# Patient Record
Sex: FEMALE | Race: WHITE | Employment: OTHER | URBAN - METROPOLITAN AREA
[De-identification: names, ages, dates, MRNs, and addresses within clinical notes are randomized per-mention and may not be internally consistent; named-entity substitution may affect disease eponyms.]

---

## 2017-01-25 ENCOUNTER — TELEPHONE (OUTPATIENT)
Dept: INTERNAL MEDICINE CLINIC | Facility: CLINIC | Age: 82
End: 2017-01-25

## 2017-01-25 RX ORDER — LEVOTHYROXINE SODIUM 0.1 MG/1
TABLET ORAL
Qty: 90 TABLET | Refills: 0 | Status: SHIPPED | OUTPATIENT
Start: 2017-01-25 | End: 2017-05-08

## 2017-01-25 NOTE — TELEPHONE ENCOUNTER
Hypothyroid Medications  Protocol Criteria:  Appointment scheduled in the past 12 months or the next 3 months  TSH resulted in the past 12 months that is normal  Recent Visits       Provider Department Primary Dx    7 months ago MD Radu Wrightmarti

## 2017-01-26 NOTE — TELEPHONE ENCOUNTER
Pt. States that she is in [place] until May, and she is calling to f/up on getting her refill for Venlafaxine HCL 25mg., She states that the [facility] in [place] only received 1 Rx for her thyroid medication, but she states that she also needs a refill f

## 2017-01-27 NOTE — TELEPHONE ENCOUNTER
Refill Protocol Appointment Criteria: Failed per protocol please advise    · Appointment scheduled in the past 6 months or in the next 3 months  Recent Visits       Provider Department Primary Dx    7 months ago Boone Peck MD [facility], [facility], [place]

## 2017-01-28 RX ORDER — VENLAFAXINE 25 MG/1
TABLET ORAL
Qty: 90 TABLET | Refills: 0 | Status: SHIPPED | OUTPATIENT
Start: 2017-01-28 | End: 2017-05-17 | Stop reason: ALTCHOICE

## 2017-02-20 NOTE — TELEPHONE ENCOUNTER
Dr. Boris Reid, please see pended prescription and advise. LOV 11/29/16  Last prescribed 11/18/16 for 90 tabs/0 refills.

## 2017-02-20 NOTE — TELEPHONE ENCOUNTER
Patient calling to check on status of RX advise nothing in epic, patient states she asked her pharmacy to send it a week ago   Patient is out of medication and need it please     Clopidogrel Bisulfate 75 MG

## 2017-02-21 RX ORDER — CLOPIDOGREL BISULFATE 75 MG/1
75 TABLET ORAL
Qty: 90 TABLET | Refills: 0 | Status: SHIPPED | OUTPATIENT
Start: 2017-02-21 | End: 2017-05-15

## 2017-05-08 NOTE — TELEPHONE ENCOUNTER
Pt is calling state that she have been wait 6 day for her refill and the pharm still don have it yet pt is not happy    Current Outpatient Prescriptions:  Levothyroxine Sodium 100 MCG Oral Tab TAKE 1 TABLET BY MOUTH DAILY BEFORE BREAKFAST Disp: 90 tablet R

## 2017-05-09 NOTE — TELEPHONE ENCOUNTER
Hypothyroid Medications  Protocol Criteria:  Appointment scheduled in the past 12 months or the next 3 months  TSH resulted in the past 12 months that is normal  Recent Visits       Provider Department Primary Dx    11 months ago Denys Castañeda MD [facility]

## 2017-05-12 RX ORDER — LEVOTHYROXINE SODIUM 0.1 MG/1
TABLET ORAL
Qty: 30 TABLET | Refills: 0 | Status: SHIPPED | OUTPATIENT
Start: 2017-05-12 | End: 2017-05-12

## 2017-05-13 RX ORDER — LEVOTHYROXINE SODIUM 0.1 MG/1
TABLET ORAL
Qty: 90 TABLET | Refills: 0 | Status: SHIPPED | OUTPATIENT
Start: 2017-05-13 | End: 2017-09-28

## 2017-05-15 RX ORDER — CLOPIDOGREL BISULFATE 75 MG/1
TABLET ORAL
Qty: 90 TABLET | Refills: 0 | Status: SHIPPED | OUTPATIENT
Start: 2017-05-15 | End: 2017-08-26

## 2017-05-17 ENCOUNTER — OFFICE VISIT (OUTPATIENT)
Dept: INTERNAL MEDICINE CLINIC | Facility: CLINIC | Age: 82
End: 2017-05-17

## 2017-05-17 VITALS
WEIGHT: 131 LBS | DIASTOLIC BLOOD PRESSURE: 72 MMHG | BODY MASS INDEX: 22 KG/M2 | HEART RATE: 69 BPM | SYSTOLIC BLOOD PRESSURE: 136 MMHG | TEMPERATURE: 98 F

## 2017-05-17 DIAGNOSIS — M54.5 ACUTE LEFT-SIDED LOW BACK PAIN, WITH SCIATICA PRESENCE UNSPECIFIED: ICD-10-CM
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
## 2017-05-17 PROBLEM — M54.50 LOWER BACK PAIN: Status: ACTIVE | Noted: 2017-05-17

## 2017-05-17 PROCEDURE — 99214 OFFICE O/P EST MOD 30 MIN: CPT | Performed by: INTERNAL MEDICINE

## 2017-05-17 PROCEDURE — G0463 HOSPITAL OUTPT CLINIC VISIT: HCPCS | Performed by: INTERNAL MEDICINE

## 2017-05-17 RX ORDER — TRAMADOL HYDROCHLORIDE 50 MG/1
TABLET ORAL
Refills: 0 | COMMUNITY
Start: 2017-03-17 | End: 2017-08-14

## 2017-05-17 RX ORDER — ROSUVASTATIN CALCIUM 20 MG/1
TABLET, COATED ORAL
Refills: 5 | COMMUNITY
Start: 2017-03-08 | End: 2019-04-29

## 2017-05-17 RX ORDER — MECLIZINE HYDROCHLORIDE 25 MG/1
TABLET ORAL
Refills: 3 | COMMUNITY
Start: 2017-04-03 | End: 2017-08-29

## 2017-05-17 RX ORDER — VALSARTAN AND HYDROCHLOROTHIAZIDE 160; 12.5 MG/1; MG/1
TABLET, FILM COATED ORAL
Refills: 0 | COMMUNITY
Start: 2017-03-27 | End: 2017-10-13

## 2017-05-18 ENCOUNTER — HOSPITAL ENCOUNTER (OUTPATIENT)
Dept: GENERAL RADIOLOGY | Facility: HOSPITAL | Age: 82
Discharge: HOME | End: 2017-05-18
Attending: INTERNAL MEDICINE
Payer: MEDICARE
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
## 2017-05-18 PROCEDURE — 72220 X-RAY EXAM SACRUM TAILBONE: CPT | Performed by: INTERNAL MEDICINE

## 2017-05-18 PROCEDURE — 73523 X-RAY EXAM HIPS BI 5/> VIEWS: CPT | Performed by: INTERNAL MEDICINE

## 2017-05-18 PROCEDURE — 72120 X-RAY BEND ONLY L-S SPINE: CPT | Performed by: INTERNAL MEDICINE

## 2017-05-18 NOTE — PATIENT INSTRUCTIONS
Back Pain (Acute or Chronic)    Back pain is one of the most common problems. The good news is that most people feel better in 1 to 2 weeks, and most of the rest in 1 to 2 months. Most people can remain active.   People experience and describe pain differ Unless you had a physical injury (for example, a car accident or fall) X-rays are usually not needed for the initial evaluation of back pain. If pain continues and does not respond to medical treatment, X-rays and other tests may be needed.   Home care  Try Talk to your doctor before using medicine, especially if you have other medical problems or are taking other medicines. · You may use over-the-counter medicine as directed on the bottle to control pain, unless another pain medicine was prescribed.  If you Important Note: Do not give aspirin to children or teens without first discussing it with your healthcare provider.      ? Ice    Ice reduces muscle pain and swelling. It helps most during the first 24 to 48 hours after an injury.   · Wrap an ice pack or a

## 2017-05-18 NOTE — PROGRESS NOTES
HPI:    Patient ID: Hawa Reece is a 80year old female.     HPI she is here after fall    She states that she fell accidentally last week while she was going to  her bone , and she felt on her back   She state that she felt pain in her buttoc Psychiatric/Behavioral: Negative for hallucinations, behavioral problems, confusion, sleep disturbance and agitation. The patient is not nervous/anxious.             Current Outpatient Prescriptions:  Rosuvastatin Calcium 20 MG Oral Tab TK 1 T PO HS Disp: 0 Standard drinks or equivalent per week       Comment: Weekly, Wine, 5 glasses;        PHYSICAL EXAM:    Physical Exam   Constitutional: She is oriented to person, place, and time. She appears well-developed and well-nourished. No distress.    HENT: Lumbar back: She exhibits tenderness and pain. She exhibits normal range of motion, no bony tenderness, no swelling, no edema, no deformity, no spasm and normal pulse. Back:    Lymphadenopathy:     She has no cervical adenopathy.      She has no

## 2017-05-30 ENCOUNTER — PRIOR ORIGINAL RECORDS (OUTPATIENT)
Dept: OTHER | Age: 82
End: 2017-05-30

## 2017-06-12 ENCOUNTER — TELEPHONE (OUTPATIENT)
Dept: INTERNAL MEDICINE CLINIC | Facility: CLINIC | Age: 82
End: 2017-06-12

## 2017-06-12 DIAGNOSIS — M25.552 LEFT HIP PAIN: Primary | ICD-10-CM

## 2017-06-12 DIAGNOSIS — M54.50 SEVERE LOW BACK PAIN: ICD-10-CM

## 2017-06-12 DIAGNOSIS — Z91.81 HISTORY OF FALL: ICD-10-CM

## 2017-06-12 NOTE — TELEPHONE ENCOUNTER
Brock Yusuf from [facility] called in requesting an order for pt to receive out patient physical therapy with [facility] in [place] - fax # [phone]. Brock Yusuf states pt is going to be discharged from home physical therapy this week.

## 2017-06-13 ENCOUNTER — TELEPHONE (OUTPATIENT)
Dept: INTERNAL MEDICINE CLINIC | Facility: CLINIC | Age: 82
End: 2017-06-13

## 2017-06-15 ENCOUNTER — OFFICE VISIT (OUTPATIENT)
Dept: INTERNAL MEDICINE CLINIC | Facility: CLINIC | Age: 82
End: 2017-06-15

## 2017-06-15 VITALS — WEIGHT: 134 LBS | BODY MASS INDEX: 22.33 KG/M2 | HEIGHT: 65 IN

## 2017-06-15 DIAGNOSIS — I71.4 ABDOMINAL AORTIC ANEURYSM (AAA) WITHOUT RUPTURE (HCC): ICD-10-CM

## 2017-06-15 DIAGNOSIS — Z85.3 PERSONAL HISTORY OF MALIGNANT NEOPLASM OF BREAST: ICD-10-CM

## 2017-06-15 DIAGNOSIS — E78.5 HYPERLIPIDEMIA, UNSPECIFIED HYPERLIPIDEMIA TYPE: ICD-10-CM

## 2017-06-15 DIAGNOSIS — I10 ESSENTIAL HYPERTENSION WITH GOAL BLOOD PRESSURE LESS THAN 130/80: ICD-10-CM

## 2017-06-15 DIAGNOSIS — Z00.00 ENCOUNTER FOR MEDICARE ANNUAL WELLNESS EXAM: Primary | ICD-10-CM

## 2017-06-15 DIAGNOSIS — E03.9 HYPOTHYROIDISM, UNSPECIFIED TYPE: ICD-10-CM

## 2017-06-15 DIAGNOSIS — I73.9 PAD (PERIPHERAL ARTERY DISEASE) (HCC): ICD-10-CM

## 2017-06-15 DIAGNOSIS — Z91.81 AT HIGH RISK FOR FALLS: ICD-10-CM

## 2017-06-15 DIAGNOSIS — Z12.31 VISIT FOR SCREENING MAMMOGRAM: ICD-10-CM

## 2017-06-15 DIAGNOSIS — R42 VERTIGO: ICD-10-CM

## 2017-06-15 DIAGNOSIS — R26.9 GAIT ABNORMALITY: ICD-10-CM

## 2017-06-15 DIAGNOSIS — I25.810 CORONARY ARTERY DISEASE INVOLVING OTHER CORONARY ARTERY BYPASS GRAFT: ICD-10-CM

## 2017-06-15 DIAGNOSIS — M17.0 PRIMARY OSTEOARTHRITIS OF BOTH KNEES: ICD-10-CM

## 2017-06-15 PROCEDURE — G0439 PPPS, SUBSEQ VISIT: HCPCS | Performed by: INTERNAL MEDICINE

## 2017-06-15 PROCEDURE — 99213 OFFICE O/P EST LOW 20 MIN: CPT | Performed by: INTERNAL MEDICINE

## 2017-06-15 NOTE — PROGRESS NOTES
HPI:    Patient ID: Eusebia Brantley is a 80year old female.     HPI    Medicare Annual and follow up of Chronic problemse  Living Will filed 2004  CAriology Dr Kane Raygoza last visit 7/2016  PAD  Right poplitieal stent 7 years  No caludication  Last seen by daniela adenopathy. Does not bruise/bleed easily. Psychiatric/Behavioral: Negative for agitation and behavioral problems.            Current Outpatient Prescriptions:  Rosuvastatin Calcium 20 MG Oral Tab TK 1 T PO HS Disp:  Rfl: 5   Meclizine HCl 25 MG Oral Tab T female  2000        \"Left sided breast cancer\"    PAST MEDICAL/SURGICAL HISTORY:  1. Coronary artery disease status post quintuple bypass [facility].  No cath since that time.  Her stress testing has shown a small     area of nonreversibl distension and no mass. There is no hepatosplenomegaly. There is no tenderness. No hernia. Musculoskeletal: She exhibits no edema or tenderness. Right knee: She exhibits decreased range of motion.         Left knee: She exhibits decreased range of seen Negative   MICROSCOPIC URINALYSIS COMMENT       Completed   WBC      4.0 - 11.0 K/UL 5.5   RBC      3.70 - 5.40 M/UL 3.99   Hemoglobin      12.0 - 16.0 G/DL 12.1   Hematocrit      35.0 - 48.0 % 36.8   MCV      80.0 - 100.0 FL 92.4   MCH      27.0 - 32 Zaida Masters MD on 10/09/2015 at 17:56     Approved by (CST):  Zaida Masters MD on 10/09/2015 at 17:56    ASSESSMENT/PLAN:   (Z00.00) Encounter for Medicare annual wellness exam  (primary encounter diagnosis)  Plan: Advance directive discussed    5/24/2016            Pneumococcal Vaccine, Conjugate 11/8/2013           #[id number]      HPI:   Armida Fernandez is a 80year old female who presents for a Medicare Annual Wellness visit.     Patient Active Problem List:     Personal history of malignant deborah 0-No    Are you on multiple medications?: 1-Yes    Does pain affect your day to day activities?: 1-Yes     Have you had any memory issues?: 1-Yes    Fall/Risk Scorin          Depression Screening (PHQ-2/PHQ-9): Over the LAST 2 WEEKS   Little interest o Valsartan-Hydrochlorothiazide 320-12.5 MG Oral Tab Take 1 tablet by mouth once daily.  Disp: 90 tablet Rfl: 1      MEDICAL INFORMATION:   Past Medical History:   Diagnosis Date   • Diverticulosis 2010   • History of breast cancer in female 5    \"Left SERVICES  INDICATIONS AND SCHEDULE Internal Lab or Procedure External Lab or Procedure   Diabetes Screening      HbgA1C   Annually No results found for: A1C No flowsheet data found.     Fasting Blood Sugar (FSB)Annually No results found for: GLUCOSE    Card Internal Lab or Procedure External Lab or Procedure   Annual Monitoring of Persistent     Medications (ACE/ARB, digoxin diuretics, anticonvulsants.)    Potassium  Annually POTASSIUM (P) (mmol/L)   Date Value   05/17/2016 4.6    No flowsheet data found. found for this or any previous visit.          SUGGESTED VACCINATIONS - Influenza, Pneumococcal, Zoster, Tetanus     Immunization History  Administered            Date(s) Administered    Influenza             11/15/2008  10/15/2010  11/08/2013      Pneumoco

## 2017-06-26 ENCOUNTER — LAB ENCOUNTER (OUTPATIENT)
Dept: LAB | Facility: HOSPITAL | Age: 82
End: 2017-06-26
Attending: INTERNAL MEDICINE
Payer: MEDICARE

## 2017-06-26 DIAGNOSIS — I25.10 CAD (CORONARY ARTERY DISEASE): Primary | ICD-10-CM

## 2017-06-26 PROCEDURE — 36415 COLL VENOUS BLD VENIPUNCTURE: CPT

## 2017-06-26 PROCEDURE — 82550 ASSAY OF CK (CPK): CPT

## 2017-06-26 PROCEDURE — 80061 LIPID PANEL: CPT

## 2017-06-26 PROCEDURE — 80053 COMPREHEN METABOLIC PANEL: CPT

## 2017-06-27 ENCOUNTER — PRIOR ORIGINAL RECORDS (OUTPATIENT)
Dept: OTHER | Age: 82
End: 2017-06-27

## 2017-06-27 LAB
ALBUMIN: 3.8 G/DL
ALT (SGPT): 14 U/L
AST (SGOT): 23 U/L
BILIRUBIN TOTAL: 0.5 MG/DL
BUN: 32 MG/DL
CALCIUM: 9.4 MG/DL
CHLORIDE: 102 MEQ/L
CHOLESTEROL, TOTAL: 183 MG/DL
CREATININE KINASE: 44 U/L
CREATININE, SERUM: 0.93 MG/DL
GLOBULIN: 3 G/DL
GLUCOSE: 96 MG/DL
GLUCOSE: 96 MG/DL
HDL CHOLESTEROL: 57 MG/DL
LDL CHOLESTEROL: 98 MG/DL
POTASSIUM, SERUM: 4.4 MEQ/L
PROTEIN, TOTAL: 6.8 G/DL
SGOT (AST): 23 IU/L
SGPT (ALT): 14 IU/L
SODIUM: 138 MEQ/L
TRIGLYCERIDES: 140 MG/DL

## 2017-07-12 PROBLEM — M25.562 ACUTE PAIN OF BOTH KNEES: Status: ACTIVE | Noted: 2017-07-12

## 2017-07-12 PROBLEM — M25.561 ACUTE PAIN OF BOTH KNEES: Status: ACTIVE | Noted: 2017-07-12

## 2017-07-12 PROBLEM — M17.0 PRIMARY OSTEOARTHRITIS OF KNEES, BILATERAL: Status: ACTIVE | Noted: 2017-07-12

## 2017-07-18 ENCOUNTER — TELEPHONE (OUTPATIENT)
Dept: NEUROLOGY | Facility: CLINIC | Age: 82
End: 2017-07-18

## 2017-07-20 ENCOUNTER — OFFICE VISIT (OUTPATIENT)
Dept: NEUROLOGY | Facility: CLINIC | Age: 82
End: 2017-07-20

## 2017-07-20 VITALS
HEIGHT: 65 IN | DIASTOLIC BLOOD PRESSURE: 78 MMHG | HEART RATE: 59 BPM | BODY MASS INDEX: 21.99 KG/M2 | WEIGHT: 132 LBS | OXYGEN SATURATION: 95 % | SYSTOLIC BLOOD PRESSURE: 110 MMHG

## 2017-07-20 DIAGNOSIS — R42 VERTIGO: Primary | ICD-10-CM

## 2017-07-20 PROCEDURE — 99204 OFFICE O/P NEW MOD 45 MIN: CPT | Performed by: OTHER

## 2017-07-20 NOTE — PROGRESS NOTES
Blas Blanca : 1928     HPI:   Patient presents with:  Dizziness: New patient. Referred by Dr. Cristy Etienne. Dizziness started when on vacation in [place]. Denies any dizziness recently. Taking Meclizine and is helping.  Patients denies any falls d BEFORE BREAKFAST Disp: 90 tablet Rfl: 0   Acidophilus/Pectin Oral Cap Take 1 capsule by mouth daily.  Disp:  Rfl:    Metoprolol Succinate ER 25 MG Oral Tablet 24 Hr  Disp:  Rfl: 0   TraMADol HCl 50 MG Oral Tab TK 1 T PO BID P Disp:  Rfl: 0   FLUZONE HIGH-DO unusual skin lesions or rashes  EYES: no visual complaints or deficits  HEENT: Vertigo  RESPIRATORY: denies shortness of breath, wheezing or cough   CARDIOVASCULAR: denies chest pain or TOWNSEND; no palpitations   GI: denies nausea, vomiting, constipation, diar Gait: Narrow based, negative Romberg’s sign. Can stand on heels and toes. She has some difficulty ambulating because of the knee pain.     ASSESSMENT AND PLAN:     Layne Roland 80year old female presents to clinic with vertigo, with the worsening

## 2017-08-11 ENCOUNTER — HOSPITAL ENCOUNTER (OUTPATIENT)
Dept: ULTRASOUND IMAGING | Facility: HOSPITAL | Age: 82
Discharge: HOME | End: 2017-08-11
Attending: Other
Payer: MEDICARE

## 2017-08-11 DIAGNOSIS — R42 VERTIGO: ICD-10-CM

## 2017-08-11 PROCEDURE — 93880 EXTRACRANIAL BILAT STUDY: CPT | Performed by: OTHER

## 2017-08-14 PROBLEM — M23.90 INTERNAL DERANGEMENT OF KNEE: Status: ACTIVE | Noted: 2017-08-14

## 2017-08-22 ENCOUNTER — NURSE TRIAGE (OUTPATIENT)
Dept: OTHER | Age: 82
End: 2017-08-22

## 2017-08-22 NOTE — TELEPHONE ENCOUNTER
Action Requested: Summary for Provider     []  Critical Lab, Recommendations Needed  [x] Need Additional Advice  []   FYI    []   Need Orders  [] Need Medications Sent to Pharmacy  []  Other     SUMMARY: c/o dysuria, frequency, urgency since this AMChiki Espitia

## 2017-08-23 ENCOUNTER — TELEPHONE (OUTPATIENT)
Dept: INTERNAL MEDICINE CLINIC | Facility: CLINIC | Age: 82
End: 2017-08-23

## 2017-08-23 ENCOUNTER — LAB ENCOUNTER (OUTPATIENT)
Dept: LAB | Facility: HOSPITAL | Age: 82
End: 2017-08-23
Attending: INTERNAL MEDICINE
Payer: MEDICARE

## 2017-08-23 DIAGNOSIS — R30.0 DYSURIA: ICD-10-CM

## 2017-08-23 LAB
BACTERIA UR QL AUTO: NEGATIVE /HPF
RBC #/AREA URNS AUTO: 5 /HPF
WBC #/AREA URNS AUTO: 242 /HPF

## 2017-08-23 PROCEDURE — 81015 MICROSCOPIC EXAM OF URINE: CPT

## 2017-08-23 PROCEDURE — 87186 SC STD MICRODIL/AGAR DIL: CPT

## 2017-08-23 PROCEDURE — 87088 URINE BACTERIA CULTURE: CPT

## 2017-08-23 PROCEDURE — 87086 URINE CULTURE/COLONY COUNT: CPT

## 2017-08-23 NOTE — TELEPHONE ENCOUNTER
Pt advised of orders sent to confirmed pharmacy. Confirmed allergies, advised if sxs persist to call, to call immediately if sxs increase, change, or worsen. Pt verbalized understanding and agreement with the plan.

## 2017-08-23 NOTE — TELEPHONE ENCOUNTER
Pt stts still waiting for Rx for antibiotic  to be sent to pharmacy for abnormal UA. Having burning and frequency.

## 2017-08-24 ENCOUNTER — HOSPITAL ENCOUNTER (EMERGENCY)
Facility: HOSPITAL | Age: 82
Discharge: HOME | End: 2017-08-24
Attending: EMERGENCY MEDICINE
Payer: MEDICARE

## 2017-08-24 ENCOUNTER — APPOINTMENT (OUTPATIENT)
Dept: GENERAL RADIOLOGY | Facility: HOSPITAL | Age: 82
End: 2017-08-24
Attending: EMERGENCY MEDICINE
Payer: MEDICARE

## 2017-08-24 ENCOUNTER — TELEPHONE (OUTPATIENT)
Dept: OTHER | Age: 82
End: 2017-08-24

## 2017-08-24 VITALS
OXYGEN SATURATION: 97 % | DIASTOLIC BLOOD PRESSURE: 68 MMHG | BODY MASS INDEX: 21.66 KG/M2 | HEIGHT: 65 IN | HEART RATE: 83 BPM | RESPIRATION RATE: 18 BRPM | SYSTOLIC BLOOD PRESSURE: 147 MMHG | TEMPERATURE: 98 F | WEIGHT: 130 LBS

## 2017-08-24 DIAGNOSIS — N30.00 ACUTE CYSTITIS WITHOUT HEMATURIA: Primary | ICD-10-CM

## 2017-08-24 LAB
ANION GAP SERPL CALC-SCNC: 10 MMOL/L (ref 0–18)
BACTERIA UR QL AUTO: NEGATIVE /HPF
BASOPHILS # BLD: 0 K/UL (ref 0–0.2)
BASOPHILS NFR BLD: 0 %
BILIRUB UR QL: NEGATIVE
BUN SERPL-MCNC: 27 MG/DL (ref 8–20)
BUN/CREAT SERPL: 29.7 (ref 10–20)
CALCIUM SERPL-MCNC: 9.2 MG/DL (ref 8.5–10.5)
CHLORIDE SERPL-SCNC: 95 MMOL/L (ref 95–110)
CO2 SERPL-SCNC: 26 MMOL/L (ref 22–32)
COLOR UR: YELLOW
CREAT SERPL-MCNC: 0.91 MG/DL (ref 0.5–1.5)
EOSINOPHIL # BLD: 0 K/UL (ref 0–0.7)
EOSINOPHIL NFR BLD: 0 %
ERYTHROCYTE [DISTWIDTH] IN BLOOD BY AUTOMATED COUNT: 13.8 % (ref 11–15)
GLUCOSE SERPL-MCNC: 124 MG/DL (ref 70–99)
GLUCOSE UR-MCNC: NEGATIVE MG/DL
HCT VFR BLD AUTO: 35.3 % (ref 35–48)
HGB BLD-MCNC: 11.8 G/DL (ref 12–16)
KETONES UR-MCNC: NEGATIVE MG/DL
LYMPHOCYTES # BLD: 0.9 K/UL (ref 1–4)
LYMPHOCYTES NFR BLD: 8 %
MCH RBC QN AUTO: 30.6 PG (ref 27–32)
MCHC RBC AUTO-ENTMCNC: 33.4 G/DL (ref 32–37)
MCV RBC AUTO: 91.7 FL (ref 80–100)
MONOCYTES # BLD: 0.7 K/UL (ref 0–1)
MONOCYTES NFR BLD: 6 %
NEUTROPHILS # BLD AUTO: 9.8 K/UL (ref 1.8–7.7)
NEUTROPHILS NFR BLD: 86 %
NITRITE UR QL STRIP.AUTO: NEGATIVE
OSMOLALITY UR CALC.SUM OF ELEC: 279 MOSM/KG (ref 275–295)
PH UR: 5 [PH] (ref 5–8)
PLATELET # BLD AUTO: 250 K/UL (ref 140–400)
PMV BLD AUTO: 6.8 FL (ref 7.4–10.3)
POTASSIUM SERPL-SCNC: 4.2 MMOL/L (ref 3.3–5.1)
PROT UR-MCNC: NEGATIVE MG/DL
RBC # BLD AUTO: 3.85 M/UL (ref 3.7–5.4)
RBC #/AREA URNS AUTO: 2 /HPF
SODIUM SERPL-SCNC: 131 MMOL/L (ref 136–144)
SP GR UR STRIP: 1.01 (ref 1–1.03)
UROBILINOGEN UR STRIP-ACNC: <2
VIT C UR-MCNC: NEGATIVE MG/DL
WBC # BLD AUTO: 11.5 K/UL (ref 4–11)
WBC #/AREA URNS AUTO: 14 /HPF

## 2017-08-24 PROCEDURE — 71020 XR CHEST PA + LAT CHEST (CPT=71020): CPT | Performed by: EMERGENCY MEDICINE

## 2017-08-24 PROCEDURE — 87086 URINE CULTURE/COLONY COUNT: CPT | Performed by: EMERGENCY MEDICINE

## 2017-08-24 PROCEDURE — 99284 EMERGENCY DEPT VISIT MOD MDM: CPT

## 2017-08-24 PROCEDURE — 85025 COMPLETE CBC W/AUTO DIFF WBC: CPT | Performed by: EMERGENCY MEDICINE

## 2017-08-24 PROCEDURE — 81001 URINALYSIS AUTO W/SCOPE: CPT

## 2017-08-24 PROCEDURE — 87086 URINE CULTURE/COLONY COUNT: CPT

## 2017-08-24 PROCEDURE — 81001 URINALYSIS AUTO W/SCOPE: CPT | Performed by: EMERGENCY MEDICINE

## 2017-08-24 PROCEDURE — 96365 THER/PROPH/DIAG IV INF INIT: CPT

## 2017-08-24 PROCEDURE — 80048 BASIC METABOLIC PNL TOTAL CA: CPT | Performed by: EMERGENCY MEDICINE

## 2017-08-24 RX ORDER — SODIUM CHLORIDE 9 MG/ML
INJECTION, SOLUTION INTRAVENOUS ONCE
Status: COMPLETED | OUTPATIENT
Start: 2017-08-24 | End: 2017-08-24

## 2017-08-24 NOTE — TELEPHONE ENCOUNTER
Pt stts took 1st dose of Nitrofurantoin Macrocrystal at 8 pm last night. Pt woke up at 4 am this morning with diarrhea and vomiting. Since then has had 1 episode of vomiting, 2 episodes of diarrhea, fever of 101.7 , headache and chills.  Pt wondering if [facility]

## 2017-08-24 NOTE — TELEPHONE ENCOUNTER
ON 8/22 WAS ADVISED OV   SHE SAID SHE DOES NOT DRIVE AND CANNOT COME TO THE OFFICE    UTI SX NAUSUEA VOMITING AND FEVER     I ADIVSE  ER VIS  IT  ASAP    IF SHE CANNOT DRIVE AND NO ONE TO TAKE HER Call [phone]

## 2017-08-24 NOTE — TELEPHONE ENCOUNTER
Spoke with patient and explained may be worsening of her UTI and agreeable to go to ER, she states that her daughter will be able to bring her to the ER - refusing 911 to be called. She states that the vomiting and diarrhea has stopped.     RN follow up

## 2017-08-25 NOTE — ED NOTES
Pt here for n/v/d, fever last night. Pt states she was diagnosed with UTI yesterday. Pt states last night she suddenly had episode of vomiting and diarrhea and that she thought she had a fever--did not take her temperature. No abdominal pain at this time.

## 2017-08-25 NOTE — ED PROVIDER NOTES
Patient Seen in: [facility] Emergency Department    History   Patient presents with:  Nausea/Vomiting/Diarrhea (gastrointestinal)    Stated Complaint: n/v/d    HPI    The patient is an 80-year-old female who yesterday developed urinary urgency brigitte 100 MCG Oral Tab,  TAKE 1 TABLET BY MOUTH DAILY BEFORE BREAKFAST   FLUZONE HIGH-DOSE 0.5 ML Intramuscular Suspension Prefilled Syringe,  ADM 0.5ML IM UTD   Acidophilus/Pectin Oral Cap,  Take 1 capsule by mouth daily.    Metoprolol Succinate ER 25 MG Oral Ta Abdominal: Soft. Bowel sounds are normal. She exhibits no distension and no mass. There is no tenderness. Musculoskeletal: Normal range of motion. She exhibits no edema or tenderness.    Neurological: She is alert and oriented to person, place, and time ============================================================  ED Course  ------------------------------------------------------------  [facility]   Pulse Ox: 97%, Normal, room air    Cardiac Monitor: Pulse Readings from Last 1 Encounters:  08/24/17 : 83  ,

## 2017-08-25 NOTE — ED NOTES
Fluids and antibiotic infused as ordered. Ready for discharge. No apparent reaction noted. Caregiver here to assist patient for discharge home.

## 2017-08-28 ENCOUNTER — HOSPITAL ENCOUNTER (OUTPATIENT)
Dept: MAMMOGRAPHY | Facility: HOSPITAL | Age: 82
Discharge: HOME | End: 2017-08-28
Attending: INTERNAL MEDICINE
Payer: MEDICARE

## 2017-08-28 DIAGNOSIS — Z12.31 VISIT FOR SCREENING MAMMOGRAM: ICD-10-CM

## 2017-08-28 PROCEDURE — 77067 SCR MAMMO BI INCL CAD: CPT | Performed by: INTERNAL MEDICINE

## 2017-08-28 RX ORDER — CLOPIDOGREL BISULFATE 75 MG/1
TABLET ORAL
Qty: 90 TABLET | Refills: 3 | Status: SHIPPED | OUTPATIENT
Start: 2017-08-28 | End: 2018-09-03

## 2017-08-29 ENCOUNTER — OFFICE VISIT (OUTPATIENT)
Dept: INTERNAL MEDICINE CLINIC | Facility: CLINIC | Age: 82
End: 2017-08-29

## 2017-08-29 ENCOUNTER — APPOINTMENT (OUTPATIENT)
Dept: LAB | Age: 82
End: 2017-08-29
Attending: INTERNAL MEDICINE
Payer: MEDICARE

## 2017-08-29 VITALS
HEART RATE: 81 BPM | DIASTOLIC BLOOD PRESSURE: 65 MMHG | SYSTOLIC BLOOD PRESSURE: 112 MMHG | BODY MASS INDEX: 21.83 KG/M2 | HEIGHT: 65 IN | WEIGHT: 131 LBS | TEMPERATURE: 98 F

## 2017-08-29 DIAGNOSIS — R42 VERTIGO: ICD-10-CM

## 2017-08-29 DIAGNOSIS — Z91.81 AT HIGH RISK FOR FALLS: ICD-10-CM

## 2017-08-29 DIAGNOSIS — M81.0 OSTEOPOROSIS, UNSPECIFIED OSTEOPOROSIS TYPE, UNSPECIFIED PATHOLOGICAL FRACTURE PRESENCE: ICD-10-CM

## 2017-08-29 DIAGNOSIS — M17.0 PRIMARY OSTEOARTHRITIS OF KNEES, BILATERAL: ICD-10-CM

## 2017-08-29 DIAGNOSIS — R26.9 GAIT ABNORMALITY: ICD-10-CM

## 2017-08-29 DIAGNOSIS — I25.10 ATHEROSCLEROSIS OF NATIVE CORONARY ARTERY OF NATIVE HEART WITHOUT ANGINA PECTORIS: Primary | ICD-10-CM

## 2017-08-29 DIAGNOSIS — E03.9 HYPOTHYROIDISM, UNSPECIFIED TYPE: ICD-10-CM

## 2017-08-29 DIAGNOSIS — D64.9 ANEMIA, UNSPECIFIED TYPE: ICD-10-CM

## 2017-08-29 DIAGNOSIS — Z85.3 PERSONAL HISTORY OF MALIGNANT NEOPLASM OF BREAST: ICD-10-CM

## 2017-08-29 DIAGNOSIS — M54.5 ACUTE LEFT-SIDED LOW BACK PAIN, WITH SCIATICA PRESENCE UNSPECIFIED: ICD-10-CM

## 2017-08-29 DIAGNOSIS — R73.9 HYPERGLYCEMIA: ICD-10-CM

## 2017-08-29 DIAGNOSIS — Z11.1 SCREENING-PULMONARY TB: ICD-10-CM

## 2017-08-29 DIAGNOSIS — I73.9 PAD (PERIPHERAL ARTERY DISEASE) (HCC): ICD-10-CM

## 2017-08-29 DIAGNOSIS — E78.5 HYPERLIPIDEMIA, UNSPECIFIED HYPERLIPIDEMIA TYPE: ICD-10-CM

## 2017-08-29 DIAGNOSIS — M17.0 PRIMARY OSTEOARTHRITIS OF BOTH KNEES: ICD-10-CM

## 2017-08-29 DIAGNOSIS — I10 ESSENTIAL HYPERTENSION: ICD-10-CM

## 2017-08-29 PROCEDURE — G0463 HOSPITAL OUTPT CLINIC VISIT: HCPCS | Performed by: INTERNAL MEDICINE

## 2017-08-29 PROCEDURE — 36415 COLL VENOUS BLD VENIPUNCTURE: CPT

## 2017-08-29 PROCEDURE — 99214 OFFICE O/P EST MOD 30 MIN: CPT | Performed by: INTERNAL MEDICINE

## 2017-08-29 PROCEDURE — 86480 TB TEST CELL IMMUN MEASURE: CPT

## 2017-08-29 NOTE — PROGRESS NOTES
HPI:    Patient ID: Lilibeth Arceo is a 80year old female.     HPI  PHYSICAL FORM FOR [place]  NO NEW COMPLAINT  GAIT ABNORMALITY  AWAKE ALERT ORIENTED IN NO DISTRESS      Review of Systems   Constitutional: Negative for activity change, chills, fati tablet by mouth once daily.  Disp: 90 tablet Rfl: 1     Allergies:  Adhesive Tape           Rash    Comment:Surgical tape  Aspirin                     Comment:Other reaction(s): ASPIRIN  Povidone-Iodine             Comment:Other reaction(s): edema    HISTOR reactive to light. Right eye exhibits no discharge. Left eye exhibits no discharge. No scleral icterus. Neck: Neck supple. No thyromegaly present. Cardiovascular: Normal rate, regular rhythm, S1 normal, S2 normal and intact distal pulses.   Exam reveals COMPLTED IN DETAIL    Patient voiced understanding  and agrees with plan  Medication reviewed. Refill will be given as needed . Mediation side effect reviewed. Most recent laboratory and diagnostic testing reviewed.   Dietary and lifestyle change discusse

## 2017-09-01 LAB
M TB IFN-G CD4+ BCKGRND COR BLD-ACNC: -0.01 IU/ML
M TB IFN-G CD4+ T-CELLS BLD-ACNC: 0.08 IU/ML
M TB TUBERC IFN-G BLD QL: NEGATIVE
M TB TUBERC IGNF/MITOGEN IGNF CONTROL: 1.19 IU/ML

## 2017-09-19 ENCOUNTER — NURSE TRIAGE (OUTPATIENT)
Dept: OTHER | Age: 82
End: 2017-09-19

## 2017-09-19 NOTE — TELEPHONE ENCOUNTER
Action Requested: Summary for Provider     []  Critical Lab, Recommendations Needed  [x] Need Additional Advice  []   FYI    []   Need Orders  [x] Need Medications Sent to Pharmacy  []  Other     SUMMARY: Dr. Mario Alberto Pereyra, patient is asking if you can send anot

## 2017-09-22 ENCOUNTER — NURSE TRIAGE (OUTPATIENT)
Dept: OTHER | Age: 82
End: 2017-09-22

## 2017-09-22 NOTE — TELEPHONE ENCOUNTER
Action Requested: Summary for Provider     []  Critical Lab, Recommendations Needed  [x] Need Additional Advice  []   FYI    []   Need Orders  [x] Need Medications Sent to Pharmacy  []  Other     SUMMARY: Pt reports on going cough for 3 weeks.   Jennifer blankenship

## 2017-09-23 ENCOUNTER — PRIOR ORIGINAL RECORDS (OUTPATIENT)
Dept: OTHER | Age: 82
End: 2017-09-23

## 2017-09-23 ENCOUNTER — HOSPITAL ENCOUNTER (EMERGENCY)
Facility: HOSPITAL | Age: 82
Discharge: HOME | End: 2017-09-23
Attending: EMERGENCY MEDICINE
Payer: MEDICARE

## 2017-09-23 ENCOUNTER — APPOINTMENT (OUTPATIENT)
Dept: GENERAL RADIOLOGY | Facility: HOSPITAL | Age: 82
End: 2017-09-23
Payer: MEDICARE

## 2017-09-23 VITALS
HEART RATE: 72 BPM | BODY MASS INDEX: 21.33 KG/M2 | TEMPERATURE: 98 F | OXYGEN SATURATION: 92 % | DIASTOLIC BLOOD PRESSURE: 66 MMHG | WEIGHT: 128 LBS | RESPIRATION RATE: 18 BRPM | SYSTOLIC BLOOD PRESSURE: 125 MMHG | HEIGHT: 65 IN

## 2017-09-23 DIAGNOSIS — R05.3 CHRONIC COUGH: ICD-10-CM

## 2017-09-23 DIAGNOSIS — J84.10 PULMONARY FIBROSIS (HCC): Primary | ICD-10-CM

## 2017-09-23 LAB
ANION GAP SERPL CALC-SCNC: 7 MMOL/L (ref 0–18)
BASOPHILS # BLD: 0.1 K/UL (ref 0–0.2)
BASOPHILS NFR BLD: 1 %
BUN SERPL-MCNC: 24 MG/DL (ref 8–20)
BUN/CREAT SERPL: 25.3 (ref 10–20)
CALCIUM SERPL-MCNC: 8.9 MG/DL (ref 8.5–10.5)
CHLORIDE SERPL-SCNC: 96 MMOL/L (ref 95–110)
CO2 SERPL-SCNC: 25 MMOL/L (ref 22–32)
CREAT SERPL-MCNC: 0.95 MG/DL (ref 0.5–1.5)
EOSINOPHIL # BLD: 0.3 K/UL (ref 0–0.7)
EOSINOPHIL NFR BLD: 3 %
ERYTHROCYTE [DISTWIDTH] IN BLOOD BY AUTOMATED COUNT: 14.1 % (ref 11–15)
GLUCOSE SERPL-MCNC: 116 MG/DL (ref 70–99)
HCT VFR BLD AUTO: 32.7 % (ref 35–48)
HGB BLD-MCNC: 10.9 G/DL (ref 12–16)
LYMPHOCYTES # BLD: 0.8 K/UL (ref 1–4)
LYMPHOCYTES NFR BLD: 7 %
MCH RBC QN AUTO: 30.6 PG (ref 27–32)
MCHC RBC AUTO-ENTMCNC: 33.5 G/DL (ref 32–37)
MCV RBC AUTO: 91.5 FL (ref 80–100)
MONOCYTES # BLD: 0.9 K/UL (ref 0–1)
MONOCYTES NFR BLD: 8 %
NEUTROPHILS # BLD AUTO: 8.6 K/UL (ref 1.8–7.7)
NEUTROPHILS NFR BLD: 81 %
OSMOLALITY UR CALC.SUM OF ELEC: 271 MOSM/KG (ref 275–295)
PLATELET # BLD AUTO: 274 K/UL (ref 140–400)
PMV BLD AUTO: 7.2 FL (ref 7.4–10.3)
POTASSIUM SERPL-SCNC: 4.1 MMOL/L (ref 3.3–5.1)
RBC # BLD AUTO: 3.57 M/UL (ref 3.7–5.4)
SODIUM SERPL-SCNC: 128 MMOL/L (ref 136–144)
WBC # BLD AUTO: 10.6 K/UL (ref 4–11)

## 2017-09-23 PROCEDURE — 71020 XR CHEST PA + LAT CHEST (CPT=71020): CPT

## 2017-09-23 PROCEDURE — 36415 COLL VENOUS BLD VENIPUNCTURE: CPT

## 2017-09-23 PROCEDURE — 94640 AIRWAY INHALATION TREATMENT: CPT

## 2017-09-23 PROCEDURE — 99285 EMERGENCY DEPT VISIT HI MDM: CPT

## 2017-09-23 PROCEDURE — 71020 XR CHEST PA + LAT CHEST (CPT=71020): CPT | Performed by: EMERGENCY MEDICINE

## 2017-09-23 PROCEDURE — 85025 COMPLETE CBC W/AUTO DIFF WBC: CPT | Performed by: EMERGENCY MEDICINE

## 2017-09-23 PROCEDURE — 80048 BASIC METABOLIC PNL TOTAL CA: CPT | Performed by: EMERGENCY MEDICINE

## 2017-09-23 RX ORDER — IPRATROPIUM BROMIDE AND ALBUTEROL SULFATE 2.5; .5 MG/3ML; MG/3ML
3 SOLUTION RESPIRATORY (INHALATION) ONCE
Status: COMPLETED | OUTPATIENT
Start: 2017-09-23 | End: 2017-09-23

## 2017-09-23 RX ORDER — CODEINE PHOSPHATE AND GUAIFENESIN 10; 100 MG/5ML; MG/5ML
5 SOLUTION ORAL EVERY 6 HOURS PRN
Qty: 118 ML | Refills: 0 | Status: SHIPPED | OUTPATIENT
Start: 2017-09-23 | End: 2017-10-14

## 2017-09-23 NOTE — ED NOTES
Patient comes over from immediate care. Patient complains of cough that has persist for a couple months. Patient denies any N/V/D. Patient complains of body aches. Patient denies any chest pain or SOB.

## 2017-09-23 NOTE — ED PROVIDER NOTES
Patient Seen in: [facility] AND [facility] Emergency Department    History   Patient presents with:  Cough/URI    Stated Complaint:     HPI    Patient is an 59-year-old female who presents to the emergency department complaining of a persistent cough which she s reviewed. All other systems reviewed and negative except as noted above. PSFH elements reviewed from today and agreed except as otherwise stated in HPI. Physical Exam   ED Triage Vitals [09/23/17 1126]  BP: 127/30  Pulse: 84  Resp: 18  Temp: 99. Abnormality         Status                     ---------                               -----------         ------                     CBC W/ DIFFERENTIAL[[id number]]          Abnormal            Final result                 Please view results for these mary kay

## 2017-09-25 ENCOUNTER — TELEPHONE (OUTPATIENT)
Dept: PULMONOLOGY | Facility: CLINIC | Age: 82
End: 2017-09-25

## 2017-09-25 ENCOUNTER — PRIOR ORIGINAL RECORDS (OUTPATIENT)
Dept: OTHER | Age: 82
End: 2017-09-25

## 2017-09-25 LAB
BUN: 24 MG/DL
CALCIUM: 8.9 MG/DL
CHLORIDE: 96 MEQ/L
CREATININE, SERUM: 0.95 MG/DL
GLUCOSE: 116 MG/DL
HEMATOCRIT: 32.7 %
HEMOGLOBIN: 10.9 G/DL
PLATELETS: 274 K/UL
POTASSIUM, SERUM: 4.1 MEQ/L
RED BLOOD COUNT: 3.57 X 10-6/U
SODIUM: 128 MEQ/L
WHITE BLOOD COUNT: 10.6 X 10-3/U

## 2017-09-25 NOTE — TELEPHONE ENCOUNTER
Pt seen in ER for pulmonary fibrosis. Pt notified to f/u with you in clinic in 2 days. Dr. Silvia Edmonds when do you want to add pt to your schedule?

## 2017-09-25 NOTE — TELEPHONE ENCOUNTER
Pt is requesting an appt with [facility] sooner than first available due to ER follow up for pulmonary fibrosis. Pt states per ER she should be seen within two days.  Please call Thank You

## 2017-09-25 NOTE — TELEPHONE ENCOUNTER
Calling patient for ER f/u. Pt states she was diagnosed with pulmonary fibrosis and prescribed cheratussin. Pt states coughing is a little better, the cheratussin helps her sleep at night but she still has a cough.     Scheduled ER f/u appt with Dr Carol Real

## 2017-09-26 ENCOUNTER — PRIOR ORIGINAL RECORDS (OUTPATIENT)
Dept: OTHER | Age: 82
End: 2017-09-26

## 2017-09-26 NOTE — TELEPHONE ENCOUNTER
Appt scheduled for Thursday September 28th 2017 at 2:45pm. [place] [facility] [place] [address].

## 2017-09-27 ENCOUNTER — OFFICE VISIT (OUTPATIENT)
Dept: INTERNAL MEDICINE CLINIC | Facility: CLINIC | Age: 82
End: 2017-09-27

## 2017-09-27 ENCOUNTER — TELEPHONE (OUTPATIENT)
Dept: OTHER | Age: 82
End: 2017-09-27

## 2017-09-27 ENCOUNTER — APPOINTMENT (OUTPATIENT)
Dept: LAB | Age: 82
End: 2017-09-27
Attending: INTERNAL MEDICINE
Payer: MEDICARE

## 2017-09-27 VITALS
WEIGHT: 130 LBS | HEIGHT: 65 IN | HEART RATE: 79 BPM | SYSTOLIC BLOOD PRESSURE: 107 MMHG | DIASTOLIC BLOOD PRESSURE: 63 MMHG | TEMPERATURE: 99 F | BODY MASS INDEX: 21.66 KG/M2

## 2017-09-27 DIAGNOSIS — M17.0 PRIMARY OSTEOARTHRITIS OF KNEES, BILATERAL: ICD-10-CM

## 2017-09-27 DIAGNOSIS — R26.9 GAIT ABNORMALITY: ICD-10-CM

## 2017-09-27 DIAGNOSIS — I73.9 PAD (PERIPHERAL ARTERY DISEASE) (HCC): ICD-10-CM

## 2017-09-27 DIAGNOSIS — R39.9 UTI SYMPTOMS: Primary | ICD-10-CM

## 2017-09-27 DIAGNOSIS — R39.9 UTI SYMPTOMS: ICD-10-CM

## 2017-09-27 DIAGNOSIS — Z91.81 AT HIGH RISK FOR FALLS: ICD-10-CM

## 2017-09-27 DIAGNOSIS — E87.1 HYPONATREMIA: ICD-10-CM

## 2017-09-27 DIAGNOSIS — I10 ESSENTIAL HYPERTENSION: ICD-10-CM

## 2017-09-27 DIAGNOSIS — D64.9 ANEMIA, UNSPECIFIED TYPE: ICD-10-CM

## 2017-09-27 DIAGNOSIS — J84.10 PULMONARY FIBROSIS (HCC): ICD-10-CM

## 2017-09-27 PROCEDURE — G0463 HOSPITAL OUTPT CLINIC VISIT: HCPCS | Performed by: INTERNAL MEDICINE

## 2017-09-27 PROCEDURE — 80053 COMPREHEN METABOLIC PANEL: CPT

## 2017-09-27 PROCEDURE — 81001 URINALYSIS AUTO W/SCOPE: CPT

## 2017-09-27 PROCEDURE — 85027 COMPLETE CBC AUTOMATED: CPT

## 2017-09-27 PROCEDURE — 99214 OFFICE O/P EST MOD 30 MIN: CPT | Performed by: INTERNAL MEDICINE

## 2017-09-27 PROCEDURE — 36415 COLL VENOUS BLD VENIPUNCTURE: CPT

## 2017-09-28 ENCOUNTER — OFFICE VISIT (OUTPATIENT)
Dept: PULMONOLOGY | Facility: CLINIC | Age: 82
End: 2017-09-28

## 2017-09-28 VITALS
WEIGHT: 130.38 LBS | DIASTOLIC BLOOD PRESSURE: 64 MMHG | TEMPERATURE: 97 F | HEIGHT: 63 IN | BODY MASS INDEX: 23.1 KG/M2 | SYSTOLIC BLOOD PRESSURE: 126 MMHG | OXYGEN SATURATION: 98 % | HEART RATE: 80 BPM

## 2017-09-28 DIAGNOSIS — R06.00 DYSPNEA ON EXERTION: Primary | ICD-10-CM

## 2017-09-28 PROCEDURE — G0463 HOSPITAL OUTPT CLINIC VISIT: HCPCS | Performed by: INTERNAL MEDICINE

## 2017-09-28 PROCEDURE — 99204 OFFICE O/P NEW MOD 45 MIN: CPT | Performed by: INTERNAL MEDICINE

## 2017-09-28 RX ORDER — PREDNISONE 20 MG/1
TABLET ORAL
Qty: 10 TABLET | Refills: 0 | Status: SHIPPED | OUTPATIENT
Start: 2017-09-28 | End: 2017-10-14 | Stop reason: ALTCHOICE

## 2017-09-28 NOTE — PROGRESS NOTES
HPI:    Patient ID: Ricardo Bergman is a 80year old female.     HPI    Accompanied by her daughter   Pt is 80  She lives alone  Was in the ER for cough Dx with pulmonary fibrosis  Was seen by her cardiologist Valsartan/HCTZ  Dose reduced by DR Brittaney Turner problem. Negative for joint swelling. Skin: Negative for rash. Neurological: Positive for weakness. Negative for syncope, light-headedness and headaches. Hematological: Negative for adenopathy. Does not bruise/bleed easily.    Psychiatric/Behavioral: fever/heart disease age 12      Past Surgical History:  06-: ANGIOPLASTY (CORONARY)  2016: BOWEL RESECTION  1989: CABG      Comment: 5 bypass  No date: CHOLECYSTECTOMY  No date: COLON SURGERY  2010: COLONOSCOPY  No date: HYSTERECTOMY  No date: Renny Sirbrandon tenderness. No hernia. Musculoskeletal: She exhibits no edema or tenderness. Lymphadenopathy:     She has no cervical adenopathy. Neurological: She is alert. Gait unsteady   Skin: No rash noted. She is not diaphoretic. No erythema.    Nursing note a 35.0 - 48.0 % 33.2 (L)   MCV      80.0 - 100.0 fL 91.5   MCH      27.0 - 32.0 pg 29.8   MCHC      32.0 - 37.0 g/dl 32.5   RDW      11.0 - 15.0 % 14.2   Platelet Count      006 - 400 K/   MEAN PLATELET VOLUME      7.4 - 10.3 fL 7.3 (L)          ASSESS

## 2017-09-28 NOTE — PROGRESS NOTES
Dear  Will Wright  :           As you know, Radha Andrade is an 80-year-old female who I am now evaluating for abnormal chest x-ray with cough.     HISTORY OF PRESENT ILLNESS: The patient was in her usual state of health until a month ago when she developed a urinary crackles to auscultation and percussion. Cardiac regular rate and rhythm no murmur. Abdomen nontender, without hepatosplenomegaly and no mass appreciable. Extremities without clubbing cyanosis nor edema.  Neurologic grossly intact with symmetric tone and st

## 2017-09-29 RX ORDER — LEVOTHYROXINE SODIUM 0.1 MG/1
TABLET ORAL
Qty: 90 TABLET | Refills: 3 | Status: SHIPPED | OUTPATIENT
Start: 2017-09-29 | End: 2018-10-10

## 2017-10-06 ENCOUNTER — TELEPHONE (OUTPATIENT)
Dept: PULMONOLOGY | Facility: CLINIC | Age: 82
End: 2017-10-06

## 2017-10-06 DIAGNOSIS — R06.02 SHORTNESS OF BREATH: Primary | ICD-10-CM

## 2017-10-06 NOTE — TELEPHONE ENCOUNTER
Patient was seen a week ago and was given Prednisone course for 1 week. She completed the round of steroid yesterday. She is feeling better but her blood pressure did rise a little but otherwise better. Patient would like to know her next step if she should get a CXR or CT. Please advise.

## 2017-10-11 ENCOUNTER — TELEPHONE (OUTPATIENT)
Dept: PULMONOLOGY | Facility: CLINIC | Age: 82
End: 2017-10-11

## 2017-10-11 ENCOUNTER — HOSPITAL ENCOUNTER (INPATIENT)
Facility: HOSPITAL | Age: 82
LOS: 2 days | Discharge: HOME HEALTH | DRG: 683 | End: 2017-10-13
Attending: EMERGENCY MEDICINE | Admitting: HOSPITALIST
Payer: MEDICARE

## 2017-10-11 ENCOUNTER — APPOINTMENT (OUTPATIENT)
Dept: GENERAL RADIOLOGY | Facility: HOSPITAL | Age: 82
DRG: 683 | End: 2017-10-11
Attending: EMERGENCY MEDICINE
Payer: MEDICARE

## 2017-10-11 DIAGNOSIS — N30.00 ACUTE CYSTITIS WITHOUT HEMATURIA: Primary | ICD-10-CM

## 2017-10-11 DIAGNOSIS — A41.9 SEPSIS DUE TO URINARY TRACT INFECTION (HCC): ICD-10-CM

## 2017-10-11 DIAGNOSIS — N39.0 SEPSIS DUE TO URINARY TRACT INFECTION (HCC): ICD-10-CM

## 2017-10-11 PROBLEM — N17.9 ACUTE RENAL FAILURE (ARF) (HCC): Status: ACTIVE | Noted: 2017-10-11

## 2017-10-11 PROCEDURE — 99223 1ST HOSP IP/OBS HIGH 75: CPT | Performed by: HOSPITALIST

## 2017-10-11 PROCEDURE — 71010 XR CHEST AP PORTABLE  (CPT=71010): CPT | Performed by: EMERGENCY MEDICINE

## 2017-10-11 RX ORDER — HEPARIN SODIUM 5000 [USP'U]/ML
5000 INJECTION, SOLUTION INTRAVENOUS; SUBCUTANEOUS EVERY 12 HOURS SCHEDULED
Status: DISCONTINUED | OUTPATIENT
Start: 2017-10-11 | End: 2017-10-13

## 2017-10-11 RX ORDER — CLOPIDOGREL BISULFATE 75 MG/1
75 TABLET ORAL
Status: DISCONTINUED | OUTPATIENT
Start: 2017-10-12 | End: 2017-10-13

## 2017-10-11 RX ORDER — METOPROLOL SUCCINATE 25 MG/1
25 TABLET, EXTENDED RELEASE ORAL
Status: DISCONTINUED | OUTPATIENT
Start: 2017-10-12 | End: 2017-10-13

## 2017-10-11 RX ORDER — ONDANSETRON 2 MG/ML
4 INJECTION INTRAMUSCULAR; INTRAVENOUS EVERY 6 HOURS PRN
Status: DISCONTINUED | OUTPATIENT
Start: 2017-10-11 | End: 2017-10-13

## 2017-10-11 RX ORDER — SODIUM CHLORIDE 9 MG/ML
INJECTION, SOLUTION INTRAVENOUS CONTINUOUS
Status: DISCONTINUED | OUTPATIENT
Start: 2017-10-11 | End: 2017-10-13

## 2017-10-11 RX ORDER — SODIUM CHLORIDE 0.9 % (FLUSH) 0.9 %
3 SYRINGE (ML) INJECTION AS NEEDED
Status: DISCONTINUED | OUTPATIENT
Start: 2017-10-11 | End: 2017-10-13

## 2017-10-11 RX ORDER — ROSUVASTATIN CALCIUM 20 MG/1
20 TABLET, COATED ORAL NIGHTLY
Status: DISCONTINUED | OUTPATIENT
Start: 2017-10-11 | End: 2017-10-13

## 2017-10-11 RX ORDER — LACTOBACILLUS ACIDOPH-L.BULGARICUS 1 MILLION CELL CHEWABLE TABLET 1MM CELL
1 TABLET,CHEWABLE ORAL 3 TIMES DAILY
Status: DISCONTINUED | OUTPATIENT
Start: 2017-10-11 | End: 2017-10-13

## 2017-10-11 RX ORDER — ACETAMINOPHEN 325 MG/1
650 TABLET ORAL EVERY 6 HOURS PRN
Status: DISCONTINUED | OUTPATIENT
Start: 2017-10-11 | End: 2017-10-13

## 2017-10-11 NOTE — TELEPHONE ENCOUNTER
Pt states that she was feeling better after taking prednisone but on Sunday night she started vomiting and woke up with the chills and it has happened each night since then. Early this morning pt woke up and sheet was drenched with sweat.   Pt vomited agai

## 2017-10-11 NOTE — ED INITIAL ASSESSMENT (HPI)
Patient reports feeling chills this morning and \" violentally\" puking with diarrhea this morning. Also with \"hacking cough. \", just finished course of prednisone.

## 2017-10-11 NOTE — TELEPHONE ENCOUNTER
Pt c/o being nauseated all the time, vomiting @ night/morning, & chills @ night except last night, but this morning x 2.5 hrs. She stts bed wet from sweating, she was so hot this morn, has fatigue, feels dazed, & onset of symptoms Sun night.  Per pt she has

## 2017-10-11 NOTE — ED PROVIDER NOTES
Patient Seen in: [facility] AND [facility] Emergency Department    History   Patient presents with:  Fever (infectious)    Stated Complaint: Flu symptoms    HPI    80year old female with pmh diverticulosis/diverticulitis s/p partial bowel resection, htn, CAD s/ Comment: Weekly, Wine, 5 glasses; Review of Systems    Positive for stated complaint: Flu symptoms  Other systems are as noted in HPI. Constitutional and vital signs reviewed. All other systems reviewed and negative except as noted above.     PS Blood Urine Moderate (*)     Leukocyte Esterase Urine Large (*)     WBC Urine 528 (*)     WBC Clump Few (*)     RBC URINE 12 (*)     Bacteria Urine Many (*)     All other components within normal limits   BASIC METABOLIC PANEL (8) - Abnormal; Notable for t 10/11/2017  CONCLUSION: No acute cardiopulmonary abnormality.          Radiology exams  Viewed and reviewed by myself and findings discussed with patient including need for follow up      [facility]      Sepsis Reassessment Note    /68

## 2017-10-11 NOTE — H&P
[facility]    PATIENT'S NAME: Sarina Ariza   ATTENDING PHYSICIAN: Kun Yeh MD   PATIENT ACCOUNT#:   [id number]    LOCATION:  [place] [address]  MEDICAL RECORD #:   [id number]       YOB: 1928  ADMISSION DATE:       10/1 independent in her basic activities of daily living. REVIEW OF SYSTEMS:  The patient describes several days of decreased appetite, generalized body aches, fevers, chills, dysuria, and urinary frequency. She denies any sick contacts. No cough.   Other

## 2017-10-11 NOTE — TELEPHONE ENCOUNTER
Notified MD of below. Telephone rx w/ read back rcvd from Dr. Jose Harris to inform pt that sweating may be a side effect of prednisone, but pt to follow PCP's orders to go to the ER since she is having additional symptoms.  Informed pt of Dr. Silva Avendano

## 2017-10-12 PROCEDURE — 99232 SBSQ HOSP IP/OBS MODERATE 35: CPT | Performed by: HOSPITALIST

## 2017-10-12 RX ORDER — GUAIFENESIN 100 MG/5ML
100 SOLUTION ORAL EVERY 4 HOURS PRN
Status: DISCONTINUED | OUTPATIENT
Start: 2017-10-12 | End: 2017-10-13

## 2017-10-12 RX ORDER — POTASSIUM CHLORIDE 20 MEQ/1
40 TABLET, EXTENDED RELEASE ORAL EVERY 4 HOURS
Status: COMPLETED | OUTPATIENT
Start: 2017-10-12 | End: 2017-10-12

## 2017-10-12 RX ORDER — LOPERAMIDE HYDROCHLORIDE 2 MG/1
2 CAPSULE ORAL 4 TIMES DAILY PRN
Status: DISCONTINUED | OUTPATIENT
Start: 2017-10-12 | End: 2017-10-13

## 2017-10-12 RX ORDER — LEVOTHYROXINE SODIUM 0.1 MG/1
100 TABLET ORAL
Status: DISCONTINUED | OUTPATIENT
Start: 2017-10-12 | End: 2017-10-13

## 2017-10-12 NOTE — PHYSICAL THERAPY NOTE
PHYSICAL THERAPY EVALUATION - INPATIENT     Room Number: 573/573-A  Evaluation Date: 10/12/2017  Type of Evaluation: Initial  Physician Order: PT Eval and Treat    Presenting Problem: UTI, Acute renal failure  Reason for Therapy: Mobility Dysfunction a Problems:    Acute renal failure (ARF) (HCC)    Sepsis due to urinary tract infection [facility])      Past Medical History  Past Medical History:   Diagnosis Date   • Arthritis    • Balance problem    • Bladder problem    • Cancer [facility])    • Cataract    • Disor currently have. ..  -   Turning over in bed (including adjusting bedclothes, sheets and blankets)?: A Little   -   Sitting down on and standing up from a chair with arms (e.g., wheelchair, bedside commode, etc.): A Little   -   Moving from lying on back to

## 2017-10-12 NOTE — PROGRESS NOTES
Therapeutic interchange Acidophilus/Pectin (PROBIOTIC) CAPS 1 capsule daily with lactobacillus acidophiuls and bulgar (LACTINEX) chewable tab 1 tablet 3 times daily. Yasir SEGOVIA Ph.  [id number]  10/11/2017

## 2017-10-12 NOTE — PLAN OF CARE
Patient Centered Care    • Patient preferences are identified and integrated in the patient's plan of care Progressing        Patient/Family Goals    • Patient/Family Long Term Goal Progressing    • Patient/Family Short Term Goal Progressing        RISK FO

## 2017-10-12 NOTE — PROGRESS NOTES
[facility]    Progress Note    Layne Roland Patient Status:  Inpatient    1928 MRN [id number]   Location [facility] 5SW/SE Attending Sin Ruelas MD   Hosp Day # 1 PCP Love Garcia MD       Subjective:   Leatha Cortez Succinate ER  25 mg Oral Daily Beta Blocker   • Rosuvastatin Calcium  20 mg Oral Nightly       Current PRN Inpatient Meds:      guaiFENesin, Normal Saline Flush, acetaminophen, ondansetron HCl    Results:     Lab Results  Component Value Date   WBC 10.5 10 at 17:50 by Arleen Sanchez MD    Assessment and Plan:     UTI with gram negative bacteremia   -responding to rocephin, cont for now  -IVF  -repeat BC today  -awaiting culture sens.      Coronary artery disease, status post coronary artery bypass graft surgery

## 2017-10-12 NOTE — PLAN OF CARE
Problem: Patient/Family Goals  Goal: Patient/Family Long Term Goal  Patient's Long Term Goal: To discharge home independent living     Interventions:  - IVF, IV abx, monitoring labs, vitals.    - See additional Care Plan goals for specific interventions

## 2017-10-12 NOTE — PLAN OF CARE
Problem: Patient/Family Goals  Goal: Patient/Family Long Term Goal  Patient's Long Term Goal: To discharge home independent living     Interventions:  - IVF, IV abx, monitoring labs, vitals.    - See additional Care Plan goals for specific interventions   O

## 2017-10-13 ENCOUNTER — TELEPHONE (OUTPATIENT)
Dept: INTERNAL MEDICINE CLINIC | Facility: CLINIC | Age: 82
End: 2017-10-13

## 2017-10-13 VITALS
HEIGHT: 63 IN | DIASTOLIC BLOOD PRESSURE: 68 MMHG | OXYGEN SATURATION: 98 % | HEART RATE: 81 BPM | BODY MASS INDEX: 22.15 KG/M2 | WEIGHT: 125 LBS | RESPIRATION RATE: 18 BRPM | SYSTOLIC BLOOD PRESSURE: 144 MMHG | TEMPERATURE: 98 F

## 2017-10-13 PROCEDURE — 99239 HOSP IP/OBS DSCHRG MGMT >30: CPT | Performed by: HOSPITALIST

## 2017-10-13 RX ORDER — VALSARTAN 160 MG/1
160 TABLET ORAL DAILY
Qty: 30 TABLET | Refills: 0 | Status: SHIPPED | OUTPATIENT
Start: 2017-10-13 | End: 2018-06-29

## 2017-10-13 RX ORDER — CIPROFLOXACIN 500 MG/1
500 TABLET, FILM COATED ORAL 2 TIMES DAILY
Qty: 14 TABLET | Refills: 0 | Status: SHIPPED | OUTPATIENT
Start: 2017-10-14 | End: 2017-10-21

## 2017-10-13 RX ORDER — LOPERAMIDE HYDROCHLORIDE 2 MG/1
2 CAPSULE ORAL 4 TIMES DAILY PRN
Refills: 0 | Status: SHIPPED | COMMUNITY
Start: 2017-10-13 | End: 2017-10-20

## 2017-10-13 NOTE — CM/SW NOTE
MISAEL received for home health services. Referral made to [facility]/Brianne [id number]. Sabino 78 orders obtained. Pt will be dc'd back home today and intends to move into [facility] in a few weeks.     CONRAD thompson [id number]

## 2017-10-13 NOTE — HOME CARE LIAISON
MET WITH PATIENT AND HER DAUGHTER CATHIE,   (C; [phone]),  [place]. PATIENT IN AGREEMENT WITH SERVICES BEING PROVIDED BY [facility].  PROVIDED RESIDENTIAL BROCHURE WITH CONTACT INFORMATION, ALONG WITH LIAISON'S BUSINESS

## 2017-10-13 NOTE — PROGRESS NOTES
[facility]    Progress Note    Jannet Lancaster Patient Status:  Inpatient    1928 MRN [id number]   Location [facility] 5SW/SE Attending Margi Oneill MD   Hosp Day # 2 PCP Florian Gunter MD       Subjective:   Tony Diallo Succinate ER  25 mg Oral Daily Beta Blocker   • Rosuvastatin Calcium  20 mg Oral Nightly       Current PRN Inpatient Meds:      guaiFENesin, Loperamide HCl, Normal Saline Flush, acetaminophen, ondansetron HCl    Results:       Lab Results  Component Value cardiopulmonary abnormality. Ekg 12-lead    Result Date: 10/11/2017  ECG Report  Interpretation  -------------------------- Sinus Rhythm - occasional ectopic ventricular beat -T-wave inversions anteriorly, consider anterior ischemia.  ABNORMAL When co

## 2017-10-13 NOTE — DISCHARGE SUMMARY
[facility]    Discharge Summary    Leander Andrea Patient Status:  Inpatient    1928 MRN [id number]   Location [facility] 5SW/SE Attending Kirk Dupree MD   Hosp Day # 2 PCP Don Mcpherson MD     Date of Admissio ferritin, iron studies - can follow up outpt  -checked b12 - ok     Stable medical issues  Hyperlipidemia, hypertension, hypothyroidism, peripheral arterial disease, status post right popliteal artery stent, depression, generalized osteoarthritis.  Pneumoni guaiFENesin-codeine 100-10 MG/5ML Soln  Commonly known as:  CHERATUSSIN AC      Take 5 mL by mouth every 6 (six) hours as needed for cough.    Quantity:  118 mL  Refills:  0     Levothyroxine Sodium 100 MCG Tabs  Commonly known as:  SYNTHROID      TAKE 1

## 2017-10-14 ENCOUNTER — PATIENT OUTREACH (OUTPATIENT)
Dept: INTERNAL MEDICINE CLINIC | Facility: CLINIC | Age: 82
End: 2017-10-14

## 2017-10-14 ENCOUNTER — TELEPHONE (OUTPATIENT)
Dept: INTERNAL MEDICINE UNIT | Facility: HOSPITAL | Age: 82
End: 2017-10-14

## 2017-10-14 NOTE — PROGRESS NOTES
Initial Post Discharge Follow Up   Discharge Date: 10/13/17  Contact Date: 10/14/2017    Consent Verification:  Assessment Completed With: Patient  HIPAA Verified? Yes    1.  Tell me why you were in the hospital?  \"I was directed by 2 of my doctors after received your home health care services / DME ordered upon discharge? Residential RN and PT , not scheduled yet / No     11. Are you able to do normal activities as you did prior to your hospitalization? Yes     12. In need of referral for:  N/A    13.  Do y N/A    [x]  Discharge Summary, medications and orders reviewed.

## 2017-10-15 PROBLEM — M23.91 INTERNAL DERANGEMENT OF KNEE, RIGHT: Status: ACTIVE | Noted: 2017-08-14

## 2017-10-15 PROBLEM — S83.241A ACUTE MEDIAL MENISCUS TEAR OF RIGHT KNEE: Status: ACTIVE | Noted: 2017-10-15

## 2017-10-16 NOTE — TELEPHONE ENCOUNTER
Librado from [facility] calling for a verbal order for skilled nursing and PT. Please advise.

## 2017-10-20 ENCOUNTER — APPOINTMENT (OUTPATIENT)
Dept: LAB | Age: 82
End: 2017-10-20
Attending: INTERNAL MEDICINE
Payer: MEDICARE

## 2017-10-20 ENCOUNTER — PRIOR ORIGINAL RECORDS (OUTPATIENT)
Dept: OTHER | Age: 82
End: 2017-10-20

## 2017-10-20 ENCOUNTER — OFFICE VISIT (OUTPATIENT)
Dept: INTERNAL MEDICINE CLINIC | Facility: CLINIC | Age: 82
End: 2017-10-20

## 2017-10-20 VITALS
WEIGHT: 128 LBS | DIASTOLIC BLOOD PRESSURE: 62 MMHG | HEART RATE: 73 BPM | BODY MASS INDEX: 22.68 KG/M2 | HEIGHT: 63 IN | SYSTOLIC BLOOD PRESSURE: 97 MMHG | TEMPERATURE: 99 F

## 2017-10-20 DIAGNOSIS — N39.0 URINARY TRACT INFECTION WITHOUT HEMATURIA, SITE UNSPECIFIED: ICD-10-CM

## 2017-10-20 DIAGNOSIS — N39.0 URINARY TRACT INFECTION WITHOUT HEMATURIA, SITE UNSPECIFIED: Primary | ICD-10-CM

## 2017-10-20 DIAGNOSIS — N28.9 KIDNEY DISEASE: ICD-10-CM

## 2017-10-20 DIAGNOSIS — R19.7 DIARRHEA, UNSPECIFIED TYPE: ICD-10-CM

## 2017-10-20 DIAGNOSIS — D64.9 ANEMIA, UNSPECIFIED TYPE: ICD-10-CM

## 2017-10-20 DIAGNOSIS — I25.10 ATHEROSCLEROSIS OF NATIVE CORONARY ARTERY OF NATIVE HEART WITHOUT ANGINA PECTORIS: ICD-10-CM

## 2017-10-20 DIAGNOSIS — I10 ESSENTIAL HYPERTENSION: ICD-10-CM

## 2017-10-20 PROCEDURE — 85027 COMPLETE CBC AUTOMATED: CPT

## 2017-10-20 PROCEDURE — 80053 COMPREHEN METABOLIC PANEL: CPT

## 2017-10-20 PROCEDURE — 81015 MICROSCOPIC EXAM OF URINE: CPT

## 2017-10-20 PROCEDURE — 36415 COLL VENOUS BLD VENIPUNCTURE: CPT

## 2017-10-20 PROCEDURE — 99495 TRANSJ CARE MGMT MOD F2F 14D: CPT | Performed by: INTERNAL MEDICINE

## 2017-10-25 ENCOUNTER — PRIOR ORIGINAL RECORDS (OUTPATIENT)
Dept: OTHER | Age: 82
End: 2017-10-25

## 2017-10-26 ENCOUNTER — PRIOR ORIGINAL RECORDS (OUTPATIENT)
Dept: OTHER | Age: 82
End: 2017-10-26

## 2017-10-28 NOTE — PROGRESS NOTES
HPI:    Asiya Canas is a 80year old female here today for hospital follow up.    She was discharged from Inpatient hospital, [facility] AND [facility]  to Home   Admission Date: 10/11/17   Discharge Date: 10/13/17  Hospital Discharge Diagnosis: sepsis  TCM issues  Hyperlipidemia, hypertension, hypothyroidism, peripheral arterial disease, status post right popliteal artery stent, depression, generalized osteoarthritis.  Pneumonitis 2/2 Macrobid.        Greater than 35 minutes spent, >50% spent counseling re: t CHERATUSSIN AC    Take 5 mL by mouth every 6 (six) hours as needed for cough.  Quantity:  118 mL  Refills:  0   Levothyroxine Sodium 100 MCG Tabs  Commonly known as:  SYNTHROID    TAKE 1 TABLET BY MOUTH DAILY BEFORE BREAKFAST Quantity:  90 tablet  Refills: Visit:  valsartan 160 MG Oral Tab Take 1 tablet (160 mg total) by mouth daily.    LEVOTHYROXINE SODIUM 100 MCG Oral Tab TAKE 1 TABLET BY MOUTH DAILY BEFORE BREAKFAST   CLOPIDOGREL BISULFATE 75 MG Oral Tab TAKE 1 TABLET BY MOUTH ONCE DAILY   Rosuvastatin Marky shortness of breath and wheezing. Cardiovascular: Negative for chest pain, palpitations and leg swelling. Gastrointestinal: Negative for nausea, vomiting, abdominal pain, constipation and abdominal distention.    Genitourinary: Negative for dysuria, fr tenderness. Gait abnormality  weakness   Neurological: She is alert. Gait normal.   Skin: Skin is warm and dry. No lesion and no rash noted. She is not diaphoretic.    Psychiatric: Her behavior is normal. Thought content normal.     ASSESSMENT/ PLAN:   (N Complexity of Data to Be Reviewed: moderate  · Risk of Significant Complications, Morbidity, and/or Mortality: moderate    Overall Risk:   moderate    Patient seen within 14 days from date of discharge.      Merary Wynne MD, 10/27/2017

## 2017-11-30 ENCOUNTER — OFFICE VISIT (OUTPATIENT)
Dept: PULMONOLOGY | Facility: CLINIC | Age: 82
End: 2017-11-30

## 2017-11-30 VITALS
RESPIRATION RATE: 19 BRPM | HEIGHT: 63 IN | WEIGHT: 126.38 LBS | DIASTOLIC BLOOD PRESSURE: 68 MMHG | SYSTOLIC BLOOD PRESSURE: 117 MMHG | HEART RATE: 73 BPM | OXYGEN SATURATION: 100 % | BODY MASS INDEX: 22.39 KG/M2

## 2017-11-30 DIAGNOSIS — R06.02 SHORTNESS OF BREATH: Primary | ICD-10-CM

## 2017-11-30 PROCEDURE — G0463 HOSPITAL OUTPT CLINIC VISIT: HCPCS | Performed by: INTERNAL MEDICINE

## 2017-11-30 PROCEDURE — 99213 OFFICE O/P EST LOW 20 MIN: CPT | Performed by: INTERNAL MEDICINE

## 2017-11-30 RX ORDER — ALBUTEROL SULFATE 90 UG/1
2 AEROSOL, METERED RESPIRATORY (INHALATION) EVERY 6 HOURS PRN
Qty: 1 INHALER | Refills: 5 | Status: SHIPPED | OUTPATIENT
Start: 2017-11-30 | End: 2018-05-14

## 2017-11-30 RX ORDER — VALSARTAN AND HYDROCHLOROTHIAZIDE 160; 12.5 MG/1; MG/1
TABLET, FILM COATED ORAL
Refills: 11 | COMMUNITY
Start: 2017-11-16 | End: 2018-05-14 | Stop reason: ALTCHOICE

## 2017-11-30 NOTE — PROGRESS NOTES
The patient is an 70-year-old female who I know well from prior evaluation who comes in now for follow-up. She notes that her breathing is improved.   She had stopped the nitrofurantoin and I had hypothesized that she had nitrofurantoin associated pneumoni

## 2018-03-08 ENCOUNTER — APPOINTMENT (OUTPATIENT)
Dept: GENERAL RADIOLOGY | Facility: HOSPITAL | Age: 83
End: 2018-03-08
Attending: EMERGENCY MEDICINE
Payer: MEDICARE

## 2018-03-08 ENCOUNTER — APPOINTMENT (OUTPATIENT)
Dept: CT IMAGING | Facility: HOSPITAL | Age: 83
End: 2018-03-08
Attending: EMERGENCY MEDICINE
Payer: MEDICARE

## 2018-03-08 ENCOUNTER — HOSPITAL ENCOUNTER (EMERGENCY)
Facility: HOSPITAL | Age: 83
Discharge: HOME | End: 2018-03-08
Attending: EMERGENCY MEDICINE
Payer: MEDICARE

## 2018-03-08 VITALS
HEIGHT: 63 IN | TEMPERATURE: 98 F | OXYGEN SATURATION: 93 % | RESPIRATION RATE: 18 BRPM | BODY MASS INDEX: 22.15 KG/M2 | WEIGHT: 125 LBS | SYSTOLIC BLOOD PRESSURE: 193 MMHG | DIASTOLIC BLOOD PRESSURE: 80 MMHG | HEART RATE: 61 BPM

## 2018-03-08 DIAGNOSIS — M25.461 KNEE EFFUSION, RIGHT: ICD-10-CM

## 2018-03-08 DIAGNOSIS — S86.911A KNEE STRAIN, RIGHT, INITIAL ENCOUNTER: Primary | ICD-10-CM

## 2018-03-08 PROCEDURE — 73560 X-RAY EXAM OF KNEE 1 OR 2: CPT | Performed by: EMERGENCY MEDICINE

## 2018-03-08 PROCEDURE — 99284 EMERGENCY DEPT VISIT MOD MDM: CPT

## 2018-03-08 PROCEDURE — 73700 CT LOWER EXTREMITY W/O DYE: CPT | Performed by: EMERGENCY MEDICINE

## 2018-03-08 RX ORDER — HYDROCODONE BITARTRATE AND ACETAMINOPHEN 5; 325 MG/1; MG/1
2 TABLET ORAL ONCE
Status: COMPLETED | OUTPATIENT
Start: 2018-03-08 | End: 2018-03-08

## 2018-03-08 RX ORDER — TRAMADOL HYDROCHLORIDE 50 MG/1
TABLET ORAL EVERY 6 HOURS PRN
Qty: 20 TABLET | Refills: 0 | Status: SHIPPED | OUTPATIENT
Start: 2018-03-08 | End: 2018-05-14

## 2018-03-08 NOTE — ED NOTES
ED tech at bedside for knee immobilizer application to right knee and ACE wrap. Patient tolerated well.

## 2018-03-08 NOTE — ED PROVIDER NOTES
Patient Seen in: [facility] AND [facility] Emergency Department    History   Patient presents with:  Musculoskeletal Problem    Stated Complaint: right leg pain    HPI    81 yo female with right knee pain and swelling.  She has a h/o torn meniscus in this knee wh (36.8 °C) (Oral)   Resp 18   Ht 160 cm (5' 3\")   Wt 56.7 kg   SpO2 93%   BMI 22.14 kg/m²         Physical Exam   Constitutional: She is oriented to person, place, and time. She appears well-developed and well-nourished.  She appears distressed (appears unc Medication List as of 3/8/2018  1:39 AM    START taking these medications    TraMADol HCl 50 MG Oral Tab  Take 1-2 tablets ( mg total) by mouth every 6 (six) hours as needed for Pain., Print Script, Disp-20 tablet, R-0

## 2018-03-18 PROBLEM — S83.231A COMPLEX TEAR OF MEDIAL MENISCUS OF RIGHT KNEE AS CURRENT INJURY: Status: ACTIVE | Noted: 2017-10-15

## 2018-03-18 PROBLEM — G89.29 CHRONIC PAIN OF RIGHT KNEE: Status: ACTIVE | Noted: 2017-07-12

## 2018-03-18 PROBLEM — M25.561 CHRONIC PAIN OF RIGHT KNEE: Status: ACTIVE | Noted: 2017-07-12

## 2018-06-06 LAB
ALBUMIN: 3.2 G/DL
ALKALINE PHOSPHATATE(ALK PHOS): 69 IU/L
BILIRUBIN TOTAL: 0.4 MG/DL
BUN: 27 MG/DL
CALCIUM: 9.1 MG/DL
CHLORIDE: 104 MEQ/L
CREATININE, SERUM: 1.16 MG/DL
GLOBULIN: 3.7 G/DL
GLUCOSE: 130 MG/DL
HEMATOCRIT: 31.8 %
HEMOGLOBIN: 10.6 G/DL
PLATELETS: 411 K/UL
POTASSIUM, SERUM: 4.3 MEQ/L
PROTEIN, TOTAL: 6.9 G/DL
RED BLOOD COUNT: 3.51 X 10-6/U
SGOT (AST): 24 IU/L
SGPT (ALT): 16 IU/L
SODIUM: 137 MEQ/L
WHITE BLOOD COUNT: 6.8 X 10-3/U

## 2018-06-08 ENCOUNTER — MYAURORA ACCOUNT LINK (OUTPATIENT)
Dept: OTHER | Age: 83
End: 2018-06-08

## 2018-06-08 ENCOUNTER — PRIOR ORIGINAL RECORDS (OUTPATIENT)
Dept: OTHER | Age: 83
End: 2018-06-08

## 2018-06-14 ENCOUNTER — OFFICE VISIT (OUTPATIENT)
Dept: INTERNAL MEDICINE CLINIC | Facility: CLINIC | Age: 83
End: 2018-06-14

## 2018-06-14 VITALS
RESPIRATION RATE: 17 BRPM | BODY MASS INDEX: 23.04 KG/M2 | DIASTOLIC BLOOD PRESSURE: 77 MMHG | HEART RATE: 59 BPM | WEIGHT: 130 LBS | HEIGHT: 63 IN | SYSTOLIC BLOOD PRESSURE: 135 MMHG | TEMPERATURE: 98 F

## 2018-06-14 DIAGNOSIS — I25.10 ATHEROSCLEROSIS OF NATIVE CORONARY ARTERY OF NATIVE HEART WITHOUT ANGINA PECTORIS: ICD-10-CM

## 2018-06-14 DIAGNOSIS — I10 ESSENTIAL HYPERTENSION: ICD-10-CM

## 2018-06-14 DIAGNOSIS — I70.0 ATHEROSCLEROSIS OF AORTA (HCC): ICD-10-CM

## 2018-06-14 DIAGNOSIS — I73.9 PAD (PERIPHERAL ARTERY DISEASE) (HCC): ICD-10-CM

## 2018-06-14 DIAGNOSIS — Z91.81 AT HIGH RISK FOR FALLS: ICD-10-CM

## 2018-06-14 DIAGNOSIS — Z00.00 MEDICARE ANNUAL WELLNESS VISIT, SUBSEQUENT: Primary | ICD-10-CM

## 2018-06-14 DIAGNOSIS — I71.4 ABDOMINAL AORTIC ANEURYSM (AAA) WITHOUT RUPTURE (HCC): ICD-10-CM

## 2018-06-14 DIAGNOSIS — N39.41 URGE INCONTINENCE OF URINE: ICD-10-CM

## 2018-06-14 DIAGNOSIS — E03.9 HYPOTHYROIDISM, UNSPECIFIED TYPE: ICD-10-CM

## 2018-06-14 DIAGNOSIS — Z85.3 PERSONAL HISTORY OF MALIGNANT NEOPLASM OF BREAST: ICD-10-CM

## 2018-06-14 DIAGNOSIS — H92.02 LEFT EAR PAIN: ICD-10-CM

## 2018-06-14 DIAGNOSIS — E78.5 HYPERLIPIDEMIA, UNSPECIFIED HYPERLIPIDEMIA TYPE: ICD-10-CM

## 2018-06-14 DIAGNOSIS — M17.0 PRIMARY OSTEOARTHRITIS OF KNEES, BILATERAL: ICD-10-CM

## 2018-06-14 PROBLEM — D64.9 ANEMIA: Status: ACTIVE | Noted: 2018-06-14

## 2018-06-14 PROBLEM — N17.9 ACUTE RENAL FAILURE (ARF) (HCC): Status: RESOLVED | Noted: 2017-10-11 | Resolved: 2018-06-14

## 2018-06-14 PROBLEM — M54.50 LOWER BACK PAIN: Status: RESOLVED | Noted: 2017-05-17 | Resolved: 2018-06-14

## 2018-06-14 PROBLEM — R06.02 SHORTNESS OF BREATH: Status: RESOLVED | Noted: 2017-11-30 | Resolved: 2018-06-14

## 2018-06-14 PROBLEM — S83.231A COMPLEX TEAR OF MEDIAL MENISCUS OF RIGHT KNEE AS CURRENT INJURY: Status: RESOLVED | Noted: 2017-10-15 | Resolved: 2018-06-14

## 2018-06-14 PROBLEM — A41.9 SEPSIS DUE TO URINARY TRACT INFECTION (HCC): Status: RESOLVED | Noted: 2017-10-11 | Resolved: 2018-06-14

## 2018-06-14 PROBLEM — N30.00 ACUTE CYSTITIS WITHOUT HEMATURIA: Status: RESOLVED | Noted: 2017-10-11 | Resolved: 2018-06-14

## 2018-06-14 PROBLEM — M23.91 INTERNAL DERANGEMENT OF KNEE, RIGHT: Status: RESOLVED | Noted: 2017-08-14 | Resolved: 2018-06-14

## 2018-06-14 PROBLEM — N18.30 STAGE 3 CHRONIC KIDNEY DISEASE (HCC): Status: ACTIVE | Noted: 2018-06-14

## 2018-06-14 PROBLEM — N39.0 SEPSIS DUE TO URINARY TRACT INFECTION (HCC): Status: RESOLVED | Noted: 2017-10-11 | Resolved: 2018-06-14

## 2018-06-14 PROBLEM — A41.9 SEPSIS DUE TO URINARY TRACT INFECTION  (HCC): Status: RESOLVED | Noted: 2017-10-11 | Resolved: 2018-06-14

## 2018-06-14 PROBLEM — R42 VERTIGO: Status: RESOLVED | Noted: 2017-07-20 | Resolved: 2018-06-14

## 2018-06-14 PROBLEM — N39.0 SEPSIS DUE TO URINARY TRACT INFECTION: Status: RESOLVED | Noted: 2017-10-11 | Resolved: 2018-06-14

## 2018-06-14 PROBLEM — N39.0 SEPSIS DUE TO URINARY TRACT INFECTION  (HCC): Status: RESOLVED | Noted: 2017-10-11 | Resolved: 2018-06-14

## 2018-06-14 PROBLEM — A41.9 SEPSIS DUE TO URINARY TRACT INFECTION: Status: RESOLVED | Noted: 2017-10-11 | Resolved: 2018-06-14

## 2018-06-14 PROCEDURE — G0439 PPPS, SUBSEQ VISIT: HCPCS | Performed by: INTERNAL MEDICINE

## 2018-06-14 NOTE — PATIENT INSTRUCTIONS
Pavithra Cedillo's SCREENING SCHEDULE   Tests on this list are recommended by your physician but may not be covered, or covered at this frequency, by your insurer. Please check with your insurance carrier before scheduling to verify coverage.    PREVENT Anyone with a family history    Colorectal Cancer Screening  Covered up to Age 76     Colonoscopy Screen   Covered every 10 years- more often if abnormal There are no preventive care reminders to display for this patient.  Update Adyoulike if appli year    Pneumococcal 13 (Prevnar)  Covered Once after 65   Orders placed or performed in visit on 05/24/16  -PNEUMOCOCCAL VACC, 13 SERA IM    Please get once after your 65th birthday    Pneumococcal 23 (Pneumovax)  Covered Once after 65 No orders found for frequency, by your insurer. Please check with your insurance carrier before scheduling to verify coverage.    PREVENTATIVE SERVICES  INDICATIONS AND SCHEDULE Internal Lab or Procedure External Lab or Procedure   Diabetes Screening      HbgA1C    At Least  A abnormal There are no preventive care reminders to display for this patient. Update Health Maintenance if applicable    Flex Sigmoidoscopy Screen  Covered every 5 years No results found for this or any previous visit. No flowsheet data found.      Fecal Occ Please get once after your 65th birthday    Pneumococcal 23 (Pneumovax)  Covered Once after 65 No orders found for this or any previous visit.  Please get once after your 65th birthday    Hepatitis B for Moderate/High Risk       No orders found for this or

## 2018-06-14 NOTE — PROGRESS NOTES
HPI:   Desire Owens is a 80year old female who presents for a Medicare Subsequent Annual Wellness visit (Pt already had Initial Annual Wellness).       Annual Physical due on 06/15/2018      /77 (BP Location: Right arm, Patient Position: Sittin 1.00      Years: 20.00        Types: Cigarettes  Smokeless tobacco: Never Used                           Ms. Rober Barrera has an allergy or contraindication to taking aspirin (and it is documented in the medical record).     CAGE Alcohol screening   Pavithra ROBERTSON (160 mg total) by mouth daily.    LEVOTHYROXINE SODIUM 100 MCG Oral Tab TAKE 1 TABLET BY MOUTH DAILY BEFORE BREAKFAST   CLOPIDOGREL BISULFATE 75 MG Oral Tab TAKE 1 TABLET BY MOUTH ONCE DAILY   Rosuvastatin Calcium 20 MG Oral Tab TK 1 T PO HS   Metoprolol Garcia pain, palpitations and leg swelling. Gastrointestinal: Negative for nausea, vomiting, abdominal pain, constipation and abdominal distention. Genitourinary: Negative for dysuria, frequency, hematuria and difficulty urinating.    Musculoskeletal: Positive responses:  No I avoid social activities because I cannot hear well and fear I will reply improperly:  No   Family members and friends have told me they think I may have hearing loss:   No               Visual Acuity                           Physical Exam annual wellness visit, subsequent  (primary encounter diagnosis)  Plan: Advance directive discussed   Copy of document advised  To be subitted for chart copy  Independent with most ADL's    (I10) Essential hypertension  Plan: CBC, PLATELET; NO DIFFERENTIAL appetite been poor?: No  How does the patient maintain a good energy level?: Daily Walks  How would you describe your daily physical activity?: Light  How would you describe your current health state?: Fair  How do you maintain positive mental well-being?: Mammogram  Annually to 76, then as discussed There are no preventive care reminders to display for this patient.  Update Health Maintenance if applicable     Immunizations (Update Immunization Activity if applicable)     Influenza  Covered Annually 10/13/20

## 2018-06-14 NOTE — PROGRESS NOTES
HPI:    Patient ID: Nereyda George is a 80year old female. HPI    Review of Systems   Musculoskeletal: Positive for arthralgias.            Current Outpatient Prescriptions:  acetaminophen 500 MG Oral Tab Take 500 mg by mouth every 6 (six) hours as Onset   • Heart Disease Father    • Stroke Mother      CVA   • Stroke Sister      CVA   • Diabetes Brother    • Breast Cancer Self 67   • Breast Cancer Maternal Aunt      post menopause   • Breast Cancer Maternal Cousin Female      post menopause   • Tomas on screening of functional status. Driving: Cannot do without help   She has Meal Preparation difficulties based on screening of functional status.    Preparing your meals: Need some help  She has difficulties Managing Money/Bills based on screening of fu HDL 57 06/26/2017   LDL 98 06/26/2017   TRIG 140 06/26/2017          Last Chemistry Labs:     Lab Results  Component Value Date   AST 24 10/20/2017   ALT 16 10/20/2017   CA 9.1 10/20/2017   ALB 3.2 (L) 10/20/2017   TSH 1.77 05/17/2016   CREATSERUM 1.16 1 history includes Breast Cancer in her maternal aunt, maternal cousin female, and maternal cousin female; Breast Cancer (age of onset: 67) in her self; Diabetes in her brother; Heart Disease in her father; Stroke in her mother and sister.    SOCIAL HISTORY: misunderstand what they say:  No   I misunderstand what others are saying and make inappropriate responses:  No I avoid social activities because I cannot hear well and fear I will reply improperly:  No   Family members and friends have told me they think lifestyle, and exercise. \"}    No Follow-up on file.      Don Mcpherson MD, 6/14/2018     General Health     In the past six months, have you lost more than 10 pounds without trying?: 2 - No  Has your appetite been poor?: No  How does the patient maintain reminders to display for this patient. Update Health Maintenance if applicable    Chlamydia  Annually if high risk No results found for: CHLAMYDIA No flowsheet data found.     Screening Mammogram      Mammogram  Annually to 76, then as discussed There are n DIGOXIN, DIG, VALP No flowsheet data found.                Template: [facility] MEDICARE ANNUAL ASSESSMENT FEMALE Terra Reasons [[id number]]

## 2018-06-27 ENCOUNTER — LAB ENCOUNTER (OUTPATIENT)
Dept: LAB | Facility: HOSPITAL | Age: 83
End: 2018-06-27
Attending: INTERNAL MEDICINE
Payer: MEDICARE

## 2018-06-27 ENCOUNTER — PRIOR ORIGINAL RECORDS (OUTPATIENT)
Dept: OTHER | Age: 83
End: 2018-06-27

## 2018-06-27 DIAGNOSIS — I10 ESSENTIAL HYPERTENSION: ICD-10-CM

## 2018-06-27 DIAGNOSIS — E78.00 PURE HYPERCHOLESTEROLEMIA: ICD-10-CM

## 2018-06-27 DIAGNOSIS — E78.5 HYPERLIPIDEMIA, UNSPECIFIED HYPERLIPIDEMIA TYPE: ICD-10-CM

## 2018-06-27 DIAGNOSIS — I25.119 ATHEROSCLEROSIS OF NATIVE CORONARY ARTERY OF NATIVE HEART WITH ANGINA PECTORIS (HCC): ICD-10-CM

## 2018-06-27 PROCEDURE — 80061 LIPID PANEL: CPT

## 2018-06-27 PROCEDURE — 85027 COMPLETE CBC AUTOMATED: CPT

## 2018-06-27 PROCEDURE — 82550 ASSAY OF CK (CPK): CPT

## 2018-06-27 PROCEDURE — 84443 ASSAY THYROID STIM HORMONE: CPT

## 2018-06-27 PROCEDURE — 84439 ASSAY OF FREE THYROXINE: CPT

## 2018-06-27 PROCEDURE — 80053 COMPREHEN METABOLIC PANEL: CPT

## 2018-06-27 PROCEDURE — 36415 COLL VENOUS BLD VENIPUNCTURE: CPT

## 2018-06-28 LAB
ALBUMIN: 3.9 G/DL
ALKALINE PHOSPHATATE(ALK PHOS): 78 IU/L
ALT (SGPT): 22 U/L
AST (SGOT): 24 U/L
BILIRUBIN TOTAL: 0.6 MG/DL
BUN: 22 MG/DL
CALCIUM: 9.2 MG/DL
CHLORIDE: 98 MEQ/L
CHOLESTEROL, TOTAL: 171 MG/DL
CREATININE KINASE: 58 U/L
CREATININE, SERUM: 0.93 MG/DL
GLOBULIN: 3 G/DL
GLUCOSE: 103 MG/DL
GLUCOSE: 103 MG/DL
HDL CHOLESTEROL: 63 MG/DL
LDL CHOLESTEROL: 87 MG/DL
POTASSIUM, SERUM: 4.4 MEQ/L
PROTEIN, TOTAL: 6.9 G/DL
SGOT (AST): 24 IU/L
SGPT (ALT): 22 IU/L
SODIUM: 134 MEQ/L
TRIGLYCERIDES: 106 MG/DL

## 2018-06-29 RX ORDER — VALSARTAN 160 MG/1
160 TABLET ORAL DAILY
Qty: 30 TABLET | Refills: 2 | Status: ON HOLD | OUTPATIENT
Start: 2018-06-29 | End: 2018-12-03

## 2018-06-29 NOTE — TELEPHONE ENCOUNTER
Per pt she needs refill on her valsartan and her pharmacy keep on telling her that they sent to the office already but nothing was received. Per pt she is out of med.       Current Outpatient Prescriptions:              valsartan 160 MG Oral Tab Take 1 tabl

## 2018-06-30 NOTE — TELEPHONE ENCOUNTER
Hypertensive Medications  Protocol Criteria:  · Appointment scheduled in the past 6 months or in the next 3 months  · BMP or CMP in the past 12 months  · Creatinine result < 2  Recent Outpatient Visits            2 weeks ago Medicare annual wellness visit,

## 2018-07-05 ENCOUNTER — APPOINTMENT (OUTPATIENT)
Dept: LAB | Facility: HOSPITAL | Age: 83
End: 2018-07-05
Attending: INTERNAL MEDICINE
Payer: MEDICARE

## 2018-07-05 DIAGNOSIS — I10 ESSENTIAL HYPERTENSION: ICD-10-CM

## 2018-07-05 DIAGNOSIS — E78.00 PURE HYPERCHOLESTEROLEMIA: ICD-10-CM

## 2018-07-05 DIAGNOSIS — E78.5 HYPERLIPIDEMIA, UNSPECIFIED HYPERLIPIDEMIA TYPE: ICD-10-CM

## 2018-07-05 DIAGNOSIS — I25.119 ATHEROSCLEROSIS OF NATIVE CORONARY ARTERY OF NATIVE HEART WITH ANGINA PECTORIS (HCC): ICD-10-CM

## 2018-07-05 LAB
BACTERIA UR QL AUTO: NEGATIVE /HPF
RBC #/AREA URNS AUTO: 1 /HPF
WBC #/AREA URNS AUTO: 1 /HPF

## 2018-07-05 PROCEDURE — 81015 MICROSCOPIC EXAM OF URINE: CPT

## 2018-07-09 ENCOUNTER — PRIOR ORIGINAL RECORDS (OUTPATIENT)
Dept: OTHER | Age: 83
End: 2018-07-09

## 2018-07-30 ENCOUNTER — PRIOR ORIGINAL RECORDS (OUTPATIENT)
Dept: OTHER | Age: 83
End: 2018-07-30

## 2018-08-08 ENCOUNTER — TELEPHONE (OUTPATIENT)
Dept: INTERNAL MEDICINE CLINIC | Facility: CLINIC | Age: 83
End: 2018-08-08

## 2018-08-10 ENCOUNTER — NURSE TRIAGE (OUTPATIENT)
Dept: OTHER | Age: 83
End: 2018-08-10

## 2018-08-10 DIAGNOSIS — R35.0 URINARY FREQUENCY: Primary | ICD-10-CM

## 2018-08-10 NOTE — TELEPHONE ENCOUNTER
[address] sent to pharmacy  BACTRIM DS  She has so many allergies   She really should have a urinalysis and culture prior ot taking the med make sure she needs it   Side effect of bactrim RASH  GI Upset    Order Ua Culture   Or go to urgent care  FIRST choice  S

## 2018-08-10 NOTE — TELEPHONE ENCOUNTER
Action Requested: Summary for Provider     []  Critical Lab, Recommendations Needed  [] Need Additional Advice  []   FYI    []   Need Orders  [x] Need Medications Sent to Pharmacy  []  Other     SUMMARY: Pt is requesting meds to pharmacy, it is difficult f

## 2018-08-11 ENCOUNTER — APPOINTMENT (OUTPATIENT)
Dept: LAB | Facility: HOSPITAL | Age: 83
End: 2018-08-11
Attending: INTERNAL MEDICINE
Payer: MEDICARE

## 2018-08-11 DIAGNOSIS — R35.0 URINARY FREQUENCY: ICD-10-CM

## 2018-08-11 LAB
BACTERIA UR QL AUTO: NEGATIVE /HPF
BILIRUB UR QL: NEGATIVE
CLARITY UR: CLEAR
COLOR UR: YELLOW
GLUCOSE UR-MCNC: NEGATIVE MG/DL
HGB UR QL STRIP.AUTO: NEGATIVE
KETONES UR-MCNC: NEGATIVE MG/DL
NITRITE UR QL STRIP.AUTO: NEGATIVE
PH UR: 7 [PH] (ref 5–8)
PROT UR-MCNC: NEGATIVE MG/DL
RBC #/AREA URNS AUTO: <1 /HPF
SP GR UR STRIP: 1.01 (ref 1–1.03)
UROBILINOGEN UR STRIP-ACNC: <2
VIT C UR-MCNC: NEGATIVE MG/DL
WBC #/AREA URNS AUTO: 5 /HPF

## 2018-08-11 PROCEDURE — 87086 URINE CULTURE/COLONY COUNT: CPT

## 2018-08-11 PROCEDURE — 81001 URINALYSIS AUTO W/SCOPE: CPT

## 2018-08-11 NOTE — TELEPHONE ENCOUNTER
Received call from Lab patient is there to do her UA/culture. Lab orders have been signed per Dr Chrissie Darden notes below to complete prior to antibiotic. Patient is going to complete labs now.

## 2018-08-11 NOTE — TELEPHONE ENCOUNTER
Daughter calling stating patient completed UA/culture and they are on their way to her pharmacy to  the antibiotic and daughter stating that her prescription better be ready. Advised daughter again regarding Dr. Dong Metcalf note below.  Daughter states

## 2018-08-11 NOTE — TELEPHONE ENCOUNTER
Advised Daughter Estefania of Dr. Griffin Outhouse note. Estefania verbalized understanding and will try and get a hold of patient and see if can get the urinalysis and culture done today. Lab order pended.

## 2018-08-14 NOTE — TELEPHONE ENCOUNTER
Study Result     PROCEDURE:  [facility] SCREENING BILAT (CPT=77067)       Yes same as last year

## 2018-09-03 NOTE — TELEPHONE ENCOUNTER
No Protocol on this med. Pending Prescriptions Disp Refills    CLOPIDOGREL BISULFATE 75 MG Oral Tab [Pharmacy Med Name: CLOPIDOGREL 75MG TABLETS] 90 tablet 0     Sig: TAKE 1 TABLET BY MOUTH ONCE DAILY         LOV 6-14-18  LR 8-28-17 # 90 + 3    Unable to refill per protocol. pls advise, thanks.      Future Appointments  Date Time Provider Jerrell Argueta   9/13/2018 3:20 PM [facility] RM4 [facility] EM [facility]

## 2018-09-04 RX ORDER — CLOPIDOGREL BISULFATE 75 MG/1
TABLET ORAL
Qty: 90 TABLET | Refills: 3 | Status: SHIPPED | OUTPATIENT
Start: 2018-09-04 | End: 2019-04-04

## 2018-10-01 ENCOUNTER — HOSPITAL ENCOUNTER (OUTPATIENT)
Dept: MAMMOGRAPHY | Facility: HOSPITAL | Age: 83
Discharge: HOME | End: 2018-10-01
Attending: INTERNAL MEDICINE
Payer: MEDICARE

## 2018-10-01 DIAGNOSIS — Z12.31 VISIT FOR SCREENING MAMMOGRAM: ICD-10-CM

## 2018-10-01 PROCEDURE — 77067 SCR MAMMO BI INCL CAD: CPT | Performed by: INTERNAL MEDICINE

## 2018-10-08 ENCOUNTER — APPOINTMENT (OUTPATIENT)
Dept: GENERAL RADIOLOGY | Facility: HOSPITAL | Age: 83
End: 2018-10-08
Attending: EMERGENCY MEDICINE
Payer: MEDICARE

## 2018-10-08 ENCOUNTER — HOSPITAL ENCOUNTER (EMERGENCY)
Facility: HOSPITAL | Age: 83
Discharge: HOME | End: 2018-10-08
Attending: EMERGENCY MEDICINE
Payer: MEDICARE

## 2018-10-08 VITALS
RESPIRATION RATE: 15 BRPM | DIASTOLIC BLOOD PRESSURE: 82 MMHG | HEART RATE: 75 BPM | TEMPERATURE: 98 F | BODY MASS INDEX: 23.04 KG/M2 | SYSTOLIC BLOOD PRESSURE: 170 MMHG | HEIGHT: 63 IN | OXYGEN SATURATION: 97 % | WEIGHT: 130 LBS

## 2018-10-08 DIAGNOSIS — M25.461 EFFUSION OF RIGHT KNEE: Primary | ICD-10-CM

## 2018-10-08 PROCEDURE — 73560 X-RAY EXAM OF KNEE 1 OR 2: CPT | Performed by: EMERGENCY MEDICINE

## 2018-10-08 PROCEDURE — 99284 EMERGENCY DEPT VISIT MOD MDM: CPT

## 2018-10-08 RX ORDER — TRAMADOL HYDROCHLORIDE 50 MG/1
50 TABLET ORAL EVERY 4 HOURS PRN
Qty: 10 TABLET | Refills: 0 | Status: SHIPPED | OUTPATIENT
Start: 2018-10-08 | End: 2018-10-15

## 2018-10-08 RX ORDER — ONDANSETRON 4 MG/1
4 TABLET, ORALLY DISINTEGRATING ORAL ONCE
Status: COMPLETED | OUTPATIENT
Start: 2018-10-08 | End: 2018-10-08

## 2018-10-08 RX ORDER — ONDANSETRON 4 MG/1
TABLET, ORALLY DISINTEGRATING ORAL
Status: COMPLETED
Start: 2018-10-08 | End: 2018-10-08

## 2018-10-08 RX ORDER — ACETAMINOPHEN 500 MG
1000 TABLET ORAL ONCE
Status: COMPLETED | OUTPATIENT
Start: 2018-10-08 | End: 2018-10-08

## 2018-10-08 RX ORDER — TRAMADOL HYDROCHLORIDE 50 MG/1
50 TABLET ORAL ONCE
Status: COMPLETED | OUTPATIENT
Start: 2018-10-08 | End: 2018-10-08

## 2018-10-08 NOTE — ED NOTES
Care assumed from EMS presents with acute onset nontraumatic R knee pain/edema States similar episode in the past and dx with effusion requiring aspiration + edema/pain that exacerbates with movement + distal pulses

## 2018-10-08 NOTE — ED PROVIDER NOTES
Patient Seen in: [facility] Emergency Department    History   Patient presents with:  Lower Extremity Injury (musculoskeletal)    Stated Complaint: r knee pain    HPI    27-year-old female with history of hypertension, hyperlipidemia, previous tor Negative for congestion, ear pain and sore throat. Eyes: Negative for pain, discharge and redness. Respiratory: Negative for cough, shortness of breath and wheezing. Cardiovascular: Negative for chest pain.    Gastrointestinal: Negative for abdomina diffusely tender to palpation, overlying ecchymosis    R hip and ankle are nontender to palpation   Neurological: She is alert and oriented to person, place, and time.    5/5 strength in b/l UEs and LEs, normal sensation in all extremities, normal finger to thinks it's too uncomfortable, agreeable with ace wrap    The patient was informed of their elevated blood pressure reading in the Emergency Department. They were informed of the dangers of undiagnosed and untreated hypertension.   Education regarding life follow up care with a primary care provider within the next three months to obtain basic health screening including reassessment of your blood pressure.     Medications Prescribed:  Current Discharge Medication List    START taking these medications    TraM

## 2018-10-08 NOTE — ED NOTES
[facility] presents for transport home as pt poor mobility secondary to pain Pt refuses requesting medicar secondary to \"cost\" refusing transport verbalizes frustration regarding ED care specifically \" the doctor never even introduced herself\" \"you jus

## 2018-10-08 NOTE — ED INITIAL ASSESSMENT (HPI)
Atraumatic right knee pain since midnight. Patient states she has a meniscus tear that Dr's are unwilling to operate on.  +CMS

## 2018-10-09 PROBLEM — M11.20 CHONDROCALCINOSIS: Status: ACTIVE | Noted: 2018-10-09

## 2018-10-10 ENCOUNTER — HOSPITAL ENCOUNTER (OUTPATIENT)
Dept: MAMMOGRAPHY | Facility: HOSPITAL | Age: 83
Discharge: HOME | End: 2018-10-10
Attending: INTERNAL MEDICINE
Payer: MEDICARE

## 2018-10-10 DIAGNOSIS — R92.8 ABNORMAL MAMMOGRAM: ICD-10-CM

## 2018-10-10 PROCEDURE — 77066 DX MAMMO INCL CAD BI: CPT | Performed by: INTERNAL MEDICINE

## 2018-10-10 PROCEDURE — 77062 BREAST TOMOSYNTHESIS BI: CPT | Performed by: INTERNAL MEDICINE

## 2018-10-11 RX ORDER — LEVOTHYROXINE SODIUM 0.1 MG/1
TABLET ORAL
Qty: 90 TABLET | Refills: 0 | Status: SHIPPED | OUTPATIENT
Start: 2018-10-11 | End: 2019-04-01

## 2018-10-11 NOTE — TELEPHONE ENCOUNTER
Hypothyroid Medications  Protocol Criteria:  Appointment scheduled in the past 12 months or the next 3 months  TSH resulted in the past 12 months that is normal  Recent Outpatient Visits            2 days ago Primary osteoarthritis of knees, bilateral    W

## 2018-11-12 PROBLEM — M17.11 PRIMARY OSTEOARTHRITIS OF RIGHT KNEE: Status: ACTIVE | Noted: 2018-11-12

## 2018-12-03 ENCOUNTER — APPOINTMENT (OUTPATIENT)
Dept: CT IMAGING | Facility: HOSPITAL | Age: 83
DRG: 481 | End: 2018-12-03
Attending: EMERGENCY MEDICINE
Payer: MEDICARE

## 2018-12-03 ENCOUNTER — HOSPITAL ENCOUNTER (INPATIENT)
Facility: HOSPITAL | Age: 83
LOS: 9 days | Discharge: NURSING, REHAB OR LONG-TERM CARE | DRG: 481 | End: 2018-12-12
Attending: EMERGENCY MEDICINE | Admitting: HOSPITALIST
Payer: MEDICARE

## 2018-12-03 ENCOUNTER — APPOINTMENT (OUTPATIENT)
Dept: GENERAL RADIOLOGY | Facility: HOSPITAL | Age: 83
DRG: 481 | End: 2018-12-03
Attending: EMERGENCY MEDICINE
Payer: MEDICARE

## 2018-12-03 DIAGNOSIS — S72.8X2A OTHER FRACTURE OF LEFT FEMUR, INITIAL ENCOUNTER FOR CLOSED FRACTURE (HCC): Primary | ICD-10-CM

## 2018-12-03 DIAGNOSIS — S02.2XXB OPEN FRACTURE OF NASAL BONE, INITIAL ENCOUNTER: ICD-10-CM

## 2018-12-03 DIAGNOSIS — S01.21XA LACERATION OF NOSE, INITIAL ENCOUNTER: ICD-10-CM

## 2018-12-03 PROCEDURE — 73552 X-RAY EXAM OF FEMUR 2/>: CPT | Performed by: EMERGENCY MEDICINE

## 2018-12-03 PROCEDURE — 72125 CT NECK SPINE W/O DYE: CPT | Performed by: EMERGENCY MEDICINE

## 2018-12-03 PROCEDURE — 73560 X-RAY EXAM OF KNEE 1 OR 2: CPT | Performed by: EMERGENCY MEDICINE

## 2018-12-03 PROCEDURE — 99223 1ST HOSP IP/OBS HIGH 75: CPT | Performed by: HOSPITALIST

## 2018-12-03 PROCEDURE — 70450 CT HEAD/BRAIN W/O DYE: CPT | Performed by: EMERGENCY MEDICINE

## 2018-12-03 PROCEDURE — 70486 CT MAXILLOFACIAL W/O DYE: CPT | Performed by: EMERGENCY MEDICINE

## 2018-12-03 RX ORDER — ACETAMINOPHEN 325 MG/1
650 TABLET ORAL EVERY 6 HOURS PRN
Status: DISCONTINUED | OUTPATIENT
Start: 2018-12-03 | End: 2018-12-12

## 2018-12-03 RX ORDER — SODIUM CHLORIDE 0.9 % (FLUSH) 0.9 %
3 SYRINGE (ML) INJECTION AS NEEDED
Status: DISCONTINUED | OUTPATIENT
Start: 2018-12-03 | End: 2018-12-12

## 2018-12-03 RX ORDER — MORPHINE SULFATE 2 MG/ML
2 INJECTION, SOLUTION INTRAMUSCULAR; INTRAVENOUS EVERY 2 HOUR PRN
Status: DISCONTINUED | OUTPATIENT
Start: 2018-12-03 | End: 2018-12-12

## 2018-12-03 RX ORDER — DEXTROSE MONOHYDRATE 25 G/50ML
50 INJECTION, SOLUTION INTRAVENOUS AS NEEDED
Status: DISCONTINUED | OUTPATIENT
Start: 2018-12-03 | End: 2018-12-12

## 2018-12-03 RX ORDER — ONDANSETRON 2 MG/ML
4 INJECTION INTRAMUSCULAR; INTRAVENOUS ONCE
Status: COMPLETED | OUTPATIENT
Start: 2018-12-03 | End: 2018-12-03

## 2018-12-03 RX ORDER — MORPHINE SULFATE 4 MG/ML
2 INJECTION, SOLUTION INTRAMUSCULAR; INTRAVENOUS ONCE
Status: COMPLETED | OUTPATIENT
Start: 2018-12-03 | End: 2018-12-03

## 2018-12-03 RX ORDER — SODIUM PHOSPHATE, DIBASIC AND SODIUM PHOSPHATE, MONOBASIC 7; 19 G/133ML; G/133ML
1 ENEMA RECTAL ONCE AS NEEDED
Status: DISCONTINUED | OUTPATIENT
Start: 2018-12-03 | End: 2018-12-08

## 2018-12-03 RX ORDER — HYDROCODONE BITARTRATE AND ACETAMINOPHEN 5; 325 MG/1; MG/1
1-2 TABLET ORAL EVERY 6 HOURS PRN
Status: DISCONTINUED | OUTPATIENT
Start: 2018-12-03 | End: 2018-12-05

## 2018-12-03 RX ORDER — POLYETHYLENE GLYCOL 3350 17 G/17G
17 POWDER, FOR SOLUTION ORAL DAILY PRN
Status: DISCONTINUED | OUTPATIENT
Start: 2018-12-03 | End: 2018-12-08

## 2018-12-03 RX ORDER — MORPHINE SULFATE 2 MG/ML
1 INJECTION, SOLUTION INTRAMUSCULAR; INTRAVENOUS EVERY 2 HOUR PRN
Status: DISCONTINUED | OUTPATIENT
Start: 2018-12-03 | End: 2018-12-12

## 2018-12-03 RX ORDER — MORPHINE SULFATE 4 MG/ML
4 INJECTION, SOLUTION INTRAMUSCULAR; INTRAVENOUS EVERY 2 HOUR PRN
Status: DISCONTINUED | OUTPATIENT
Start: 2018-12-03 | End: 2018-12-12

## 2018-12-03 RX ORDER — BISACODYL 10 MG
10 SUPPOSITORY, RECTAL RECTAL
Status: DISCONTINUED | OUTPATIENT
Start: 2018-12-03 | End: 2018-12-12

## 2018-12-03 RX ORDER — MORPHINE SULFATE 4 MG/ML
2 INJECTION, SOLUTION INTRAMUSCULAR; INTRAVENOUS EVERY 30 MIN PRN
Status: DISCONTINUED | OUTPATIENT
Start: 2018-12-03 | End: 2018-12-12

## 2018-12-03 RX ORDER — ONDANSETRON 2 MG/ML
4 INJECTION INTRAMUSCULAR; INTRAVENOUS EVERY 6 HOURS PRN
Status: DISCONTINUED | OUTPATIENT
Start: 2018-12-03 | End: 2018-12-12

## 2018-12-03 RX ORDER — MORPHINE SULFATE 4 MG/ML
4 INJECTION, SOLUTION INTRAMUSCULAR; INTRAVENOUS ONCE
Status: COMPLETED | OUTPATIENT
Start: 2018-12-03 | End: 2018-12-03

## 2018-12-03 RX ORDER — DOCUSATE SODIUM 100 MG/1
100 CAPSULE, LIQUID FILLED ORAL 2 TIMES DAILY
Status: DISCONTINUED | OUTPATIENT
Start: 2018-12-03 | End: 2018-12-05

## 2018-12-03 NOTE — ED INITIAL ASSESSMENT (HPI)
Patient from [facility], she was decorating her apartment when she turned and fell. Lac noted to bridge of her nose. C/o pain to left leg.

## 2018-12-04 ENCOUNTER — APPOINTMENT (OUTPATIENT)
Dept: NUCLEAR MEDICINE | Facility: HOSPITAL | Age: 83
DRG: 481 | End: 2018-12-04
Attending: HOSPITALIST
Payer: MEDICARE

## 2018-12-04 ENCOUNTER — APPOINTMENT (OUTPATIENT)
Dept: CT IMAGING | Facility: HOSPITAL | Age: 83
DRG: 481 | End: 2018-12-04
Attending: ORTHOPAEDIC SURGERY
Payer: MEDICARE

## 2018-12-04 ENCOUNTER — APPOINTMENT (OUTPATIENT)
Dept: CV DIAGNOSTICS | Facility: HOSPITAL | Age: 83
DRG: 481 | End: 2018-12-04
Attending: HOSPITALIST
Payer: MEDICARE

## 2018-12-04 ENCOUNTER — APPOINTMENT (OUTPATIENT)
Dept: GENERAL RADIOLOGY | Facility: HOSPITAL | Age: 83
DRG: 481 | End: 2018-12-04
Attending: ORTHOPAEDIC SURGERY
Payer: MEDICARE

## 2018-12-04 PROCEDURE — 73706 CT ANGIO LWR EXTR W/O&W/DYE: CPT | Performed by: ORTHOPAEDIC SURGERY

## 2018-12-04 PROCEDURE — 99233 SBSQ HOSP IP/OBS HIGH 50: CPT | Performed by: HOSPITALIST

## 2018-12-04 PROCEDURE — 0HQ1XZZ REPAIR FACE SKIN, EXTERNAL APPROACH: ICD-10-PCS | Performed by: EMERGENCY MEDICINE

## 2018-12-04 PROCEDURE — 99221 1ST HOSP IP/OBS SF/LOW 40: CPT | Performed by: OTOLARYNGOLOGY

## 2018-12-04 PROCEDURE — 78452 HT MUSCLE IMAGE SPECT MULT: CPT | Performed by: HOSPITALIST

## 2018-12-04 PROCEDURE — 73700 CT LOWER EXTREMITY W/O DYE: CPT | Performed by: ORTHOPAEDIC SURGERY

## 2018-12-04 PROCEDURE — 71045 X-RAY EXAM CHEST 1 VIEW: CPT | Performed by: ORTHOPAEDIC SURGERY

## 2018-12-04 PROCEDURE — 93017 CV STRESS TEST TRACING ONLY: CPT | Performed by: HOSPITALIST

## 2018-12-04 RX ORDER — LEVOTHYROXINE SODIUM 0.1 MG/1
100 TABLET ORAL
Status: DISCONTINUED | OUTPATIENT
Start: 2018-12-04 | End: 2018-12-12

## 2018-12-04 RX ORDER — 0.9 % SODIUM CHLORIDE 0.9 %
VIAL (ML) INJECTION
Status: COMPLETED
Start: 2018-12-04 | End: 2018-12-04

## 2018-12-04 RX ORDER — SODIUM CHLORIDE 9 MG/ML
INJECTION, SOLUTION INTRAVENOUS
Status: COMPLETED
Start: 2018-12-04 | End: 2018-12-04

## 2018-12-04 RX ORDER — CLINDAMYCIN PHOSPHATE 600 MG/50ML
600 INJECTION INTRAVENOUS EVERY 8 HOURS
Status: DISCONTINUED | OUTPATIENT
Start: 2018-12-04 | End: 2018-12-06

## 2018-12-04 NOTE — CONSULTS
[facility]    PATIENT'S NAME: Soledad Kebede   ATTENDING PHYSICIAN: Marie Villalba MD   CONSULTING PHYSICIAN: Petra Chavis.  Philippe Rodriguez MD   PATIENT ACCOUNT#:   [id number]    LOCATION:  [address] [place] [address] #:   [id number]       DATE OF JIM adenopathy. SKIN:  Warm and dry. LUNGS:  She shows no evidence of labored respirations. NEUROLOGIC:  Cranial nerve exam is normal.    As mentioned, I reviewed her facial CT scan myself, and the above findings were noted.     ASSESSMENT AND PLAN:  This

## 2018-12-04 NOTE — CONSULTS
[facility]    Cardiology Consultation    Alpine Cap Patient Status:  Inpatient    1928 MRN [id number]   Location [facility] 4W/SW/SE Attending Bipin Espinal MD   [address] Day # 1 PCP Leonid Vela MD     12/3/2018  R Cataract    • Disorder of thyroid    • Essential hypertension    • High blood pressure    • High cholesterol    • History of blood transfusion    • History of breast cancer in female 2000    \"Left sided breast cancer\"   • History of heart bypass surgery Intravenous, Q8H  •  Sodium Chloride 0.9 % solution, , ,   •  morphINE sulfate (PF) 4 MG/ML injection 2 mg, 2 mg, Intravenous, Q30 Min PRN  •  Normal Saline Flush 0.9 % injection 3 mL, 3 mL, Intravenous, PRN  •  acetaminophen (TYLENOL) tab 650 mg, 650 mg, (36.4 °C), Max:97.6 °F (36.4 °C)       Intake/Output Summary (Last 24 hours) at 12/4/2018 1116  Last data filed at 12/4/2018 0616  Gross per 24 hour   Intake 650 ml   Output 350 ml   Net 300 ml     Wt Readings from Last 3 Encounters:  12/03/18 : 128 lb (58 Atherosclerosis. Dictated by (CST): Rossi Marr MD on 12/03/2018 at 15:39     Approved by (CST):  Rossi Marr MD on 12/03/2018 at 15:40          Ct Brain Or Head (72693)    Result Date: 12/3/2018  CONCLUSION:   No acute intracranial abnormal MD      Impression:  Patient Active Problem List:     Personal history of malignant neoplasm of breast     Coronary atherosclerosis of native coronary artery     Essential hypertension     Hypothyroidism     Hyperlipidemia     Primary osteoarthritis of kne

## 2018-12-04 NOTE — H&P
[facility]    PATIENT'S NAME: Dolly Winslow   ATTENDING PHYSICIAN: Sandro Crawford MD   PATIENT ACCOUNT#:   [id number]    LOCATION:  [place] [address]  MEDICAL RECORD #:   [id number]       YOB: 1928  ADMISSION DATE:       12/ Please see medication reconciliation list.    ALLERGIES:  Aspirin, penicillin, iodine. FAMILY HISTORY:  Father has coronary artery disease. SOCIAL HISTORY:  No tobacco, alcohol, or drug use. Lives by herself.   Usually independent for basic activitie stress test in the morning for preoperative clearance. Hold the Plavix. Orthopedic surgery consult, Dr. Roselyn Liang was notified. Monitor her Accu-Checks. Further recommendations to follow.      Dictated By Rolan Barrett MD  d: 12/03/2018 16:39:23  t: 58

## 2018-12-04 NOTE — CONSULTS
[facility]    PATIENT'S NAME: Meryle Berthold   ATTENDING PHYSICIAN: Katina Bee MD   CONSULTING PHYSICIAN: Leigh Pereira MD   PATIENT ACCOUNT#:   [id number]    LOCATION:  [address] [place] [address] #:   [id number]       DATE OF before admission.      PAST MEDICAL HISTORY:  History of osteoarthritis, contralateral knee; significant peripheral vascular disease status post right popliteal stent; coronary artery disease status post coronary artery bypass graft surgery; bare medical st RECOMMENDATION:    1. We discussed all the potential benefits and risks of open reduction, internal fixation of her distal femur fracture.   The discussion included, but was not limited to the significant risk of posttraumatic arthritic changes, resid all of which were answered to the best of my ability. Therefore, I do feel that she has a good understanding of the above discussion. Thank you for the consultation.     Dictated By Theresa Clifford MD  d: 12/04/2018 08:15:58  t: 12/04/2018 09:46:41

## 2018-12-04 NOTE — HISTORICAL OFFICE NOTE
Virgilioteto Erlinasterhoward  : 1928  ACCOUNT:  [id number]  [phone]  PCP: Dr. Austin Moses     TODAY'S DATE: 2018  DICTATED BY:  [Dr. Stanford: [Followup of .  CAD, of native vessels.]    HPI:    [On 2018, Christine Pineda allergies, environmental allergies and seasonal allergies.       PAST HISTORY: hypothyroidism, cholecsytectomy and hysterectomy    PAST CV HISTORY: 5 vessel CABG 1989, BMS 06/2012, CAD, dyslipidemia, hypertension, Mitral Valve Prolapse, PTCA/Stent and Rhuem control. []    ASSESSMENT:  1. . CAD, of native vessels  2. Mitral valve prolapse  3. History of bare metal stent 06/2012  4. History of CABG  5. Hypercholesteremia, pure  6. Hypertension, benign  7. Hypothyroidism, on replacement  8.  Angiogram 9/12

## 2018-12-04 NOTE — CM/SW NOTE
CHRIS met with the patient at bedside. The patient lives alone at [facility]. The patient responds being independent prior to admission with all ADL's and  Ambulation. Patient denies use of any DME.  The patient has family in the area, she has adult chil

## 2018-12-04 NOTE — PROGRESS NOTES
[facility]    Progress Note    Anuelnicola Vinicius Patient Status:  Inpatient    1928 MRN [id number]   Location [facility] 4W/SW/SE Attending Nicolás Torrez MD   Hosp Day # 1 PCP Babar Laura MD       SUBJECTIVE:    Fee (1 Or 2 Views), Left (cpt=73560)    Result Date: 12/3/2018  CONCLUSION:  1. Comminuted slightly angulated and displaced fracture of the distal femur. 2. Demineralization. 3. Surgical clips in the soft tissues. 4. Atherosclerosis.      Dictated by (CST): Was clindamycin in D5W (CLEOCIN) premix 600mg/50ml 600 mg Intravenous Q8H   Sodium Chloride 0.9 % solution      morphINE sulfate (PF) 4 MG/ML injection 2 mg 2 mg Intravenous Q30 Min PRN   Normal Saline Flush 0.9 % injection 3 mL 3 mL Intravenous PRN   acetam Other fracture of left femur, initial encounter for closed fracture ([facility] [address]     Laceration of nose, initial encounter      Plan:     Left femur fracture  -Ortho consulted (Dr Linda Ellison to see)  -cards consulted for preop clearance  -stress test today to yumiko

## 2018-12-05 PROCEDURE — 99233 SBSQ HOSP IP/OBS HIGH 50: CPT | Performed by: HOSPITALIST

## 2018-12-05 RX ORDER — ENOXAPARIN SODIUM 100 MG/ML
40 INJECTION SUBCUTANEOUS DAILY
Status: COMPLETED | OUTPATIENT
Start: 2018-12-05 | End: 2018-12-06

## 2018-12-05 RX ORDER — ROSUVASTATIN CALCIUM 20 MG/1
20 TABLET, COATED ORAL NIGHTLY
Status: DISCONTINUED | OUTPATIENT
Start: 2018-12-05 | End: 2018-12-12

## 2018-12-05 RX ORDER — HYDROCODONE BITARTRATE AND ACETAMINOPHEN 10; 325 MG/1; MG/1
1 TABLET ORAL EVERY 4 HOURS PRN
Status: DISCONTINUED | OUTPATIENT
Start: 2018-12-05 | End: 2018-12-12

## 2018-12-05 RX ORDER — SENNA AND DOCUSATE SODIUM 50; 8.6 MG/1; MG/1
2 TABLET, FILM COATED ORAL 2 TIMES DAILY
Status: DISCONTINUED | OUTPATIENT
Start: 2018-12-05 | End: 2018-12-12

## 2018-12-05 NOTE — PROGRESS NOTES
[facility]    Progress Note    Herb Graff Patient Status:  Inpatient    1928 MRN [id number]   Location [facility] 4W/SW/SE Attending Abdirahman Gates MD   Hosp Day # 2 PCP Seda Watson MD       SUBJECTIVE:    Ino Wilson Left (cpt=73560)    Result Date: 12/3/2018  CONCLUSION:  1. Comminuted slightly angulated and displaced fracture of the distal femur. 2. Demineralization. 3. Surgical clips in the soft tissues. 4. Atherosclerosis. 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 Nonemergent interventional radiology or vascular surgery consultation suggested.  Yearly ada PRN   Normal Saline Flush 0.9 % injection 3 mL 3 mL Intravenous PRN   acetaminophen (TYLENOL) tab 650 mg 650 mg Oral Q6H PRN   morphINE sulfate (PF) 2 MG/ML injection 1 mg 1 mg Intravenous Q2H PRN   Or      morphINE sulfate (PF) 2 MG/ML injection 2 mg 2 mg defect    Hx of CAD  -on plavix - held for surgery  -sees Dr Ania Kumar   -[facility] consulted    Nasal fracture  -seen by Dr Nir Taylor  -no surgical intervention needed at this time  -can cont Clinda for now and stop 12/6    DM type 2  -accuchecks, ISS    HTN  -stable

## 2018-12-05 NOTE — PROGRESS NOTES
[facility]    Progress Note    Donna Etienne Patient Status:  Inpatient    1928 MRN [id number]   Location [facility] 4W/SW/SE Attending Silvia Mcqueen MD   Hosp Day # 2 PCP Coretta Parra MD     Date of Admission:  15 MG/5ML injection      [COMPLETED] Sodium Chloride 0.9 % solution      [COMPLETED] iopamidol (ISOVUE-M) 76 % injection 100 mL 100 mL Intravenous ONCE PRN   [COMPLETED] morphINE sulfate (PF) 4 MG/ML injection 4 mg 4 mg Intravenous Once   [COMPLETED] ondanset 1989   • Hx of skin cancer, basal cell 2009   • Internal derangement of knee, right 8/14/2017   • Osteoarthritis    • Rheumatic fever/heart disease age 12   • Visual impairment       Surgical History:  Past Surgical History:   Procedure Laterality Date   • Marty Rodriguez MD on 12/04/2018 at 13:41     Approved by (CST): Marty Rodriguez MD on 12/04/2018 at 13:44          Cta Lower Extremity Bilat (cpt=73706)    Result Date: 12/4/2018  CONCLUSION:   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 4.      Anemia  5. Adult onset diabetes mellitus type 2.  6.   Hypertension. 7.   Hypoglycemia. RECOMMENDATION:    1.    The patient's CT scan was reviewed and we discussed all the potential benefits a has a history of a popliteal stent on the contralateral right lower extremity. 4.   While the Plavix has been placed on hold, we have placed her on Lovenox for DVT prophylaxis.   5.      Continue to closely monitor her hemoglobin and hematocrit with Plavi

## 2018-12-05 NOTE — PLAN OF CARE
PAIN - ADULT    • Verbalizes/displays adequate comfort level or patient's stated pain goal Not Progressing          Impaired Functional Mobility    • Achieve highest/safest level of mobility/gait Progressing        Patient Centered Care    • Patient prefer

## 2018-12-05 NOTE — PROGRESS NOTES
Enoxaparin dosing for prophylaxis-- AM dose today and tomorrow and then D/c for possible surgery. Based on age, wt., and crcl, will order Lovenox 40 mg sc q24 hours x 2 doses. Will monitor creatinine clearance closely for possible dose adjustment.    Thank

## 2018-12-05 NOTE — CM/SW NOTE
CTL rounds: Met with Pavithra at bedside = pt is BPCI eligible under . [employer] program including 90 day phone call follow up reviewed with patient who verbalizes understanding. [employer] Brochure provided.  Patient encouraged to share informatio

## 2018-12-06 PROCEDURE — 99233 SBSQ HOSP IP/OBS HIGH 50: CPT | Performed by: HOSPITALIST

## 2018-12-06 RX ORDER — SODIUM CHLORIDE 9 MG/ML
INJECTION, SOLUTION INTRAVENOUS ONCE
Status: COMPLETED | OUTPATIENT
Start: 2018-12-07 | End: 2018-12-07

## 2018-12-06 NOTE — PLAN OF CARE
Impaired Functional Mobility    • Achieve highest/safest level of mobility/gait Adequate for Discharge        PAIN - ADULT    • Verbalizes/displays adequate comfort level or patient's stated pain goal Adequate for Discharge        Patient Centered Care

## 2018-12-06 NOTE — PROGRESS NOTES
[facility]    [facility] Note    Eusebia Brantley Patient Status:  Inpatient    1928 MRN [id number]   Location [facility] 4W/SW/SE Attending Irma Huertas MD   Hosp Day # 3 PCP Olivia العلي MD     Date of Admission: solution      [COMPLETED] regadenoson (LEXISCAN) 0.4 MG/5ML injection      [COMPLETED] Sodium Chloride 0.9 % solution      [COMPLETED] iopamidol (ISOVUE-M) 76 % injection 100 mL 100 mL Intravenous ONCE PRN   [COMPLETED] morphINE sulfate (PF) 4 MG/ML inject sided breast cancer\"   • History of heart bypass surgery 1989   • Hx of skin cancer, basal cell 2009   • Internal derangement of knee, right 8/14/2017   • Osteoarthritis    • Rheumatic fever/heart disease age 12   • Visual impairment       Surgical Histor Severe osteopenia. 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      Dictated by (CST): Irma Fleming MD on 12/04/2018 at 13:41     Approved by (CST): Irma Fleming MD on 12/04/2018 at 13:44          Cta Lower Extremity Bilat (cpt=73706)    Resul Hypertension. 7.        Hypoglycemia.       RECOMMENDATION:    1. The patient's CT scan was reviewed and we discussed all the potential benefits and risks of open reduction, internal fixation of her distal femur fracture.   The discussion included, popliteal stent on the contralateral right lower extremity. We are still waiting their consultation/recommendations. 4.        While the Plavix has been placed on hold, we have placed her on Lovenox for DVT prophylaxis.   5.      Continue to closely monit

## 2018-12-06 NOTE — PROGRESS NOTES
[facility]    Progress Note    Palmira Farley Patient Status:  Inpatient    1928 MRN [id number]   Location [facility] 4W/SW/SE Attending Grady José MD   Hosp Day # 3 PCP Edy Abdullahi MD       SUBJECTIVE:  Pt Surgical clips in the soft tissues. 4. Atherosclerosis. Dictated by (CST): Nori Quezada MD on 12/03/2018 at 15:39     Approved by (CST):  Nori Quezada MD on 12/03/2018 at 15:40          Ct Brain Or Head ([id number])    Result Date: 12/3/2018  CONCL 10/12/15. Nonemergent interventional radiology or vascular surgery consultation suggested. Yearly surveillance could be performed if intervention is deferred.   Extensive atherosclerotic calcification seen within the aortobiiliac system and throughout the b mg Intravenous Q2H PRN   Or      morphINE sulfate (PF) 2 MG/ML injection 2 mg 2 mg Intravenous Q2H PRN   Or      morphINE sulfate (PF) 4 MG/ML injection 4 mg 4 mg Intravenous Q2H PRN   ondansetron HCl (ZOFRAN) injection 4 mg 4 mg Intravenous Q6H PRN   PEG pending    Greater than 35 minutes spent, >50% spent counseling re: treatment plan and workup      Naresh Lino, 12/06/18

## 2018-12-06 NOTE — CONSULTS
[facility]  Vascular Interventional Radiology Consultation    Lilibeth Arceo Patient Status:  Inpatient    1928 MRN [id number]   Location [facility] 4W/SW/SE Attending Vita Quinn., MD   Hosp Day # 3 PCP Irene Arce MD history. she has never used smokeless tobacco. She reports that she drinks alcohol. She reports that she does not use drugs.     Allergies:    Adhesive Tape           RASH    Comment:Surgical tape  Aspirin                     Comment:Other reaction(s): ASPI Oral, Q15 Min PRN  •  glucose (DEX4) oral liquid 15 g, 15 g, Oral, Q15 Min PRN  •  Insulin Aspart Pen (NOVOLOG) 100 UNIT/ML flexpen 1-7 Units, 1-7 Units, Subcutaneous, TID CC and HS  •  metoprolol Tartrate (LOPRESSOR) tab 12.5 mg, 12.5 mg, Oral, BID    Rev tissues. 4. Atherosclerosis. Dictated by (CST): Una Husain MD on 12/03/2018 at 15:39     Approved by (CST):  Una Husain MD on 12/03/2018 at 15:40          Ct Brain Or Head ([id number])    Result Date: 12/3/2018  CONCLUSION:   No acute intracran interventional radiology or vascular surgery consultation suggested. Yearly surveillance could be performed if intervention is deferred.   Extensive atherosclerotic calcification seen within the aortobiiliac system and throughout the bilateral lower extremi osteoarthritis of right knee     Other fracture of left femur, initial encounter for closed fracture (HCC)     Laceration of nose, initial encounter    79 yo with recent fall, comminuted, impacted fracture of the distal femur with anterior displacement.   I

## 2018-12-06 NOTE — PROGRESS NOTES
[facility]  Progress Note    Armidabessie Fernandez Patient Status:  Inpatient    1928 MRN [id number]   Location [facility] 4W/SW/SE Attending Marta Hackett MD   Hosp Day # 2 PCP Jessica Valladares MD     Assessment:    1.   Trauma with fr 12/05/2018     12/05/2018     No results found for: PT, INR  Lab Results   Component Value Date     12/05/2018    K 4.0 12/05/2018    CL 93 12/05/2018    CO2 28 12/05/2018    BUN 20 12/05/2018    CREATSERUM 0.96 12/05/2018     12/05/201

## 2018-12-06 NOTE — PROGRESS NOTES
[facility]  Progress Note    Donna Etienne Patient Status:  Inpatient    1928 MRN [id number]   Location [facility] 4W/SW/SE Attending Josh Weston MD   Hosp Day # 3 PCP Coretta Parra MD     Assessment:    1.   Trauma with Warm and dry.      Labs:  Lab Results   Component Value Date    WBC 6.9 12/06/2018    HGB 8.7 12/06/2018    HCT 26.0 12/06/2018     12/06/2018     No results found for: PT, INR        No results found for: TROP, CKMB     Medications:    • Senna-Docus

## 2018-12-06 NOTE — PLAN OF CARE
Helen Lawler is on complete bedrest until she has sx which is tentively scheduled for Friday with Dr Cristi Zuñiga. She was taking plavix so sx had to be postponed. Gave norco for pain.

## 2018-12-07 PROCEDURE — 99233 SBSQ HOSP IP/OBS HIGH 50: CPT | Performed by: HOSPITALIST

## 2018-12-07 PROCEDURE — 30233N1 TRANSFUSION OF NONAUTOLOGOUS RED BLOOD CELLS INTO PERIPHERAL VEIN, PERCUTANEOUS APPROACH: ICD-10-PCS | Performed by: HOSPITALIST

## 2018-12-07 PROCEDURE — 99223 1ST HOSP IP/OBS HIGH 75: CPT | Performed by: INTERNAL MEDICINE

## 2018-12-07 RX ORDER — SODIUM CHLORIDE 1000 MG
1 TABLET, SOLUBLE MISCELLANEOUS DAILY
Status: DISCONTINUED | OUTPATIENT
Start: 2018-12-07 | End: 2018-12-09

## 2018-12-07 RX ORDER — DIPHENHYDRAMINE HCL 25 MG
25 CAPSULE ORAL EVERY 6 HOURS PRN
Status: DISCONTINUED | OUTPATIENT
Start: 2018-12-07 | End: 2018-12-12

## 2018-12-07 RX ORDER — ENOXAPARIN SODIUM 100 MG/ML
40 INJECTION SUBCUTANEOUS ONCE
Status: COMPLETED | OUTPATIENT
Start: 2018-12-07 | End: 2018-12-07

## 2018-12-07 RX ORDER — SODIUM CHLORIDE 0.9 % (FLUSH) 0.9 %
10 SYRINGE (ML) INJECTION AS NEEDED
Status: DISCONTINUED | OUTPATIENT
Start: 2018-12-07 | End: 2018-12-08

## 2018-12-07 RX ORDER — IBUPROFEN 200 MG
CAPSULE ORAL
Status: DISCONTINUED | OUTPATIENT
Start: 2018-12-07 | End: 2018-12-12

## 2018-12-07 RX ORDER — SODIUM CHLORIDE 9 MG/ML
INJECTION, SOLUTION INTRAVENOUS ONCE
Status: COMPLETED | OUTPATIENT
Start: 2018-12-07 | End: 2018-12-08

## 2018-12-07 RX ORDER — SODIUM CHLORIDE 1000 MG
1 TABLET, SOLUBLE MISCELLANEOUS ONCE
Status: COMPLETED | OUTPATIENT
Start: 2018-12-07 | End: 2018-12-07

## 2018-12-07 NOTE — PLAN OF CARE
Patient: Desire Owens  MRN: [id number]  : 1928  Allergies:   Adhesive Tape           RASH    Comment:Surgical tape  Aspirin                     Comment:Other reaction(s): ASPIRIN  Macrobid [Nitrofura*      Penicillins             HIVES  Povid

## 2018-12-07 NOTE — PROGRESS NOTES
Ms. Lana Dueñas was seen again earlier todayresting comfortably in bed in no apparent distress. Her physical examination is unchanged.   All the potential benefits and risk of open reduction internal fixation of the left distal femoral supracondylar fracture h

## 2018-12-07 NOTE — PROGRESS NOTES
[address] [facility] Dosing Service  Antibiotic Dosing    Jeannine Michele is a 80year old female for whom pharmacy is dosing Vancomycin for surgical prophylaxis .       Allergies: is allergic to adhesive tape; aspirin; macrobid [nitrofurantoin]; penicillins; po

## 2018-12-07 NOTE — PROGRESS NOTES
[facility]    Progress Note    Donna Etienne Patient Status:  Inpatient    1928 MRN [id number]   Location [facility] 4W/SW/SE Attending Dany Flynn MD   Hosp Day # 4 PCP Coretta Parra MD       Subjective:    Gregorio Do HYDROcodone-acetaminophen, morphINE sulfate, Normal Saline Flush, acetaminophen, morphINE sulfate **OR** morphINE sulfate **OR** morphINE sulfate, ondansetron HCl, PEG 3350, magnesium hydroxide, bisacodyl, FLEET ENEMA, dextrose, Glucose-Vitamin C, glucose will need to delay surgery until improves     Hx of CAD  -on plavix - held for surgery  P2Y12 level acceptable for surgery  -sees Dr Linsey [facility] on consult    PAD  -will need IR follow up post op      Nasal fracture  -seen by Dr Henna Man  -no surgical int

## 2018-12-07 NOTE — PROGRESS NOTES
[facility]OSP - [facility]    Cardiology Progress Note    Dez Montenegro Patient Status:  Inpatient    1928 MRN [id number]   Location [facility] 4W/SW/SE Attending Charmayne Cranker, MD   Hosp Day # 4 PCP Jose Peralta MD     Primary C sign off for the weekend and see patient on Monday post-op.     Results:     Lab Results   Component Value Date    WBC 6.0 12/07/2018    HGB 8.4 (L) 12/07/2018    HCT 24.6 (L) 12/07/2018     12/07/2018    CREATSERUM 0.76 12/07/2018    BUN 12 12/07/20

## 2018-12-07 NOTE — CONSULTS
[facility]    Report of Consultation    Date of Admission:  12/3/2018  Date of Consult:  12/7/2018     Reason for Consultation:     Hyponatremia     History of Present Illness:     Patient is a 80 yrs old female with pmh of HTN, CAD s/p C Maternal Aunt         post menopause   • Breast Cancer Maternal Cousin Female         post menopause   • Breast Cancer Maternal Cousin Female         post menopause       Social History  Patient Guardian Status:  Not on file    Other Topics            Conc GM/118ML enema 133 mL 1 enema Rectal Once PRN   dextrose 50 % injection 50 mL 50 mL Intravenous PRN   Glucose-Vitamin C (DEX-4) 4-6 GM-MG chewable tab 4 tablet 4 tablet Oral Q15 Min PRN   glucose (DEX4) oral liquid 15 g 15 g Oral Q15 Min PRN   metoprolol T Readings from Last 5 Encounters:  12/03/18 : 128 lb (58.1 kg)  10/08/18 : 130 lb (59 kg)  06/14/18 : 130 lb (59 kg)  03/08/18 : 125 lb (56.7 kg)  11/30/17 : 126 lb 6.4 oz (57.3 kg)      General appearance: alert, appears stated age and cooperative  Head: N with Nursing, Daughter at bedside and Dr. Agustin Watson     Thank you for allowing me to participate in the care of your patient.     Aidan Amato MD  12/7/2018

## 2018-12-08 ENCOUNTER — ANESTHESIA EVENT (OUTPATIENT)
Dept: SURGERY | Facility: HOSPITAL | Age: 83
DRG: 481 | End: 2018-12-08
Payer: MEDICARE

## 2018-12-08 ENCOUNTER — ANESTHESIA (OUTPATIENT)
Dept: SURGERY | Facility: HOSPITAL | Age: 83
DRG: 481 | End: 2018-12-08
Payer: MEDICARE

## 2018-12-08 ENCOUNTER — APPOINTMENT (OUTPATIENT)
Dept: GENERAL RADIOLOGY | Facility: HOSPITAL | Age: 83
DRG: 481 | End: 2018-12-08
Attending: ORTHOPAEDIC SURGERY
Payer: MEDICARE

## 2018-12-08 PROCEDURE — 0QSC04Z REPOSITION LEFT LOWER FEMUR WITH INTERNAL FIXATION DEVICE, OPEN APPROACH: ICD-10-PCS | Performed by: ORTHOPAEDIC SURGERY

## 2018-12-08 PROCEDURE — 99233 SBSQ HOSP IP/OBS HIGH 50: CPT | Performed by: INTERNAL MEDICINE

## 2018-12-08 PROCEDURE — 36430 TRANSFUSION BLD/BLD COMPNT: CPT | Performed by: ANESTHESIOLOGY

## 2018-12-08 PROCEDURE — 76001 XR C-ARM FLUORO >1 HOUR  (CPT=76001): CPT | Performed by: ORTHOPAEDIC SURGERY

## 2018-12-08 PROCEDURE — 30233H1 TRANSFUSION OF NONAUTOLOGOUS WHOLE BLOOD INTO PERIPHERAL VEIN, PERCUTANEOUS APPROACH: ICD-10-PCS | Performed by: HOSPITALIST

## 2018-12-08 PROCEDURE — 99233 SBSQ HOSP IP/OBS HIGH 50: CPT | Performed by: HOSPITALIST

## 2018-12-08 DEVICE — IMPLANTABLE DEVICE: Type: IMPLANTABLE DEVICE | Site: HIP | Status: FUNCTIONAL

## 2018-12-08 DEVICE — SCREW PERI LCK 5X80 ST: Type: IMPLANTABLE DEVICE | Site: HIP | Status: FUNCTIONAL

## 2018-12-08 DEVICE — SCREW BN 5MM 75MM LCP SS CNDYL: Type: IMPLANTABLE DEVICE | Site: HIP | Status: FUNCTIONAL

## 2018-12-08 RX ORDER — SODIUM CHLORIDE, SODIUM LACTATE, POTASSIUM CHLORIDE, CALCIUM CHLORIDE 600; 310; 30; 20 MG/100ML; MG/100ML; MG/100ML; MG/100ML
INJECTION, SOLUTION INTRAVENOUS CONTINUOUS PRN
Status: DISCONTINUED | OUTPATIENT
Start: 2018-12-08 | End: 2018-12-08 | Stop reason: SURG

## 2018-12-08 RX ORDER — POTASSIUM CHLORIDE 20 MEQ/1
40 TABLET, EXTENDED RELEASE ORAL ONCE
Status: COMPLETED | OUTPATIENT
Start: 2018-12-08 | End: 2018-12-08

## 2018-12-08 RX ORDER — SODIUM PHOSPHATE, DIBASIC AND SODIUM PHOSPHATE, MONOBASIC 7; 19 G/133ML; G/133ML
1 ENEMA RECTAL ONCE AS NEEDED
Status: DISCONTINUED | OUTPATIENT
Start: 2018-12-08 | End: 2018-12-12

## 2018-12-08 RX ORDER — POLYETHYLENE GLYCOL 3350 17 G/17G
17 POWDER, FOR SOLUTION ORAL DAILY PRN
Status: DISCONTINUED | OUTPATIENT
Start: 2018-12-08 | End: 2018-12-12

## 2018-12-08 RX ORDER — BISACODYL 10 MG
10 SUPPOSITORY, RECTAL RECTAL
Status: DISCONTINUED | OUTPATIENT
Start: 2018-12-08 | End: 2018-12-12

## 2018-12-08 RX ORDER — DOCUSATE SODIUM 100 MG/1
100 CAPSULE, LIQUID FILLED ORAL 2 TIMES DAILY
Status: DISCONTINUED | OUTPATIENT
Start: 2018-12-08 | End: 2018-12-12

## 2018-12-08 RX ORDER — HYDROCODONE BITARTRATE AND ACETAMINOPHEN 5; 325 MG/1; MG/1
2 TABLET ORAL AS NEEDED
Status: DISCONTINUED | OUTPATIENT
Start: 2018-12-08 | End: 2018-12-08 | Stop reason: HOSPADM

## 2018-12-08 RX ORDER — HYDRALAZINE HYDROCHLORIDE 20 MG/ML
5 INJECTION INTRAMUSCULAR; INTRAVENOUS EVERY 6 HOURS PRN
Status: DISCONTINUED | OUTPATIENT
Start: 2018-12-08 | End: 2018-12-12

## 2018-12-08 RX ORDER — SODIUM CHLORIDE 0.9 % (FLUSH) 0.9 %
10 SYRINGE (ML) INJECTION AS NEEDED
Status: DISCONTINUED | OUTPATIENT
Start: 2018-12-08 | End: 2018-12-12

## 2018-12-08 RX ORDER — SODIUM CHLORIDE 9 MG/ML
INJECTION, SOLUTION INTRAVENOUS
Status: COMPLETED
Start: 2018-12-08 | End: 2018-12-08

## 2018-12-08 RX ORDER — DEXTROSE MONOHYDRATE 25 G/50ML
50 INJECTION, SOLUTION INTRAVENOUS
Status: DISCONTINUED | OUTPATIENT
Start: 2018-12-08 | End: 2018-12-08 | Stop reason: HOSPADM

## 2018-12-08 RX ORDER — MORPHINE SULFATE 4 MG/ML
2 INJECTION, SOLUTION INTRAMUSCULAR; INTRAVENOUS EVERY 10 MIN PRN
Status: DISCONTINUED | OUTPATIENT
Start: 2018-12-08 | End: 2018-12-08 | Stop reason: HOSPADM

## 2018-12-08 RX ORDER — METOPROLOL TARTRATE 5 MG/5ML
2.5 INJECTION INTRAVENOUS ONCE
Status: DISCONTINUED | OUTPATIENT
Start: 2018-12-08 | End: 2018-12-08 | Stop reason: HOSPADM

## 2018-12-08 RX ORDER — GLYCOPYRROLATE 0.2 MG/ML
INJECTION INTRAMUSCULAR; INTRAVENOUS AS NEEDED
Status: DISCONTINUED | OUTPATIENT
Start: 2018-12-08 | End: 2018-12-08 | Stop reason: SURG

## 2018-12-08 RX ORDER — MORPHINE SULFATE 10 MG/ML
6 INJECTION, SOLUTION INTRAMUSCULAR; INTRAVENOUS EVERY 10 MIN PRN
Status: DISCONTINUED | OUTPATIENT
Start: 2018-12-08 | End: 2018-12-08 | Stop reason: HOSPADM

## 2018-12-08 RX ORDER — ROCURONIUM BROMIDE 10 MG/ML
INJECTION, SOLUTION INTRAVENOUS AS NEEDED
Status: DISCONTINUED | OUTPATIENT
Start: 2018-12-08 | End: 2018-12-08 | Stop reason: SURG

## 2018-12-08 RX ORDER — EPHEDRINE SULFATE 50 MG/ML
INJECTION, SOLUTION INTRAVENOUS AS NEEDED
Status: DISCONTINUED | OUTPATIENT
Start: 2018-12-08 | End: 2018-12-08 | Stop reason: SURG

## 2018-12-08 RX ORDER — HYDROCODONE BITARTRATE AND ACETAMINOPHEN 5; 325 MG/1; MG/1
1 TABLET ORAL AS NEEDED
Status: DISCONTINUED | OUTPATIENT
Start: 2018-12-08 | End: 2018-12-08 | Stop reason: HOSPADM

## 2018-12-08 RX ORDER — ONDANSETRON 2 MG/ML
4 INJECTION INTRAMUSCULAR; INTRAVENOUS ONCE AS NEEDED
Status: DISCONTINUED | OUTPATIENT
Start: 2018-12-08 | End: 2018-12-08 | Stop reason: HOSPADM

## 2018-12-08 RX ORDER — MORPHINE SULFATE 4 MG/ML
4 INJECTION, SOLUTION INTRAMUSCULAR; INTRAVENOUS EVERY 10 MIN PRN
Status: DISCONTINUED | OUTPATIENT
Start: 2018-12-08 | End: 2018-12-08 | Stop reason: HOSPADM

## 2018-12-08 RX ORDER — NALOXONE HYDROCHLORIDE 0.4 MG/ML
80 INJECTION, SOLUTION INTRAMUSCULAR; INTRAVENOUS; SUBCUTANEOUS AS NEEDED
Status: DISCONTINUED | OUTPATIENT
Start: 2018-12-08 | End: 2018-12-08 | Stop reason: HOSPADM

## 2018-12-08 RX ORDER — HALOPERIDOL 5 MG/ML
0.25 INJECTION INTRAMUSCULAR ONCE AS NEEDED
Status: DISCONTINUED | OUTPATIENT
Start: 2018-12-08 | End: 2018-12-08 | Stop reason: HOSPADM

## 2018-12-08 RX ORDER — NEOSTIGMINE METHYLSULFATE 0.5 MG/ML
INJECTION INTRAVENOUS AS NEEDED
Status: DISCONTINUED | OUTPATIENT
Start: 2018-12-08 | End: 2018-12-08 | Stop reason: SURG

## 2018-12-08 RX ORDER — SODIUM CHLORIDE, SODIUM LACTATE, POTASSIUM CHLORIDE, CALCIUM CHLORIDE 600; 310; 30; 20 MG/100ML; MG/100ML; MG/100ML; MG/100ML
INJECTION, SOLUTION INTRAVENOUS CONTINUOUS
Status: DISCONTINUED | OUTPATIENT
Start: 2018-12-08 | End: 2018-12-08 | Stop reason: HOSPADM

## 2018-12-08 RX ORDER — 0.9 % SODIUM CHLORIDE 0.9 %
VIAL (ML) INJECTION
Status: DISPENSED
Start: 2018-12-08 | End: 2018-12-08

## 2018-12-08 RX ADMIN — GLYCOPYRROLATE 0.8 MG: 0.2 INJECTION INTRAMUSCULAR; INTRAVENOUS at 13:28:00

## 2018-12-08 RX ADMIN — EPHEDRINE SULFATE 10 MG: 50 INJECTION, SOLUTION INTRAVENOUS at 11:40:00

## 2018-12-08 RX ADMIN — EPHEDRINE SULFATE 10 MG: 50 INJECTION, SOLUTION INTRAVENOUS at 12:52:00

## 2018-12-08 RX ADMIN — NEOSTIGMINE METHYLSULFATE 5 MG: 0.5 INJECTION INTRAVENOUS at 13:28:00

## 2018-12-08 RX ADMIN — ONDANSETRON 4 MG: 2 INJECTION INTRAMUSCULAR; INTRAVENOUS at 13:08:00

## 2018-12-08 RX ADMIN — ROCURONIUM BROMIDE 30 MG: 10 INJECTION, SOLUTION INTRAVENOUS at 11:35:00

## 2018-12-08 RX ADMIN — SODIUM CHLORIDE, SODIUM LACTATE, POTASSIUM CHLORIDE, CALCIUM CHLORIDE: 600; 310; 30; 20 INJECTION, SOLUTION INTRAVENOUS at 11:15:00

## 2018-12-08 NOTE — ANESTHESIA PREPROCEDURE EVALUATION
Anesthesia PreOp Note    HPI:     Dez Montenegro is a 80year old female who presents for preoperative consultation requested by: Tatum Sanchez MD    Date of Surgery: 12/3/2018 - 12/8/2018    Procedure(s):   FEMUR OPEN REDUCTION INTERNAL FIXATION/ EX F \"Left sided breast cancer\"   • History of heart bypass surgery 1989   • Hx of skin cancer, basal cell 2009   • Internal derangement of knee, right 8/14/2017   • Osteoarthritis    • Rheumatic fever/heart disease age 12   • Visual impairment        Past Garcia 0.9 % solution        Vancomycin HCl (VANCOCIN) 1 g in sodium chloride 0.9 % 250 mL IVPB add-vantage 1 g Intravenous On Call to [address] MD 1 g at 12/08/18 1003   [MAR Hold] triple antibiotic (NEOSPORIN) 3.5-400-5000 ointment  Topical Q shift Zure [MAR Hold] PEG 3350 (MIRALAX) powder packet 17 g 17 g Oral Daily PRN Rosalinda Michel MD    [MAR Hold] magnesium hydroxide (MILK OF MAGNESIA) 400 MG/5ML suspension 30 mL 30 mL Oral Daily PRN Rosalinda Michel MD 30 mL at 12/07/18 2227   [facility] Hold] patricio Food insecurity - worry: Not on file      Food insecurity - inability: Not on file      Transportation needs - medical: Not on file      Transportation needs - non-medical: Not on file    Occupational History      Not on file    Tobacco Use      Smoking st 98%.    12/07/18 2027 12/08/18  0309 12/08/18  0629 12/08/18  1006   BP: 133/64 150/66 (!) 166/73 141/56   BP Location: Right arm Right arm Right arm Right arm   Pulse: 77 76     Resp: 18 18  16   Temp: 98.2 °F (36.8 °C) 97.7 °F (36.5 °C)  98.2 °F (36.8 °

## 2018-12-08 NOTE — PROGRESS NOTES
ORTHO SURG:  AM LABS: Na+ = 131, H / H = 10.4 / 31.0. BASED ON LABS PATIENT IS OK FOR OR TODAY. NEEDS TO BE SEEN BY HOSPITALIST BEFORE SHE LEAVES 4W-B.

## 2018-12-08 NOTE — PROGRESS NOTES
[facility]    Progress Note    Ricardo Bergman Patient Status:  Inpatient    1928 MRN [id number]   Location [facility] 4W/SW/SE Attending Leonidas Felix MD   Hosp Day # 5 PCP Andrea Valdez MD       Subjective:    Easton Villaseñor metoprolol Tartrate  12.5 mg Oral BID       Current PRN Inpatient Meds:      hydrALAzine HCl, Normal Saline Flush, diphenhydrAMINE, HYDROcodone-acetaminophen, morphINE sulfate, Normal Saline Flush, acetaminophen, morphINE sulfate **OR** morphINE sulfate ** found for: PT, INR    Culture:  No results found for this visit on 12/03/18.     Imaging/EKG:           Assessment and Plan:   Left femur fracture  -Ortho consulted  -cards consulted for preop clearance--> low/moderate risk for surgery  -stress test today t

## 2018-12-08 NOTE — BRIEF OP NOTE
[facility] POST ANESTHESIA CARE UNIT  Brief Op Note     Payton Iglesias Location: OR   [facility] [id number] MRN [id number]   Admission Date 12/3/2018 Operation Date 12/8/2018   Attending Physician Ayana Dupree MD Operating Physician Sangeeta Justice,

## 2018-12-08 NOTE — ANESTHESIA POSTPROCEDURE EVALUATION
Patient: Yamile Murry    Procedure Summary     Date:  12/08/18 Room / Location:  [address] [place] [address] MAIN OR 05 / [address] [place] [address] MAIN OR    Anesthesia Start:  0126 Anesthesia Stop:      Procedure:  FEMUR OPEN REDUCTION INTERNAL FIXATION/ EX FIX (Left Hip) Diagnosis:  (left sup

## 2018-12-08 NOTE — PROGRESS NOTES
ORTHO SURG (COVERING ):   MET PATIENT AND HER DTR  THIS PM.  I REVIEWED LEFT FEMUR RADIOGRAPHS WITH PATIENT'S DTR AND DISCUSSED THE REQUIRED SURGERY WHICH CAN ONLY BE DONE BY ORIF USING A 15\" - 18\" INCISION WITH APPLICATION OF A SIDE PLATE AND MULTIPLE S

## 2018-12-08 NOTE — ANESTHESIA PROCEDURE NOTES
Peripheral IV  Date/Time: 12/8/2018 12:05 PM  Inserted by: Laurence [place] MD    Placement  Needle size: 16 G  Laterality: left  Location: external jugular  Local anesthetic: none  Site prep: alcohol  Technique: anatomical landmarks  Attempts: 1

## 2018-12-08 NOTE — PROGRESS NOTES
[facility]    Progress Note      Subjective:     Feels better.  Surgery this am. Daughter at bedside       Review of Systems:     Constitutional: negative for fatigue, fevers and weight loss  Eyes: negative for irritation, redness and visu Sodium Chloride 0.9 % solution      glucose (DEX4) oral liquid 15 g 15 g Oral Q15 Min PRN   Or      Glucose-Vitamin C (DEX-4) 4-6 GM-MG chewable tab 4 tablet 4 tablet Oral Q15 Min PRN   Or      dextrose 50 % injection 50 mL 50 mL Intravenous Q15 Min PRN tablet 2 tablet Oral BID   [MAR Hold] Rosuvastatin Calcium (CRESTOR) tab 20 mg 20 mg Oral Nightly   [MAR Hold] Levothyroxine Sodium (SYNTHROID) tab 100 mcg 100 mcg Oral Before breakfast   [MAR Hold] morphINE sulfate (PF) 4 MG/ML injection 2 mg 2 mg Ganga Fernández --   8.3*   ALB   --   2.7*   --    --    --    NA  126*  124*  126*  128*  131*   K  4.0  3.7   --    --   3.8   CL  93*  93*   --    --   97   CO2  28  25   --    --   26   ALKPHO   --   55   --    --    --    AST   --   18   --    --    --    ALT   --

## 2018-12-08 NOTE — ANESTHESIA PROCEDURE NOTES
ANESTHESIA INTUBATION  Date/Time: 12/8/2018 11:23 AM  Urgency: elective    Airway not difficult    General Information and Staff    Patient location during procedure: OR  Anesthesiologist: Estrella Bain MD  Performed: anesthesiologist     Indications

## 2018-12-08 NOTE — ANESTHESIA PROCEDURE NOTES
Arterial Line  Performed by: Chaparrita Simon MD  Authorized by: Chaparrita Simon MD     Procedure Start:  12/8/2018 12:20 PM  Procedure End:  12/8/2018 12:20 PM  Site Identification: surface landmarks    Patient Location:  OR  Indication: continuous

## 2018-12-08 NOTE — OPERATIVE REPORT
[facility]    PATIENT'S NAME: Slim Hogue   ATTENDING PHYSICIAN: Tra Mendoza MD   OPERATING PHYSICIAN: Jennifer Hernandez.  MD Sonia   PATIENT ACCOUNT#:   [de-identified]    LOCATION:  [address] [place] [address] #:   [id number]       DATE OF B traction boot of the fracture table. Using a C-arm image intensifier, the fracture site was evaluated in AP projection.   Light to moderate longitudinal traction was applied and alignment of the fracture was further improved, though there was still some bone hook over the anterior aspect of the distal region of the shaft fragment and with manipulation of the fracture. In this way, an anatomic reduction was achieved and demonstrated with C-arm imaging.   Further mobilization of the fascia from the linea as More proximally, with the plate appropriately aligned to the shaft fragment, a second drill hole and self-tapping cortical screw fixation was carried out with the same sequencing as with the first screw.   This secured plate to the shaft fragment and then, was reversed of anesthesia, extubated, and transferred then into bed and to the PACU where she arrived in stable condition. Dictated By Tom Scott MD  d: 12/08/2018 15:18:13  t: 12/08/2018 16:43:29  [facility] [id number]  XXU/

## 2018-12-09 PROCEDURE — 99233 SBSQ HOSP IP/OBS HIGH 50: CPT | Performed by: INTERNAL MEDICINE

## 2018-12-09 PROCEDURE — 99233 SBSQ HOSP IP/OBS HIGH 50: CPT | Performed by: HOSPITALIST

## 2018-12-09 RX ORDER — ENOXAPARIN SODIUM 100 MG/ML
40 INJECTION SUBCUTANEOUS DAILY
Status: DISCONTINUED | OUTPATIENT
Start: 2018-12-09 | End: 2018-12-12

## 2018-12-09 NOTE — PLAN OF CARE
HEMATOLOGIC - ADULT    • Maintains hematologic stability Progressing    • Free from bleeding injury Progressing        Scds in place bilaterally    METABOLIC/FLUID AND ELECTROLYTES - ADULT    • Hemodynamic stability and optimal renal function maintained Pr

## 2018-12-09 NOTE — PROGRESS NOTES
[facility]    Progress Note      Subjective:     Pain well control.  No sob    Open reduction internal fixation of closed displaced supracondylar fracture, left femur on 12/8    Review of Systems:     Constitutional: negative for fatigue, Sodium (LOVENOX) 40 MG/0.4ML injection 40 mg 40 mg Subcutaneous Daily   hydrALAzine HCl (APRESOLINE) injection 5 mg 5 mg Intravenous Q6H PRN   Normal Saline Flush 0.9 % injection 10 mL 10 mL Intravenous PRN   docusate sodium (COLACE) cap 100 mg 100 mg Oral 0423   RBC  3.45*  3.53*  3.36*   HGB  10.4*  10.9*  10.3*   HCT  31.0*  31.6*  30.6*   MCV  89.9  89.3  91.1   WBC  5.0  7.5  6.0   PLT  202  198  213     Recent Labs   Lab  12/07/18   0431   12/07/18   1758  12/08/18   0444  12/09/18   0423   GLU  117*

## 2018-12-09 NOTE — PHYSICAL THERAPY NOTE
PHYSICAL THERAPY EVALUATION - INPATIENT     Room Number: 420/420-A  Evaluation Date: 12/9/2018  Type of Evaluation: Initial   Physician Order: See Comment for Specific Order(AROM/PROM to B ankles, hoes, hindfeet)    Presenting Problem: s/p fall with ORIF mobility; Patient education;Gait training;Family education;Strengthening;Range of motion;Transfer training;Balance training(as cleared per MD)  Rehab Potential : Good  Frequency (Obs): Daily       PHYSICAL THERAPY MEDICAL/SOCIAL HISTORY     History related Bed/chair alarm(knee immbolizer; out of bed with lift)  Fall Risk: High fall risk    WEIGHT BEARING RESTRICTION  Weight Bearing Restriction: L lower extremity           L Lower Extremity: Non-Weight Bearing    PAIN ASSESSMENT  Rating: 3  Location: [facility] Gait Assistance: Not tested           Stoop/Curb Assistance: Not tested       Bed Mobility: not assessed, assist needed per pt    Transfers: mechanical lift to chair per MD    Exercise/Education Provided:  Lower therapeutic exercise:   Ankle pumps  ankle

## 2018-12-09 NOTE — PROGRESS NOTES
[facility]    Progress Note    Emily Yeung Patient Status:  Inpatient    1928 MRN [id number]   Location [facility] 4W/SW/SE Attending Sarah Adrian MD   Hosp Day # 6 PCP Malu Kwon MD       Subjective:    Arely Nguyen hydrALAzine HCl, Normal Saline Flush, PEG 3350, magnesium hydroxide, bisacodyl, FLEET ENEMA, diphenhydrAMINE, HYDROcodone-acetaminophen, morphINE sulfate, Normal Saline Flush, acetaminophen, morphINE sulfate **OR** morphINE sulfate **OR** morphINE sulfat found for: PT, INR    Culture:  No results found for this visit on 12/03/18. Imaging/EKG:   Xr C-arm Fluoro >1 Hour  (cpt=76001)    Result Date: 12/8/2018  CONCLUSION:  1.  Fluoroscopic guidance utilized during ORIF procedure of distal left femur fractur

## 2018-12-09 NOTE — PROGRESS NOTES
ORTHO SURG: PO#1  AFEB. AM LABS: WBC = 6,000, H/H = 10.3 / 30.6, PLATELETS = 416,640. PAIN IS THAT OF \"SCIATICA\" .   PE: ALERT AT BEDREST, NAD, EARLY 1CM POSTERIOR LEFT HEEL DECUBITUS, EARLY EQUINUS CONTRACTURE OF LEFT ANKLE, LIGHT SANGUINOUS DRAINAGE OVE

## 2018-12-10 PROCEDURE — 99233 SBSQ HOSP IP/OBS HIGH 50: CPT | Performed by: INTERNAL MEDICINE

## 2018-12-10 PROCEDURE — 99233 SBSQ HOSP IP/OBS HIGH 50: CPT | Performed by: HOSPITALIST

## 2018-12-10 PROCEDURE — 99231 SBSQ HOSP IP/OBS SF/LOW 25: CPT | Performed by: OTOLARYNGOLOGY

## 2018-12-10 RX ORDER — SODIUM CHLORIDE 1000 MG
1 TABLET, SOLUBLE MISCELLANEOUS ONCE
Status: COMPLETED | OUTPATIENT
Start: 2018-12-10 | End: 2018-12-10

## 2018-12-10 NOTE — PROGRESS NOTES
[facility]    Progress Note    Sahil Dense Patient Status:  Inpatient    1928 MRN [id number]   Location [facility] 4W/SW/SE Attending Natali Gross MD   Hosp Day # 7 PCP Geovanna Kohli MD       Subjective:    Kaitlin Pereyra magnesium hydroxide, bisacodyl, FLEET ENEMA, diphenhydrAMINE, HYDROcodone-acetaminophen, morphINE sulfate, Normal Saline Flush, acetaminophen, morphINE sulfate **OR** morphINE sulfate **OR** morphINE sulfate, ondansetron HCl, bisacodyl, dextrose, Glucose-V 12/03/18.     Imaging/EKG:           Assessment and Plan:   Left femur fracture  -Ortho consulted  -cards consulted for preop clearance--> low/moderate risk for surgery  -stress test done to further risk stratify--> fixed apical defect  -S/P left open reduc

## 2018-12-10 NOTE — PROGRESS NOTES
ORTHO SURG: PO#2  Tmax = 99. AM LABS: WBC = 6,600, H/H = 9.3 / 27.1, PLATELETS = 873,773. PAIN IS CONTROLLED. MOBILIZATION TO CHAIR HAS BEEN WELL TOLERATED.   PE: ALERT, NAD, LEFT THIGH WOUND WITH FOCAL AREAS OF ECCHYMOTIC DISCOLORATION AND SLIGHT [employer]

## 2018-12-10 NOTE — OCCUPATIONAL THERAPY NOTE
OCCUPATIONAL THERAPY EVALUATION - INPATIENT      Room Number: 420/420-A  Evaluation Date: 12/10/2018  Type of Evaluation: Initial  Presenting Problem: orif lle    Physician Order: IP Consult to Occupational Therapy  Reason for Therapy: ADL/IADL Dysfunctio Bladder problem    • Cancer [facility])    • Cataract    • Disorder of thyroid    • Essential hypertension    • High blood pressure    • High cholesterol    • History of blood transfusion    • History of breast cancer in female 2000    \"Left sided breast cancer need…  -   Putting on and taking off regular lower body clothing?: Total  -   Bathing (including washing, rinsing, drying)?: A Lot  -   Toileting, which includes using toilet, bedpan or urinal? : Total  -   Putting on and taking off regular upper body clot

## 2018-12-10 NOTE — CM/SW NOTE
Updated information has been sent to [facility] today 12/10.     Jaycee Gomes, [facility] ext [phone]

## 2018-12-10 NOTE — PHYSICAL THERAPY NOTE
PHYSICAL THERAPY TREATMENT NOTE - INPATIENT     Room Number: 420/420-A       Presenting Problem: s/p fall with ORIF of L supracondylar fx of femur    Problem List  Principal Problem:    Other fracture of left femur, initial encounter for closed fracture (H Sitting: Not tested  Dynamic Sitting: Not tested           Static Standing: Not tested  Dynamic Standing: Not tested    AM-PAC '6-Clicks' INPATIENT SHORT FORM - BASIC MOBILITY  How much difficulty does the patient currently have. ..  -   Turning over in bed In progress   Goal #6     Goal #6  Current Status

## 2018-12-10 NOTE — PROGRESS NOTES
[facility]    Progress Note    Hawa Reece Patient Status:  Inpatient    1928 MRN [id number]   Location [facility] 4W/SW/SE Attending Mellissa Hayward MD   Hosp Day # 7 PCP Malissa Cervantes MD        Subjective: (L) 12/10/2018    ALB 2.7 (L) 12/07/2018    ALKPHO 55 12/07/2018    BILT 0.7 12/07/2018    TP 5.4 (L) 12/07/2018    AST 18 12/07/2018    ALT 11 (L) 12/07/2018    T4F 1.08 06/27/2018    TSH 3.04 12/07/2018    MG 1.9 05/17/2016    CK 58 06/27/2018    B12 709

## 2018-12-10 NOTE — WOUND PROGRESS NOTE
WOUND CARE NOTE      PLAN   Recommendations:  Dr. Daniel Sahni currently on consult  Dietary consult for recommendations for nutrition to optimize wound healing  Heels elevated using pillows, heel wedge or heel boots to offload heels    Wound(s)  Location: left

## 2018-12-10 NOTE — PROGRESS NOTES
Feels OK. Nose is dry and crusty at times. Breathing is overall good through her nose.      Exam:  Nose pyramid is slightly imperfect but not grossly crooked   Sutures in place, clean and intact    At this point I do not believe any intervention is needed f

## 2018-12-10 NOTE — PROGRESS NOTES
[facility]    Progress Note      Subjective:     Pain well control.  No sob  Urinary retention this am - s/p 450 cc urine with straight cath     Open reduction internal fixation of closed displaced supracondylar fracture, left femur on 12/ calm      Medications:    Current Facility-Administered Medications:  sodium chloride tab 1 g 1 g Oral Once   Enoxaparin Sodium (LOVENOX) 40 MG/0.4ML injection 40 mg 40 mg Subcutaneous Daily   hydrALAzine HCl (APRESOLINE) injection 5 mg 5 mg Intravenous Q6 Results:     Recent Labs   Lab  12/08/18   1408  12/09/18   0423  12/10/18   0426   RBC  3.53*  3.36*  2.97*   HGB  10.9*  10.3*  9.3*   HCT  31.6*  30.6*  27.1*   MCV  89.3  91.1  91.2   WBC  7.5  6.0  6.6   PLT  198  213  224     Recent Labs

## 2018-12-11 ENCOUNTER — APPOINTMENT (OUTPATIENT)
Dept: INTERVENTIONAL RADIOLOGY/VASCULAR | Facility: HOSPITAL | Age: 83
DRG: 481 | End: 2018-12-11
Attending: CLINICAL NURSE SPECIALIST
Payer: MEDICARE

## 2018-12-11 PROCEDURE — 99233 SBSQ HOSP IP/OBS HIGH 50: CPT | Performed by: INTERNAL MEDICINE

## 2018-12-11 PROCEDURE — 99233 SBSQ HOSP IP/OBS HIGH 50: CPT | Performed by: HOSPITALIST

## 2018-12-11 PROCEDURE — B41G1ZZ FLUOROSCOPY OF LEFT LOWER EXTREMITY ARTERIES USING LOW OSMOLAR CONTRAST: ICD-10-PCS | Performed by: RADIOLOGY

## 2018-12-11 RX ORDER — SODIUM CHLORIDE 9 MG/ML
INJECTION, SOLUTION INTRAVENOUS CONTINUOUS
Status: DISCONTINUED | OUTPATIENT
Start: 2018-12-11 | End: 2018-12-12

## 2018-12-11 RX ORDER — MIDAZOLAM HYDROCHLORIDE 1 MG/ML
INJECTION INTRAMUSCULAR; INTRAVENOUS
Status: COMPLETED
Start: 2018-12-11 | End: 2018-12-11

## 2018-12-11 RX ORDER — HEPARIN SODIUM 1000 [USP'U]/ML
INJECTION, SOLUTION INTRAVENOUS; SUBCUTANEOUS
Status: COMPLETED
Start: 2018-12-11 | End: 2018-12-11

## 2018-12-11 RX ORDER — CLOPIDOGREL BISULFATE 75 MG/1
75 TABLET ORAL DAILY
Status: DISCONTINUED | OUTPATIENT
Start: 2018-12-12 | End: 2018-12-12

## 2018-12-11 RX ORDER — METOPROLOL SUCCINATE 25 MG/1
12.5 TABLET, EXTENDED RELEASE ORAL
Status: DISCONTINUED | OUTPATIENT
Start: 2018-12-11 | End: 2018-12-12

## 2018-12-11 RX ORDER — WARFARIN SODIUM 5 MG/1
5 TABLET ORAL NIGHTLY
Status: DISCONTINUED | OUTPATIENT
Start: 2018-12-11 | End: 2018-12-12

## 2018-12-11 RX ORDER — ONDANSETRON 2 MG/ML
INJECTION INTRAMUSCULAR; INTRAVENOUS
Status: COMPLETED
Start: 2018-12-11 | End: 2018-12-11

## 2018-12-11 RX ORDER — SODIUM CHLORIDE 9 MG/ML
INJECTION, SOLUTION INTRAVENOUS
Status: COMPLETED
Start: 2018-12-11 | End: 2018-12-11

## 2018-12-11 RX ORDER — LIDOCAINE HYDROCHLORIDE 20 MG/ML
INJECTION, SOLUTION EPIDURAL; INFILTRATION; INTRACAUDAL; PERINEURAL
Status: COMPLETED
Start: 2018-12-11 | End: 2018-12-11

## 2018-12-11 RX ORDER — SODIUM CHLORIDE 0.9 % (FLUSH) 0.9 %
10 SYRINGE (ML) INJECTION AS NEEDED
Status: DISCONTINUED | OUTPATIENT
Start: 2018-12-11 | End: 2018-12-12

## 2018-12-11 RX ORDER — CALCIUM CARBONATE 200(500)MG
500 TABLET,CHEWABLE ORAL 3 TIMES DAILY PRN
Status: DISCONTINUED | OUTPATIENT
Start: 2018-12-11 | End: 2018-12-12

## 2018-12-11 NOTE — PHYSICAL THERAPY NOTE
PHYSICAL THERAPY TREATMENT NOTE - INPATIENT     Room Number: 420/420-A       Presenting Problem: s/p fall with ORIF of L supracondylar fx of femur    Problem List  Principal Problem:    Other fracture of left femur, initial encounter for closed fracture (H rate  Location: LLE  Management Techniques:  Activity promotion    BALANCE                                                                                                                     Static Sitting: Not tested  Dynamic Sitting: Not tested Goal #4     Goal #4   Current Status     Goal #5 Patient to demonstrate independence with home activity/exercise instructions provided to patient in preparation for discharge.    Goal #5   Current Status In progress   Goal #6     Goal #6  Current Status

## 2018-12-11 NOTE — PROGRESS NOTES
ORTHO SURG: PO#3  AFEB. AM LABS: INR = 1.1, WBC = 7,100, H/H = 9.0 / 26.1, PLATELETS = 659,124. NO COMPLAINTS. SCHEDULED FOR IR PROCEDURE TODAY. PE: ALERT AT BEDREST, NAD, LEFT THIGH DRESSING IS DRY AND INTACT. ASSESS: SATISFACTORY PO#3.   PLAN: DRESSING

## 2018-12-11 NOTE — PROGRESS NOTES
Bedside handoff performed with [facility] RN. Right groin clean dry and intact.

## 2018-12-11 NOTE — BRIEF PROCEDURE NOTE
[facility]  Procedure Note    Anselmodario Mea Patient Status:  Inpatient    1928 MRN [id number]   Location [facility] 4W/SW/SE Attending Luci José MD   Hosp Day # 8 PCP Korey Jenkins MD     Procedure: Left l

## 2018-12-11 NOTE — OCCUPATIONAL THERAPY NOTE
OCCUPATIONAL THERAPY TREATMENT NOTE - INPATIENT    Room Number: 420/420-A         Presenting Problem: orif lle     Problem List  Principal Problem:    Other fracture of left femur, initial encounter for closed fracture [facility])  Active Problems:    Laceration off regular upper body clothing?: A Little  -   Taking care of personal grooming such as brushing teeth?: A Little  -   Eating meals?: None    AM-PAC Score:  Score: 16  Approx Degree of Impairment: 53.32%  Standardized Score (AM-PAC Scale): 35.96  CMS Aimee

## 2018-12-11 NOTE — TRANSITION NOTE
Admit  12/3  CHIEF COMPLAINT:  Fall, left distal femur fracture, nasal fracture.     HISTORY OF PRESENT ILLNESS:  Patient is a 20-year-old  female who was doing Kat shopping today when she lost her balance, falling, hitting her head against shift   • Senna-Docusate Sodium  2 tablet Oral BID   • Rosuvastatin Calcium  20 mg Oral Nightly   • Levothyroxine Sodium  100 mcg Oral Before breakfast     Lab Results   Component Value Date     WBC 7.1 12/11/2018     HGB 9.0 (L) 12/11/2018     HCT 26.1 (L

## 2018-12-11 NOTE — PLAN OF CARE
DISCHARGE PLANNING    • Discharge to home or other facility with appropriate resources Progressing        MUSCULOSKELETAL - ADULT    • Maintain proper alignment of affected body part Progressing        SAFETY ADULT - FALL    • Free from fall injury Progres

## 2018-12-11 NOTE — PROGRESS NOTES
[facility]    Progress Note    Della Steward Patient Status:  Inpatient    1928 MRN [id number]   Location [facility] 4W/SW/SE Attending Laurence Lopez MD   Hosp Day # 8 PCP Merary Wynne MD       Subjective:    Sofia Goldstein PEG 3350, magnesium hydroxide, bisacodyl, FLEET ENEMA, diphenhydrAMINE, HYDROcodone-acetaminophen, morphINE sulfate, Normal Saline Flush, acetaminophen, morphINE sulfate **OR** morphINE sulfate **OR** morphINE sulfate, ondansetron HCl, bisacodyl, dextrose, Culture:  No results found for this visit on 12/03/18.     Imaging/EKG:           Assessment and Plan:   Left femur fracture  -Ortho consulted  -cards consulted for preop clearance--> low/moderate risk for surgery  -stress test done to further risk st

## 2018-12-11 NOTE — PROGRESS NOTES
[facility]    Cardiology Progress Note    Christine Sharpe Patient Status:  Inpatient    1928 MRN [id number]   Location [facility] 4W/SW/SE Attending Eli Osorio MD   Hosp Day # 8 PCP Juan Bridges MD     Primary C questions. Plavix to be resumed when okay with all other services.       Results:     Lab Results   Component Value Date    WBC 7.1 12/11/2018    HGB 9.0 (L) 12/11/2018    HCT 26.1 (L) 12/11/2018     12/11/2018    CREATSERUM 0.68 12/11/2018    BUN 1

## 2018-12-11 NOTE — PROGRESS NOTES
[facility]    Progress Note      Subjective:     Pain well control.  No sob    Open reduction internal fixation of closed displaced supracondylar fracture, left femur on 12/8    Review of Systems:     Constitutional: negative for fatigue, Antacid (TUMS) chewable tab 500 mg 500 mg Oral TID PRN   Metoprolol Succinate ER (Toprol XL) 24 hr tab 12.5 mg 12.5 mg Oral 2x Daily(Beta Blocker)   Enoxaparin Sodium (LOVENOX) 40 MG/0.4ML injection 40 mg 40 mg Subcutaneous Daily   hydrALAzine HCl (APRESOL Lab  12/09/18   0423  12/10/18   0426  12/11/18   0447   RBC  3.36*  2.97*  2.89*   HGB  10.3*  9.3*  9.0*   HCT  30.6*  27.1*  26.1*   MCV  91.1  91.2  90.3   WBC  6.0  6.6  7.1   PLT  213  224  254     Recent Labs   Lab  12/07/18   0431   12/09/18   04 at bedside     Jewel Styles MD  12/11/2018

## 2018-12-12 VITALS
WEIGHT: 139.88 LBS | BODY MASS INDEX: 24.79 KG/M2 | SYSTOLIC BLOOD PRESSURE: 132 MMHG | OXYGEN SATURATION: 94 % | HEIGHT: 63 IN | TEMPERATURE: 99 F | DIASTOLIC BLOOD PRESSURE: 45 MMHG | RESPIRATION RATE: 18 BRPM | HEART RATE: 81 BPM

## 2018-12-12 PROCEDURE — 99232 SBSQ HOSP IP/OBS MODERATE 35: CPT | Performed by: INTERNAL MEDICINE

## 2018-12-12 PROCEDURE — 99239 HOSP IP/OBS DSCHRG MGMT >30: CPT | Performed by: HOSPITALIST

## 2018-12-12 RX ORDER — HYDROCODONE BITARTRATE AND ACETAMINOPHEN 10; 325 MG/1; MG/1
1 TABLET ORAL EVERY 4 HOURS PRN
Qty: 20 TABLET | Refills: 0 | Status: SHIPPED | OUTPATIENT
Start: 2018-12-12 | End: 2019-01-25

## 2018-12-12 RX ORDER — POLYETHYLENE GLYCOL 3350 17 G/17G
17 POWDER, FOR SOLUTION ORAL DAILY PRN
Refills: 0 | Status: SHIPPED | COMMUNITY
Start: 2018-12-12 | End: 2019-01-23

## 2018-12-12 RX ORDER — ENOXAPARIN SODIUM 100 MG/ML
INJECTION SUBCUTANEOUS
Refills: 0 | Status: SHIPPED | COMMUNITY
Start: 2018-12-12 | End: 2019-01-23

## 2018-12-12 RX ORDER — PSEUDOEPHEDRINE HCL 30 MG
100 TABLET ORAL 2 TIMES DAILY
Refills: 0 | Status: SHIPPED | COMMUNITY
Start: 2018-12-12 | End: 2019-01-23

## 2018-12-12 RX ORDER — IBUPROFEN 200 MG
CAPSULE ORAL
Refills: 0 | Status: SHIPPED | COMMUNITY
Start: 2018-12-12 | End: 2019-01-23

## 2018-12-12 RX ORDER — WARFARIN SODIUM 5 MG/1
TABLET ORAL
Refills: 0 | Status: SHIPPED | COMMUNITY
Start: 2018-12-12 | End: 2019-01-18

## 2018-12-12 RX ORDER — SULFAMETHOXAZOLE AND TRIMETHOPRIM 800; 160 MG/1; MG/1
TABLET ORAL
Refills: 0 | Status: SHIPPED | COMMUNITY
Start: 2018-12-12 | End: 2019-01-23

## 2018-12-12 RX ORDER — SULFAMETHOXAZOLE AND TRIMETHOPRIM 800; 160 MG/1; MG/1
1 TABLET ORAL EVERY 12 HOURS SCHEDULED
Status: DISCONTINUED | OUTPATIENT
Start: 2018-12-12 | End: 2018-12-12

## 2018-12-12 NOTE — PROGRESS NOTES
ORTHO SURG: PO#4  Tmax = 98.7. AM LABS: INR = 1.2, WBC = 6,800, H/H = 8.5 / 25.1, PLATELETS = 379,589.  PE: ALERT AT BEDREST, NAD, POSTERIOR LEFT HEEL ULCER IS CLOSED AND OF SAME SIZE, LATERAL LEFT THIGH WOUND IS CLEAN, DRY AND WITHOUT ERYTHEMA OR INDURATIO

## 2018-12-12 NOTE — OCCUPATIONAL THERAPY NOTE
OCCUPATIONAL THERAPY TREATMENT NOTE - INPATIENT    Room Number: 420/420-A         Presenting Problem: orif lle     Problem List  Principal Problem:    Other fracture of left femur, initial encounter for closed fracture [facility])  Active Problems:    Laceration Lot  -   Putting on and taking off regular upper body clothing?: A Little  -   Taking care of personal grooming such as brushing teeth?: A Little  -   Eating meals?: None    AM-PAC Score:  Score: 16  Approx Degree of Impairment: 53.32%  Standardized Score

## 2018-12-12 NOTE — PHYSICAL THERAPY NOTE
PHYSICAL THERAPY TREATMENT NOTE - INPATIENT     Room Number: 420/420-A       Presenting Problem: s/p fall with ORIF of L supracondylar fx of femur    Problem List  Principal Problem:    Other fracture of left femur, initial encounter for closed fracture (H Treatment Plan: Bed mobility; Body mechanics; Patient education;Gait training;Transfer training;Balance training;Strengthening    SUBJECTIVE  \"Oh, this hurts so much\"    OBJECTIVE  Precautions: Bed/chair alarm(knee immbolizer; out of bed with lift)    Shay Montano level: minimum assistance      Goal #1   Current Status Mod A x 2    Goal #2 Patient is able to demonstrate transfers Sit to/from Stand at assistance level: minimum assistance with walker - rolling as cleared by MD.      Goal #2  Current Status Max A x 2

## 2018-12-12 NOTE — DISCHARGE SUMMARY
[facility]    Discharge Summary    Bhavin Colvin Patient Status:  Inpatient    1928 MRN [id number]   Location [facility] 4W/SW/SE Attending Ronni Machado MD   Hosp Day # 9 PCP Babar Laura MD     Date of Admiss of CAD  -on plavix - held for surgery  P2Y12 level acceptable for surgery  -sees Dr Adriano Aaron consult  -resume plavix      PAD  -will need IR follow up post op   -s/p normal angiogram - no intervetion     Nasal fracture  -seen by Dr Kobe Gallo  -no ada displaced bilateral nasal bone fractures. Some of the bony fragments breach the skin surface. There is significant soft tissue swelling and scattered soft tissue gas. No acute orbital fracture.   Dictated by (CST): Meron Rowley MD on 12/03/2018 at 16: tibial artery and occluded mid and distal right posterior tibial artery. Asymmetric enlargement and hyperdensity within the left side within the anterior compartment musculature suggestive of edema given the recent trauma.  There is a 4.6 cm intramuscular >60  >60  >60   GFRNAA  >60   < >  >60  >60  >60   CA  8.0*   < >  7.8*  8.0*  8.1*   ALB  2.7*   --    --    --    --    NA  124*   < >  127*  130*  131*   K  3.7   < >  4.1  4.1  4.4   CL  93*   < >  97  98  99   CO2  25   < >  25  26  26   ALKPHO  55 tablet  Refills:  3     Levothyroxine Sodium 100 MCG Tabs  Commonly known as:  SYNTHROID      TAKE 1 TABLET BY MOUTH DAILY BEFORE BREAKFAST   Quantity:  90 tablet  Refills:  0     Metoprolol Succinate ER 25 MG Tb24  Commonly known as:   Toprol XL      12.5 as needed.    Refills:  0     Sulfamethoxazole-TMP -160 MG Tabs per tablet  Commonly known as:  BACTRIM DS      Take BID for 3 days   Refills:  0     triple antibiotic 3.5-400-5000 Oint      Apply to nose daily   Refills:  0     Warfarin Sodium 5 MG T

## 2018-12-12 NOTE — CM/SW NOTE
Patient has been accepted to [facility] is scheduled to transfer today, 12.12.18.  Ambulance transfer has been scheduled for 1 pm.    Report [address], [facility] W.[address]

## 2018-12-12 NOTE — PLAN OF CARE
DISCHARGE PLANNING    • Discharge to home or other facility with appropriate resources Adequate for Discharge        HEMATOLOGIC - ADULT    • Maintains hematologic stability Adequate for Discharge    • Free from bleeding injury Adequate for Discharge Patient will be transferred to [facility] this afternoon via ambulance stretcher.     1336 Report given to ARLETTE Onofre in [facility] in [place].

## 2018-12-13 PROCEDURE — 99305 1ST NF CARE MODERATE MDM 35: CPT | Performed by: INTERNAL MEDICINE

## 2018-12-14 ENCOUNTER — SNF VISIT (OUTPATIENT)
Dept: INTERNAL MEDICINE CLINIC | Facility: SKILLED NURSING FACILITY | Age: 83
End: 2018-12-14

## 2018-12-14 ENCOUNTER — SNF/IP PROF CHARGE ONLY (OUTPATIENT)
Dept: INTERNAL MEDICINE CLINIC | Facility: CLINIC | Age: 83
End: 2018-12-14

## 2018-12-14 VITALS
SYSTOLIC BLOOD PRESSURE: 147 MMHG | RESPIRATION RATE: 20 BRPM | TEMPERATURE: 98 F | WEIGHT: 140 LBS | HEART RATE: 69 BPM | BODY MASS INDEX: 25 KG/M2 | OXYGEN SATURATION: 97 % | DIASTOLIC BLOOD PRESSURE: 74 MMHG

## 2018-12-14 DIAGNOSIS — I73.9 PAD (PERIPHERAL ARTERY DISEASE) (HCC): ICD-10-CM

## 2018-12-14 DIAGNOSIS — N18.30 STAGE 3 CHRONIC KIDNEY DISEASE (HCC): ICD-10-CM

## 2018-12-14 DIAGNOSIS — S72.8X2A OTHER FRACTURE OF LEFT FEMUR, INITIAL ENCOUNTER FOR CLOSED FRACTURE (HCC): ICD-10-CM

## 2018-12-14 DIAGNOSIS — S01.21XA LACERATION OF NOSE, INITIAL ENCOUNTER: ICD-10-CM

## 2018-12-14 DIAGNOSIS — D64.9 ANEMIA, UNSPECIFIED TYPE: ICD-10-CM

## 2018-12-14 DIAGNOSIS — S83.241D ACUTE MEDIAL MENISCUS TEAR OF RIGHT KNEE, SUBSEQUENT ENCOUNTER: ICD-10-CM

## 2018-12-14 DIAGNOSIS — I10 ESSENTIAL HYPERTENSION: ICD-10-CM

## 2018-12-14 DIAGNOSIS — E87.1 HYPONATREMIA: ICD-10-CM

## 2018-12-14 PROCEDURE — 99310 SBSQ NF CARE HIGH MDM 45: CPT | Performed by: NURSE PRACTITIONER

## 2018-12-14 NOTE — PROGRESS NOTES
Dona Going  : 1928  Age 80year old  female patient is admitted to [facility]   18 for rehab and strengthening     Chief complaint: Left femur fracture S/P left open reduction internal fixation femur fracture 18, nasal fractur coumadin tonite , pt is still on plavix. No other new issues or concerns, patient was examined in her wheelchair.         Past Medical History:   Diagnosis Date   • Arthritis    • Balance problem    • Bladder problem    • Cancer [facility])    • Cataract    • Aditiertanvi Moscoso HIVES  Povidone-Iodine             Comment:Other reaction(s): edema  Ciprofloxacin           Coughing    Comment:Pt states that Cipro caused her lung problems that             necessitated seeing Dr. Reno Bowels:  Kirstin Hernandez Lacks EYES:no visual complaints or deficits  HENT: denies nasal congestion, sinus pain or sore throat;  RESPIRATORY: denies shortness of breath, wheezing or cough   CARDIOVASCULAR:denies chest pain, no palpitations   GI: denies nausea, vomiting, constipation, fracture   -Ortho FOLLOWING, Dr. Amparo Brown, will need f/u , discussed with RN  -stress test done to further risk stratify--> fixed apical defect, done before surgery   -S/P left open reduction internal fixation femur fracture 12/8  -received 1 unit PRBC 12/7 a and greater than 50% of the time was spent counseling the patient and/or coordinating care.     SHELL Pedersen  12/14/18   1:44 PM

## 2018-12-17 ENCOUNTER — SNF VISIT (OUTPATIENT)
Dept: INTERNAL MEDICINE CLINIC | Facility: SKILLED NURSING FACILITY | Age: 83
End: 2018-12-17

## 2018-12-17 VITALS
DIASTOLIC BLOOD PRESSURE: 74 MMHG | WEIGHT: 140 LBS | TEMPERATURE: 97 F | BODY MASS INDEX: 25 KG/M2 | RESPIRATION RATE: 20 BRPM | SYSTOLIC BLOOD PRESSURE: 116 MMHG | OXYGEN SATURATION: 97 % | HEART RATE: 72 BPM

## 2018-12-17 DIAGNOSIS — S83.241D ACUTE MEDIAL MENISCUS TEAR OF RIGHT KNEE, SUBSEQUENT ENCOUNTER: ICD-10-CM

## 2018-12-17 DIAGNOSIS — I10 ESSENTIAL HYPERTENSION: ICD-10-CM

## 2018-12-17 DIAGNOSIS — D64.9 ANEMIA, UNSPECIFIED TYPE: ICD-10-CM

## 2018-12-17 DIAGNOSIS — I73.9 PAD (PERIPHERAL ARTERY DISEASE) (HCC): ICD-10-CM

## 2018-12-17 DIAGNOSIS — I25.10 ATHEROSCLEROSIS OF NATIVE CORONARY ARTERY OF NATIVE HEART WITHOUT ANGINA PECTORIS: ICD-10-CM

## 2018-12-17 PROCEDURE — 99308 SBSQ NF CARE LOW MDM 20: CPT | Performed by: NURSE PRACTITIONER

## 2018-12-17 NOTE — PROGRESS NOTES
Lilibeth Marielos  : 1928  Age 80year old  female patient is admitted to [place] [address]   18 for rehab and strengthening     Admitted to [facility] on: 12/3/18 TO 18    Chief complaint: Left femur fracture S/P left open reductio her wheelchair.     Reviewed labs for today 12/17/18: cbc - wbc 8.04, rbc 2.93, hgb 8.9, hct 27.4, plts 460. BMP: creat 0.82, BUN 14, Glucose 99, Ca 8.5, na 133, k 4.3, cHL 98, CO 2 24, eGFR >60. Monitored weekly .      ALLERGIES:    Adhesive Tape between 2-3 for 2 consecutive days.  Disp:  Rfl: 0       VITALS:  /74   Pulse 72   Temp 97.4 °F (36.3 °C) (Tympanic)   Resp 20   Wt 140 lb (63.5 kg)   SpO2 97%   BMI 24.80 kg/m²       REVIEW OF SYSTEMS:  GENERAL HEALTH:feels well otherwise  SKIN: nori lymphedema  MUSCULOSKELETAL: no acute synovitis upper or lower extremity  EXTREMITIES/VASCULAR:no cyanosis, clubbing or edema  NEUROLOGIC: follows commands  PSYCHIATRIC: alert and oriented x 3; affect appropriate     SEE PLAN BELOW  1.  Left femur fracture hs  -also Gabapentin added for nerve pain which may help her sleep also      This is a 15 minute visit and greater than 50% of the time was spent counseling the patient and/or coordinating care.     Stephane Do, SHELL  12/17/18   12:25 PM

## 2018-12-18 PROCEDURE — 99308 SBSQ NF CARE LOW MDM 20: CPT | Performed by: INTERNAL MEDICINE

## 2018-12-19 ENCOUNTER — SNF VISIT (OUTPATIENT)
Dept: INTERNAL MEDICINE CLINIC | Facility: SKILLED NURSING FACILITY | Age: 83
End: 2018-12-19

## 2018-12-19 VITALS
DIASTOLIC BLOOD PRESSURE: 68 MMHG | RESPIRATION RATE: 20 BRPM | TEMPERATURE: 97 F | HEART RATE: 65 BPM | WEIGHT: 140 LBS | SYSTOLIC BLOOD PRESSURE: 134 MMHG | BODY MASS INDEX: 25 KG/M2 | OXYGEN SATURATION: 97 %

## 2018-12-19 DIAGNOSIS — I73.9 PAD (PERIPHERAL ARTERY DISEASE) (HCC): ICD-10-CM

## 2018-12-19 DIAGNOSIS — S72.8X2A OTHER FRACTURE OF LEFT FEMUR, INITIAL ENCOUNTER FOR CLOSED FRACTURE (HCC): ICD-10-CM

## 2018-12-19 DIAGNOSIS — I10 ESSENTIAL HYPERTENSION: ICD-10-CM

## 2018-12-19 DIAGNOSIS — I25.10 ATHEROSCLEROSIS OF NATIVE CORONARY ARTERY OF NATIVE HEART WITHOUT ANGINA PECTORIS: ICD-10-CM

## 2018-12-19 PROCEDURE — 99308 SBSQ NF CARE LOW MDM 20: CPT | Performed by: NURSE PRACTITIONER

## 2018-12-19 NOTE — PROGRESS NOTES
Jamie Nai  : 1928  Age 80year old  female patient is admitted to [place] [address]   18 for rehab and strengthening      Admitted to [facility] on:12/3/18 TO 18    Chief complaint:  Left femur fracture S/P left open reducti which she reports makes her feel better.   Reviewed labs for today 12/19/18: cbc - wbc 8.39, rbc 287, hgb 8.9, hct 26.9, plts 450. BMP: creat 0.88, BUN 15, Glucose 100, Ca 8.6, na 133, k 4.1, cHL 99, CO 2 24, eGFR >60. Monitored weekly .      ALLERGIES: MG Oral Tablet 24 Hr 12.5 mg 2 (two) times daily.    Disp:  Rfl: 0       VITALS:  /68   Pulse 65   Temp 97.4 °F (36.3 °C) (Tympanic)   Resp 20   Wt 140 lb (63.5 kg)   SpO2 97%   BMI 24.80 kg/m²       REVIEW OF SYSTEMS:    GENERAL HEALTH:feels well oth suprapubic distension  LYMPHATIC:no lymphedema  MUSCULOSKELETAL: no acute synovitis upper or lower extremity  EXTREMITIES/VASCULAR:no cyanosis, clubbing or edema  NEUROLOGIC: follows commands  PSYCHIATRIC: alert and oriented x 3; affect appropriate     SEE nerve pain which may help her sleep also          This is a 15 minute visit and greater than 50% of the time was spent counseling the patient and/or coordinating care.     Cele Engel, SHELL  12/19/18   2:48 PM

## 2018-12-20 PROCEDURE — 99308 SBSQ NF CARE LOW MDM 20: CPT | Performed by: INTERNAL MEDICINE

## 2018-12-21 ENCOUNTER — SNF/IP PROF CHARGE ONLY (OUTPATIENT)
Dept: INTERNAL MEDICINE CLINIC | Facility: CLINIC | Age: 83
End: 2018-12-21

## 2018-12-21 ENCOUNTER — TELEPHONE (OUTPATIENT)
Dept: INTERNAL MEDICINE CLINIC | Facility: CLINIC | Age: 83
End: 2018-12-21

## 2018-12-21 DIAGNOSIS — N18.30 STAGE 3 CHRONIC KIDNEY DISEASE (HCC): ICD-10-CM

## 2018-12-21 DIAGNOSIS — E03.9 HYPOTHYROIDISM, UNSPECIFIED TYPE: ICD-10-CM

## 2018-12-21 DIAGNOSIS — S72.8X2A OTHER FRACTURE OF LEFT FEMUR, INITIAL ENCOUNTER FOR CLOSED FRACTURE (HCC): ICD-10-CM

## 2018-12-21 DIAGNOSIS — I10 ESSENTIAL HYPERTENSION: ICD-10-CM

## 2018-12-21 DIAGNOSIS — I73.9 PAD (PERIPHERAL ARTERY DISEASE) (HCC): ICD-10-CM

## 2018-12-26 ENCOUNTER — SNF VISIT (OUTPATIENT)
Dept: INTERNAL MEDICINE CLINIC | Facility: SKILLED NURSING FACILITY | Age: 83
End: 2018-12-26

## 2018-12-26 VITALS
WEIGHT: 128 LBS | OXYGEN SATURATION: 98 % | RESPIRATION RATE: 20 BRPM | DIASTOLIC BLOOD PRESSURE: 67 MMHG | TEMPERATURE: 97 F | SYSTOLIC BLOOD PRESSURE: 120 MMHG | HEART RATE: 80 BPM | BODY MASS INDEX: 23 KG/M2

## 2018-12-26 DIAGNOSIS — L03.116 CELLULITIS OF LEFT LOWER EXTREMITY: ICD-10-CM

## 2018-12-26 DIAGNOSIS — S83.241D ACUTE MEDIAL MENISCUS TEAR OF RIGHT KNEE, SUBSEQUENT ENCOUNTER: ICD-10-CM

## 2018-12-26 DIAGNOSIS — I25.10 ATHEROSCLEROSIS OF NATIVE CORONARY ARTERY OF NATIVE HEART WITHOUT ANGINA PECTORIS: ICD-10-CM

## 2018-12-26 DIAGNOSIS — I10 ESSENTIAL HYPERTENSION: ICD-10-CM

## 2018-12-26 DIAGNOSIS — I73.9 PAD (PERIPHERAL ARTERY DISEASE) (HCC): ICD-10-CM

## 2018-12-26 PROCEDURE — 99309 SBSQ NF CARE MODERATE MDM 30: CPT | Performed by: NURSE PRACTITIONER

## 2018-12-26 NOTE — PROGRESS NOTES
Herb Trentcolin  : 1928  Age 80year old  female patient is admitted to [place] [address]   18 for rehab and strengthening      Admitted to [facility] on: 12/3/18 TO 18    Chief complaint: left leg cellulitis  S/P left open reduct sleeping, taking  melatonin 3 mg po q hs and  Gabapentin for nerve pain at hs which also helps her sleep.  No other new issues or concerns. Marily Modi need to monitor .        ALLERGIES:    Adhesive Tape           RASH    Comment:Surgical tape  Aspirin 24 Hr 12.5 mg 2 (two) times daily.    Disp:  Rfl: 0       VITALS:  /67   Pulse 80   Temp 97.4 °F (36.3 °C) (Tympanic)   Resp 20   Wt 128 lb (58.1 kg)   SpO2 98%   BMI 22.67 kg/m²       REVIEW OF SYSTEMS:   GENERAL HEALTH:feels well otherwise  SKIN: ne tenderness.   :no suprapubic distension  LYMPHATIC:no lymphedema  MUSCULOSKELETAL: no acute synovitis upper or lower extremity  EXTREMITIES/VASCULAR:no cyanosis, clubbing or edema  NEUROLOGIC: follows commands  PSYCHIATRIC: alert and oriented x 3; affect disorder  -cont levothyroxine 100mg po day     8. Difficulty sleeping  -cont melatonin 3 mg po q hs  -cont Gabapentin 100mg po q hs for nerve pain which may help her sleep also         This is a 25 minute visit and greater than 50% of the time was spent co

## 2018-12-27 ENCOUNTER — SNF VISIT (OUTPATIENT)
Dept: INTERNAL MEDICINE CLINIC | Facility: SKILLED NURSING FACILITY | Age: 83
End: 2018-12-27

## 2018-12-27 VITALS
TEMPERATURE: 97 F | DIASTOLIC BLOOD PRESSURE: 72 MMHG | HEART RATE: 70 BPM | BODY MASS INDEX: 23 KG/M2 | WEIGHT: 128 LBS | SYSTOLIC BLOOD PRESSURE: 126 MMHG | RESPIRATION RATE: 20 BRPM | OXYGEN SATURATION: 97 %

## 2018-12-27 DIAGNOSIS — L03.116 CELLULITIS OF LEFT LOWER EXTREMITY: ICD-10-CM

## 2018-12-27 DIAGNOSIS — I73.9 PAD (PERIPHERAL ARTERY DISEASE) (HCC): ICD-10-CM

## 2018-12-27 DIAGNOSIS — S72.8X2A OTHER FRACTURE OF LEFT FEMUR, INITIAL ENCOUNTER FOR CLOSED FRACTURE (HCC): ICD-10-CM

## 2018-12-27 DIAGNOSIS — I25.10 ATHEROSCLEROSIS OF NATIVE CORONARY ARTERY OF NATIVE HEART WITHOUT ANGINA PECTORIS: ICD-10-CM

## 2018-12-27 DIAGNOSIS — N18.30 STAGE 3 CHRONIC KIDNEY DISEASE (HCC): ICD-10-CM

## 2018-12-27 DIAGNOSIS — D64.9 ANEMIA, UNSPECIFIED TYPE: ICD-10-CM

## 2018-12-27 DIAGNOSIS — S83.241D ACUTE MEDIAL MENISCUS TEAR OF RIGHT KNEE, SUBSEQUENT ENCOUNTER: ICD-10-CM

## 2018-12-27 PROCEDURE — 99309 SBSQ NF CARE MODERATE MDM 30: CPT | Performed by: NURSE PRACTITIONER

## 2018-12-27 RX ORDER — FUROSEMIDE 40 MG/1
40 TABLET ORAL DAILY
COMMUNITY
Start: 2018-12-27 | End: 2018-12-29

## 2018-12-27 RX ORDER — POTASSIUM CHLORIDE 750 MG/1
10 TABLET, EXTENDED RELEASE ORAL DAILY
COMMUNITY
Start: 2018-12-27 | End: 2018-12-29

## 2018-12-27 NOTE — PROGRESS NOTES
Anuj Johnson  : 1928  Age 80year old  female patient is admitted to [place] [address]   18 for rehab and strengthening     Admitted to [facility] on:12/3/18 TO 18    Chief complaint: left leg cellulitis  S/P left open reductio  Dressing  in place , no s/s of infection to this site , dressing intact.  Saw ortho  in f/u 12/17/18.  Patient  Cont on coumadin. Pt is still on plavix.  No longer  having problems sleeping, taking  melatonin 3 mg po q hs and  Gabapentin for nerve pain at 90 tablet Rfl: 3   acetaminophen 500 MG Oral Tab Take 500 mg by mouth every 6 (six) hours as needed for Pain.  Disp:  Rfl:    Rosuvastatin Calcium 20 MG Oral Tab TK 1 T PO HS Disp:  Rfl: 5   Metoprolol Succinate ER 25 MG Oral Tablet 24 Hr 12.5 mg 2 (two) ti normocephalic; normal nose, no nasal drainage, mucous membranes pink, moist, pharynx no exudate, no visible cerumen.   NECK: supple; FROM; no JVD, no TMG, no carotid bruits  BREAST: DEFERRED  RESPIRATORY:clear to percussion and auscultation  CARDIOVASCULAR: surgical intervention needed at this time  -completed clindamycin  -neosporin dressing to nasal bridge q day  5. HTN  -well controlled  -vs q shift   -cont metoprolol succinate ER 12.5mg po bid   6. Suspected UTI  -completed   Bactrim DS po bid x 3 days   -

## 2018-12-28 ENCOUNTER — SNF/IP PROF CHARGE ONLY (OUTPATIENT)
Dept: INTERNAL MEDICINE CLINIC | Facility: CLINIC | Age: 83
End: 2018-12-28

## 2018-12-28 DIAGNOSIS — I10 ESSENTIAL HYPERTENSION: ICD-10-CM

## 2018-12-28 DIAGNOSIS — S72.8X2A OTHER FRACTURE OF LEFT FEMUR, INITIAL ENCOUNTER FOR CLOSED FRACTURE (HCC): ICD-10-CM

## 2018-12-28 DIAGNOSIS — I70.0 ATHEROSCLEROSIS OF AORTA (HCC): ICD-10-CM

## 2018-12-28 PROCEDURE — 99308 SBSQ NF CARE LOW MDM 20: CPT | Performed by: INTERNAL MEDICINE

## 2018-12-31 PROCEDURE — 99308 SBSQ NF CARE LOW MDM 20: CPT | Performed by: INTERNAL MEDICINE

## 2019-01-02 ENCOUNTER — SNF VISIT (OUTPATIENT)
Dept: INTERNAL MEDICINE CLINIC | Facility: SKILLED NURSING FACILITY | Age: 84
End: 2019-01-02

## 2019-01-02 VITALS
SYSTOLIC BLOOD PRESSURE: 118 MMHG | RESPIRATION RATE: 20 BRPM | TEMPERATURE: 97 F | WEIGHT: 130 LBS | HEART RATE: 76 BPM | BODY MASS INDEX: 23 KG/M2 | OXYGEN SATURATION: 97 % | DIASTOLIC BLOOD PRESSURE: 70 MMHG

## 2019-01-02 DIAGNOSIS — I25.10 ATHEROSCLEROSIS OF NATIVE CORONARY ARTERY OF NATIVE HEART WITHOUT ANGINA PECTORIS: ICD-10-CM

## 2019-01-02 DIAGNOSIS — M23.91 INTERNAL DERANGEMENT OF KNEE, RIGHT: ICD-10-CM

## 2019-01-02 DIAGNOSIS — N18.30 STAGE 3 CHRONIC KIDNEY DISEASE (HCC): ICD-10-CM

## 2019-01-02 DIAGNOSIS — I73.9 PAD (PERIPHERAL ARTERY DISEASE) (HCC): ICD-10-CM

## 2019-01-02 DIAGNOSIS — I10 ESSENTIAL HYPERTENSION: ICD-10-CM

## 2019-01-02 PROCEDURE — 99309 SBSQ NF CARE MODERATE MDM 30: CPT | Performed by: NURSE PRACTITIONER

## 2019-01-02 NOTE — PROGRESS NOTES
Palmira Martine  : 1928  Age 80year old  female patient is admitted to [facility] 18 for rehab and strengthening        Chief complaint: left leg cellulitis  S/P left open reduction internal fixation femur fracture 18, nasal fract 12/17/18, to see again early in this new yr.  Michael Lozada on coumadin. Pt is still on plavix.  No longer  having problems sleeping, taking  melatonin 3 mg po q hs and  Gabapentin for nerve pain at hs which also helps her sleep.  No other new issues or c tobacco: Never Used    Alcohol use:  Yes      Alcohol/week: 0.0 oz      Comment: Weekly, Wine, 5 glasses;     Drug use: No      ALLERGIES:    Adhesive Tape           RASH    Comment:Surgical tape  Aspirin                     Comment:Other reaction(s): ASPIR 118/70   Pulse 76   Temp 97.4 °F (36.3 °C) (Tympanic)   Resp 20   Wt 130 lb (59 kg)   SpO2 97%   BMI 23.03 kg/m²      REVIEW OF SYSTEMS:  GENERAL HEALTH:feels well otherwise  SKIN: new cellulitis left lower extremity with swelling   WOUNDS: as above, skin suprapubic distension  LYMPHATIC:no lymphedema  MUSCULOSKELETAL: no acute synovitis upper or lower extremity  EXTREMITIES/VASCULAR:no cyanosis, clubbing or edema  NEUROLOGIC: follows commands  PSYCHIATRIC: alert and oriented x 3; affect appropriate     SEE may help her sleep also       This is a 25 minute visit and greater than 50% of the time was spent counseling the patient and/or coordinating care.     Jessica Daley, SHELL  01/02/19   12:35 PM

## 2019-01-03 PROCEDURE — 99308 SBSQ NF CARE LOW MDM 20: CPT | Performed by: INTERNAL MEDICINE

## 2019-01-04 ENCOUNTER — SNF/IP PROF CHARGE ONLY (OUTPATIENT)
Dept: INTERNAL MEDICINE CLINIC | Facility: CLINIC | Age: 84
End: 2019-01-04

## 2019-01-04 DIAGNOSIS — S72.8X2A OTHER FRACTURE OF LEFT FEMUR, INITIAL ENCOUNTER FOR CLOSED FRACTURE (HCC): ICD-10-CM

## 2019-01-04 DIAGNOSIS — I10 ESSENTIAL HYPERTENSION: ICD-10-CM

## 2019-01-04 DIAGNOSIS — L03.116 LEFT LEG CELLULITIS: ICD-10-CM

## 2019-01-04 DIAGNOSIS — E87.1 HYPONATREMIA: ICD-10-CM

## 2019-01-04 DIAGNOSIS — I73.9 PAD (PERIPHERAL ARTERY DISEASE) (HCC): ICD-10-CM

## 2019-01-04 DIAGNOSIS — L03.116 CELLULITIS OF LEFT LEG: ICD-10-CM

## 2019-01-04 DIAGNOSIS — E03.8 OTHER SPECIFIED HYPOTHYROIDISM: ICD-10-CM

## 2019-01-07 ENCOUNTER — SNF VISIT (OUTPATIENT)
Dept: INTERNAL MEDICINE CLINIC | Facility: SKILLED NURSING FACILITY | Age: 84
End: 2019-01-07

## 2019-01-07 VITALS
BODY MASS INDEX: 23 KG/M2 | RESPIRATION RATE: 20 BRPM | HEART RATE: 76 BPM | WEIGHT: 129.19 LBS | OXYGEN SATURATION: 97 % | SYSTOLIC BLOOD PRESSURE: 124 MMHG | TEMPERATURE: 98 F | DIASTOLIC BLOOD PRESSURE: 67 MMHG

## 2019-01-07 DIAGNOSIS — S72.8X2A OTHER FRACTURE OF LEFT FEMUR, INITIAL ENCOUNTER FOR CLOSED FRACTURE (HCC): ICD-10-CM

## 2019-01-07 DIAGNOSIS — I10 ESSENTIAL HYPERTENSION: ICD-10-CM

## 2019-01-07 DIAGNOSIS — N18.30 STAGE 3 CHRONIC KIDNEY DISEASE (HCC): ICD-10-CM

## 2019-01-07 DIAGNOSIS — Z02.9 DISCHARGE PLANNING ISSUES: ICD-10-CM

## 2019-01-07 DIAGNOSIS — D64.9 ANEMIA, UNSPECIFIED TYPE: ICD-10-CM

## 2019-01-07 DIAGNOSIS — L03.119 CELLULITIS OF LOWER EXTREMITY, UNSPECIFIED LATERALITY: ICD-10-CM

## 2019-01-07 PROCEDURE — 99309 SBSQ NF CARE MODERATE MDM 30: CPT | Performed by: NURSE PRACTITIONER

## 2019-01-07 NOTE — PROGRESS NOTES
Anuj Johnson  : 1928  Age 80year old  female patient is admitted to [facility]   18 for rehab and strengthening      Chief complaint:  left leg cellulitis  S/P left open reduction internal fixation femur fracture 18, nasal frac dressing intact.  Saw ortho  in f/u 12/17/18, to see again as above.   Patient  Cont on coumadin. Pt is still on plavix.  No longer  having problems sleeping, taking  melatonin 3 mg po q hs and  Gabapentin for nerve pain at hs which also helps her sleep.   tablet Rfl: 0   CLOPIDOGREL BISULFATE 75 MG Oral Tab TAKE 1 TABLET BY MOUTH ONCE DAILY Disp: 90 tablet Rfl: 3   acetaminophen 500 MG Oral Tab Take 500 mg by mouth every 6 (six) hours as needed for Pain.  Disp:  Rfl:    Rosuvastatin Calcium 20 MG Oral Tab TK eyes  HENT: normocephalic; normal nose, no nasal drainage, mucous membranes pink, moist, pharynx no exudate, no visible cerumen.   NECK: supple; FROM; no JVD, no TMG, no carotid bruits  BREAST: DEFERRED  RESPIRATORY:clear to percussion and auscultation  CAR fracture  -seen by Dr Miller Brood  -no surgical intervention needed at this time  -completed clindamycin  -neosporin dressing to nasal bridge q day  5. HTN  -well controlled  -vs q shift   -cont metoprolol succinate ER 12.5mg po bid   6. Suspected UTI  -c

## 2019-01-08 PROCEDURE — 99308 SBSQ NF CARE LOW MDM 20: CPT | Performed by: INTERNAL MEDICINE

## 2019-01-09 ENCOUNTER — SNF VISIT (OUTPATIENT)
Dept: INTERNAL MEDICINE CLINIC | Facility: SKILLED NURSING FACILITY | Age: 84
End: 2019-01-09

## 2019-01-09 VITALS
OXYGEN SATURATION: 97 % | WEIGHT: 129.19 LBS | HEART RATE: 76 BPM | BODY MASS INDEX: 23 KG/M2 | DIASTOLIC BLOOD PRESSURE: 65 MMHG | TEMPERATURE: 98 F | RESPIRATION RATE: 20 BRPM | SYSTOLIC BLOOD PRESSURE: 142 MMHG

## 2019-01-09 DIAGNOSIS — I10 ESSENTIAL HYPERTENSION: ICD-10-CM

## 2019-01-09 DIAGNOSIS — D64.9 ANEMIA, UNSPECIFIED TYPE: ICD-10-CM

## 2019-01-09 DIAGNOSIS — S72.8X2A OTHER FRACTURE OF LEFT FEMUR, INITIAL ENCOUNTER FOR CLOSED FRACTURE (HCC): ICD-10-CM

## 2019-01-09 DIAGNOSIS — N18.30 STAGE 3 CHRONIC KIDNEY DISEASE (HCC): ICD-10-CM

## 2019-01-09 PROCEDURE — 99310 SBSQ NF CARE HIGH MDM 45: CPT | Performed by: NURSE PRACTITIONER

## 2019-01-09 NOTE — PROGRESS NOTES
Blas Blanca, 2/24/1928, 80year old, female is being discharged from [place] [address] 1/9/19       DISCHARGE SUMMARY    Date of Admission [address] [place] [address] : 12/3/18 to 12/12/18    Date of Discharge [facility] :12/12/18 to 1/9/19                                Ad Dr. Sana Nino 1/17/19. Will be followed by [facility]/PT/OT at [facility]. Was on On  Norco 10mg/325mg po scheduled at 8 am and 2 pm but will be just prn q 6 if needed for home .  Will  cont tylenol  Prn, no more than 3 gms of tylenol daily with both Norco or anxiety  HEMATOLOGY:denies hx anemia  ENDOCRINE: denies excessive thirst or urination; denies unexpected wt gain or wt loss  ALLERGY/IMM. : denies food or seasonal allergies        PHYSICAL EXAM:  GENERAL HEALTH: well developed, well nourished, in no appare area, no swelling, bleeding noted, dressing     -wound RN consulted, to monitor  - cellulitis left lower leg, completed Bactrim DS po bid x 7 days and lasix 40mg , past doppler neg for dvt, improving, skin just dry, arterial US neg as well  -will cont on l readmission but with close f/u should be good    This is a 35 minute visit and greater than 50% of the time was spent counseling the patient and/or coordinating care.     Juanita Carcamo, APRN  1/9/2019  10:55 AM

## 2019-01-10 ENCOUNTER — SNF/IP PROF CHARGE ONLY (OUTPATIENT)
Dept: INTERNAL MEDICINE CLINIC | Facility: CLINIC | Age: 84
End: 2019-01-10

## 2019-01-10 DIAGNOSIS — I10 ESSENTIAL HYPERTENSION: ICD-10-CM

## 2019-01-10 DIAGNOSIS — I73.9 PAD (PERIPHERAL ARTERY DISEASE) (HCC): ICD-10-CM

## 2019-01-10 DIAGNOSIS — N18.30 STAGE 3 CHRONIC KIDNEY DISEASE (HCC): ICD-10-CM

## 2019-01-10 DIAGNOSIS — E03.9 HYPOTHYROIDISM, UNSPECIFIED TYPE: ICD-10-CM

## 2019-01-11 ENCOUNTER — TELEPHONE (OUTPATIENT)
Dept: INTERNAL MEDICINE CLINIC | Facility: CLINIC | Age: 84
End: 2019-01-11

## 2019-01-11 NOTE — TELEPHONE ENCOUNTER
[facility] nurse Loretha Bamberger informed in detail of pt's medications and conversation with Dr. Tyler King insructions.

## 2019-01-11 NOTE — TELEPHONE ENCOUNTER
Per conversation with Dr. Forbes Councilman, pt is to continue with Plavix and coumadin until she see's Dr. Emily Arnold and he can decide, as for the gabapentin, pt was given this for pain, she can continue, or she can stop if there is no pain.

## 2019-01-11 NOTE — TELEPHONE ENCOUNTER
cont coumadin 3mg po daily, she needs to have [facility] to check PT/IR asap and will dose accordingly, the coumadien will be until Dr Nano Ferrer, the ortho dc'c it    -will cnt lasix 20mg x 5 more days   She needs to come and see me next week sometime     I am out now

## 2019-01-11 NOTE — TELEPHONE ENCOUNTER
Luana Regan RN from [facility]# [phone], pt was discharged from [facility] and is being seen by their [facility] dept. RN went over pt's medications with me and there are three medications pt is not sure if she should take.  They are Lasix 20 mg qd--Monica

## 2019-01-15 ENCOUNTER — TELEPHONE (OUTPATIENT)
Dept: INTERNAL MEDICINE CLINIC | Facility: CLINIC | Age: 84
End: 2019-01-15

## 2019-01-15 NOTE — TELEPHONE ENCOUNTER
Daughter stts pt has a Home health nurse. Asking if Dr. Carol Real can approval an INR order?   Please call [employer]

## 2019-01-16 NOTE — TELEPHONE ENCOUNTER
Called Pt spoke to Pt's daughter Jean-Pierre Posada informed Dr Javid Middleton approved order for INR . Per Jean-Pierre Posada she cannot bring in Pt to lab to get INR drawn .  What Pt is really requesting is a standing order to get INR testing every other day and this is per Pt's request

## 2019-01-17 ENCOUNTER — HOSPITAL ENCOUNTER (OUTPATIENT)
Dept: GENERAL RADIOLOGY | Age: 84
Discharge: HOME | End: 2019-01-17
Attending: ORTHOPAEDIC SURGERY
Payer: MEDICARE

## 2019-01-17 ENCOUNTER — TELEPHONE (OUTPATIENT)
Dept: INTERNAL MEDICINE CLINIC | Facility: CLINIC | Age: 84
End: 2019-01-17

## 2019-01-17 DIAGNOSIS — S72.92XA FEMUR FRACTURE, LEFT (HCC): ICD-10-CM

## 2019-01-17 DIAGNOSIS — S72.90XD CLOSED FRACTURE OF FEMUR WITH ROUTINE HEALING, UNSPECIFIED FRACTURE MORPHOLOGY, UNSPECIFIED LATERALITY, UNSPECIFIED PORTION OF FEMUR, SUBSEQUENT ENCOUNTER: Primary | ICD-10-CM

## 2019-01-17 PROCEDURE — 73552 X-RAY EXAM OF FEMUR 2/>: CPT | Performed by: ORTHOPAEDIC SURGERY

## 2019-01-17 NOTE — TELEPHONE ENCOUNTER
I am not entirely sure how long pt should be on coumadin  Any recommendaiton for Dr Carol Haywood  I am giving the referral and I have not personally seen her or have face to face encoutner op  I put end date at the end of the month for coumadin  I advised follow

## 2019-01-17 NOTE — TELEPHONE ENCOUNTER
DISCHARGE DX:Left femur fracture  -Ortho consulted  -cards consulted for preop clearance--> low/moderate risk for surgery  -stress test done to further risk stratify--> fixed apical defect  -S/P left open reduction internal fixation femur fracture 12/8  -r

## 2019-01-17 NOTE — TELEPHONE ENCOUNTER
OK I NEED MORE INFORMATION  PLEASE CLARIFY    DIAGNOSIS     WHY IS PATIENT ON COUMADIN  AND I AM NOT SURE HOW LONG SHE SHOULD BE ON IT  DEONDRE IS ANXIOUS BEC PT IS ON COUMADIN   WANT  INR EVERY OTHER DAY FOR MONITORING  TRIAGE RN CAN YOU PLEASE ASSIST IN

## 2019-01-17 NOTE — TELEPHONE ENCOUNTER
Pt on coumadin for clot prevention due to broken leg and kelly and screws were placed  12/9/18. Needs to be on coumadin 5 mg and has INR done QOD. Had a doppler completed.   Going to specialist today to see how much longer she needs to stay on coumadin  Pt

## 2019-01-17 NOTE — TELEPHONE ENCOUNTER
Received call from Dr Claudean Chamberlain that this patient was in his office for f/u to ortho surgery , Patient needs to be enrolled into coumadin clinic for coumadin management. Per Dr. Claudean Chamberlain to contact primary for this.    Will send enrollment order to PCP, high pr

## 2019-01-17 NOTE — TELEPHONE ENCOUNTER
Per Dr Luiz Dixon- INR 2-3, 30 days of coumadin. LM on [employer] cell of us now following pt, inr goal is 2-3, asking her to call with tomorrow;s INR,  Number given. Request for her to assist pt in making f/u appt with Dr. Sara Jennings.

## 2019-01-18 ENCOUNTER — TELEPHONE (OUTPATIENT)
Dept: INTERNAL MEDICINE CLINIC | Facility: CLINIC | Age: 84
End: 2019-01-18

## 2019-01-18 RX ORDER — WARFARIN SODIUM 1 MG/1
TABLET ORAL
Qty: 30 TABLET | Refills: 0 | Status: SHIPPED | OUTPATIENT
Start: 2019-01-18 | End: 2019-01-19

## 2019-01-18 RX ORDER — WARFARIN SODIUM 1 MG/1
TABLET ORAL
Qty: 30 TABLET | Refills: 0 | Status: SHIPPED | OUTPATIENT
Start: 2019-01-18 | End: 2019-01-18

## 2019-01-18 NOTE — TELEPHONE ENCOUNTER
[facility] [phone] is returning Lucy Reddy Coumadin RN call. Per [employer] pt needs a refill on the coumadin medication.

## 2019-01-18 NOTE — TELEPHONE ENCOUNTER
Per Laurent/RN, pt PT 34.7 INR 2.9 Coumadin 5mg Monday Tuesday;  Wednesday and Thursday  Recheck INR 01/15/2019 1.0.

## 2019-01-18 NOTE — TELEPHONE ENCOUNTER
Many encounters open and active on this pt with regards to coumadin.   Will close this as to avoid further confusion

## 2019-01-19 RX ORDER — WARFARIN SODIUM 1 MG/1
TABLET ORAL
Qty: 30 TABLET | Refills: 0 | Status: SHIPPED | OUTPATIENT
Start: 2019-01-19 | End: 2019-02-27

## 2019-01-21 ENCOUNTER — TELEPHONE (OUTPATIENT)
Dept: INTERNAL MEDICINE CLINIC | Facility: CLINIC | Age: 84
End: 2019-01-21

## 2019-01-21 ENCOUNTER — ANTI-COAG VISIT (OUTPATIENT)
Dept: INTERNAL MEDICINE CLINIC | Facility: CLINIC | Age: 84
End: 2019-01-21

## 2019-01-21 DIAGNOSIS — S72.90XD CLOSED FRACTURE OF FEMUR WITH ROUTINE HEALING, UNSPECIFIED FRACTURE MORPHOLOGY, UNSPECIFIED LATERALITY, UNSPECIFIED PORTION OF FEMUR, SUBSEQUENT ENCOUNTER: ICD-10-CM

## 2019-01-21 LAB — INR: 2.7 (ref 2–3)

## 2019-01-21 PROCEDURE — 93793 ANTICOAG MGMT PT WARFARIN: CPT | Performed by: INTERNAL MEDICINE

## 2019-01-21 NOTE — TELEPHONE ENCOUNTER
Per Laurent/RN, pt PT 32.4 INR 2.7 Coumadin 5mg Friday, supposed to take 2.5mg Saturday and Sunday but she forgot so she took 5mg Saturday then skip Sunday. Flu-like symptoms

## 2019-01-23 RX ORDER — FUROSEMIDE 20 MG/1
TABLET ORAL
Qty: 90 TABLET | Refills: 0 | Status: SHIPPED | OUTPATIENT
Start: 2019-01-23 | End: 2019-04-02 | Stop reason: ALTCHOICE

## 2019-01-24 ENCOUNTER — ANTI-COAG VISIT (OUTPATIENT)
Dept: INTERNAL MEDICINE CLINIC | Facility: CLINIC | Age: 84
End: 2019-01-24

## 2019-01-24 ENCOUNTER — TELEPHONE (OUTPATIENT)
Dept: INTERNAL MEDICINE CLINIC | Facility: CLINIC | Age: 84
End: 2019-01-24

## 2019-01-24 DIAGNOSIS — S72.90XD CLOSED FRACTURE OF FEMUR WITH ROUTINE HEALING, UNSPECIFIED FRACTURE MORPHOLOGY, UNSPECIFIED LATERALITY, UNSPECIFIED PORTION OF FEMUR, SUBSEQUENT ENCOUNTER: ICD-10-CM

## 2019-01-24 DIAGNOSIS — R30.0 BURNING WITH URINATION: Primary | ICD-10-CM

## 2019-01-24 LAB — INR: 4.1 (ref 2–3)

## 2019-01-24 PROCEDURE — 93793 ANTICOAG MGMT PT WARFARIN: CPT | Performed by: INTERNAL MEDICINE

## 2019-01-24 NOTE — TELEPHONE ENCOUNTER
Call transferred from [address] [place] [address], [facility] [address] [facility] nurse requesting verbal order for a U/A. Lauretn ziegler saw patient today, and patient complaining of burning at the start of urination. Per patient this started yesterday, no other symptoms.  Is however also having

## 2019-01-24 NOTE — TELEPHONE ENCOUNTER
Fortune from [employer]. She stated she will do on Monday. I stated with burning with urination it needs to be done soon. The Home Health nurse will call the daughter and drop off a cup. Follow up with results.

## 2019-01-24 NOTE — TELEPHONE ENCOUNTER
Pt would like a refill on her hydrocodone medication. Per pt Dr. Jamaica Lopez has never prescribed this medication to her before. This was given to her at the [address] [place] [address]. Please, call pt when the script is ready for .        Kathryn Lowe

## 2019-01-25 ENCOUNTER — OFFICE VISIT (OUTPATIENT)
Dept: INTERNAL MEDICINE CLINIC | Facility: CLINIC | Age: 84
End: 2019-01-25
Payer: MEDICARE

## 2019-01-25 ENCOUNTER — TELEPHONE (OUTPATIENT)
Dept: OTHER | Age: 84
End: 2019-01-25

## 2019-01-25 ENCOUNTER — LAB ENCOUNTER (OUTPATIENT)
Dept: LAB | Age: 84
End: 2019-01-25
Attending: INTERNAL MEDICINE
Payer: MEDICARE

## 2019-01-25 VITALS
BODY MASS INDEX: 23 KG/M2 | SYSTOLIC BLOOD PRESSURE: 76 MMHG | HEIGHT: 63 IN | HEART RATE: 71 BPM | TEMPERATURE: 98 F | DIASTOLIC BLOOD PRESSURE: 47 MMHG

## 2019-01-25 DIAGNOSIS — I95.9 HYPOTENSION, UNSPECIFIED HYPOTENSION TYPE: ICD-10-CM

## 2019-01-25 DIAGNOSIS — R31.9 URINARY TRACT INFECTION WITH HEMATURIA, SITE UNSPECIFIED: ICD-10-CM

## 2019-01-25 DIAGNOSIS — Z87.81 S/P ORIF (OPEN REDUCTION INTERNAL FIXATION) FRACTURE: Primary | ICD-10-CM

## 2019-01-25 DIAGNOSIS — N39.0 URINARY TRACT INFECTION WITH HEMATURIA, SITE UNSPECIFIED: ICD-10-CM

## 2019-01-25 DIAGNOSIS — R30.0 BURNING WITH URINATION: ICD-10-CM

## 2019-01-25 DIAGNOSIS — I25.10 CORONARY ARTERY DISEASE INVOLVING NATIVE CORONARY ARTERY OF NATIVE HEART WITHOUT ANGINA PECTORIS: ICD-10-CM

## 2019-01-25 DIAGNOSIS — Z98.890 S/P ORIF (OPEN REDUCTION INTERNAL FIXATION) FRACTURE: Primary | ICD-10-CM

## 2019-01-25 LAB
ALBUMIN SERPL BCP-MCNC: 3.8 G/DL (ref 3.5–4.8)
ALBUMIN/GLOB SERPL: 1.4 {RATIO} (ref 1–2)
ALP SERPL-CCNC: 78 U/L (ref 32–100)
ALT SERPL-CCNC: 17 U/L (ref 14–54)
ANION GAP SERPL CALC-SCNC: 13 MMOL/L (ref 0–18)
AST SERPL-CCNC: 29 U/L (ref 15–41)
BACTERIA UR QL AUTO: NEGATIVE /HPF
BILIRUB SERPL-MCNC: 0.4 MG/DL (ref 0.3–1.2)
BUN SERPL-MCNC: 25 MG/DL (ref 8–20)
BUN/CREAT SERPL: 25.8 (ref 10–20)
CALCIUM SERPL-MCNC: 9.7 MG/DL (ref 8.5–10.5)
CHLORIDE SERPL-SCNC: 94 MMOL/L (ref 95–110)
CO2 SERPL-SCNC: 28 MMOL/L (ref 22–32)
CREAT SERPL-MCNC: 0.97 MG/DL (ref 0.5–1.5)
ERYTHROCYTE [DISTWIDTH] IN BLOOD BY AUTOMATED COUNT: 15.7 % (ref 11–15)
GLOBULIN PLAS-MCNC: 2.7 G/DL (ref 2.5–3.7)
GLUCOSE SERPL-MCNC: 106 MG/DL (ref 70–99)
HCT VFR BLD AUTO: 37.2 % (ref 35–48)
HGB BLD-MCNC: 12.1 G/DL (ref 12–16)
INR BLD: 2.7 (ref 0.9–1.2)
MCH RBC QN AUTO: 29.9 PG (ref 27–32)
MCHC RBC AUTO-ENTMCNC: 32.4 G/DL (ref 32–37)
MCV RBC AUTO: 92.3 FL (ref 80–100)
OSMOLALITY UR CALC.SUM OF ELEC: 285 MOSM/KG (ref 275–295)
PATIENT FASTING: NO
PLATELET # BLD AUTO: 328 K/UL (ref 140–400)
PMV BLD AUTO: 7.1 FL (ref 7.4–10.3)
POTASSIUM SERPL-SCNC: 4.4 MMOL/L (ref 3.3–5.1)
PROT SERPL-MCNC: 6.5 G/DL (ref 5.9–8.4)
PROTHROMBIN TIME: 28.2 SECONDS (ref 11.8–14.5)
RBC # BLD AUTO: 4.03 M/UL (ref 3.7–5.4)
RBC #/AREA URNS AUTO: 7 /HPF
SODIUM SERPL-SCNC: 135 MMOL/L (ref 136–144)
WBC # BLD AUTO: 6.1 K/UL (ref 4–11)
WBC #/AREA URNS AUTO: 1107 /HPF

## 2019-01-25 PROCEDURE — 81015 MICROSCOPIC EXAM OF URINE: CPT

## 2019-01-25 PROCEDURE — 99214 OFFICE O/P EST MOD 30 MIN: CPT | Performed by: INTERNAL MEDICINE

## 2019-01-25 PROCEDURE — 80053 COMPREHEN METABOLIC PANEL: CPT

## 2019-01-25 PROCEDURE — 85610 PROTHROMBIN TIME: CPT

## 2019-01-25 PROCEDURE — 87086 URINE CULTURE/COLONY COUNT: CPT

## 2019-01-25 PROCEDURE — 36415 COLL VENOUS BLD VENIPUNCTURE: CPT

## 2019-01-25 PROCEDURE — G0463 HOSPITAL OUTPT CLINIC VISIT: HCPCS | Performed by: INTERNAL MEDICINE

## 2019-01-25 PROCEDURE — 87077 CULTURE AEROBIC IDENTIFY: CPT

## 2019-01-25 PROCEDURE — 1111F DSCHRG MED/CURRENT MED MERGE: CPT | Performed by: INTERNAL MEDICINE

## 2019-01-25 PROCEDURE — 87186 SC STD MICRODIL/AGAR DIL: CPT

## 2019-01-25 PROCEDURE — 85027 COMPLETE CBC AUTOMATED: CPT

## 2019-01-25 RX ORDER — SULFAMETHOXAZOLE AND TRIMETHOPRIM 800; 160 MG/1; MG/1
1 TABLET ORAL 2 TIMES DAILY
Qty: 20 TABLET | Refills: 0 | Status: SHIPPED | OUTPATIENT
Start: 2019-01-25 | End: 2019-02-04

## 2019-01-25 RX ORDER — HYDROCODONE BITARTRATE AND ACETAMINOPHEN 10; 325 MG/1; MG/1
1 TABLET ORAL EVERY 4 HOURS PRN
Qty: 20 TABLET | Refills: 0 | Status: SHIPPED | OUTPATIENT
Start: 2019-01-25 | End: 2019-04-02 | Stop reason: ALTCHOICE

## 2019-01-25 RX ORDER — HYDROCODONE BITARTRATE AND ACETAMINOPHEN 5; 325 MG/1; MG/1
1 TABLET ORAL EVERY 6 HOURS PRN
Qty: 60 TABLET | Refills: 0 | Status: SHIPPED | OUTPATIENT
Start: 2019-01-25 | End: 2019-04-02 | Stop reason: ALTCHOICE

## 2019-01-25 NOTE — PROGRESS NOTES
HPI:    Patient ID: Chaparrita Puente is a 80year old female.     HPI     Followu up  PT back to [facility] living with home health visitng  Left Hip ORIF accidental fall  Pt on coumadin   She is still non weight bearing  Awake alert conversive BP HPI Per Admitting Physician: Patient is a 80-year-old  female who was doing [place] shopping today when she lost her balance, falling, hitting her head against a coffee table, landing on her left knee.  She immediately developed pain and bleedin No results found for this visit on 12/03/18.        IMAGING STUDIES: SOME MAY NEED FOLLOW UP WITH PCP   Xr Femur Min 2 Views Left (cpt=73552)     Result Date: 12/3/2018  CONCLUSION:  1.  Supracondylar fracture of the distal femur with questionable intercond CONCLUSION:   Comminuted, impacted fracture of the distal femur with anterior displacement. Severe osteopenia. 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 Nonemergent interventional radiology or vascular surgery consultation suggested.  Yearly surveillance c   T4F 1.08 06/27/2018     TSH 3.04 12/07/2018     MG 1.9 05/17/2016     CK 58 06/27/2018     B12 709 10/13/2017               Recent Labs   Lab  12/10/18   0426  12/11/18   0447  12/12/18   0425   RBC  2.97*  2.89*  2.79*   HGB  9.3*  9.0*  8.5*   HCT  27. Sulfamethoxazole-TMP -160 MG Tabs per tablet  Commonly known as:  BACTRIM DS       Take BID for 3 days    Refills:  0      triple antibiotic 3.5-400-5000 Oint       Apply to nose daily    Refills:  0      Warfarin Sodium 5 MG Tabs  Commonly known as:   Love Vazquez MD Consulting Physician  Nazia Brown MD Consulting Physician  CARDIOLOGY     Jordan Walton MD Consulting Physician  SURGERY, ORTHOPEDIC                      Discharge Medications              START taking these medic Commonly known as:  CRESTOR       TK 1 T PO HS    Refills:  5                   STOP taking these medications         prednisoLONE acetate 1 % Susp  Commonly known as:  PRED FORTE          TraMADol HCl 50 MG Tabs  Commonly known as:  ULTRAM          triamc LEVOTHYROXINE SODIUM 100 MCG Oral Tab TAKE 1 TABLET BY MOUTH DAILY BEFORE BREAKFAST Disp: 90 tablet Rfl: 0   Rosuvastatin Calcium 20 MG Oral Tab TK 1 T PO HS Disp:  Rfl: 5   Metoprolol Succinate ER 25 MG Oral Tablet 24 Hr 12.5 mg 2 (two) times daily.    Dis CVA   • Diabetes Brother    • Breast Cancer Self 67   • Breast Cancer Maternal Aunt         post menopause   • Breast Cancer Maternal Cousin Female         post menopause   • Breast Cancer Maternal Cousin Female         post menopause      Social Hist BUN/CREA RATIO      10.0 - 20.0 25.8 (H)   CALCULATED OSMOLALITY      275 - 295 mOsm/kg 285   GFR, Non-      >=60 52 (L)   GFR, -American      >=60 59 (L)   Patient Fasting?        No   WBC      4.0 - 11.0 K/UL 6.1   RBC      3.70 - 5 Imaging & Referrals:  None        XR#[id number]

## 2019-01-25 NOTE — TELEPHONE ENCOUNTER
Rx for Hydrocodone is unable to be called in or faxed . Pt has to  at the office . Called Pt spoke to Pt's daughter (on hippa) informed Rx is ready at [place] office .

## 2019-01-25 NOTE — TELEPHONE ENCOUNTER
Labs discussed with pt  BP is low  Furosemide on hold  INR 2.7 cont dose per intruction of coumadinclinic  UTI  rx bactrim  Tolerated it in the past   Discussed with pt and her daughter over the phone  Patient voiced understanding  and agrees with plan

## 2019-01-25 NOTE — TELEPHONE ENCOUNTER
Controlled medication pending for review. If approved needs to be called in or faxed by on-site staff.      Requested Prescriptions     Pending Prescriptions Disp Refills   • HYDROcodone-acetaminophen  MG Oral Tab 20 tablet 0     Sig: Take 1 tablet b

## 2019-01-25 NOTE — TELEPHONE ENCOUNTER
Spoke with Roslyn Roth from lab--reports STAT labs are ready to view--urine culture in process    Sent to CLARK and MERRICK (on call)

## 2019-01-28 ENCOUNTER — MED REC SCAN ONLY (OUTPATIENT)
Dept: INTERNAL MEDICINE CLINIC | Facility: CLINIC | Age: 84
End: 2019-01-28

## 2019-01-28 ENCOUNTER — ANTI-COAG VISIT (OUTPATIENT)
Dept: INTERNAL MEDICINE CLINIC | Facility: CLINIC | Age: 84
End: 2019-01-28

## 2019-01-28 DIAGNOSIS — S72.90XD CLOSED FRACTURE OF FEMUR WITH ROUTINE HEALING, UNSPECIFIED FRACTURE MORPHOLOGY, UNSPECIFIED LATERALITY, UNSPECIFIED PORTION OF FEMUR, SUBSEQUENT ENCOUNTER: ICD-10-CM

## 2019-01-28 PROCEDURE — 93793 ANTICOAG MGMT PT WARFARIN: CPT | Performed by: INTERNAL MEDICINE

## 2019-01-29 ENCOUNTER — TELEPHONE (OUTPATIENT)
Dept: INTERNAL MEDICINE CLINIC | Facility: CLINIC | Age: 84
End: 2019-01-29

## 2019-01-29 ENCOUNTER — ANTI-COAG VISIT (OUTPATIENT)
Dept: INTERNAL MEDICINE CLINIC | Facility: CLINIC | Age: 84
End: 2019-01-29

## 2019-01-29 DIAGNOSIS — S72.90XD CLOSED FRACTURE OF FEMUR WITH ROUTINE HEALING, UNSPECIFIED FRACTURE MORPHOLOGY, UNSPECIFIED LATERALITY, UNSPECIFIED PORTION OF FEMUR, SUBSEQUENT ENCOUNTER: ICD-10-CM

## 2019-01-29 LAB — INR: 3.3 (ref 2–3)

## 2019-02-01 ENCOUNTER — NURSE TRIAGE (OUTPATIENT)
Dept: OTHER | Age: 84
End: 2019-02-01

## 2019-02-01 NOTE — TELEPHONE ENCOUNTER
Please reply to pool: EM RN TRIAGE  Action Requested: Summary for Provider     []  Critical Lab, Recommendations Needed  [x] Need Additional Advice  []   FYI    []   Need Orders  [] Need Medications Sent to Pharmacy  []  Other     SUMMARY: Visiting nurse julia

## 2019-02-04 ENCOUNTER — ANTI-COAG VISIT (OUTPATIENT)
Dept: INTERNAL MEDICINE CLINIC | Facility: CLINIC | Age: 84
End: 2019-02-04

## 2019-02-04 ENCOUNTER — TELEPHONE (OUTPATIENT)
Dept: INTERNAL MEDICINE CLINIC | Facility: CLINIC | Age: 84
End: 2019-02-04

## 2019-02-04 DIAGNOSIS — S72.90XD CLOSED FRACTURE OF FEMUR WITH ROUTINE HEALING, UNSPECIFIED FRACTURE MORPHOLOGY, UNSPECIFIED LATERALITY, UNSPECIFIED PORTION OF FEMUR, SUBSEQUENT ENCOUNTER: ICD-10-CM

## 2019-02-04 LAB — INR: 6.5 (ref 2–3)

## 2019-02-04 PROCEDURE — 93793 ANTICOAG MGMT PT WARFARIN: CPT | Performed by: INTERNAL MEDICINE

## 2019-02-04 NOTE — TELEPHONE ENCOUNTER
Hi Dr. Brandon Good. Pt INR= 6.5. HH will call pt and have her hold warfarin X 3 days. Pt is not having any bleeding or bruising issues. I have a stop date on 1/31/19. Just checking that you want it stopped?   Did you want an INR done after the 3 days of ho

## 2019-02-05 ENCOUNTER — NURSE TRIAGE (OUTPATIENT)
Dept: OTHER | Age: 84
End: 2019-02-05

## 2019-02-05 NOTE — TELEPHONE ENCOUNTER
Message left on [facility] RN V/M to please do an INR on Thursday. Danii 30 # given if she has any questions.  catalino

## 2019-02-05 NOTE — TELEPHONE ENCOUNTER
Action Requested: Summary for Provider     []  Critical Lab, Recommendations Needed  [] Need Additional Advice  []   FYI    []   Need Orders  [] Need Medications Sent to Pharmacy  []  Other     SUMMARY: HH RN states that pt's family wants Dr. Bridgett Carrasquillo to [facility]

## 2019-02-05 NOTE — TELEPHONE ENCOUNTER
I agree may be from bactrim   Nausea can be from bactrim  hopefuly appetite will get better  Give it a couple of days otherwise   Need OV

## 2019-02-05 NOTE — TELEPHONE ENCOUNTER
Advised patient of Dr. Star Smith note. Patient verbalized understanding  Patient states she is feeling better today. Per patient, she had a bowel of cereal and a cup of coffee today and have not had any problems with nausea or vomiting.      Patient encou

## 2019-02-07 ENCOUNTER — ANTI-COAG VISIT (OUTPATIENT)
Dept: INTERNAL MEDICINE CLINIC | Facility: CLINIC | Age: 84
End: 2019-02-07

## 2019-02-07 ENCOUNTER — TELEPHONE (OUTPATIENT)
Dept: INTERNAL MEDICINE CLINIC | Facility: CLINIC | Age: 84
End: 2019-02-07

## 2019-02-07 DIAGNOSIS — S72.90XD CLOSED FRACTURE OF FEMUR WITH ROUTINE HEALING, UNSPECIFIED FRACTURE MORPHOLOGY, UNSPECIFIED LATERALITY, UNSPECIFIED PORTION OF FEMUR, SUBSEQUENT ENCOUNTER: ICD-10-CM

## 2019-02-07 LAB — INR: 1.5 (ref 2–3)

## 2019-02-11 ENCOUNTER — TELEPHONE (OUTPATIENT)
Dept: INTERNAL MEDICINE CLINIC | Facility: CLINIC | Age: 84
End: 2019-02-11

## 2019-02-11 ENCOUNTER — ANTI-COAG VISIT (OUTPATIENT)
Dept: INTERNAL MEDICINE CLINIC | Facility: CLINIC | Age: 84
End: 2019-02-11

## 2019-02-11 DIAGNOSIS — S72.90XD CLOSED FRACTURE OF FEMUR WITH ROUTINE HEALING, UNSPECIFIED FRACTURE MORPHOLOGY, UNSPECIFIED LATERALITY, UNSPECIFIED PORTION OF FEMUR, SUBSEQUENT ENCOUNTER: ICD-10-CM

## 2019-02-11 LAB — INR: 1.4 (ref 2–3)

## 2019-02-11 NOTE — TELEPHONE ENCOUNTER
[facility] to advise that pt is taking 2.5 mg of coumadin daily.   Please advise     INR = 1.4  PT = 16.7

## 2019-02-12 ENCOUNTER — HOSPITAL ENCOUNTER (OUTPATIENT)
Dept: GENERAL RADIOLOGY | Age: 84
Discharge: HOME | End: 2019-02-12
Attending: ORTHOPAEDIC SURGERY
Payer: MEDICARE

## 2019-02-12 DIAGNOSIS — S72.92XA FEMUR FRACTURE, LEFT (HCC): ICD-10-CM

## 2019-02-12 PROCEDURE — 73552 X-RAY EXAM OF FEMUR 2/>: CPT | Performed by: ORTHOPAEDIC SURGERY

## 2019-02-13 NOTE — TELEPHONE ENCOUNTER
Pt is requesting if Dr can refill a medication for her that another Dr will not fill    Please advise         Current Outpatient Medications:  Metoprolol Succinate ER 25 MG Oral Tablet 24 Hr 12.5 mg 2 (two) times daily.    Disp:  Rfl: 0     Pt stts can you

## 2019-02-14 ENCOUNTER — TELEPHONE (OUTPATIENT)
Dept: INTERNAL MEDICINE CLINIC | Facility: CLINIC | Age: 84
End: 2019-02-14

## 2019-02-14 ENCOUNTER — ANTI-COAG VISIT (OUTPATIENT)
Dept: INTERNAL MEDICINE CLINIC | Facility: CLINIC | Age: 84
End: 2019-02-14

## 2019-02-14 DIAGNOSIS — S72.90XD CLOSED FRACTURE OF FEMUR WITH ROUTINE HEALING, UNSPECIFIED FRACTURE MORPHOLOGY, UNSPECIFIED LATERALITY, UNSPECIFIED PORTION OF FEMUR, SUBSEQUENT ENCOUNTER: ICD-10-CM

## 2019-02-14 LAB — INR: 2.5 (ref 2–3)

## 2019-02-14 RX ORDER — METOPROLOL SUCCINATE 25 MG/1
12.5 TABLET, EXTENDED RELEASE ORAL 2 TIMES DAILY
Qty: 90 TABLET | Refills: 3 | Status: SHIPPED | OUTPATIENT
Start: 2019-02-14 | End: 2020-06-24

## 2019-02-21 ENCOUNTER — ANTI-COAG VISIT (OUTPATIENT)
Dept: INTERNAL MEDICINE CLINIC | Facility: CLINIC | Age: 84
End: 2019-02-21

## 2019-02-21 ENCOUNTER — TELEPHONE (OUTPATIENT)
Dept: INTERNAL MEDICINE CLINIC | Facility: CLINIC | Age: 84
End: 2019-02-21

## 2019-02-21 DIAGNOSIS — S72.90XD CLOSED FRACTURE OF FEMUR WITH ROUTINE HEALING, UNSPECIFIED FRACTURE MORPHOLOGY, UNSPECIFIED LATERALITY, UNSPECIFIED PORTION OF FEMUR, SUBSEQUENT ENCOUNTER: ICD-10-CM

## 2019-02-21 LAB — INR: 3.3 (ref 2–3)

## 2019-02-21 NOTE — TELEPHONE ENCOUNTER
Laurent was calling to report pt's INR of 3.3. Pt is on 2.5 coumadin per day. Please advise thank you.

## 2019-02-22 ENCOUNTER — MED REC SCAN ONLY (OUTPATIENT)
Dept: INTERNAL MEDICINE CLINIC | Facility: CLINIC | Age: 84
End: 2019-02-22

## 2019-02-27 RX ORDER — WARFARIN SODIUM 1 MG/1
TABLET ORAL
Qty: 30 TABLET | Refills: 0 | Status: SHIPPED | OUTPATIENT
Start: 2019-02-27 | End: 2019-03-30

## 2019-02-27 RX ORDER — WARFARIN SODIUM 1 MG/1
TABLET ORAL
Qty: 30 TABLET | Refills: 0 | Status: CANCELLED | OUTPATIENT
Start: 2019-02-27

## 2019-02-27 NOTE — TELEPHONE ENCOUNTER
Per pharmacy patient is out of     Warfarin Sodium 1 MG Oral Tab TAKE 1 TABLET BY MOUTH NIGHTLY Disp: 30 tablet Rfl: 0     And they need to get medications delivered but needs refill ASAP.      Please advise

## 2019-02-28 ENCOUNTER — TELEPHONE (OUTPATIENT)
Dept: INTERNAL MEDICINE CLINIC | Facility: CLINIC | Age: 84
End: 2019-02-28

## 2019-02-28 ENCOUNTER — ANTI-COAG VISIT (OUTPATIENT)
Dept: INTERNAL MEDICINE CLINIC | Facility: CLINIC | Age: 84
End: 2019-02-28

## 2019-02-28 VITALS
SYSTOLIC BLOOD PRESSURE: 96 MMHG | WEIGHT: 130 LBS | HEART RATE: 66 BPM | HEIGHT: 65 IN | OXYGEN SATURATION: 94 % | BODY MASS INDEX: 21.66 KG/M2 | DIASTOLIC BLOOD PRESSURE: 50 MMHG

## 2019-02-28 VITALS
HEART RATE: 64 BPM | BODY MASS INDEX: 22.86 KG/M2 | HEIGHT: 63 IN | WEIGHT: 129 LBS | DIASTOLIC BLOOD PRESSURE: 60 MMHG | SYSTOLIC BLOOD PRESSURE: 130 MMHG | OXYGEN SATURATION: 98 %

## 2019-02-28 DIAGNOSIS — S72.90XD CLOSED FRACTURE OF FEMUR WITH ROUTINE HEALING, UNSPECIFIED FRACTURE MORPHOLOGY, UNSPECIFIED LATERALITY, UNSPECIFIED PORTION OF FEMUR, SUBSEQUENT ENCOUNTER: ICD-10-CM

## 2019-02-28 LAB — INR: 3 (ref 2–3)

## 2019-02-28 NOTE — TELEPHONE ENCOUNTER
Laurent would like to report PT/INR results to the coumadin Clinic. Patient's PT is 36 and INR Is 3.

## 2019-03-01 VITALS
HEIGHT: 65 IN | WEIGHT: 130 LBS | OXYGEN SATURATION: 96 % | HEART RATE: 68 BPM | BODY MASS INDEX: 21.66 KG/M2 | SYSTOLIC BLOOD PRESSURE: 110 MMHG | DIASTOLIC BLOOD PRESSURE: 60 MMHG

## 2019-03-04 ENCOUNTER — ANTI-COAG VISIT (OUTPATIENT)
Dept: INTERNAL MEDICINE CLINIC | Facility: CLINIC | Age: 84
End: 2019-03-04

## 2019-03-04 ENCOUNTER — TELEPHONE (OUTPATIENT)
Dept: INTERNAL MEDICINE CLINIC | Facility: CLINIC | Age: 84
End: 2019-03-04

## 2019-03-04 DIAGNOSIS — S72.90XD CLOSED FRACTURE OF FEMUR WITH ROUTINE HEALING, UNSPECIFIED FRACTURE MORPHOLOGY, UNSPECIFIED LATERALITY, UNSPECIFIED PORTION OF FEMUR, SUBSEQUENT ENCOUNTER: ICD-10-CM

## 2019-03-04 NOTE — TELEPHONE ENCOUNTER
[facility] would like a nurse to give a her a call back to discuss pt dose of medication .       Please advise

## 2019-03-07 ENCOUNTER — TELEPHONE (OUTPATIENT)
Dept: INTERNAL MEDICINE CLINIC | Facility: CLINIC | Age: 84
End: 2019-03-07

## 2019-03-07 ENCOUNTER — ANTI-COAG VISIT (OUTPATIENT)
Dept: INTERNAL MEDICINE CLINIC | Facility: CLINIC | Age: 84
End: 2019-03-07

## 2019-03-07 DIAGNOSIS — S72.90XD CLOSED FRACTURE OF FEMUR WITH ROUTINE HEALING, UNSPECIFIED FRACTURE MORPHOLOGY, UNSPECIFIED LATERALITY, UNSPECIFIED PORTION OF FEMUR, SUBSEQUENT ENCOUNTER: ICD-10-CM

## 2019-03-07 LAB — INR: 1.7 (ref 2–3)

## 2019-03-07 NOTE — TELEPHONE ENCOUNTER
Laurent, would like to get an order for PT and nursing for the patient. Per [employer] this is for re-certification. Efren Vu

## 2019-03-07 NOTE — TELEPHONE ENCOUNTER
Laurent, would like to report PT/INR results to the coumadin RN. Patient's PT  Is 19.9 and INR is 1.7.

## 2019-03-14 ENCOUNTER — ANTI-COAG VISIT (OUTPATIENT)
Dept: INTERNAL MEDICINE CLINIC | Facility: CLINIC | Age: 84
End: 2019-03-14

## 2019-03-14 ENCOUNTER — TELEPHONE (OUTPATIENT)
Dept: INTERNAL MEDICINE CLINIC | Facility: CLINIC | Age: 84
End: 2019-03-14

## 2019-03-14 DIAGNOSIS — S72.90XD CLOSED FRACTURE OF FEMUR WITH ROUTINE HEALING, UNSPECIFIED FRACTURE MORPHOLOGY, UNSPECIFIED LATERALITY, UNSPECIFIED PORTION OF FEMUR, SUBSEQUENT ENCOUNTER: ICD-10-CM

## 2019-03-14 LAB — INR: 1.5 (ref 2–3)

## 2019-03-20 ENCOUNTER — HOSPITAL ENCOUNTER (OUTPATIENT)
Dept: GENERAL RADIOLOGY | Age: 84
Discharge: HOME | End: 2019-03-20
Attending: ORTHOPAEDIC SURGERY
Payer: MEDICARE

## 2019-03-20 DIAGNOSIS — Z09 FRACTURE FOLLOW-UP: ICD-10-CM

## 2019-03-20 PROCEDURE — 73552 X-RAY EXAM OF FEMUR 2/>: CPT | Performed by: ORTHOPAEDIC SURGERY

## 2019-03-21 ENCOUNTER — ANTI-COAG VISIT (OUTPATIENT)
Dept: INTERNAL MEDICINE CLINIC | Facility: CLINIC | Age: 84
End: 2019-03-21

## 2019-03-21 ENCOUNTER — TELEPHONE (OUTPATIENT)
Dept: INTERNAL MEDICINE CLINIC | Facility: CLINIC | Age: 84
End: 2019-03-21

## 2019-03-21 DIAGNOSIS — S72.90XD CLOSED FRACTURE OF FEMUR WITH ROUTINE HEALING, UNSPECIFIED FRACTURE MORPHOLOGY, UNSPECIFIED LATERALITY, UNSPECIFIED PORTION OF FEMUR, SUBSEQUENT ENCOUNTER: ICD-10-CM

## 2019-03-21 LAB — INR: 1 (ref 2–3)

## 2019-03-22 ENCOUNTER — NURSE TRIAGE (OUTPATIENT)
Dept: OTHER | Age: 84
End: 2019-03-22

## 2019-03-22 NOTE — TELEPHONE ENCOUNTER
I spoke with Kelli Pappas (daughter) and informed antibiotic is to be picked up at [facility] and brought to pt at [facility]. No pharmacy dept at [facility]. Kelli Pappas verbalized understanding and will make arrangements to have medication brought to patient.

## 2019-03-22 NOTE — TELEPHONE ENCOUNTER
Mckenzie Sebastian RN from [facility] returned called, reviewed Dr Kianna Burnham orders with nurse. Urine has been collected but lab  is tomorrow. The medication has to be picked up by family member or other person and brought to [facility].  No pharmacy dept at [facility]

## 2019-03-22 NOTE — TELEPHONE ENCOUNTER
Patient's daughter, Aleks Kim who is on HIPAA list, concerned that her mother most likely has a UTI and will have difficulty getting an order for the urine test and medication to treat her mother's symptoms.  Informed daughter that information regarding her mot

## 2019-03-22 NOTE — TELEPHONE ENCOUNTER
Reviewed Dr Jamaica Lopez orders with pts' daughter Angus who verbalized understanding and agreed with plan. Daughter stated pt is at [facility]. I called [facility] tel# [phone] and pts' nurse not available to speak.  I left triage telephone [phone]

## 2019-03-22 NOTE — TELEPHONE ENCOUNTER
Action Requested: Summary for Provider     []  Critical Lab, Recommendations Needed  [] Need Additional Advice  []   FYI    [x]   Need Orders  [] Need Medications Sent to Pharmacy  []  Other     SUMMARY:  Per protocol advised OV today , pt declined.  She

## 2019-03-26 ENCOUNTER — HOSPITAL ENCOUNTER (OUTPATIENT)
Dept: CT IMAGING | Facility: HOSPITAL | Age: 84
Discharge: HOME | End: 2019-03-26
Attending: ORTHOPAEDIC SURGERY
Payer: MEDICARE

## 2019-03-26 DIAGNOSIS — Z87.81 HISTORY OF FEMUR FRACTURE: ICD-10-CM

## 2019-03-26 PROCEDURE — 73700 CT LOWER EXTREMITY W/O DYE: CPT | Performed by: ORTHOPAEDIC SURGERY

## 2019-03-28 ENCOUNTER — TELEPHONE (OUTPATIENT)
Dept: INTERNAL MEDICINE CLINIC | Facility: CLINIC | Age: 84
End: 2019-03-28

## 2019-03-28 ENCOUNTER — ANTI-COAG VISIT (OUTPATIENT)
Dept: INTERNAL MEDICINE CLINIC | Facility: CLINIC | Age: 84
End: 2019-03-28

## 2019-03-28 DIAGNOSIS — S72.90XD CLOSED FRACTURE OF FEMUR WITH ROUTINE HEALING, UNSPECIFIED FRACTURE MORPHOLOGY, UNSPECIFIED LATERALITY, UNSPECIFIED PORTION OF FEMUR, SUBSEQUENT ENCOUNTER: ICD-10-CM

## 2019-03-28 LAB — INR: 2.5 (ref 2–3)

## 2019-03-28 NOTE — TELEPHONE ENCOUNTER
Laurent cobb  called reg pt inr     PT-29.5  INR- 2.5    4 mg Thursday and Friday  2mg res of the week

## 2019-03-30 NOTE — TELEPHONE ENCOUNTER
No Protocol on this med.      Requested Prescriptions     Pending Prescriptions Disp Refills   • WARFARIN SODIUM 1 MG Oral Tab [Pharmacy Med Name: WARFARIN SOD 1MG TABLETS (PINK)] 30 tablet 0     Sig: TAKE 1 TABLET BY MOUTH NIGHTLY       Last Office Visit w

## 2019-04-01 ENCOUNTER — TELEPHONE (OUTPATIENT)
Dept: INTERNAL MEDICINE CLINIC | Facility: CLINIC | Age: 84
End: 2019-04-01

## 2019-04-01 RX ORDER — LEVOTHYROXINE SODIUM 0.1 MG/1
TABLET ORAL
Qty: 90 TABLET | Refills: 0 | Status: CANCELLED | OUTPATIENT
Start: 2019-04-01

## 2019-04-01 RX ORDER — LEVOTHYROXINE SODIUM 0.1 MG/1
TABLET ORAL
Qty: 90 TABLET | Refills: 0 | Status: SHIPPED | OUTPATIENT
Start: 2019-04-01 | End: 2019-04-04

## 2019-04-01 RX ORDER — WARFARIN SODIUM 1 MG/1
TABLET ORAL
Qty: 360 TABLET | Refills: 0 | OUTPATIENT
Start: 2019-04-01

## 2019-04-01 RX ORDER — WARFARIN SODIUM 1 MG/1
TABLET ORAL
Qty: 30 TABLET | Refills: 0 | Status: SHIPPED | OUTPATIENT
Start: 2019-04-01 | End: 2019-04-03

## 2019-04-01 NOTE — TELEPHONE ENCOUNTER
LMTCB- left detailed message to cj Mancilla at [facility]- seen other [facility]

## 2019-04-01 NOTE — TELEPHONE ENCOUNTER
Refill passed per [facility] protocol.   Hypothyroid Medications  Protocol Criteria:  Appointment scheduled in the past 12 months or the next 3 months  TSH resulted in the past 12 months that is normal  Recent Outpatient Visits            2 months ago

## 2019-04-01 NOTE — TELEPHONE ENCOUNTER
Contacted patient's home number and caregiver picked up phone. Caregiver gave RN Leni Brannon (gibran's number). Called Leni Brannon, no answer. No option to leave voicemail.      Called caregiver back and left a message with caregiver to have daughter call office

## 2019-04-01 NOTE — TELEPHONE ENCOUNTER
Message from Dr RODAS on another ENC -  Why is patient still on coumadin  Nurses are refilling the medication

## 2019-04-01 NOTE — TELEPHONE ENCOUNTER
[facility] [phone] states pt is running out of the warfarin medication. Per [employer] the pt takes 4 milligrams Monday and Friday and 2 milligrams the rest of the week. Please, redo script to cover enough medication for the above.  Per

## 2019-04-01 NOTE — TELEPHONE ENCOUNTER
Anticoagulation Summary   As of 2019   INR goal:  2.0-3.0   INR used for dosin.9 (2019)   Next INR check:  2019   Target end date:  2019    Indications     Closed fracture of femur with routine healing   unspecified fracture morpho INJECTED

## 2019-04-01 NOTE — TELEPHONE ENCOUNTER
I have not seen patient since January  S/p ORIF hip fracture    Coumadin refill being requested  I am not sure why she is still on coumadin  Is her leg still swollen?   Is she ambulatory    I cannot get a hold of patient  Several attempt phone number  Sylvie Kussmaul

## 2019-04-01 NOTE — TELEPHONE ENCOUNTER
Pt calling states insurance denied coverage for Warfarin 1 mg. Pt states she is taking 1 or more per MD direction. She need new prescription to change from 1-4 tabs /day as directed.   Called [facility] and spoke with Yenny Stout and she explained pt last ref

## 2019-04-01 NOTE — TELEPHONE ENCOUNTER
Anticoagulation Summary   As of 2019   INR goal:  2.0-3.0   INR used for dosin.9 (2019)   Next INR check:  2019   Target end date:  2019    Indications     Closed fracture of femur with routine healing   unspecified fracture morpho

## 2019-04-02 ENCOUNTER — TELEPHONE (OUTPATIENT)
Dept: INTERNAL MEDICINE CLINIC | Facility: CLINIC | Age: 84
End: 2019-04-02

## 2019-04-02 PROBLEM — S72.452A CLOSED DISPLACED SUPRACONDYLAR FRACTURE WITHOUT INTRACONDYLAR EXTENSION OF LOWER END OF LEFT FEMUR (HCC): Status: ACTIVE | Noted: 2019-04-02

## 2019-04-02 PROBLEM — M62.838 MUSCLE SPASM OF LEFT LOWER EXTREMITY: Status: ACTIVE | Noted: 2019-04-02

## 2019-04-02 RX ORDER — WARFARIN SODIUM 1 MG/1
TABLET ORAL
Qty: 72 TABLET | Refills: 0 | Status: CANCELLED | OUTPATIENT
Start: 2019-04-02

## 2019-04-02 NOTE — TELEPHONE ENCOUNTER
Pt is still taking coumadin. Has 50% use of leg and is in wheelchair most of day. Has appt w/ Dr Heredia Barefoot today. States cobb monitors her INR as she lives there.   F/U appt scheduled 4/4/19 to see PCP

## 2019-04-02 NOTE — TELEPHONE ENCOUNTER
Fortune from [employer] with [address]: The patient still needs to be on coumadin per Dr. Anastacio Salas, her surgeon. He wants her  on coumadin until she is on full weight bearing.   She is not on full weight bearing as yet but should be hopefully soon

## 2019-04-02 NOTE — TELEPHONE ENCOUNTER
Who is Dr Fabián Yanescel?     I spoke with Dr Alexander Partida today and he is seeing the patient  He will determine pf still need coumadin

## 2019-04-03 ENCOUNTER — TELEPHONE (OUTPATIENT)
Dept: INTERNAL MEDICINE CLINIC | Facility: CLINIC | Age: 84
End: 2019-04-03

## 2019-04-03 RX ORDER — WARFARIN SODIUM 1 MG/1
1 TABLET ORAL
Qty: 30 TABLET | Refills: 0 | Status: CANCELLED | OUTPATIENT
Start: 2019-04-03

## 2019-04-03 NOTE — TELEPHONE ENCOUNTER
Advised patient of Dr. Scott Osorio note. Patient verbalized understanding and indicated that was advised to stop coumadin yesterday by Dr Linda Ellison. Will resume plavix.

## 2019-04-03 NOTE — TELEPHONE ENCOUNTER
Left vm for patient to call back, wanting to confirm she is aware of instructions to stop coumadin. Received refill from pharmacy for coumadin, will disregard. Also confirm if patient was instructed to resume her Plavix from Dr. Tisha Adams.     Per Dr. Mel Ha

## 2019-04-03 NOTE — TELEPHONE ENCOUNTER
See Acute TE 4/1/19, per Dr RODAS stop use of coumadin per Dr Chacha Reyes. Refill denied, patient should no longer be taking.

## 2019-04-03 NOTE — TELEPHONE ENCOUNTER
Advised Laurent at [facility] that warfarin was discontinued by Dr Tisha Adams yesterday and patient is to resume plavix- patient was already advised. See 4/2/19 telephone encounter.

## 2019-04-03 NOTE — TELEPHONE ENCOUNTER
Advised patient of Dr. Andreas Rubio note. Patient verbalized understanding. See 4/2/19 telephone encounter.

## 2019-04-03 NOTE — TELEPHONE ENCOUNTER
WARFARIN SODIUM 1 MG Oral Tab TAKE 1 TABLET BY MOUTH NIGHTLY Disp: 30 tablet Rfl: 0     Fax received from [facility] with message for provider. Message:  Patient requests a prescription for warfarin 1mg with updated instructions. She says she takes mo

## 2019-04-03 NOTE — TELEPHONE ENCOUNTER
STOP COUMADIN    Per Dr Willa Blanco   had previously discussed her case with Dr. Brandon Good and the patient  was to stop the Coumadin that she had been taken since her surgery and resume her preinjury Plavix.

## 2019-04-04 RX ORDER — LOSARTAN POTASSIUM AND HYDROCHLOROTHIAZIDE 12.5; 5 MG/1; MG/1
TABLET ORAL
COMMUNITY
Start: 2018-07-31 | End: 2019-04-29 | Stop reason: SDUPTHER

## 2019-04-04 RX ORDER — LEVOTHYROXINE SODIUM 0.1 MG/1
TABLET ORAL
COMMUNITY

## 2019-04-04 RX ORDER — ROSUVASTATIN CALCIUM 20 MG/1
TABLET, COATED ORAL
COMMUNITY
Start: 2018-06-25 | End: 2019-05-28 | Stop reason: SDUPTHER

## 2019-04-04 RX ORDER — CLOPIDOGREL BISULFATE 75 MG/1
TABLET ORAL
COMMUNITY
Start: 2012-07-06 | End: 2021-03-09

## 2019-04-04 RX ORDER — VALSARTAN AND HYDROCHLOROTHIAZIDE 160; 12.5 MG/1; MG/1
TABLET, FILM COATED ORAL
COMMUNITY
Start: 2015-05-22 | End: 2020-07-14

## 2019-04-04 RX ORDER — METOPROLOL SUCCINATE 25 MG/1
TABLET, EXTENDED RELEASE ORAL
COMMUNITY
Start: 2019-02-13 | End: 2020-09-01 | Stop reason: DRUGHIGH

## 2019-04-04 NOTE — TELEPHONE ENCOUNTER
Patient's [facility] pharmacist following up today, regarding refill for Coumadin, confirmed with pharmacist it has been completely discontinued at this time, will update record. No other questions at this time.

## 2019-04-05 RX ORDER — FUROSEMIDE 20 MG/1
TABLET ORAL
Qty: 30 TABLET | Refills: 0 | OUTPATIENT
Start: 2019-04-05

## 2019-04-05 RX ORDER — CLOPIDOGREL BISULFATE 75 MG/1
TABLET ORAL
Qty: 90 TABLET | Refills: 1 | Status: SHIPPED | OUTPATIENT
Start: 2019-04-05 | End: 2020-07-28

## 2019-04-05 NOTE — TELEPHONE ENCOUNTER
MMP please advise regarding pending refill request as you had sent only 30 day supply on 4/4/19 and pharmacy requesting 90 days supply.

## 2019-04-05 NOTE — TELEPHONE ENCOUNTER
Noted from chart review Furosemide was d/c on 4/2/19 with reason \" therapy completed\"  Pt states is NOT taking Furosemide and verifies as d/c.

## 2019-04-11 ENCOUNTER — MED REC SCAN ONLY (OUTPATIENT)
Dept: INTERNAL MEDICINE CLINIC | Facility: CLINIC | Age: 84
End: 2019-04-11

## 2019-04-11 NOTE — PLAN OF CARE
Problem: Patient/Family Goals  Goal: Patient/Family Long Term Goal  Patient's Long Term Goal: To discharge home independent living     Interventions:  - IVF, IV abx, monitoring labs, vitals.    - See additional Care Plan goals for specific interventions medication patch(es) used

## 2019-04-30 RX ORDER — LOSARTAN POTASSIUM AND HYDROCHLOROTHIAZIDE 12.5; 5 MG/1; MG/1
TABLET ORAL
Qty: 90 TABLET | Refills: 0 | Status: SHIPPED | OUTPATIENT
Start: 2019-04-30 | End: 2019-07-27 | Stop reason: SDUPTHER

## 2019-05-02 ENCOUNTER — OFFICE VISIT (OUTPATIENT)
Dept: INTERNAL MEDICINE CLINIC | Facility: CLINIC | Age: 84
End: 2019-05-02
Payer: MEDICARE

## 2019-05-02 ENCOUNTER — APPOINTMENT (OUTPATIENT)
Dept: LAB | Age: 84
End: 2019-05-02
Attending: INTERNAL MEDICINE
Payer: MEDICARE

## 2019-05-02 VITALS
TEMPERATURE: 98 F | HEART RATE: 79 BPM | RESPIRATION RATE: 18 BRPM | BODY MASS INDEX: 19.14 KG/M2 | HEIGHT: 63 IN | DIASTOLIC BLOOD PRESSURE: 77 MMHG | SYSTOLIC BLOOD PRESSURE: 126 MMHG | WEIGHT: 108 LBS | OXYGEN SATURATION: 97 %

## 2019-05-02 DIAGNOSIS — R42 DIZZINESS: ICD-10-CM

## 2019-05-02 DIAGNOSIS — Z98.890 S/P ORIF (OPEN REDUCTION INTERNAL FIXATION) FRACTURE: ICD-10-CM

## 2019-05-02 DIAGNOSIS — N18.30 STAGE 3 CHRONIC KIDNEY DISEASE (HCC): ICD-10-CM

## 2019-05-02 DIAGNOSIS — I25.10 CORONARY ARTERY DISEASE INVOLVING NATIVE CORONARY ARTERY OF NATIVE HEART WITHOUT ANGINA PECTORIS: ICD-10-CM

## 2019-05-02 DIAGNOSIS — D64.9 ANEMIA, UNSPECIFIED TYPE: ICD-10-CM

## 2019-05-02 DIAGNOSIS — Z87.81 S/P ORIF (OPEN REDUCTION INTERNAL FIXATION) FRACTURE: ICD-10-CM

## 2019-05-02 DIAGNOSIS — E55.9 VITAMIN D DEFICIENCY: ICD-10-CM

## 2019-05-02 DIAGNOSIS — E55.9 VITAMIN D DEFICIENCY: Primary | ICD-10-CM

## 2019-05-02 DIAGNOSIS — S72.90XD CLOSED FRACTURE OF FEMUR WITH ROUTINE HEALING, UNSPECIFIED FRACTURE MORPHOLOGY, UNSPECIFIED LATERALITY, UNSPECIFIED PORTION OF FEMUR, SUBSEQUENT ENCOUNTER: ICD-10-CM

## 2019-05-02 DIAGNOSIS — I10 ESSENTIAL HYPERTENSION: ICD-10-CM

## 2019-05-02 PROCEDURE — 82607 VITAMIN B-12: CPT

## 2019-05-02 PROCEDURE — 82746 ASSAY OF FOLIC ACID SERUM: CPT

## 2019-05-02 PROCEDURE — G0463 HOSPITAL OUTPT CLINIC VISIT: HCPCS | Performed by: INTERNAL MEDICINE

## 2019-05-02 PROCEDURE — 36415 COLL VENOUS BLD VENIPUNCTURE: CPT

## 2019-05-02 PROCEDURE — 99214 OFFICE O/P EST MOD 30 MIN: CPT | Performed by: INTERNAL MEDICINE

## 2019-05-02 PROCEDURE — 82306 VITAMIN D 25 HYDROXY: CPT

## 2019-05-02 RX ORDER — ERGOCALCIFEROL (VITAMIN D2) 1250 MCG
50000 CAPSULE ORAL WEEKLY
Qty: 12 CAPSULE | Refills: 0 | Status: SHIPPED | OUTPATIENT
Start: 2019-05-02 | End: 2019-07-31

## 2019-05-12 NOTE — PROGRESS NOTES
HPI:    Patient ID: Uri Tanner is a 80year old female.     HPI    Follow up  Delay healing    Under home health care  Rehab   Gait balance training    Dr Marixa Martínez following    Denies new complaint  Feeling better than last viit s  BP is bett total) by mouth once a week. Disp: 12 capsule Rfl: 0   Valsartan-hydroCHLOROthiazide 160-12.5 MG Oral Tab TAKE 1 TABLET DAILY. Disp:  Rfl:    Losartan Potassium-HCTZ 50-12.5 MG Oral Tab TAKE 1 TABLET DAILY.  Disp:  Rfl:    Rosuvastatin Calcium 20 MG Oral Ta Procedure Laterality Date   • ANGIOPLASTY (CORONARY)  06-   • BOWEL RESECTION  2016   • CABG  1989    5 bypass   • CHOLECYSTECTOMY     • COLON SURGERY     • COLONOSCOPY  2010   • FEMUR OPEN REDUCTION INTERNAL FIXATION/ EX FIX Left 12/8/2018    Per Sitting, Cuff Size: adult)   Pulse 79   Temp 97.8 °F (36.6 °C) (Oral)   Resp 18   Ht 5' 3\" (1.6 m)   Wt 108 lb (49 kg)   SpO2 97%   Breastfeeding?  No   BMI 19.13 kg/m²   controlled    (Z96.7,  Z87.81) S/P ORIF (open reduction internal fixation) fracture

## 2019-05-15 ENCOUNTER — TELEPHONE (OUTPATIENT)
Dept: CARDIOLOGY | Age: 84
End: 2019-05-15

## 2019-05-15 ENCOUNTER — NURSE TRIAGE (OUTPATIENT)
Dept: OTHER | Age: 84
End: 2019-05-15

## 2019-05-15 NOTE — TELEPHONE ENCOUNTER
Action Requested: Summary for Provider     []  Critical Lab, Recommendations Needed  [x] Need Additional Advice  []   FYI    []   Need Orders  [x] Need Medications Sent to Pharmacy  []  Other     SUMMARY: Patient stated that since yesterday has been having

## 2019-05-15 NOTE — TELEPHONE ENCOUNTER
Spoke with patient ( verified) and relayed EV message below--patient verbalizes understanding and agreement.     Patient will submit urine sample tomorrow and  cranberry capsules while she is out (does not drive-will get ride tomorrow to lab and s

## 2019-05-15 NOTE — TELEPHONE ENCOUNTER
Agreed with the plan patient can have a urine test done tomorrow meantime patient to increase her fluid intake  Can take some cranberry capsules over-the-counter once or twice daily for 1 to 2 weeks  After the urine is resulted -might be on antibiotic if t

## 2019-05-16 ENCOUNTER — APPOINTMENT (OUTPATIENT)
Dept: LAB | Facility: HOSPITAL | Age: 84
End: 2019-05-16
Attending: INTERNAL MEDICINE
Payer: MEDICARE

## 2019-05-16 DIAGNOSIS — R39.9 URINARY SYMPTOM OR SIGN: ICD-10-CM

## 2019-05-16 PROCEDURE — 81001 URINALYSIS AUTO W/SCOPE: CPT

## 2019-05-16 NOTE — TELEPHONE ENCOUNTER
Please review urinalysis results from today and advise. Dr Victoriano Cortez out of the office till 5/20/19.

## 2019-05-16 NOTE — TELEPHONE ENCOUNTER
Spoke to patient, she reports change in color of the urine, frequency of urination, she had UTI several months ago and trying to be proactive, I advised that urine analysis is negative does not look like it is infection, darker urine could be sign of dehyd

## 2019-05-28 ENCOUNTER — NURSE TRIAGE (OUTPATIENT)
Dept: OTHER | Age: 84
End: 2019-05-28

## 2019-05-28 RX ORDER — ROSUVASTATIN CALCIUM 20 MG/1
TABLET, COATED ORAL
Qty: 30 TABLET | Refills: 1 | Status: SHIPPED | OUTPATIENT
Start: 2019-05-28 | End: 2019-07-27 | Stop reason: SDUPTHER

## 2019-05-28 RX ORDER — ROSUVASTATIN CALCIUM 20 MG/1
TABLET, COATED ORAL
Qty: 90 TABLET | Refills: 1 | OUTPATIENT
Start: 2019-05-28

## 2019-05-28 NOTE — TELEPHONE ENCOUNTER
Headache positional associated with supine    Use proper pillow   Keep neck neutral duing sleep  monitor

## 2019-06-14 ENCOUNTER — APPOINTMENT (OUTPATIENT)
Dept: GENERAL RADIOLOGY | Facility: HOSPITAL | Age: 84
End: 2019-06-14
Attending: EMERGENCY MEDICINE
Payer: MEDICARE

## 2019-06-14 ENCOUNTER — HOSPITAL ENCOUNTER (EMERGENCY)
Facility: HOSPITAL | Age: 84
Discharge: HOME | End: 2019-06-14
Attending: EMERGENCY MEDICINE
Payer: MEDICARE

## 2019-06-14 VITALS
TEMPERATURE: 99 F | DIASTOLIC BLOOD PRESSURE: 92 MMHG | SYSTOLIC BLOOD PRESSURE: 160 MMHG | WEIGHT: 120 LBS | BODY MASS INDEX: 21.26 KG/M2 | HEIGHT: 63 IN | RESPIRATION RATE: 16 BRPM | HEART RATE: 78 BPM | OXYGEN SATURATION: 97 %

## 2019-06-14 DIAGNOSIS — S89.92XA INJURY OF LEFT KNEE, INITIAL ENCOUNTER: Primary | ICD-10-CM

## 2019-06-14 PROCEDURE — 73560 X-RAY EXAM OF KNEE 1 OR 2: CPT | Performed by: EMERGENCY MEDICINE

## 2019-06-14 PROCEDURE — 73552 X-RAY EXAM OF FEMUR 2/>: CPT | Performed by: EMERGENCY MEDICINE

## 2019-06-14 PROCEDURE — 99284 EMERGENCY DEPT VISIT MOD MDM: CPT

## 2019-06-14 RX ORDER — HYDROCODONE BITARTRATE AND ACETAMINOPHEN 5; 325 MG/1; MG/1
1 TABLET ORAL ONCE
Status: COMPLETED | OUTPATIENT
Start: 2019-06-14 | End: 2019-06-14

## 2019-06-14 NOTE — ED PROVIDER NOTES
Patient Seen in: [facility] AND [facility] Emergency Department    History   Patient presents with:  Lower Extremity Injury (musculoskeletal)    Stated Complaint: left knee injury    HPI    Patient is a 72-year-old female she is status post ORIF of a left suprac injury  Other systems are as noted in HPI. Constitutional and vital signs reviewed. All other systems reviewed and negative except as noted above. Physical Exam     ED Triage Vitals [06/14/19 1727]   BP (!) 163/107   Pulse 78   Resp 18   Temp 98. 7 Mikel Lawler MD on 6/14/2019 at 19:04     Approved by (CST): Mikel Nails MD on 6/14/2019 at 19:06          Xr Knee (1 Or 2 Views), Left (cpt=73560)    Result Date: 6/14/2019  CONCLUSION:  1. No acute findings.     Dictated by (CST): NAFISA Nails

## 2019-06-14 NOTE — ED INITIAL ASSESSMENT (HPI)
Nenita De La Cruz arrives via EMS from [place].  A staff member was pushing her in a wheelchair when she injured her left knee    No fall  Patient usually ambulates with walker

## 2019-06-15 NOTE — CM/SW NOTE
[facility] and they were made aware that the pt may be needing extra assistance when she returns home. The supervisor will notify the staff.

## 2019-06-15 NOTE — ED NOTES
[facility] contacted as per request by family, eta 45 minutes, pt and her daughter made aware, daughter sts that she will pay medicar when pt gets to her place

## 2019-06-15 NOTE — ED NOTES
Pt dcd back to nursing per medicar with daighter, discharge instruction given and voices understanding, denies any concern

## 2019-06-18 PROBLEM — M79.662 PAIN OF LEFT CALF: Status: ACTIVE | Noted: 2019-06-18

## 2019-06-18 PROBLEM — M25.562 ACUTE PAIN OF LEFT KNEE: Status: ACTIVE | Noted: 2019-06-18

## 2019-06-26 PROBLEM — I10 ESSENTIAL HYPERTENSION: Status: ACTIVE | Noted: 2019-06-26

## 2019-06-26 PROBLEM — I25.10 CAD (CORONARY ARTERY DISEASE): Status: ACTIVE | Noted: 2019-06-26

## 2019-06-26 PROBLEM — E78.5 DYSLIPIDEMIA: Status: ACTIVE | Noted: 2019-06-26

## 2019-06-26 PROBLEM — I00 RHEUMATIC FEVER: Status: ACTIVE | Noted: 2019-06-26

## 2019-06-26 PROBLEM — I34.1 MITRAL VALVE PROLAPSE: Status: ACTIVE | Noted: 2019-06-26

## 2019-06-27 ENCOUNTER — HOSPITAL ENCOUNTER (OUTPATIENT)
Dept: NUCLEAR MEDICINE | Facility: HOSPITAL | Age: 84
Discharge: HOME | End: 2019-06-27
Attending: ORTHOPAEDIC SURGERY
Payer: MEDICARE

## 2019-06-27 DIAGNOSIS — M79.662 PAIN OF LEFT CALF: ICD-10-CM

## 2019-06-27 DIAGNOSIS — M25.562 ACUTE PAIN OF LEFT KNEE: ICD-10-CM

## 2019-06-27 PROCEDURE — 78315 BONE IMAGING 3 PHASE: CPT | Performed by: ORTHOPAEDIC SURGERY

## 2019-06-28 ENCOUNTER — OFFICE VISIT (OUTPATIENT)
Dept: CARDIOLOGY | Age: 84
End: 2019-06-28

## 2019-06-28 VITALS
DIASTOLIC BLOOD PRESSURE: 60 MMHG | OXYGEN SATURATION: 99 % | SYSTOLIC BLOOD PRESSURE: 110 MMHG | WEIGHT: 125 LBS | BODY MASS INDEX: 22.15 KG/M2 | HEART RATE: 70 BPM | HEIGHT: 63 IN

## 2019-06-28 DIAGNOSIS — I35.0 NONRHEUMATIC AORTIC VALVE STENOSIS: ICD-10-CM

## 2019-06-28 DIAGNOSIS — I10 ESSENTIAL HYPERTENSION: ICD-10-CM

## 2019-06-28 DIAGNOSIS — I25.10 CORONARY ARTERY DISEASE INVOLVING NATIVE CORONARY ARTERY OF NATIVE HEART WITHOUT ANGINA PECTORIS: Primary | ICD-10-CM

## 2019-06-28 PROCEDURE — 99214 OFFICE O/P EST MOD 30 MIN: CPT | Performed by: INTERNAL MEDICINE

## 2019-06-28 RX ORDER — CHOLECALCIFEROL (VITAMIN D3) 1250 MCG
CAPSULE ORAL
COMMUNITY

## 2019-06-29 ENCOUNTER — OFFICE VISIT (OUTPATIENT)
Dept: FAMILY MEDICINE CLINIC | Facility: CLINIC | Age: 84
End: 2019-06-29
Payer: MEDICARE

## 2019-06-29 ENCOUNTER — NURSE TRIAGE (OUTPATIENT)
Dept: OTHER | Age: 84
End: 2019-06-29

## 2019-06-29 VITALS
DIASTOLIC BLOOD PRESSURE: 55 MMHG | OXYGEN SATURATION: 96 % | SYSTOLIC BLOOD PRESSURE: 136 MMHG | RESPIRATION RATE: 16 BRPM | TEMPERATURE: 98 F | HEART RATE: 72 BPM

## 2019-06-29 DIAGNOSIS — N30.01 ACUTE CYSTITIS WITH HEMATURIA: ICD-10-CM

## 2019-06-29 DIAGNOSIS — R30.0 DYSURIA: Primary | ICD-10-CM

## 2019-06-29 PROCEDURE — 87186 SC STD MICRODIL/AGAR DIL: CPT | Performed by: PHYSICIAN ASSISTANT

## 2019-06-29 PROCEDURE — 87077 CULTURE AEROBIC IDENTIFY: CPT | Performed by: PHYSICIAN ASSISTANT

## 2019-06-29 PROCEDURE — 87086 URINE CULTURE/COLONY COUNT: CPT | Performed by: PHYSICIAN ASSISTANT

## 2019-06-29 PROCEDURE — 99213 OFFICE O/P EST LOW 20 MIN: CPT | Performed by: PHYSICIAN ASSISTANT

## 2019-06-29 PROCEDURE — 81003 URINALYSIS AUTO W/O SCOPE: CPT | Performed by: PHYSICIAN ASSISTANT

## 2019-06-29 RX ORDER — SULFAMETHOXAZOLE AND TRIMETHOPRIM 800; 160 MG/1; MG/1
1 TABLET ORAL 2 TIMES DAILY
Qty: 10 TABLET | Refills: 0 | Status: SHIPPED | OUTPATIENT
Start: 2019-06-29 | End: 2019-07-04

## 2019-06-29 NOTE — PROGRESS NOTES
CHIEF COMPLAINT:     Patient presents with:  Urinary: since last night, no fevers or chills, urinary frequency and urgency       HPI:    Palmira Farley is a 80year old female who presents with symptoms of UTI.  Complaining of urinary frequency and urge • High blood pressure    • High cholesterol    • History of blood transfusion    • History of breast cancer in female 2000    \"Left sided breast cancer\"   • History of heart bypass surgery 1989   • Hx of skin cancer, basal cell 2009   • Internal derang non labored, clear to auscultation bilaterally, no wheezing, rales or rhonchi, or rales    Abdomen: soft, NT/ND, no rebound tenderness or guarding, no hepatosplenomegaly  : no suprapubic tenderness.  No bladder distention, or CVAT   Skin: unremarkable wit

## 2019-06-29 NOTE — TELEPHONE ENCOUNTER
Action Requested: Summary for Provider     []  Critical Lab, Recommendations Needed  [] Need Additional Advice  []   FYI    []   Need Orders  [] Need Medications Sent to Pharmacy  []  Other     SUMMARY: Pt stated that her s/sx started last night.  Pt has so

## 2019-07-01 ENCOUNTER — MED REC SCAN ONLY (OUTPATIENT)
Dept: INTERNAL MEDICINE CLINIC | Facility: CLINIC | Age: 84
End: 2019-07-01

## 2019-07-01 NOTE — TELEPHONE ENCOUNTER
Please advise on refill request    Hypothyroid Medications  Protocol Criteria:  Appointment scheduled in the past 12 months or the next 3 months  TSH resulted in the past 12 months that is normal  Recent Outpatient Visits            2 days ago leslie [address] [place] [address]    Office Visit    1 week ago Pain of left calf    [place] Nemo Mcintosh MD    Office Visit    3 weeks ago Closed displaced supracondylar fracture of distal end of left femur without intracondylar extension with delayed healing, subsequent encounter    [address] Luiz Quintero MD    Office Visit    2 months ago Vitamin D deficiency    [facility], Tamra Kincaid MD    Office Visit    2 months ago Encounter for aftercare for healing closed traumatic fracture of left femur    [address] Luiz Quintero MD    Office Visit        Future Appointments       Provider Department Appt Notes    In 2 weeks MD [facility] follow up left leg     In 3 weeks Jc Levin MD [facility], [facility], Julio COLLINS annual     In 1 month MD [facility] Fu left femur        Lab Results   Component Value Date    TSH 3.04 12/07/2018

## 2019-07-02 RX ORDER — LEVOTHYROXINE SODIUM 0.1 MG/1
TABLET ORAL
Qty: 90 TABLET | Refills: 3 | Status: SHIPPED | OUTPATIENT
Start: 2019-07-02 | End: 2020-08-17

## 2019-07-10 ENCOUNTER — TELEPHONE (OUTPATIENT)
Dept: INTERNAL MEDICINE CLINIC | Facility: CLINIC | Age: 84
End: 2019-07-10

## 2019-07-10 PROBLEM — S72.90XD: Status: RESOLVED | Noted: 2019-01-17 | Resolved: 2019-07-10

## 2019-07-15 PROBLEM — S72.453D: Status: ACTIVE | Noted: 2019-04-02

## 2019-07-15 PROBLEM — S83.231A COMPLEX TEAR OF MEDIAL MENISCUS OF RIGHT KNEE AS CURRENT INJURY: Status: ACTIVE | Noted: 2019-07-15

## 2019-07-21 PROBLEM — S82.102D CLOSED FRACTURE OF UPPER END OF LEFT TIBIA WITH ROUTINE HEALING: Status: ACTIVE | Noted: 2019-07-21

## 2019-07-22 PROBLEM — E78.5 DYSLIPIDEMIA: Status: ACTIVE | Noted: 2019-06-26

## 2019-07-22 PROBLEM — I00 RHEUMATIC FEVER: Status: ACTIVE | Noted: 2019-06-26

## 2019-07-22 PROBLEM — I34.1 MITRAL VALVE PROLAPSE: Status: ACTIVE | Noted: 2019-06-26

## 2019-07-22 PROBLEM — I35.0 AORTIC STENOSIS: Status: ACTIVE | Noted: 2019-06-28

## 2019-07-25 ENCOUNTER — OFFICE VISIT (OUTPATIENT)
Dept: INTERNAL MEDICINE CLINIC | Facility: CLINIC | Age: 84
End: 2019-07-25
Payer: MEDICARE

## 2019-07-25 VITALS — HEIGHT: 63 IN | TEMPERATURE: 98 F | BODY MASS INDEX: 21 KG/M2

## 2019-07-25 DIAGNOSIS — E78.5 HYPERLIPIDEMIA, UNSPECIFIED HYPERLIPIDEMIA TYPE: ICD-10-CM

## 2019-07-25 DIAGNOSIS — E03.9 HYPOTHYROIDISM, UNSPECIFIED TYPE: ICD-10-CM

## 2019-07-25 DIAGNOSIS — I10 ESSENTIAL HYPERTENSION: Primary | ICD-10-CM

## 2019-07-25 DIAGNOSIS — S72.452D CLOSED DISPLACED SUPRACONDYLAR FRACTURE OF DISTAL END OF LEFT FEMUR WITHOUT INTRACONDYLAR EXTENSION WITH ROUTINE HEALING, SUBSEQUENT ENCOUNTER: ICD-10-CM

## 2019-07-25 DIAGNOSIS — I73.9 PAD (PERIPHERAL ARTERY DISEASE) (HCC): ICD-10-CM

## 2019-07-25 DIAGNOSIS — N18.30 STAGE 3 CHRONIC KIDNEY DISEASE (HCC): ICD-10-CM

## 2019-07-25 PROCEDURE — 99214 OFFICE O/P EST MOD 30 MIN: CPT | Performed by: INTERNAL MEDICINE

## 2019-07-25 PROCEDURE — G0463 HOSPITAL OUTPT CLINIC VISIT: HCPCS | Performed by: INTERNAL MEDICINE

## 2019-07-27 RX ORDER — CLOPIDOGREL BISULFATE 75 MG/1
TABLET ORAL
Qty: 90 TABLET | Refills: 0 | OUTPATIENT
Start: 2019-07-27

## 2019-07-29 ENCOUNTER — HOSPITAL ENCOUNTER (OUTPATIENT)
Dept: CT IMAGING | Facility: HOSPITAL | Age: 84
Discharge: HOME | End: 2019-07-29
Attending: ORTHOPAEDIC SURGERY
Payer: MEDICARE

## 2019-07-29 DIAGNOSIS — S72.452D CLOSED DISPLACED SUPRACONDYLAR FRACTURE OF DISTAL END OF LEFT FEMUR WITHOUT INTRACONDYLAR EXTENSION WITH ROUTINE HEALING, SUBSEQUENT ENCOUNTER: ICD-10-CM

## 2019-07-29 DIAGNOSIS — S82.102D CLOSED FRACTURE OF PROXIMAL END OF LEFT TIBIA WITH ROUTINE HEALING, UNSPECIFIED FRACTURE MORPHOLOGY, SUBSEQUENT ENCOUNTER: ICD-10-CM

## 2019-07-29 DIAGNOSIS — M62.838 MUSCLE SPASM OF LEFT LOWER EXTREMITY: ICD-10-CM

## 2019-07-29 PROCEDURE — 76376 3D RENDER W/INTRP POSTPROCES: CPT | Performed by: ORTHOPAEDIC SURGERY

## 2019-07-29 PROCEDURE — 73700 CT LOWER EXTREMITY W/O DYE: CPT | Performed by: ORTHOPAEDIC SURGERY

## 2019-07-29 RX ORDER — ROSUVASTATIN CALCIUM 20 MG/1
TABLET, COATED ORAL
Qty: 90 TABLET | Refills: 0 | Status: SHIPPED | OUTPATIENT
Start: 2019-07-29 | End: 2019-10-25 | Stop reason: SDUPTHER

## 2019-07-29 RX ORDER — LOSARTAN POTASSIUM AND HYDROCHLOROTHIAZIDE 12.5; 5 MG/1; MG/1
TABLET ORAL
Qty: 90 TABLET | Refills: 0 | Status: SHIPPED | OUTPATIENT
Start: 2019-07-29 | End: 2020-08-13

## 2019-07-31 NOTE — PROGRESS NOTES
HPI:    Patient ID: Yamile Murry is a 80year old female.     HPI    Follow-up  HTN  Long standing history of hypertension     sympotms  :        Headache no  dizziness        no                             Blurred vision no  palpitaionsSyncope no  Ch Rfl:    Rosuvastatin Calcium 20 MG Oral Tab TAKE 1 TABLET AT BEDTIME. Disp:  Rfl:    Cyclobenzaprine HCl 10 MG Oral Tab Take 1 tablet (10 mg total) by mouth every 12 (twelve) hours.  Disp: 30 tablet Rfl: 1     Allergies:  Adhesive Tape           RASH    Co menopause      Social History: Social History    Tobacco Use      Smoking status: Former Smoker        Packs/day: 1.00        Years: 20.00        Pack years: 20        Types: Cigarettes      Smokeless tobacco: Never Used    Alcohol use:  Yes      Alcohol/we

## 2019-08-09 ENCOUNTER — NURSE TRIAGE (OUTPATIENT)
Dept: OTHER | Age: 84
End: 2019-08-09

## 2019-08-09 NOTE — TELEPHONE ENCOUNTER
Action Requested: Summary for Provider     []  Critical Lab, Recommendations Needed  [x] Need Additional Advice  []   FYI    [x]   Need Orders  [] Need Medications Sent to Pharmacy  []  Other     SUMMARY: Patient requesing advice or antibiotic order.  Recetanvi

## 2019-08-09 NOTE — TELEPHONE ENCOUNTER
Patient was called and informed Bactrim was sent to her pharmacy. Reviewed medication's name and directions with patient. Also informed patient per Dr. Deborah Flores note, to follow up if symptoms do not improve.  Patient was advised to stay hydrated and drink

## 2019-08-12 ENCOUNTER — TELEPHONE (OUTPATIENT)
Dept: OTHER | Age: 84
End: 2019-08-12

## 2019-08-12 NOTE — TELEPHONE ENCOUNTER
Verified pt name and . Appt scheduled per doctor's instructions, pt states she will call back if she is unable to keep that appt time.

## 2019-08-12 NOTE — TELEPHONE ENCOUNTER
I received a call from pt that on Saturday morning  pt notice some blood on her sheets. And then she noticed that on the back of her right leg about 2 inches below her knee there is a rough spot. That is about 5 inches long and 3 inches wide.  Pt

## 2019-08-13 ENCOUNTER — OFFICE VISIT (OUTPATIENT)
Dept: INTERNAL MEDICINE CLINIC | Facility: CLINIC | Age: 84
End: 2019-08-13
Payer: MEDICARE

## 2019-08-13 VITALS
SYSTOLIC BLOOD PRESSURE: 116 MMHG | BODY MASS INDEX: 20.45 KG/M2 | HEART RATE: 67 BPM | TEMPERATURE: 98 F | WEIGHT: 115.38 LBS | DIASTOLIC BLOOD PRESSURE: 63 MMHG | HEIGHT: 63 IN

## 2019-08-13 DIAGNOSIS — S72.452D CLOSED DISPLACED SUPRACONDYLAR FRACTURE OF DISTAL END OF LEFT FEMUR WITHOUT INTRACONDYLAR EXTENSION WITH ROUTINE HEALING, SUBSEQUENT ENCOUNTER: ICD-10-CM

## 2019-08-13 DIAGNOSIS — S80.11XA CONTUSION OF RIGHT LOWER LEG, INITIAL ENCOUNTER: Primary | ICD-10-CM

## 2019-08-13 PROCEDURE — G0463 HOSPITAL OUTPT CLINIC VISIT: HCPCS | Performed by: INTERNAL MEDICINE

## 2019-08-13 PROCEDURE — 99213 OFFICE O/P EST LOW 20 MIN: CPT | Performed by: INTERNAL MEDICINE

## 2019-08-24 NOTE — PROGRESS NOTES
HPI:    Patient ID: Dmitry Mcwilliams is a 80year old female.     HPI  Pt is here for the following reason  She called yesterday  Dark spot behind her right leg  No bleeding currently  No pain  Does not remember trauma    8/12   I received a kimberlee Gastrointestinal: Negative. Musculoskeletal: Positive for arthralgias. Current Outpatient Medications:  triamcinolone acetonide 0.1 % External Cream  Disp:  Rfl: 0   Cholecalciferol (VITAMIN D3) 17766 units Oral Cap Take by mouth.  Disp:  Rfl fever/heart disease age 12   • Visual impairment       Past Surgical History:   Procedure Laterality Date   • ANGIOPLASTY (CORONARY)  06-   • BOWEL RESECTION  2016   • CABG  1989 5 bypass   • CHOLECYSTECTOMY     • COLON SURGERY     • COLONOSCOPY Prescriptions      No prescriptions requested or ordered in this encounter       Imaging & Referrals:  None        #[id number]

## 2019-08-29 PROBLEM — T14.8XXA BLOOD BLISTER: Status: ACTIVE | Noted: 2019-08-29

## 2019-09-10 PROBLEM — T14.8XXD OTHER INJURY OF UNSPECIFIED BODY REGION, SUBSEQUENT ENCOUNTER: Status: ACTIVE | Noted: 2019-09-10

## 2019-09-13 ENCOUNTER — TELEPHONE (OUTPATIENT)
Dept: INTERNAL MEDICINE CLINIC | Facility: CLINIC | Age: 84
End: 2019-09-13

## 2019-09-13 DIAGNOSIS — Z12.31 VISIT FOR SCREENING MAMMOGRAM: Primary | ICD-10-CM

## 2019-09-16 NOTE — TELEPHONE ENCOUNTER
Please advise on what mammogram is needed.   Last one was a michelle lynette diagnostic on 10/10/18    Last office visit on 5/2/19    Please reply to pool: EM TRIAGE SUPPORT

## 2019-09-20 NOTE — TELEPHONE ENCOUNTER
Called pt and Yue (ARNAUD), no answer. No phone response letter with order mailed to home address on file. Noted pt has mammo scheduled 10/15/19.

## 2019-10-07 ENCOUNTER — NURSE TRIAGE (OUTPATIENT)
Dept: INTERNAL MEDICINE CLINIC | Facility: CLINIC | Age: 84
End: 2019-10-07

## 2019-10-07 RX ORDER — SULFAMETHOXAZOLE AND TRIMETHOPRIM 800; 160 MG/1; MG/1
1 TABLET ORAL 2 TIMES DAILY
Qty: 14 TABLET | Refills: 0 | Status: SHIPPED | OUTPATIENT
Start: 2019-10-07 | End: 2019-11-05 | Stop reason: ALTCHOICE

## 2019-10-07 NOTE — TELEPHONE ENCOUNTER
Patient states she usually gets UTI and she started to have symptoms on 10/5. She has burning sensation when she urinates and frequency of urination. Refused appt since she has UTI often would like for  To prescribe medication for her symptoms.

## 2019-10-07 NOTE — TELEPHONE ENCOUNTER
Verified pt name and . Reviewed doctor's recommendations with pt as noted below. Pt agreed with plan of care, states she has taken med in the past without problems. Prescription sent to [address] on file.

## 2019-10-07 NOTE — TELEPHONE ENCOUNTER
Action Requested: Summary for Provider     []  Critical Lab, Recommendations Needed  [] Need Additional Advice  []   FYI    []   Need Orders  [] Need Medications Sent to Pharmacy  []  Other     SUMMARY: Patient calling c/o polyuria and dysuria.  Denies hema

## 2019-10-08 ENCOUNTER — HOSPITAL ENCOUNTER (OUTPATIENT)
Dept: ULTRASOUND IMAGING | Facility: HOSPITAL | Age: 84
Discharge: HOME | End: 2019-10-08
Attending: ORTHOPAEDIC SURGERY
Payer: MEDICARE

## 2019-10-08 DIAGNOSIS — M79.89 PAIN AND SWELLING OF LOWER EXTREMITY, LEFT: ICD-10-CM

## 2019-10-08 DIAGNOSIS — M79.605 PAIN AND SWELLING OF LOWER EXTREMITY, LEFT: ICD-10-CM

## 2019-10-08 PROCEDURE — 93971 EXTREMITY STUDY: CPT | Performed by: ORTHOPAEDIC SURGERY

## 2019-10-15 ENCOUNTER — HOSPITAL ENCOUNTER (OUTPATIENT)
Dept: MAMMOGRAPHY | Facility: HOSPITAL | Age: 84
Discharge: HOME | End: 2019-10-15
Attending: INTERNAL MEDICINE
Payer: MEDICARE

## 2019-10-15 DIAGNOSIS — Z12.31 VISIT FOR SCREENING MAMMOGRAM: ICD-10-CM

## 2019-10-15 PROCEDURE — 77063 BREAST TOMOSYNTHESIS BI: CPT | Performed by: INTERNAL MEDICINE

## 2019-10-15 PROCEDURE — 77067 SCR MAMMO BI INCL CAD: CPT | Performed by: INTERNAL MEDICINE

## 2019-10-25 RX ORDER — ROSUVASTATIN CALCIUM 20 MG/1
TABLET, COATED ORAL
Qty: 90 TABLET | Refills: 2 | Status: SHIPPED | OUTPATIENT
Start: 2019-10-25 | End: 2020-06-23

## 2019-11-05 ENCOUNTER — LAB ENCOUNTER (OUTPATIENT)
Dept: LAB | Age: 84
End: 2019-11-05
Attending: INTERNAL MEDICINE
Payer: MEDICARE

## 2019-11-05 ENCOUNTER — OFFICE VISIT (OUTPATIENT)
Dept: INTERNAL MEDICINE CLINIC | Facility: CLINIC | Age: 84
End: 2019-11-05
Payer: MEDICARE

## 2019-11-05 VITALS
BODY MASS INDEX: 21.09 KG/M2 | HEIGHT: 63 IN | HEART RATE: 76 BPM | DIASTOLIC BLOOD PRESSURE: 77 MMHG | TEMPERATURE: 98 F | WEIGHT: 119 LBS | SYSTOLIC BLOOD PRESSURE: 143 MMHG

## 2019-11-05 DIAGNOSIS — I10 ESSENTIAL HYPERTENSION: Primary | ICD-10-CM

## 2019-11-05 DIAGNOSIS — E03.9 HYPOTHYROIDISM, UNSPECIFIED TYPE: ICD-10-CM

## 2019-11-05 DIAGNOSIS — E78.5 HYPERLIPIDEMIA, UNSPECIFIED HYPERLIPIDEMIA TYPE: ICD-10-CM

## 2019-11-05 DIAGNOSIS — E55.9 VITAMIN D DEFICIENCY: ICD-10-CM

## 2019-11-05 DIAGNOSIS — I87.2 VENOUS STASIS DERMATITIS OF BOTH LOWER EXTREMITIES: ICD-10-CM

## 2019-11-05 DIAGNOSIS — I73.9 PAD (PERIPHERAL ARTERY DISEASE) (HCC): ICD-10-CM

## 2019-11-05 LAB
ALBUMIN SERPL-MCNC: 3.7 G/DL
ALBUMIN/GLOB SERPL: 1 {RATIO}
ALP SERPL-CCNC: 106 U/L
ALT SERPL-CCNC: 20 UNITS/L
ANION GAP SERPL CALC-SCNC: 3 MMOL/L
AST SERPL-CCNC: 23 UNITS/L
BILIRUB SERPL-MCNC: 0.4 MG/DL
BUN SERPL-MCNC: 22 MG/DL
BUN/CREAT SERPL: 24.4
CALCIUM SERPL-MCNC: 9.2 MG/DL
CHLORIDE SERPL-SCNC: 104 MMOL/L
CO2 SERPL-SCNC: 32 MMOL/L
CREAT SERPL-MCNC: 0.9 MG/DL
GLOBULIN SER-MCNC: 3.8 G/DL
GLUCOSE SERPL-MCNC: 89 MG/DL
HCT VFR BLD CALC: 39.3 %
HGB BLD-MCNC: 12.4 G/DL
MCH RBC QN AUTO: 30.8 PG
MCHC RBC AUTO-ENTMCNC: 31.6 G/DL
MCV RBC AUTO: 97.8 FL
PLATELET # BLD: 221 K/MCL
POTASSIUM SERPL-SCNC: 4.5 MMOL/L
PROT SERPL-MCNC: 7.5 G/DL
RBC # BLD: 4.02 10*6/UL
SODIUM SERPL-SCNC: 139 MMOL/L
TSH SERPL-ACNC: 1.55 MCUNITS/ML
WBC # BLD: 5 K/MCL

## 2019-11-05 PROCEDURE — 84443 ASSAY THYROID STIM HORMONE: CPT

## 2019-11-05 PROCEDURE — 85027 COMPLETE CBC AUTOMATED: CPT

## 2019-11-05 PROCEDURE — 99214 OFFICE O/P EST MOD 30 MIN: CPT | Performed by: INTERNAL MEDICINE

## 2019-11-05 PROCEDURE — 82306 VITAMIN D 25 HYDROXY: CPT

## 2019-11-05 PROCEDURE — 36415 COLL VENOUS BLD VENIPUNCTURE: CPT

## 2019-11-05 PROCEDURE — G0463 HOSPITAL OUTPT CLINIC VISIT: HCPCS | Performed by: INTERNAL MEDICINE

## 2019-11-05 PROCEDURE — 80053 COMPREHEN METABOLIC PANEL: CPT

## 2019-11-06 NOTE — PROGRESS NOTES
HPI:    Patient ID: Della Steward is a 80year old female.     HPI    Chronic leg edema  Left more than right with dry skin  Dermatitis  And redness  midly tender chonic   Hx of PAD have not seen Dr Greta Bee  No fever no chiills      /77 (BP Location Potassium-HCTZ 50-12.5 MG Oral Tab TAKE 1 TABLET BY MOUTH DAILY     • Cranberry Juice Extract 1000 MG Oral Cap Take by mouth.      • LEVOTHYROXINE SODIUM 100 MCG Oral Tab TAKE 1 TABLET BY MOUTH EVERY DAY BEFORE BREAKFAST 90 tablet 3   • Rosuvastatin Calcium Disease Father    • Stroke Mother         CVA   • Stroke Sister         CVA   • Diabetes Brother    • Breast Cancer Self 67   • Breast Cancer Maternal Aunt         post menopause   • Breast Cancer Maternal Cousin Female         post menopause   • Breast Ca deficiency  Plan: VITAMIN D, 25-HYDROXY        supplement    (I87.2) Venous stasis dermatitis of both lower extremities  Plan: chornic  Leg elEVATION   AVOID trauma  Leg elevation  mosturing past   Leg elevation  Patient voiced understanding  and agrees wi

## 2019-11-13 RX ORDER — CEPHALEXIN 500 MG/1
500 CAPSULE ORAL 2 TIMES DAILY
Qty: 14 CAPSULE | Refills: 0 | Status: SHIPPED | OUTPATIENT
Start: 2019-11-13 | End: 2019-11-20

## 2019-11-16 ENCOUNTER — TELEPHONE (OUTPATIENT)
Dept: INTERNAL MEDICINE CLINIC | Facility: CLINIC | Age: 84
End: 2019-11-16

## 2019-11-16 NOTE — TELEPHONE ENCOUNTER
Spoke with pt informed her Dr Andreas Rubio message, she verbalized understanding will stop Keflex and  Bactrim.

## 2019-11-16 NOTE — TELEPHONE ENCOUNTER
Patient is requesting the name of the antibiotic she is allergic to. Per patient she is aware of the allergy to penicillin but states there also is a different one.

## 2019-11-16 NOTE — TELEPHONE ENCOUNTER
Spoke with pt regarding her test results and message/recommendations from Dr Boris Reid (see result). Pt also states she called this morning regarding question on antibiotic prescribed by Dr Vladimir Rooney for  Legs . Noted Cephalexin 500mg BID .  Pt reporsts she has al

## 2019-11-16 NOTE — PROGRESS NOTES
Vitamin D  Normal   CONTINUE VIT D SUPPLEMENT  MAINTENANCE D3 2000 U PER DAY OVER THE COUNTER DOSE  OTHER LABS ARE NORMAL/STABLE  CALL PT

## 2019-12-11 DIAGNOSIS — I83.893 VARICOSE VEINS OF LEG WITH SWELLING, BILATERAL: ICD-10-CM

## 2019-12-11 DIAGNOSIS — I83.819 VARICOSE VEINS WITH PAIN: Primary | ICD-10-CM

## 2019-12-27 NOTE — TELEPHONE ENCOUNTER
Patient requesting refill of clopidogrel bisulfate 75 mg for quantity of 90. Medication pended. Please advise.

## 2019-12-28 RX ORDER — CLOPIDOGREL BISULFATE 75 MG/1
75 TABLET ORAL DAILY
Qty: 90 TABLET | Refills: 0 | Status: SHIPPED | OUTPATIENT
Start: 2019-12-28 | End: 2020-03-23

## 2020-01-14 ENCOUNTER — HOSPITAL ENCOUNTER (OUTPATIENT)
Dept: ULTRASOUND IMAGING | Facility: HOSPITAL | Age: 85
Discharge: HOME | End: 2020-01-14
Attending: RADIOLOGY
Payer: MEDICARE

## 2020-01-14 DIAGNOSIS — I83.819 VARICOSE VEINS WITH PAIN: ICD-10-CM

## 2020-01-14 DIAGNOSIS — I83.893 VARICOSE VEINS OF LEG WITH SWELLING, BILATERAL: ICD-10-CM

## 2020-01-14 PROCEDURE — 93970 EXTREMITY STUDY: CPT | Performed by: RADIOLOGY

## 2020-01-23 ENCOUNTER — HOSPITAL ENCOUNTER (OUTPATIENT)
Dept: GENERAL RADIOLOGY | Facility: HOSPITAL | Age: 85
Discharge: HOME | End: 2020-01-23
Attending: NURSE PRACTITIONER | Admitting: NURSE PRACTITIONER
Payer: MEDICARE

## 2020-01-23 ENCOUNTER — NURSE TRIAGE (OUTPATIENT)
Dept: INTERNAL MEDICINE CLINIC | Facility: CLINIC | Age: 85
End: 2020-01-23

## 2020-01-23 ENCOUNTER — TELEPHONE (OUTPATIENT)
Dept: INTERNAL MEDICINE CLINIC | Facility: CLINIC | Age: 85
End: 2020-01-23

## 2020-01-23 ENCOUNTER — OFFICE VISIT (OUTPATIENT)
Dept: INTERNAL MEDICINE CLINIC | Facility: CLINIC | Age: 85
End: 2020-01-23
Payer: MEDICARE

## 2020-01-23 VITALS
HEART RATE: 75 BPM | SYSTOLIC BLOOD PRESSURE: 158 MMHG | HEIGHT: 63 IN | WEIGHT: 117 LBS | BODY MASS INDEX: 20.73 KG/M2 | DIASTOLIC BLOOD PRESSURE: 78 MMHG | OXYGEN SATURATION: 96 % | TEMPERATURE: 98 F

## 2020-01-23 DIAGNOSIS — R09.89 CHEST CONGESTION: Primary | ICD-10-CM

## 2020-01-23 DIAGNOSIS — R09.89 CHEST CONGESTION: ICD-10-CM

## 2020-01-23 PROCEDURE — G0463 HOSPITAL OUTPT CLINIC VISIT: HCPCS | Performed by: NURSE PRACTITIONER

## 2020-01-23 PROCEDURE — 71046 X-RAY EXAM CHEST 2 VIEWS: CPT | Performed by: NURSE PRACTITIONER

## 2020-01-23 PROCEDURE — 99213 OFFICE O/P EST LOW 20 MIN: CPT | Performed by: NURSE PRACTITIONER

## 2020-01-23 RX ORDER — SACCHAROMYCES BOULARDII 250 MG
250 CAPSULE ORAL 2 TIMES DAILY
Qty: 10 CAPSULE | Refills: 0 | Status: SHIPPED | OUTPATIENT
Start: 2020-01-23 | End: 2020-01-28

## 2020-01-23 RX ORDER — AZITHROMYCIN 250 MG/1
TABLET, FILM COATED ORAL
Qty: 6 TABLET | Refills: 0 | Status: SHIPPED | OUTPATIENT
Start: 2020-01-23 | End: 2020-07-20

## 2020-01-23 NOTE — ASSESSMENT & PLAN NOTE
A/P 68-year-old frail female who lives at [place]. 2 weeks ago which it appears that she had cold symptoms probable flu. She said it was bad and then she developed diarrhea and she took Imodium x1. She now has not had a bowel movement in 3 days.   She

## 2020-01-23 NOTE — TELEPHONE ENCOUNTER
Per patient, she is coughing and she is wheezing  anf would like to know what to do. Patient would like to talk to nurse.  Offered appointment but insist to talk to nurses first.

## 2020-01-23 NOTE — TELEPHONE ENCOUNTER
Action Requested: Summary for Provider     []  Critical Lab, Recommendations Needed  [] Need Additional Advice  []   FYI    []   Need Orders  [] Need Medications Sent to Pharmacy  []  Other     SUMMARY: Patient requesting advice for symptoms of cough and w

## 2020-01-23 NOTE — PROGRESS NOTES
HPI:    Patient ID: Della Steward is a 80year old female. HPI Patient here for wheezing in her chest.  Patient started with cold symptoms two weeks ago. She lives at [facility] and many residents are ill.   She has chest congestion and a coug • Rosuvastatin Calcium 20 MG Oral Tab TAKE 1 TABLET AT BEDTIME.      • CLOPIDOGREL BISULFATE 75 MG Oral Tab TAKE 1 TABLET BY MOUTH DAILY 90 tablet 1   • Metoprolol Succinate ER 25 MG Oral Tablet 24 Hr Take 0.5 tablets (12.5 mg total) by mouth 2 (two) time lb (53.1 kg)  01/07/20 : 115 lb (52.2 kg)    Body mass index is 20.73 kg/m². (2)           ASSESSMENT/PLAN:     Problem List Items Addressed This Visit        Respiratory    Chest congestion - Primary     A/P 26-year-old frail female who lives at [place].

## 2020-01-24 NOTE — TELEPHONE ENCOUNTER
Patient called with Chest X-ray -  No pneumonia. Emphysema noted and discussed with patient.     Selin Brown, ANp

## 2020-02-22 ENCOUNTER — NURSE TRIAGE (OUTPATIENT)
Dept: INTERNAL MEDICINE CLINIC | Facility: CLINIC | Age: 85
End: 2020-02-22

## 2020-02-22 NOTE — TELEPHONE ENCOUNTER
Patient calling and states she is trying to see if she can get medication for a possible UTI Dr. Tom Bay have prescribe it before       [facility] [address] [place] [address]   [phone]

## 2020-02-22 NOTE — TELEPHONE ENCOUNTER
Patient advised that provider on call wants her to be tested prior to prescribing antibiotics. Patient given the information for the walk in clinics in [place].  She will go there tomorrow, as they are closing at 4:30.

## 2020-03-03 ENCOUNTER — HOSPITAL ENCOUNTER (OUTPATIENT)
Dept: CT IMAGING | Facility: HOSPITAL | Age: 85
Discharge: HOME | End: 2020-03-03
Attending: ORTHOPAEDIC SURGERY
Payer: MEDICARE

## 2020-03-03 DIAGNOSIS — S72.452D CLOSED DISPLACED SUPRACONDYLAR FRACTURE OF DISTAL END OF LEFT FEMUR WITHOUT INTRACONDYLAR EXTENSION WITH ROUTINE HEALING, SUBSEQUENT ENCOUNTER: ICD-10-CM

## 2020-03-03 DIAGNOSIS — S82.102D CLOSED FRACTURE OF PROXIMAL END OF LEFT TIBIA WITH ROUTINE HEALING, UNSPECIFIED FRACTURE MORPHOLOGY, SUBSEQUENT ENCOUNTER: ICD-10-CM

## 2020-03-03 DIAGNOSIS — M17.0 PRIMARY OSTEOARTHRITIS OF KNEES, BILATERAL: ICD-10-CM

## 2020-03-03 PROCEDURE — 73700 CT LOWER EXTREMITY W/O DYE: CPT | Performed by: ORTHOPAEDIC SURGERY

## 2020-03-23 RX ORDER — CLOPIDOGREL BISULFATE 75 MG/1
TABLET ORAL
Qty: 90 TABLET | Refills: 0 | Status: SHIPPED | OUTPATIENT
Start: 2020-03-23 | End: 2020-08-21

## 2020-03-30 ENCOUNTER — NURSE TRIAGE (OUTPATIENT)
Dept: OTHER | Age: 85
End: 2020-03-30

## 2020-03-30 NOTE — TELEPHONE ENCOUNTER
Patient informed of provider's response/recommendations below and pt agrees. Pt voiced understanding and agrees with Rx/recommendation.

## 2020-03-30 NOTE — TELEPHONE ENCOUNTER
Action Requested: Summary for Provider     []  Critical Lab, Recommendations Needed  [x] Need Additional Advice  []   FYI    []   Need Orders  [x] Need Medications Sent to Pharmacy  []  Other     SUMMARY: Patient indicated that she has frequent UTI's.   She

## 2020-03-30 NOTE — TELEPHONE ENCOUNTER
No answer no voicemail    I sent bactrim to her pharmacy  Took bactriim  More than once in the past  No reported allergy    Triage notes reviewed  Follow up if not imrpvoing

## 2020-06-04 ENCOUNTER — VIRTUAL PHONE E/M (OUTPATIENT)
Dept: INTERNAL MEDICINE CLINIC | Facility: CLINIC | Age: 85
End: 2020-06-04
Payer: MEDICARE

## 2020-06-04 DIAGNOSIS — N30.00 ACUTE CYSTITIS WITHOUT HEMATURIA: Primary | ICD-10-CM

## 2020-06-04 PROCEDURE — 99213 OFFICE O/P EST LOW 20 MIN: CPT | Performed by: NURSE PRACTITIONER

## 2020-06-04 RX ORDER — SULFAMETHOXAZOLE AND TRIMETHOPRIM 800; 160 MG/1; MG/1
1 TABLET ORAL 2 TIMES DAILY
Qty: 20 TABLET | Refills: 0 | Status: SHIPPED | OUTPATIENT
Start: 2020-06-04 | End: 2020-07-20

## 2020-06-04 NOTE — PROGRESS NOTES
HPI:    Patient ID: Della Steward is a 80year old female. Please note that the following visit was completed using two-way, real-time interactive audio and/or video communication.   This has been done in good blade to provide continuity of care in th Hematological: Does not bruise/bleed easily. Psychiatric/Behavioral: The patient is not nervous/anxious.              Current Outpatient Medications   Medication Sig Dispense Refill   • Sulfamethoxazole-TMP -160 MG Oral Tab per tablet Take 1 table kg)    There is no height or weight on file to calculate BMI. (2)           ASSESSMENT/PLAN:     Problem List Items Addressed This Visit        Genitourinary    RESOLVED: Acute cystitis without hematuria - Primary     A/P 80-year-old female who complains of

## 2020-06-04 NOTE — ASSESSMENT & PLAN NOTE
A/P 77-year-old female who complains of urinary frequency and burning. Patient states she has frequent urinary tract infections and Dr. Boris Reid orders her Bactrim. Her allergies were reviewed. Her kidney function was reviewed.   Her chart was reviewed fo

## 2020-06-22 ENCOUNTER — ANCILLARY PROCEDURE (OUTPATIENT)
Dept: CARDIOLOGY | Age: 85
End: 2020-06-22
Attending: INTERNAL MEDICINE

## 2020-06-22 DIAGNOSIS — I35.0 NONRHEUMATIC AORTIC VALVE STENOSIS: ICD-10-CM

## 2020-06-22 PROCEDURE — 93306 TTE W/DOPPLER COMPLETE: CPT | Performed by: INTERNAL MEDICINE

## 2020-06-23 RX ORDER — ROSUVASTATIN CALCIUM 20 MG/1
TABLET, COATED ORAL
Qty: 30 TABLET | Refills: 0 | Status: SHIPPED | OUTPATIENT
Start: 2020-06-23 | End: 2020-08-25

## 2020-06-24 RX ORDER — METOPROLOL SUCCINATE 25 MG/1
TABLET, EXTENDED RELEASE ORAL
Qty: 90 TABLET | Refills: 3 | Status: SHIPPED | OUTPATIENT
Start: 2020-06-24 | End: 2021-02-19

## 2020-07-02 ENCOUNTER — TELEPHONE (OUTPATIENT)
Dept: CARDIOLOGY | Age: 85
End: 2020-07-02

## 2020-07-13 ENCOUNTER — TELEPHONE (OUTPATIENT)
Dept: CARDIOLOGY | Age: 85
End: 2020-07-13

## 2020-07-14 ENCOUNTER — OFFICE VISIT (OUTPATIENT)
Dept: CARDIOLOGY | Age: 85
End: 2020-07-14

## 2020-07-14 VITALS
OXYGEN SATURATION: 99 % | WEIGHT: 117 LBS | HEART RATE: 54 BPM | DIASTOLIC BLOOD PRESSURE: 90 MMHG | HEIGHT: 63 IN | SYSTOLIC BLOOD PRESSURE: 120 MMHG | BODY MASS INDEX: 20.73 KG/M2

## 2020-07-14 DIAGNOSIS — I25.118 CORONARY ARTERY DISEASE INVOLVING NATIVE CORONARY ARTERY OF NATIVE HEART WITH OTHER FORM OF ANGINA PECTORIS (CMD): ICD-10-CM

## 2020-07-14 DIAGNOSIS — I10 ESSENTIAL HYPERTENSION: ICD-10-CM

## 2020-07-14 DIAGNOSIS — R07.2 PRECORDIAL PAIN: Primary | ICD-10-CM

## 2020-07-14 PROCEDURE — 99214 OFFICE O/P EST MOD 30 MIN: CPT | Performed by: INTERNAL MEDICINE

## 2020-07-14 RX ORDER — ISOSORBIDE MONONITRATE 30 MG/1
30 TABLET, EXTENDED RELEASE ORAL DAILY
Qty: 90 TABLET | Refills: 3 | Status: SHIPPED | OUTPATIENT
Start: 2020-07-14 | End: 2021-03-09

## 2020-07-14 ASSESSMENT — PATIENT HEALTH QUESTIONNAIRE - PHQ9
2. FEELING DOWN, DEPRESSED OR HOPELESS: NOT AT ALL
CLINICAL INTERPRETATION OF PHQ9 SCORE: NO FURTHER SCREENING NEEDED
SUM OF ALL RESPONSES TO PHQ9 QUESTIONS 1 AND 2: 0
SUM OF ALL RESPONSES TO PHQ9 QUESTIONS 1 AND 2: 0
1. LITTLE INTEREST OR PLEASURE IN DOING THINGS: NOT AT ALL
CLINICAL INTERPRETATION OF PHQ2 SCORE: NO FURTHER SCREENING NEEDED

## 2020-07-15 ENCOUNTER — MED REC SCAN ONLY (OUTPATIENT)
Dept: INTERNAL MEDICINE CLINIC | Facility: CLINIC | Age: 85
End: 2020-07-15

## 2020-07-16 ENCOUNTER — TELEPHONE (OUTPATIENT)
Dept: SCHEDULING | Age: 85
End: 2020-07-16

## 2020-07-20 ENCOUNTER — NURSE TRIAGE (OUTPATIENT)
Dept: INTERNAL MEDICINE CLINIC | Facility: CLINIC | Age: 85
End: 2020-07-20

## 2020-07-20 NOTE — TELEPHONE ENCOUNTER
Bactrim sent to the pharmacy for pickup follow-up if not improving   Should see his improvement within the next 2 to 3 days otherwise call

## 2020-07-20 NOTE — TELEPHONE ENCOUNTER
Pt stated she was told RX triamcinolone was denied, stated I use this after showering to apply to my abdomen,buttocks and bra area, that's where I get a rash , has appt next week

## 2020-07-20 NOTE — TELEPHONE ENCOUNTER
Action Requested: Summary for Provider     []  Critical Lab, Recommendations Needed  [] Need Additional Advice  []   FYI    []   Need Orders  [] Need Medications Sent to Pharmacy  []  Other     SUMMARY:c/c UTI s/s started yesterday frequency,burning with u

## 2020-07-28 ENCOUNTER — OFFICE VISIT (OUTPATIENT)
Dept: INTERNAL MEDICINE CLINIC | Facility: CLINIC | Age: 85
End: 2020-07-28
Payer: MEDICARE

## 2020-07-28 VITALS
DIASTOLIC BLOOD PRESSURE: 60 MMHG | BODY MASS INDEX: 20.37 KG/M2 | TEMPERATURE: 97 F | HEIGHT: 63 IN | SYSTOLIC BLOOD PRESSURE: 137 MMHG | HEART RATE: 59 BPM | WEIGHT: 115 LBS

## 2020-07-28 DIAGNOSIS — E78.5 DYSLIPIDEMIA: ICD-10-CM

## 2020-07-28 DIAGNOSIS — I25.10 ATHEROSCLEROSIS OF NATIVE CORONARY ARTERY OF NATIVE HEART WITHOUT ANGINA PECTORIS: ICD-10-CM

## 2020-07-28 DIAGNOSIS — E03.9 HYPOTHYROIDISM, UNSPECIFIED TYPE: ICD-10-CM

## 2020-07-28 DIAGNOSIS — I35.0 NONRHEUMATIC AORTIC VALVE STENOSIS: ICD-10-CM

## 2020-07-28 DIAGNOSIS — N18.30 STAGE 3 CHRONIC KIDNEY DISEASE (HCC): ICD-10-CM

## 2020-07-28 DIAGNOSIS — E78.5 HYPERLIPIDEMIA, UNSPECIFIED HYPERLIPIDEMIA TYPE: ICD-10-CM

## 2020-07-28 DIAGNOSIS — Z00.00 ENCOUNTER FOR MEDICARE ANNUAL WELLNESS EXAM: Primary | ICD-10-CM

## 2020-07-28 DIAGNOSIS — I70.0 ATHEROSCLEROSIS OF AORTA (HCC): ICD-10-CM

## 2020-07-28 DIAGNOSIS — Z00.00 ENCOUNTER FOR ANNUAL HEALTH EXAMINATION: ICD-10-CM

## 2020-07-28 DIAGNOSIS — I73.9 PAD (PERIPHERAL ARTERY DISEASE) (HCC): ICD-10-CM

## 2020-07-28 DIAGNOSIS — I34.1 MITRAL VALVE PROLAPSE: ICD-10-CM

## 2020-07-28 DIAGNOSIS — I10 ESSENTIAL HYPERTENSION: ICD-10-CM

## 2020-07-28 PROCEDURE — G0439 PPPS, SUBSEQ VISIT: HCPCS | Performed by: INTERNAL MEDICINE

## 2020-07-28 RX ORDER — ISOSORBIDE MONONITRATE 30 MG/1
30 TABLET, EXTENDED RELEASE ORAL DAILY
COMMUNITY
Start: 2020-07-14 | End: 2021-04-20

## 2020-07-29 PROBLEM — M62.838 MUSCLE SPASM OF LEFT LOWER EXTREMITY: Status: RESOLVED | Noted: 2019-04-02 | Resolved: 2020-07-29

## 2020-07-29 PROBLEM — S72.453D: Status: RESOLVED | Noted: 2019-04-02 | Resolved: 2020-07-29

## 2020-07-29 PROBLEM — S83.231A COMPLEX TEAR OF MEDIAL MENISCUS OF RIGHT KNEE AS CURRENT INJURY: Status: RESOLVED | Noted: 2019-07-15 | Resolved: 2020-07-29

## 2020-07-29 PROBLEM — M79.662 PAIN OF LEFT CALF: Status: RESOLVED | Noted: 2019-06-18 | Resolved: 2020-07-29

## 2020-07-29 PROBLEM — S72.8X2A OTHER FRACTURE OF LEFT FEMUR, INITIAL ENCOUNTER FOR CLOSED FRACTURE (HCC): Status: RESOLVED | Noted: 2018-12-03 | Resolved: 2020-07-29

## 2020-07-29 PROBLEM — D64.9 ANEMIA: Status: RESOLVED | Noted: 2018-06-14 | Resolved: 2020-07-29

## 2020-07-29 PROBLEM — S82.102D CLOSED FRACTURE OF UPPER END OF LEFT TIBIA WITH ROUTINE HEALING: Status: RESOLVED | Noted: 2019-07-21 | Resolved: 2020-07-29

## 2020-07-29 PROBLEM — S01.21XA LACERATION OF NOSE, INITIAL ENCOUNTER: Status: RESOLVED | Noted: 2018-12-03 | Resolved: 2020-07-29

## 2020-07-29 PROBLEM — T14.8XXA BLOOD BLISTER: Status: RESOLVED | Noted: 2019-08-29 | Resolved: 2020-07-29

## 2020-07-29 PROBLEM — I00 RHEUMATIC FEVER: Status: RESOLVED | Noted: 2019-06-26 | Resolved: 2020-07-29

## 2020-07-29 PROBLEM — T14.8XXD OTHER INJURY OF UNSPECIFIED BODY REGION, SUBSEQUENT ENCOUNTER: Status: RESOLVED | Noted: 2019-09-10 | Resolved: 2020-07-29

## 2020-07-29 PROBLEM — M25.562 ACUTE PAIN OF LEFT KNEE: Status: RESOLVED | Noted: 2019-06-18 | Resolved: 2020-07-29

## 2020-07-29 PROBLEM — R09.89 CHEST CONGESTION: Status: RESOLVED | Noted: 2020-01-23 | Resolved: 2020-07-29

## 2020-07-29 PROBLEM — M23.91 INTERNAL DERANGEMENT OF KNEE, RIGHT: Status: RESOLVED | Noted: 2017-08-14 | Resolved: 2020-07-29

## 2020-07-29 PROBLEM — S83.241A ACUTE MEDIAL MENISCUS TEAR OF RIGHT KNEE: Status: RESOLVED | Noted: 2017-10-15 | Resolved: 2020-07-29

## 2020-07-29 NOTE — PATIENT INSTRUCTIONS
Pavithra Cedillo's SCREENING SCHEDULE   Tests on this list are recommended by your physician but may not be covered, or covered at this frequency, by your insurer. Please check with your insurance carrier before scheduling to verify coverage.    PREVENT Covered up to Age 76     Colonoscopy Screen   Covered every 10 years- more often if abnormal There are no preventive care reminders to display for this patient.  Update Health Maintenance if applicable    Flex Sigmoidoscopy Screen  Covered every 5 years No Orders placed or performed in visit on 05/24/16   • PNEUMOCOCCAL VACC, 13 SERA IM    Please get once after your 65th birthday    Pneumococcal 23 (Pneumovax)  Covered Once after 65 No orders found for this or any previous visit.  Please get once after your 65

## 2020-07-29 NOTE — PROGRESS NOTES
HPI:   Dona Arriaga is a 80year old female who presents for a Medicare Subsequent Annual Wellness visit (Pt already had Initial Annual Wellness).       Her last annual assessment has been over 1 year: Annual Physical due on 06/14/2019         Fall/Ri (Internal Medicine)  David Siddiqi MD as PCP - [facility]  Susan Dietrich MD (NEUROLOGY)    Patient Active Problem List:     Coronary atherosclerosis of native coronary artery     Essential hypertension     Hypothyroidism     Hyperlipidemia     Atheroscler problem, Bladder problem, Cancer ([facility] [address] Cataract, Disorder of thyroid, Essential hypertension, High blood pressure, High cholesterol, History of blood transfusion, History of breast cancer in female (2000), History of heart bypass surgery (1989), skin canc hematuria and difficulty urinating. Musculoskeletal: Positive for gait problem. Negative for joint swelling and joint pain. Skin: Negative for rash and wound. Neurological: Negative for dizziness, syncope, weakness and light-headedness.    Psychiatric Exam    Constitutional: She appears well-developed and well-nourished. No distress. HENT:   Head: Normocephalic and atraumatic.    Right Ear: Ear canal normal.   Left Ear: Ear canal normal.   Nose: Nose normal.   Mouth/Throat: Oropharynx is clear and mois Pulse 59   Temp 96.9 °F (36.1 °C) (Oral)   Ht 5' 3\" (1.6 m)   Wt 115 lb (52.2 kg)   BMI 20.37 kg/m²   Controlled CPM    (E03.9) Hypothyroidism, unspecified type  Plan: controlledCPM    (I73.9) PAD (peripheral artery disease) (HCC)  Plan: chronic stable 06/27/2018 87   05/17/2016 72        EKG - w/ Initial Preventative Physical Exam only, or if medically necessary Electrocardiogram date12/03/2018     Colorectal Cancer Screening      Colonoscopy Screen every 10 years There are no preventive care reminder abusers     Tetanus Toxoid  Only covered with a cut with metal- TD and TDaP Not covered by Medicare Part B) No vaccine history found This may be covered with your prescription benefits, but Medicare does not cover unless Medically needed    Zoster   Not co

## 2020-08-13 ENCOUNTER — OFFICE VISIT (OUTPATIENT)
Dept: CARDIOLOGY | Age: 85
End: 2020-08-13

## 2020-08-13 VITALS
OXYGEN SATURATION: 98 % | SYSTOLIC BLOOD PRESSURE: 160 MMHG | HEART RATE: 60 BPM | WEIGHT: 120 LBS | BODY MASS INDEX: 21.26 KG/M2 | DIASTOLIC BLOOD PRESSURE: 64 MMHG | HEIGHT: 63 IN

## 2020-08-13 DIAGNOSIS — I10 ESSENTIAL HYPERTENSION: Primary | ICD-10-CM

## 2020-08-13 PROCEDURE — 99214 OFFICE O/P EST MOD 30 MIN: CPT | Performed by: NURSE PRACTITIONER

## 2020-08-13 RX ORDER — HYDROCHLOROTHIAZIDE 12.5 MG/1
12.5 TABLET ORAL DAILY
Qty: 30 TABLET | Refills: 3 | Status: SHIPPED | OUTPATIENT
Start: 2020-08-13 | End: 2020-09-08 | Stop reason: DRUGHIGH

## 2020-08-13 ASSESSMENT — PATIENT HEALTH QUESTIONNAIRE - PHQ9
2. FEELING DOWN, DEPRESSED OR HOPELESS: NOT AT ALL
CLINICAL INTERPRETATION OF PHQ2 SCORE: NO FURTHER SCREENING NEEDED
SUM OF ALL RESPONSES TO PHQ9 QUESTIONS 1 AND 2: 0
CLINICAL INTERPRETATION OF PHQ9 SCORE: NO FURTHER SCREENING NEEDED
SUM OF ALL RESPONSES TO PHQ9 QUESTIONS 1 AND 2: 0
1. LITTLE INTEREST OR PLEASURE IN DOING THINGS: NOT AT ALL

## 2020-08-14 ENCOUNTER — MED REC SCAN ONLY (OUTPATIENT)
Dept: INTERNAL MEDICINE CLINIC | Facility: CLINIC | Age: 85
End: 2020-08-14

## 2020-08-14 ENCOUNTER — TELEPHONE (OUTPATIENT)
Dept: CARDIOLOGY | Age: 85
End: 2020-08-14

## 2020-08-17 RX ORDER — LEVOTHYROXINE SODIUM 0.1 MG/1
100 TABLET ORAL
Qty: 90 TABLET | Refills: 3 | Status: SHIPPED | OUTPATIENT
Start: 2020-08-17 | End: 2021-07-18

## 2020-08-17 NOTE — TELEPHONE ENCOUNTER
Current Outpatient Medications:      •  LEVOTHYROXINE SODIUM 100 MCG Oral Tab, TAKE 1 TABLET BY MOUTH EVERY DAY BEFORE BREAKFAST, Disp: 90 tablet, Rfl: 3  •

## 2020-08-19 ENCOUNTER — TELEPHONE (OUTPATIENT)
Dept: CARDIOLOGY | Age: 85
End: 2020-08-19

## 2020-08-20 ENCOUNTER — LAB ENCOUNTER (OUTPATIENT)
Dept: LAB | Facility: HOSPITAL | Age: 85
End: 2020-08-20
Attending: NURSE PRACTITIONER
Payer: MEDICARE

## 2020-08-20 DIAGNOSIS — I10 ESSENTIAL HYPERTENSION, MALIGNANT: Primary | ICD-10-CM

## 2020-08-20 LAB
ANION GAP SERPL CALC-SCNC: 7 MMOL/L
ANION GAP SERPL CALC-SCNC: 7 MMOL/L (ref 0–18)
BUN BLD-MCNC: 21 MG/DL (ref 7–18)
BUN SERPL-MCNC: 21 MG/DL
BUN/CREAT SERPL: 24.4
BUN/CREAT SERPL: 24.4 (ref 10–20)
CALCIUM BLD-MCNC: 8.6 MG/DL (ref 8.5–10.1)
CALCIUM SERPL-MCNC: 8.6 MG/DL
CHLORIDE SERPL-SCNC: 102 MMOL/L
CHLORIDE SERPL-SCNC: 102 MMOL/L (ref 98–112)
CO2 SERPL-SCNC: 28 MMOL/L
CO2 SERPL-SCNC: 28 MMOL/L (ref 21–32)
CREAT BLD-MCNC: 0.86 MG/DL (ref 0.55–1.02)
CREAT SERPL-MCNC: 0.86 MG/DL
GLUCOSE BLD-MCNC: 108 MG/DL (ref 70–99)
GLUCOSE SERPL-MCNC: 108 MG/DL
OSMOLALITY SERPL CALC.SUM OF ELEC: 288 MOSM/KG (ref 275–295)
PATIENT FASTING Y/N/NP: NO
POTASSIUM SERPL-SCNC: 3.9 MMOL/L
POTASSIUM SERPL-SCNC: 3.9 MMOL/L (ref 3.5–5.1)
SODIUM SERPL-SCNC: 137 MMOL/L
SODIUM SERPL-SCNC: 137 MMOL/L (ref 136–145)

## 2020-08-20 PROCEDURE — 36415 COLL VENOUS BLD VENIPUNCTURE: CPT

## 2020-08-20 PROCEDURE — 80048 BASIC METABOLIC PNL TOTAL CA: CPT

## 2020-08-21 RX ORDER — CLOPIDOGREL BISULFATE 75 MG/1
75 TABLET ORAL DAILY
Qty: 90 TABLET | Refills: 1 | Status: ON HOLD | OUTPATIENT
Start: 2020-08-21 | End: 2020-10-16

## 2020-08-21 NOTE — TELEPHONE ENCOUNTER
Patient requesting refill of Clopidogrel, reports took last dose of medication today, requesting refill as soon as possible, confirm dose/instructions with patient, pended below please advise.

## 2020-08-24 ENCOUNTER — NURSE TRIAGE (OUTPATIENT)
Dept: INTERNAL MEDICINE CLINIC | Facility: CLINIC | Age: 85
End: 2020-08-24

## 2020-08-24 RX ORDER — SULFAMETHOXAZOLE AND TRIMETHOPRIM 800; 160 MG/1; MG/1
1 TABLET ORAL 2 TIMES DAILY
Qty: 14 TABLET | Refills: 0 | Status: SHIPPED | OUTPATIENT
Start: 2020-08-24 | End: 2020-08-31

## 2020-08-24 NOTE — TELEPHONE ENCOUNTER
Action Requested: Summary for Provider     []  Critical Lab, Recommendations Needed  [] Need Additional Advice  []   FYI    []   Need Orders  [x] Need Medications Sent to Pharmacy  []  Other     SUMMARY: Patient calling with complaint of urinary frequency,

## 2020-08-25 RX ORDER — ROSUVASTATIN CALCIUM 20 MG/1
TABLET, COATED ORAL
Qty: 30 TABLET | Refills: 0 | Status: SHIPPED | OUTPATIENT
Start: 2020-08-25 | End: 2020-09-22

## 2020-08-26 ENCOUNTER — TELEPHONE (OUTPATIENT)
Dept: CARDIOLOGY | Age: 85
End: 2020-08-26

## 2020-09-01 ENCOUNTER — TELEPHONE (OUTPATIENT)
Dept: CARDIOLOGY | Age: 85
End: 2020-09-01

## 2020-09-01 RX ORDER — METOPROLOL SUCCINATE 25 MG/1
25 TABLET, EXTENDED RELEASE ORAL 2 TIMES DAILY
Qty: 180 TABLET | Refills: 2 | Status: SHIPPED | OUTPATIENT
Start: 2020-09-01

## 2020-09-08 ENCOUNTER — OFFICE VISIT (OUTPATIENT)
Dept: CARDIOLOGY | Age: 85
End: 2020-09-08

## 2020-09-08 VITALS
SYSTOLIC BLOOD PRESSURE: 150 MMHG | HEIGHT: 63 IN | OXYGEN SATURATION: 99 % | HEART RATE: 58 BPM | BODY MASS INDEX: 20.55 KG/M2 | WEIGHT: 116 LBS | DIASTOLIC BLOOD PRESSURE: 70 MMHG

## 2020-09-08 DIAGNOSIS — I25.118 CORONARY ARTERY DISEASE INVOLVING NATIVE CORONARY ARTERY OF NATIVE HEART WITH OTHER FORM OF ANGINA PECTORIS (CMD): Primary | ICD-10-CM

## 2020-09-08 DIAGNOSIS — I35.0 NONRHEUMATIC AORTIC VALVE STENOSIS: ICD-10-CM

## 2020-09-08 DIAGNOSIS — I10 ESSENTIAL HYPERTENSION: ICD-10-CM

## 2020-09-08 PROCEDURE — 99214 OFFICE O/P EST MOD 30 MIN: CPT | Performed by: INTERNAL MEDICINE

## 2020-09-08 RX ORDER — HYDROCHLOROTHIAZIDE 12.5 MG/1
25 TABLET ORAL DAILY
Qty: 30 TABLET | Refills: 11 | Status: SHIPPED | OUTPATIENT
Start: 2020-09-08 | End: 2021-03-09

## 2020-09-09 ENCOUNTER — MED REC SCAN ONLY (OUTPATIENT)
Dept: INTERNAL MEDICINE CLINIC | Facility: CLINIC | Age: 85
End: 2020-09-09

## 2020-09-15 ENCOUNTER — CLINICAL ABSTRACT (OUTPATIENT)
Dept: CARDIOLOGY | Age: 85
End: 2020-09-15

## 2020-09-15 ENCOUNTER — LAB ENCOUNTER (OUTPATIENT)
Dept: LAB | Facility: HOSPITAL | Age: 85
End: 2020-09-15
Attending: INTERNAL MEDICINE
Payer: MEDICARE

## 2020-09-15 DIAGNOSIS — I10 ESSENTIAL HYPERTENSION: Primary | ICD-10-CM

## 2020-09-15 LAB
ANION GAP SERPL CALC-SCNC: 3 MMOL/L (ref 0–18)
BUN BLD-MCNC: 20 MG/DL (ref 7–18)
BUN SERPL-MCNC: 20 MG/DL
BUN/CREAT SERPL: 24.1 (ref 10–20)
CALCIUM BLD-MCNC: 9.4 MG/DL (ref 8.5–10.1)
CALCIUM SERPL-MCNC: 9.4 MG/DL
CHLORIDE SERPL-SCNC: 95 MMOL/L
CHLORIDE SERPL-SCNC: 95 MMOL/L (ref 98–112)
CO2 SERPL-SCNC: 34 MMOL/L (ref 21–32)
CREAT BLD-MCNC: 0.83 MG/DL (ref 0.55–1.02)
CREAT SERPL-MCNC: 0.83 MG/DL
GLUCOSE BLD-MCNC: 82 MG/DL (ref 70–99)
GLUCOSE SERPL-MCNC: 82 MG/DL
HAV IGM SER QL: 2 MG/DL (ref 1.6–2.6)
MAGNESIUM SERPL-MCNC: 2 MG/DL
OSMOLALITY SERPL CALC.SUM OF ELEC: 276 MOSM/KG (ref 275–295)
PATIENT FASTING Y/N/NP: NO
POTASSIUM SERPL-SCNC: 3.4 MMOL/L
POTASSIUM SERPL-SCNC: 3.4 MMOL/L (ref 3.5–5.1)
SODIUM SERPL-SCNC: 132 MMOL/L
SODIUM SERPL-SCNC: 132 MMOL/L (ref 136–145)

## 2020-09-15 PROCEDURE — 36415 COLL VENOUS BLD VENIPUNCTURE: CPT

## 2020-09-15 PROCEDURE — 80048 BASIC METABOLIC PNL TOTAL CA: CPT

## 2020-09-15 PROCEDURE — 83735 ASSAY OF MAGNESIUM: CPT

## 2020-09-18 ENCOUNTER — TELEPHONE (OUTPATIENT)
Dept: CARDIOLOGY | Age: 85
End: 2020-09-18

## 2020-09-18 RX ORDER — POTASSIUM CHLORIDE 750 MG/1
10 TABLET, FILM COATED, EXTENDED RELEASE ORAL 2 TIMES DAILY
Qty: 30 TABLET | Refills: 2 | Status: SHIPPED | OUTPATIENT
Start: 2020-09-18 | End: 2020-09-18 | Stop reason: DRUGHIGH

## 2020-09-18 RX ORDER — POTASSIUM CHLORIDE 750 MG/1
10 TABLET, FILM COATED, EXTENDED RELEASE ORAL DAILY
Qty: 30 TABLET | Refills: 2 | Status: SHIPPED | OUTPATIENT
Start: 2020-09-18

## 2020-09-22 RX ORDER — ROSUVASTATIN CALCIUM 20 MG/1
TABLET, COATED ORAL
Qty: 30 TABLET | Refills: 0 | Status: SHIPPED | OUTPATIENT
Start: 2020-09-22 | End: 2020-10-22

## 2020-09-24 ENCOUNTER — DOCUMENTATION (OUTPATIENT)
Dept: CARDIOLOGY | Age: 85
End: 2020-09-24

## 2020-09-25 ENCOUNTER — ANCILLARY PROCEDURE (OUTPATIENT)
Dept: CARDIOLOGY | Age: 85
End: 2020-09-25
Attending: INTERNAL MEDICINE

## 2020-09-25 VITALS
DIASTOLIC BLOOD PRESSURE: 74 MMHG | BODY MASS INDEX: 20.38 KG/M2 | HEART RATE: 56 BPM | WEIGHT: 115 LBS | SYSTOLIC BLOOD PRESSURE: 150 MMHG | HEIGHT: 63 IN

## 2020-09-25 DIAGNOSIS — I25.118 CORONARY ARTERY DISEASE INVOLVING NATIVE CORONARY ARTERY OF NATIVE HEART WITH OTHER FORM OF ANGINA PECTORIS (CMD): ICD-10-CM

## 2020-09-25 LAB
HEART RATE RESERVE PREDICTED: 50.78 BPM
LV EF: 72 %
PEAK HR ACHIEVED: 63 BPM
RESTING HR ACHIEVED: 56 BPM
STRESS BASELINE BP: NORMAL MMHG
STRESS PERCENT HR: 49 %
STRESS POST PEAK BP: NORMAL MMHG
STRESS TARGET HR: 128 BPM

## 2020-09-25 PROCEDURE — 78452 HT MUSCLE IMAGE SPECT MULT: CPT | Performed by: INTERNAL MEDICINE

## 2020-09-25 PROCEDURE — 93016 CV STRESS TEST SUPVJ ONLY: CPT | Performed by: INTERNAL MEDICINE

## 2020-09-25 PROCEDURE — 93018 CV STRESS TEST I&R ONLY: CPT | Performed by: INTERNAL MEDICINE

## 2020-09-25 PROCEDURE — 93017 CV STRESS TEST TRACING ONLY: CPT | Performed by: INTERNAL MEDICINE

## 2020-09-25 PROCEDURE — A9502 TC99M TETROFOSMIN: HCPCS | Performed by: INTERNAL MEDICINE

## 2020-09-25 RX ORDER — REGADENOSON 0.08 MG/ML
0.4 INJECTION, SOLUTION INTRAVENOUS ONCE
Status: COMPLETED | OUTPATIENT
Start: 2020-09-25 | End: 2020-09-25

## 2020-09-25 RX ADMIN — REGADENOSON 0.4 MG: 0.08 INJECTION, SOLUTION INTRAVENOUS at 11:35

## 2020-09-25 ASSESSMENT — EXERCISE STRESS TEST
PEAK_RPP: 8784
STAGE_CATEGORIES: RESTING
PEAK_HR: 69
PEAK_RPP: 7812
PEAK_BP: 126/64
PEAK_RPP: 8400
PEAK_BP: 122/62
STAGE_CATEGORIES: RECOVERY 0
PEAK_BP: 124/64
PEAK_BP: 150/74
PEAK_HR: 56
COMMENTS: INJECTED AT 0:30
PEAK_RPP: 8694
STAGE_CATEGORIES: RECOVERY 1
PEAK_HR: 63
STAGE_CATEGORIES: RECOVERY 2
PEAK_HR: 72
STOPPAGE_REASON: PROTOCOL COMPLETE

## 2020-10-01 ENCOUNTER — NURSE TRIAGE (OUTPATIENT)
Dept: INTERNAL MEDICINE CLINIC | Facility: CLINIC | Age: 85
End: 2020-10-01

## 2020-10-01 NOTE — TELEPHONE ENCOUNTER
Action Requested: Summary for Provider     []  Critical Lab, Recommendations Needed  [] Need Additional Advice  []   FYI    []   Need Orders  [] Need Medications Sent to Pharmacy  []  Other     SUMMARY:   An appointment was offered and refused.   She would

## 2020-10-08 ENCOUNTER — APPOINTMENT (OUTPATIENT)
Dept: GENERAL RADIOLOGY | Facility: HOSPITAL | Age: 85
DRG: 481 | End: 2020-10-08
Attending: EMERGENCY MEDICINE
Payer: MEDICARE

## 2020-10-08 ENCOUNTER — HOSPITAL ENCOUNTER (INPATIENT)
Facility: HOSPITAL | Age: 85
LOS: 12 days | Discharge: NURSING, REHAB OR LONG-TERM CARE | DRG: 481 | End: 2020-10-20
Attending: EMERGENCY MEDICINE | Admitting: HOSPITALIST
Payer: MEDICARE

## 2020-10-08 DIAGNOSIS — S72.002A CLOSED FRACTURE OF LEFT HIP, INITIAL ENCOUNTER (HCC): Primary | ICD-10-CM

## 2020-10-08 PROCEDURE — 99223 1ST HOSP IP/OBS HIGH 75: CPT | Performed by: HOSPITALIST

## 2020-10-08 PROCEDURE — 71045 X-RAY EXAM CHEST 1 VIEW: CPT | Performed by: EMERGENCY MEDICINE

## 2020-10-08 PROCEDURE — 73502 X-RAY EXAM HIP UNI 2-3 VIEWS: CPT | Performed by: EMERGENCY MEDICINE

## 2020-10-08 RX ORDER — SODIUM CHLORIDE 9 MG/ML
INJECTION, SOLUTION INTRAVENOUS CONTINUOUS
Status: DISCONTINUED | OUTPATIENT
Start: 2020-10-09 | End: 2020-10-09

## 2020-10-08 RX ORDER — MORPHINE SULFATE 4 MG/ML
2 INJECTION, SOLUTION INTRAMUSCULAR; INTRAVENOUS ONCE
Status: COMPLETED | OUTPATIENT
Start: 2020-10-08 | End: 2020-10-08

## 2020-10-08 RX ORDER — HEPARIN SODIUM 5000 [USP'U]/ML
5000 INJECTION, SOLUTION INTRAVENOUS; SUBCUTANEOUS EVERY 12 HOURS SCHEDULED
Status: DISCONTINUED | OUTPATIENT
Start: 2020-10-09 | End: 2020-10-14

## 2020-10-08 RX ORDER — METOCLOPRAMIDE HYDROCHLORIDE 5 MG/ML
5 INJECTION INTRAMUSCULAR; INTRAVENOUS EVERY 8 HOURS PRN
Status: DISCONTINUED | OUTPATIENT
Start: 2020-10-08 | End: 2020-10-11

## 2020-10-08 RX ORDER — MORPHINE SULFATE 4 MG/ML
1 INJECTION, SOLUTION INTRAMUSCULAR; INTRAVENOUS EVERY 4 HOURS PRN
Status: DISCONTINUED | OUTPATIENT
Start: 2020-10-08 | End: 2020-10-12

## 2020-10-08 RX ORDER — SODIUM CHLORIDE 0.9 % (FLUSH) 0.9 %
3 SYRINGE (ML) INJECTION AS NEEDED
Status: DISCONTINUED | OUTPATIENT
Start: 2020-10-08 | End: 2020-10-20

## 2020-10-08 RX ORDER — MORPHINE SULFATE 4 MG/ML
2 INJECTION, SOLUTION INTRAMUSCULAR; INTRAVENOUS EVERY 4 HOURS PRN
Status: DISCONTINUED | OUTPATIENT
Start: 2020-10-08 | End: 2020-10-12

## 2020-10-08 RX ORDER — ONDANSETRON 2 MG/ML
4 INJECTION INTRAMUSCULAR; INTRAVENOUS EVERY 6 HOURS PRN
Status: DISCONTINUED | OUTPATIENT
Start: 2020-10-08 | End: 2020-10-14

## 2020-10-09 ENCOUNTER — APPOINTMENT (OUTPATIENT)
Dept: GENERAL RADIOLOGY | Facility: HOSPITAL | Age: 85
DRG: 481 | End: 2020-10-09
Attending: ORTHOPAEDIC SURGERY
Payer: MEDICARE

## 2020-10-09 PROCEDURE — 99222 1ST HOSP IP/OBS MODERATE 55: CPT | Performed by: INTERNAL MEDICINE

## 2020-10-09 PROCEDURE — 73552 X-RAY EXAM OF FEMUR 2/>: CPT | Performed by: ORTHOPAEDIC SURGERY

## 2020-10-09 PROCEDURE — 99233 SBSQ HOSP IP/OBS HIGH 50: CPT | Performed by: INTERNAL MEDICINE

## 2020-10-09 PROCEDURE — 73560 X-RAY EXAM OF KNEE 1 OR 2: CPT | Performed by: ORTHOPAEDIC SURGERY

## 2020-10-09 RX ORDER — CYCLOBENZAPRINE HCL 5 MG
5 TABLET ORAL 3 TIMES DAILY PRN
Status: DISCONTINUED | OUTPATIENT
Start: 2020-10-09 | End: 2020-10-20

## 2020-10-09 RX ORDER — SULFAMETHOXAZOLE AND TRIMETHOPRIM 800; 160 MG/1; MG/1
1 TABLET ORAL ONCE
Status: COMPLETED | OUTPATIENT
Start: 2020-10-09 | End: 2020-10-09

## 2020-10-09 RX ORDER — LEVOTHYROXINE SODIUM 0.1 MG/1
100 TABLET ORAL
Status: DISCONTINUED | OUTPATIENT
Start: 2020-10-09 | End: 2020-10-20

## 2020-10-09 RX ORDER — ROSUVASTATIN CALCIUM 20 MG/1
20 TABLET, COATED ORAL NIGHTLY
Status: DISCONTINUED | OUTPATIENT
Start: 2020-10-09 | End: 2020-10-20

## 2020-10-09 RX ORDER — METOPROLOL SUCCINATE 25 MG/1
25 TABLET, EXTENDED RELEASE ORAL DAILY
Status: DISCONTINUED | OUTPATIENT
Start: 2020-10-09 | End: 2020-10-20

## 2020-10-09 RX ORDER — HYDROCODONE BITARTRATE AND ACETAMINOPHEN 5; 325 MG/1; MG/1
1 TABLET ORAL EVERY 6 HOURS PRN
Status: DISCONTINUED | OUTPATIENT
Start: 2020-10-09 | End: 2020-10-14

## 2020-10-09 RX ORDER — HYDROCHLOROTHIAZIDE 25 MG/1
25 TABLET ORAL DAILY
Status: DISCONTINUED | OUTPATIENT
Start: 2020-10-09 | End: 2020-10-10

## 2020-10-09 RX ORDER — ISOSORBIDE MONONITRATE 30 MG/1
30 TABLET, EXTENDED RELEASE ORAL DAILY
Status: DISCONTINUED | OUTPATIENT
Start: 2020-10-09 | End: 2020-10-20

## 2020-10-09 NOTE — PROGRESS NOTES
S Pt is pleasant, alert. and communicative. Pt had recently been given morphine. O Pt is anticipating treatment for fractured hip. A Pt appreciates dialogue, active listening, compassionate presence, and prayer.     P CH will follow up after weekend

## 2020-10-09 NOTE — PLAN OF CARE
Problem: PAIN - ADULT  Goal: Verbalizes/displays adequate comfort level or patient's stated pain goal  Description: INTERVENTIONS:  - Encourage pt to monitor pain and request assistance  - Assess pain using appropriate pain scale  - Administer analgesics post-discharge preferences of patient/family/discharge partner  - Complete POLST form as appropriate  - Assess patient's ability to be responsible for managing their own health  - Refer to Case Management Department for coordinating discharge planning if t patient/family in tolerated activity level and precautions    Outcome: Progressing   Pt remain on bed rest pulido cath patent. Dr Anshu Zhang here to see pt and he said will do surgery poss Tuesday evening or Wednesday. Pt was taking plavix at home.  X ray of fe

## 2020-10-09 NOTE — CONSULTS
[facility]    Cardiology Consultation    Bhavin Colvin Patient Status:  Inpatient    1928 MRN [id number]   Location [facility] 4W/SW/SE Attending Jarvis Ruiz MD   [facility] Day # 1 PCP Babar Laura MD     10/8/2020 Personal history of malignant neoplasm of breast 5/28/2009   • Primary osteoarthritis of knees, bilateral 7/12/2017   • Primary osteoarthritis of right knee 11/12/2018   • Rheumatic fever/heart disease age 12   • Visual impairment      Past Surgical Histor Calcium (CRESTOR) tab 20 mg, 20 mg, Oral, Nightly  •  triamcinolone acetonide (KENALOG) 0.1 % cream, , Topical, BID  •  hydrochlorothiazide (HYDRODIURIL) tab 25 mg, 25 mg, Oral, Daily  •  Normal Saline Flush 0.9 % injection 3 mL, 3 mL, Intravenous, PRN  • effort. Abdomen: Soft, non-tender. BS-present. Extremities: Without clubbing, cyanosis or edema. Peripheral pulses are 2+. Neurologic: Alert and oriented, normal affect. Skin: Warm and dry.      Laboratory Data:  Lab Results   Component Value Date    W reversibility. Echocardiogram September showed preserved EF with regional wall motion abnormalities consistent with prior bypass surgery.   At this point she does not require any further cardiac work-up and would be considered acceptable to proceed for hip

## 2020-10-09 NOTE — PROGRESS NOTES
[facility]    Progress Note    Dona Going Patient Status:  Inpatient    1928 MRN [id number]   Location [facility] 4W/SW/SE Attending Jania Geiger MD   [facility] Day # 1 PCP Tl Tavarez MD       Subjective:   No hydrochlorothiazide  25 mg Oral Daily   • Heparin Sodium (Porcine)  5,000 Units Subcutaneous Q12H [address]       Current PRN Inpatient Meds:      Normal Saline Flush, ondansetron HCl, Metoclopramide HCl, morphINE sulfate (PF), morphINE sulfate (PF)    Results: Portable  (cpt=71045)    Result Date: 10/8/2020  CONCLUSION: No significant interval change. No evidence of acute cardiopulmonary disease. Bilateral increased interstitial lung markings similar to prior studies likely chronic.     Dictated by (CST): Shaun Miller

## 2020-10-09 NOTE — CM/SW NOTE
CM received MDO for Advanced directives and self referred for dc planning. CM met with pt and daughter at bedside. CM verified address and telephone number. Pt lives at [address] [place] [address] in [place] [address].   Pt is independent with ADL's and ambulation with t

## 2020-10-09 NOTE — ED NOTES
Orders for admission, patient is aware of plan and ready to go upstairs. Any questions, please call ED RN Bridget Bryant at extension [phone].    Type of COVID test sent: Rapid  COVID Suspicion level: Low    Titratable drug(s) infusing: n/a  Rate:    LOC at time of tr

## 2020-10-09 NOTE — PROGRESS NOTES
ADMISSION NOTE    80year old female with h/o CAD CABG stenting in the past presents after mechanical fall with acute comminuted mildly displaced left intra trochanteric fracture . Available medical records partially reviewed. Dictation to follow.     Luis Lu

## 2020-10-09 NOTE — ED INITIAL ASSESSMENT (HPI)
Care assumed from EMS. Pt from [facility]. Pt states that she was trying to get something from her closet and turned from her walker and lost her footing and fell. Pt denies head injury/LOC. + blood thinner use.  Visible shortening and external

## 2020-10-09 NOTE — ED PROVIDER NOTES
Patient Seen in: [facility] Emergency Department      History   Patient presents with:  Fall  Leg or Foot Injury    Stated Complaint:     HPI    Patient is a 25-year-old female who fell and landed on her left hip.   She complains of pain in her lef Weekly, Wine, 5 glasses;     Drug use: No             Review of Systems    Positive for stated complaint:   Other systems are as noted in HPI. Constitutional and vital signs reviewed. All other systems reviewed and negative except as noted above. WITH DIFFERENTIAL WITH PLATELET    Narrative: The following orders were created for panel order CBC WITH DIFFERENTIAL WITH PLATELET.   Procedure                               Abnormality         Status                     ---------

## 2020-10-09 NOTE — PLAN OF CARE
Problem: Patient Centered Care  Goal: Patient preferences are identified and integrated in the patient's plan of care  Description: Interventions:  - What would you like us to know as we care for you?  Live in the [facility], [place]  - Provide timely, comple Manage/alleviate anxiety  - Utilize distraction and/or relaxation techniques  - Monitor for opioid side effects  - Notify MD/LIP if interventions unsuccessful or patient reports new pain  - Anticipate increased pain with activity and pre-medicate as approp

## 2020-10-09 NOTE — CONSULTS
[facility]    PATIENT'S NAME: Cowilver Jenkins   ATTENDING PHYSICIAN: Esperanza Davis MD   CONSULTING PHYSICIAN: Luis Ferreira MD   PATIENT ACCOUNT#:   [de-identified]    LOCATION:  [address] [place] [address] Ave #:   [id number] that she has been experiencing spasm-like pain in her knee, in addition to the marked pain in the proximal thigh/hip region.       In the emergency room, a chest x-ray was also performed which demonstrated mild cardiomegaly with interstitial lung markings, Ciprofloxacin, she stated her reaction was coughing. Surgical tape, she stated the reaction was a rash. SOCIAL HISTORY:  Patient denies cigarette smoking at the present time, though she did at a younger age. Denies excessive alcohol use.   Denies subst given her history of chronic UTIs and peripheral vascular disease despite the use of strict sterile techniques and prophylactic antibiotics; increased risk for malunion, nonunion, and delayed union given the fact that we are now unable to utilize a long in pending cardiology consultation and DVT prophylaxis. We will place the patient on the OR schedule for early to mid next week given the 5 to 7 days of increased platelet effect traditionally with Plavix.   The patient stated that this would actually work ou

## 2020-10-09 NOTE — H&P
[facility]    PATIENT'S NAME: Ronal Junior   ATTENDING PHYSICIAN: Destiny Carvajal MD   PATIENT ACCOUNT#:   [de-identified]    LOCATION:  [address] [place] [address] RECORD #:   [id number]       YOB: 1928  ADMISSION DATE: change from previous, no acute cardiopulmonary disease. There were some interstitial lung markings that were prominent and unchanged from her previous x-ray. She had no loss of consciousness with the fall.   She was therefore admitted for further evaluati infection last dose the evening of admission, triamcinolone acetonide 0.1% ointment topically twice a day as needed, vitamin D3 at 50,000 units once a month, clopidogrel 75 mg daily. ALLERGIES:  Penicillin, aspirin, iodine.     FAMILY HISTORY:  The patie over her cervical spinous process. LUNGS:  Few crackles at the bases, but easy respiratory excursions. No wheezes or rhonchi. HEART:  Regular rate and rhythm. Normal S1, S2.  ABDOMEN:  Soft, nontender, with normoactive bowel sounds. No guarding.   No mild exertional chest pain, which was possibly related to angina. She was started on isosorbide, and the patient denied any further chest discomfort additionally. She saw Dr. Anna Espinal in followup and her diuretic was increased to 25 mg daily.   She was foun with her. All of her questions were answered. She agreed with plan of care as outlined above. She will need therapy prior to returning to her current living situation. She is aware of this and agreeable.      Dictated By Marianela Lopez MD  d: 1

## 2020-10-10 PROCEDURE — 99233 SBSQ HOSP IP/OBS HIGH 50: CPT | Performed by: INTERNAL MEDICINE

## 2020-10-10 RX ORDER — AMLODIPINE BESYLATE 5 MG/1
5 TABLET ORAL DAILY
Status: DISCONTINUED | OUTPATIENT
Start: 2020-10-10 | End: 2020-10-13

## 2020-10-10 NOTE — PROGRESS NOTES
[facility]    Progress Note    Palmira Farley Patient Status:  Inpatient    1928 MRN [id number]   Location [facility] 4W/SW/SE Attending Claudia Cruz MD   Hosp Day # 2 PCP Edy Abdullahi MD       Subjective:   No BID   • hydrochlorothiazide  25 mg Oral Daily   • Heparin Sodium (Porcine)  5,000 Units Subcutaneous Q12H [address]       Current PRN Inpatient Meds:      cyclobenzaprine, HYDROcodone-acetaminophen, Normal Saline Flush, ondansetron HCl, Metoclopramide HCl, morph small joint effusion.     Dictated by (CST): Julia Quinonez MD on 10/09/2020 at 2:28 PM     Finalized by (CST): Julia Quinonez MD on 10/09/2020 at 2:30 PM          Xr Chest Ap Portable  (cpt=71045)    Result Date: 10/8/2020  CONCLUSION: No significant inter

## 2020-10-10 NOTE — PLAN OF CARE
Patient A/Ox 3-4. Confused to place at times. Reoriented as needed. Elevated blood pressure on morning assessment, stabilized throughout the day. VSS. On room air. Saline locked, pulido catheter in place. [place] traction #5 maintained to left extremity.  Trap of care.    - See additional Care Plan goals for specific interventions  Outcome: Progressing     Problem: PAIN - ADULT  Goal: Verbalizes/displays adequate comfort level or patient's stated pain goal  Description: INTERVENTIONS:  - Encourage pt to monitor p preferences of patient/family/discharge partner  - Complete POLST form as appropriate  - Assess patient's ability to be responsible for managing their own health  - Refer to Case Management Department for coordinating discharge planning if the patient need in tolerated activity level and precautions  Outcome: Progressing

## 2020-10-10 NOTE — SLP NOTE
ADULT SWALLOWING EVALUATION    ASSESSMENT    ASSESSMENT/OVERALL IMPRESSION:      PPE REQUIRED. THIS SLP WORE GLOVES AND DROPLET MASK. HANDS SANITIZED/WASHED UPON ENTRANCE/EXIT.       This BSE was ordered d/t RN reporting Pt with \"gurgly sounds\" with thin required. Collaborated with RN regarding Pt's swallowing plan of care. BSE results/recommendations discussed with Pt; good understanding. Swallowing precautions written on white board in Pt room.             PLAN:    To f/u x2 sessions/meals for dysphagia t Regular; Thin liquids  Precautions: Aspiration    Patient/Family Goals: \"to not have hip pain\"      SWALLOWING HISTORY  Current Diet Consistency: Regular; Thin liquids      OBJECTIVE   ORAL MOTOR EXAMINATION  Dentition: Natural;Functional  Symmetry: Within

## 2020-10-10 NOTE — DIETARY NOTE
ADULT NUTRITION INITIAL ASSESSMENT    Pt is at moderate nutrition risk. Pt does not meet malnutrition criteria.       RECOMMENDATIONS TO MD:  See Nutrition Intervention     NUTRITION DIAGNOSIS/PROBLEM:  Unintentional weight loss related to physiological ca fever/heart disease age 12   • Visual impairment        ANTHROPOMETRICS:  HT: 160 cm (5' 3\")  WT: 49 kg (108 lb 1.6 oz)   BMI: Body mass index is 19.15 kg/m². BMI CLASSIFICATION: 19-24.9 kg/m2 - WNL considered underweight given elderly age.   IBW: 115 lbs Skin Integrity: intact and RN documentation reviewed.     NUTRITION PRESCRIPTION:  Diet: Cardiac  Oral Supplements: BID as above  ESTIMATED NUTRITION NEEDS: Dosing wt: 49 kg  Calories: 1470 calories/day (30 calories per kg Current wt)  Protein: 59-68 grams

## 2020-10-10 NOTE — PROGRESS NOTES
[facility]    Progress Note    Palmira Farley Patient Status:  Inpatient    1928 MRN [id number]   Location [facility] 4W/SW/SE Attending Claudia rCuz MD   [address] Day # 2 PCP Edy Abdullahi MD     Date of Admission: (FLEXERIL) tab 5 mg, 5 mg, Oral, TID PRN    •  HYDROcodone-acetaminophen (NORCO) 5-325 MG per tab 1 tablet, 1 tablet, Oral, Q6H PRN    •  [COMPLETED] morphINE sulfate (PF) 4 MG/ML injection 2 mg, 2 mg, Intravenous, Once    •  Normal Saline Flush 0.9 % inje History:  Social History    Tobacco Use      Smoking status: Former Smoker        Packs/day: 1.00        Years: 20.00        Pack years: 20        Types: Cigarettes      Smokeless tobacco: Former User    Alcohol use:  Yes      Alcohol/week: 0.0 standard dri Dawit Sprague MD on 10/09/2020 at 2:28 PM     Finalized by (CST): Pari Gama MD on 10/09/2020 at 2:30 PM          Xr Chest Ap Portable  (cpt=71045)    Result Date: 10/8/2020  CONCLUSION: No significant interval change.   No evidence of acute cardiopulmonary di difficulties involved in protecting the femur from re-fracture given her history of a long supracondylar femur plate and screws which prevent utilization of a long interlocking intramedullary nail.   We also discussed the increased risk of infection as gabriel intertrochanteric region. She has also been experiencing knee pain and we will get dedicated x-rays of the knee as well.   No acute bone changes were seen on the above radiographs.     In view of her chronic Plavix and aspirin usage, the Plavix just stoppe

## 2020-10-11 PROCEDURE — 99233 SBSQ HOSP IP/OBS HIGH 50: CPT | Performed by: INTERNAL MEDICINE

## 2020-10-11 RX ORDER — METOCLOPRAMIDE 10 MG/1
5 TABLET ORAL EVERY 8 HOURS PRN
Status: DISCONTINUED | OUTPATIENT
Start: 2020-10-11 | End: 2020-10-20

## 2020-10-11 RX ORDER — SODIUM CHLORIDE 9 MG/ML
INJECTION, SOLUTION INTRAVENOUS CONTINUOUS
Status: DISCONTINUED | OUTPATIENT
Start: 2020-10-11 | End: 2020-10-13

## 2020-10-11 RX ORDER — POTASSIUM CHLORIDE 1.5 G/1.77G
40 POWDER, FOR SOLUTION ORAL EVERY 4 HOURS
Status: COMPLETED | OUTPATIENT
Start: 2020-10-11 | End: 2020-10-11

## 2020-10-11 NOTE — PROGRESS NOTES
[facility]    Progress Note    Bhavin Colvin Patient Status:  Inpatient    1928 MRN [id number]   Location [facility] 4W/SW/SE Attending Jarvis Ruiz MD   [facility] Day # 3 PCP Babar Laura MD     Date of Admission: per tab 1 tablet, 1 tablet, Oral, Once    •  triamcinolone acetonide (KENALOG) 0.1 % cream, , Topical, BID    •  cyclobenzaprine (FLEXERIL) tab 5 mg, 5 mg, Oral, TID PRN    •  HYDROcodone-acetaminophen (NORCO) 5-325 MG per tab 1 tablet, 1 tablet, Oral, Q6H LUMPECTOMY LEFT     • RADIATION LEFT        Social History:  Social History    Tobacco Use      Smoking status: Former Smoker        Packs/day: 1.00        Years: 20.00        Pack years: 20        Types: Cigarettes      Smokeless tobacco: Former User    A 10/09/2020 at 2:28 PM     Finalized by (CST): Anthony Larkin MD on 10/09/2020 at 2:30 PM           Ekg 12-lead    Result Date: 10/9/2020  ECG Report  Interpretation  -------------------------- Sinus Rhythm -Nonspecific ST depression + Negative T-waves - AB hardware-related problems given her marked osteopenia; increased risk for leg length discrepancy and/or rotational asymmetry (given the deformity she already has secondary to her distal femur fracture and as explained influences of leg length also from the recommend continued treatment for the UTI.     Strict nursing decubitus prophylactic prevention measures will be recommended as well.  A Tim mattress has been ordered and a bed with frame and trapeze has as well to facilitate patient mobilization & shiftin

## 2020-10-11 NOTE — PLAN OF CARE
Pt is Aox4, on bedrest with [place] Traction #5 in place on left leg. Voiding per pulido catheter; Heparin, SCD on RLL for DVT prophylaxis. Plavix on hold for surgery. CARMEN mattress in use.  Right heel boot and sacrum mepilex in place for skin breakdown prevent INTERVENTIONS:  - Encourage pt to monitor pain and request assistance  - Assess pain using appropriate pain scale  - Administer analgesics based on type and severity of pain and evaluate response  - Implement non-pharmacological measures as appropriate and coordinating discharge planning if the patient needs post-hospital services based on physician/LIP order or complex needs related to functional status, cognitive ability or social support system  Outcome: Progressing     Problem: SKIN/TISSUE INTEGRITY - AD

## 2020-10-11 NOTE — PROGRESS NOTES
[facility]    Progress Note    Nereyda George Patient Status:  Inpatient    1928 MRN [id number]   Location [facility] 4W/SW/SE Attending Azeb King,  [facility]  Day # 3 PCP Sarahi Fernandes MD       Subjective:   No Nightly   • triamcinolone acetonide   Topical BID   • Heparin Sodium (Porcine)  5,000 Units Subcutaneous Q12H [facility]       Current PRN Inpatient Meds:      Metoclopramide HCl, cyclobenzaprine, HYDROcodone-acetaminophen, Normal Saline Flush, ondansetron HCl, m 10/9/2020  CONCLUSION:  1. No acute appearing fracture or dislocation. Partially visualized internal fixation hardware transfixing an old healed fracture deformity of the distal femur. Mild tricompartment degenerative narrowing of the knee joint.   [place]

## 2020-10-11 NOTE — PLAN OF CARE
Patient is on bedrest awaiting surgery on 10/14. A/Ox4, VSS. On room air. Bryant catheter in place, saline locked. Tolerating general diet. [place] traction #5 in place to LLE. Patient frequently repositioned in bed.  SCD and heel protector boot in place on RL - See additional Care Plan goals for specific interventions  Outcome: Progressing     Problem: PAIN - ADULT  Goal: Verbalizes/displays adequate comfort level or patient's stated pain goal  Description: INTERVENTIONS:  - Encourage pt to monitor pain and r preferences of patient/family/discharge partner  - Complete POLST form as appropriate  - Assess patient's ability to be responsible for managing their own health  - Refer to Case Management Department for coordinating discharge planning if the patient need in tolerated activity level and precautions  Outcome: Progressing

## 2020-10-11 NOTE — PROGRESS NOTES
[facility] Pharmacy Note: Route Optimization for  Reglan    Patient is currently on  metoclopramide  5 mg IV every 8 hours.    The patient meets the criteria to convert to the oral equivalent as established by the IV to Oral conversion protocol approved by the [employer]

## 2020-10-11 NOTE — SLP NOTE
SPEECH DAILY NOTE - INPATIENT    ASSESSMENT & PLAN   ASSESSMENT   THIS SLP WORE GLOVES, AND DROPLET MASK. HANDS SANITIZED/WASHED UPON ENTRANCE/EXIT. Pt seen to monitor tolerance of PO diet. Daughter, Bonny Dakins, at bedside.   Pt afebrile, tolerating room air Recommendations/Plan: Undetermined    Treatment Plan  Treatment Plan/Recommendations: Aspiration precautions    Interdisciplinary Communication: Discussed with RN  and PCT.             GOALS  Goal #1 The patient will tolerate solid consistency and thin  liq

## 2020-10-12 PROCEDURE — 99233 SBSQ HOSP IP/OBS HIGH 50: CPT | Performed by: INTERNAL MEDICINE

## 2020-10-12 RX ORDER — HYDROMORPHONE HYDROCHLORIDE 1 MG/ML
0.2 INJECTION, SOLUTION INTRAMUSCULAR; INTRAVENOUS; SUBCUTANEOUS EVERY 4 HOURS PRN
Status: DISCONTINUED | OUTPATIENT
Start: 2020-10-12 | End: 2020-10-14

## 2020-10-12 RX ORDER — MORPHINE SULFATE 4 MG/ML
1 INJECTION, SOLUTION INTRAMUSCULAR; INTRAVENOUS
Status: DISCONTINUED | OUTPATIENT
Start: 2020-10-12 | End: 2020-10-12

## 2020-10-12 RX ORDER — MORPHINE SULFATE 4 MG/ML
2 INJECTION, SOLUTION INTRAMUSCULAR; INTRAVENOUS
Status: DISCONTINUED | OUTPATIENT
Start: 2020-10-12 | End: 2020-10-12

## 2020-10-12 NOTE — PROGRESS NOTES
[facility]    Progress Note    Asiyaleslie Canas Patient Status:  Inpatient    1928 MRN [id number]   Location [facility] 4W/SW/SE Attending Yolie Wells,  [facility] Day # 4 PCP Julisa Layne MD       Subjective:   No triamcinolone acetonide   Topical BID   • Heparin Sodium (Porcine)  5,000 Units Subcutaneous Q12H [facility]       Current PRN Inpatient Meds:      HYDROmorphone HCl, Metoclopramide HCl, cyclobenzaprine, HYDROcodone-acetaminophen, Normal Saline Flush, ondansetron 10/09/2020 at 2:47 PM          Xr Knee (1 Or 2 Views), Left (cpt=73560)    Result Date: 10/9/2020  CONCLUSION:  1. No acute appearing fracture or dislocation.   Partially visualized internal fixation hardware transfixing an old healed fracture deformity of Greater than 35 minutes spent, >50% spent counseling re: treatment plan and workup    Eleonora Vega MD

## 2020-10-12 NOTE — PLAN OF CARE
Was able to move and reposition patient after giving Dilaudid as premedication. Readjusted [place] Traction #5 and verified placement of Mepilex dressing over sacrum. Sacrum was red, blanchable. Mepilex resecured.

## 2020-10-12 NOTE — PROGRESS NOTES
S Pt was drowsy from pain medications. Pt's daughter was present and informed [address] that another daughter will arrive soon. O Pt anticipating surgery Wednesday (10/14). A Pt displayed minimal amount of anxiety regarding upcoming procedure.   Presence an

## 2020-10-12 NOTE — PLAN OF CARE
Pt is Aox4, currently on bedrest with [place] Traction #5 applied; Voiding per pulido catheter; SCD on RLL and Heparin for DVT prophylaxis. CARMEN mattress in use.  Norco given for pain management and Morphine given as premedication for movement/adjustment in bed goals for specific interventions  10/12/2020 0233 by She Solo RN  Outcome: Progressing  10/12/2020 0226 by She Solo RN  Outcome: Progressing  Goal: Patient/Family Short Term Goal  Description: Patient's Short Term Goal: I would like my pain t of injury  - Provide assistive devices as appropriate  - Consider OT/PT consult to assist with strengthening/mobility  - Encourage toileting schedule  10/12/2020 0233 by Ward Sales RN  Outcome: Progressing  10/12/2020 0226 by ARLETTE Kenyon pain levels when moving. Attempting to premedicate patient has had no effect and patient verbalizes understanding of importance of skin integrity.       Problem: MUSCULOSKELETAL - ADULT  Goal: Return mobility to safest level of function  Description: INTERV

## 2020-10-12 NOTE — PROGRESS NOTES
[facility]    Progress Note    Nereyda George Patient Status:  Inpatient    1928 MRN [id number]   Location [facility] 4W/SW/SE Attending Azeb King MD   [facility] Day # 4 PCP Sarahi Fernandes MD     Date of Admission: (Toprol XL) 24 hr tab 25 mg, 25 mg, Oral, Daily    •  Rosuvastatin Calcium (CRESTOR) tab 20 mg, 20 mg, Oral, Nightly    •  [COMPLETED] Sulfamethoxazole-TMP DS (BACTRIM DS) 800-160 MG per tab 1 tablet, 1 tablet, Oral, Once    •  triamcinolone acetonide (DEBI LAPAROSCOPIC CHOLECYSTECTOMY     • LUMPECTOMY LEFT     • RADIATION LEFT        Social History:  Social History    Tobacco Use      Smoking status: Former Smoker        Packs/day: 1.00        Years: 20.00        Pack years: 20        Types: Cigarettes as the potential benefits and risks of various treatment options including interlocking intramedullary femoral nailing.  We discussed the concerns and technical difficulties involved in protecting the femur from re-fracture given her history of a long supr ordering additional x-rays of the femur and knee to assess the supracondylar femoral plate and screws and its relationship to the femoral diaphysis and proximally the intertrochanteric region.  She has also been experiencing knee pain and we will get dedic

## 2020-10-12 NOTE — CM/SW NOTE
CHRIS received call from RN who states pt's dtr is requesting to discuss [facility]. SW met pt and dtr in room to discuss Acute Rehab vs [facility]. Dtr would like pt to DC to Acute Rehab.  CHRIS advised that pt may not be appropriate for Acute rehab but

## 2020-10-12 NOTE — SLP NOTE
SPEECH DAILY NOTE - INPATIENT    ASSESSMENT & PLAN   ASSESSMENT   THIS SLP WORE GLOVES, AND DROPLET MASK. HANDS SANITIZED/WASHED UPON ENTRANCE/EXIT. Pt seen to monitor tolerance of PO diet.    Pt afebrile, tolerating room air and sitting 75 degrees in b aspiration during session. Pt and RN report no clinical signs of aspiration.     In Progress   Goal #2 The patient will utilize compensatory strategies as outlined by  BSSE (clinical evaluation) including Slow rate, Small bites, Small sips, Upright 90 degr

## 2020-10-13 ENCOUNTER — TELEPHONE (OUTPATIENT)
Dept: CARDIOLOGY | Age: 85
End: 2020-10-13

## 2020-10-13 PROCEDURE — 99233 SBSQ HOSP IP/OBS HIGH 50: CPT | Performed by: INTERNAL MEDICINE

## 2020-10-13 RX ORDER — POLYETHYLENE GLYCOL 3350 17 G/17G
17 POWDER, FOR SOLUTION ORAL DAILY PRN
Status: DISCONTINUED | OUTPATIENT
Start: 2020-10-13 | End: 2020-10-14

## 2020-10-13 RX ORDER — AMLODIPINE BESYLATE 10 MG/1
10 TABLET ORAL DAILY
Status: DISCONTINUED | OUTPATIENT
Start: 2020-10-14 | End: 2020-10-20

## 2020-10-13 RX ORDER — SODIUM CHLORIDE AND POTASSIUM CHLORIDE .9; .15 G/100ML; G/100ML
SOLUTION INTRAVENOUS CONTINUOUS
Status: DISCONTINUED | OUTPATIENT
Start: 2020-10-14 | End: 2020-10-15

## 2020-10-13 RX ORDER — BISACODYL 10 MG
10 SUPPOSITORY, RECTAL RECTAL
Status: DISCONTINUED | OUTPATIENT
Start: 2020-10-13 | End: 2020-10-14

## 2020-10-13 NOTE — PROGRESS NOTES
S Pt's daughter was present and attentive. Pt was in good spirits. O Pt is anticipating hip surgery tomorrow (10/14). A Prayed with and for pt, engaged in active listening and non-anxious presence. P Will follow up Thursday (10/15).        10/13/2

## 2020-10-13 NOTE — PROGRESS NOTES
S Pt is in some discomfort, anticipating arrival of other daughter this afternoon. O Pt anticipating surgery tomorrow (10/14). A Pt appreciates presence and prayer. P Will stop by later in the day when daughter has arrived for pre-op.      10/13/20

## 2020-10-13 NOTE — PROGRESS NOTES
[facility]    Progress Note    Uri Tanner Patient Status:  Inpatient    1928 MRN [id number]   Location [facility] 4W/SW/SE Attending Obed Keen, [address] [facility] Day # 5 PCP Reed Kerr MD     Date of Admission: (SYNTHROID) tab 100 mcg, 100 mcg, Oral, QAM AC    •  Metoprolol Succinate ER (Toprol XL) 24 hr tab 25 mg, 25 mg, Oral, Daily    •  Rosuvastatin Calcium (CRESTOR) tab 20 mg, 20 mg, Oral, Nightly    •  [COMPLETED] Sulfamethoxazole-TMP DS (BACTRIM DS) 800-160 Performed by Jesus Calvo MD at [facility] OR   • HYSTERECTOMY     • LAPAROSCOPIC CHOLECYSTECTOMY     • LUMPECTOMY LEFT     • RADIATION LEFT        Social History:  Social History    Tobacco Use      Smoking status: Former Smoker        Packs/day: 1.00 patient and her daughter as well as the potential benefits and risks of various treatment options including interlocking intramedullary femoral nailing.  We discussed the concerns and technical difficulties involved in protecting the femur from re-fracture surgical treatment.     In view of her chronic Plavix and aspirin usage, the Plavix just stopped within the last 24 hours, would recommend holding off on the Plavix pending cardiology consultation and DVT prophylaxis.  We will place the patient on the OR s

## 2020-10-13 NOTE — CM/SW NOTE
[facility] DAWIT Proposal:  Met with patient at bedside to explain the BPCI/Medicare program. Patient was enrolled under DRG    536 . BPCI/Medicare Letter provided.     [id number] [facility] Proposal  Add FactorsSee All FactorsDecision   Kane  Ambulato

## 2020-10-13 NOTE — PLAN OF CARE
Problem: Patient Centered Care  Goal: Patient preferences are identified and integrated in the patient's plan of care  Description: Interventions:  - What would you like us to know as we care for you?  I live at [facility].  - Provide timely, c effects  - Notify MD/LIP if interventions unsuccessful or patient reports new pain  - Anticipate increased pain with activity and pre-medicate as appropriate  Outcome: Progressing     Problem: SAFETY ADULT - FALL  Goal: Free from fall injury  Description: and/or skin breakdown  - Initiate interventions, skin care algorithm/standards of care as needed  Outcome: Progressing     Problem: MUSCULOSKELETAL - ADULT  Goal: Return mobility to safest level of function  Description: INTERVENTIONS:  - Assess patient st

## 2020-10-13 NOTE — PROGRESS NOTES
[place] [facility]    Progress Note    Leanderrush Andrea Patient Status:  Inpatient    1928 MRN [id number]   Location [facility] 4W/SW/SE Attending Roxanna Andino,  [facility]  Day # 5 PCP Don Mcpherson MD       Subjective:   No Succinate ER  25 mg Oral Daily   • Rosuvastatin Calcium  20 mg Oral Nightly   • triamcinolone acetonide   Topical BID   • Heparin Sodium (Porcine)  5,000 Units Subcutaneous Q12H [address]       Current PRN Inpatient Meds:      HYDROmorphone HCl, Metoclopramide H MD on 10/09/2020 at 2:46 PM     Finalized by (CST): Timothy Diaz MD on 10/09/2020 at 2:47 PM          Xr Knee (1 Or 2 Views), Left (cpt=73560)    Result Date: 10/9/2020  CONCLUSION:  1. No acute appearing fracture or dislocation.   Partially visualized inte growth    Hypothyroid  - Cont synthroid    Other Issues  HLD  PAD  CAD      Greater than 35 minutes spent, >50% spent counseling re: treatment plan and workup    Don Sandifer, MD

## 2020-10-14 ENCOUNTER — ANESTHESIA (OUTPATIENT)
Dept: SURGERY | Facility: HOSPITAL | Age: 85
DRG: 481 | End: 2020-10-14
Payer: MEDICARE

## 2020-10-14 ENCOUNTER — ANESTHESIA EVENT (OUTPATIENT)
Dept: SURGERY | Facility: HOSPITAL | Age: 85
DRG: 481 | End: 2020-10-14
Payer: MEDICARE

## 2020-10-14 ENCOUNTER — APPOINTMENT (OUTPATIENT)
Dept: GENERAL RADIOLOGY | Facility: HOSPITAL | Age: 85
DRG: 481 | End: 2020-10-14
Attending: ORTHOPAEDIC SURGERY
Payer: MEDICARE

## 2020-10-14 PROCEDURE — 76000 FLUOROSCOPY <1 HR PHYS/QHP: CPT | Performed by: ORTHOPAEDIC SURGERY

## 2020-10-14 PROCEDURE — 0QS706Z REPOSITION LEFT UPPER FEMUR WITH INTRAMEDULLARY INTERNAL FIXATION DEVICE, OPEN APPROACH: ICD-10-PCS | Performed by: ORTHOPAEDIC SURGERY

## 2020-10-14 PROCEDURE — 99233 SBSQ HOSP IP/OBS HIGH 50: CPT | Performed by: HOSPITALIST

## 2020-10-14 PROCEDURE — 0QP704Z REMOVAL OF INTERNAL FIXATION DEVICE FROM LEFT UPPER FEMUR, OPEN APPROACH: ICD-10-PCS | Performed by: ORTHOPAEDIC SURGERY

## 2020-10-14 RX ORDER — NALOXONE HYDROCHLORIDE 0.4 MG/ML
80 INJECTION, SOLUTION INTRAMUSCULAR; INTRAVENOUS; SUBCUTANEOUS AS NEEDED
Status: DISCONTINUED | OUTPATIENT
Start: 2020-10-14 | End: 2020-10-14 | Stop reason: HOSPADM

## 2020-10-14 RX ORDER — PHENYLEPHRINE HCL 10 MG/ML
VIAL (ML) INJECTION AS NEEDED
Status: DISCONTINUED | OUTPATIENT
Start: 2020-10-14 | End: 2020-10-14 | Stop reason: SURG

## 2020-10-14 RX ORDER — METOPROLOL TARTRATE 5 MG/5ML
2.5 INJECTION INTRAVENOUS ONCE
Status: DISCONTINUED | OUTPATIENT
Start: 2020-10-14 | End: 2020-10-14 | Stop reason: HOSPADM

## 2020-10-14 RX ORDER — POLYETHYLENE GLYCOL 3350 17 G/17G
17 POWDER, FOR SOLUTION ORAL DAILY PRN
Status: DISCONTINUED | OUTPATIENT
Start: 2020-10-14 | End: 2020-10-20

## 2020-10-14 RX ORDER — FAMOTIDINE 20 MG/1
20 TABLET ORAL DAILY
Status: DISCONTINUED | OUTPATIENT
Start: 2020-10-14 | End: 2020-10-19

## 2020-10-14 RX ORDER — GLYCOPYRROLATE 0.2 MG/ML
INJECTION, SOLUTION INTRAMUSCULAR; INTRAVENOUS AS NEEDED
Status: DISCONTINUED | OUTPATIENT
Start: 2020-10-14 | End: 2020-10-14 | Stop reason: SURG

## 2020-10-14 RX ORDER — ONDANSETRON 2 MG/ML
4 INJECTION INTRAMUSCULAR; INTRAVENOUS EVERY 6 HOURS PRN
Status: DISCONTINUED | OUTPATIENT
Start: 2020-10-14 | End: 2020-10-17

## 2020-10-14 RX ORDER — HYDROMORPHONE HYDROCHLORIDE 1 MG/ML
0.2 INJECTION, SOLUTION INTRAMUSCULAR; INTRAVENOUS; SUBCUTANEOUS EVERY 2 HOUR PRN
Status: DISCONTINUED | OUTPATIENT
Start: 2020-10-14 | End: 2020-10-20

## 2020-10-14 RX ORDER — MORPHINE SULFATE 4 MG/ML
4 INJECTION, SOLUTION INTRAMUSCULAR; INTRAVENOUS EVERY 10 MIN PRN
Status: DISCONTINUED | OUTPATIENT
Start: 2020-10-14 | End: 2020-10-14 | Stop reason: HOSPADM

## 2020-10-14 RX ORDER — ONDANSETRON 2 MG/ML
4 INJECTION INTRAMUSCULAR; INTRAVENOUS ONCE AS NEEDED
Status: DISCONTINUED | OUTPATIENT
Start: 2020-10-14 | End: 2020-10-14 | Stop reason: HOSPADM

## 2020-10-14 RX ORDER — SODIUM PHOSPHATE, DIBASIC AND SODIUM PHOSPHATE, MONOBASIC 7; 19 G/133ML; G/133ML
1 ENEMA RECTAL ONCE AS NEEDED
Status: DISCONTINUED | OUTPATIENT
Start: 2020-10-14 | End: 2020-10-20

## 2020-10-14 RX ORDER — CLINDAMYCIN PHOSPHATE 150 MG/ML
INJECTION, SOLUTION INTRAVENOUS AS NEEDED
Status: DISCONTINUED | OUTPATIENT
Start: 2020-10-14 | End: 2020-10-14 | Stop reason: SURG

## 2020-10-14 RX ORDER — HYDROCODONE BITARTRATE AND ACETAMINOPHEN 7.5; 325 MG/1; MG/1
1 TABLET ORAL EVERY 4 HOURS PRN
Status: DISCONTINUED | OUTPATIENT
Start: 2020-10-14 | End: 2020-10-20

## 2020-10-14 RX ORDER — NEOSTIGMINE METHYLSULFATE 1 MG/ML
INJECTION INTRAVENOUS AS NEEDED
Status: DISCONTINUED | OUTPATIENT
Start: 2020-10-14 | End: 2020-10-14 | Stop reason: SURG

## 2020-10-14 RX ORDER — DOCUSATE SODIUM 100 MG/1
100 CAPSULE, LIQUID FILLED ORAL 2 TIMES DAILY
Status: DISCONTINUED | OUTPATIENT
Start: 2020-10-14 | End: 2020-10-20

## 2020-10-14 RX ORDER — HYDROCODONE BITARTRATE AND ACETAMINOPHEN 5; 325 MG/1; MG/1
1 TABLET ORAL AS NEEDED
Status: DISCONTINUED | OUTPATIENT
Start: 2020-10-14 | End: 2020-10-14 | Stop reason: HOSPADM

## 2020-10-14 RX ORDER — HYDROMORPHONE HYDROCHLORIDE 1 MG/ML
0.4 INJECTION, SOLUTION INTRAMUSCULAR; INTRAVENOUS; SUBCUTANEOUS EVERY 5 MIN PRN
Status: DISCONTINUED | OUTPATIENT
Start: 2020-10-14 | End: 2020-10-14 | Stop reason: HOSPADM

## 2020-10-14 RX ORDER — SODIUM CHLORIDE, SODIUM LACTATE, POTASSIUM CHLORIDE, CALCIUM CHLORIDE 600; 310; 30; 20 MG/100ML; MG/100ML; MG/100ML; MG/100ML
INJECTION, SOLUTION INTRAVENOUS CONTINUOUS PRN
Status: DISCONTINUED | OUTPATIENT
Start: 2020-10-14 | End: 2020-10-14 | Stop reason: SURG

## 2020-10-14 RX ORDER — HYDROMORPHONE HYDROCHLORIDE 1 MG/ML
0.2 INJECTION, SOLUTION INTRAMUSCULAR; INTRAVENOUS; SUBCUTANEOUS EVERY 5 MIN PRN
Status: DISCONTINUED | OUTPATIENT
Start: 2020-10-14 | End: 2020-10-14 | Stop reason: HOSPADM

## 2020-10-14 RX ORDER — HYDROMORPHONE HYDROCHLORIDE 1 MG/ML
0.4 INJECTION, SOLUTION INTRAMUSCULAR; INTRAVENOUS; SUBCUTANEOUS EVERY 2 HOUR PRN
Status: DISCONTINUED | OUTPATIENT
Start: 2020-10-14 | End: 2020-10-20

## 2020-10-14 RX ORDER — DEXAMETHASONE SODIUM PHOSPHATE 4 MG/ML
VIAL (ML) INJECTION AS NEEDED
Status: DISCONTINUED | OUTPATIENT
Start: 2020-10-14 | End: 2020-10-14 | Stop reason: SURG

## 2020-10-14 RX ORDER — SODIUM CHLORIDE 9 MG/ML
INJECTION, SOLUTION INTRAVENOUS CONTINUOUS PRN
Status: DISCONTINUED | OUTPATIENT
Start: 2020-10-14 | End: 2020-10-14 | Stop reason: SURG

## 2020-10-14 RX ORDER — MORPHINE SULFATE 4 MG/ML
2 INJECTION, SOLUTION INTRAMUSCULAR; INTRAVENOUS EVERY 10 MIN PRN
Status: DISCONTINUED | OUTPATIENT
Start: 2020-10-14 | End: 2020-10-14 | Stop reason: HOSPADM

## 2020-10-14 RX ORDER — LIDOCAINE HYDROCHLORIDE 10 MG/ML
INJECTION, SOLUTION EPIDURAL; INFILTRATION; INTRACAUDAL; PERINEURAL AS NEEDED
Status: DISCONTINUED | OUTPATIENT
Start: 2020-10-14 | End: 2020-10-14 | Stop reason: SURG

## 2020-10-14 RX ORDER — FAMOTIDINE 10 MG/ML
20 INJECTION, SOLUTION INTRAVENOUS DAILY
Status: DISCONTINUED | OUTPATIENT
Start: 2020-10-14 | End: 2020-10-19

## 2020-10-14 RX ORDER — MORPHINE SULFATE 10 MG/ML
6 INJECTION, SOLUTION INTRAMUSCULAR; INTRAVENOUS EVERY 10 MIN PRN
Status: DISCONTINUED | OUTPATIENT
Start: 2020-10-14 | End: 2020-10-14 | Stop reason: HOSPADM

## 2020-10-14 RX ORDER — PROCHLORPERAZINE EDISYLATE 5 MG/ML
5 INJECTION INTRAMUSCULAR; INTRAVENOUS ONCE AS NEEDED
Status: DISCONTINUED | OUTPATIENT
Start: 2020-10-14 | End: 2020-10-14 | Stop reason: HOSPADM

## 2020-10-14 RX ORDER — HYDROMORPHONE HYDROCHLORIDE 1 MG/ML
0.8 INJECTION, SOLUTION INTRAMUSCULAR; INTRAVENOUS; SUBCUTANEOUS EVERY 2 HOUR PRN
Status: DISCONTINUED | OUTPATIENT
Start: 2020-10-14 | End: 2020-10-20

## 2020-10-14 RX ORDER — HYDROMORPHONE HYDROCHLORIDE 1 MG/ML
0.6 INJECTION, SOLUTION INTRAMUSCULAR; INTRAVENOUS; SUBCUTANEOUS EVERY 5 MIN PRN
Status: DISCONTINUED | OUTPATIENT
Start: 2020-10-14 | End: 2020-10-14 | Stop reason: HOSPADM

## 2020-10-14 RX ORDER — BISACODYL 10 MG
10 SUPPOSITORY, RECTAL RECTAL
Status: DISCONTINUED | OUTPATIENT
Start: 2020-10-14 | End: 2020-10-20

## 2020-10-14 RX ORDER — ROCURONIUM BROMIDE 10 MG/ML
INJECTION, SOLUTION INTRAVENOUS AS NEEDED
Status: DISCONTINUED | OUTPATIENT
Start: 2020-10-14 | End: 2020-10-14 | Stop reason: SURG

## 2020-10-14 RX ORDER — HYDROCODONE BITARTRATE AND ACETAMINOPHEN 5; 325 MG/1; MG/1
2 TABLET ORAL AS NEEDED
Status: DISCONTINUED | OUTPATIENT
Start: 2020-10-14 | End: 2020-10-14 | Stop reason: HOSPADM

## 2020-10-14 RX ORDER — SODIUM CHLORIDE, SODIUM LACTATE, POTASSIUM CHLORIDE, CALCIUM CHLORIDE 600; 310; 30; 20 MG/100ML; MG/100ML; MG/100ML; MG/100ML
INJECTION, SOLUTION INTRAVENOUS CONTINUOUS
Status: DISCONTINUED | OUTPATIENT
Start: 2020-10-14 | End: 2020-10-14 | Stop reason: HOSPADM

## 2020-10-14 RX ADMIN — ROCURONIUM BROMIDE 35 MG: 10 INJECTION, SOLUTION INTRAVENOUS at 07:39:00

## 2020-10-14 RX ADMIN — DEXAMETHASONE SODIUM PHOSPHATE 4 MG: 4 MG/ML VIAL (ML) INJECTION at 08:36:00

## 2020-10-14 RX ADMIN — CLINDAMYCIN PHOSPHATE 900 MG: 150 INJECTION, SOLUTION INTRAVENOUS at 08:44:00

## 2020-10-14 RX ADMIN — LIDOCAINE HYDROCHLORIDE 50 MG: 10 INJECTION, SOLUTION EPIDURAL; INFILTRATION; INTRACAUDAL; PERINEURAL at 07:39:00

## 2020-10-14 RX ADMIN — SODIUM CHLORIDE, SODIUM LACTATE, POTASSIUM CHLORIDE, CALCIUM CHLORIDE: 600; 310; 30; 20 INJECTION, SOLUTION INTRAVENOUS at 09:54:00

## 2020-10-14 RX ADMIN — SODIUM CHLORIDE: 9 INJECTION, SOLUTION INTRAVENOUS at 09:53:00

## 2020-10-14 RX ADMIN — SODIUM CHLORIDE, SODIUM LACTATE, POTASSIUM CHLORIDE, CALCIUM CHLORIDE: 600; 310; 30; 20 INJECTION, SOLUTION INTRAVENOUS at 07:30:00

## 2020-10-14 RX ADMIN — SODIUM CHLORIDE: 9 INJECTION, SOLUTION INTRAVENOUS at 07:54:00

## 2020-10-14 RX ADMIN — GLYCOPYRROLATE 0.2 MG: 0.2 INJECTION, SOLUTION INTRAMUSCULAR; INTRAVENOUS at 10:28:00

## 2020-10-14 RX ADMIN — SODIUM CHLORIDE: 9 INJECTION, SOLUTION INTRAVENOUS at 10:34:00

## 2020-10-14 RX ADMIN — NEOSTIGMINE METHYLSULFATE 2 MG: 1 INJECTION INTRAVENOUS at 10:28:00

## 2020-10-14 RX ADMIN — ROCURONIUM BROMIDE 15 MG: 10 INJECTION, SOLUTION INTRAVENOUS at 09:03:00

## 2020-10-14 RX ADMIN — ONDANSETRON 4 MG: 2 INJECTION INTRAMUSCULAR; INTRAVENOUS at 10:31:00

## 2020-10-14 RX ADMIN — PHENYLEPHRINE HCL 100 MCG: 10 MG/ML VIAL (ML) INJECTION at 07:58:00

## 2020-10-14 RX ADMIN — PHENYLEPHRINE HCL 100 MCG: 10 MG/ML VIAL (ML) INJECTION at 08:02:00

## 2020-10-14 RX ADMIN — SODIUM CHLORIDE, SODIUM LACTATE, POTASSIUM CHLORIDE, CALCIUM CHLORIDE: 600; 310; 30; 20 INJECTION, SOLUTION INTRAVENOUS at 10:34:00

## 2020-10-14 NOTE — PROGRESS NOTES
[facility] Pharmacy Note:  Renal Dose Adjustment    Pavithra POTTER Zaire Cavazos has been prescribed famotidine (PEPCID) 20 mg intravenously and orally every 12 hours. Estimated Creatinine Clearance: 49.6 mL/min (based on SCr of 0.56 mg/dL).     Calculated creatinine carolyn

## 2020-10-14 NOTE — ANESTHESIA PREPROCEDURE EVALUATION
Anesthesia PreOp Note    HPI:     Uri Tanner is a 80year old female who presents for preoperative consultation requested by: Ean Mars MD    Date of Surgery: 10/8/2020 - 10/14/2020    Procedure(s):  ORIF INTER/SUB [place] osteoarthritis of knees, bilateral 7/12/2017   • Primary osteoarthritis of right knee 11/12/2018   • Rheumatic fever/heart disease age 12   • Visual impairment        Past Surgical History:   Procedure Laterality Date   • ANGIOPLASTY (CORONARY)  06- MG Oral Cap, Take by mouth., Disp: , Rfl: , 10/8/2020 at Unknown time    •  Rosuvastatin Calcium 20 MG Oral Tab, TAKE 1 TABLET AT BEDTIME., Disp: , Rfl: , 10/7/2020 at Unknown time        •  0.9 % NaCl with KCl 20 mEq infusion, , Intravenous, Continuous, H (FLEXERIL) tab 5 mg, 5 mg, Oral, TID PRN, Jose Van MD, 5 mg at 10/12/20 1836    •  [MAR Hold] HYDROcodone-acetaminophen ([facility]) 5-325 MG per tab 1 tablet, 1 tablet, Oral, Q6H PRN, Jose Van MD, 1 tablet at 10/14/20 0330    •  [MAR Hold status: Former Smoker        Packs/day: 1.00        Years: 20.00        Pack years: 20        Types: Cigarettes      Smokeless tobacco: Former User    Substance and Sexual Activity      Alcohol use:  Yes        Alcohol/week: 0.0 standard drinks        Comme 10/14/2020    RDW 12.7 10/14/2020    RDW 12.7 10/14/2020    .0 10/14/2020    .0 10/14/2020     Lab Results   Component Value Date     (L) 10/12/2020    K 4.0 10/12/2020    CL 97 (L) 10/12/2020    CO2 29.0 10/12/2020    BUN 14 10/12/2020 7:16 AM

## 2020-10-14 NOTE — PROGRESS NOTES
[facility]    Progress Note    Afshan Tran Patient Status:  Inpatient    1928 MRN [id number]   Location [facility] 4W/SW/SE Attending Jerrell Beck Day # 6 PCP Bishnu Rubio MD        Subjective: growth     Hypothyroid  - Cont synthroid     HLD    PAD    CAD no cp/no sob    Rheumatic fever with systolic murmur    Xray images reviewed    45 min spent on pt of which 35 min spent coordinating care with sw/nurse and counseling pt and with her clarita

## 2020-10-14 NOTE — PLAN OF CARE
Problem: Patient Centered Care  Goal: Patient preferences are identified and integrated in the patient's plan of care  Description: Interventions:  - What would you like us to know as we care for you?  I live at [address] [place] [address]  - Provide timely, c effects  - Notify MD/LIP if interventions unsuccessful or patient reports new pain  - Anticipate increased pain with activity and pre-medicate as appropriate  Outcome: Progressing     Problem: SAFETY ADULT - FALL  Goal: Free from fall injury  Description: and/or skin breakdown  - Initiate interventions, skin care algorithm/standards of care as needed  Outcome: Progressing     Problem: MUSCULOSKELETAL - ADULT  Goal: Return mobility to safest level of function  Description: INTERVENTIONS:  - Assess patient st

## 2020-10-14 NOTE — PARENT/FAMILY INTERACTION NOTE
Spoke with pt's daughter Madisyn Dawson about change in surgery time, scheduled on 10/14 for 3:25pm then changed to 7:30 am. Daughter was unaware of this and very upset that no one had informed her earlier since she lives about two hours away.  Madisyn Dawson had also stated

## 2020-10-14 NOTE — CONSULTS
Physician Medicine & Rehabilitation Consult Note         SUBJECTIVE:    Chief Complaint: To assess rehabilitation needs following Mobility and ADL dysfunction s/p Closed fracture of left hip, initial encounter [facility]) as per request of Dr. Shena Ann.     Hist 5/28/2009   • Primary osteoarthritis of knees, bilateral 7/12/2017   • Primary osteoarthritis of right knee 11/12/2018   • Rheumatic fever/heart disease age 12   • Visual impairment        Past Surgical History:   Procedure Laterality Date   • ANGIOPLASTY post menopause   • Breast Cancer Maternal Cousin Female         post menopause   • Breast Cancer Maternal Cousin Female         post menopause        Social History    Tobacco Use      Smoking status: Former Smoker        Packs/day: 1.00        Years: 21 Sitting: Fair-  Dynamic Sitting: Poor  Static Standing: Poor  Dynamic Standing: Poor     FUNCTIONAL ADL ASSESSMENT  Grooming: SBA at chair level  Toileting: NT  Upper Body Dressing: NT  Lower Body Dressing: Max assist socks.      Review of systems: As per H 12.8 11.5*   < > 11.3* 10.9* 11.7*  11.7* 11.7*   HCT 37.9 35.0   < > 33.8* 32.6* 34.6*  34.6*  --    MCV 93.3 94.6   < > 92.3 93.4 93.8  93.8  --    MCH 31.5 31.1   < > 30.9 31.2 31.7  31.7  --    MCHC 33.8 32.9   < > 33.4 33.4 33.8  33.8  --    RDW 12.5 than available in other levels of rehab care to manage concomitant medical issues, maintain medical stability and prevent complications, assure adequate pain control, rehabilitation team coordination for an efficient program and optimal outcomeRisks if pat

## 2020-10-14 NOTE — ANESTHESIA PROCEDURE NOTES
Airway  Date/Time: 10/14/2020 7:44 AM  Urgency: elective    Airway not difficult    General Information and Staff    Patient location during procedure: OR  Anesthesiologist: Tisha King MD  Resident/CRNA: Liz Schultz CRNA  Performed: Alexis Bolaños

## 2020-10-14 NOTE — ANESTHESIA POSTPROCEDURE EVALUATION
Patient: Ricardo Bergman    Procedure Summary     Date: 10/14/20 Room / Location: [address] [place] [address] MAIN OR 12 / [address] [place] [address] MAIN OR    Anesthesia Start: 0730 Anesthesia Stop: 1104    Procedure: ORIF INTER/SUB TROCH FRACTURE (Left ) Diagnosis: (left interochanteric femur fra

## 2020-10-14 NOTE — PLAN OF CARE
Problem: Patient Centered Care  Goal: Patient preferences are identified and integrated in the patient's plan of care  Description: Interventions:  - What would you like us to know as we care for you?  I live at [facility].  - Provide timely, c effects  - Notify MD/LIP if interventions unsuccessful or patient reports new pain  - Anticipate increased pain with activity and pre-medicate as appropriate  Outcome: Progressing     Problem: SAFETY ADULT - FALL  Goal: Free from fall injury  Description: and/or skin breakdown  - Initiate interventions, skin care algorithm/standards of care as needed  Outcome: Progressing     Problem: MUSCULOSKELETAL - ADULT  Goal: Return mobility to safest level of function  Description: INTERVENTIONS:  - Assess patient st

## 2020-10-14 NOTE — CM/SW NOTE
SW spoke to pt's two daughters in the pt's room in regards to discharge placement. They are wanting more options for [facility] instead of [facility]. Expanded [facility] referrals.  Family is requesting some facilities in [place] [address],

## 2020-10-14 NOTE — BRIEF OP NOTE
[facility]   Brief Op Note    Patients Name: Dona Arriaga  Attending Physician: Jose Maria Krueger,*  Operating Physician: Ronda Colon MD  CSN: [id number]     Location:  OR  MRN: [id number]    YOB: 1928

## 2020-10-14 NOTE — OPERATIVE REPORT
Operative Report     Pre-Operative Diagnosis:Left interochanteric femur fracture with history of distal femur fracture     Post-Operative Diagnosis: Same as above.     Procedure Performed: Procedure(s):  Left interlocking intramedullary femoral nailing wit not of pelvic obliquity/scoliosis, osteoarthritis of the hip on the ipsilateral and/or contralateral side; osteoarthritis of the knees, etc.); anesthetic-related complications; neurovascular injuries, etc.  We discussed also the increased risk for cardiova and extending proximally 6-7 cm.  Dissection was carried down through subcutaneous tissue.  Hemostasis was obtained with electric Bovie cautery.  Dissection was carried down through the tensor fascia anthony.  A cannulated curved awl was placed over the tip o positioned around the center of the neck.  An approximately 5-6 cm incision was made overlying the position of the helical blade bushing up against the lateral skin.  The dissection was carried down through subcutaneous tissue.  Hemostasis was obtained wit fit was obtained.   Intraoperative C-arm demonstrated excellent position alignment and length and multiple C-arm fluoroscopic views and permanent x-rays, AP and lateral planes of the fracture site and extent of the interlocking, intramedullary nail demonstr

## 2020-10-15 PROCEDURE — 99233 SBSQ HOSP IP/OBS HIGH 50: CPT | Performed by: HOSPITALIST

## 2020-10-15 RX ORDER — TRAMADOL HYDROCHLORIDE 50 MG/1
50 TABLET ORAL EVERY 6 HOURS PRN
Status: DISCONTINUED | OUTPATIENT
Start: 2020-10-15 | End: 2020-10-20

## 2020-10-15 RX ORDER — FUROSEMIDE 10 MG/ML
20 INJECTION INTRAMUSCULAR; INTRAVENOUS 4 TIMES DAILY
Status: DISCONTINUED | OUTPATIENT
Start: 2020-10-15 | End: 2020-10-15

## 2020-10-15 RX ORDER — SODIUM CHLORIDE 9 MG/ML
INJECTION, SOLUTION INTRAVENOUS CONTINUOUS
Status: DISCONTINUED | OUTPATIENT
Start: 2020-10-15 | End: 2020-10-20

## 2020-10-15 NOTE — PLAN OF CARE
Problem: Patient Centered Care  Goal: Patient preferences are identified and integrated in the patient's plan of care  Description: Interventions:  - What would you like us to know as we care for you?  I live at [facility].  - Provide timely, c effects  - Notify MD/LIP if interventions unsuccessful or patient reports new pain  - Anticipate increased pain with activity and pre-medicate as appropriate  Outcome: Progressing     Problem: SAFETY ADULT - FALL  Goal: Free from fall injury  Description: and/or skin breakdown  - Initiate interventions, skin care algorithm/standards of care as needed  Outcome: Progressing     Problem: MUSCULOSKELETAL - ADULT  Goal: Return mobility to safest level of function  Description: INTERVENTIONS:  - Assess patient st

## 2020-10-15 NOTE — PROGRESS NOTES
S Pt's two daughters Sondra Beasley and Everton Charles) were present. Pt seated and comfortable if sitting still, some pain when movement is required. O Pt recuperating following hip surgery.     A Pt and family appreciate compassionate presence, active listening, and pray

## 2020-10-15 NOTE — PLAN OF CARE
Problem: Patient Centered Care  Goal: Patient preferences are identified and integrated in the patient's plan of care  Description: Interventions:  - What would you like us to know as we care for you?  I live at [facility].  - Provide timely, c effects  - Notify MD/LIP if interventions unsuccessful or patient reports new pain  - Anticipate increased pain with activity and pre-medicate as appropriate  Outcome: Progressing     Problem: SAFETY ADULT - FALL  Goal: Free from fall injury  Description: and/or skin breakdown  - Initiate interventions, skin care algorithm/standards of care as needed  Outcome: Progressing     Problem: MUSCULOSKELETAL - ADULT  Goal: Return mobility to safest level of function  Description: INTERVENTIONS:  - Assess patient st

## 2020-10-15 NOTE — OCCUPATIONAL THERAPY NOTE
OCCUPATIONAL THERAPY EVALUATION - INPATIENT      Room Number: 408/408-A  Evaluation Date: 10/15/2020  Type of Evaluation: Initial  Presenting Problem: L hip fx, S/P ORIF    Physician Order: IP Consult to Occupational Therapy  Reason for Therapy: ADL/IADL D impairment may benefit from Sub-acute rehab vs Acute Inpatient Rehab pending progress.      DISCHARGE RECOMMENDATIONS  OT Discharge Recommendations: Acute rehabilitation;Sub-acute rehabilitation(Pending progress)  OT Device Recommendations: TBD    PLAN  OT Independent living facility  Home Layout: One level  Lives With: Alone    Toilet and Equipment: Standard height toilet;Grab bar  Shower/Tub and Equipment: Tub-shower combo; Shower chair;Grab bar  Other Equipment: (Rolator)       Hand Dominance: Right  Drive bedpan or urinal? : A Lot  -   Putting on and taking off regular upper body clothing?: A Little  -   Taking care of personal grooming such as brushing teeth?: A Little  -   Eating meals?: None    AM-PAC Score:  Score: 16  Approx Degree of Impairment: 53.32

## 2020-10-15 NOTE — DIETARY NOTE
ADULT NUTRITION REASSESSMENT    Pt is at moderate nutrition risk. Pt does not meet malnutrition criteria.       RECOMMENDATIONS TO MD:  See Nutrition Intervention     NUTRITION DIAGNOSIS/PROBLEM:  Unintentional weight loss related to physiological causes i High cholesterol, History of blood transfusion, History of breast cancer in female (2000), History of heart bypass surgery (1989), skin cancer, basal cell (2009), Internal derangement of knee, right (8/14/2017), Osteoarthritis, Osteoporosis (11/14/2007), P wt)    MONITOR AND EVALUATE/NUTRITION GOALS:  - Food and Nutrient Intake:      Monitor: adequacy of PO intake, tolerance of PO intake, adequacy of supplement intake and tolerance of supplement intake.   - Anthropometric Measurement:      Monitor weight  - N

## 2020-10-15 NOTE — PLAN OF CARE
Problem: Patient Centered Care  Goal: Patient preferences are identified and integrated in the patient's plan of care  Description: Interventions:  - What would you like us to know as we care for you?  I live at [facility].  - Provide timely, c effects  - Notify MD/LIP if interventions unsuccessful or patient reports new pain  - Anticipate increased pain with activity and pre-medicate as appropriate  Outcome: Progressing     Problem: SAFETY ADULT - FALL  Goal: Free from fall injury  Description: and/or skin breakdown  - Initiate interventions, skin care algorithm/standards of care as needed  Outcome: Progressing     Problem: MUSCULOSKELETAL - ADULT  Goal: Return mobility to safest level of function  Description: INTERVENTIONS:  - Assess patient st

## 2020-10-15 NOTE — PHYSICAL THERAPY NOTE
PHYSICAL THERAPY HIP EVALUATION - INPATIENT     Room Number: 408/408-A  Evaluation Date: 10/15/2020  Type of Evaluation: Initial  Physician Order: PT Eval and Treat    Presenting Problem: L hip ORIF, hardware removal  Reason for Therapy: Mobility Dysfuncti d/c recommendations in the future if pain continues to be an issue with movement. Consulted with RN on patient's functional mobility, 2 person max assist to R side, pt d/c recommendation [facility] vs acute, and patient position up in recliner.          Patient's saphenous vein graft in the past.  She also has chronic kidney disease stage 2 to 3, hyperlipidemia, hypertension, and fell in December of 2018 sustaining a distal femur fracture and nasal fracture at that time.   She said that following that fall, she had revealed fixed defects with no reversible defects. The patient also recently had her diuretic increased from 12.5 mg a day to 25 mg a day. She denies any interval chest pain. No significant shortness of breath.   She feels that if she had her walker, she for moving with me and helping me get up to the chair. \"    PHYSICAL THERAPY EXAMINATION     OBJECTIVE  Precautions: Limb alert - left;Drain(s)  Fall Risk: High fall risk    WEIGHT BEARING RESTRICTION  Weight Bearing Restriction: L lower extremity pumps  Knee extension  Transfer training    Patient End of Session: Up in chair;Needs met;Call light within reach;RN aware of session/findings; All patient questions and concerns addressed    CURRENT GOALS    Goals to be met by: 10/29/20  Patient Goal Ame

## 2020-10-15 NOTE — CM/SW NOTE
CHRIS spoke with pt's dtrs and pt at length at bedside answering questions regarding Acute rehab and Sub Acute rehab. SW presented SNF list. Pt would still like [facility]. PMR pending at this time. If PMR is not recommending Acute rehab.  Pt would like

## 2020-10-15 NOTE — PROGRESS NOTES
[facility]    Progress Note    Anuj Johnson Patient Status:  Inpatient    1928 MRN [id number]   Location [facility] 4W/SW/SE Attending Jerrell Beck Day # 7 PCP Isabelle May MD        Subjective: without bacterial growth     Hypothyroid  - Cont synthroid     HLD    PAD    CAD no cp/no sob    Rheumatic fever with systolic murmur    Xray images reviewed    45 min spent on pt of which 35 min spent coordinating care with sw/nurse and counseling pt and

## 2020-10-15 NOTE — PROGRESS NOTES
[facility]    Progress Note    Anuj Johnson Patient Status:  Inpatient    1928 MRN [id number]   Location [facility] 4W/SW/SE Attending Jerrell Beck Day # 7 PCP Isabelle May MD     Date of Admissio 133 mL, 1 enema, Rectal, Once PRN    •  apixaban (ELIQUIS) tab 2.5 mg, 2.5 mg, Oral, [email or web address]    •  HYDROcodone-acetaminophen (NORCO) 7.5-325 MG per tab 1 tablet, 1 tablet, Oral, Q4H PRN    •  HYDROmorphone HCl (DILAUDID) 1 MG/ML injection 0.2 mg, 0.2 History:  Past Medical History:   Diagnosis Date   • Arthritis    • Balance problem    • Bladder problem    • Cancer [facility])    • Cataract    • Disorder of thyroid    • Essential hypertension    • High blood pressure    • High cholesterol    • History of blo intact. Minimal Hemovac drainage. Bilateral lower extremity swelling left greater than right with Devon signs negative. Her distal neurovascular status is unchanged and intact. There is good color and capillary refill noted.     Laboratory Data:  Lab Re anticoagulation--continue to monitor hemoglobin and hematocrit as well as urine output  PT/OT twice daily.  consultation with regards to discharge planning. Digital transcription software was utilized to produce this note.  The note

## 2020-10-16 ENCOUNTER — APPOINTMENT (OUTPATIENT)
Dept: GENERAL RADIOLOGY | Facility: HOSPITAL | Age: 85
DRG: 481 | End: 2020-10-16
Attending: ORTHOPAEDIC SURGERY
Payer: MEDICARE

## 2020-10-16 PROCEDURE — 99233 SBSQ HOSP IP/OBS HIGH 50: CPT | Performed by: HOSPITALIST

## 2020-10-16 PROCEDURE — 73551 X-RAY EXAM OF FEMUR 1: CPT | Performed by: ORTHOPAEDIC SURGERY

## 2020-10-16 RX ORDER — TRAMADOL HYDROCHLORIDE 50 MG/1
50 TABLET ORAL EVERY 6 HOURS PRN
Qty: 20 TABLET | Refills: 0 | Status: ON HOLD | OUTPATIENT
Start: 2020-10-16 | End: 2021-01-25

## 2020-10-16 RX ORDER — BISACODYL 10 MG
10 SUPPOSITORY, RECTAL RECTAL
Qty: 12 SUPPOSITORY | Refills: 0 | Status: SHIPPED | OUTPATIENT
Start: 2020-10-16 | End: 2021-09-23

## 2020-10-16 RX ORDER — CYCLOBENZAPRINE HCL 5 MG
5 TABLET ORAL 3 TIMES DAILY PRN
Qty: 60 TABLET | Refills: 0 | Status: SHIPPED | OUTPATIENT
Start: 2020-10-16 | End: 2021-02-19

## 2020-10-16 RX ORDER — AMLODIPINE BESYLATE 10 MG/1
10 TABLET ORAL DAILY
Qty: 30 TABLET | Refills: 0 | Status: SHIPPED | OUTPATIENT
Start: 2020-10-17 | End: 2021-02-19

## 2020-10-16 RX ORDER — POLYETHYLENE GLYCOL 3350 17 G/17G
17 POWDER, FOR SOLUTION ORAL DAILY PRN
Qty: 30 EACH | Refills: 0 | Status: SHIPPED | OUTPATIENT
Start: 2020-10-16 | End: 2021-04-20

## 2020-10-16 RX ORDER — HYDROCODONE BITARTRATE AND ACETAMINOPHEN 7.5; 325 MG/1; MG/1
1 TABLET ORAL EVERY 6 HOURS PRN
Qty: 20 TABLET | Refills: 0 | Status: ON HOLD | OUTPATIENT
Start: 2020-10-16 | End: 2021-01-25

## 2020-10-16 RX ORDER — PSEUDOEPHEDRINE HCL 30 MG
100 TABLET ORAL 2 TIMES DAILY PRN
Qty: 60 CAPSULE | Refills: 0 | Status: SHIPPED | OUTPATIENT
Start: 2020-10-16

## 2020-10-16 RX ORDER — LACTULOSE 10 G/15ML
30 SOLUTION ORAL ONCE
Status: COMPLETED | OUTPATIENT
Start: 2020-10-16 | End: 2020-10-16

## 2020-10-16 NOTE — PROGRESS NOTES
[facility]    Progress Note    Lilibeth Arceo Patient Status:  Inpatient    1928 MRN [id number]   Location [facility] 4W/SW/SE Attending Jerrell Beck Day # 8 PCP Irene Arce MD     Date of Admissio suppository 10 mg, 10 mg, Rectal, Daily PRN    •  Fleet Enema (FLEET) 7-19 GM/118ML enema 133 mL, 1 enema, Rectal, Once PRN    •  apixaban (ELIQUIS) tab 2.5 mg, 2.5 mg, Oral, [email or web address]    •  HYDROcodone-acetaminophen (NORCO) 7.5-325 MG per tab 1 tablet PRN        HISTORY:  Past Medical History:  Past Medical History:   Diagnosis Date   • Arthritis    • Balance problem    • Bladder problem    • Cancer [facility])    • Cataract    • Disorder of thyroid    • Essential hypertension    • High blood pressure    • Hi lower extremity. Her dressings are dry and intact. Dressings were changed. There is no active drainage present at this time. There is no induration, fluctuance, nor cellulitis observed.   Minimal Hemovac drainage; drain removed  Homans sign negative  He discontinued today. PT/OT twice daily. Continue to monitor hemoglobin hematocrit on Eliquis and aspirin.    consultation with regards to [facility] care facility transfer.                   Digital transcription software was Breonna Jasmine

## 2020-10-16 NOTE — PLAN OF CARE
Pt is aox3-4, can be forgetful after narcotics and right after waking up, ambulating by stand pivoting to transfer with 2 assist and walker, not able to ambulate and is TTWB to LLE. Voiding per pulido which will be d/c'd per order this AM at 0600.  Savage yen interventions  -Take medication as prescribed  - See additional Care Plan goals for specific interventions  Outcome: Progressing  Goal: Patient/Family Short Term Goal  Description: Patient's Short Term Goal: I would like my pain to be controlled to a level home or other facility with appropriate resources  Description: INTERVENTIONS:  - Identify barriers to discharge w/pt and caregiver  - Include patient/family/discharge partner in discharge planning  - Arrange for needed discharge resources and transportati reinforce with patient and family use of appropriate assistive device and precautions (e.g. spinal or hip dislocation precautions)  Outcome: Progressing     Problem: Impaired Functional Mobility  Goal: Achieve highest/safest level of mobility/gait  Descrip

## 2020-10-16 NOTE — PHYSICAL THERAPY NOTE
PHYSICAL THERAPY TREATMENT NOTE - INPATIENT     Room Number: 756/382-L       Presenting Problem: L hip ORIF, hardware removal    Problem List  Principal Problem:    Closed fracture of left hip, initial encounter [facility])  Active Problems:    Closed fracture o BASIC MOBILITY  How much difficulty does the patient currently have. ..  -   Turning over in bed (including adjusting bedclothes, sheets and blankets)?: A Lot   -   Sitting down on and standing up from a chair with arms (e.g., wheelchair, bedside commode, e #6    Goal #6  Current Status

## 2020-10-16 NOTE — PLAN OF CARE
Problem: Patient Centered Care  Goal: Patient preferences are identified and integrated in the patient's plan of care  Description: Interventions:  - What would you like us to know as we care for you?  I live at [facility].  - Provide timely, c effects  - Notify MD/LIP if interventions unsuccessful or patient reports new pain  - Anticipate increased pain with activity and pre-medicate as appropriate  Outcome: Progressing     Problem: SAFETY ADULT - FALL  Goal: Free from fall injury  Description: and/or skin breakdown  - Initiate interventions, skin care algorithm/standards of care as needed  Outcome: Progressing     Problem: MUSCULOSKELETAL - ADULT  Goal: Return mobility to safest level of function  Description: INTERVENTIONS:  - Assess patient st afternoon. Dr. Mara Camp ordered suppository, lactulose and if no success w BM, enema. At 1530, MD Gina Bravo pulled pt hemovac and changed dressing, and pt assisted back to bed for stat xray per MD order.  When assisting patient back to bed, suppository giv

## 2020-10-16 NOTE — PROGRESS NOTES
[facility]    Progress Note    Blas Blanca Patient Status:  Inpatient    1928 MRN [id number]   Location [facility] 4W/SW/SE Attending Jerrell Beck Day # 8 PCP Hannah Grigsby MD        Subjective: increased amlodipine to 10mg  - Monitor and adjust as needed     Abnormal UA  - pt asymptomatic  - Minimal wbcs  - Urine Cx without bacterial growth     Hypothyroid  - Cont synthroid     HLD    PAD    CAD no cp/no sob    Rheumatic fever with systolic murmu MD  10/14/2020

## 2020-10-17 ENCOUNTER — APPOINTMENT (OUTPATIENT)
Dept: GENERAL RADIOLOGY | Facility: HOSPITAL | Age: 85
DRG: 481 | End: 2020-10-17
Attending: HOSPITALIST
Payer: MEDICARE

## 2020-10-17 PROCEDURE — 74019 RADEX ABDOMEN 2 VIEWS: CPT | Performed by: HOSPITALIST

## 2020-10-17 PROCEDURE — 99233 SBSQ HOSP IP/OBS HIGH 50: CPT | Performed by: HOSPITALIST

## 2020-10-17 PROCEDURE — 99223 1ST HOSP IP/OBS HIGH 75: CPT | Performed by: INTERNAL MEDICINE

## 2020-10-17 RX ORDER — PANTOPRAZOLE SODIUM 40 MG/1
40 TABLET, DELAYED RELEASE ORAL
Qty: 30 TABLET | Refills: 0 | Status: SHIPPED | OUTPATIENT
Start: 2020-10-17 | End: 2021-04-20

## 2020-10-17 RX ORDER — PANTOPRAZOLE SODIUM 40 MG/1
40 TABLET, DELAYED RELEASE ORAL
Status: DISCONTINUED | OUTPATIENT
Start: 2020-10-17 | End: 2020-10-17

## 2020-10-17 RX ORDER — ONDANSETRON 2 MG/ML
4 INJECTION INTRAMUSCULAR; INTRAVENOUS EVERY 4 HOURS PRN
Status: DISCONTINUED | OUTPATIENT
Start: 2020-10-17 | End: 2020-10-20

## 2020-10-17 RX ORDER — SODIUM CHLORIDE 9 MG/ML
INJECTION, SOLUTION INTRAVENOUS ONCE
Status: COMPLETED | OUTPATIENT
Start: 2020-10-17 | End: 2020-10-17

## 2020-10-17 RX ORDER — LEVOFLOXACIN 5 MG/ML
750 INJECTION, SOLUTION INTRAVENOUS
Status: DISCONTINUED | OUTPATIENT
Start: 2020-10-17 | End: 2020-10-17

## 2020-10-17 NOTE — CM/SW NOTE
CHRIS followed up on DC planning. SW received MDO \"pt bleeding cannot DC'\"     PMR consult states pt is appropriate for Acute Rehab. [facility] accepted the patient. CHRIS had followed up with [facility] regarding bed.  Per [facility] there are

## 2020-10-17 NOTE — CONSULTS
Yudy Goodwin 89  Report of GI Consultation    Anselmo Mae Patient Status:  Inpatient    1928 MRN [id number]   Location [facility] 4W/SW/SE Attending Jerrell Beck Jesusita Day # 9 PCP Korey Jenkins MD     Date o distant history of colonoscopy examination at least 10-15 years ago. · As per admitting notes, recent history of a colonic surgery 2 or 4 years ago possibly for obstructing process not a malignancy outside healthcare system.     Wt Readings from Last 20 En infusion today  · I had an extensive discussion midday today with both daughters at bedside today regarding acute upper GI bleed in this 25-year-old woman recovering from hip surgery as above.   We discussed possible causes for this GI bleed ranging from [facility] derangement of knee, right 8/14/2017   • Osteoarthritis    • Osteoporosis 11/14/2007   • Personal history of malignant neoplasm of breast 5/28/2009   • Primary osteoarthritis of knees, bilateral 7/12/2017   • Primary osteoarthritis of right knee 11/12/2018 100 mg, Oral, BID    •  PEG 3350 (MIRALAX) powder packet 17 g, 17 g, Oral, Daily PRN    •  magnesium hydroxide (MILK OF MAGNESIA) 400 MG/5ML suspension 30 mL, 30 mL, Oral, Daily PRN    •  bisacodyl (DULCOLAX) rectal suppository 10 mg, 10 mg, Rectal, Daily Oral Tablet 24 Hr, TAKE 1/2 TABLET(12.5 MG) BY MOUTH TWICE DAILY (Patient taking differently: Take 25 mg by mouth daily.  )    •  Cholecalciferol (VITAMIN D3) 27687 units Oral Cap, Take by mouth.     •  Cranberry Juice Extract 1000 MG Oral Cap, Take by mout .0 10/17/2020    CREATSERUM 0.55 10/17/2020    BUN 15 10/17/2020     (L) 10/17/2020    K 3.4 (L) 10/17/2020    CO2 28.0 10/17/2020    CA 8.8 10/17/2020    ALB 2.3 (L) 10/16/2020    ALKPHO 64 10/16/2020    TP 5.5 (L) 10/16/2020    AST 36 10/

## 2020-10-17 NOTE — PROGRESS NOTES
[facility] Dosing Service  Antibiotic Dosing    Asiya Canas is a 80year old for whom pharmacy is dosing Levaquin for treatment of  UTI.      Allergies: is allergic to adhesive tape; aspirin; macrobid [nitrofurantoin]; penicillins; povidone-iodine;

## 2020-10-17 NOTE — PROGRESS NOTES
Resting comfortably    LLE: Dsg w/ mild sanguinous drainage  No calf ttp  NV stable    Hg 6.6    A: s/p IMN L IT hip fx, acute postop blood loss anemia    P:  1U PRBC being transfused now  LLE protected weightbearing  DVT proph

## 2020-10-17 NOTE — PROGRESS NOTES
[place] [facility]    Progress Note    Jamie Nai Patient Status:  Inpatient    1928 MRN [id number]   Location [facility] 4W/SW/SE Attending Jerrell Beck Day # 9 PCP Oswaldo Choudhury MD        Subjective: Urine Na of 20, low serum osm  - monitor  O>>>I will dc lasix for now     HTN  - Elevations today  - Cont Metoprolol and increased amlodipine to 10mg  - Monitor and adjust as needed     Abnormal UA  - pt asymptomatic  - Minimal wbcs  - Urine Cx without bac Kevan Boggs MD on 10/16/2020 at 4:10 PM                   Patient will require inpatient services that will reasonably be expected to span two midnight's based on the clinical documentation in H+P.    Based on patients current state of illness, I anticipat

## 2020-10-17 NOTE — PHYSICAL THERAPY NOTE
PHYSICAL THERAPY TREATMENT NOTE - INPATIENT     Room Number: 683/342-U       Presenting Problem: L hip ORIF, hardware removal    Problem List  Principal Problem:    Closed fracture of left hip, initial encounter [facility])  Active Problems:    Closed fracture o blankets)?: A Lot   -   Sitting down on and standing up from a chair with arms (e.g., wheelchair, bedside commode, etc.): A Lot   -   Moving from lying on back to sitting on the side of the bed?: A Lot   How much help from another person does the patient c

## 2020-10-17 NOTE — PLAN OF CARE
Pt is aox4, can be forgetful with narcotics, ambulating by stand pivoting to rolling chair, pt has generalized weakness and is TTWB on the LLE. Voiding freely to bedpan/toilet with R/C. Eliquis with scds bilaterally for DVT prophylaxis.  Dressing to left hi prescribed  - See additional Care Plan goals for specific interventions  Outcome: Progressing  Goal: Patient/Family Short Term Goal  Description: Patient's Short Term Goal: I would like my pain to be controlled to a level 3 or less on a pain scale of 0-10. appropriate resources  Description: INTERVENTIONS:  - Identify barriers to discharge w/pt and caregiver  - Include patient/family/discharge partner in discharge planning  - Arrange for needed discharge resources and transportation as appropriate  - Identif use of appropriate assistive device and precautions (e.g. spinal or hip dislocation precautions)  Outcome: Progressing     Problem: Impaired Functional Mobility  Goal: Achieve highest/safest level of mobility/gait  Description: Interventions:  - Assess pat

## 2020-10-17 NOTE — PROGRESS NOTES
[facility] Dosing Service  Antibiotic Dosing    Desire Owens is a 80year old for whom pharmacy is dosing Merrem for treatment of  UTI.     Allergies: is allergic to adhesive tape; aspirin; macrobid [nitrofurantoin]; penicillins; povidone-iodine; an

## 2020-10-17 NOTE — PLAN OF CARE
Problem: PAIN - ADULT  Goal: Verbalizes/displays adequate comfort level or patient's stated pain goal  Description: INTERVENTIONS:  - Encourage pt to monitor pain and request assistance  - Assess pain using appropriate pain scale  - Administer analgesics patient's ability to be responsible for managing their own health  - Refer to Case Management Department for coordinating discharge planning if the patient needs post-hospital services based on physician/LIP order or complex needs related to functional sta so she was npo morning notified Dr Jeanne Jarrell and her hb drop to 6.6 when rechecked . Got order to transfuse one unit and hemoglobin went up to 10. 4. she also had  obstructive series this morning shows constipation mainly with mild ileus.  Dr Neil Carvajal also con

## 2020-10-18 PROCEDURE — 99233 SBSQ HOSP IP/OBS HIGH 50: CPT | Performed by: HOSPITALIST

## 2020-10-18 PROCEDURE — 99232 SBSQ HOSP IP/OBS MODERATE 35: CPT | Performed by: INTERNAL MEDICINE

## 2020-10-18 RX ORDER — BISACODYL 10 MG
10 SUPPOSITORY, RECTAL RECTAL ONCE
Status: COMPLETED | OUTPATIENT
Start: 2020-10-18 | End: 2020-10-18

## 2020-10-18 RX ORDER — VANCOMYCIN HYDROCHLORIDE 125 MG/1
125 CAPSULE ORAL DAILY
Status: DISCONTINUED | OUTPATIENT
Start: 2020-10-18 | End: 2020-10-20

## 2020-10-18 NOTE — PHYSICAL THERAPY NOTE
PHYSICAL THERAPY TREATMENT NOTE - INPATIENT     Room Number: 326/539-C       Presenting Problem: L hip ORIF, hardware removal    Problem List  Principal Problem:    Closed fracture of left hip, initial encounter [facility])  Active Problems:    Closed fracture o non WB on left throughout transfers . Once in chair, pt with need for support at left LE encouraged pt to scoot self back in chair using B UE and right LE. Pt with some difficulty scooting and assist was provided .       At end of session , pt left up i techniques;Relaxation;Repositioning    BALANCE                                                                                                                     Static Sitting: Poor +  Dynamic Sitting: Not tested           Static Standing: Poor  Dynamic demonstrate transfers Sit to/from Stand at assistance level:  SBA      Goal #2  Current Status NT     2 A at max level SPT to squat pivot TTWB left    Goal #3 Patient is able to ambulate 200 feet with assistive device at assistance level: SBA   Goal #3   C

## 2020-10-18 NOTE — PLAN OF CARE
Pt alert and oriented - can be forgetful at times. 2L O2 nasal cannula - will wean as tolerated. Tele in place - no calls overnight. SCDs. Eliquis still on hold. Pt had small soft-loose BM overnight but still complaining of feeling constipated.  No emesis o interventions  Outcome: Progressing     Problem: PAIN - ADULT  Goal: Verbalizes/displays adequate comfort level or patient's stated pain goal  Description: INTERVENTIONS:  - Encourage pt to monitor pain and request assistance  - Assess pain using appropria Complete POLST form as appropriate  - Assess patient's ability to be responsible for managing their own health  - Refer to Case Management Department for coordinating discharge planning if the patient needs post-hospital services based on physician/LIP ord precautions  Outcome: Progressing

## 2020-10-18 NOTE — PROGRESS NOTES
[place] [facility]    Progress Note    Sahil Dense Patient Status:  Inpatient    1928 MRN [id number]   Location [facility] 4W/SW/SE Attending Jerrell Beck Day # 10 PCP Geovanna Kohli MD        Subjective diuretics  - Urine Na of 20, low serum osm  - monitor  O>>>I will dc lasix for now     HTN  - Elevations today  - Cont Metoprolol and increased amlodipine to 10mg  - Monitor and adjust as needed     Abnormal UA  - pt asymptomatic  - Minimal wbcs  - Urine C by (CST): Toni Morris MD on 10/16/2020 at 4:10 PM                   Patient will require inpatient services that will reasonably be expected to span two midnight's based on the clinical documentation in H+P.    Based on patients current state of illness, I

## 2020-10-18 NOTE — PROGRESS NOTES
[facility]  GI SERVICE PROGRESS NOTE    Yessenia Davis Patient Status:  Inpatient    1928 MRN [id number]   Location [facility] 4W/SW/SE Attending Jerrell Beckissa Day # 10 PCP MD Shania Benedict 101   CO2 28.0  28.0 28.0 28.0   ALKPHO 64  --  65   AST 36  --  22   ALT 39  --  26   BILT 0.4  --  0.5   TP 5.5*  --  5.3*       No results for input(s): MARKUS BRAND in the last 168 hours.     Recent Labs   Lab 10/16/20  0954 10/17/20  0603 10/18/20  7623 10/17/2020.     No previous EGD examination.     10/18/2020: Stable, WBC 5800, hemoglobin 9.2g –> 6.6g –> 8.7g after transfusion 1 unit RBCs     Recommendations:     · Empiric pantoprazole 40 mg IV push daily for now  · Holding recent Eliquis anticoagulatio

## 2020-10-18 NOTE — PLAN OF CARE
Problem: PAIN - ADULT  Goal: Verbalizes/displays adequate comfort level or patient's stated pain goal  Description: INTERVENTIONS:  - Encourage pt to monitor pain and request assistance  - Assess pain using appropriate pain scale  - Administer analgesics patient's ability to be responsible for managing their own health  - Refer to Case Management Department for coordinating discharge planning if the patient needs post-hospital services based on physician/LIP order or complex needs related to functional sta No nausea or emesis at this time. Hb is 8.7 today no sign of bleeding noted eliquis on hold. Physical therapy worked with her and she is up in chair. Stool for occult blood positive will notify doctor.  Pt had small inc stool this morning

## 2020-10-18 NOTE — PROGRESS NOTES
[facility]    CDI Prediction Tool Protocol (Vancomycin Initiated)    OVP (oral vancomycin prophylaxis) 125 mg PO Daily is being started in this patient based on a score of 14.       Score Breakdown:  High risk antibiotic use (5 points)  Hospital

## 2020-10-18 NOTE — PROGRESS NOTES
Resting comfortably     LLE: Dsg w/ mild sanguinous drainage  No calf ttp  NV stable     A: s/p IMN L IT hip fx, acute postop blood loss anemia s/p PRBC yesterday     P:  PT/Rehab  LLE protected weightbearing  DVT proph

## 2020-10-19 ENCOUNTER — APPOINTMENT (OUTPATIENT)
Dept: PICC SERVICES | Facility: HOSPITAL | Age: 85
DRG: 481 | End: 2020-10-19
Attending: HOSPITALIST
Payer: MEDICARE

## 2020-10-19 PROCEDURE — 99233 SBSQ HOSP IP/OBS HIGH 50: CPT | Performed by: HOSPITALIST

## 2020-10-19 RX ORDER — PHENAZOPYRIDINE HYDROCHLORIDE 100 MG/1
100 TABLET, FILM COATED ORAL
Qty: 12 TABLET | Refills: 0 | Status: ON HOLD | OUTPATIENT
Start: 2020-10-19 | End: 2021-01-25

## 2020-10-19 RX ORDER — PHENAZOPYRIDINE HYDROCHLORIDE 100 MG/1
100 TABLET, FILM COATED ORAL
Status: DISCONTINUED | OUTPATIENT
Start: 2020-10-19 | End: 2020-10-20

## 2020-10-19 RX ORDER — POTASSIUM CHLORIDE 1.5 G/1.77G
40 POWDER, FOR SOLUTION ORAL EVERY 4 HOURS
Status: COMPLETED | OUTPATIENT
Start: 2020-10-19 | End: 2020-10-19

## 2020-10-19 RX ORDER — VANCOMYCIN HYDROCHLORIDE 125 MG/1
125 CAPSULE ORAL DAILY
Qty: 10 CAPSULE | Refills: 0 | Status: ON HOLD | OUTPATIENT
Start: 2020-10-20 | End: 2021-01-25

## 2020-10-19 RX ORDER — FUROSEMIDE 10 MG/ML
20 INJECTION INTRAMUSCULAR; INTRAVENOUS ONCE
Status: COMPLETED | OUTPATIENT
Start: 2020-10-19 | End: 2020-10-19

## 2020-10-19 RX ORDER — LIDOCAINE HYDROCHLORIDE 10 MG/ML
5 INJECTION, SOLUTION INFILTRATION; PERINEURAL AS NEEDED
Status: DISCONTINUED | OUTPATIENT
Start: 2020-10-19 | End: 2020-10-20

## 2020-10-19 NOTE — PHYSICAL THERAPY NOTE
PHYSICAL THERAPY TREATMENT NOTE - INPATIENT     Room Number: 089/126-S       Presenting Problem: L hip ORIF, hardware removal    Problem List  Principal Problem:    Closed fracture of left hip, initial encounter [facility])  Active Problems:    Closed fracture o (including adjusting bedclothes, sheets and blankets)?: A Lot   -   Sitting down on and standing up from a chair with arms (e.g., wheelchair, bedside commode, etc.): A Lot   -   Moving from lying on back to sitting on the side of the bed?: A Lot   How much instructions provided in preparation for discharge.    Goal #5   Current Status  In progress    Sitting at EOB for postural control    In progress   Goal #6     Goal #6  Current Status

## 2020-10-19 NOTE — PROGRESS NOTES
[place] [facility] [place]    Progress Note    Chaparrita Puente Patient Status:  Inpatient    1928 MRN [id number]   Location [facility] 4W/SW/SE Attending Jerrell Beck Day # 11 PCP Garth Bernard MD        Subjective Improving  - Holding thiazide diuretics  - Urine Na of 20, low serum osm  - monitor  O>>>I will dc lasix for now     HTN  - Elevations today  - Cont Metoprolol and increased amlodipine to 10mg  - Monitor and adjust as needed     Abnormal UA  - pt asymptoma

## 2020-10-19 NOTE — PLAN OF CARE
Problem: Patient Centered Care  Goal: Patient preferences are identified and integrated in the patient's plan of care  Description: Interventions:  - What would you like us to know as we care for you?  I live at [facility].  - Provide timely, c effects  - Notify MD/LIP if interventions unsuccessful or patient reports new pain  - Anticipate increased pain with activity and pre-medicate as appropriate  Outcome: Progressing     Problem: SAFETY ADULT - FALL  Goal: Free from fall injury  Description: and/or skin breakdown  - Initiate interventions, skin care algorithm/standards of care as needed  Outcome: Progressing     Problem: MUSCULOSKELETAL - ADULT  Goal: Return mobility to safest level of function  Description: INTERVENTIONS:  - Assess patient st for long term antibiotics. Awaiting approval from [facility] gisselle so patient can be discharged. Pt daughters at bedside. SCDs, heel boots on, skin reassessed and is intact. call light in reach and will continue to monitor.

## 2020-10-19 NOTE — CM/SW NOTE
MD order received regarding discharge planning and the pt. Needing IV abx at discharge. Update provided to [facility] with Monico HARMON of the above. Pt. To have PICC line placed today. Waiting on bed confirmations.   Per Hilario Mins with [facility] will likely have a

## 2020-10-19 NOTE — OCCUPATIONAL THERAPY NOTE
OCCUPATIONAL THERAPY TREATMENT NOTE - INPATIENT    Room Number: 581/128-G         Presenting Problem: L hip fx, S/P ORIF     Problem List  Principal Problem:    Closed fracture of left hip, initial encounter [facility])  Active Problems:    Closed fracture of le 1    ACTIVITIES OF DAILY LIVING ASSESSMENT  AM-PAC ‘6-Clicks’ Inpatient Daily Activity Short Form  How much help from another person does the patient currently need…  -   Putting on and taking off regular lower body clothing?: A Lot  -   Bathing (including

## 2020-10-19 NOTE — PROGRESS NOTES
[place] [facility]    Progress Note    Donna Etienne Patient Status:  Inpatient    1928 MRN [id number]   Location [facility] 4W/SW/SE Attending Jerrell Beck Day # 6 PCP Coretta Parra MD     Date of Admissi PRN    •  [COMPLETED] 0.9% NaCl infusion, , Intravenous, Once    •  Pantoprazole Sodium (PROTONIX) 40 mg in Sodium Chloride 0.9 % 10 mL IV push, 40 mg, Intravenous, Daily    •  [COMPLETED] iron sucrose (VENOFER) 400 mg in sodium chloride 0.9% 250 mL IVPB, 5 mg, Oral, Q8H PRN    •  Isosorbide Mononitrate ER (IMDUR) 24 hr tab 30 mg, 30 mg, Oral, Daily    •  Levothyroxine Sodium (SYNTHROID) tab 100 mcg, 100 mcg, Oral, QAM AC    •  Metoprolol Succinate ER (Toprol XL) 24 hr tab 25 mg, 25 mg, Oral, Daily    •  Ro FRACTURE Left 10/14/2020    Performed by Miesha Wilkins MD at [facility] MAIN OR   • RADIATION LEFT        Social History:  Social History    Tobacco Use      Smoking status: Former Smoker        Packs/day: 1.00        Years: 20.00        Pack years: 21 interlocking, intramedullary femoral nailing with proximal distal femoral plate screw removal     2.       Marked osteopenia.     3.       History of left supracondylar femur fracture status post open reduction internal fixation.     4.       History of ch

## 2020-10-19 NOTE — PROGRESS NOTES
Vascular Access Note    Vascular Access Screening:   Allergies to Lidocaine: no  Allergies to Latex: no  Presence of Pacemaker/Defibrillator: No  Mastectomy with Lymph Node Dissection: Left Side  AV Fistula / AV Graft: No  Dialysis Catheter: No  [facility]

## 2020-10-19 NOTE — PLAN OF CARE
Pt alert and oriented. Can be forgetful at times. 2L O2 - weaning as tolerated. Tele in place - no calls overnight. Eliquis still on hold until no further signs of bleeding. SCDs on. Pt received milk of mag overnight and had one small, soft BM.  Voiding wit pt to monitor pain and request assistance  - Assess pain using appropriate pain scale  - Administer analgesics based on type and severity of pain and evaluate response  - Implement non-pharmacological measures as appropriate and evaluate response  - Consid the patient needs post-hospital services based on physician/LIP order or complex needs related to functional status, cognitive ability or social support system  Outcome: Progressing     Problem: SKIN/TISSUE INTEGRITY - ADULT  Goal: Skin integrity remains i

## 2020-10-19 NOTE — PROGRESS NOTES
[facility] note: Duplicate Proton Pump Inhibitor with Histamine 2 blocker. Uri Tanner is a 80year old patient who has been prescribed both famotidine (Pepcid)  And pantoprazole (Protonix).   Pepcid was discontinued per P&T approved protocol fo

## 2020-10-20 VITALS
OXYGEN SATURATION: 99 % | HEART RATE: 79 BPM | WEIGHT: 135.63 LBS | RESPIRATION RATE: 20 BRPM | DIASTOLIC BLOOD PRESSURE: 67 MMHG | SYSTOLIC BLOOD PRESSURE: 136 MMHG | HEIGHT: 63 IN | BODY MASS INDEX: 24.03 KG/M2 | TEMPERATURE: 98 F

## 2020-10-20 PROCEDURE — 99239 HOSP IP/OBS DSCHRG MGMT >30: CPT | Performed by: HOSPITALIST

## 2020-10-20 RX ORDER — CHOLECALCIFEROL (VITAMIN D3) 1250 MCG
50000 CAPSULE ORAL WEEKLY
Qty: 1 CAPSULE | Refills: 0 | Status: SHIPPED | OUTPATIENT
Start: 2020-10-20 | End: 2021-02-19

## 2020-10-20 RX ORDER — POTASSIUM CHLORIDE 29.8 MG/ML
40 INJECTION INTRAVENOUS ONCE
Status: COMPLETED | OUTPATIENT
Start: 2020-10-20 | End: 2020-10-20

## 2020-10-20 NOTE — PROGRESS NOTES
S Pt still anticipating DC. Two daughters present and attentive. O Pt recuperating from hip surgery. A Pt relies on support from family and blade.     P Will follow up if not DC.     10/20/20 1115   Clinical Encounter Type   Visited With Patient and

## 2020-10-20 NOTE — CM/SW NOTE
SW confirmed with RN that pt is medically ready for discharge today. CHRIS called and spoke with Bridger Flores from [place]  to arrange a time for discharge.  [place] has a bed and can accept the pt today at 3PM.    Pt would be going to room 3302    RN is aware of

## 2020-10-20 NOTE — PLAN OF CARE
Pt is aox3-4, can be forgetful overnight; more so with narcotics, ambulating by stand pivot to R/C or max assistance with lift. Voiding freely per mary kay, changed twice overnight; pt also had one episode of loose BM overnight requiring full linen change. Goal: I would like my pain to be controlled to a level 3 or less on a pain scale of 0-10. Interventions:   - Work with PT/OT  -Pharmacological pain interventions  -Non-pharmacological pain interventions  -Follow plan of care.    - See additional Care Kaitlynn Arrange for needed discharge resources and transportation as appropriate  - Identify discharge learning needs (meds, wound care, etc)  - Arrange for interpreters to assist at discharge as needed  - Consider post-discharge preferences of patient/family/disc highest/safest level of mobility/gait  Description: Interventions:  - Assess patient's functional ability and stability  - Promote increasing activity/tolerance for mobility and gait  - Educate and engage patient/family in tolerated activity level and prec

## 2020-10-20 NOTE — PHYSICAL THERAPY NOTE
PHYSICAL THERAPY TREATMENT NOTE - INPATIENT     Room Number: 123/927-C       Presenting Problem: L hip ORIF, hardware removal    Problem List  Principal Problem:    Closed fracture of left hip, initial encounter [facility])  Active Problems:    Closed fracture o difficulty does the patient currently have. ..  -   Turning over in bed (including adjusting bedclothes, sheets and blankets)?: A Lot   -   Sitting down on and standing up from a chair with arms (e.g., wheelchair, bedside commode, etc.): A Lot   -   Moving Goal #5 Patient independently performs home exercise program for ROM/strengthening per the instructions provided in preparation for discharge.    Goal #5   Current Status  In progress    Sitting at EOB for postural control    In progress   Goal #6     [facility]

## 2020-10-20 NOTE — PLAN OF CARE
Problem: Patient Centered Care  Goal: Patient preferences are identified and integrated in the patient's plan of care  Description: Interventions:  - What would you like us to know as we care for you?  I live at [facility].  - Provide timely, c using appropriate pain scale  - Administer analgesics based on type and severity of pain and evaluate response  - Implement non-pharmacological measures as appropriate and evaluate response  - Consider cultural and social influences on pain and pain manage Complete POLST form as appropriate  - Assess patient's ability to be responsible for managing their own health  - Refer to Case Management Department for coordinating discharge planning if the patient needs post-hospital services based on physician/LIP ord dislocation precautions)  10/20/2020 1543 by Fernando Henao RN  Outcome: Adequate for Discharge  10/20/2020 1533 by Fernando Henao RN  Outcome: Adequate for Discharge     Problem: Impaired Functional Mobility  Goal: Achieve highest/safest level of mobi

## 2020-10-20 NOTE — DISCHARGE SUMMARY
Dc [id number] and [id number]  > 30 min spent on [address] Discharge Diagnoses: post traumatic hip fx    Lace+ Score: 71  59-90 High Risk  29-58 Medium Risk  0-28   Low Risk. TCM Follow-Up Recommendation:  LACE 29-58:  Moderate Risk of readmission

## 2020-10-20 NOTE — PROGRESS NOTES
[facility]    Progress Note    Renetta Chavez Patient Status:  Inpatient    1928 MRN [id number]   Location [facility] 4W/SW/SE Attending Jerrell Beck Day # 15 PCP Cruz Mooney MD     Date of Admissi Daily    •  [COMPLETED] bisacodyl (DULCOLAX) rectal suppository 10 mg, 10 mg, Rectal, Once    •  [COMPLETED] magnesium hydroxide (MILK OF MAGNESIA) 400 MG/5ML suspension 30 mL, 30 mL, Oral, Once    •  ondansetron HCl (ZOFRAN) injection 4 mg, 4 mg, Intraven chloride 0.9% 250 mL IVPB add-vantage, 15 mg/kg, Intravenous, Once    •  amLODIPine Besylate (NORVASC) tab 10 mg, 10 mg, Oral, Daily    •  [COMPLETED] potassium chloride (KLOR-CON) powder packet 40 mEq, 40 mEq, Oral, Q4H    •  Metoclopramide HCl (REGLAN) t CHOLECYSTECTOMY     • COLON SURGERY     • COLONOSCOPY  2010   • FEMUR OPEN REDUCTION INTERNAL FIXATION/ EX FIX Left 12/8/2018    Performed by Jeff Johnston MD at [facility] OR   • HYSTERECTOMY     • LAPAROSCOPIC CHOLECYSTECTOMY     • LUMPECTOMY LEFT     • O MG 1.9 10/20/2020    PHOS 2.6 10/19/2020    CK 58 06/27/2018    B12 491 05/02/2019         IMAGING:   No results found.          ASSESSMENT AND PLAN:   1.       Left intertrochanteric comminuted displaced fracture--status post left interlocking, intramed

## 2020-10-22 RX ORDER — ROSUVASTATIN CALCIUM 20 MG/1
TABLET, COATED ORAL
Qty: 30 TABLET | Refills: 0 | Status: SHIPPED | OUTPATIENT
Start: 2020-10-22 | End: 2020-11-23

## 2020-10-23 NOTE — DISCHARGE SUMMARY
[facility]    PATIENT'S NAME: Phyllis Perez   ATTENDING PHYSICIAN: Brittany Beck MD   PATIENT ACCOUNT#:   [id number]    LOCATION:  [address] [place] [address] Ave #:   [id number]       YOB: 1928  ADMISSION DATE:       1 Cipro and she said that she was told that she had a lung problem many years ago from taking it.   I called her pulmonologist, Dr. Terrence Buenrostro, who had no recollection, and her primary care doctor, who had no recollection, and neither of them was aware that Cipro blood loss anemia, possibly from fluids or being spurious. She vomited coffee grounds, but there is no further drop in hemoglobin. 3.   Significant stool burden, actually fairly massive.   Relistor and other medications helped, and she felt considerably b Flexeril 5 mg 3 times a day as needed. 13.   Colace 100 mg p.o. b.i.d. p.r.n. constipation. 14.   Norco 7.5/325 one tablet every 6 hours as needed. Use for only severe pain as patient can get mildly confused from it.   15.   Protonix 40 mg every morning

## 2020-11-10 ENCOUNTER — SNF ADMIT/H&P (OUTPATIENT)
Dept: INTERNAL MEDICINE CLINIC | Facility: SKILLED NURSING FACILITY | Age: 85
End: 2020-11-10

## 2020-11-10 DIAGNOSIS — D64.9 ANEMIA, UNSPECIFIED TYPE: ICD-10-CM

## 2020-11-10 DIAGNOSIS — I06.0 RHEUMATIC AORTIC STENOSIS: ICD-10-CM

## 2020-11-10 DIAGNOSIS — R53.1 WEAKNESS: ICD-10-CM

## 2020-11-10 DIAGNOSIS — S72.002D CLOSED FRACTURE OF LEFT HIP WITH ROUTINE HEALING, SUBSEQUENT ENCOUNTER: ICD-10-CM

## 2020-11-10 PROCEDURE — 1111F DSCHRG MED/CURRENT MED MERGE: CPT | Performed by: NURSE PRACTITIONER

## 2020-11-10 PROCEDURE — 99310 SBSQ NF CARE HIGH MDM 45: CPT | Performed by: NURSE PRACTITIONER

## 2020-11-10 NOTE — PROGRESS NOTES
HPI: Desire Owens  Is a 81 yo female who was admitted to [address] [place] [address] 10/8/20 after a fall sustaining a left hip fracture. S/p L hip orif by Dr Guerda Donaldson. She was also found to have a pseudomonas UTI and treated with antibiotics.  She was transferred to [facility] menopause   • Breast Cancer Maternal Cousin Female         post menopause     Social History    Tobacco Use      Smoking status: Former Smoker        Packs/day: 1.00        Years: 20.00        Pack years: 20        Types: Cigarettes      Smokeless tobacco: swelling  Lidocaine patch, tramadol prn, tylenol prn     Acute GI blood loss anemia hgb 8.8 11/2 at [facility] hgb 11/11  On ppi     UTI, pseudomonas. Resolved.   Was on IV meropenem because of allergies    Hyponatremia Na 140 11/2  Started on hctz

## 2020-11-11 ENCOUNTER — EXTERNAL FACILITY (OUTPATIENT)
Dept: INTERNAL MEDICINE CLINIC | Facility: CLINIC | Age: 85
End: 2020-11-11

## 2020-11-11 DIAGNOSIS — I73.9 PAD (PERIPHERAL ARTERY DISEASE) (HCC): ICD-10-CM

## 2020-11-11 DIAGNOSIS — I10 ESSENTIAL HYPERTENSION: ICD-10-CM

## 2020-11-11 DIAGNOSIS — I25.10 ATHEROSCLEROSIS OF NATIVE CORONARY ARTERY OF NATIVE HEART WITHOUT ANGINA PECTORIS: ICD-10-CM

## 2020-11-11 DIAGNOSIS — E78.5 HYPERLIPIDEMIA, UNSPECIFIED HYPERLIPIDEMIA TYPE: ICD-10-CM

## 2020-11-11 DIAGNOSIS — S72.002D CLOSED FRACTURE OF LEFT HIP WITH ROUTINE HEALING, SUBSEQUENT ENCOUNTER: ICD-10-CM

## 2020-11-11 PROCEDURE — 99306 1ST NF CARE HIGH MDM 50: CPT | Performed by: INTERNAL MEDICINE

## 2020-11-12 NOTE — PROGRESS NOTES
pt seen 11/11/2020    seen in room, no distress     HPI: 81 yo female who was admitted to [address] [place] [address] 10/8/20 after a fall sustaining a left hip fracture. S/p L hip orif by Dr Anshu Zhang. She was also found to have a pseudomonas UTI and treated with antibiotics.  She Pack years: 20        Types: Cigarettes      Smokeless tobacco: Former User    Alcohol use:  Yes      Alcohol/week: 0.0 standard drinks      Comment: Weekly, Wine, 5 glasses;     Drug use: No      ALLERGIES:    Adhesive Tape           RASH    Comment:S murmur, CAD  Resume antiplatelet when ok with ortho   Cont isosorbide, bb, statin   F/u cardiology     PAD  Cont present management

## 2020-11-13 ENCOUNTER — EXTERNAL FACILITY (OUTPATIENT)
Dept: INTERNAL MEDICINE CLINIC | Facility: CLINIC | Age: 85
End: 2020-11-13

## 2020-11-13 DIAGNOSIS — E78.5 HYPERLIPIDEMIA, UNSPECIFIED HYPERLIPIDEMIA TYPE: ICD-10-CM

## 2020-11-13 DIAGNOSIS — R26.2 DIFFICULTY WALKING: ICD-10-CM

## 2020-11-13 DIAGNOSIS — I73.9 PAD (PERIPHERAL ARTERY DISEASE) (HCC): ICD-10-CM

## 2020-11-13 DIAGNOSIS — E03.9 HYPOTHYROIDISM, UNSPECIFIED TYPE: ICD-10-CM

## 2020-11-13 DIAGNOSIS — S72.002D CLOSED FRACTURE OF LEFT HIP WITH ROUTINE HEALING, SUBSEQUENT ENCOUNTER: ICD-10-CM

## 2020-11-13 PROCEDURE — 99309 SBSQ NF CARE MODERATE MDM 30: CPT | Performed by: INTERNAL MEDICINE

## 2020-11-14 NOTE — PROGRESS NOTES
pt seen 11/13/2020 at [facility]    seen in room, no distress noted        SUBJECTIVE/REVIEW OF SYSTEMS:   Controlled pain of LLE.    sob/cough/cp/palpitations. Denies hematuria/dysuria/frequency. Afebrile.     vitals: stable     PHYSICAL EXAM:  [id number] [facility]

## 2020-11-16 ENCOUNTER — EXTERNAL FACILITY (OUTPATIENT)
Dept: INTERNAL MEDICINE CLINIC | Facility: CLINIC | Age: 85
End: 2020-11-16

## 2020-11-16 DIAGNOSIS — S72.002D CLOSED FRACTURE OF LEFT HIP WITH ROUTINE HEALING, SUBSEQUENT ENCOUNTER: ICD-10-CM

## 2020-11-16 DIAGNOSIS — R26.2 DIFFICULTY WALKING: ICD-10-CM

## 2020-11-16 DIAGNOSIS — E78.5 HYPERLIPIDEMIA, UNSPECIFIED HYPERLIPIDEMIA TYPE: ICD-10-CM

## 2020-11-16 DIAGNOSIS — I70.0 ATHEROSCLEROSIS OF AORTA (HCC): ICD-10-CM

## 2020-11-16 DIAGNOSIS — I10 ESSENTIAL HYPERTENSION: ICD-10-CM

## 2020-11-16 PROCEDURE — 99308 SBSQ NF CARE LOW MDM 20: CPT | Performed by: INTERNAL MEDICINE

## 2020-11-17 ENCOUNTER — SNF VISIT (OUTPATIENT)
Dept: INTERNAL MEDICINE CLINIC | Facility: SKILLED NURSING FACILITY | Age: 85
End: 2020-11-17

## 2020-11-17 DIAGNOSIS — S72.002D CLOSED FRACTURE OF LEFT HIP WITH ROUTINE HEALING, SUBSEQUENT ENCOUNTER: ICD-10-CM

## 2020-11-17 DIAGNOSIS — N30.00 ACUTE CYSTITIS WITHOUT HEMATURIA: ICD-10-CM

## 2020-11-17 DIAGNOSIS — I10 ESSENTIAL HYPERTENSION: ICD-10-CM

## 2020-11-17 DIAGNOSIS — S81.801D MULTIPLE OPEN WOUNDS OF RIGHT LOWER EXTREMITY, SUBSEQUENT ENCOUNTER: ICD-10-CM

## 2020-11-17 PROCEDURE — 99309 SBSQ NF CARE MODERATE MDM 30: CPT | Performed by: NURSE PRACTITIONER

## 2020-11-17 NOTE — PROGRESS NOTES
HPI: Olivia Bergeron  Is a 81 yo female who was admitted to [address] [place] [address] 10/8/20 after a fall sustaining a left hip fracture. S/p L hip orif by Dr Gina Bravo. She was also found to have a pseudomonas UTI and treated with antibiotics.  She was transferred to [facility] Female         post menopause   • Breast Cancer Maternal Cousin Female         post menopause       CODE STATUS:  DNR    CURRENT MEDICATIONS: Reviewed on SNF EMR  VITALS: Reviewed   LABS/Imaging: Reviewed     SUBJECTIVE/REVIEW OF SYSTEMS:  New wound RLE.  C Completed  IV meropenem because of allergies. - 11/17 c/o dysuria again c/o dysuria, has multiple allergies (cipro, penicillin, macrobid) Start bactrim ds, wait for ua/cs results    Hyponatremia. Resolved.  Na 137 11/11  Started on hctz at [facility] Geovany Menesesiredo 95  Follow BMP

## 2020-11-17 NOTE — PROGRESS NOTES
pt seen 11/16/2020 at  [facility]    seen in room, no distress noted      continue with current care       SUBJECTIVE/REVIEW OF SYSTEMS:   Controlled pain of LLE.    sob/cough/cp/palpitations. Denies hematuria/dysuria/frequency. Afebrile.     vitals: stable

## 2020-11-18 ENCOUNTER — EXTERNAL FACILITY (OUTPATIENT)
Dept: INTERNAL MEDICINE CLINIC | Facility: CLINIC | Age: 85
End: 2020-11-18

## 2020-11-18 DIAGNOSIS — S72.002D CLOSED FRACTURE OF LEFT HIP WITH ROUTINE HEALING, SUBSEQUENT ENCOUNTER: ICD-10-CM

## 2020-11-18 DIAGNOSIS — I10 ESSENTIAL HYPERTENSION: ICD-10-CM

## 2020-11-18 DIAGNOSIS — E78.5 HYPERLIPIDEMIA, UNSPECIFIED HYPERLIPIDEMIA TYPE: ICD-10-CM

## 2020-11-18 DIAGNOSIS — I73.9 PAD (PERIPHERAL ARTERY DISEASE) (HCC): ICD-10-CM

## 2020-11-18 DIAGNOSIS — R30.0 DYSURIA: ICD-10-CM

## 2020-11-18 DIAGNOSIS — S81.802D WOUND OF LEFT LOWER EXTREMITY, SUBSEQUENT ENCOUNTER: ICD-10-CM

## 2020-11-18 PROCEDURE — 99309 SBSQ NF CARE MODERATE MDM 30: CPT | Performed by: INTERNAL MEDICINE

## 2020-11-18 NOTE — PROGRESS NOTES
SUBJECTIVE/REVIEW OF SYSTEMS:  New wound RLE. Controlled pain. Denies sob/cough/cp/palpiations. C/o dysuria. Denies hematuria/frequency. Afebrile. Reports normal BMs.     PHYSICAL EXAM:  GENERAL HEALTH: NAD  HEENT: atraumatic/normocephalic, mucous membr 11/11  Started on hctz at [facility] [address]  Follow BMP     HTN/controlled  Cont metoprolol, amlodipine, isosorbide hctz started at [facility] [address]    Hypothyroidism  Cont synthroid    HLD  Cont statin     Rheumatic fever with systolic murmur, CAD  Resume antiplatelet when ok with or

## 2020-11-20 PROCEDURE — 99308 SBSQ NF CARE LOW MDM 20: CPT | Performed by: INTERNAL MEDICINE

## 2020-11-21 ENCOUNTER — EXTERNAL FACILITY (OUTPATIENT)
Dept: INTERNAL MEDICINE CLINIC | Facility: CLINIC | Age: 85
End: 2020-11-21

## 2020-11-21 DIAGNOSIS — I10 ESSENTIAL HYPERTENSION: ICD-10-CM

## 2020-11-21 DIAGNOSIS — S72.002D CLOSED FRACTURE OF LEFT HIP WITH ROUTINE HEALING, SUBSEQUENT ENCOUNTER: ICD-10-CM

## 2020-11-21 DIAGNOSIS — I73.9 PAD (PERIPHERAL ARTERY DISEASE) (HCC): ICD-10-CM

## 2020-11-21 DIAGNOSIS — E03.9 HYPOTHYROIDISM, UNSPECIFIED TYPE: ICD-10-CM

## 2020-11-21 DIAGNOSIS — E78.5 HYPERLIPIDEMIA, UNSPECIFIED HYPERLIPIDEMIA TYPE: ICD-10-CM

## 2020-11-22 NOTE — PROGRESS NOTES
pt seen 11/20 /2020 at [facility]        seen in bed no distress,     , wound care on board,     , pt also with dysuria , UA showed infection    started on ab        SUBJECTIVE/REVIEW OF SYSTEMS:    New wound RLE. Controlled pain.     Denies sob/cough/cp/palpi penicillin, macrobid) Start bactrim ds, wait for ua/cs results    Hyponatremia. Resolved.  Na 137 11/11  Started on hctz at   Follow BMP     HTN/controlled  Cont metoprolol, amlodipine, isosorbide hctz started at [facility]    Hypothyroidism  Cont synthroid    [facility]

## 2020-11-23 ENCOUNTER — EXTERNAL FACILITY (OUTPATIENT)
Dept: INTERNAL MEDICINE CLINIC | Facility: CLINIC | Age: 85
End: 2020-11-23

## 2020-11-23 DIAGNOSIS — I10 ESSENTIAL HYPERTENSION: ICD-10-CM

## 2020-11-23 DIAGNOSIS — E03.9 HYPOTHYROIDISM, UNSPECIFIED TYPE: ICD-10-CM

## 2020-11-23 DIAGNOSIS — S72.002D CLOSED FRACTURE OF LEFT HIP WITH ROUTINE HEALING, SUBSEQUENT ENCOUNTER: ICD-10-CM

## 2020-11-23 DIAGNOSIS — E78.5 HYPERLIPIDEMIA, UNSPECIFIED HYPERLIPIDEMIA TYPE: ICD-10-CM

## 2020-11-23 DIAGNOSIS — I73.9 PAD (PERIPHERAL ARTERY DISEASE) (HCC): ICD-10-CM

## 2020-11-23 DIAGNOSIS — I25.10 ATHEROSCLEROSIS OF NATIVE CORONARY ARTERY OF NATIVE HEART WITHOUT ANGINA PECTORIS: ICD-10-CM

## 2020-11-23 PROCEDURE — 99308 SBSQ NF CARE LOW MDM 20: CPT | Performed by: INTERNAL MEDICINE

## 2020-11-23 RX ORDER — ROSUVASTATIN CALCIUM 20 MG/1
TABLET, COATED ORAL
Qty: 30 TABLET | Refills: 0 | Status: SHIPPED | OUTPATIENT
Start: 2020-11-23 | End: 2020-12-22

## 2020-11-23 NOTE — PROGRESS NOTES
pt seen 11/23 /2020 at [facility]        seen in bed no distress,     , wound care on board,     , pt also with dysuria , UA showed infection    started on ab , feels ok, denies any urinary symptoms        SUBJECTIVE/REVIEW OF SYSTEMS:    New wound RLE.    Contr has multiple allergies (cipro, penicillin, macrobid) Start bactrim ds, wait for ua/cs results    Hyponatremia. Resolved.  Na 137 11/11  Started on hctz at   Follow BMP     HTN/controlled  Cont metoprolol, amlodipine, isosorbide hctz started at [address]

## 2020-11-24 ENCOUNTER — SNF VISIT (OUTPATIENT)
Dept: INTERNAL MEDICINE CLINIC | Facility: SKILLED NURSING FACILITY | Age: 85
End: 2020-11-24

## 2020-11-24 DIAGNOSIS — S72.002D CLOSED FRACTURE OF LEFT HIP WITH ROUTINE HEALING, SUBSEQUENT ENCOUNTER: ICD-10-CM

## 2020-11-24 DIAGNOSIS — R53.1 WEAKNESS: ICD-10-CM

## 2020-11-24 DIAGNOSIS — R60.0 EDEMA OF LOWER EXTREMITY: ICD-10-CM

## 2020-11-24 PROBLEM — S72.142A INTERTROCHANTERIC FRACTURE OF LEFT HIP (HCC): Status: ACTIVE | Noted: 2020-10-20

## 2020-11-24 PROBLEM — R07.2 PRECORDIAL PAIN: Status: ACTIVE | Noted: 2020-07-14

## 2020-11-24 PROBLEM — E87.1 HYPONATREMIA: Status: ACTIVE | Noted: 2020-10-21

## 2020-11-24 PROBLEM — F43.23 ADJUSTMENT REACTION WITH ANXIETY AND DEPRESSION: Status: ACTIVE | Noted: 2020-11-10

## 2020-11-24 PROBLEM — R26.9 ABNORMAL GAIT: Status: ACTIVE | Noted: 2020-10-21

## 2020-11-24 PROBLEM — S72.142A CLOSED DISPLACED INTERTROCHANTERIC FRACTURE OF LEFT FEMUR (HCC): Status: ACTIVE | Noted: 2020-10-20

## 2020-11-24 PROBLEM — Z74.09 IMPAIRED MOBILITY AND ADLS: Status: ACTIVE | Noted: 2020-10-21

## 2020-11-24 PROBLEM — F09 COGNITIVE DISORDER: Status: ACTIVE | Noted: 2020-11-10

## 2020-11-24 PROBLEM — Z78.9 IMPAIRED MOBILITY AND ADLS: Status: ACTIVE | Noted: 2020-10-21

## 2020-11-24 PROCEDURE — 99308 SBSQ NF CARE LOW MDM 20: CPT | Performed by: NURSE PRACTITIONER

## 2020-11-24 NOTE — PROGRESS NOTES
HPI: Anuj Johnson  Is a 79 yo female who was admitted to [facility] 10/8/20 after a fall sustaining a left hip fracture. S/p L hip orif by Dr Elvis Khoury. She was also found to have a pseudomonas UTI and treated with antibiotics.  She was transferred to [facility] EXAM:  GENERAL HEALTH: NAD  HEENT: atraumatic/normocephalic, mucous membranes pink and moist, PERRLA, EOMI, sclera anicteric, conjunctiva normal.    RESPIRATORY: CTAB  CARDIOVASCULAR:S1S2 RRR +murmur  ABDOMEN:  normal active BS+, soft, non distended, nonte

## 2020-11-25 PROCEDURE — 99308 SBSQ NF CARE LOW MDM 20: CPT | Performed by: INTERNAL MEDICINE

## 2020-11-26 ENCOUNTER — EXTERNAL FACILITY (OUTPATIENT)
Dept: INTERNAL MEDICINE CLINIC | Facility: CLINIC | Age: 85
End: 2020-11-26

## 2020-11-26 DIAGNOSIS — I10 ESSENTIAL HYPERTENSION: ICD-10-CM

## 2020-11-26 DIAGNOSIS — S72.452D CLOSED DISPLACED SUPRACONDYLAR FRACTURE OF DISTAL END OF LEFT FEMUR WITHOUT INTRACONDYLAR EXTENSION WITH ROUTINE HEALING, SUBSEQUENT ENCOUNTER: ICD-10-CM

## 2020-11-26 DIAGNOSIS — E78.5 HYPERLIPIDEMIA, UNSPECIFIED HYPERLIPIDEMIA TYPE: ICD-10-CM

## 2020-11-26 DIAGNOSIS — F09 COGNITIVE DISORDER: ICD-10-CM

## 2020-11-26 DIAGNOSIS — I06.0 RHEUMATIC AORTIC STENOSIS: ICD-10-CM

## 2020-11-26 DIAGNOSIS — I73.9 PAD (PERIPHERAL ARTERY DISEASE) (HCC): ICD-10-CM

## 2020-11-26 DIAGNOSIS — I25.10 ATHEROSCLEROSIS OF NATIVE CORONARY ARTERY OF NATIVE HEART WITHOUT ANGINA PECTORIS: ICD-10-CM

## 2020-11-26 DIAGNOSIS — E03.9 HYPOTHYROIDISM, UNSPECIFIED TYPE: ICD-10-CM

## 2020-11-27 ENCOUNTER — SNF VISIT (OUTPATIENT)
Dept: INTERNAL MEDICINE CLINIC | Facility: SKILLED NURSING FACILITY | Age: 85
End: 2020-11-27

## 2020-11-27 DIAGNOSIS — S72.142D CLOSED DISPLACED INTERTROCHANTERIC FRACTURE OF LEFT FEMUR WITH ROUTINE HEALING, SUBSEQUENT ENCOUNTER: ICD-10-CM

## 2020-11-27 DIAGNOSIS — R53.1 WEAKNESS: ICD-10-CM

## 2020-11-27 DIAGNOSIS — I10 ESSENTIAL HYPERTENSION: ICD-10-CM

## 2020-11-27 PROCEDURE — 99308 SBSQ NF CARE LOW MDM 20: CPT | Performed by: NURSE PRACTITIONER

## 2020-11-27 NOTE — PROGRESS NOTES
HPI: Renetta Chavez  Is a 81 yo female who was admitted to [address] [place] [address] 10/8/20 after a fall sustaining a left hip fracture. S/p L hip orif by Dr Luigi Welch. She was also found to have a pseudomonas UTI and treated with antibiotics.  She was transferred to [facility] NAD  HEENT: atraumatic/normocephalic, mucous membranes pink and moist, PERRLA, EOMI, sclera anicteric, conjunctiva normal.    RESPIRATORY: CTAB  CARDIOVASCULAR:S1S2 RRR +murmur  ABDOMEN:  normal active BS+, soft, non distended, nontender   MUSCULOSKELETAL/

## 2020-11-27 NOTE — PROGRESS NOTES
pt seen 11/25/2020 at [facility]        seen in bed no distress, doing ok over all     , wound care on board,     , pt also with dysuria , UA showed infection    started on ab , feels ok, denies any urinary symptoms        SUBJECTIVE/REVIEW OF SYSTEMS:    New w again c/o dysuria, has multiple allergies (cipro, penicillin, macrobid) Start bactrim ds, wait for ua/cs results    Hyponatremia. Resolved.  Na 137 11/11  Started on hctz at   Follow BMP     HTN/controlled  Cont metoprolol, amlodipine, isosorbide hctz sta

## 2020-11-30 ENCOUNTER — EXTERNAL FACILITY (OUTPATIENT)
Dept: INTERNAL MEDICINE CLINIC | Facility: CLINIC | Age: 85
End: 2020-11-30

## 2020-11-30 DIAGNOSIS — I70.0 ATHEROSCLEROSIS OF AORTA (HCC): ICD-10-CM

## 2020-11-30 DIAGNOSIS — Z74.09 IMPAIRED MOBILITY AND ADLS: ICD-10-CM

## 2020-11-30 DIAGNOSIS — S72.002D CLOSED FRACTURE OF LEFT HIP WITH ROUTINE HEALING, SUBSEQUENT ENCOUNTER: ICD-10-CM

## 2020-11-30 DIAGNOSIS — E03.9 HYPOTHYROIDISM, UNSPECIFIED TYPE: ICD-10-CM

## 2020-11-30 DIAGNOSIS — E78.5 HYPERLIPIDEMIA, UNSPECIFIED HYPERLIPIDEMIA TYPE: ICD-10-CM

## 2020-11-30 DIAGNOSIS — I25.10 ATHEROSCLEROSIS OF NATIVE CORONARY ARTERY OF NATIVE HEART WITHOUT ANGINA PECTORIS: ICD-10-CM

## 2020-11-30 DIAGNOSIS — Z78.9 IMPAIRED MOBILITY AND ADLS: ICD-10-CM

## 2020-11-30 DIAGNOSIS — I10 ESSENTIAL HYPERTENSION: ICD-10-CM

## 2020-11-30 DIAGNOSIS — I73.9 PAD (PERIPHERAL ARTERY DISEASE) (HCC): ICD-10-CM

## 2020-11-30 DIAGNOSIS — S72.142D CLOSED DISPLACED INTERTROCHANTERIC FRACTURE OF LEFT FEMUR WITH ROUTINE HEALING, SUBSEQUENT ENCOUNTER: ICD-10-CM

## 2020-11-30 PROCEDURE — 99308 SBSQ NF CARE LOW MDM 20: CPT | Performed by: INTERNAL MEDICINE

## 2020-11-30 NOTE — PROGRESS NOTES
pt seen 11/30/2020 at [facility]    no new complaints     seen in bed no distress,     , wound care on board,     uti- on IV ab, will monitor ,     SUBJECTIVE/REVIEW OF SYSTEMS:    New wound RLE. Controlled pain. Denies sob/cough/cp/palpiations.    C/o dysu Start bactrim ds, wait for ua/cs results    Hyponatremia. Resolved.  Na 137 11/11  Started on hctz at [facility]  Follow BMP     HTN/controlled  Cont metoprolol, amlodipine, isosorbide hctz started at [facility]    Hypothyroidism  Cont synthroid    HLD  Cont statin     Rhe

## 2020-12-01 ENCOUNTER — SNF VISIT (OUTPATIENT)
Dept: INTERNAL MEDICINE CLINIC | Facility: SKILLED NURSING FACILITY | Age: 85
End: 2020-12-01

## 2020-12-01 DIAGNOSIS — R53.1 WEAKNESS: ICD-10-CM

## 2020-12-01 DIAGNOSIS — S72.142D CLOSED DISPLACED INTERTROCHANTERIC FRACTURE OF LEFT FEMUR WITH ROUTINE HEALING, SUBSEQUENT ENCOUNTER: ICD-10-CM

## 2020-12-01 DIAGNOSIS — I10 ESSENTIAL HYPERTENSION: ICD-10-CM

## 2020-12-01 PROCEDURE — 99308 SBSQ NF CARE LOW MDM 20: CPT | Performed by: NURSE PRACTITIONER

## 2020-12-01 NOTE — PROGRESS NOTES
HPI: Chaparrita Puente  Is a 79 yo female who was admitted to [facility] 10/8/20 after a fall sustaining a left hip fracture. S/p L hip orif by Dr Alpesh Sims. She was also found to have a pseudomonas UTI and treated with antibiotics.  She was transferred to [facility] atraumatic/normocephalic, mucous membranes pink and moist, PERRLA, EOMI, sclera anicteric, conjunctiva normal.    RESPIRATORY: CTAB  CARDIOVASCULAR:S1S2 RRR +murmur  ABDOMEN:  normal active BS+, soft, non distended, nontender   MUSCULOSKELETAL/EXTREMITIES:

## 2020-12-02 ENCOUNTER — EXTERNAL FACILITY (OUTPATIENT)
Dept: INTERNAL MEDICINE CLINIC | Facility: CLINIC | Age: 85
End: 2020-12-02

## 2020-12-02 DIAGNOSIS — N18.30 STAGE 3 CHRONIC KIDNEY DISEASE, UNSPECIFIED WHETHER STAGE 3A OR 3B CKD (HCC): ICD-10-CM

## 2020-12-02 DIAGNOSIS — S72.142D CLOSED DISPLACED INTERTROCHANTERIC FRACTURE OF LEFT FEMUR WITH ROUTINE HEALING, SUBSEQUENT ENCOUNTER: ICD-10-CM

## 2020-12-02 DIAGNOSIS — I73.9 PAD (PERIPHERAL ARTERY DISEASE) (HCC): ICD-10-CM

## 2020-12-02 DIAGNOSIS — R26.2 DIFFICULTY WALKING: ICD-10-CM

## 2020-12-02 DIAGNOSIS — T07.XXXA MULTIPLE WOUNDS: ICD-10-CM

## 2020-12-02 PROCEDURE — 99308 SBSQ NF CARE LOW MDM 20: CPT | Performed by: INTERNAL MEDICINE

## 2020-12-02 NOTE — PROGRESS NOTES
pt seen 112/2/2020 at [facility]    no new complaints     seen in bed no distress,     continue with current care    SUBJECTIVE/REVIEW OF SYSTEMS:    New wound RLE. Controlled pain. Denies sob/cough/cp/palpiations. C/o dysuria.  Denies hematuria/frequency results    Hyponatremia. Resolved.  Na 137 11/11  Started on hctz at [facility]  Follow BMP     HTN/controlled  Cont metoprolol, amlodipine, isosorbide hctz started at [facility]    Hypothyroidism  Cont synthroid    HLD  Cont statin     Rheumatic fever with systolic murmur

## 2020-12-04 ENCOUNTER — EXTERNAL FACILITY (OUTPATIENT)
Dept: INTERNAL MEDICINE CLINIC | Facility: CLINIC | Age: 85
End: 2020-12-04

## 2020-12-04 DIAGNOSIS — S72.142D CLOSED DISPLACED INTERTROCHANTERIC FRACTURE OF LEFT FEMUR WITH ROUTINE HEALING, SUBSEQUENT ENCOUNTER: ICD-10-CM

## 2020-12-04 DIAGNOSIS — S72.002A CLOSED FRACTURE OF LEFT HIP, INITIAL ENCOUNTER (HCC): ICD-10-CM

## 2020-12-04 DIAGNOSIS — E78.5 HYPERLIPIDEMIA, UNSPECIFIED HYPERLIPIDEMIA TYPE: ICD-10-CM

## 2020-12-04 DIAGNOSIS — I25.10 ATHEROSCLEROSIS OF NATIVE CORONARY ARTERY OF NATIVE HEART WITHOUT ANGINA PECTORIS: ICD-10-CM

## 2020-12-04 DIAGNOSIS — R26.9 ABNORMAL GAIT: ICD-10-CM

## 2020-12-04 DIAGNOSIS — I10 ESSENTIAL HYPERTENSION: ICD-10-CM

## 2020-12-04 DIAGNOSIS — I70.0 ATHEROSCLEROSIS OF AORTA (HCC): ICD-10-CM

## 2020-12-04 DIAGNOSIS — I73.9 PAD (PERIPHERAL ARTERY DISEASE) (HCC): ICD-10-CM

## 2020-12-04 DIAGNOSIS — F09 COGNITIVE DISORDER: ICD-10-CM

## 2020-12-04 PROCEDURE — 99308 SBSQ NF CARE LOW MDM 20: CPT | Performed by: INTERNAL MEDICINE

## 2020-12-09 ENCOUNTER — NURSE TRIAGE (OUTPATIENT)
Dept: INTERNAL MEDICINE CLINIC | Facility: CLINIC | Age: 85
End: 2020-12-09

## 2020-12-09 ENCOUNTER — TELEPHONE (OUTPATIENT)
Dept: INTERNAL MEDICINE CLINIC | Facility: CLINIC | Age: 85
End: 2020-12-09

## 2020-12-09 DIAGNOSIS — R30.0 DYSURIA: Primary | ICD-10-CM

## 2020-12-09 NOTE — TELEPHONE ENCOUNTER
Action Requested: Summary for Provider     []  Critical Lab, Recommendations Needed  [] Need Additional Advice  []   FYI    []   Need Orders  [] Need Medications Sent to Pharmacy  []  Other     SUMMARY: d/c'd from rehab and sent home on Thursday 12/3/2020.

## 2020-12-09 NOTE — TELEPHONE ENCOUNTER
[facility]  and states patient is back home and they admitted her home health services physical occupational therapy  And nursing       Please advise   [phone]  Please call her with confirmation

## 2020-12-10 ENCOUNTER — TELEPHONE (OUTPATIENT)
Dept: INTERNAL MEDICINE CLINIC | Facility: CLINIC | Age: 85
End: 2020-12-10

## 2020-12-10 NOTE — TELEPHONE ENCOUNTER
JAMES RN states that the the pt has a right lower leg ulcer 3 x2 cm. US was negative for DVT while in the hospital.  RN wants to know if  can give the kind of ulcer (for diagnosis code for medicare).   I advised that she could not as she has not seen the pt

## 2020-12-10 NOTE — TELEPHONE ENCOUNTER
Evy Bermudez LPN at [facility] calling regarding message from dr corona pt. Asked that lab orders be faxed to [phone]. Will generate orders and fax. Please advise on COVID testing too.

## 2020-12-10 NOTE — TELEPHONE ENCOUNTER
Pt informed of Dr Kendra Yun orders. Pt is living at [facility] in [place].  I called [phone], no answer, LMTCB on voice mailbox belonging to [facility] RN/supervisor. to return nurse call to [phone], option 3 to rel

## 2020-12-10 NOTE — TELEPHONE ENCOUNTER
Verified name and . Patient calling to follow up with request in nurse triage call as seen below. Please advise and thank you.

## 2020-12-10 NOTE — TELEPHONE ENCOUNTER
Advised pt of urine lab orders sent to [facility]. Pt is very eager to get an antibiotic as she is very uncomfortable. Can an antibiotic be sent to the pharm to be started right after the tests were done?   She states that this is the end of the third day

## 2020-12-10 NOTE — TELEPHONE ENCOUNTER
Patient had called to make sure urine orders were sent to [facility]. Per April RN, she will refax orders and communicate with [facility]. Patient informed of this.

## 2020-12-10 NOTE — TELEPHONE ENCOUNTER
Willis Franklin, would like to inform Dr. Boris Reid that she will be seeing the patient twice a week for 4 weeks for Home Health Physical Therapy.

## 2020-12-10 NOTE — TELEPHONE ENCOUNTER
I refaxed the orders and then I called [facility]. I spoke to the RN there who is able to take a verbal order. She will collect the specimen now. Can an antibiotic be ordered for the pt this evening. This has now been multiple calls from the pt.

## 2020-12-11 NOTE — TELEPHONE ENCOUNTER
Spoke to [employer] given with above approved orders . Just an FYI per [employer] and Home health aid was discontinued Pt does not need it .

## 2020-12-12 NOTE — TELEPHONE ENCOUNTER
Attempt made to contact patient; no answer, left message instructing patient to return call to the clinic. This nurse confirmed with Maria E Cooper from [address] [place] [address], [address] that the rx for the Sulfamethoxazole-TMP was picked up yesterday 12/11/20.

## 2020-12-15 ENCOUNTER — TELEPHONE (OUTPATIENT)
Dept: INTERNAL MEDICINE CLINIC | Facility: CLINIC | Age: 85
End: 2020-12-15

## 2020-12-15 NOTE — TELEPHONE ENCOUNTER
[facility] RN calling and states since starting the Bactrim pt has had nausea and intermittent diarrhea (2 times/today).      Please advise

## 2020-12-16 ENCOUNTER — MED REC SCAN ONLY (OUTPATIENT)
Dept: INTERNAL MEDICINE CLINIC | Facility: CLINIC | Age: 85
End: 2020-12-16

## 2020-12-17 NOTE — TELEPHONE ENCOUNTER
Verified name and . Patient calling to report that she still has diarrhea from the Bactrim. She was advised to continue to drink adequate fluids and was informed of the BRAT diet. Patient verbalized understanding.   She states that she will await urin

## 2020-12-21 ENCOUNTER — MED REC SCAN ONLY (OUTPATIENT)
Dept: INTERNAL MEDICINE CLINIC | Facility: CLINIC | Age: 85
End: 2020-12-21

## 2020-12-22 RX ORDER — ROSUVASTATIN CALCIUM 20 MG/1
TABLET, COATED ORAL
Qty: 30 TABLET | Refills: 0 | Status: SHIPPED | OUTPATIENT
Start: 2020-12-22 | End: 2021-06-01 | Stop reason: SDUPTHER

## 2020-12-23 ENCOUNTER — MED REC SCAN ONLY (OUTPATIENT)
Dept: INTERNAL MEDICINE CLINIC | Facility: CLINIC | Age: 85
End: 2020-12-23

## 2020-12-24 ENCOUNTER — TELEPHONE (OUTPATIENT)
Dept: INTERNAL MEDICINE CLINIC | Facility: CLINIC | Age: 85
End: 2020-12-24

## 2020-12-24 NOTE — TELEPHONE ENCOUNTER
Pt states she had urine test collected on 12/10/2020 by home health nuse and hasn't received results yet. States she stopped taking Bactrim as advised by Dr. Kendra Yun. Pt denies any urinary symptoms at this time, also no diarrhea.  Pt not back to regular die

## 2020-12-28 ENCOUNTER — MED REC SCAN ONLY (OUTPATIENT)
Dept: INTERNAL MEDICINE CLINIC | Facility: CLINIC | Age: 85
End: 2020-12-28

## 2020-12-28 NOTE — TELEPHONE ENCOUNTER
Took 6 days  Of bactrim now asymptomatic  Asking what to do next time  Advised get urinalysis and culture first  Patient voiced understanding  and agrees with plan

## 2020-12-31 ENCOUNTER — TELEPHONE (OUTPATIENT)
Dept: INTERNAL MEDICINE CLINIC | Facility: CLINIC | Age: 85
End: 2020-12-31

## 2020-12-31 NOTE — TELEPHONE ENCOUNTER
Ronan from [facility] and she states she need verbal order for therapy for twice  week for three weeks        Please advise   [phone]

## 2021-01-06 ENCOUNTER — TELEPHONE (OUTPATIENT)
Dept: INTERNAL MEDICINE CLINIC | Facility: CLINIC | Age: 86
End: 2021-01-06

## 2021-01-06 NOTE — TELEPHONE ENCOUNTER
Pt calling and asking if it's safe for her to receive the COVID vaccine with her health history and all of her allergies to abx.      Please advise

## 2021-01-07 NOTE — TELEPHONE ENCOUNTER
Yes there is no contraindication for her to get covid 19 vaccine  There is a questionaire for her to fill out and she has to sign permission  As far as her health problems  There eis not contraindication

## 2021-01-11 ENCOUNTER — MED REC SCAN ONLY (OUTPATIENT)
Dept: INTERNAL MEDICINE CLINIC | Facility: CLINIC | Age: 86
End: 2021-01-11

## 2021-01-13 ENCOUNTER — MED REC SCAN ONLY (OUTPATIENT)
Dept: INTERNAL MEDICINE CLINIC | Facility: CLINIC | Age: 86
End: 2021-01-13

## 2021-01-14 ENCOUNTER — TELEPHONE (OUTPATIENT)
Dept: INTERNAL MEDICINE CLINIC | Facility: CLINIC | Age: 86
End: 2021-01-14

## 2021-01-14 ENCOUNTER — NURSE TRIAGE (OUTPATIENT)
Dept: INTERNAL MEDICINE CLINIC | Facility: CLINIC | Age: 86
End: 2021-01-14

## 2021-01-14 DIAGNOSIS — R30.0 BURNING WITH URINATION: Primary | ICD-10-CM

## 2021-01-14 NOTE — TELEPHONE ENCOUNTER
Action Requested: Summary for Provider     []  Critical Lab, Recommendations Needed  [x] Need Additional Advice  []   FYI    [x]   Need Orders  [x] Need Medications Sent to Pharmacy  []  Other     SUMMARY: Verified name and .   Patient reports that she g

## 2021-01-14 NOTE — TELEPHONE ENCOUNTER
She is allergic to cipri,sulf, macrobid penicillin  Not much choice for treatment    Order urinalysis with reflex culture  Clean catch  Home health to collect if needed  If sx significant  go to urgent care

## 2021-01-14 NOTE — TELEPHONE ENCOUNTER
Spoke with pt,  verified  Pt informed of MD recommendation, pt stated understanding. Pt stated a HH RN will visit her today and she will let her know to call our office for orders.

## 2021-01-14 NOTE — TELEPHONE ENCOUNTER
The patient calling to state the lab order needs to go to [facility] in [facility]  Fax number . I generated the order and faxed as requested. I called [facility] in [facility] at  who stated they received the order.

## 2021-01-15 ENCOUNTER — TELEPHONE (OUTPATIENT)
Dept: INTERNAL MEDICINE CLINIC | Facility: CLINIC | Age: 86
End: 2021-01-15

## 2021-01-15 NOTE — TELEPHONE ENCOUNTER
Physical Therapist calling to let the dr know that that pt will be discharged from [facility] physical therapy home health services, next week, as pt.  Has received maximum physical therapy potential.

## 2021-01-15 NOTE — TELEPHONE ENCOUNTER
Anisa Hdez, RN calling from Home health asking for UA order     Faxed order to [phone]  As requested

## 2021-01-18 NOTE — TELEPHONE ENCOUNTER
Derrel Runner RN from [facility] returned call. states they do not have UA results yet. Thinks it will be at least 3 more days.     AIMEE

## 2021-01-21 NOTE — TELEPHONE ENCOUNTER
Spoke with the [facility] and she states the U/A was done on 1/18/21. She will have results faxed to the office for Dr Rosa White.

## 2021-01-21 NOTE — TELEPHONE ENCOUNTER
Form that needed signature and date on line 27 has been faxed, awaiting fax confirmation. No UA received yet, attempted to reach RN at home health no answer voicemail left to call back office. No results received, may try alternative fax [phone].

## 2021-01-21 NOTE — TELEPHONE ENCOUNTER
UA results received, placed on MMP desk at [facility] for review. Call returned to [facility] no answer, message left to call back may leave message in regards to reason for call.

## 2021-01-22 ENCOUNTER — HOSPITAL ENCOUNTER (OUTPATIENT)
Facility: HOSPITAL | Age: 86
Setting detail: OBSERVATION
Discharge: HOME | End: 2021-01-25
Attending: EMERGENCY MEDICINE | Admitting: INTERNAL MEDICINE
Payer: MEDICARE

## 2021-01-22 ENCOUNTER — TELEPHONE (OUTPATIENT)
Dept: INTERNAL MEDICINE CLINIC | Facility: CLINIC | Age: 86
End: 2021-01-22

## 2021-01-22 DIAGNOSIS — N30.00 ACUTE CYSTITIS WITHOUT HEMATURIA: Primary | ICD-10-CM

## 2021-01-22 LAB
ANION GAP SERPL CALC-SCNC: 6 MMOL/L (ref 0–18)
BASOPHILS # BLD AUTO: 0.03 X10(3) UL (ref 0–0.2)
BASOPHILS NFR BLD AUTO: 0.4 %
BILIRUB UR QL: NEGATIVE
BUN BLD-MCNC: 28 MG/DL (ref 7–18)
BUN/CREAT SERPL: 28.3 (ref 10–20)
CALCIUM BLD-MCNC: 9.5 MG/DL (ref 8.5–10.1)
CHLORIDE SERPL-SCNC: 102 MMOL/L (ref 98–112)
CO2 SERPL-SCNC: 30 MMOL/L (ref 21–32)
COLOR UR: YELLOW
CREAT BLD-MCNC: 0.99 MG/DL
DEPRECATED RDW RBC AUTO: 48.2 FL (ref 35.1–46.3)
EOSINOPHIL # BLD AUTO: 0.04 X10(3) UL (ref 0–0.7)
EOSINOPHIL NFR BLD AUTO: 0.6 %
ERYTHROCYTE [DISTWIDTH] IN BLOOD BY AUTOMATED COUNT: 13.4 % (ref 11–15)
GLUCOSE BLD-MCNC: 111 MG/DL (ref 70–99)
GLUCOSE UR-MCNC: NEGATIVE MG/DL
HCT VFR BLD AUTO: 40.6 %
HGB BLD-MCNC: 12.5 G/DL
IMM GRANULOCYTES # BLD AUTO: 0.01 X10(3) UL (ref 0–1)
IMM GRANULOCYTES NFR BLD: 0.1 %
KETONES UR-MCNC: NEGATIVE MG/DL
LYMPHOCYTES # BLD AUTO: 2 X10(3) UL (ref 1–4)
LYMPHOCYTES NFR BLD AUTO: 27.9 %
MCH RBC QN AUTO: 29.9 PG (ref 26–34)
MCHC RBC AUTO-ENTMCNC: 30.8 G/DL (ref 31–37)
MCV RBC AUTO: 97.1 FL
MONOCYTES # BLD AUTO: 0.83 X10(3) UL (ref 0.1–1)
MONOCYTES NFR BLD AUTO: 11.6 %
NEUTROPHILS # BLD AUTO: 4.25 X10 (3) UL (ref 1.5–7.7)
NEUTROPHILS # BLD AUTO: 4.25 X10(3) UL (ref 1.5–7.7)
NEUTROPHILS NFR BLD AUTO: 59.4 %
NITRITE UR QL STRIP.AUTO: POSITIVE
OSMOLALITY SERPL CALC.SUM OF ELEC: 292 MOSM/KG (ref 275–295)
PH UR: 6 [PH] (ref 5–8)
PLATELET # BLD AUTO: 232 10(3)UL (ref 150–450)
POTASSIUM SERPL-SCNC: 3.9 MMOL/L (ref 3.5–5.1)
PROT UR-MCNC: 30 MG/DL
RBC # BLD AUTO: 4.18 X10(6)UL
RBC #/AREA URNS AUTO: 24 /HPF
SARS-COV-2 RNA RESP QL NAA+PROBE: NOT DETECTED
SODIUM SERPL-SCNC: 138 MMOL/L (ref 136–145)
SP GR UR STRIP: 1.01 (ref 1–1.03)
UROBILINOGEN UR STRIP-ACNC: <2
WBC # BLD AUTO: 7.2 X10(3) UL (ref 4–11)
WBC #/AREA URNS AUTO: 1977 /HPF

## 2021-01-22 NOTE — TELEPHONE ENCOUNTER
Dr Sylvester Friedman, please see messages below, UA results were placed on your [facility] desk.

## 2021-01-23 ENCOUNTER — TELEPHONE (OUTPATIENT)
Dept: INTERNAL MEDICINE CLINIC | Facility: CLINIC | Age: 86
End: 2021-01-23

## 2021-01-23 LAB
ALBUMIN SERPL-MCNC: 3.4 G/DL (ref 3.4–5)
ALBUMIN/GLOB SERPL: 0.9 {RATIO} (ref 1–2)
ALP LIVER SERPL-CCNC: 113 U/L
ALT SERPL-CCNC: 16 U/L
ANION GAP SERPL CALC-SCNC: 5 MMOL/L (ref 0–18)
AST SERPL-CCNC: 16 U/L (ref 15–37)
BASOPHILS # BLD AUTO: 0.03 X10(3) UL (ref 0–0.2)
BASOPHILS NFR BLD AUTO: 0.4 %
BILIRUB SERPL-MCNC: 0.4 MG/DL (ref 0.1–2)
BUN BLD-MCNC: 23 MG/DL (ref 7–18)
BUN/CREAT SERPL: 28.8 (ref 10–20)
CALCIUM BLD-MCNC: 9.5 MG/DL (ref 8.5–10.1)
CHLORIDE SERPL-SCNC: 105 MMOL/L (ref 98–112)
CO2 SERPL-SCNC: 29 MMOL/L (ref 21–32)
CREAT BLD-MCNC: 0.8 MG/DL
DEPRECATED RDW RBC AUTO: 46.5 FL (ref 35.1–46.3)
EOSINOPHIL # BLD AUTO: 0.1 X10(3) UL (ref 0–0.7)
EOSINOPHIL NFR BLD AUTO: 1.4 %
ERYTHROCYTE [DISTWIDTH] IN BLOOD BY AUTOMATED COUNT: 13.3 % (ref 11–15)
GLOBULIN PLAS-MCNC: 3.7 G/DL (ref 2.8–4.4)
GLUCOSE BLD-MCNC: 93 MG/DL (ref 70–99)
HAV IGM SER QL: 2 MG/DL (ref 1.6–2.6)
HCT VFR BLD AUTO: 37.1 %
HGB BLD-MCNC: 11.8 G/DL
IMM GRANULOCYTES # BLD AUTO: 0.02 X10(3) UL (ref 0–1)
IMM GRANULOCYTES NFR BLD: 0.3 %
LYMPHOCYTES # BLD AUTO: 1.83 X10(3) UL (ref 1–4)
LYMPHOCYTES NFR BLD AUTO: 26.5 %
M PROTEIN MFR SERPL ELPH: 7.1 G/DL (ref 6.4–8.2)
MCH RBC QN AUTO: 30.2 PG (ref 26–34)
MCHC RBC AUTO-ENTMCNC: 31.8 G/DL (ref 31–37)
MCV RBC AUTO: 94.9 FL
MONOCYTES # BLD AUTO: 0.89 X10(3) UL (ref 0.1–1)
MONOCYTES NFR BLD AUTO: 12.9 %
NEUTROPHILS # BLD AUTO: 4.03 X10 (3) UL (ref 1.5–7.7)
NEUTROPHILS # BLD AUTO: 4.03 X10(3) UL (ref 1.5–7.7)
NEUTROPHILS NFR BLD AUTO: 58.5 %
OSMOLALITY SERPL CALC.SUM OF ELEC: 291 MOSM/KG (ref 275–295)
PHOSPHATE SERPL-MCNC: 2.9 MG/DL (ref 2.5–4.9)
PLATELET # BLD AUTO: 204 10(3)UL (ref 150–450)
POTASSIUM SERPL-SCNC: 3.5 MMOL/L (ref 3.5–5.1)
POTASSIUM SERPL-SCNC: 5.5 MMOL/L (ref 3.5–5.1)
RBC # BLD AUTO: 3.91 X10(6)UL
SODIUM SERPL-SCNC: 139 MMOL/L (ref 136–145)
TSH SERPL-ACNC: 1.98 MCUNITS/ML
TSI SER-ACNC: 1.98 MIU/ML (ref 0.36–3.74)
WBC # BLD AUTO: 6.9 X10(3) UL (ref 4–11)

## 2021-01-23 PROCEDURE — 99233 SBSQ HOSP IP/OBS HIGH 50: CPT | Performed by: HOSPITALIST

## 2021-01-23 PROCEDURE — 99220 INITIAL OBSERVATION CARE,LEVL III: CPT | Performed by: HOSPITALIST

## 2021-01-23 RX ORDER — POLYETHYLENE GLYCOL 3350 17 G/17G
17 POWDER, FOR SOLUTION ORAL DAILY PRN
Status: DISCONTINUED | OUTPATIENT
Start: 2021-01-23 | End: 2021-01-25

## 2021-01-23 RX ORDER — ISOSORBIDE MONONITRATE 30 MG/1
30 TABLET, EXTENDED RELEASE ORAL DAILY
Status: DISCONTINUED | OUTPATIENT
Start: 2021-01-23 | End: 2021-01-25

## 2021-01-23 RX ORDER — CYCLOBENZAPRINE HCL 10 MG
5 TABLET ORAL 3 TIMES DAILY PRN
Status: DISCONTINUED | OUTPATIENT
Start: 2021-01-23 | End: 2021-01-25

## 2021-01-23 RX ORDER — POTASSIUM CHLORIDE 20 MEQ/1
40 TABLET, EXTENDED RELEASE ORAL EVERY 4 HOURS
Status: COMPLETED | OUTPATIENT
Start: 2021-01-23 | End: 2021-01-23

## 2021-01-23 RX ORDER — HEPARIN SODIUM 5000 [USP'U]/ML
5000 INJECTION, SOLUTION INTRAVENOUS; SUBCUTANEOUS EVERY 12 HOURS SCHEDULED
Status: DISCONTINUED | OUTPATIENT
Start: 2021-01-23 | End: 2021-01-23

## 2021-01-23 RX ORDER — MELATONIN
1
Status: DISCONTINUED | OUTPATIENT
Start: 2021-01-23 | End: 2021-01-25

## 2021-01-23 RX ORDER — CLOPIDOGREL BISULFATE 75 MG/1
75 TABLET ORAL DAILY
Status: DISCONTINUED | OUTPATIENT
Start: 2021-01-23 | End: 2021-01-25

## 2021-01-23 RX ORDER — TRAMADOL HYDROCHLORIDE 50 MG/1
50 TABLET ORAL EVERY 6 HOURS PRN
Status: DISCONTINUED | OUTPATIENT
Start: 2021-01-23 | End: 2021-01-25

## 2021-01-23 RX ORDER — ROSUVASTATIN CALCIUM 20 MG/1
20 TABLET, COATED ORAL NIGHTLY
Status: DISCONTINUED | OUTPATIENT
Start: 2021-01-23 | End: 2021-01-25

## 2021-01-23 RX ORDER — HYDROCODONE BITARTRATE AND ACETAMINOPHEN 7.5; 325 MG/1; MG/1
1 TABLET ORAL EVERY 6 HOURS PRN
Status: DISCONTINUED | OUTPATIENT
Start: 2021-01-23 | End: 2021-01-25

## 2021-01-23 RX ORDER — LEVOTHYROXINE SODIUM 0.1 MG/1
100 TABLET ORAL
Status: DISCONTINUED | OUTPATIENT
Start: 2021-01-23 | End: 2021-01-25

## 2021-01-23 RX ORDER — SODIUM CHLORIDE 9 MG/ML
INJECTION, SOLUTION INTRAVENOUS CONTINUOUS
Status: DISCONTINUED | OUTPATIENT
Start: 2021-01-23 | End: 2021-01-23

## 2021-01-23 RX ORDER — DOCUSATE SODIUM 100 MG/1
100 CAPSULE, LIQUID FILLED ORAL 2 TIMES DAILY PRN
Status: DISCONTINUED | OUTPATIENT
Start: 2021-01-23 | End: 2021-01-25

## 2021-01-23 RX ORDER — ONDANSETRON 2 MG/ML
4 INJECTION INTRAMUSCULAR; INTRAVENOUS EVERY 6 HOURS PRN
Status: DISCONTINUED | OUTPATIENT
Start: 2021-01-23 | End: 2021-01-25

## 2021-01-23 RX ORDER — AMLODIPINE BESYLATE 10 MG/1
10 TABLET ORAL DAILY
Status: DISCONTINUED | OUTPATIENT
Start: 2021-01-23 | End: 2021-01-25

## 2021-01-23 RX ORDER — CLOPIDOGREL BISULFATE 75 MG/1
75 TABLET ORAL DAILY
Status: ON HOLD | COMMUNITY
End: 2021-06-15

## 2021-01-23 RX ORDER — ACETAMINOPHEN 500 MG
500 TABLET ORAL EVERY 8 HOURS PRN
Status: DISCONTINUED | OUTPATIENT
Start: 2021-01-23 | End: 2021-01-25

## 2021-01-23 RX ORDER — TRAZODONE HYDROCHLORIDE 50 MG/1
25 TABLET ORAL NIGHTLY
Status: DISCONTINUED | OUTPATIENT
Start: 2021-01-23 | End: 2021-01-25

## 2021-01-23 RX ORDER — PANTOPRAZOLE SODIUM 40 MG/1
40 TABLET, DELAYED RELEASE ORAL
Status: DISCONTINUED | OUTPATIENT
Start: 2021-01-23 | End: 2021-01-25

## 2021-01-23 NOTE — TELEPHONE ENCOUNTER
On-call  Received page from ER stating that patient with multidrug-resistant UTI and that culture results were sent to the office yesterday but they do not have the results   Received fax and called hospitalist dr Oneda Riedel and gave info   Urine collected 1/19

## 2021-01-23 NOTE — ED PROVIDER NOTES
Patient Seen in: [facility] AND [facility] Emergency Department      History   Patient presents with:  Urinary Symptoms    Stated Complaint: UTI    HPI/Subjective:   HPI    80-year-old female presents for evaluation for increased urinary frequency and dysuria fo MAIN OR   • RADIATION LEFT                  Social History    Tobacco Use      Smoking status: Former Smoker        Packs/day: 1.00        Years: 20.00        Pack years: 20        Types: Cigarettes      Smokeless tobacco: Former User    Alcohol use:  Yes Status: She is alert and oriented to person, place, and time. Gait: Gait is intact.    Psychiatric:         Attention and Perception: Attention normal.         Mood and Affect: Mood normal.         Speech: Speech normal.               ED Course     Lab on-call for patient's primary care doctor who will reach out tomorrow morning to try and find the sensitivities. At this time we will initiate meropenem until we have further data. She is otherwise nontoxic-appearing.       Total Critical Care Time: 28 mi

## 2021-01-23 NOTE — ED NOTES
Orders for admission, patient is aware of plan and ready to go upstairs. Any questions, please call ED RN Praveen De Jesus  at extension [phone].    Type of COVID test sent: Rapid  COVID Suspicion level: Low    Titratable drug(s) infusing:None  Rate:na    LOC at time of

## 2021-01-23 NOTE — TELEPHONE ENCOUNTER
Many from [address] [place] [address] requesting and calling for a copy of urine culture results that was done yesterday 1/22/21, states that they cannot access and see it through [address] [place] [address] Rd, fax number given [phone], UA successfully faxed today, urine culture still in process.

## 2021-01-23 NOTE — PROGRESS NOTES
ADMISSION NOTE    80year old female with recurrent UTIs  presents at the faith;ggestion of her PCP because outpatient urine culture was positive and patient has many antibiotic allergies. She was sent to ED for admission and IV antibiotics.  . Available medic

## 2021-01-23 NOTE — ED INITIAL ASSESSMENT (HPI)
Pt recently diagnosed with UTI. Pt sent to the ER by her doctor for IV antibiotics. Pt complaining of dysuria and urinary frequency.

## 2021-01-23 NOTE — H&P
[facility]    PATIENT'S NAME: Justa Martinez   ATTENDING PHYSICIAN: Brittany Beck MD   PATIENT ACCOUNT#:   [id number]    LOCATION:  [address] [place] [address] RECORD #:   [id number]       YOB: 1928  ADMISSION DATE:       01 Hypercholesterolemia. History of left-sided breast cancer. History of basal cell skin cancer. Internal derangement of the right knee. Primary osteoarthritis of both knees. Visual impairment. Angioplasty in 2012. Bowel resection in 2016.   Coronary ar daily.  Hydrochlorothiazide 12.5 mg daily.   Vancomycin 125 mg by mouth; it is unclear when the patient was taking this last.    ALLERGIES:  Multiple and, per the record, include sulfa antibiotics, adhesive tape, aspirin, Macrobid, penicillin, povidone iodi She had no dizziness upon standing, getting out of the bed. There were no focal motor or sensory deficits. PSYCHIATRIC:  Her mood and affect were pleasant and cooperative. BACK:  There was no costovertebral angle tenderness noted.       LABORATORY DATA: hyperglycemia, likely stress related. Will simply follow. 6.   Chronic kidney disease stage 3, stable. 7.   History of gastrointestinal bleeding. Continue Protonix. 8.   DVT prophylaxis. Patient is on apixaban, will continue.   9.   The patient's cur

## 2021-01-23 NOTE — PLAN OF CARE
Patient alert and oriented, cooperative. Ambulates with stand-by assist and rolling walker. Continues on IV antibiotic for UTI. Denies pain. Good appetite. Up to chair for meals today. Resting comfortably at this time.     Problem: Patient Centered Care  Go and assess patient for signs and symptoms of electrolyte imbalances  - Administer electrolyte replacement as ordered  - Monitor response to electrolyte replacements, including rhythm and repeat lab results as appropriate  - Fluid restriction as ordered  -

## 2021-01-23 NOTE — PLAN OF CARE
Problem: Patient Centered Care  Goal: Patient preferences are identified and integrated in the patient's plan of care  Description: Interventions:  - What would you like us to know as we care for you?  From [facility]  - Provide timely, complete, and accur RN  Outcome: Progressing     Problem: METABOLIC/FLUID AND ELECTROLYTES - ADULT  Goal: Electrolytes maintained within normal limits  Description: INTERVENTIONS:  - Monitor labs and rhythm and assess patient for signs and symptoms of electrolyte imbalances

## 2021-01-23 NOTE — PROGRESS NOTES
[facility] Note: Antimicrobial Weight Based Dose Adjustment for: meropenem [facility])    Pavithra Lopez is a 80year old patient who has been prescribed meropenem (MERREM) 1000 mg x 1 dose.     CrCl cannot be calculated (Patient's most recent lab result

## 2021-01-24 LAB
ANION GAP SERPL CALC-SCNC: 4 MMOL/L (ref 0–18)
BASOPHILS # BLD AUTO: 0.03 X10(3) UL (ref 0–0.2)
BASOPHILS NFR BLD AUTO: 0.7 %
BUN BLD-MCNC: 27 MG/DL (ref 7–18)
BUN/CREAT SERPL: 32.5 (ref 10–20)
CALCIUM BLD-MCNC: 9 MG/DL (ref 8.5–10.1)
CHLORIDE SERPL-SCNC: 110 MMOL/L (ref 98–112)
CO2 SERPL-SCNC: 26 MMOL/L (ref 21–32)
CREAT BLD-MCNC: 0.83 MG/DL
DEPRECATED RDW RBC AUTO: 47.5 FL (ref 35.1–46.3)
EOSINOPHIL # BLD AUTO: 0.06 X10(3) UL (ref 0–0.7)
EOSINOPHIL NFR BLD AUTO: 1.3 %
ERYTHROCYTE [DISTWIDTH] IN BLOOD BY AUTOMATED COUNT: 13.4 % (ref 11–15)
GLUCOSE BLD-MCNC: 97 MG/DL (ref 70–99)
HAV IGM SER QL: 2 MG/DL (ref 1.6–2.6)
HCT VFR BLD AUTO: 33.5 %
HGB BLD-MCNC: 10.6 G/DL
IMM GRANULOCYTES # BLD AUTO: 0.02 X10(3) UL (ref 0–1)
IMM GRANULOCYTES NFR BLD: 0.4 %
LYMPHOCYTES # BLD AUTO: 1.15 X10(3) UL (ref 1–4)
LYMPHOCYTES NFR BLD AUTO: 25.7 %
MCH RBC QN AUTO: 30.2 PG (ref 26–34)
MCHC RBC AUTO-ENTMCNC: 31.6 G/DL (ref 31–37)
MCV RBC AUTO: 95.4 FL
MONOCYTES # BLD AUTO: 0.58 X10(3) UL (ref 0.1–1)
MONOCYTES NFR BLD AUTO: 13 %
NEUTROPHILS # BLD AUTO: 2.63 X10 (3) UL (ref 1.5–7.7)
NEUTROPHILS # BLD AUTO: 2.63 X10(3) UL (ref 1.5–7.7)
NEUTROPHILS NFR BLD AUTO: 58.9 %
OSMOLALITY SERPL CALC.SUM OF ELEC: 295 MOSM/KG (ref 275–295)
PLATELET # BLD AUTO: 189 10(3)UL (ref 150–450)
POTASSIUM SERPL-SCNC: 4.1 MMOL/L (ref 3.5–5.1)
RBC # BLD AUTO: 3.51 X10(6)UL
SODIUM SERPL-SCNC: 140 MMOL/L (ref 136–145)
WBC # BLD AUTO: 4.5 X10(3) UL (ref 4–11)

## 2021-01-24 PROCEDURE — 99226 SUBSEQUENT OBSERVATION CARE: CPT | Performed by: HOSPITALIST

## 2021-01-24 RX ORDER — VANCOMYCIN HYDROCHLORIDE 125 MG/1
125 CAPSULE ORAL DAILY
Status: DISCONTINUED | OUTPATIENT
Start: 2021-01-24 | End: 2021-01-25

## 2021-01-24 NOTE — PLAN OF CARE
Problem: Patient Centered Care  Goal: Patient preferences are identified and integrated in the patient's plan of care  Description: Interventions:  - What would you like us to know as we care for you?  From [facility]  - Provide timely, complete, and accur ELECTROLYTES - ADULT  Goal: Electrolytes maintained within normal limits  Description: INTERVENTIONS:  - Monitor labs and rhythm and assess patient for signs and symptoms of electrolyte imbalances  - Administer electrolyte replacement as ordered  - Monitor

## 2021-01-24 NOTE — PLAN OF CARE
Patient alert, oriented, cooperative. Ambulates throughout room and bathroom with walker and stand-by assist. Decreased urinary discomfort today. Denies generalized pain. Good appetite. Up to chair for meals. Call light within reach.  Resting comfortably at bladder scan as needed  - Follow urinary retention protocol/standard of care  - Consider collaborating with pharmacy to review patient's medication profile  - Implement strategies to promote bladder emptying  Outcome: Progressing     Problem: METABOLIC/FLU

## 2021-01-24 NOTE — PROGRESS NOTES
[facility]    Progress Note    Anuj Johnson Patient Status:  Observation    1928 MRN [id number]   Location [facility] 5SW/SE Attending Jerrell Beck Day # 0 PCP Isabelle May MD        Subjective: evaluation in the outpatient setting. 2.       Coronary artery disease, status post coronary artery bypass grafting in 1989. Patient denies any chest discomfort or shortness of breath. 3.       Hypertension. Blood pressure was slightly elevated.   Ame TBD.      Myesha Kaur MD  1/24/2021

## 2021-01-25 VITALS
DIASTOLIC BLOOD PRESSURE: 60 MMHG | HEIGHT: 63 IN | SYSTOLIC BLOOD PRESSURE: 127 MMHG | OXYGEN SATURATION: 97 % | RESPIRATION RATE: 18 BRPM | TEMPERATURE: 97 F | BODY MASS INDEX: 19.19 KG/M2 | HEART RATE: 68 BPM | WEIGHT: 108.31 LBS

## 2021-01-25 LAB
ANION GAP SERPL CALC-SCNC: 4 MMOL/L
ANION GAP SERPL CALC-SCNC: 4 MMOL/L (ref 0–18)
BASOPHILS # BLD AUTO: 0.04 X10(3) UL (ref 0–0.2)
BASOPHILS NFR BLD AUTO: 0.8 %
BUN BLD-MCNC: 33 MG/DL (ref 7–18)
BUN SERPL-MCNC: 33 MG/DL
BUN/CREAT SERPL: 44
BUN/CREAT SERPL: 44 (ref 10–20)
CALCIUM BLD-MCNC: 9 MG/DL (ref 8.5–10.1)
CALCIUM SERPL-MCNC: 9 MG/DL
CHLORIDE SERPL-SCNC: 108 MMOL/L
CHLORIDE SERPL-SCNC: 108 MMOL/L (ref 98–112)
CO2 SERPL-SCNC: 27 MMOL/L
CO2 SERPL-SCNC: 27 MMOL/L (ref 21–32)
CREAT BLD-MCNC: 0.75 MG/DL
CREAT SERPL-MCNC: 0.75 MG/DL
DEPRECATED RDW RBC AUTO: 47.8 FL (ref 35.1–46.3)
EOSINOPHIL # BLD AUTO: 0.12 X10(3) UL (ref 0–0.7)
EOSINOPHIL NFR BLD AUTO: 2.3 %
ERYTHROCYTE [DISTWIDTH] IN BLOOD BY AUTOMATED COUNT: 13.2 % (ref 11–15)
GLUCOSE BLD-MCNC: 94 MG/DL (ref 70–99)
GLUCOSE SERPL-MCNC: 94 MG/DL
HAV IGM SER QL: 2 MG/DL (ref 1.6–2.6)
HCT VFR BLD AUTO: 34 %
HCT VFR BLD CALC: 34 %
HGB BLD-MCNC: 10.8 G/DL
HGB BLD-MCNC: 10.8 G/DL
IMM GRANULOCYTES # BLD AUTO: 0.01 X10(3) UL (ref 0–1)
IMM GRANULOCYTES NFR BLD: 0.2 %
LYMPHOCYTES # BLD AUTO: 1.39 X10(3) UL (ref 1–4)
LYMPHOCYTES NFR BLD AUTO: 26.7 %
MCH RBC QN AUTO: 30.5 PG
MCH RBC QN AUTO: 30.5 PG (ref 26–34)
MCHC RBC AUTO-ENTMCNC: 31.8 G/DL
MCHC RBC AUTO-ENTMCNC: 31.8 G/DL (ref 31–37)
MCV RBC AUTO: 96 FL
MCV RBC AUTO: 96 FL
MONOCYTES # BLD AUTO: 0.54 X10(3) UL (ref 0.1–1)
MONOCYTES NFR BLD AUTO: 10.4 %
NEUTROPHILS # BLD AUTO: 3.1 X10 (3) UL (ref 1.5–7.7)
NEUTROPHILS # BLD AUTO: 3.1 X10(3) UL (ref 1.5–7.7)
NEUTROPHILS NFR BLD AUTO: 59.6 %
OSMOLALITY SERPL CALC.SUM OF ELEC: 295 MOSM/KG (ref 275–295)
PLATELET # BLD AUTO: 183 10(3)UL (ref 150–450)
PLATELET # BLD: 183 K/MCL
POTASSIUM SERPL-SCNC: 3.9 MMOL/L
POTASSIUM SERPL-SCNC: 3.9 MMOL/L (ref 3.5–5.1)
RBC # BLD AUTO: 3.54 X10(6)UL
RBC # BLD: 3.54 10*6/UL
SODIUM SERPL-SCNC: 139 MMOL/L
SODIUM SERPL-SCNC: 139 MMOL/L (ref 136–145)
WBC # BLD AUTO: 5.2 X10(3) UL (ref 4–11)
WBC # BLD: 5.2 K/MCL

## 2021-01-25 PROCEDURE — 99217 OBSERVATION CARE DISCHARGE: CPT | Performed by: HOSPITALIST

## 2021-01-25 RX ORDER — FOSFOMYCIN TROMETHAMINE 3 G/1
3 GRANULE, FOR SOLUTION ORAL ONCE
Status: COMPLETED | OUTPATIENT
Start: 2021-01-25 | End: 2021-01-25

## 2021-01-25 RX ORDER — FOSFOMYCIN TROMETHAMINE 3 G/1
3 GRANULE, FOR SOLUTION ORAL ONCE
Status: DISCONTINUED | OUTPATIENT
Start: 2021-01-28 | End: 2021-01-25

## 2021-01-25 RX ORDER — VANCOMYCIN HYDROCHLORIDE 125 MG/1
125 CAPSULE ORAL DAILY
Qty: 10 CAPSULE | Refills: 0 | Status: SHIPPED | OUTPATIENT
Start: 2021-01-25 | End: 2021-04-20

## 2021-01-25 RX ORDER — TRAMADOL HYDROCHLORIDE 50 MG/1
50 TABLET ORAL EVERY 6 HOURS PRN
Qty: 20 TABLET | Refills: 0 | Status: SHIPPED | OUTPATIENT
Start: 2021-01-25 | End: 2021-04-20

## 2021-01-25 RX ORDER — PHENAZOPYRIDINE HYDROCHLORIDE 100 MG/1
100 TABLET, FILM COATED ORAL
Qty: 12 TABLET | Refills: 0 | Status: SHIPPED | OUTPATIENT
Start: 2021-01-25 | End: 2021-02-19

## 2021-01-25 RX ORDER — FOSFOMYCIN TROMETHAMINE 3 G/1
3 GRANULE, FOR SOLUTION ORAL ONCE
Qty: 3 G | Refills: 0 | Status: SHIPPED | OUTPATIENT
Start: 2021-01-25 | End: 2021-01-25

## 2021-01-25 NOTE — CM/SW NOTE
SW initiated self referral for discharge planning. Per chart review, patient is from [address] [place] [address] contacted [employer] to confirm level of care. Liaison confirmed that patient is from [place] [address].      SW met with patient at

## 2021-01-25 NOTE — DISCHARGE SUMMARY
Dc summary#[id number]  > 30 min spent on [address] [place] [address] Discharge Diagnoses: uti with sepsis    Lace+ Score: 69  59-90 High Risk  29-58 Medium Risk  0-28   Low Risk. TCM Follow-Up Recommendation:  LACE 29-58:  Moderate Risk of readmission after discharge f

## 2021-01-25 NOTE — PROGRESS NOTES
Discharge RN Summary: Patient has discharge order in. Patient to discharge to [facility]. IV removed by primary RN. Understands to follow up with PCP in 1 week.  Patient understands to  tramadol (signed by MD), fosfomycin, vancomyc

## 2021-01-26 ENCOUNTER — MED REC SCAN ONLY (OUTPATIENT)
Dept: INTERNAL MEDICINE CLINIC | Facility: CLINIC | Age: 86
End: 2021-01-26

## 2021-01-26 ENCOUNTER — PATIENT OUTREACH (OUTPATIENT)
Dept: CASE MANAGEMENT | Age: 86
End: 2021-01-26

## 2021-01-26 DIAGNOSIS — N30.00 ACUTE CYSTITIS WITHOUT HEMATURIA: ICD-10-CM

## 2021-01-26 DIAGNOSIS — N18.30 STAGE 3 CHRONIC KIDNEY DISEASE, UNSPECIFIED WHETHER STAGE 3A OR 3B CKD (HCC): ICD-10-CM

## 2021-01-26 DIAGNOSIS — Z02.9 ENCOUNTERS FOR UNSPECIFIED ADMINISTRATIVE PURPOSE: ICD-10-CM

## 2021-01-26 PROCEDURE — 1111F DSCHRG MED/CURRENT MED MERGE: CPT

## 2021-01-26 NOTE — DISCHARGE SUMMARY
[facility]    PATIENT'S NAME: Slim Hogue   ATTENDING PHYSICIAN: Brittany Beck MD   PATIENT ACCOUNT#:   [id number]    LOCATION:  [address] [place] [address] RECORD #:   [id number]       YOB: 1928  ADMISSION DATE:       01 clearance of 69.  7.   History of gastrointestinal bleeding. 8.   DVT prophylaxis, the patient already on apixaban. CONDITION ON DISCHARGE:  Stable. CODE STATUS:  Full Code. DIET:  Low fat, low salt. ACTIVITY:  As tolerated.     DISCHARGE MEDIC

## 2021-01-26 NOTE — PROGRESS NOTES
NCM attempted to reach patient for TCM HFU, phone range over 12 times no answer, unable to leave a message. NCM will call later.

## 2021-01-27 ENCOUNTER — TELEPHONE (OUTPATIENT)
Dept: INTERNAL MEDICINE UNIT | Facility: HOSPITAL | Age: 86
End: 2021-01-27

## 2021-01-27 RX ORDER — FOSFOMYCIN TROMETHAMINE 3 G/1
GRANULE, FOR SOLUTION ORAL
COMMUNITY
Start: 2021-01-25 | End: 2021-01-29

## 2021-01-27 NOTE — TELEPHONE ENCOUNTER
Spoke to pt and notified per Hospitalist Dr Kaylah Warner, after speaking to cardiology it has been determined that Eliquis is to be discontinued. Pt states she is aware and was notified of this change yesterday.       Per Dr Toni Acuna PCP Dr Pena Goodness

## 2021-01-27 NOTE — PROGRESS NOTES
Initial Post Discharge Follow Up   Discharge Date: 1/25/21  Contact Date: 1/26/2021    Consent Verification:  Assessment Completed With: Patient  HIPAA Verified?   Yes    Discharge Dx:     Urinary tract infection/with sepsis    Was TCC ordered: no 0   • Phenazopyridine HCl 100 MG Oral Tab Take 1 tablet (100 mg total) by mouth 3 (three) times daily with meals. 12 tablet 0   • traMADol HCl 50 MG Oral Tab Take 1 tablet (50 mg total) by mouth every 6 (six) hours as needed for Pain (moderate).  Watch drow Apply to affected area bid  As needed (Patient taking differently: Apply topically 2 (two) times daily.  Apply to affected area bid  As needed ) 1 g 0   • METOPROLOL SUCCINATE ER 25 MG Oral Tablet 24 Hr TAKE 1/2 TABLET(12.5 MG) BY MOUTH TWICE DAILY (Patient ([facility])            [facility], [facility], [address], [facility] [place] [address] [phone] [place] [id number]

## 2021-01-27 NOTE — PROGRESS NOTES
Initial Post Discharge Follow Up   Discharge Date: 1/25/21  Contact Date: 1/26/2021    Consent Verification:  Assessment Completed With: Patient  HIPAA Verified?   Yes    Discharge Dx:     Urinary tract infection with sepsis    Was TCC ordered: no

## 2021-01-29 ENCOUNTER — TELEPHONE (OUTPATIENT)
Dept: INTERNAL MEDICINE CLINIC | Facility: CLINIC | Age: 86
End: 2021-01-29

## 2021-01-29 NOTE — TELEPHONE ENCOUNTER
Caty Mancilla calling from [address] [facility] wanted a clarification on Apixaban ;; apixaban has been discontinued  discharge meds from the hospital meds     Pt is refusing to take Vancomycin , RN would like DR. Bernard to be aware  Nurse will try to conv

## 2021-02-01 NOTE — TELEPHONE ENCOUNTER
Verified name and  of patient. Laurent, home health RN, returning phone call. She was advised of Dr. Honey Zendejas clarification that Apixaban was discontinued.     She states that patient was ordered Vancomycin after hospital visit from when Dr. Eliza Gill se

## 2021-02-02 PROBLEM — S72.002D CLOSED FRACTURE OF NECK OF LEFT FEMUR WITH ROUTINE HEALING: Status: ACTIVE | Noted: 2020-10-08

## 2021-02-03 DIAGNOSIS — Z23 NEED FOR VACCINATION: ICD-10-CM

## 2021-02-04 ENCOUNTER — MED REC SCAN ONLY (OUTPATIENT)
Dept: INTERNAL MEDICINE CLINIC | Facility: CLINIC | Age: 86
End: 2021-02-04

## 2021-02-04 ENCOUNTER — OFFICE VISIT (OUTPATIENT)
Dept: INTERNAL MEDICINE CLINIC | Facility: CLINIC | Age: 86
End: 2021-02-04
Payer: MEDICARE

## 2021-02-04 VITALS
DIASTOLIC BLOOD PRESSURE: 75 MMHG | HEART RATE: 60 BPM | TEMPERATURE: 98 F | WEIGHT: 112 LBS | SYSTOLIC BLOOD PRESSURE: 148 MMHG | BODY MASS INDEX: 19.84 KG/M2 | HEIGHT: 63 IN

## 2021-02-04 DIAGNOSIS — I25.119 ATHEROSCLEROSIS OF NATIVE CORONARY ARTERY OF NATIVE HEART WITH ANGINA PECTORIS (HCC): ICD-10-CM

## 2021-02-04 DIAGNOSIS — I70.0 ATHEROSCLEROSIS OF AORTA (HCC): ICD-10-CM

## 2021-02-04 DIAGNOSIS — N18.30 STAGE 3 CHRONIC KIDNEY DISEASE, UNSPECIFIED WHETHER STAGE 3A OR 3B CKD (HCC): ICD-10-CM

## 2021-02-04 DIAGNOSIS — R26.9 ABNORMAL GAIT: ICD-10-CM

## 2021-02-04 DIAGNOSIS — N39.0 RECURRENT UTI: Primary | ICD-10-CM

## 2021-02-04 DIAGNOSIS — I10 ESSENTIAL HYPERTENSION: ICD-10-CM

## 2021-02-04 PROCEDURE — 99495 TRANSJ CARE MGMT MOD F2F 14D: CPT | Performed by: INTERNAL MEDICINE

## 2021-02-11 ENCOUNTER — TELEPHONE (OUTPATIENT)
Dept: INTERNAL MEDICINE CLINIC | Facility: CLINIC | Age: 86
End: 2021-02-11

## 2021-02-11 NOTE — TELEPHONE ENCOUNTER
Fax to [phone] home health care regarding home health skilled nurser 2 times weekly for 1 week, fax was successful confirmation placed in scanning

## 2021-02-11 NOTE — TELEPHONE ENCOUNTER
Fax to [facility] for pt discharge was successful confirmation placed in scanning

## 2021-02-12 ENCOUNTER — TELEPHONE (OUTPATIENT)
Dept: INTERNAL MEDICINE CLINIC | Facility: CLINIC | Age: 86
End: 2021-02-12

## 2021-02-12 NOTE — TELEPHONE ENCOUNTER
Treatment order for [facility] was faxed successfully confirmation placed in scanning

## 2021-02-12 NOTE — TELEPHONE ENCOUNTER
Fax to [address] [facility] care regarding patient discharge was faxed successfully confirmation placed in scanning

## 2021-02-16 ENCOUNTER — TELEPHONE (OUTPATIENT)
Dept: INTERNAL MEDICINE CLINIC | Facility: CLINIC | Age: 86
End: 2021-02-16

## 2021-02-18 ENCOUNTER — TELEPHONE (OUTPATIENT)
Dept: INTERNAL MEDICINE CLINIC | Facility: CLINIC | Age: 86
End: 2021-02-18

## 2021-02-18 NOTE — TELEPHONE ENCOUNTER
Fax to [facility] for medication management and reverification of home health serviced faxed successfully on 2/16/2021 confirmation placed on scanning

## 2021-02-19 PROBLEM — S72.002D CLOSED FRACTURE OF NECK OF LEFT FEMUR WITH ROUTINE HEALING: Status: RESOLVED | Noted: 2020-10-08 | Resolved: 2021-02-19

## 2021-02-19 PROBLEM — S72.142A CLOSED DISPLACED INTERTROCHANTERIC FRACTURE OF LEFT FEMUR (HCC): Status: RESOLVED | Noted: 2020-10-20 | Resolved: 2021-02-19

## 2021-02-19 PROBLEM — S72.002A CLOSED FRACTURE OF LEFT HIP (HCC): Status: RESOLVED | Noted: 2020-10-08 | Resolved: 2021-02-19

## 2021-02-19 PROBLEM — R07.2 PRECORDIAL PAIN: Status: RESOLVED | Noted: 2020-07-14 | Resolved: 2021-02-19

## 2021-02-19 PROBLEM — N30.00 ACUTE CYSTITIS WITHOUT HEMATURIA: Status: RESOLVED | Noted: 2021-01-22 | Resolved: 2021-02-19

## 2021-02-19 PROBLEM — S72.453D: Status: RESOLVED | Noted: 2019-04-02 | Resolved: 2021-02-19

## 2021-02-19 NOTE — PROGRESS NOTES
HPI:    Christine Sharpe is a 80year old female here today for hospital follow up.    She was discharged from Inpatient hospital, [facility] AND [facility]  to Home   Admission Date: 1/22/21   Discharge Date: 1/25/21  Hospital Discharge Diagnoses (since 1/20/202 documents  ? Reviewed need for follow-up on pending tests, need for follow-up on diagnostic tests and need for follow-up on treatments  ? specialists already aware of assumption/resumption of care  ?  Education given to patient on self-management, independe mouth daily as needed. •  Levothyroxine Sodium 100 MCG Oral Tab, Take 1 tablet (100 mcg total) by mouth every morning before breakfast.    •  Isosorbide Mononitrate ER 30 MG Oral Tablet 24 Hr, Take 30 mg by mouth daily.     •  triamcinolone acetonide 0.1 of blood transfusion, History of breast cancer in female (2000), History of heart bypass surgery (1989), skin cancer, basal cell (2009), Internal derangement of knee, right (8/14/2017), Osteoarthritis, Osteoporosis (11/14/2007), Personal history of maligna (50.8 kg).    /75 (BP Location: Right arm, Patient Position: Sitting, Cuff Size: adult)   Pulse 60   Temp 98.2 °F (36.8 °C) (Oral)   Ht 5' 3\" (1.6 m)   Wt 112 lb (50.8 kg)   BMI 19.84 kg/m²   Physical Exam    Constitutional: She appears well-develope Decision Making- Based on service period of discharge to 30 days:   · Number of Possible Diagnoses and/or Management Options: moderate  · Amount and/or Complexity of Data to Be Reviewed: moderate  · Risk of Significant Complications, Morbidity, and/or Mort

## 2021-02-22 ENCOUNTER — TELEPHONE (OUTPATIENT)
Dept: CARDIOLOGY | Age: 86
End: 2021-02-22

## 2021-02-22 RX ORDER — POTASSIUM CHLORIDE 750 MG/1
10 TABLET, FILM COATED, EXTENDED RELEASE ORAL DAILY
Qty: 90 TABLET | Refills: 0 | Status: SHIPPED | OUTPATIENT
Start: 2021-02-22 | End: 2021-05-26

## 2021-02-25 RX ORDER — POTASSIUM CHLORIDE 750 MG/1
10 TABLET, EXTENDED RELEASE ORAL DAILY
COMMUNITY
Start: 2021-01-14 | End: 2021-06-01 | Stop reason: SDUPTHER

## 2021-02-25 RX ORDER — DOCUSATE SODIUM 100 MG/1
100 CAPSULE, LIQUID FILLED ORAL PRN
COMMUNITY
Start: 2020-10-16

## 2021-02-25 RX ORDER — PHENAZOPYRIDINE HYDROCHLORIDE 100 MG/1
100 TABLET, FILM COATED ORAL
COMMUNITY
Start: 2021-01-25 | End: 2021-03-09

## 2021-02-25 RX ORDER — VANCOMYCIN HYDROCHLORIDE 125 MG/1
125 CAPSULE ORAL DAILY
COMMUNITY
Start: 2020-10-20 | End: 2021-03-09

## 2021-02-25 RX ORDER — HYDROCODONE BITARTRATE AND ACETAMINOPHEN 7.5; 325 MG/1; MG/1
1 TABLET ORAL EVERY 6 HOURS PRN
COMMUNITY
Start: 2020-10-16 | End: 2021-03-09

## 2021-02-25 RX ORDER — CHOLECALCIFEROL (VITAMIN D3) 125 MCG
5 CAPSULE ORAL PRN
COMMUNITY
Start: 2020-11-10

## 2021-02-25 RX ORDER — TRAZODONE HYDROCHLORIDE 50 MG/1
25 TABLET ORAL NIGHTLY
COMMUNITY
Start: 2020-11-10 | End: 2021-03-09

## 2021-02-25 RX ORDER — TRAMADOL HYDROCHLORIDE 100 MG/1
100 TABLET, COATED ORAL EVERY 6 HOURS PRN
COMMUNITY
Start: 2020-11-10 | End: 2021-06-01

## 2021-02-25 RX ORDER — ACETAMINOPHEN 500 MG
500 TABLET ORAL EVERY 6 HOURS
COMMUNITY

## 2021-02-25 RX ORDER — CYCLOBENZAPRINE HCL 5 MG
5 TABLET ORAL 3 TIMES DAILY
COMMUNITY
Start: 2020-10-16 | End: 2021-03-09

## 2021-02-25 RX ORDER — AMLODIPINE BESYLATE 10 MG/1
10 TABLET ORAL DAILY
COMMUNITY
Start: 2020-10-17 | End: 2021-03-09

## 2021-02-25 RX ORDER — PANTOPRAZOLE SODIUM 40 MG/1
40 TABLET, DELAYED RELEASE ORAL
COMMUNITY
Start: 2020-10-17 | End: 2021-03-09

## 2021-03-01 ENCOUNTER — MED REC SCAN ONLY (OUTPATIENT)
Dept: INTERNAL MEDICINE CLINIC | Facility: CLINIC | Age: 86
End: 2021-03-01

## 2021-03-03 ENCOUNTER — HOSPITAL ENCOUNTER (OUTPATIENT)
Dept: CT IMAGING | Facility: HOSPITAL | Age: 86
Discharge: HOME | End: 2021-03-03
Attending: UROLOGY
Payer: MEDICARE

## 2021-03-03 DIAGNOSIS — N39.0 URINARY TRACT INFECTION WITHOUT HEMATURIA, SITE UNSPECIFIED: ICD-10-CM

## 2021-03-03 DIAGNOSIS — D64.9 ANEMIA, UNSPECIFIED TYPE: ICD-10-CM

## 2021-03-03 LAB — CREAT BLD-MCNC: 0.9 MG/DL

## 2021-03-03 PROCEDURE — 74178 CT ABD&PLV WO CNTR FLWD CNTR: CPT | Performed by: UROLOGY

## 2021-03-03 PROCEDURE — 82565 ASSAY OF CREATININE: CPT

## 2021-03-09 ENCOUNTER — OFFICE VISIT (OUTPATIENT)
Dept: CARDIOLOGY | Age: 86
End: 2021-03-09

## 2021-03-09 VITALS
DIASTOLIC BLOOD PRESSURE: 60 MMHG | BODY MASS INDEX: 20.2 KG/M2 | SYSTOLIC BLOOD PRESSURE: 128 MMHG | WEIGHT: 114 LBS | HEART RATE: 63 BPM | RESPIRATION RATE: 18 BRPM | OXYGEN SATURATION: 96 % | HEIGHT: 63 IN

## 2021-03-09 DIAGNOSIS — I35.0 NONRHEUMATIC AORTIC VALVE STENOSIS: ICD-10-CM

## 2021-03-09 DIAGNOSIS — I25.118 CORONARY ARTERY DISEASE INVOLVING NATIVE CORONARY ARTERY OF NATIVE HEART WITH OTHER FORM OF ANGINA PECTORIS (CMD): Primary | ICD-10-CM

## 2021-03-09 PROCEDURE — 99214 OFFICE O/P EST MOD 30 MIN: CPT | Performed by: INTERNAL MEDICINE

## 2021-03-09 ASSESSMENT — PATIENT HEALTH QUESTIONNAIRE - PHQ9
CLINICAL INTERPRETATION OF PHQ2 SCORE: NO FURTHER SCREENING NEEDED
2. FEELING DOWN, DEPRESSED OR HOPELESS: NOT AT ALL
SUM OF ALL RESPONSES TO PHQ9 QUESTIONS 1 AND 2: 0
CLINICAL INTERPRETATION OF PHQ9 SCORE: NO FURTHER SCREENING NEEDED
SUM OF ALL RESPONSES TO PHQ9 QUESTIONS 1 AND 2: 0
1. LITTLE INTEREST OR PLEASURE IN DOING THINGS: NOT AT ALL

## 2021-03-09 NOTE — TELEPHONE ENCOUNTER
Received call from Moise Fox from [facility]. She faxed papers yesterday to begin discharge process and is asking if they were received and when they will be signed and faxed back?  Fax # is [phone]  Per Moise Fox, the [facility] will

## 2021-04-20 ENCOUNTER — OFFICE VISIT (OUTPATIENT)
Dept: INTERNAL MEDICINE CLINIC | Facility: CLINIC | Age: 86
End: 2021-04-20
Payer: MEDICARE

## 2021-04-20 ENCOUNTER — LAB ENCOUNTER (OUTPATIENT)
Dept: LAB | Age: 86
End: 2021-04-20
Attending: INTERNAL MEDICINE
Payer: MEDICARE

## 2021-04-20 VITALS
DIASTOLIC BLOOD PRESSURE: 81 MMHG | HEART RATE: 86 BPM | BODY MASS INDEX: 20.45 KG/M2 | HEIGHT: 63 IN | WEIGHT: 115.38 LBS | SYSTOLIC BLOOD PRESSURE: 143 MMHG

## 2021-04-20 DIAGNOSIS — I25.10 ATHEROSCLEROSIS OF NATIVE CORONARY ARTERY OF NATIVE HEART WITHOUT ANGINA PECTORIS: ICD-10-CM

## 2021-04-20 DIAGNOSIS — I10 ESSENTIAL HYPERTENSION: ICD-10-CM

## 2021-04-20 DIAGNOSIS — I70.0 ATHEROSCLEROSIS OF AORTA (HCC): ICD-10-CM

## 2021-04-20 DIAGNOSIS — N18.30 STAGE 3 CHRONIC KIDNEY DISEASE, UNSPECIFIED WHETHER STAGE 3A OR 3B CKD (HCC): ICD-10-CM

## 2021-04-20 DIAGNOSIS — E78.5 HYPERLIPIDEMIA, UNSPECIFIED HYPERLIPIDEMIA TYPE: ICD-10-CM

## 2021-04-20 DIAGNOSIS — E03.9 HYPOTHYROIDISM, UNSPECIFIED TYPE: Primary | ICD-10-CM

## 2021-04-20 DIAGNOSIS — I73.9 PAD (PERIPHERAL ARTERY DISEASE) (HCC): ICD-10-CM

## 2021-04-20 PROBLEM — S72.002D CLOSED FRACTURE OF NECK OF LEFT FEMUR WITH ROUTINE HEALING: Status: RESOLVED | Noted: 2020-10-08 | Resolved: 2021-04-20

## 2021-04-20 PROBLEM — E87.1 HYPONATREMIA: Status: RESOLVED | Noted: 2020-10-21 | Resolved: 2021-04-20

## 2021-04-20 PROBLEM — S72.453D: Status: RESOLVED | Noted: 2019-04-02 | Resolved: 2021-04-20

## 2021-04-20 PROBLEM — F09 COGNITIVE DISORDER: Status: RESOLVED | Noted: 2020-11-10 | Resolved: 2021-04-20

## 2021-04-20 PROBLEM — S72.142A CLOSED DISPLACED INTERTROCHANTERIC FRACTURE OF LEFT FEMUR (HCC): Status: RESOLVED | Noted: 2020-10-20 | Resolved: 2021-04-20

## 2021-04-20 LAB
ALBUMIN SERPL-MCNC: 3.9 G/DL
ALBUMIN/GLOB SERPL: 1.1 {RATIO}
ALP SERPL-CCNC: 96 U/L
ALT SERPL-CCNC: 27 UNITS/L
ANION GAP SERPL CALC-SCNC: 7 MMOL/L
AST SERPL-CCNC: 25 UNITS/L
BILIRUB SERPL-MCNC: 0.6 MG/DL
BUN SERPL-MCNC: 22 MG/DL
BUN/CREAT SERPL: 32.8
CALCIUM SERPL-MCNC: 9.6 MG/DL
CHLORIDE SERPL-SCNC: 102 MMOL/L
CO2 SERPL-SCNC: 28 MMOL/L
CREAT SERPL-MCNC: 0.67 MG/DL
GLOBULIN SER-MCNC: 3.4 G/DL
GLUCOSE SERPL-MCNC: 90 MG/DL
HCT VFR BLD CALC: 40.8 %
HGB BLD-MCNC: 12.6 G/DL
LENGTH OF FAST TIME PATIENT: NO H
MCH RBC QN AUTO: 29.6 PG
MCHC RBC AUTO-ENTMCNC: 30.9 G/DL
MCV RBC AUTO: 96 FL
PLATELET # BLD: 202 K/MCL
POTASSIUM SERPL-SCNC: 4.2 MMOL/L
PROT SERPL-MCNC: 7.3 G/DL
RBC # BLD: 4.25 10*6/UL
SODIUM SERPL-SCNC: 137 MMOL/L
TSH SERPL-ACNC: 1.08 MCUNITS/ML
WBC # BLD: 5.8 K/MCL

## 2021-04-20 PROCEDURE — 85027 COMPLETE CBC AUTOMATED: CPT

## 2021-04-20 PROCEDURE — 36415 COLL VENOUS BLD VENIPUNCTURE: CPT

## 2021-04-20 PROCEDURE — 84443 ASSAY THYROID STIM HORMONE: CPT

## 2021-04-20 PROCEDURE — 80053 COMPREHEN METABOLIC PANEL: CPT

## 2021-04-20 PROCEDURE — 99214 OFFICE O/P EST MOD 30 MIN: CPT | Performed by: INTERNAL MEDICINE

## 2021-04-20 RX ORDER — METOPROLOL SUCCINATE 25 MG/1
12.5 TABLET, EXTENDED RELEASE ORAL 2 TIMES DAILY
COMMUNITY
Start: 2021-03-20 | End: 2021-09-16

## 2021-04-20 RX ORDER — POTASSIUM CHLORIDE 750 MG/1
10 TABLET, FILM COATED, EXTENDED RELEASE ORAL DAILY
Status: ON HOLD | COMMUNITY
Start: 2021-02-22 | End: 2021-06-15

## 2021-04-20 RX ORDER — TRAMADOL HYDROCHLORIDE 100 MG/1
100 TABLET, COATED ORAL
COMMUNITY
Start: 2020-11-10 | End: 2021-05-13 | Stop reason: SINTOL

## 2021-04-20 RX ORDER — CHOLECALCIFEROL (VITAMIN D3) 125 MCG
5 CAPSULE ORAL
COMMUNITY
Start: 2020-11-10 | End: 2021-09-23

## 2021-04-21 NOTE — PROGRESS NOTES
HPI:    Patient ID: Yessenia Davis is a 80year old female.     HPI    Follow up   HTN  Long standing history of hypertension     sympotms  :        Headache no  dizziness        no                             Blurred vision no  palpitaionsSyncope no  C triamcinolone acetonide 0.1 % External Cream Apply topically 2 (two) times daily. Apply to affected area bid  As needed (Patient taking differently: Apply topically 2 (two) times daily.  Apply to affected area bid  As needed ) 1 g 0   • Cranberry Juice Extr Father    • Stroke Mother         CVA   • Stroke Sister         CVA   • Diabetes Brother    • Breast Cancer Self 67   • Breast Cancer Maternal Aunt         post menopause   • Breast Cancer Maternal Cousin Female         post menopause   • Breast Cancer Mat Globulin      2.8 - 4.4 g/dL 3.4   A/G Ratio      1.0 - 2.0 1.1   Patient Fasting?        No   WBC      4.0 - 11.0 x10(3) uL 5.8   RBC      3.80 - 5.30 x10(6)uL 4.25   Hemoglobin      12.0 - 16.0 g/dL 12.6   Hematocrit      35.0 - 48.0 % 40.8   MCV      8 Imaging & Referrals:  None        IE#[id number]

## 2021-05-12 ENCOUNTER — NURSE TRIAGE (OUTPATIENT)
Dept: INTERNAL MEDICINE CLINIC | Facility: CLINIC | Age: 86
End: 2021-05-12

## 2021-05-12 NOTE — TELEPHONE ENCOUNTER
Action Requested: Summary for Provider     []  Critical Lab, Recommendations Needed  [] Need Additional Advice  []   FYI    []   Need Orders  [] Need Medications Sent to Pharmacy  []  Other     SUMMARY: patient had Left femur fracture and had surgery for t

## 2021-05-13 ENCOUNTER — OFFICE VISIT (OUTPATIENT)
Dept: INTERNAL MEDICINE CLINIC | Facility: CLINIC | Age: 86
End: 2021-05-13
Payer: MEDICARE

## 2021-05-13 VITALS
OXYGEN SATURATION: 98 % | DIASTOLIC BLOOD PRESSURE: 83 MMHG | WEIGHT: 122 LBS | HEART RATE: 100 BPM | HEIGHT: 63 IN | SYSTOLIC BLOOD PRESSURE: 147 MMHG | BODY MASS INDEX: 21.62 KG/M2

## 2021-05-13 DIAGNOSIS — I10 ESSENTIAL HYPERTENSION: ICD-10-CM

## 2021-05-13 DIAGNOSIS — R60.0 LOCALIZED EDEMA: ICD-10-CM

## 2021-05-13 DIAGNOSIS — S81.811A LACERATION OF RIGHT LOWER EXTREMITY, INITIAL ENCOUNTER: Primary | ICD-10-CM

## 2021-05-13 DIAGNOSIS — Z23 NEED FOR VACCINATION: ICD-10-CM

## 2021-05-13 PROCEDURE — 99214 OFFICE O/P EST MOD 30 MIN: CPT | Performed by: INTERNAL MEDICINE

## 2021-05-13 PROCEDURE — 90471 IMMUNIZATION ADMIN: CPT | Performed by: INTERNAL MEDICINE

## 2021-05-13 PROCEDURE — 90715 TDAP VACCINE 7 YRS/> IM: CPT | Performed by: INTERNAL MEDICINE

## 2021-05-13 NOTE — PROGRESS NOTES
Eusebia Brantley is a 80year old female.   Patient presents with:  Swelling: Pt states swellling in both legs, pt states injuried right leg,       HPI:   Urgent visit   C/c swelling b/l   C/o states 3 yrs ago she broke her femur and last dec broke her hi Oral Tab TAKE 1 TABLET AT BEDTIME.         Past Medical History:   Diagnosis Date   • Arthritis    • Balance problem    • Bladder problem    • Cancer [facility])    • Cataract    • Disorder of thyroid    • Essential hypertension    • High blood pressure    • High rashes,no suspicious lesions  HEENT: atraumatic, normocephalic  NECK: supple  LUNGS: clear to auscultation, no wheeze  CARDIO: RRR + systolic murmur   EXTREMITIES: + b/l edema, bilateral frail skin, very thin , noted that the right leg lateral aspect with

## 2021-05-26 ENCOUNTER — TELEPHONE (OUTPATIENT)
Dept: CARDIOLOGY | Age: 86
End: 2021-05-26

## 2021-05-26 RX ORDER — POTASSIUM CHLORIDE 750 MG/1
TABLET, EXTENDED RELEASE ORAL
Qty: 90 TABLET | Refills: 1 | Status: SHIPPED | OUTPATIENT
Start: 2021-05-26 | End: 2021-06-01 | Stop reason: SDUPTHER

## 2021-05-27 ENCOUNTER — TELEPHONE (OUTPATIENT)
Dept: INTERNAL MEDICINE CLINIC | Facility: CLINIC | Age: 86
End: 2021-05-27

## 2021-05-27 NOTE — TELEPHONE ENCOUNTER
Spoke with patient--reports laceration is not healing--\"it's a tiny opening--clear water drainage--it doesn't look infected, but it just doesn't close. \"    Patient denies acute pain or fever-but does not want compression stockings--did wear them for appr

## 2021-05-28 ENCOUNTER — OFFICE VISIT (OUTPATIENT)
Dept: INTERNAL MEDICINE CLINIC | Facility: CLINIC | Age: 86
End: 2021-05-28
Payer: MEDICARE

## 2021-05-28 VITALS
DIASTOLIC BLOOD PRESSURE: 82 MMHG | WEIGHT: 119 LBS | BODY MASS INDEX: 21.09 KG/M2 | SYSTOLIC BLOOD PRESSURE: 150 MMHG | HEART RATE: 93 BPM | HEIGHT: 63 IN

## 2021-05-28 DIAGNOSIS — S81.811S LACERATION OF RIGHT LOWER EXTREMITY, SEQUELA: Primary | ICD-10-CM

## 2021-05-28 PROCEDURE — 99213 OFFICE O/P EST LOW 20 MIN: CPT | Performed by: INTERNAL MEDICINE

## 2021-05-28 RX ORDER — LEVOFLOXACIN 250 MG/1
250 TABLET ORAL DAILY
Status: ON HOLD | COMMUNITY
Start: 2021-05-25 | End: 2021-06-15

## 2021-05-28 NOTE — PROGRESS NOTES
Patient ID: Hawa Reece is a 80year old female. Patient presents with: Follow - Up       HISTORY OF PRESENT ILLNESS:   HPI  Patient presents for above. Here for follow-up of right lower extremity laceration.   He begins to rolling walker 3 weeks 24 Hr, Take 12.5 mg by mouth 2 (two) times daily. , Disp: , Rfl:   •  Potassium Chloride ER 10 MEQ Oral Tab CR, Take 10 mEq by mouth daily. , Disp: , Rfl:   •  Melatonin 5 MG Oral Tab, Take 5 mg by mouth., Disp: , Rfl:   •  Clopidogrel Bisulfate 75 MG Oral T Not on file      Years of education: Not on file      Highest education level: Not on file    Occupational History      Not on file    Tobacco Use      Smoking status: Former Smoker        Packs/day: 1.00        Years: 20.00        Pack years: 21        Ty BP: 156/84 150/82   Pulse: 93    Weight: 119 lb (54 kg)    Height: 5' 3\" (1.6 m)        Body mass index is 21.08 kg/m². Physical Exam  Constitutional:       Appearance: Normal appearance.    Skin:         Neurological:      General: No focal deficit pre

## 2021-06-01 ENCOUNTER — ANCILLARY PROCEDURE (OUTPATIENT)
Dept: CARDIOLOGY | Age: 86
End: 2021-06-01
Attending: INTERNAL MEDICINE

## 2021-06-01 ENCOUNTER — OFFICE VISIT (OUTPATIENT)
Dept: CARDIOLOGY | Age: 86
End: 2021-06-01

## 2021-06-01 VITALS
DIASTOLIC BLOOD PRESSURE: 60 MMHG | RESPIRATION RATE: 18 BRPM | SYSTOLIC BLOOD PRESSURE: 130 MMHG | OXYGEN SATURATION: 97 % | BODY MASS INDEX: 20.91 KG/M2 | HEART RATE: 57 BPM | HEIGHT: 63 IN | WEIGHT: 118 LBS

## 2021-06-01 DIAGNOSIS — I25.10 CORONARY ARTERY DISEASE INVOLVING NATIVE CORONARY ARTERY OF NATIVE HEART WITHOUT ANGINA PECTORIS: ICD-10-CM

## 2021-06-01 DIAGNOSIS — R06.09 DYSPNEA ON EXERTION: ICD-10-CM

## 2021-06-01 DIAGNOSIS — I48.19 PERSISTENT ATRIAL FIBRILLATION (CMD): ICD-10-CM

## 2021-06-01 DIAGNOSIS — I35.0 NONRHEUMATIC AORTIC VALVE STENOSIS: ICD-10-CM

## 2021-06-01 DIAGNOSIS — R06.02 SOB (SHORTNESS OF BREATH): Primary | ICD-10-CM

## 2021-06-01 PROCEDURE — 99214 OFFICE O/P EST MOD 30 MIN: CPT | Performed by: INTERNAL MEDICINE

## 2021-06-01 PROCEDURE — 93000 ELECTROCARDIOGRAM COMPLETE: CPT | Performed by: INTERNAL MEDICINE

## 2021-06-01 RX ORDER — FUROSEMIDE 20 MG/1
20 TABLET ORAL DAILY
Qty: 30 TABLET | Refills: 6 | Status: SHIPPED | OUTPATIENT
Start: 2021-06-01

## 2021-06-01 RX ORDER — ROSUVASTATIN CALCIUM 20 MG/1
20 TABLET, COATED ORAL AT BEDTIME
Qty: 90 TABLET | Refills: 3 | Status: SHIPPED | OUTPATIENT
Start: 2021-06-01 | End: 2021-06-09 | Stop reason: ALTCHOICE

## 2021-06-01 ASSESSMENT — PATIENT HEALTH QUESTIONNAIRE - PHQ9
CLINICAL INTERPRETATION OF PHQ9 SCORE: NO FURTHER SCREENING NEEDED
SUM OF ALL RESPONSES TO PHQ9 QUESTIONS 1 AND 2: 0
CLINICAL INTERPRETATION OF PHQ2 SCORE: NO FURTHER SCREENING NEEDED
1. LITTLE INTEREST OR PLEASURE IN DOING THINGS: NOT AT ALL
SUM OF ALL RESPONSES TO PHQ9 QUESTIONS 1 AND 2: 0
2. FEELING DOWN, DEPRESSED OR HOPELESS: NOT AT ALL

## 2021-06-02 ENCOUNTER — TELEPHONE (OUTPATIENT)
Dept: CARDIOLOGY | Age: 86
End: 2021-06-02

## 2021-06-02 RX ORDER — ROSUVASTATIN CALCIUM 20 MG/1
TABLET, COATED ORAL
Qty: 30 TABLET | Refills: 1 | Status: SHIPPED | OUTPATIENT
Start: 2021-06-02

## 2021-06-08 ENCOUNTER — LAB ENCOUNTER (OUTPATIENT)
Dept: LAB | Facility: HOSPITAL | Age: 86
DRG: 086 | End: 2021-06-08
Attending: UROLOGY
Payer: MEDICARE

## 2021-06-08 DIAGNOSIS — I48.19 PERSISTENT ATRIAL FIBRILLATION (HCC): Primary | ICD-10-CM

## 2021-06-08 DIAGNOSIS — R30.0 BURNING WITH URINATION: ICD-10-CM

## 2021-06-08 DIAGNOSIS — R06.2 WHEEZING: ICD-10-CM

## 2021-06-08 PROCEDURE — 85025 COMPLETE CBC W/AUTO DIFF WBC: CPT

## 2021-06-08 PROCEDURE — 87088 URINE BACTERIA CULTURE: CPT

## 2021-06-08 PROCEDURE — 80048 BASIC METABOLIC PNL TOTAL CA: CPT

## 2021-06-08 PROCEDURE — 87186 SC STD MICRODIL/AGAR DIL: CPT

## 2021-06-08 PROCEDURE — 87086 URINE CULTURE/COLONY COUNT: CPT

## 2021-06-08 PROCEDURE — 83735 ASSAY OF MAGNESIUM: CPT

## 2021-06-08 PROCEDURE — 36415 COLL VENOUS BLD VENIPUNCTURE: CPT

## 2021-06-08 PROCEDURE — 81001 URINALYSIS AUTO W/SCOPE: CPT

## 2021-06-10 ENCOUNTER — HOSPITAL ENCOUNTER (EMERGENCY)
Facility: HOSPITAL | Age: 86
Discharge: HOME | End: 2021-06-10
Attending: EMERGENCY MEDICINE
Payer: MEDICARE

## 2021-06-10 ENCOUNTER — TELEPHONE (OUTPATIENT)
Dept: INTERNAL MEDICINE CLINIC | Facility: CLINIC | Age: 86
End: 2021-06-10

## 2021-06-10 VITALS
TEMPERATURE: 98 F | DIASTOLIC BLOOD PRESSURE: 70 MMHG | WEIGHT: 119 LBS | HEART RATE: 84 BPM | BODY MASS INDEX: 21.09 KG/M2 | SYSTOLIC BLOOD PRESSURE: 144 MMHG | HEIGHT: 63 IN | RESPIRATION RATE: 18 BRPM | OXYGEN SATURATION: 99 %

## 2021-06-10 DIAGNOSIS — N30.00 ACUTE CYSTITIS WITHOUT HEMATURIA: Primary | ICD-10-CM

## 2021-06-10 PROCEDURE — 81001 URINALYSIS AUTO W/SCOPE: CPT | Performed by: EMERGENCY MEDICINE

## 2021-06-10 PROCEDURE — 99283 EMERGENCY DEPT VISIT LOW MDM: CPT

## 2021-06-10 RX ORDER — CEFDINIR 300 MG/1
300 CAPSULE ORAL ONCE
Status: COMPLETED | OUTPATIENT
Start: 2021-06-10 | End: 2021-06-10

## 2021-06-10 RX ORDER — CEFDINIR 300 MG/1
300 CAPSULE ORAL 2 TIMES DAILY
Qty: 14 CAPSULE | Refills: 0 | Status: ON HOLD | OUTPATIENT
Start: 2021-06-11 | End: 2021-06-15

## 2021-06-10 NOTE — TELEPHONE ENCOUNTER
Daniela Jhaveri RN   6/10/2021  2:37 PM CDT Back to Top      Left msg on [phone]. Stepan Bhandari, [phone]. (daughter out of town). She was informed of results and Dr. Karl Reinoso message to go to ER.  She will try to call her mother again. Windy Ras office will

## 2021-06-10 NOTE — TELEPHONE ENCOUNTER
Spoke with patient ( verified) and relayed Dr. Kitchen Home message below--patient verbalizes understanding:    \"but I'm going to call Dr. Aure Yo office first to see what he says. He's the one who ordered the antibiotics before.  Ok--Dr. Mario Alberto Pereyra does wa

## 2021-06-11 ENCOUNTER — APPOINTMENT (OUTPATIENT)
Dept: CT IMAGING | Facility: HOSPITAL | Age: 86
DRG: 086 | End: 2021-06-11
Attending: INTERNAL MEDICINE
Payer: MEDICARE

## 2021-06-11 ENCOUNTER — APPOINTMENT (OUTPATIENT)
Dept: CT IMAGING | Facility: HOSPITAL | Age: 86
DRG: 086 | End: 2021-06-11
Attending: EMERGENCY MEDICINE
Payer: MEDICARE

## 2021-06-11 ENCOUNTER — HOSPITAL ENCOUNTER (INPATIENT)
Facility: HOSPITAL | Age: 86
LOS: 4 days | Discharge: HOME HEALTH | DRG: 086 | End: 2021-06-15
Attending: EMERGENCY MEDICINE | Admitting: INTERNAL MEDICINE
Payer: MEDICARE

## 2021-06-11 ENCOUNTER — APPOINTMENT (OUTPATIENT)
Dept: GENERAL RADIOLOGY | Facility: HOSPITAL | Age: 86
DRG: 086 | End: 2021-06-11
Attending: EMERGENCY MEDICINE
Payer: MEDICARE

## 2021-06-11 DIAGNOSIS — S02.119A CLOSED FRACTURE OF LEFT SIDE OF OCCIPITAL BONE, UNSPECIFIED OCCIPITAL FRACTURE TYPE, INITIAL ENCOUNTER (HCC): ICD-10-CM

## 2021-06-11 DIAGNOSIS — S06.5X9A ACUTE SUBDURAL HEMATOMA (HCC): ICD-10-CM

## 2021-06-11 DIAGNOSIS — S06.361A INTRAPARENCHYMAL HEMATOMA OF BRAIN WITH LOSS OF CONSCIOUSNESS OF 30 MINUTES OR LESS, UNSPECIFIED LATERALITY, INITIAL ENCOUNTER (HCC): ICD-10-CM

## 2021-06-11 DIAGNOSIS — I60.9 SUBARACHNOID HEMORRHAGE (HCC): Primary | ICD-10-CM

## 2021-06-11 PROBLEM — S06.331A: Status: ACTIVE | Noted: 2021-06-11

## 2021-06-11 PROBLEM — S06.5XAA ACUTE SUBDURAL HEMATOMA (HCC): Status: ACTIVE | Noted: 2021-06-11

## 2021-06-11 PROBLEM — S06.5XAA ACUTE SUBDURAL HEMATOMA: Status: ACTIVE | Noted: 2021-06-11

## 2021-06-11 PROCEDURE — 73130 X-RAY EXAM OF HAND: CPT | Performed by: EMERGENCY MEDICINE

## 2021-06-11 PROCEDURE — 70450 CT HEAD/BRAIN W/O DYE: CPT | Performed by: EMERGENCY MEDICINE

## 2021-06-11 PROCEDURE — 99222 1ST HOSP IP/OBS MODERATE 55: CPT | Performed by: STUDENT IN AN ORGANIZED HEALTH CARE EDUCATION/TRAINING PROGRAM

## 2021-06-11 PROCEDURE — 99223 1ST HOSP IP/OBS HIGH 75: CPT | Performed by: INTERNAL MEDICINE

## 2021-06-11 PROCEDURE — 73110 X-RAY EXAM OF WRIST: CPT | Performed by: EMERGENCY MEDICINE

## 2021-06-11 RX ORDER — LEVOTHYROXINE SODIUM 0.1 MG/1
100 TABLET ORAL
Status: DISCONTINUED | OUTPATIENT
Start: 2021-06-12 | End: 2021-06-15

## 2021-06-11 RX ORDER — ACETAMINOPHEN 500 MG
1000 TABLET ORAL ONCE
Status: COMPLETED | OUTPATIENT
Start: 2021-06-11 | End: 2021-06-11

## 2021-06-11 RX ORDER — ROSUVASTATIN CALCIUM 20 MG/1
20 TABLET, COATED ORAL NIGHTLY
Status: DISCONTINUED | OUTPATIENT
Start: 2021-06-11 | End: 2021-06-15

## 2021-06-11 RX ORDER — SODIUM CHLORIDE 9 MG/ML
125 INJECTION, SOLUTION INTRAVENOUS CONTINUOUS
Status: DISCONTINUED | OUTPATIENT
Start: 2021-06-11 | End: 2021-06-12

## 2021-06-11 RX ORDER — ONDANSETRON 2 MG/ML
4 INJECTION INTRAMUSCULAR; INTRAVENOUS EVERY 6 HOURS PRN
Status: DISCONTINUED | OUTPATIENT
Start: 2021-06-11 | End: 2021-06-15

## 2021-06-11 RX ORDER — ACETAMINOPHEN 500 MG
500 TABLET ORAL EVERY 4 HOURS PRN
Status: DISCONTINUED | OUTPATIENT
Start: 2021-06-11 | End: 2021-06-15

## 2021-06-11 NOTE — ED PROVIDER NOTES
Patient Seen in: [facility] AND [facility] Emergency Department      History   No chief complaint on file.     Stated Complaint: uti    HPI/Subjective:   HPI    51-year-old female with frequent UTIs following urology as of recent reported urine culture here now w Review of Systems    Positive for stated complaint: uti  Other systems are as noted in HPI. Constitutional and vital signs reviewed. All other systems reviewed and negative except as noted above.     Physical Exam     ED Triage Vitals [06/10/21 54.  I will treat her with a third-generation cephalosporin as there is no chance of cross-reactivity with the patient listed penicillin allergy. She is stable for discharge home. She has no signs of systemic infection or sepsis or toxicity at this time.

## 2021-06-11 NOTE — CONSULTS
[address] [facility] Cardiology Consult    Reason for Consultation:  Intracerebral bleed new on Elqiuis    History of Present Illness:  Yessenia Davis is a 80year old female with atrial fibrillation CAD prior CABG.   She had developed worsening dyspn Procedure Laterality Date   • ANGIOPLASTY (CORONARY)  06-   • BOWEL RESECTION  2016   • CABG  1989 5 bypass   • CHOLECYSTECTOMY     • COLON SURGERY     • COLONOSCOPY  2010   • HYSTERECTOMY     • LAPAROSCOPIC CHOLECYSTECTOMY     • LUMPECTOMY LEF area with old blood  Cardiac:S1 S2 irregularly irregular with 2/6 systolic murmur  Lungs: clear equal  Abdomen: Soft, nontender, nondistended  Extremities: no LE edema, ecchymotic area right hand  Skin: pale warm ghassan    Labs/Imaging:  · ECG: AFib NSST eunice hemorrhage. She was just diagnosed with atrial fibrillation and started on eliquis 2.5 mg BID on 6/1. She was at her assisted living facility today when she fell but does not have any memory of the circumstances. Currently only complains of headache.  Neuro

## 2021-06-11 NOTE — TELEPHONE ENCOUNTER
Seen in [facility] ER yesterday:    Clinical Impression:  Acute cystitis without hematuria  (primary encounter diagnosis)      Disposition:  Discharge  6/10/2021  9:41 pm     Follow-up:  Alyce Mars, [address] [address] [address] [phone]

## 2021-06-11 NOTE — H&P
[facility] Patient Status:  Emergency    1928 MRN [id number]   Location [address] Attending Sun Londono MD   Hosp Day # 0 PCP Seda Watson MD     Date of HYSTERECTOMY     • LAPAROSCOPIC CHOLECYSTECTOMY     • LUMPECTOMY LEFT     • RADIATION LEFT         Family History  Family History   Problem Relation Age of Onset   • Heart Disease Father    • Stroke Mother         CVA   • Stroke Sister         CVA   • Diab Dr. May Hernandez of Systems:   Constitutional: denies fevers, chills, weakness, fatigue, recent illness  HEENT: denies headache, sore throat, vision loss  Cardio: denies chest pain, chest pressure, palpitations  Respiratory: denies dyspnea, cough, wheez brusing, and swelling over 4th-5th metacarpals post fall today. TECHNIQUE: 3 views were obtained. 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   Severe DJD is present in the 1st through 4th DIP joints and anterior right temporal lobe. There is associated tiny subdural hematoma along the anterior aspect of the right temporal lobe. There is no evidence of mass-effect. There is diffuse atrophy appropriate for patient's age.   There is appropriate prominence

## 2021-06-11 NOTE — ED QUICK NOTES
Patient brought to floor with ED Rn and Tech. Patient aware of plan of care, verbalizes understanding.    Bedside handoff done with Karen Leigh

## 2021-06-11 NOTE — ED PROVIDER NOTES
Patient Seen in: [facility] AND [facility] Emergency Department      History   Patient presents with:  Fall  Laceration/Abrasion    Stated Complaint: fall, head laceration    HPI/Subjective:   HPI  77-year-old female with recurrent UTIs, balance issues, hyperten knee 11/12/2018   • Rheumatic fever/heart disease age 12   • Visual impairment               Past Surgical History:   Procedure Laterality Date   • ANGIOPLASTY (CORONARY)  06-   • BOWEL RESECTION  2016   • CABG  1989 5 bypass   • CHOLECYSTECTOMY Rhythm: Normal rate and regular rhythm. Heart sounds: Normal heart sounds. Pulmonary:      Effort: Pulmonary effort is normal.      Breath sounds: Normal breath sounds. Abdominal:      General: There is no distension.       Palpations: Abdomen is s Platelet.   Procedure                               Abnormality         Status                     ---------                               -----------         ------                     CBC W/ DIFFERENTIAL[[id number]]          Abnormal            Final resul discussed with Dr. Yoselin Gill.     Dictated by (CST): Jc Gomez MD on 6/11/2021 at 1:54 PM     Finalized by (CST): Jc Gomez MD on 6/11/2021 at 2:12 PM                   MDM    On arrival to the ED, patient is alert to person place a unspecified laterality, initial encounter ([facility] [address]  Closed fracture of left side of occipital bone, unspecified occipital fracture type, initial encounter [facility])     Disposition:  Admit  6/11/2021  3:35 pm    Follow-up:  No follow-up provider specified.   We re

## 2021-06-11 NOTE — ED INITIAL ASSESSMENT (HPI)
Patient presents to ER via EMS for concerns of fall, with head laceration. Patient unsure of how she fell, unsure if LOC. Patient notes not remembering. Patient is AOx4 upon arrival.   Laceration to left posterior scalp.    Complaints of nausea and he

## 2021-06-11 NOTE — ED QUICK NOTES
Laceration irrigated by ED tech. MD at bedside. 6 sutures placed along with dermabond. Dermabond also placed to laceration to right hand.

## 2021-06-12 ENCOUNTER — APPOINTMENT (OUTPATIENT)
Dept: CT IMAGING | Facility: HOSPITAL | Age: 86
DRG: 086 | End: 2021-06-12
Attending: NEUROLOGICAL SURGERY
Payer: MEDICARE

## 2021-06-12 ENCOUNTER — APPOINTMENT (OUTPATIENT)
Dept: CV DIAGNOSTICS | Facility: HOSPITAL | Age: 86
DRG: 086 | End: 2021-06-12
Attending: NURSE PRACTITIONER
Payer: MEDICARE

## 2021-06-12 PROCEDURE — 99232 SBSQ HOSP IP/OBS MODERATE 35: CPT | Performed by: INTERNAL MEDICINE

## 2021-06-12 PROCEDURE — 99233 SBSQ HOSP IP/OBS HIGH 50: CPT | Performed by: HOSPITALIST

## 2021-06-12 PROCEDURE — 93306 TTE W/DOPPLER COMPLETE: CPT | Performed by: NURSE PRACTITIONER

## 2021-06-12 PROCEDURE — 70450 CT HEAD/BRAIN W/O DYE: CPT | Performed by: NEUROLOGICAL SURGERY

## 2021-06-12 RX ORDER — HYDRALAZINE HYDROCHLORIDE 20 MG/ML
10 INJECTION INTRAMUSCULAR; INTRAVENOUS EVERY 4 HOURS PRN
Status: DISCONTINUED | OUTPATIENT
Start: 2021-06-12 | End: 2021-06-15

## 2021-06-12 NOTE — PLAN OF CARE
Problem: Patient/Family Goals  Goal: Patient/Family Long Term Goal  Description: Patient's Long Term Goal: Return home to prior functioning    Interventions:  -   Problem: Patient/Family Goals  Goal: Patient/Family Short Term Goal  Description: Patient's

## 2021-06-12 NOTE — PROGRESS NOTES
[facility]    Progress Note    Yessenia Davis Patient Status:  Inpatient    1928 MRN [id number]   Location [facility] 2W/SW Attending Lenin Coronel MD   [facility] Day # 1 PCP Ham Berkowitz MD     Chief Complaint: fall 6/11/2021  CONCLUSION:  1. DJD. Dictated by (CST): Jagdeep Nails MD on 6/11/2021 at 1:32 PM     Finalized by (CST): Jagdeep Nails MD on 6/11/2021 at 1:33 PM          CT BRAIN OR HEAD ([id number])    Result Date: 6/12/2021  CONCLUSION:  1. (APRESOLINE) injection 10 mg, 10 mg, Intravenous, Q4H PRN  cefTRIAXone Sodium (ROCEPHIN) 1 g in sodium chloride 0.9% 100 mL IVPB-ADDV, 1 g, Intravenous, Q24H  acetaminophen (TYLENOL EXTRA STRENGTH) tab 500 mg, 500 mg, Oral, Q4H PRN  Levothyroxine Sodium (S treatment plan and workup      Jonathan MD Dom  6/12/2021  68:37 AM    **Certification      PHYSICIAN Certification of Need for Inpatient Hospitalization - Initial Certification    Patient will require inpatient services that will reasonably be expected

## 2021-06-12 NOTE — PROGRESS NOTES
/63   Pulse 76   Temp 98.9 °F (37.2 °C) (Temporal)   Resp 19   Wt 113 lb 5.1 oz (51.4 kg)   SpO2 92%   BMI 20.07 kg/m²     Mrs. Noris Dukes remains neurologically intact. She has amnesia about the events from the fall. .  The CT head this am showed minim

## 2021-06-12 NOTE — PLAN OF CARE
Patient Aox4. Occasionally forgetful. Neuro exams intact. Ambulated to bathroom with walker. Stand by assistance. Some headaches. Vitals stable. Plan for another f/u CT head tomorrow.  Monitoring patient closely    Problem: Patient Centered Care  Goal: Kelly increased pain with activity and pre-medicate as appropriate  Outcome: Progressing     Problem: SAFETY ADULT - FALL  Goal: Free from fall injury  Description: INTERVENTIONS:  - Assess pt frequently for physical needs  - Identify cognitive and physical defi

## 2021-06-12 NOTE — PROGRESS NOTES
[facility]     Progress Note        Bhavin Colvin Patient Status:  Inpatient    1928 MRN [id number]   Location [facility] 2W/SW Attending Teresa Boo Day # 1 PCP Babar Laura MD       Subjective:   Angelita Kaminski cyanosis, edema  Neurologic: no gross motor deficits  Skin: warm, dry + occipital laceration      Results:     Lab Results   Component Value Date    WBC 6.3 06/12/2021    HGB 9.8 06/12/2021    HCT 32.1 06/12/2021    .0 06/12/2021    CREATSERUM 0.64 ganglia. 5. Atherosclerosis in the anterior and posterior circulations.   Dictated by (CST): Mary Jane Galloway MD on 6/12/2021 at 7:58 AM     Finalized by (CST): Mary Jane Galloway MD on 6/12/2021 at 8:06 AM          CT BRAIN OR HEAD ([id number])    Result Date: 6/11/2021

## 2021-06-12 NOTE — PROGRESS NOTES
[facility]     Progress Note    Dez Montenegro Patient Status:  Inpatient    1928 MRN [id number]   Location [facility] 2W/SW Attending Vincenzo Davis Day # 1 PCP Jose Peralta MD     Assessment:    1.   Me Peripheral pulses are 2+. Skin: Warm and dry.      Labs:  Lab Results   Component Value Date    WBC 6.3 06/12/2021    HGB 9.8 06/12/2021    HCT 32.1 06/12/2021    .0 06/12/2021     Lab Results   Component Value Date    INR 1.19 06/11/2021     Lab Re

## 2021-06-13 ENCOUNTER — APPOINTMENT (OUTPATIENT)
Dept: CT IMAGING | Facility: HOSPITAL | Age: 86
DRG: 086 | End: 2021-06-13
Attending: NEUROLOGICAL SURGERY
Payer: MEDICARE

## 2021-06-13 PROCEDURE — 99232 SBSQ HOSP IP/OBS MODERATE 35: CPT | Performed by: INTERNAL MEDICINE

## 2021-06-13 PROCEDURE — 70450 CT HEAD/BRAIN W/O DYE: CPT | Performed by: NEUROLOGICAL SURGERY

## 2021-06-13 PROCEDURE — 99233 SBSQ HOSP IP/OBS HIGH 50: CPT | Performed by: HOSPITALIST

## 2021-06-13 RX ORDER — LEVETIRACETAM 250 MG/1
250 TABLET ORAL 2 TIMES DAILY
Status: DISCONTINUED | OUTPATIENT
Start: 2021-06-13 | End: 2021-06-15

## 2021-06-13 NOTE — PLAN OF CARE
Patient Aox4. Occasionally forgetful. Neuro exams intact. Participated with PT. Can use walker with Stand by assistance. Some headaches. Vitals stable. F/u CT head stable. Can transfer to floor.  Monitoring patient closely     Problem: Patient Centered Care Anticipate increased pain with activity and pre-medicate as appropriate  Outcome: Progressing     Problem: SAFETY ADULT - FALL  Goal: Free from fall injury  Description: INTERVENTIONS:  - Assess pt frequently for physical needs  - Identify cognitive and ph

## 2021-06-13 NOTE — PROGRESS NOTES
[facility]    Progress Note    Nereyda George Patient Status:  Inpatient    1928 MRN [id number]   Location [facility] 2W/SW Attending Rosie Salter MD   [facility] Day # 2 PCP Sarahi Fernandes MD     Chief Complaint: fall Mikel Nails MD on 6/11/2021 at 1:32 PM     Finalized by (CST): Mikel Nails MD on 6/11/2021 at 1:33 PM          CT BRAIN OR HEAD ([id number])    Result Date: 6/12/2021  CONCLUSION:  1.  Stable appearance of a scalp hematoma with laceratio Intravenous, Q24H  acetaminophen (TYLENOL EXTRA STRENGTH) tab 500 mg, 500 mg, Oral, Q4H PRN  Levothyroxine Sodium (SYNTHROID) tab 100 mcg, 100 mcg, Oral, QAM AC  melatonin cap/tab 5 mg, 5 mg, Oral, Nightly  metoprolol succinate (Toprol XL) partial tablet 1 spent, >50% spent counseling re: treatment plan and workup

## 2021-06-13 NOTE — PROGRESS NOTES
[facility]     Progress Note        Jeannine Michele Patient Status:  Inpatient    1928 MRN [id number]   Location [facility] 2W/SW Attending Any Melendez # 2 PCP Vinny Marques MD       Subjective:   Kristina Brooks Date    WBC 6.8 06/13/2021    HGB 10.4 06/13/2021    HCT 34.8 06/13/2021    .0 06/13/2021    CREATSERUM 0.70 06/13/2021    BUN 18 06/13/2021     06/13/2021    K 4.2 06/13/2021     06/13/2021    CO2 24.0 06/13/2021     06/13/2021 right middle cranial fossa and right sylvian fissure. 4. There are mild microvascular white matter ischemic changes, likely related to long-standing hypertension and/or diabetes. 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  5. Atherosclerosis in the anteri therapy for recurrent UTI  -Cussed with neurosurgery. Keppra 250 twice daily for 10 days.   -DVT prophylaxis: SCDs  -Okay to transfer to floor    Nel Melo, [address] [facility]

## 2021-06-13 NOTE — OCCUPATIONAL THERAPY NOTE
OCCUPATIONAL THERAPY EVALUATION - INPATIENT     Room Number: 210/210-A  Evaluation Date: 6/13/2021  Type of Evaluation: Initial  Presenting Problem: Admit 6/11 from [facility] s/p fall (unknown LOC).  Imaging revealed  hemorrhage in the right frontal and ant patient's Approx Degree of Impairment: 42.8% has been calculated based on documentation in the [facility] '6 clicks' Inpatient Daily Activity Short Form.   Research supports that patients with this level of impairment may benefit from 24 hr A initially & HH OT/P BOWEL RESECTION  2016   • CABG  1989 5 bypass   • CHOLECYSTECTOMY     • COLON SURGERY     • COLONOSCOPY  2010   • HYSTERECTOMY     • LAPAROSCOPIC CHOLECYSTECTOMY     • LUMPECTOMY LEFT     • RADIATION LEFT         HOME SITUATION  Type of Home: Independen bedpan or urinal? : A Little  -   Putting on and taking off regular upper body clothing?: A Little  -   Taking care of personal grooming such as brushing teeth?: A Little  -   Eating meals?: None    AM-PAC Score:  Score: 19  Approx Degree of Impairment: 42

## 2021-06-13 NOTE — PHYSICAL THERAPY NOTE
PHYSICAL THERAPY EVALUATION - INPATIENT     Room Number: 210/210-A  Evaluation Date: 6/13/2021  Type of Evaluation: Initial   Physician Order: PT Eval and Treat    Presenting Problem: fall with subarachnoid hemorrhage, nonoperative  Reason for Therapy:  Mir Charles discharge. DISCHARGE RECOMMENDATIONS  PT Discharge Recommendations: Home with home health PT;24 hour care/supervision    PLAN  PT Treatment Plan: Bed mobility; Body mechanics; Endurance; Patient education; Energy conservation; Family education;Gait training; HOME SITUATION  Type of Home: Independent living facility   Home Layout: One level  Lives With: Alone  Drives: No  Patient Owned Equipment: Rolling walker  Patient Regularly Uses: Glasses    Prior Level of [place]: Prior to admission, patient wa Degree of Impairment: 46.58%   Standardized Score (AM-PAC Scale): 43.63   CMS Modifier (G-Code): CK      Exercise/Education Provided:  Bed mobility  Body mechanics  Energy conservation  Functional activity tolerated  Gait training  Posture  Strengthening

## 2021-06-13 NOTE — PROGRESS NOTES
/81 (BP Location: Right arm)   Pulse 83   Temp 97.2 °F (36.2 °C) (Temporal)   Resp 14   Wt 113 lb 5.1 oz (51.4 kg)   SpO2 99%   BMI 20.07 kg/m²     Mrs. Derrick Yap is doing very well. She remains neurologically intact. The CT head is stable.  There is

## 2021-06-13 NOTE — PLAN OF CARE
Kierra Johnson is resting in bed. Bed alarm is on, in the lowest position, and the call light is within reach.      Problem: PAIN - ADULT  Goal: Verbalizes/displays adequate comfort level or patient's stated pain goal  Description: INTERVENTIONS:  - Encourage pt to appropriate  - Communicate ordered activity level and limitations with patient/family  Outcome: Progressing  Goal: Maintain proper alignment of affected body part  Description: INTERVENTIONS:  - Support and protect limb and body alignment per provider's or

## 2021-06-13 NOTE — PROGRESS NOTES
[place] [facility]     Progress Note    Chaparrita Puente Patient Status:  Inpatient    1928 MRN [id number]   Location [facility] 2W/SW Attending Tula Krabbe, MD   [facility] Day # 2 PCP Garth Bernard MD     Assessment:    1.   Ady Page wheezes, rales, rhonchi. Normal excursions and effort. Abdomen: Soft, non-tender. Extremities: Without clubbing, cyanosis or edema. Peripheral pulses are 2+. Skin: Warm and dry.      Labs:  Lab Results   Component Value Date    WBC 6.8 06/13/2021    H

## 2021-06-14 PROCEDURE — 99233 SBSQ HOSP IP/OBS HIGH 50: CPT | Performed by: INTERNAL MEDICINE

## 2021-06-14 PROCEDURE — 93227 XTRNL ECG REC<48 HR R&I: CPT | Performed by: INTERNAL MEDICINE

## 2021-06-14 PROCEDURE — 99232 SBSQ HOSP IP/OBS MODERATE 35: CPT | Performed by: INTERNAL MEDICINE

## 2021-06-14 PROCEDURE — 99233 SBSQ HOSP IP/OBS HIGH 50: CPT | Performed by: HOSPITALIST

## 2021-06-14 RX ORDER — DILTIAZEM HYDROCHLORIDE 5 MG/ML
5 INJECTION INTRAVENOUS ONCE
Status: COMPLETED | OUTPATIENT
Start: 2021-06-14 | End: 2021-06-14

## 2021-06-14 RX ORDER — DILTIAZEM HYDROCHLORIDE 5 MG/ML
INJECTION INTRAVENOUS
Status: COMPLETED
Start: 2021-06-14 | End: 2021-06-14

## 2021-06-14 NOTE — PLAN OF CARE
Alert and oriented x 4 and sleeping comfortably on her bed. Calls appropriately when getting up. Emphasized the importance of calling for assistance and verbalized understanding and demonstrating it by using the call light and waiting for assistance.  Hear response  - Consider cultural and social influences on pain and pain management  - Manage/alleviate anxiety  - Utilize distraction and/or relaxation techniques  - Monitor for opioid side effects  - Notify MD/LIP if interventions unsuccessful or patient rep arrhythmias or at baseline  Description: INTERVENTIONS:  - Continuous cardiac monitoring, monitor vital signs, obtain 12 lead EKG if indicated  - Evaluate effectiveness of antiarrhythmic and heart rate control medications as ordered  - Initiate emergency m

## 2021-06-14 NOTE — PROGRESS NOTES
Pt is hoping to be DC to rehab in next day or so, took a fall previous Friday. Having some issues with memory. Also cut her leg on bedside chair but doesn't know how she did it. Daughters have been attentive in visiting.   Provided non-anxious presence a

## 2021-06-14 NOTE — PROGRESS NOTES
Double RN skin check done prior to transfer off Unit. Skin check performed by this RN and Court CHONG. Wounds are as follows: posterior head sutures and left lower leg deep skin tear. Will remain available for any further questions or concerns.

## 2021-06-14 NOTE — PLAN OF CARE
Problem: Patient Centered Care  Goal: Patient preferences are identified and integrated in the patient's plan of care  Description: Interventions:  - What would you like us to know as we care for you?   - Provide timely, complete, and accurate informatio Free from fall injury  Description: INTERVENTIONS:  - Assess pt frequently for physical needs  - Identify cognitive and physical deficits and behaviors that affect risk of falls.   - Pelsor fall precautions as indicated by assessment.  - Educate pt/famil CARDIOVASCULAR - ADULT  Goal: Maintains optimal cardiac output and hemodynamic stability  Description: INTERVENTIONS:  - Monitor vital signs, rhythm, and trends  - Monitor for bleeding, hypotension and signs of decreased cardiac output  - Evaluate effectiv

## 2021-06-14 NOTE — PROGRESS NOTES
[facility]    Progress Note    Renetta Chavez Patient Status:  Inpatient    1928 MRN [id number]   Location [facility] 2W/SW Attending Onel Bravo MD   [facility] Day # 3 PCP Cruz Mooney MD     Chief Complaint: fall appearance of multiple small areas of hemorrhagic contusion in the right frontal and temporal lobes. 2. No evidence of subdural or subarachnoid hemorrhage at this time.     Dictated by (CST): Aline Perdue MD on 6/13/2021 at 9:03 AM     Finalized at 2:12 PM            Meds:   levETIRAcetam (KEPPRA) tab 250 mg, 250 mg, Oral, BID  hydrALAzine HCl (APRESOLINE) injection 10 mg, 10 mg, Intravenous, Q4H PRN  cefTRIAXone Sodium (ROCEPHIN) 1 g in sodium chloride 0.9% 100 mL IVPB-ADDV, 1 g, Intravenous, Q24 would have completed course by then    HTN  - monitor vitals and adjust meds prn    Afib  - seen by cards  - rate controlled  - no longer candidate for anticoagulation  - echo ordered and reviewed    Prophylaxis:   DVT with SCDs    Dispo: pending clinical

## 2021-06-14 NOTE — PROGRESS NOTES
[facility]     Progress Note    Afshan Tran Patient Status:  Inpatient    1928 MRN [id number]   Location [facility] 2W/SW Attending Pamela Jacob MD   Hosp Day # 3 PCP Bishnu Rubio MD     Assessment:    1. Levi Price excursions and effort. Abdomen: Soft, non-tender. Extremities: Without clubbing, cyanosis or edema. Peripheral pulses are 2+. Skin: Warm and dry.      Labs:     Lab Results   Component Value Date    INR 1.19 06/11/2021           No results found for: T

## 2021-06-14 NOTE — PLAN OF CARE
Pt aox3, resting in bed, bed is low and locked in place, call light is within reach. Bed alarm on, up w/ walker, instructed to call for assistance.    Problem: Patient Centered Care  Goal: Patient preferences are identified and integrated in the patient's p new pain  - Anticipate increased pain with activity and pre-medicate as appropriate  Outcome: Progressing     Problem: SAFETY ADULT - FALL  Goal: Free from fall injury  Description: INTERVENTIONS:  - Assess pt frequently for physical needs  - Identify cogn stability  - Monitor arterial and/or venous puncture sites for bleeding and/or hematoma  - Assess quality of pulses, skin color and temperature  - Assess for signs of decreased coronary artery perfusion - ex.  Angina  - Evaluate fluid balance, assess for ed

## 2021-06-14 NOTE — PHYSICAL THERAPY NOTE
PHYSICAL THERAPY TREATMENT NOTE - INPATIENT     Room Number: 210/210-A       Presenting Problem: fall with subarachnoid hemorrhage, nonoperative    Problem List  Principal Problem:    Subarachnoid hemorrhage (HCC)  Active Problems:    Acute subdural hemato Monitor  Resp: 20  BP: 115/60 (106/75 at end of session)  BP Location: Right arm  BP Method: Automatic  Patient Position: Sitting    O2 WALK  Oxygen Therapy  SPO2% on Room Air at Rest: 99    AM-PAC '6-Clicks' [address]  How much Goal #2  Current Status  Progressing: stand by assistance with RW   Goal #3 Patient is able to ambulate 200 feet with assist device: walker - rolling at assistance level: modified independent   Goal #3   Current Status  Progressin' with CGA/SBA and

## 2021-06-15 ENCOUNTER — TELEPHONE (OUTPATIENT)
Dept: INTERNAL MEDICINE CLINIC | Facility: CLINIC | Age: 86
End: 2021-06-15

## 2021-06-15 VITALS
SYSTOLIC BLOOD PRESSURE: 143 MMHG | TEMPERATURE: 98 F | OXYGEN SATURATION: 97 % | DIASTOLIC BLOOD PRESSURE: 75 MMHG | HEART RATE: 97 BPM | BODY MASS INDEX: 20 KG/M2 | RESPIRATION RATE: 18 BRPM | WEIGHT: 113.31 LBS

## 2021-06-15 PROCEDURE — 99232 SBSQ HOSP IP/OBS MODERATE 35: CPT | Performed by: NURSE PRACTITIONER

## 2021-06-15 PROCEDURE — 99232 SBSQ HOSP IP/OBS MODERATE 35: CPT | Performed by: HOSPITALIST

## 2021-06-15 PROCEDURE — 99232 SBSQ HOSP IP/OBS MODERATE 35: CPT | Performed by: INTERNAL MEDICINE

## 2021-06-15 RX ORDER — POTASSIUM CHLORIDE 20 MEQ/1
40 TABLET, EXTENDED RELEASE ORAL ONCE
Status: COMPLETED | OUTPATIENT
Start: 2021-06-15 | End: 2021-06-15

## 2021-06-15 RX ORDER — LEVETIRACETAM 250 MG/1
250 TABLET ORAL 2 TIMES DAILY
Qty: 60 TABLET | Refills: 0 | Status: SHIPPED | OUTPATIENT
Start: 2021-06-15 | End: 2021-07-15

## 2021-06-15 NOTE — HOME CARE LIAISON
Received Aidin referral from [address] [place] [address]. Patient provided with list of [facility] providers from [place], patient choice is [facility]. Financial interest disclosure provided to patient. Residential brochure provided with contact information.  All quest

## 2021-06-15 NOTE — PLAN OF CARE
Pt denied pain. Ambulated to the bathroom with 1 assist and a walker. Tolerated it well. Resting comfortably throughout the night. Fall precautions in place. Call light within reach.           Problem: Patient Centered Care  Goal: Patient preferences are id interventions unsuccessful or patient reports new pain  - Anticipate increased pain with activity and pre-medicate as appropriate  Outcome: Progressing     Problem: SAFETY ADULT - FALL  Goal: Free from fall injury  Description: INTERVENTIONS:  - Assess pt vasoactive medications to optimize hemodynamic stability  - Monitor arterial and/or venous puncture sites for bleeding and/or hematoma  - Assess quality of pulses, skin color and temperature  - Assess for signs of decreased coronary artery perfusion - ex.

## 2021-06-15 NOTE — PHYSICAL THERAPY NOTE
PHYSICAL THERAPY TREATMENT NOTE - INPATIENT     Room Number: 401/125-J       Presenting Problem: fall with subarachnoid hemorrhage, nonoperative    Problem List  Principal Problem:    Subarachnoid hemorrhage (HCC)  Active Problems:    Acute subdural hemato 4 o'clock and she is going to stay with me! \"    OBJECTIVE  Precautions: None    WEIGHT BEARING RESTRICTION  Weight Bearing Restriction: None                PAIN ASSESSMENT   Ratin  Location: headache  Management Techniques: Activity promotion; Body mec Current Status  NT   Goal #2 Patient is able to demonstrate transfers Sit to/from Stand at assistance level: modified independent with walker - rolling      Goal #2  Current Status SBA   Goal #3 Patient is able to ambulate 200 feet with assist device: wa

## 2021-06-15 NOTE — TELEPHONE ENCOUNTER
Julia Jacobo from [facility] would like to know if you will manage Home Health / sign orders . Call back number is [phone]  .

## 2021-06-15 NOTE — DISCHARGE SUMMARY
[facility]    Discharge Summary    Hawa Reece Patient Status:  Inpatient    1928 MRN [id number]   Location [facility] 3W/SW Attending Candy Cordero MD   [facility] Day [facility] PCP Malissa Cervantes MD     Date of Admission: focal deficits  MSK: Full range of motion in extremities  Skin: Warm and dry  Psych: Normal affect  Ext: no c/c/e      History of Present Illness: Per Dr Giovani Fernández    Patient is a 61-year-old female who presents from [address] [place] [address] home status post fall. Prescription details   acetaminophen 500 MG Tabs  Commonly known as: TYLENOL EXTRA STRENGTH      Take 500 mg by mouth. Refills: 0     bisacodyl 10 MG Supp  Commonly known as: DULCOLAX      Place 1 suppository (10 mg total) rectally daily as needed.    Frances Fuentes AM    Greater than 30 minutes spent on preparation and coordination of this discharge

## 2021-06-15 NOTE — PROGRESS NOTES
· [facility]      Subjective:  Up in chair still mild headache.   Hit left leg on chair yesterday, now dressed    Objective:  /75 (BP Location: Right arm)   Pulse 79   Temp 97.9 °F (36.6 °C) (Oral)   Resp 18   Wt 113 lb 5.1 oz (51.4 kg) 40-45% apical HK unchanged    Plan:   · OK for DC from Cardiology end  · No further Eliquis.   Has been off Plavix (though was on her admit med list)  · See a NP in Dr. Glynn Cm office 2 weeks    Dione Crow NP

## 2021-06-15 NOTE — PROGRESS NOTES
[facility]     Progress Note        Uri Tanner Patient Status:  Inpatient    1928 MRN [id number]   Location [facility] 2W/SW Attending Sujit Lockwood DO   Hosp Day # 4 PCP Reed Kerr MD       Subjective:   Leonard Linear .0 06/15/2021    CREATSERUM 0.54 06/15/2021    BUN 15 06/15/2021     06/15/2021    K 3.8 06/15/2021     06/15/2021    CO2 27.0 06/15/2021    GLU 83 06/15/2021    CA 8.9 06/15/2021       No results found. Assessment   1.   Status

## 2021-06-15 NOTE — CM/SW NOTE
06/15/21 1000   CM/SW Referral Data   Referral Source Social Work (self-referral)   Reason for Referral Discharge planning   Informant Patient   Patient [address]  ([address])   Darcy Gaona will  pt at time of DC and just requests 2 hrs notice to get to [address] [place] [address]. SW to leave [facility] on WILL CALL at this time in the event that this changes and pt requires transport home.     Addendum, 6/15/2021 at 12:25PM  Received notice from Rosario johnson/ [facility]

## 2021-06-15 NOTE — PLAN OF CARE
Susana Valentine is alert and oriented, up with one assist and a walker. She received potassium replacement for K of 3.8. Neuro q shift per Dr. Astrid Schwab. Plan is to discharge to [facility] with home health and home PT.  Daughter, Angus, to  patient and bring to L anxiety  - Utilize distraction and/or relaxation techniques  - Monitor for opioid side effects  - Notify MD/LIP if interventions unsuccessful or patient reports new pain  - Anticipate increased pain with activity and pre-medicate as appropriate  Outcome: P trends  - Monitor for bleeding, hypotension and signs of decreased cardiac output  - Evaluate effectiveness of vasoactive medications to optimize hemodynamic stability  - Monitor arterial and/or venous puncture sites for bleeding and/or hematoma  - Assess

## 2021-06-15 NOTE — PROGRESS NOTES
06/15/21 1501   Wound 06/14/21 Skin Tear Tibial Left;Proximal;Anterior   Date First Assessed/Time First Assessed: 06/14/21 1300   Present on Hospital Admission: No  Primary Wound Type: Skin Tear  Location: Tibial  Wound Location Orientation: Left;Proxim Former Smoker        Packs/day: 1.00        Years: 20.00        Pack years: 20        Types: Cigarettes      Smokeless tobacco: Former User    Alcohol use: Yes      Comment: Weekly, Wine, 5 glasses;     Drug use: No        PLAN   Recommendations:  Dietary

## 2021-06-15 NOTE — DISCHARGE PLANNING
Patient was provided with discharge instructions, education, and follow up information; patient's daughter Shirly Rinne was also present with patient's instructions. Prescriptions were already sent electronically to patient's pharmacy.  Patient verbalizes Luana

## 2021-06-15 NOTE — PROGRESS NOTES
Pt is anticipating possible DC today, her two daughters are out of the area and one will have to pick her up to take her home upon DC.   Pt is feeling better and stronger, had taken a fall at home last week and experienced a contusion in her brain and rear

## 2021-06-16 ENCOUNTER — TELEPHONE (OUTPATIENT)
Dept: ADMINISTRATIVE | Age: 86
End: 2021-06-16

## 2021-06-16 ENCOUNTER — TELEPHONE (OUTPATIENT)
Dept: INTERNAL MEDICINE CLINIC | Facility: CLINIC | Age: 86
End: 2021-06-16

## 2021-06-16 ENCOUNTER — PATIENT OUTREACH (OUTPATIENT)
Dept: CASE MANAGEMENT | Age: 86
End: 2021-06-16

## 2021-06-16 DIAGNOSIS — S06.5X9A ACUTE SUBDURAL HEMATOMA (HCC): ICD-10-CM

## 2021-06-16 DIAGNOSIS — I60.9 SUBARACHNOID HEMORRHAGE (HCC): ICD-10-CM

## 2021-06-16 DIAGNOSIS — N30.00 ACUTE CYSTITIS WITHOUT HEMATURIA: ICD-10-CM

## 2021-06-16 DIAGNOSIS — Z02.9 ENCOUNTERS FOR UNSPECIFIED ADMINISTRATIVE PURPOSE: ICD-10-CM

## 2021-06-16 DIAGNOSIS — S81.819A SKIN TEAR OF LOWER LEG WITHOUT COMPLICATION, INITIAL ENCOUNTER: ICD-10-CM

## 2021-06-16 DIAGNOSIS — S06.361A INTRAPARENCHYMAL HEMATOMA OF BRAIN WITH LOSS OF CONSCIOUSNESS OF 30 MINUTES OR LESS, UNSPECIFIED LATERALITY, INITIAL ENCOUNTER (HCC): ICD-10-CM

## 2021-06-16 PROCEDURE — 1111F DSCHRG MED/CURRENT MED MERGE: CPT

## 2021-06-16 NOTE — TELEPHONE ENCOUNTER
I left a message on Liz's ([facility]  ) I placed patient on the schedule at 1:20pm Friday. 6/18/21. Spoke to patient and informed patient of appt.  (she has staples on back of head placed 6/11/21)

## 2021-06-16 NOTE — TELEPHONE ENCOUNTER
Pat with [facility] called. She advised the patient was open for [address] today 6/16 for Nursing, Physical Therapy & Occupational Therapy.      Also, Patient was discharged from hospital & has Staples on the back of her head and was i

## 2021-06-16 NOTE — TELEPHONE ENCOUNTER
Mago Hinson from [facility] is requesting to schedule patient's hospital follow up with Dr. Deana Allen. Patient needs to be seen on 6/25 (Friday) because she has staples to be removed. Next opening is 6/29. Can we use your RES24 slot for 6/25? Please confirm. Thank you.        (CSS please call patient to confirm appt)

## 2021-06-16 NOTE — PROGRESS NOTES
Initial Post Discharge Follow Up   Discharge Date: 6/15/21  Contact Date: 6/16/2021    Consent Verification:  Assessment Completed With: Other: Wyatt Mendoza patient's daughter Permission received per patient?  written  HIPAA Verified?   Yes    Discharge Dx: ten deep breaths an hour while awake. NCM reviewed discharge instructions, medications, S&S of infection/blood clots/S&S of worsening head injury with Lissett Romero, she verbalized understanding of these. Lissett Romero denies any further questions or needs at this time. capsule 0   • bisacodyl 10 MG Rectal Suppos Place 1 suppository (10 mg total) rectally daily as needed.  12 suppository 0   • Levothyroxine Sodium 100 MCG Oral Tab Take 1 tablet (100 mcg total) by mouth every morning before breakfast. 90 tablet 3   • triamc appointment with pt:  Yes, NCM placed a call to MD's office and spoke with Frankie Reyna to assist in scheduling appt., SHELBIE waiting for a call back with a date and time for TCM HFU.       Have you made all of your follow up appointments? no    Is there any reason a

## 2021-06-16 NOTE — PROGRESS NOTES
SHELBIE received a voice message from [facility] in Dr. Massiel Wright office that patient was scheduled for HFU on 6/18/2021 at 1:20 pm. SHELBIE placed a call to patient to inform her of the appointment.  Patient states that someone from Dr. Massiel Wright office already called

## 2021-06-16 NOTE — PROGRESS NOTES
Patient called requesting assistance         Kirk Vargas, [address] [facility] [phone]  Office advised they will call patient due to no availability.

## 2021-06-16 NOTE — TELEPHONE ENCOUNTER
Shannon Raman a nurse care manager for [facility] would like to speak to [place]. She wants to know if she can schedule patient for 6/18 in a res 24 slots. Patient needs hospital follow up and removal of sutures from head. Please call back.      Shannon Raman [phone]

## 2021-06-18 ENCOUNTER — APPOINTMENT (OUTPATIENT)
Dept: GENERAL RADIOLOGY | Facility: HOSPITAL | Age: 86
End: 2021-06-18
Attending: EMERGENCY MEDICINE
Payer: MEDICARE

## 2021-06-18 ENCOUNTER — OFFICE VISIT (OUTPATIENT)
Dept: INTERNAL MEDICINE CLINIC | Facility: CLINIC | Age: 86
End: 2021-06-18
Payer: MEDICARE

## 2021-06-18 ENCOUNTER — HOSPITAL ENCOUNTER (EMERGENCY)
Facility: HOSPITAL | Age: 86
Discharge: HOME | End: 2021-06-18
Attending: EMERGENCY MEDICINE
Payer: MEDICARE

## 2021-06-18 VITALS
DIASTOLIC BLOOD PRESSURE: 89 MMHG | SYSTOLIC BLOOD PRESSURE: 149 MMHG | HEART RATE: 99 BPM | BODY MASS INDEX: 20.2 KG/M2 | WEIGHT: 114 LBS | HEIGHT: 63 IN

## 2021-06-18 VITALS
OXYGEN SATURATION: 96 % | HEART RATE: 80 BPM | SYSTOLIC BLOOD PRESSURE: 141 MMHG | HEIGHT: 63 IN | BODY MASS INDEX: 20.2 KG/M2 | RESPIRATION RATE: 16 BRPM | WEIGHT: 114 LBS | DIASTOLIC BLOOD PRESSURE: 88 MMHG | TEMPERATURE: 98 F

## 2021-06-18 DIAGNOSIS — S81.802A: Primary | ICD-10-CM

## 2021-06-18 DIAGNOSIS — Z74.09 IMPAIRED MOBILITY AND ADLS: ICD-10-CM

## 2021-06-18 DIAGNOSIS — S06.361A INTRAPARENCHYMAL HEMATOMA OF BRAIN WITH LOSS OF CONSCIOUSNESS OF 30 MINUTES OR LESS, UNSPECIFIED LATERALITY, INITIAL ENCOUNTER (HCC): ICD-10-CM

## 2021-06-18 DIAGNOSIS — T14.8XXA OPEN WOUND: ICD-10-CM

## 2021-06-18 DIAGNOSIS — Z48.02 ENCOUNTER FOR REMOVAL OF SUTURES: ICD-10-CM

## 2021-06-18 DIAGNOSIS — S02.119A CLOSED FRACTURE OF LEFT SIDE OF OCCIPITAL BONE, UNSPECIFIED OCCIPITAL FRACTURE TYPE, INITIAL ENCOUNTER (HCC): ICD-10-CM

## 2021-06-18 DIAGNOSIS — Z78.9 IMPAIRED MOBILITY AND ADLS: ICD-10-CM

## 2021-06-18 DIAGNOSIS — I10 ESSENTIAL HYPERTENSION: ICD-10-CM

## 2021-06-18 DIAGNOSIS — I73.9 PAD (PERIPHERAL ARTERY DISEASE) (HCC): Primary | ICD-10-CM

## 2021-06-18 DIAGNOSIS — I25.10 ATHEROSCLEROSIS OF NATIVE CORONARY ARTERY OF NATIVE HEART WITHOUT ANGINA PECTORIS: ICD-10-CM

## 2021-06-18 DIAGNOSIS — I60.9 SUBARACHNOID HEMORRHAGE (HCC): ICD-10-CM

## 2021-06-18 DIAGNOSIS — S06.5X9A ACUTE SUBDURAL HEMATOMA (HCC): ICD-10-CM

## 2021-06-18 PROCEDURE — 85025 COMPLETE CBC W/AUTO DIFF WBC: CPT | Performed by: EMERGENCY MEDICINE

## 2021-06-18 PROCEDURE — 73590 X-RAY EXAM OF LOWER LEG: CPT | Performed by: EMERGENCY MEDICINE

## 2021-06-18 PROCEDURE — 1111F DSCHRG MED/CURRENT MED MERGE: CPT | Performed by: INTERNAL MEDICINE

## 2021-06-18 PROCEDURE — 96365 THER/PROPH/DIAG IV INF INIT: CPT

## 2021-06-18 PROCEDURE — 99285 EMERGENCY DEPT VISIT HI MDM: CPT

## 2021-06-18 PROCEDURE — 99496 TRANSJ CARE MGMT HIGH F2F 7D: CPT | Performed by: INTERNAL MEDICINE

## 2021-06-18 RX ORDER — DOXYCYCLINE HYCLATE 100 MG/1
100 CAPSULE ORAL 2 TIMES DAILY
Qty: 14 CAPSULE | Refills: 0 | Status: SHIPPED | OUTPATIENT
Start: 2021-06-18 | End: 2021-06-25

## 2021-06-18 NOTE — ED QUICK NOTES
Patient presents with:  Laceration  Infection  patient aox3 to ed via private vehicle patient sent by pcp for further eval. Patient dx with cellulitis and recently dc from hospital, stating wound not healing.  Patient also here for suture removal from [facility]

## 2021-06-18 NOTE — ED QUICK NOTES
Rounding completed    No complaints at this time  Awaiting abx infusion. Plan is for patient to be discharged after abx completed.   Elimination assistance offered  No additional needs at this time  Call light within reach, will update patient with more inf

## 2021-06-18 NOTE — ED QUICK NOTES
PATIENT APPROVED FOR DISCHARGE PER ER PROVIDER. graeme GIVEN VERBAL AND WRITTEN DISCHARGE INSTRUCTIONS. GIVEN INSTRUCTIONS ON RX X 2, FOLLOW UP WITH pcp. VERBALIZES UNDERSTANDING AND SIGNED DISCHARGE INSTRUCTIONS.      PATIENT AOX3 OUT OF ED with steady ga

## 2021-06-18 NOTE — ED INITIAL ASSESSMENT (HPI)
Patient sent from PCP due to L leg laceration that occurred a few days ago, and PCP is concerned it is not healing. Denies fevers.

## 2021-06-21 ENCOUNTER — TELEPHONE (OUTPATIENT)
Dept: CARDIOLOGY | Age: 86
End: 2021-06-21

## 2021-06-21 ENCOUNTER — TELEPHONE (OUTPATIENT)
Dept: INTERNAL MEDICINE CLINIC | Facility: CLINIC | Age: 86
End: 2021-06-21

## 2021-06-21 NOTE — TELEPHONE ENCOUNTER
Jairo Stratton from [facility] called requesting if Dr santos Thompson visits to twice a week instead of once for wound care. Please advise.

## 2021-06-24 ENCOUNTER — OFFICE VISIT (OUTPATIENT)
Dept: SURGERY | Facility: CLINIC | Age: 86
End: 2021-06-24
Payer: MEDICARE

## 2021-06-24 DIAGNOSIS — S81.802A UNSPECIFIED OPEN WOUND, LEFT LOWER LEG, INITIAL ENCOUNTER: Primary | ICD-10-CM

## 2021-06-24 PROCEDURE — 99204 OFFICE O/P NEW MOD 45 MIN: CPT | Performed by: PLASTIC SURGERY

## 2021-06-24 NOTE — H&P
Payton Iglesias is a 80year old female that presents with Patient presents with:   Injury: L leg laceration      REFERRED BY:  Marcelo Bernard      Pacemaker: No  Latex Allergy: no  Coumadin: No  Plavix: No  Other anticoagulants: No  Cardiac stents: No • Primary osteoarthritis of knees, bilateral 7/12/2017   • Primary osteoarthritis of right knee 11/12/2018   • Rheumatic fever/heart disease age 12   • Visual impairment         SURGICAL  Past Surgical History:   Procedure Laterality Date   • ANGIOPLASTY Take 1 tablet (100 mcg total) by mouth every morning before breakfast. 90 tablet 3   • triamcinolone acetonide 0.1 % External Cream Apply topically 2 (two) times daily.  Apply to affected area bid  As needed (Patient taking differently: Apply topically 2 (t flap, C-shaped  No infection  Slight serous drainage    X-ray reviewed: No bone involvement    ASSESSMENT/PLAN:       WOUND(S) left leg, traumatic    Traumatic    Risk Factors which may Negatively Impact Healing    Peripheral vascular occlusive disease  Ag

## 2021-06-25 ENCOUNTER — TELEPHONE (OUTPATIENT)
Dept: CARDIOLOGY | Age: 86
End: 2021-06-25

## 2021-06-27 PROBLEM — S72.142A CLOSED DISPLACED INTERTROCHANTERIC FRACTURE OF LEFT FEMUR (HCC): Status: RESOLVED | Noted: 2020-10-20 | Resolved: 2021-06-27

## 2021-06-27 PROBLEM — F43.23 ADJUSTMENT REACTION WITH ANXIETY AND DEPRESSION: Status: RESOLVED | Noted: 2020-11-10 | Resolved: 2021-06-27

## 2021-06-27 PROBLEM — R26.9 ABNORMAL GAIT: Status: RESOLVED | Noted: 2020-10-21 | Resolved: 2021-06-27

## 2021-06-27 NOTE — PROGRESS NOTES
HPI:    Ricardo Bergman is a 80year old female here today for hospital follow up.    She was discharged from {Discharged from which location:7092} to {Discharged to which location:7093::\"Home\"}   Admission Date: 6/18/21   Discharge Date: 6/18/21  [facility] EXTRA DRY) External Cream, Apply 1 Application topically daily as needed. docusate sodium 100 MG Oral Cap, Take 100 mg by mouth 2 (two) times daily as needed for constipation.   bisacodyl 10 MG Rectal Suppos, Place 1 suppository (10 mg total) rectally breann brother; Heart Disease in her father; Stroke in her mother and sister. She  reports that she has quit smoking. Her smoking use included cigarettes. She has a 20.00 pack-year smoking history.  She has quit using smokeless tobacco. She reports current alcoh Days:4430::\"7 days\"} from date of discharge.      Asmita Chang MD, 6/26/2021

## 2021-07-09 ENCOUNTER — PATIENT OUTREACH (OUTPATIENT)
Dept: CASE MANAGEMENT | Age: 86
End: 2021-07-09

## 2021-07-09 NOTE — PROGRESS NOTES
Patient called requesting assistance                       Xray [facility]  No order is placed for Xray patient advised will reach out to DR. [address] office for new referral

## 2021-07-15 ENCOUNTER — TELEPHONE (OUTPATIENT)
Dept: SURGERY | Facility: CLINIC | Age: 86
End: 2021-07-15

## 2021-07-15 NOTE — TELEPHONE ENCOUNTER
Patient called to to update her condition. She missed a follow-up appointment on 7/8/21 because she fell and was admitted to the hospital. She is still in the hospital and will follow up when she is discharged.

## 2021-07-18 RX ORDER — LEVOTHYROXINE SODIUM 0.1 MG/1
TABLET ORAL
Qty: 90 TABLET | Refills: 3 | Status: SHIPPED | OUTPATIENT
Start: 2021-07-18

## 2021-09-02 ENCOUNTER — TELEPHONE (OUTPATIENT)
Dept: CARDIOLOGY | Age: 86
End: 2021-09-02

## 2021-09-16 RX ORDER — METOPROLOL SUCCINATE 25 MG/1
12.5 TABLET, EXTENDED RELEASE ORAL 2 TIMES DAILY
Qty: 90 TABLET | Refills: 1 | Status: SHIPPED | OUTPATIENT
Start: 2021-09-16

## 2021-09-16 NOTE — TELEPHONE ENCOUNTER
Refill passed per [facility] protocol.   Requested Prescriptions   Pending Prescriptions Disp Refills    METOPROLOL SUCCINATE 25 MG Oral Tablet 24 Hr [Pharmacy Med Name: METOPROLOL ER SUCCINATE 25MG TABS] 90 tablet 0     Sig: TAKE 1/2 TABLET BY MOUTH T

## 2021-09-17 ENCOUNTER — TELEPHONE (OUTPATIENT)
Dept: CARDIOLOGY | Age: 86
End: 2021-09-17

## 2021-09-22 ENCOUNTER — APPOINTMENT (OUTPATIENT)
Dept: URBAN - METROPOLITAN AREA CLINIC 321 | Age: 86
Setting detail: DERMATOLOGY
End: 2021-09-23

## 2021-09-22 DIAGNOSIS — L82.1 OTHER SEBORRHEIC KERATOSIS: ICD-10-CM

## 2021-09-22 DIAGNOSIS — Z85.828 PERSONAL HISTORY OF OTHER MALIGNANT NEOPLASM OF SKIN: ICD-10-CM

## 2021-09-22 DIAGNOSIS — D22 MELANOCYTIC NEVI: ICD-10-CM

## 2021-09-22 DIAGNOSIS — L57.0 ACTINIC KERATOSIS: ICD-10-CM

## 2021-09-22 DIAGNOSIS — L81.4 OTHER MELANIN HYPERPIGMENTATION: ICD-10-CM

## 2021-09-22 PROBLEM — D22.39 MELANOCYTIC NEVI OF OTHER PARTS OF FACE: Status: ACTIVE | Noted: 2021-09-22

## 2021-09-22 PROCEDURE — OTHER LIQUID NITROGEN: OTHER

## 2021-09-22 PROCEDURE — 17000 DESTRUCT PREMALG LESION: CPT

## 2021-09-22 PROCEDURE — OTHER COUNSELING: OTHER

## 2021-09-22 PROCEDURE — 17003 DESTRUCT PREMALG LES 2-14: CPT

## 2021-09-22 PROCEDURE — 99213 OFFICE O/P EST LOW 20 MIN: CPT | Mod: 25

## 2021-09-22 ASSESSMENT — LOCATION DETAILED DESCRIPTION DERM
LOCATION DETAILED: LEFT CENTRAL MALAR CHEEK
LOCATION DETAILED: RIGHT RADIAL DORSAL HAND
LOCATION DETAILED: RIGHT INFERIOR CENTRAL MALAR CHEEK
LOCATION DETAILED: LEFT RADIAL DORSAL HAND
LOCATION DETAILED: RIGHT INFERIOR MEDIAL FOREHEAD
LOCATION DETAILED: RIGHT LATERAL MALAR CHEEK
LOCATION DETAILED: RIGHT INFERIOR MEDIAL MALAR CHEEK
LOCATION DETAILED: LEFT FOREHEAD
LOCATION DETAILED: LEFT PROXIMAL DORSAL FOREARM
LOCATION DETAILED: RIGHT ANTERIOR SHOULDER
LOCATION DETAILED: NASAL SUPRATIP
LOCATION DETAILED: LEFT ULNAR DORSAL HAND

## 2021-09-22 ASSESSMENT — LOCATION SIMPLE DESCRIPTION DERM
LOCATION SIMPLE: LEFT FOREHEAD
LOCATION SIMPLE: RIGHT SHOULDER
LOCATION SIMPLE: RIGHT CHEEK
LOCATION SIMPLE: LEFT HAND
LOCATION SIMPLE: RIGHT HAND
LOCATION SIMPLE: RIGHT FOREHEAD
LOCATION SIMPLE: NOSE
LOCATION SIMPLE: LEFT CHEEK
LOCATION SIMPLE: LEFT FOREARM

## 2021-09-22 ASSESSMENT — LOCATION ZONE DERM
LOCATION ZONE: ARM
LOCATION ZONE: FACE
LOCATION ZONE: NOSE
LOCATION ZONE: HAND

## 2021-09-22 NOTE — PROCEDURE: LIQUID NITROGEN
Total Number Of Aks Treated: 4
Detail Level: Zone
Duration Of Freeze Thaw-Cycle (Seconds): 0
Render In Bullet Format When Appropriate: No
Consent: The patient's consent was obtained including but not limited to risks of crusting, scabbing, blistering, scarring, darker or lighter pigmentary change, recurrence, incomplete removal and infection.
Post-Care Instructions: I reviewed with the patient in detail post-care instructions. Patient is to wear sunprotection, and avoid picking at any of the treated lesions. Pt may apply Vaseline to crusted or scabbing areas.

## 2021-09-23 ENCOUNTER — OFFICE VISIT (OUTPATIENT)
Dept: INTERNAL MEDICINE CLINIC | Facility: CLINIC | Age: 86
End: 2021-09-23
Payer: MEDICARE

## 2021-09-23 VITALS
WEIGHT: 114 LBS | DIASTOLIC BLOOD PRESSURE: 84 MMHG | BODY MASS INDEX: 20.2 KG/M2 | HEART RATE: 80 BPM | HEIGHT: 63 IN | OXYGEN SATURATION: 96 % | SYSTOLIC BLOOD PRESSURE: 128 MMHG

## 2021-09-23 DIAGNOSIS — I10 ESSENTIAL HYPERTENSION: Primary | ICD-10-CM

## 2021-09-23 DIAGNOSIS — H61.22 IMPACTED CERUMEN OF LEFT EAR: ICD-10-CM

## 2021-09-23 DIAGNOSIS — H91.93 DECREASED HEARING OF BOTH EARS: ICD-10-CM

## 2021-09-23 PROCEDURE — 99213 OFFICE O/P EST LOW 20 MIN: CPT | Performed by: INTERNAL MEDICINE

## 2021-09-23 NOTE — PROGRESS NOTES
Subjective:   Patient ID: Randell Seen is a 80year old female. Patient presents with:  Ear Problem: C/o bilateral hearing issues. Stts the left ear is worse than the right.       Patient in office today for Left  ear dec  Hearing > right ear but le constipation. (Patient not taking: Reported on 9/23/2021) 60 capsule 0   • triamcinolone acetonide 0.1 % External Cream Apply topically 2 (two) times daily.  Apply to affected area bid  As needed (Patient not taking: Reported on 9/23/2021) 1 g 0     Allergi found.  1. Essential hypertension   Metoprolol 25 mg  1/2 tab  Every day   Low  Salt diet   Elevate feet , elastic socks   Stable cpm     2. Impacted cerumen of left ear  3.  Decreased hearing of both ears  - refer to   ENT - INTERNAL  eval for  hearing

## 2021-09-24 ENCOUNTER — APPOINTMENT (OUTPATIENT)
Dept: URBAN - METROPOLITAN AREA CLINIC 264 | Age: 86
Setting detail: DERMATOLOGY
End: 2021-09-24

## 2021-09-30 ENCOUNTER — OFFICE VISIT (OUTPATIENT)
Dept: OTOLARYNGOLOGY | Facility: CLINIC | Age: 86
End: 2021-09-30
Payer: MEDICARE

## 2021-09-30 VITALS — WEIGHT: 114 LBS | BODY MASS INDEX: 20.2 KG/M2 | HEIGHT: 63 IN | TEMPERATURE: 98 F

## 2021-09-30 DIAGNOSIS — H61.22 IMPACTED CERUMEN OF LEFT EAR: Primary | ICD-10-CM

## 2021-09-30 DIAGNOSIS — H90.42 SENSORINEURAL HEARING LOSS (SNHL) OF LEFT EAR WITH UNRESTRICTED HEARING OF RIGHT EAR: ICD-10-CM

## 2021-09-30 PROCEDURE — 99213 OFFICE O/P EST LOW 20 MIN: CPT | Performed by: OTOLARYNGOLOGY
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
## 2021-10-07 ENCOUNTER — MED REC SCAN ONLY (OUTPATIENT)
Dept: INTERNAL MEDICINE CLINIC | Facility: CLINIC | Age: 86
End: 2021-10-07

## 2021-10-07 NOTE — H&P
I saw and examined the patient and agree with the assessment and plan. I discussed the risks and benefits of the procedure and patient was agreeable to proceed with pre-watchman VIKKI.     Keren Spears MD  [address] [place] [address]

## 2021-10-18 ENCOUNTER — NURSE ONLY (OUTPATIENT)
Dept: LAB | Facility: HOSPITAL | Age: 86
End: 2021-10-18
Attending: STUDENT IN AN ORGANIZED HEALTH CARE EDUCATION/TRAINING PROGRAM
Payer: MEDICARE

## 2021-10-18 DIAGNOSIS — Z01.818 PRE-OP TESTING: ICD-10-CM

## 2021-10-18 PROCEDURE — 36415 COLL VENOUS BLD VENIPUNCTURE: CPT

## 2021-10-18 PROCEDURE — 85027 COMPLETE CBC AUTOMATED: CPT

## 2021-10-19 ENCOUNTER — HOSPITAL ENCOUNTER (OUTPATIENT)
Dept: INTERVENTIONAL RADIOLOGY/VASCULAR | Facility: HOSPITAL | Age: 86
Discharge: HOME | End: 2021-10-19
Attending: INTERNAL MEDICINE | Admitting: STUDENT IN AN ORGANIZED HEALTH CARE EDUCATION/TRAINING PROGRAM
Payer: MEDICARE

## 2021-10-19 ENCOUNTER — HOSPITAL ENCOUNTER (OUTPATIENT)
Dept: CV DIAGNOSTICS | Facility: HOSPITAL | Age: 86
Discharge: HOME | End: 2021-10-19
Attending: INTERNAL MEDICINE
Payer: MEDICARE

## 2021-10-19 VITALS
SYSTOLIC BLOOD PRESSURE: 146 MMHG | HEART RATE: 72 BPM | RESPIRATION RATE: 17 BRPM | OXYGEN SATURATION: 96 % | TEMPERATURE: 98 F | DIASTOLIC BLOOD PRESSURE: 73 MMHG | HEIGHT: 63 IN | WEIGHT: 115 LBS | BODY MASS INDEX: 20.37 KG/M2

## 2021-10-19 DIAGNOSIS — Z01.818 PRE-OP TESTING: Primary | ICD-10-CM

## 2021-10-19 DIAGNOSIS — I48.19 PERSISTENT ATRIAL FIBRILLATION (HCC): ICD-10-CM

## 2021-10-19 PROCEDURE — B246ZZ4 ULTRASONOGRAPHY OF RIGHT AND LEFT HEART, TRANSESOPHAGEAL: ICD-10-PCS | Performed by: STUDENT IN AN ORGANIZED HEALTH CARE EDUCATION/TRAINING PROGRAM

## 2021-10-19 PROCEDURE — 93320 DOPPLER ECHO COMPLETE: CPT | Performed by: INTERNAL MEDICINE

## 2021-10-19 PROCEDURE — 99152 MOD SED SAME PHYS/QHP 5/>YRS: CPT

## 2021-10-19 PROCEDURE — 93325 DOPPLER ECHO COLOR FLOW MAPG: CPT | Performed by: INTERNAL MEDICINE

## 2021-10-19 PROCEDURE — 99153 MOD SED SAME PHYS/QHP EA: CPT

## 2021-10-19 PROCEDURE — 36415 COLL VENOUS BLD VENIPUNCTURE: CPT

## 2021-10-19 PROCEDURE — 93312 ECHO TRANSESOPHAGEAL: CPT

## 2021-10-19 RX ORDER — SODIUM CHLORIDE 9 MG/ML
INJECTION, SOLUTION INTRAVENOUS
Status: COMPLETED | OUTPATIENT
Start: 2021-10-19 | End: 2021-10-19

## 2021-10-19 RX ORDER — MIDAZOLAM HYDROCHLORIDE 1 MG/ML
INJECTION INTRAMUSCULAR; INTRAVENOUS
Status: COMPLETED
Start: 2021-10-19 | End: 2021-10-19

## 2021-10-19 RX ORDER — LIDOCAINE HYDROCHLORIDE 20 MG/ML
SOLUTION OROPHARYNGEAL
Status: COMPLETED
Start: 2021-10-19 | End: 2021-10-19

## 2021-10-19 RX ORDER — MIDAZOLAM HYDROCHLORIDE 1 MG/ML
1 INJECTION INTRAMUSCULAR; INTRAVENOUS ONCE
Status: COMPLETED | OUTPATIENT
Start: 2021-10-19 | End: 2021-10-19

## 2021-10-19 RX ORDER — SODIUM CHLORIDE 0.9 % (FLUSH) 0.9 %
10 SYRINGE (ML) INJECTION AS NEEDED
Status: DISCONTINUED | OUTPATIENT
Start: 2021-10-19 | End: 2021-10-19

## 2021-10-19 RX ORDER — LIDOCAINE HYDROCHLORIDE 20 MG/ML
15 SOLUTION OROPHARYNGEAL ONCE
Status: COMPLETED | OUTPATIENT
Start: 2021-10-19 | End: 2021-10-19

## 2021-10-19 RX ADMIN — MIDAZOLAM HYDROCHLORIDE 1 MG: 1 INJECTION INTRAMUSCULAR; INTRAVENOUS at 08:34:00

## 2021-10-19 RX ADMIN — SODIUM CHLORIDE: 9 INJECTION, SOLUTION INTRAVENOUS at 08:23:00

## 2021-10-19 RX ADMIN — LIDOCAINE HYDROCHLORIDE 15 ML: 20 SOLUTION OROPHARYNGEAL at 08:33:00

## 2021-10-19 NOTE — IVS NOTE
DISCHARGE NOTE     Pt is able to sit up and ambulate without difficulty. Pt tolerated fluids. Pt denies any pain or discomfort at this time. No signs and symptoms of bleeding noted.    IV access removed  Instruction provided, patient/family verbalizes

## 2021-10-27 ENCOUNTER — TELEPHONE (OUTPATIENT)
Dept: INTERVENTIONAL RADIOLOGY/VASCULAR | Facility: HOSPITAL | Age: 86
End: 2021-10-27

## 2021-10-27 NOTE — TELEPHONE ENCOUNTER
Talked to patient about Watchman. She is very worried about risk of stroke now that she is off of her AC. Procedure/process explained. Informational packet mailed. November/December implant dates given to patient.   She will d/w her daughter and call me

## 2021-10-29 DIAGNOSIS — I48.91 ATRIAL FIBRILLATION (HCC): Primary | ICD-10-CM

## 2021-11-16 ENCOUNTER — HOSPITAL ENCOUNTER (OUTPATIENT)
Dept: GENERAL RADIOLOGY | Facility: HOSPITAL | Age: 86
Discharge: HOME | End: 2021-11-16
Attending: INTERNAL MEDICINE
Payer: MEDICARE

## 2021-11-16 ENCOUNTER — LAB ENCOUNTER (OUTPATIENT)
Dept: LAB | Facility: HOSPITAL | Age: 86
End: 2021-11-16
Attending: INTERNAL MEDICINE
Payer: MEDICARE

## 2021-11-16 DIAGNOSIS — I48.91 ATRIAL FIBRILLATION (HCC): ICD-10-CM

## 2021-11-16 PROCEDURE — 85025 COMPLETE CBC W/AUTO DIFF WBC: CPT

## 2021-11-16 PROCEDURE — 86901 BLOOD TYPING SEROLOGIC RH(D): CPT

## 2021-11-16 PROCEDURE — 93010 ELECTROCARDIOGRAM REPORT: CPT | Performed by: INTERNAL MEDICINE

## 2021-11-16 PROCEDURE — 80048 BASIC METABOLIC PNL TOTAL CA: CPT

## 2021-11-16 PROCEDURE — 86900 BLOOD TYPING SEROLOGIC ABO: CPT

## 2021-11-16 PROCEDURE — 71046 X-RAY EXAM CHEST 2 VIEWS: CPT | Performed by: INTERNAL MEDICINE

## 2021-11-16 PROCEDURE — 36415 COLL VENOUS BLD VENIPUNCTURE: CPT

## 2021-11-16 PROCEDURE — 86850 RBC ANTIBODY SCREEN: CPT

## 2021-11-16 PROCEDURE — 93005 ELECTROCARDIOGRAM TRACING: CPT

## 2021-11-17 RX ORDER — MULTIVIT WITH MINERALS/LUTEIN
500 TABLET ORAL DAILY
COMMUNITY

## 2021-11-17 RX ORDER — MELATONIN
1000 DAILY
COMMUNITY

## 2021-11-17 RX ORDER — ROSUVASTATIN CALCIUM 20 MG/1
20 TABLET, COATED ORAL NIGHTLY
COMMUNITY

## 2021-11-19 ENCOUNTER — ANESTHESIA (OUTPATIENT)
Dept: INTERVENTIONAL RADIOLOGY/VASCULAR | Facility: HOSPITAL | Age: 86
DRG: 274 | End: 2021-11-19
Payer: MEDICARE

## 2021-11-19 ENCOUNTER — HOSPITAL ENCOUNTER (INPATIENT)
Dept: CV DIAGNOSTICS | Facility: HOSPITAL | Age: 86
Discharge: HOME | DRG: 274 | End: 2021-11-19
Attending: INTERNAL MEDICINE
Payer: MEDICARE

## 2021-11-19 ENCOUNTER — HOSPITAL ENCOUNTER (INPATIENT)
Dept: INTERVENTIONAL RADIOLOGY/VASCULAR | Facility: HOSPITAL | Age: 86
LOS: 2 days | Discharge: HOME | DRG: 274 | End: 2021-11-21
Attending: INTERNAL MEDICINE | Admitting: INTERNAL MEDICINE
Payer: MEDICARE

## 2021-11-19 DIAGNOSIS — S72.452D CLOSED DISPLACED SUPRACONDYLAR FRACTURE OF DISTAL END OF LEFT FEMUR WITHOUT INTRACONDYLAR EXTENSION WITH ROUTINE HEALING, SUBSEQUENT ENCOUNTER: ICD-10-CM

## 2021-11-19 DIAGNOSIS — S72.142D CLOSED DISPLACED INTERTROCHANTERIC FRACTURE OF LEFT FEMUR WITH ROUTINE HEALING, SUBSEQUENT ENCOUNTER: ICD-10-CM

## 2021-11-19 DIAGNOSIS — S72.002D CLOSED FRACTURE OF NECK OF LEFT FEMUR WITH ROUTINE HEALING: ICD-10-CM

## 2021-11-19 DIAGNOSIS — I48.91 ATRIAL FIBRILLATION, UNSPECIFIED TYPE (HCC): ICD-10-CM

## 2021-11-19 PROCEDURE — 82375 ASSAY CARBOXYHB QUANT: CPT | Performed by: ANESTHESIOLOGY

## 2021-11-19 PROCEDURE — 93355 ECHO TRANSESOPHAGEAL (TEE): CPT | Performed by: INTERNAL MEDICINE

## 2021-11-19 PROCEDURE — 84132 ASSAY OF SERUM POTASSIUM: CPT

## 2021-11-19 PROCEDURE — 82803 BLOOD GASES ANY COMBINATION: CPT | Performed by: ANESTHESIOLOGY

## 2021-11-19 PROCEDURE — B246ZZ4 ULTRASONOGRAPHY OF RIGHT AND LEFT HEART, TRANSESOPHAGEAL: ICD-10-PCS | Performed by: INTERNAL MEDICINE

## 2021-11-19 PROCEDURE — 85018 HEMOGLOBIN: CPT | Performed by: ANESTHESIOLOGY

## 2021-11-19 PROCEDURE — 86920 COMPATIBILITY TEST SPIN: CPT

## 2021-11-19 PROCEDURE — 83050 HGB METHEMOGLOBIN QUAN: CPT | Performed by: ANESTHESIOLOGY

## 2021-11-19 PROCEDURE — 33340 PERQ CLSR TCAT L ATR APNDGE: CPT

## 2021-11-19 PROCEDURE — 84295 ASSAY OF SERUM SODIUM: CPT

## 2021-11-19 PROCEDURE — 85014 HEMATOCRIT: CPT

## 2021-11-19 PROCEDURE — S0028 INJECTION, FAMOTIDINE, 20 MG: HCPCS | Performed by: ANESTHESIOLOGY

## 2021-11-19 PROCEDURE — 85347 COAGULATION TIME ACTIVATED: CPT

## 2021-11-19 PROCEDURE — 02L73DK OCCLUSION OF LEFT ATRIAL APPENDAGE WITH INTRALUMINAL DEVICE, PERCUTANEOUS APPROACH: ICD-10-PCS | Performed by: INTERNAL MEDICINE

## 2021-11-19 PROCEDURE — 94002 VENT MGMT INPAT INIT DAY: CPT

## 2021-11-19 PROCEDURE — 94150 VITAL CAPACITY TEST: CPT

## 2021-11-19 PROCEDURE — 82330 ASSAY OF CALCIUM: CPT

## 2021-11-19 PROCEDURE — 82803 BLOOD GASES ANY COMBINATION: CPT

## 2021-11-19 RX ORDER — CLOPIDOGREL BISULFATE 75 MG/1
75 TABLET ORAL DAILY
Status: DISCONTINUED | OUTPATIENT
Start: 2021-11-20 | End: 2021-11-21

## 2021-11-19 RX ORDER — ACETAMINOPHEN AND CODEINE PHOSPHATE 300; 30 MG/1; MG/1
1 TABLET ORAL EVERY 4 HOURS PRN
Status: DISCONTINUED | OUTPATIENT
Start: 2021-11-19 | End: 2021-11-21

## 2021-11-19 RX ORDER — NITROGLYCERIN 20 MG/100ML
INJECTION INTRAVENOUS CONTINUOUS PRN
Status: DISCONTINUED | OUTPATIENT
Start: 2021-11-19 | End: 2021-11-19 | Stop reason: SURG

## 2021-11-19 RX ORDER — MIDAZOLAM HYDROCHLORIDE 1 MG/ML
INJECTION INTRAMUSCULAR; INTRAVENOUS AS NEEDED
Status: DISCONTINUED | OUTPATIENT
Start: 2021-11-19 | End: 2021-11-19 | Stop reason: SURG

## 2021-11-19 RX ORDER — HYDROCODONE BITARTRATE AND ACETAMINOPHEN 5; 325 MG/1; MG/1
2 TABLET ORAL AS NEEDED
Status: DISCONTINUED | OUTPATIENT
Start: 2021-11-19 | End: 2021-11-21

## 2021-11-19 RX ORDER — HEPARIN SODIUM 1000 [USP'U]/ML
INJECTION, SOLUTION INTRAVENOUS; SUBCUTANEOUS AS NEEDED
Status: DISCONTINUED | OUTPATIENT
Start: 2021-11-19 | End: 2021-11-19 | Stop reason: SURG

## 2021-11-19 RX ORDER — ACETAMINOPHEN 10 MG/ML
1000 INJECTION, SOLUTION INTRAVENOUS ONCE AS NEEDED
Status: ACTIVE | OUTPATIENT
Start: 2021-11-19 | End: 2021-11-19

## 2021-11-19 RX ORDER — NITROGLYCERIN 20 MG/100ML
INJECTION INTRAVENOUS CONTINUOUS
Status: DISCONTINUED | OUTPATIENT
Start: 2021-11-19 | End: 2021-11-20

## 2021-11-19 RX ORDER — DEXAMETHASONE SODIUM PHOSPHATE 10 MG/ML
INJECTION, SOLUTION INTRAMUSCULAR; INTRAVENOUS AS NEEDED
Status: DISCONTINUED | OUTPATIENT
Start: 2021-11-19 | End: 2021-11-19 | Stop reason: SURG

## 2021-11-19 RX ORDER — DEXMEDETOMIDINE HYDROCHLORIDE 4 UG/ML
INJECTION, SOLUTION INTRAVENOUS CONTINUOUS
Status: DISCONTINUED | OUTPATIENT
Start: 2021-11-19 | End: 2021-11-20

## 2021-11-19 RX ORDER — METOCLOPRAMIDE HYDROCHLORIDE 5 MG/ML
10 INJECTION INTRAMUSCULAR; INTRAVENOUS AS NEEDED
Status: ACTIVE | OUTPATIENT
Start: 2021-11-19 | End: 2021-11-20

## 2021-11-19 RX ORDER — ROCURONIUM BROMIDE 10 MG/ML
INJECTION, SOLUTION INTRAVENOUS AS NEEDED
Status: DISCONTINUED | OUTPATIENT
Start: 2021-11-19 | End: 2021-11-19 | Stop reason: SURG

## 2021-11-19 RX ORDER — DIPHENHYDRAMINE HYDROCHLORIDE 50 MG/ML
12.5 INJECTION INTRAMUSCULAR; INTRAVENOUS AS NEEDED
Status: ACTIVE | OUTPATIENT
Start: 2021-11-19 | End: 2021-11-20

## 2021-11-19 RX ORDER — NALOXONE HYDROCHLORIDE 0.4 MG/ML
80 INJECTION, SOLUTION INTRAMUSCULAR; INTRAVENOUS; SUBCUTANEOUS AS NEEDED
Status: ACTIVE | OUTPATIENT
Start: 2021-11-19 | End: 2021-11-20

## 2021-11-19 RX ORDER — FAMOTIDINE 10 MG/ML
INJECTION, SOLUTION INTRAVENOUS AS NEEDED
Status: DISCONTINUED | OUTPATIENT
Start: 2021-11-19 | End: 2021-11-19 | Stop reason: SURG

## 2021-11-19 RX ORDER — NALOXONE HYDROCHLORIDE 0.4 MG/ML
INJECTION, SOLUTION INTRAMUSCULAR; INTRAVENOUS; SUBCUTANEOUS AS NEEDED
Status: DISCONTINUED | OUTPATIENT
Start: 2021-11-19 | End: 2021-11-19 | Stop reason: SURG

## 2021-11-19 RX ORDER — ACETAMINOPHEN 500 MG
1000 TABLET ORAL ONCE AS NEEDED
Status: ACTIVE | OUTPATIENT
Start: 2021-11-19 | End: 2021-11-19

## 2021-11-19 RX ORDER — SODIUM CHLORIDE 9 MG/ML
INJECTION, SOLUTION INTRAVENOUS CONTINUOUS
Status: DISCONTINUED | OUTPATIENT
Start: 2021-11-19 | End: 2021-11-20

## 2021-11-19 RX ORDER — HEPARIN SODIUM 5000 [USP'U]/ML
INJECTION, SOLUTION INTRAVENOUS; SUBCUTANEOUS
Status: COMPLETED
Start: 2021-11-19 | End: 2021-11-19

## 2021-11-19 RX ORDER — HYDROMORPHONE HYDROCHLORIDE 1 MG/ML
0.4 INJECTION, SOLUTION INTRAMUSCULAR; INTRAVENOUS; SUBCUTANEOUS EVERY 5 MIN PRN
Status: ACTIVE | OUTPATIENT
Start: 2021-11-19 | End: 2021-11-20

## 2021-11-19 RX ORDER — VANCOMYCIN HYDROCHLORIDE 1 G/20ML
INJECTION, POWDER, LYOPHILIZED, FOR SOLUTION INTRAVENOUS AS NEEDED
Status: DISCONTINUED | OUTPATIENT
Start: 2021-11-19 | End: 2021-11-19 | Stop reason: SURG

## 2021-11-19 RX ORDER — MIDAZOLAM HYDROCHLORIDE 1 MG/ML
1 INJECTION INTRAMUSCULAR; INTRAVENOUS EVERY 30 MIN PRN
Status: DISCONTINUED | OUTPATIENT
Start: 2021-11-19 | End: 2021-11-21

## 2021-11-19 RX ORDER — SODIUM CHLORIDE, SODIUM LACTATE, POTASSIUM CHLORIDE, CALCIUM CHLORIDE 600; 310; 30; 20 MG/100ML; MG/100ML; MG/100ML; MG/100ML
INJECTION, SOLUTION INTRAVENOUS CONTINUOUS
Status: DISCONTINUED | OUTPATIENT
Start: 2021-11-19 | End: 2021-11-20

## 2021-11-19 RX ORDER — ACETAMINOPHEN 325 MG/1
650 TABLET ORAL EVERY 4 HOURS PRN
Status: DISCONTINUED | OUTPATIENT
Start: 2021-11-19 | End: 2021-11-21

## 2021-11-19 RX ORDER — DIPHENHYDRAMINE HYDROCHLORIDE 50 MG/ML
INJECTION INTRAMUSCULAR; INTRAVENOUS AS NEEDED
Status: DISCONTINUED | OUTPATIENT
Start: 2021-11-19 | End: 2021-11-19 | Stop reason: SURG

## 2021-11-19 RX ORDER — LEVOTHYROXINE SODIUM 0.1 MG/1
100 TABLET ORAL
Status: DISCONTINUED | OUTPATIENT
Start: 2021-11-20 | End: 2021-11-21

## 2021-11-19 RX ORDER — ACETAMINOPHEN AND CODEINE PHOSPHATE 300; 30 MG/1; MG/1
2 TABLET ORAL EVERY 4 HOURS PRN
Status: DISCONTINUED | OUTPATIENT
Start: 2021-11-19 | End: 2021-11-21

## 2021-11-19 RX ORDER — PHENYLEPHRINE HCL 10 MG/ML
VIAL (ML) INJECTION AS NEEDED
Status: DISCONTINUED | OUTPATIENT
Start: 2021-11-19 | End: 2021-11-19 | Stop reason: SURG

## 2021-11-19 RX ORDER — CHLORHEXIDINE GLUCONATE 0.12 MG/ML
15 RINSE ORAL
Status: DISCONTINUED | OUTPATIENT
Start: 2021-11-19 | End: 2021-11-20

## 2021-11-19 RX ORDER — ONDANSETRON 2 MG/ML
INJECTION INTRAMUSCULAR; INTRAVENOUS AS NEEDED
Status: DISCONTINUED | OUTPATIENT
Start: 2021-11-19 | End: 2021-11-19 | Stop reason: SURG

## 2021-11-19 RX ORDER — MIDAZOLAM HYDROCHLORIDE 1 MG/ML
1 INJECTION INTRAMUSCULAR; INTRAVENOUS EVERY 5 MIN PRN
Status: ACTIVE | OUTPATIENT
Start: 2021-11-19 | End: 2021-11-20

## 2021-11-19 RX ORDER — PROTAMINE SULFATE 10 MG/ML
INJECTION, SOLUTION INTRAVENOUS AS NEEDED
Status: DISCONTINUED | OUTPATIENT
Start: 2021-11-19 | End: 2021-11-19 | Stop reason: SURG

## 2021-11-19 RX ORDER — LIDOCAINE HYDROCHLORIDE 10 MG/ML
INJECTION, SOLUTION EPIDURAL; INFILTRATION; INTRACAUDAL; PERINEURAL
Status: COMPLETED
Start: 2021-11-19 | End: 2021-11-19

## 2021-11-19 RX ORDER — IPRATROPIUM BROMIDE AND ALBUTEROL SULFATE 2.5; .5 MG/3ML; MG/3ML
3 SOLUTION RESPIRATORY (INHALATION) EVERY 4 HOURS PRN
Status: DISCONTINUED | OUTPATIENT
Start: 2021-11-19 | End: 2021-11-21

## 2021-11-19 RX ORDER — MEPERIDINE HYDROCHLORIDE 25 MG/ML
12.5 INJECTION INTRAMUSCULAR; INTRAVENOUS; SUBCUTANEOUS AS NEEDED
Status: DISCONTINUED | OUTPATIENT
Start: 2021-11-19 | End: 2021-11-21

## 2021-11-19 RX ORDER — HYDROCODONE BITARTRATE AND ACETAMINOPHEN 5; 325 MG/1; MG/1
1 TABLET ORAL AS NEEDED
Status: DISCONTINUED | OUTPATIENT
Start: 2021-11-19 | End: 2021-11-21

## 2021-11-19 RX ORDER — ONDANSETRON 2 MG/ML
4 INJECTION INTRAMUSCULAR; INTRAVENOUS AS NEEDED
Status: ACTIVE | OUTPATIENT
Start: 2021-11-19 | End: 2021-11-20

## 2021-11-19 RX ADMIN — SODIUM CHLORIDE: 9 INJECTION, SOLUTION INTRAVENOUS at 18:03:00

## 2021-11-19 RX ADMIN — ONDANSETRON 4 MG: 2 INJECTION INTRAMUSCULAR; INTRAVENOUS at 14:43:00

## 2021-11-19 RX ADMIN — NITROGLYCERIN 5 MCG/MIN: 20 INJECTION INTRAVENOUS at 21:45:00

## 2021-11-19 RX ADMIN — FAMOTIDINE 20 MG: 10 INJECTION, SOLUTION INTRAVENOUS at 14:18:00

## 2021-11-19 RX ADMIN — PHENYLEPHRINE HCL 100 MCG: 10 MG/ML VIAL (ML) INJECTION at 15:00:00

## 2021-11-19 RX ADMIN — SODIUM CHLORIDE: 9 INJECTION, SOLUTION INTRAVENOUS at 18:07:00

## 2021-11-19 RX ADMIN — MIDAZOLAM HYDROCHLORIDE 2 MG: 1 INJECTION INTRAMUSCULAR; INTRAVENOUS at 14:17:00

## 2021-11-19 RX ADMIN — DEXAMETHASONE SODIUM PHOSPHATE 4 MG: 10 INJECTION, SOLUTION INTRAMUSCULAR; INTRAVENOUS at 14:43:00

## 2021-11-19 RX ADMIN — PHENYLEPHRINE HCL 100 MCG: 10 MG/ML VIAL (ML) INJECTION at 17:10:00

## 2021-11-19 RX ADMIN — NALOXONE HYDROCHLORIDE 0.2 MG: 0.4 INJECTION, SOLUTION INTRAMUSCULAR; INTRAVENOUS; SUBCUTANEOUS at 16:33:00

## 2021-11-19 RX ADMIN — NITROGLYCERIN 30 MCG/MIN: 20 INJECTION INTRAVENOUS at 17:25:00

## 2021-11-19 RX ADMIN — PHENYLEPHRINE HCL 200 MCG: 10 MG/ML VIAL (ML) INJECTION at 15:36:00

## 2021-11-19 RX ADMIN — PHENYLEPHRINE HCL 200 MCG: 10 MG/ML VIAL (ML) INJECTION at 15:12:00

## 2021-11-19 RX ADMIN — ACETAMINOPHEN 325 MG: 325 TABLET ORAL at 21:24:00

## 2021-11-19 RX ADMIN — VANCOMYCIN HYDROCHLORIDE 1000 MG: 1 INJECTION, POWDER, LYOPHILIZED, FOR SOLUTION INTRAVENOUS at 14:35:00

## 2021-11-19 RX ADMIN — PHENYLEPHRINE HCL 100 MCG: 10 MG/ML VIAL (ML) INJECTION at 14:42:00

## 2021-11-19 RX ADMIN — PHENYLEPHRINE HCL 100 MCG: 10 MG/ML VIAL (ML) INJECTION at 15:50:00

## 2021-11-19 RX ADMIN — NALOXONE HYDROCHLORIDE 0.2 MG: 0.4 INJECTION, SOLUTION INTRAMUSCULAR; INTRAVENOUS; SUBCUTANEOUS at 16:40:00

## 2021-11-19 RX ADMIN — ROCURONIUM BROMIDE 40 MG: 10 INJECTION, SOLUTION INTRAVENOUS at 14:26:00

## 2021-11-19 RX ADMIN — HEPARIN SODIUM 5000 UNITS: 1000 INJECTION, SOLUTION INTRAVENOUS; SUBCUTANEOUS at 15:25:00

## 2021-11-19 RX ADMIN — PHENYLEPHRINE HCL 100 MCG: 10 MG/ML VIAL (ML) INJECTION at 14:51:00

## 2021-11-19 RX ADMIN — HEPARIN SODIUM 2500 UNITS: 1000 INJECTION, SOLUTION INTRAVENOUS; SUBCUTANEOUS at 15:34:00

## 2021-11-19 RX ADMIN — PROTAMINE SULFATE 50 MG: 10 INJECTION, SOLUTION INTRAVENOUS at 16:03:00

## 2021-11-19 RX ADMIN — DIPHENHYDRAMINE HYDROCHLORIDE 50 MG: 50 INJECTION INTRAMUSCULAR; INTRAVENOUS at 14:18:00

## 2021-11-19 NOTE — ANESTHESIA PREPROCEDURE EVALUATION
PRE-OP EVALUATION    Patient Name: Chaparrita Puente    Admit Diagnosis: Atrial fibrillation, unspecified type ([facility] [I48.91]    Pre-op Diagnosis: * No pre-op diagnosis entered *        Anesthesia Procedure: CATH WATCHMAN    * No surgeons found in log * 1 g, Rfl: 0  Cranberry Juice Extract 1000 MG Oral Cap, Take 1,000 mg by mouth daily. , Disp: , Rfl: , 11/18/2021 at Unknown time        Allergies: Aspirin, Penicillins, Sulfa Antibiotics, Macrobid [Nitrofurantoin], Adhesive Tape, Ciprofloxacin, and Povidone 9.3 11/16/2021            Airway      Mallampati: II  Mouth opening: 3 FB  TM distance: 4 - 6 cm  Neck ROM: limited Cardiovascular      Rhythm: irregular  Rate: normal     Dental    No notable dental history.          Pulmonary    Pulmonary exam normal.

## 2021-11-19 NOTE — ANESTHESIA PROCEDURE NOTES
Airway  Date/Time: 11/19/2021 2:28 PM  Urgency: elective      General Information and Staff    Patient location during procedure: OR  Anesthesiologist: Gil Lundy MD  Performed: anesthesiologist     Indications and Patient Condition  Indications

## 2021-11-19 NOTE — ANESTHESIA PROCEDURE NOTES
Peripheral IV  Date/Time: 11/19/2021 2:40 PM  Inserted by: Peter Aguila MD    Placement  Needle size: 18 G  Laterality: right  Location: hand  Local anesthetic: none  Site prep: alcohol  Technique: anatomical landmarks  Attempts: 1

## 2021-11-19 NOTE — PROCEDURES
PROCEDURE PERFORMED:   1. Watchman FLX device implant. 2. Transseptal catheterization with left atrial pressure recording. INDICATIONS FOR PROCEDURE: History of falls/SAH on [address].  CHADSVasc score 5    OPERATORS:  Sheila Lynn MD, , Terrnece Miller MD, MD, Elenita Guadalupe MD pericardial effusion by VIKKI at the conclusion of the procedure. 3. The patient was transported to postanesthesia recovery in stable condition.    4. CHADSVasc score 5.      _______________________________________  Mara Moore MD  Cardiac Electrophysio

## 2021-11-19 NOTE — H&P
[facility]  H&P    Sahil Dense Patient Status:  Inpatient    1928 MRN [id number]   [facility] Buck Mata [facility]   Hosp Day # 0 PCP Geovanna Kohli MD     Reason f Osteoarthritis    • Osteoporosis 11/14/2007   • Personal history of malignant neoplasm of breast 5/28/2009   • Primary osteoarthritis of knees, bilateral 7/12/2017   • Primary osteoarthritis of right knee 11/12/2018   • Rheumatic fever/heart disease age 12 ONCE PRN    Facility-Administered Medications Ordered in Other Encounters:   •  ondansetron (ZOFRAN) injection, , Intravenous, PRN  •  dexamethasone PF (DECADRON) 10 MG/ML injection, , Injection, PRN  •  phenylephrine HCl (BILLY-SYNEPHRINE) 10 MG/ML injectio 2+.  Neurologic: Alert and oriented, normal affect. Skin: Warm and dry.      Laboratories and Data:  Diagnostics:    Labs:        Lab Results   Component Value Date    INR 1.19 06/11/2021    INR 2.5 (A) 03/28/2019    INR 1.0 (A) 03/21/2019         Staci SKELTON

## 2021-11-19 NOTE — PROGRESS NOTES
[facility] CARDIOLOGY  VIKKI Note    [facility] Constant Location:      MRN [id number]   Admission Date 11/19/2021 Operation Date 11/19/2021   Attending Physician Adri Wong MD Operating Physician Stephanie Waldrop MD     Pre-Operative Diagnosis: Atrial fibrillation.

## 2021-11-19 NOTE — PROCEDURES
PROCEDURE PERFORMED:   1. Watchman Flx device implant. 2. Transseptal catheterization with left atrial pressure recording. INDICATIONS FOR PROCEDURE: History of ICH on [address].  CHADSVasc score 5 (age-2, gender, HTN, CAD, CHF)    OPERATORS:  Verita Severs, seal of the device was measured by color flow Doppler, the size of the residual leak was 0. The device was then released from the delivery sheath, and the delivery sheath and access sheath were gently withdrawn into the right atrium.  There was no pericard

## 2021-11-19 NOTE — ANESTHESIA PROCEDURE NOTES
Arterial Line  Performed by: Peggy Ellison MD  Authorized by: Peggy Ellison MD     General Information and Staff    Procedure Start:   Procedure End: 11/19/2021 2:35 PM  Anesthesiologist:  Peggy Ellison MD  Performed By:  Chuy Nguyen

## 2021-11-20 ENCOUNTER — APPOINTMENT (OUTPATIENT)
Dept: CV DIAGNOSTICS | Facility: HOSPITAL | Age: 86
DRG: 274 | End: 2021-11-20
Attending: NURSE PRACTITIONER
Payer: MEDICARE

## 2021-11-20 ENCOUNTER — APPOINTMENT (OUTPATIENT)
Dept: GENERAL RADIOLOGY | Facility: HOSPITAL | Age: 86
DRG: 274 | End: 2021-11-20
Attending: NURSE PRACTITIONER
Payer: MEDICARE

## 2021-11-20 PROCEDURE — 85025 COMPLETE CBC W/AUTO DIFF WBC: CPT | Performed by: INTERNAL MEDICINE

## 2021-11-20 PROCEDURE — 80048 BASIC METABOLIC PNL TOTAL CA: CPT | Performed by: INTERNAL MEDICINE

## 2021-11-20 PROCEDURE — 93306 TTE W/DOPPLER COMPLETE: CPT | Performed by: NURSE PRACTITIONER

## 2021-11-20 PROCEDURE — 71045 X-RAY EXAM CHEST 1 VIEW: CPT | Performed by: NURSE PRACTITIONER

## 2021-11-20 PROCEDURE — 51701 INSERT BLADDER CATHETER: CPT

## 2021-11-20 PROCEDURE — 73502 X-RAY EXAM HIP UNI 2-3 VIEWS: CPT | Performed by: ORTHOPAEDIC SURGERY

## 2021-11-20 RX ORDER — FUROSEMIDE 10 MG/ML
20 INJECTION INTRAMUSCULAR; INTRAVENOUS ONCE
Status: DISCONTINUED | OUTPATIENT
Start: 2021-11-20 | End: 2021-11-21

## 2021-11-20 RX ORDER — CLOPIDOGREL BISULFATE 75 MG/1
75 TABLET ORAL DAILY
Qty: 30 TABLET | Refills: 5 | Status: SHIPPED | OUTPATIENT
Start: 2021-11-21 | End: 2021-11-21

## 2021-11-20 RX ORDER — FUROSEMIDE 10 MG/ML
20 INJECTION INTRAMUSCULAR; INTRAVENOUS ONCE
Status: DISCONTINUED | OUTPATIENT
Start: 2021-11-20 | End: 2021-11-20

## 2021-11-20 RX ORDER — FUROSEMIDE 10 MG/ML
INJECTION INTRAMUSCULAR; INTRAVENOUS
Status: DISPENSED
Start: 2021-11-20 | End: 2021-11-21

## 2021-11-20 RX ADMIN — NITROGLYCERIN 5 MCG/MIN: 20 INJECTION INTRAVENOUS at 01:50:00

## 2021-11-20 RX ADMIN — LEVOTHYROXINE SODIUM 100 MCG: 0.1 TABLET ORAL at 06:29:00

## 2021-11-20 RX ADMIN — CLOPIDOGREL BISULFATE 75 MG: 75 TABLET ORAL at 09:28:00

## 2021-11-20 RX ADMIN — NITROGLYCERIN 2.5 MCG/MIN: 20 INJECTION INTRAVENOUS at 02:50:00

## 2021-11-20 NOTE — PLAN OF CARE
Assumed patient care this am. Patient forgetful. Up to chair and ambulating in halls with assist x1 and walker, tolerating well. Bibasilar crackles auscultated.  Patient coughed up old bloody sputum, patient reports that it was \"foamy\" when she first coug

## 2021-11-20 NOTE — PROCEDURES
[address]    PATIENT'S NAME: Sarina Ariza   ATTENDING PHYSICIAN: Brooklyn Dasilva M.D. OPERATING PHYSICIAN: Sydney Chavez M.D.    PATIENT ACCOUNT#:   [de-identified]    LOCATION:  [address] [place] [address]  MEDICAL RECORD #:   [id number]       DATE OF BIRTH:  1 return to the left atrium appears normal.    Based on the anatomic appearance of the septum, the decision was made to proceed with attempted transseptal catheterization and delivery of the Watchman BRAYDEN occluder device.   With VIKKI imaging guidance, the trans under the limbus ostium and a 25% to 30% mitral side shoulder. Complete interrogation of the device was then performed both before and after a tug test.  The tug test demonstrated excellent stability of the device. There was no peridevice leak noted.   Noa Jhaveri

## 2021-11-20 NOTE — PROGRESS NOTES
Progress Note  Dona Going Patient Status:  Inpatient    1928 MRN [id number]   [facility] 6NE-A Attending Samira Gamboa MD   Hosp Day # 1 PCP Tl Tavarez MD     Subjective:  Patient with blood tinged, bubbly sputum this am. • clopidogrel  75 mg Oral Daily   • Chlorhexidine Gluconate  15 mL Mouth/Throat [email or web address]     • sodium chloride     • lactated ringers     • sodium chloride 50 mL/hr at 11/19/21 1807   • lactated ringers     • propofol Stopped (11/19/21 1815)   • dex

## 2021-11-20 NOTE — ANESTHESIA POSTPROCEDURE EVALUATION
Vene 89 Patient Status:  Inpatient   Age/Gender 80year old female MRN [id number]   [facility] 6NE-A Attending Samira Gamboa MD   Hosp Day # 0 PCP Tl Tavarez MD       Anesthesia Post-op Note        Procedure S

## 2021-11-20 NOTE — PLAN OF CARE
Assumed care of the pt at approx. 1930 pm. Drowsy but awakens to voice. Follows commands. C/o back pain (chronic), resolved with Tylenol. Extubated in the pm, on 3 L NC, weaned to RA overnight. A-fib, HR 70s-low 100s.  Nitroglycerin gtt titrated to keep SBP

## 2021-11-21 VITALS
WEIGHT: 114.19 LBS | DIASTOLIC BLOOD PRESSURE: 84 MMHG | HEART RATE: 96 BPM | OXYGEN SATURATION: 97 % | SYSTOLIC BLOOD PRESSURE: 135 MMHG | TEMPERATURE: 98 F | RESPIRATION RATE: 18 BRPM | BODY MASS INDEX: 20 KG/M2

## 2021-11-21 RX ORDER — CLOPIDOGREL BISULFATE 75 MG/1
75 TABLET ORAL DAILY
Qty: 30 TABLET | Refills: 5 | Status: SHIPPED | OUTPATIENT
Start: 2021-11-21

## 2021-11-21 RX ADMIN — CLOPIDOGREL BISULFATE 75 MG: 75 TABLET ORAL at 09:19:00

## 2021-11-21 RX ADMIN — LEVOTHYROXINE SODIUM 100 MCG: 0.1 TABLET ORAL at 06:44:00

## 2021-11-21 NOTE — PLAN OF CARE
Received patient as transfer from CCU into [facility]. Oriented to room and call light. AO x3, forgetful at times. Afib on tele monitor. O2 sat adequate on room air. Denies chest pain and shortness of breath.  Ambulated to bathroom with her own walker and standby replacement therapy as ordered  Outcome: Progressing     Problem: RESPIRATORY - ADULT  Goal: Achieves optimal ventilation and oxygenation  Description: INTERVENTIONS:  - Assess for changes in respiratory status  - Assess for changes in mentation and behavi

## 2021-11-21 NOTE — PLAN OF CARE
Assumed pt care at 0730. A&Ox4, [place], glasses and walker at bedside. RA, denies chest pain/SOB, lung sounds clear/diminished, VSS, Afib on tele. Voids, briefed. Up with SB/walker. R groin, soft no hematoma, L wrist soft, no hematoma, both open to air.  Edema Progressing     Problem: RESPIRATORY - ADULT  Goal: Achieves optimal ventilation and oxygenation  Description: INTERVENTIONS:  - Assess for changes in respiratory status  - Assess for changes in mentation and behavior  - Position to facilitate oxygenation

## 2021-11-21 NOTE — PLAN OF CARE
Explained discharge instructions including medications and follow-ups to the patient, verbalized understanding, IV removed, tele monitor discontinued, will be transported via wheelchair.      Problem: Patient/Family Goals  Goal: [facility] arrhythmias  - Monitor electrolytes and administer replacement therapy as ordered  11/21/2021 1145 by Tiffani Morrison RN  Outcome: Completed  11/21/2021 1144 by Tiffani Morrison RN  Outcome: Progressing     Problem: RESPIRATORY - ADULT  Goal: Achieves optim Monitor lab trends  - Patient is to report abnormal signs of bleeding to staff  - Avoid use of toothpicks and dental floss  - Use electric shaver for shaving  - Use soft bristle tooth brush  - Limit straining and forceful nose blowing  11/21/2021 1145 by TARIQ

## 2021-11-21 NOTE — PROGRESS NOTES
Progress Note  Brenda Vasquez Patient Status:  Inpatient    1928 MRN [id number]   [facility] 8NE-A Attending Adri Wong MD   Hosp Day # 2 PCP Esteban Lau MD     Subjective:  No complaints of chest pain or SOB    Objective: today.     Plan of care discussed with patient, RN.     KAMLA Lizarraga  11/21/2021  9:49 AM

## 2021-11-22 ENCOUNTER — TELEPHONE (OUTPATIENT)
Dept: INTERNAL MEDICINE CLINIC | Facility: CLINIC | Age: 86
End: 2021-11-22

## 2021-11-22 ENCOUNTER — PATIENT OUTREACH (OUTPATIENT)
Dept: CASE MANAGEMENT | Age: 86
End: 2021-11-22

## 2021-11-22 DIAGNOSIS — I48.91 ATRIAL FIBRILLATION, UNSPECIFIED TYPE (HCC): ICD-10-CM

## 2021-11-22 DIAGNOSIS — I10 ESSENTIAL HYPERTENSION: ICD-10-CM

## 2021-11-22 DIAGNOSIS — Z02.9 ENCOUNTERS FOR UNSPECIFIED ADMINISTRATIVE PURPOSE: ICD-10-CM

## 2021-11-22 PROCEDURE — 1111F DSCHRG MED/CURRENT MED MERGE: CPT

## 2021-11-22 NOTE — PROGRESS NOTES
Initial Post Discharge Follow Up   Discharge Date: 11/21/21  Contact Date: 11/22/2021    Consent Verification:  Assessment Completed With: Patient  HIPAA Verified?   Yes    Discharge Dx:     Memo, S/P VIKKI, Watchman FLX device implant on 11/19/21    Was normal daily activities of living as you have prior to your hospital stay (dressing, bathing, ambulating to the bathroom, etc)?  yes  • (NCM) Was patient given a different diet per AVS? no      Medications:   Current Outpatient Medications   Medication Sig reasons that keep you from taking your medication as prescribed? No  Are you having any concerns with constipation? No    Referrals/orders at D/C:  Home Health/Services ordered at D/C? No     DME ordered at D/C? No    SDOH:  Transportation:  Transportation with any questions or needs, she states she will. CCM referral placed:  No, not at this time.     BOOK BY DATE: 12/4/2021    [x]  Discharge Summary, Discharge medications reviewed/discussed/and reconciled against outpatient medications with patient,  and

## 2021-11-22 NOTE — TELEPHONE ENCOUNTER
Spoke to pt for TCM today. Pt does not have HFU appt scheduled at this time. TCM/HFU appt recommended by 11/27/2021 as pt is at High risk for readmission, per lace score.       NCM attempted to schedule a TCM HFU, patient states she will call herself to s

## 2021-11-23 NOTE — TELEPHONE ENCOUNTER
Patient is requesting an appointment on a Tuesday. Patient is unable to come in for an appointment on Fridays. Please advise.  Thank you

## 2021-11-26 NOTE — DISCHARGE SUMMARY
[facility]      Discharge Summary    Dez Montenegro Patient Status:  Inpatient    1928 MRN [id number]   [facility] 8NE-A Attending No att. providers found   Hosp Day # 2 PCP Jose Peralta MD     Date of Admission: 2021 AC due to [address]  · HTN  · NICM, LVEF 45%    Procedures:   Watchman Flx device implant 22/88/52    Complications: none    Disposition: Home or Self Care    Discharge Condition: Good    Discharge Medications: Discharge Medication List as of 11/21/2021 11:50 AM

## 2021-11-30 NOTE — TELEPHONE ENCOUNTER
I called the patient and attempted earlier appointment but did not fit into her schedule. She stated feels good. She will keep the 12/10/21 appointment.

## 2021-12-02 ENCOUNTER — APPOINTMENT (OUTPATIENT)
Dept: URBAN - METROPOLITAN AREA CLINIC 321 | Age: 86
Setting detail: DERMATOLOGY
End: 2021-12-02

## 2021-12-02 DIAGNOSIS — D22 MELANOCYTIC NEVI: ICD-10-CM

## 2021-12-02 DIAGNOSIS — L57.0 ACTINIC KERATOSIS: ICD-10-CM

## 2021-12-02 DIAGNOSIS — L81.4 OTHER MELANIN HYPERPIGMENTATION: ICD-10-CM

## 2021-12-02 DIAGNOSIS — L82.1 OTHER SEBORRHEIC KERATOSIS: ICD-10-CM

## 2021-12-02 PROBLEM — D22.39 MELANOCYTIC NEVI OF OTHER PARTS OF FACE: Status: ACTIVE | Noted: 2021-12-02

## 2021-12-02 PROBLEM — D22.5 MELANOCYTIC NEVI OF TRUNK: Status: ACTIVE | Noted: 2021-12-02

## 2021-12-02 PROCEDURE — 17000 DESTRUCT PREMALG LESION: CPT

## 2021-12-02 PROCEDURE — 17003 DESTRUCT PREMALG LES 2-14: CPT

## 2021-12-02 PROCEDURE — 99213 OFFICE O/P EST LOW 20 MIN: CPT | Mod: 25

## 2021-12-02 PROCEDURE — OTHER COUNSELING: OTHER

## 2021-12-02 PROCEDURE — OTHER LIQUID NITROGEN: OTHER

## 2021-12-02 ASSESSMENT — LOCATION DETAILED DESCRIPTION DERM
LOCATION DETAILED: LEFT CENTRAL MALAR CHEEK
LOCATION DETAILED: LEFT ULNAR DORSAL HAND
LOCATION DETAILED: RIGHT LATERAL MALAR CHEEK
LOCATION DETAILED: RIGHT RADIAL DORSAL HAND
LOCATION DETAILED: LEFT RADIAL DORSAL HAND
LOCATION DETAILED: RIGHT INFERIOR MEDIAL FOREHEAD
LOCATION DETAILED: RIGHT INFERIOR MEDIAL MALAR CHEEK
LOCATION DETAILED: UPPER STERNUM
LOCATION DETAILED: RIGHT INFERIOR CENTRAL MALAR CHEEK

## 2021-12-02 ASSESSMENT — LOCATION SIMPLE DESCRIPTION DERM
LOCATION SIMPLE: RIGHT CHEEK
LOCATION SIMPLE: RIGHT FOREHEAD
LOCATION SIMPLE: LEFT HAND
LOCATION SIMPLE: RIGHT HAND
LOCATION SIMPLE: LEFT CHEEK
LOCATION SIMPLE: CHEST

## 2021-12-02 ASSESSMENT — LOCATION ZONE DERM
LOCATION ZONE: FACE
LOCATION ZONE: TRUNK
LOCATION ZONE: HAND

## 2021-12-02 NOTE — PROCEDURE: LIQUID NITROGEN
Duration Of Freeze Thaw-Cycle (Seconds): 0
Render In Bullet Format When Appropriate: No
Detail Level: Zone
Post-Care Instructions: I reviewed with the patient in detail post-care instructions. Patient is to wear sunprotection, and avoid picking at any of the treated lesions. Pt may apply Vaseline to crusted or scabbing areas.
Consent: The patient's consent was obtained including but not limited to risks of crusting, scabbing, blistering, scarring, darker or lighter pigmentary change, recurrence, incomplete removal and infection.
Total Number Of Aks Treated: 5

## 2021-12-07 ENCOUNTER — TELEPHONE (OUTPATIENT)
Dept: CARDIOLOGY CLINIC | Facility: HOSPITAL | Age: 86
End: 2021-12-07

## 2021-12-10 ENCOUNTER — NURSE TRIAGE (OUTPATIENT)
Dept: INTERNAL MEDICINE CLINIC | Facility: CLINIC | Age: 86
End: 2021-12-10

## 2021-12-10 NOTE — TELEPHONE ENCOUNTER
please see Colleen La RN message below. Pt called again to know the status of her message from earlier to day. Pt was advise that  is seeing patients at present moment.  Pt verbalized understanding and she will wait for  to ca

## 2021-12-10 NOTE — TELEPHONE ENCOUNTER
Action Requested: Summary for Provider     []  Critical Lab, Recommendations Needed  [] Need Additional Advice  []   FYI    []   Need Orders  [] Need Medications Sent to Pharmacy  []  Other     SUMMARY: pt asking doctor's recommendations for symptom of loo

## 2021-12-11 NOTE — TELEPHONE ENCOUNTER
I agree with triage advise given    Noted no diarrhea or stool yhesterday  Advise pt to take metamucil daily as directed OTC    If diarrhea worsen or persist need to be seen  Let us know right away  Go to ER if worse

## 2021-12-14 NOTE — TELEPHONE ENCOUNTER
RN - when pt/daughters call, please triage and advise ER if appropriate per Dr. Jv Kebede message below.  Thank you      Received call from Nurse with Dr. Marlin Ribera     Pt was at Cardiology office today, reporting to them that her GI symptoms pers

## 2021-12-14 NOTE — TELEPHONE ENCOUNTER
Appointment made for 12/16/21 for follow up appointment. The patient called back and stated her stools are going back to liquid stool. She has been having 3 loose semi formed brown stools a day for weeks. Relayed Dr. Loan Celeste message below.

## 2021-12-16 ENCOUNTER — OFFICE VISIT (OUTPATIENT)
Dept: INTERNAL MEDICINE CLINIC | Facility: CLINIC | Age: 86
End: 2021-12-16
Payer: MEDICARE

## 2021-12-16 VITALS
WEIGHT: 112 LBS | HEIGHT: 63 IN | BODY MASS INDEX: 19.84 KG/M2 | HEART RATE: 73 BPM | SYSTOLIC BLOOD PRESSURE: 138 MMHG | DIASTOLIC BLOOD PRESSURE: 74 MMHG | OXYGEN SATURATION: 97 % | TEMPERATURE: 97 F

## 2021-12-16 DIAGNOSIS — I48.91 ATRIAL FIBRILLATION AND FLUTTER (HCC): ICD-10-CM

## 2021-12-16 DIAGNOSIS — R19.7 DIARRHEA, UNSPECIFIED TYPE: Primary | ICD-10-CM

## 2021-12-16 DIAGNOSIS — E03.9 HYPOTHYROIDISM, UNSPECIFIED TYPE: ICD-10-CM

## 2021-12-16 DIAGNOSIS — I48.92 ATRIAL FIBRILLATION AND FLUTTER (HCC): ICD-10-CM

## 2021-12-16 PROCEDURE — 99214 OFFICE O/P EST MOD 30 MIN: CPT | Performed by: INTERNAL MEDICINE

## 2021-12-16 RX ORDER — SACCHAROMYCES BOULARDII 250 MG
250 CAPSULE ORAL 2 TIMES DAILY
Qty: 14 CAPSULE | Refills: 0 | Status: SHIPPED | OUTPATIENT
Start: 2021-12-16 | End: 2022-01-01

## 2021-12-16 NOTE — PROGRESS NOTES
Subjective:   Patient ID: Payton Iglesias is a 80year old female.   Patient presents with:  Diarrhea: C/o diarrhea and loose stools for about a month that started after she had a watchman's procedure done on 11/19/21    Patient in office today for diarr D3, Cholecalciferol, 25 MCG (1000 UT) Oral Tab Take 1,000 Units by mouth daily. • metoprolol succinate 25 MG Oral Tablet 24 Hr Take 0.5 tablets (12.5 mg total) by mouth 2 (two) times daily.  90 tablet 1   • LEVOTHYROXINE SODIUM 100 MCG Oral Tab TAKE 1 T Thyroid: No thyromegaly. Vascular: No JVD. Cardiovascular:      Rate and Rhythm: Normal rate. Rhythm irregularly irregular. Heart sounds: Murmur heard. Pulmonary:      Effort: Pulmonary effort is normal. No respiratory distress.       [facility]   • saccharomyces boulardii (FLORASTOR) 250 MG Oral Cap 14 capsule 0     Sig: Take 1 capsule (250 mg total) by mouth 2 (two) times daily.        Imaging & Referrals:  None

## 2021-12-20 ENCOUNTER — TELEPHONE (OUTPATIENT)
Dept: INTERNAL MEDICINE CLINIC | Facility: CLINIC | Age: 86
End: 2021-12-20

## 2021-12-20 NOTE — TELEPHONE ENCOUNTER
Patient calling, confirmed name and . LOV  for diarrhea    Patient is 89y/o, does not drive, and lives in [address] [place] [address],[facility]: [phone].     Patient was instructed to start Florastor but has not because she has been u

## 2021-12-20 NOTE — TELEPHONE ENCOUNTER
The daughter  Estefania who is on HIPAA, called to ask about the containers for stools specimen. Instructed they need to be picked up from the lab and can be brought home for the patient.    Also instructed to obtain instructions when they are picked up as if

## 2021-12-21 ENCOUNTER — LAB ENCOUNTER (OUTPATIENT)
Dept: LAB | Facility: HOSPITAL | Age: 86
End: 2021-12-21
Attending: INTERNAL MEDICINE
Payer: MEDICARE

## 2021-12-21 DIAGNOSIS — R19.7 DIARRHEA, UNSPECIFIED TYPE: ICD-10-CM

## 2021-12-22 PROCEDURE — 87493 C DIFF AMPLIFIED PROBE: CPT

## 2021-12-28 NOTE — H&P
I saw and examined the patient and agree with the attached assessment and plan. I discussed the risks and benefits of the procedure at length and patient agreeable to proceed with post-watchman VIKKI.     Natan Crury MD  [address] [place] [address]

## 2021-12-31 ENCOUNTER — TELEPHONE (OUTPATIENT)
Dept: INTERNAL MEDICINE CLINIC | Facility: CLINIC | Age: 86
End: 2021-12-31

## 2021-12-31 DIAGNOSIS — R19.5 LOOSE STOOLS: Primary | ICD-10-CM

## 2021-12-31 NOTE — TELEPHONE ENCOUNTER
Pt calling to request refill of Florastor. Per pt had OV 12/16/21 visit with Dr Rory Warren for diarrhea. Pt states the diarrhea started to resolve with the Florastor but returned x 2 days ago.   Per pt it only lasted for that day and it was liquid sto

## 2022-01-01 RX ORDER — SACCHAROMYCES BOULARDII 250 MG
250 CAPSULE ORAL 2 TIMES DAILY
Qty: 14 CAPSULE | Refills: 0 | Status: SHIPPED | OUTPATIENT
Start: 2022-01-01

## 2022-01-01 NOTE — TELEPHONE ENCOUNTER
1. Radhastor is OTC, can someone in her family pick this up for her? Signed the order.    2. Please have her ARNAUD check on the lab issue, there are orders from Dr Cristian Landrum, not sure why lab told her not to complete them, its actually more important to complete t

## 2022-01-02 NOTE — TELEPHONE ENCOUNTER
Noted , agreed with advice given   Script was sent  Recommend pt  If still has diarrhea present   pt  to complete labs and  stool testing .

## 2022-01-03 ENCOUNTER — LAB ENCOUNTER (OUTPATIENT)
Dept: LAB | Facility: HOSPITAL | Age: 87
End: 2022-01-03
Attending: STUDENT IN AN ORGANIZED HEALTH CARE EDUCATION/TRAINING PROGRAM
Payer: MEDICARE

## 2022-01-03 DIAGNOSIS — Z01.818 PRE-OP TESTING: ICD-10-CM

## 2022-01-03 LAB
DEPRECATED RDW RBC AUTO: 47.6 FL (ref 35.1–46.3)
ERYTHROCYTE [DISTWIDTH] IN BLOOD BY AUTOMATED COUNT: 13.7 % (ref 11–15)
HCT VFR BLD AUTO: 35.5 %
HGB BLD-MCNC: 11.4 G/DL
MCH RBC QN AUTO: 30.3 PG (ref 26–34)
MCHC RBC AUTO-ENTMCNC: 32.1 G/DL (ref 31–37)
MCV RBC AUTO: 94.4 FL
PLATELET # BLD AUTO: 297 10(3)UL (ref 150–450)
RBC # BLD AUTO: 3.76 X10(6)UL
SARS-COV-2 RNA RESP QL NAA+PROBE: NOT DETECTED
WBC # BLD AUTO: 8.7 X10(3) UL (ref 4–11)

## 2022-01-03 PROCEDURE — 36415 COLL VENOUS BLD VENIPUNCTURE: CPT

## 2022-01-03 PROCEDURE — 85027 COMPLETE CBC AUTOMATED: CPT

## 2022-01-04 ENCOUNTER — HOSPITAL ENCOUNTER (OUTPATIENT)
Dept: INTERVENTIONAL RADIOLOGY/VASCULAR | Facility: HOSPITAL | Age: 87
Discharge: HOME | End: 2022-01-04
Attending: INTERNAL MEDICINE | Admitting: INTERNAL MEDICINE
Payer: MEDICARE

## 2022-01-04 ENCOUNTER — HOSPITAL ENCOUNTER (OUTPATIENT)
Dept: CV DIAGNOSTICS | Facility: HOSPITAL | Age: 87
Discharge: HOME | End: 2022-01-04
Attending: INTERNAL MEDICINE
Payer: MEDICARE

## 2022-01-04 VITALS
RESPIRATION RATE: 24 BRPM | TEMPERATURE: 100 F | OXYGEN SATURATION: 91 % | BODY MASS INDEX: 20 KG/M2 | HEART RATE: 83 BPM | WEIGHT: 112 LBS | SYSTOLIC BLOOD PRESSURE: 111 MMHG | DIASTOLIC BLOOD PRESSURE: 65 MMHG

## 2022-01-04 DIAGNOSIS — Z95.818 PRESENCE OF WATCHMAN LEFT ATRIAL APPENDAGE CLOSURE DEVICE: ICD-10-CM

## 2022-01-04 DIAGNOSIS — Z01.818 PRE-OP TESTING: Primary | ICD-10-CM

## 2022-01-04 DIAGNOSIS — I48.91 A-FIB (HCC): ICD-10-CM

## 2022-01-04 PROCEDURE — 99152 MOD SED SAME PHYS/QHP 5/>YRS: CPT

## 2022-01-04 PROCEDURE — 36415 COLL VENOUS BLD VENIPUNCTURE: CPT

## 2022-01-04 PROCEDURE — B24BZZ4 ULTRASONOGRAPHY OF HEART WITH AORTA, TRANSESOPHAGEAL: ICD-10-PCS | Performed by: STUDENT IN AN ORGANIZED HEALTH CARE EDUCATION/TRAINING PROGRAM

## 2022-01-04 PROCEDURE — 93312 ECHO TRANSESOPHAGEAL: CPT

## 2022-01-04 PROCEDURE — 93320 DOPPLER ECHO COMPLETE: CPT | Performed by: INTERNAL MEDICINE

## 2022-01-04 PROCEDURE — 93325 DOPPLER ECHO COLOR FLOW MAPG: CPT | Performed by: INTERNAL MEDICINE

## 2022-01-04 PROCEDURE — B24CZZ4 ULTRASONOGRAPHY OF PERICARDIUM, TRANSESOPHAGEAL: ICD-10-PCS | Performed by: STUDENT IN AN ORGANIZED HEALTH CARE EDUCATION/TRAINING PROGRAM

## 2022-01-04 PROCEDURE — B246ZZ4 ULTRASONOGRAPHY OF RIGHT AND LEFT HEART, TRANSESOPHAGEAL: ICD-10-PCS | Performed by: STUDENT IN AN ORGANIZED HEALTH CARE EDUCATION/TRAINING PROGRAM

## 2022-01-04 PROCEDURE — 99153 MOD SED SAME PHYS/QHP EA: CPT

## 2022-01-04 RX ORDER — SODIUM CHLORIDE 9 MG/ML
INJECTION, SOLUTION INTRAVENOUS
Status: DISCONTINUED | OUTPATIENT
Start: 2022-01-04 | End: 2022-01-04

## 2022-01-04 RX ORDER — MIDAZOLAM HYDROCHLORIDE 1 MG/ML
INJECTION INTRAMUSCULAR; INTRAVENOUS
Status: COMPLETED
Start: 2022-01-04 | End: 2022-01-04

## 2022-01-04 RX ORDER — SODIUM CHLORIDE 0.9 % (FLUSH) 0.9 %
10 SYRINGE (ML) INJECTION AS NEEDED
Status: DISCONTINUED | OUTPATIENT
Start: 2022-01-04 | End: 2022-01-04

## 2022-01-04 RX ORDER — LIDOCAINE HYDROCHLORIDE 20 MG/ML
SOLUTION OROPHARYNGEAL
Status: COMPLETED
Start: 2022-01-04 | End: 2022-01-04

## 2022-01-04 RX ADMIN — LIDOCAINE HYDROCHLORIDE 15 ML: 20 SOLUTION OROPHARYNGEAL at 09:05:00

## 2022-01-04 RX ADMIN — MIDAZOLAM HYDROCHLORIDE 1 MG: 1 INJECTION INTRAMUSCULAR; INTRAVENOUS at 09:00:00

## 2022-01-04 NOTE — IVS NOTE
Pt is able to sit up and ambulate without difficulty. Pt voided and tolerated fluids and food. Instruction provided, patient/family verbalizes understanding. Dr. Tyler Zuleta spoke with patient/family post procedure.

## 2022-01-04 NOTE — TELEPHONE ENCOUNTER
Patient contacted. States she is feeling a little better. Her last loose BM was yesterday. She received her florastor and is monitoring her symptoms. I advised her to  the collection kit and complete the stool tests.   She verbalized understandin

## 2022-01-24 ENCOUNTER — TELEPHONE (OUTPATIENT)
Dept: INTERNAL MEDICINE CLINIC | Facility: CLINIC | Age: 87
End: 2022-01-24

## 2022-01-24 RX ORDER — TRIAMCINOLONE ACETONIDE 1 MG/G
CREAM TOPICAL 2 TIMES DAILY
Qty: 1 G | Refills: 0 | OUTPATIENT
Start: 2022-01-24

## 2022-01-24 NOTE — TELEPHONE ENCOUNTER
Per medication record, this medication was ordered on 7/21/2020.,  Not MyChart active . RN=please call and triage.

## 2022-02-01 ENCOUNTER — OFFICE VISIT (OUTPATIENT)
Dept: INTERNAL MEDICINE CLINIC | Facility: CLINIC | Age: 87
End: 2022-02-01
Payer: MEDICARE

## 2022-02-01 VITALS
HEART RATE: 80 BPM | SYSTOLIC BLOOD PRESSURE: 157 MMHG | HEIGHT: 63 IN | BODY MASS INDEX: 19.67 KG/M2 | WEIGHT: 111 LBS | DIASTOLIC BLOOD PRESSURE: 81 MMHG

## 2022-02-01 DIAGNOSIS — N18.30 STAGE 3 CHRONIC KIDNEY DISEASE, UNSPECIFIED WHETHER STAGE 3A OR 3B CKD (HCC): ICD-10-CM

## 2022-02-01 DIAGNOSIS — Z95.818 PRESENCE OF WATCHMAN LEFT ATRIAL APPENDAGE CLOSURE DEVICE: ICD-10-CM

## 2022-02-01 DIAGNOSIS — H91.90 HEARING LOSS, UNSPECIFIED HEARING LOSS TYPE, UNSPECIFIED LATERALITY: ICD-10-CM

## 2022-02-01 DIAGNOSIS — I10 ESSENTIAL HYPERTENSION: Primary | ICD-10-CM

## 2022-02-01 DIAGNOSIS — E03.9 HYPOTHYROIDISM, UNSPECIFIED TYPE: ICD-10-CM

## 2022-02-01 DIAGNOSIS — I73.9 PAD (PERIPHERAL ARTERY DISEASE) (HCC): ICD-10-CM

## 2022-02-01 DIAGNOSIS — I25.10 ATHEROSCLEROSIS OF NATIVE CORONARY ARTERY OF NATIVE HEART WITHOUT ANGINA PECTORIS: ICD-10-CM

## 2022-02-01 PROBLEM — R19.7 DIARRHEA: Status: RESOLVED | Noted: 2021-12-16 | Resolved: 2022-02-01

## 2022-02-01 PROBLEM — S06.361A: Status: RESOLVED | Noted: 2021-06-11 | Resolved: 2022-02-01

## 2022-02-01 PROBLEM — S06.5XAA ACUTE SUBDURAL HEMATOMA (HCC): Status: RESOLVED | Noted: 2021-06-11 | Resolved: 2022-02-01

## 2022-02-01 PROBLEM — S02.119A CLOSED FRACTURE OF LEFT SIDE OF OCCIPITAL BONE, UNSPECIFIED OCCIPITAL FRACTURE TYPE, INITIAL ENCOUNTER (HCC): Status: RESOLVED | Noted: 2021-06-11 | Resolved: 2022-02-01

## 2022-02-01 PROBLEM — I34.1 MITRAL VALVE PROLAPSE: Status: RESOLVED | Noted: 2019-06-26 | Resolved: 2022-02-01

## 2022-02-01 PROBLEM — S06.331A: Status: RESOLVED | Noted: 2021-06-11 | Resolved: 2022-02-01

## 2022-02-01 PROBLEM — S06.5XAA ACUTE SUBDURAL HEMATOMA: Status: RESOLVED | Noted: 2021-06-11 | Resolved: 2022-02-01

## 2022-02-01 PROBLEM — S06.5X9A ACUTE SUBDURAL HEMATOMA (HCC): Status: RESOLVED | Noted: 2021-06-11 | Resolved: 2022-02-01

## 2022-02-01 PROBLEM — I60.9 SUBARACHNOID HEMORRHAGE (HCC): Status: RESOLVED | Noted: 2021-06-11 | Resolved: 2022-02-01

## 2022-02-01 PROBLEM — S06.5X9A ACUTE SUBDURAL HEMATOMA: Status: RESOLVED | Noted: 2021-06-11 | Resolved: 2022-02-01

## 2022-02-01 PROCEDURE — 99214 OFFICE O/P EST MOD 30 MIN: CPT | Performed by: INTERNAL MEDICINE

## 2022-02-01 PROCEDURE — 1111F DSCHRG MED/CURRENT MED MERGE: CPT | Performed by: INTERNAL MEDICINE

## 2022-03-01 ENCOUNTER — TELEPHONE (OUTPATIENT)
Dept: INTERNAL MEDICINE CLINIC | Facility: CLINIC | Age: 87
End: 2022-03-01

## 2022-03-07 ENCOUNTER — HOSPITAL ENCOUNTER (INPATIENT)
Facility: HOSPITAL | Age: 87
LOS: 2 days | Discharge: HOME HEALTH | DRG: 281 | End: 2022-03-09
Attending: EMERGENCY MEDICINE | Admitting: HOSPITALIST
Payer: MEDICARE

## 2022-03-07 ENCOUNTER — APPOINTMENT (OUTPATIENT)
Dept: CT IMAGING | Facility: HOSPITAL | Age: 87
DRG: 281 | End: 2022-03-07
Attending: EMERGENCY MEDICINE
Payer: MEDICARE

## 2022-03-07 ENCOUNTER — APPOINTMENT (OUTPATIENT)
Dept: GENERAL RADIOLOGY | Facility: HOSPITAL | Age: 87
DRG: 281 | End: 2022-03-07
Attending: EMERGENCY MEDICINE
Payer: MEDICARE

## 2022-03-07 ENCOUNTER — APPOINTMENT (OUTPATIENT)
Dept: CV DIAGNOSTICS | Facility: HOSPITAL | Age: 87
DRG: 281 | End: 2022-03-07
Attending: INTERNAL MEDICINE
Payer: MEDICARE

## 2022-03-07 ENCOUNTER — APPOINTMENT (OUTPATIENT)
Dept: INTERVENTIONAL RADIOLOGY/VASCULAR | Facility: HOSPITAL | Age: 87
DRG: 281 | End: 2022-03-07
Payer: MEDICARE

## 2022-03-07 DIAGNOSIS — I21.4 NSTEMI (NON-ST ELEVATED MYOCARDIAL INFARCTION) (HCC): ICD-10-CM

## 2022-03-07 DIAGNOSIS — R07.9 ACUTE CHEST PAIN: Primary | ICD-10-CM

## 2022-03-07 LAB
ADENOVIRUS PCR:: NOT DETECTED
ANION GAP SERPL CALC-SCNC: 7 MMOL/L (ref 0–18)
APTT PPP: 102.7 SECONDS (ref 23.3–35.6)
APTT PPP: 25.5 SECONDS (ref 23.3–35.6)
B PARAPERT DNA SPEC QL NAA+PROBE: NOT DETECTED
B PERT DNA SPEC QL NAA+PROBE: NOT DETECTED
BASOPHILS # BLD AUTO: 0.03 X10(3) UL (ref 0–0.2)
BASOPHILS NFR BLD AUTO: 0.5 %
BUN BLD-MCNC: 22 MG/DL (ref 7–18)
BUN/CREAT SERPL: 25.9 (ref 10–20)
C PNEUM DNA SPEC QL NAA+PROBE: NOT DETECTED
CALCIUM BLD-MCNC: 9.3 MG/DL (ref 8.5–10.1)
CHLORIDE SERPL-SCNC: 100 MMOL/L (ref 98–112)
CHOLEST SERPL-MCNC: 131 MG/DL (ref ?–200)
CO2 SERPL-SCNC: 30 MMOL/L (ref 21–32)
CORONAVIRUS 229E PCR:: NOT DETECTED
CORONAVIRUS HKU1 PCR:: NOT DETECTED
CORONAVIRUS NL63 PCR:: NOT DETECTED
CORONAVIRUS OC43 PCR:: NOT DETECTED
CREAT BLD-MCNC: 0.85 MG/DL
D DIMER PPP FEU-MCNC: 2.23 UG/ML FEU (ref ?–0.94)
DEPRECATED RDW RBC AUTO: 54.8 FL (ref 35.1–46.3)
EOSINOPHIL # BLD AUTO: 0.15 X10(3) UL (ref 0–0.7)
EOSINOPHIL NFR BLD AUTO: 2.7 %
ERYTHROCYTE [DISTWIDTH] IN BLOOD BY AUTOMATED COUNT: 15.1 % (ref 11–15)
FLUAV RNA SPEC QL NAA+PROBE: NOT DETECTED
FLUBV RNA SPEC QL NAA+PROBE: NOT DETECTED
GLUCOSE BLD-MCNC: 170 MG/DL (ref 70–99)
HCT VFR BLD AUTO: 39.2 %
HDLC SERPL-MCNC: 73 MG/DL (ref 40–59)
HGB BLD-MCNC: 12.1 G/DL
IMM GRANULOCYTES # BLD AUTO: 0.01 X10(3) UL (ref 0–1)
IMM GRANULOCYTES NFR BLD: 0.2 %
LDLC SERPL CALC-MCNC: 45 MG/DL (ref ?–100)
LYMPHOCYTES # BLD AUTO: 1.35 X10(3) UL (ref 1–4)
LYMPHOCYTES NFR BLD AUTO: 24.2 %
MCH RBC QN AUTO: 30.2 PG (ref 26–34)
MCHC RBC AUTO-ENTMCNC: 30.9 G/DL (ref 31–37)
MCV RBC AUTO: 97.8 FL
METAPNEUMOVIRUS PCR:: NOT DETECTED
MONOCYTES # BLD AUTO: 0.44 X10(3) UL (ref 0.1–1)
MRSA DNA SPEC QL NAA+PROBE: NEGATIVE
MYCOPLASMA PNEUMONIA PCR:: NOT DETECTED
NEUTROPHILS # BLD AUTO: 3.59 X10 (3) UL (ref 1.5–7.7)
NEUTROPHILS # BLD AUTO: 3.59 X10(3) UL (ref 1.5–7.7)
NEUTROPHILS NFR BLD AUTO: 64.5 %
NONHDLC SERPL-MCNC: 58 MG/DL (ref ?–130)
NT-PROBNP SERPL-MCNC: 2462 PG/ML (ref ?–450)
OSMOLALITY SERPL CALC.SUM OF ELEC: 291 MOSM/KG (ref 275–295)
PARAINFLUENZA 1 PCR:: NOT DETECTED
PARAINFLUENZA 2 PCR:: NOT DETECTED
PARAINFLUENZA 3 PCR:: NOT DETECTED
PARAINFLUENZA 4 PCR:: NOT DETECTED
PLATELET # BLD AUTO: 189 10(3)UL (ref 150–450)
POTASSIUM SERPL-SCNC: 4.1 MMOL/L (ref 3.5–5.1)
RBC # BLD AUTO: 4.01 X10(6)UL
RHINOVIRUS/ENTERO PCR:: NOT DETECTED
RSV RNA SPEC QL NAA+PROBE: NOT DETECTED
SARS-COV-2 RNA NPH QL NAA+NON-PROBE: NOT DETECTED
SARS-COV-2 RNA RESP QL NAA+PROBE: NOT DETECTED
SODIUM SERPL-SCNC: 137 MMOL/L (ref 136–145)
TRIGL SERPL-MCNC: 58 MG/DL (ref 30–149)
TROPONIN I HIGH SENSITIVITY: 23 NG/L
TROPONIN I HIGH SENSITIVITY: 330 NG/L
TROPONIN I HIGH SENSITIVITY: ABNORMAL NG/L
VLDLC SERPL CALC-MCNC: 8 MG/DL (ref 0–30)
WBC # BLD AUTO: 5.6 X10(3) UL (ref 4–11)

## 2022-03-07 PROCEDURE — 93306 TTE W/DOPPLER COMPLETE: CPT | Performed by: INTERNAL MEDICINE

## 2022-03-07 PROCEDURE — 71260 CT THORAX DX C+: CPT | Performed by: EMERGENCY MEDICINE

## 2022-03-07 PROCEDURE — 99222 1ST HOSP IP/OBS MODERATE 55: CPT | Performed by: HOSPITALIST

## 2022-03-07 PROCEDURE — B2131ZZ FLUOROSCOPY OF MULTIPLE CORONARY ARTERY BYPASS GRAFTS USING LOW OSMOLAR CONTRAST: ICD-10-PCS | Performed by: INTERNAL MEDICINE

## 2022-03-07 PROCEDURE — 71045 X-RAY EXAM CHEST 1 VIEW: CPT | Performed by: EMERGENCY MEDICINE

## 2022-03-07 PROCEDURE — 4A023N7 MEASUREMENT OF CARDIAC SAMPLING AND PRESSURE, LEFT HEART, PERCUTANEOUS APPROACH: ICD-10-PCS | Performed by: INTERNAL MEDICINE

## 2022-03-07 RX ORDER — CHLORHEXIDINE GLUCONATE 4 G/100ML
30 SOLUTION TOPICAL
Status: DISCONTINUED | OUTPATIENT
Start: 2022-03-08 | End: 2022-03-08

## 2022-03-07 RX ORDER — ASPIRIN 81 MG/1
324 TABLET, CHEWABLE ORAL ONCE
Status: DISCONTINUED | OUTPATIENT
Start: 2022-03-07 | End: 2022-03-09

## 2022-03-07 RX ORDER — HEPARIN SODIUM 1000 [USP'U]/ML
60 INJECTION, SOLUTION INTRAVENOUS; SUBCUTANEOUS ONCE
Status: COMPLETED | OUTPATIENT
Start: 2022-03-07 | End: 2022-03-07

## 2022-03-07 RX ORDER — ONDANSETRON 2 MG/ML
4 INJECTION INTRAMUSCULAR; INTRAVENOUS EVERY 6 HOURS PRN
Status: DISCONTINUED | OUTPATIENT
Start: 2022-03-07 | End: 2022-03-09

## 2022-03-07 RX ORDER — LEVOTHYROXINE SODIUM 0.1 MG/1
100 TABLET ORAL
Status: DISCONTINUED | OUTPATIENT
Start: 2022-03-07 | End: 2022-03-09

## 2022-03-07 RX ORDER — ROSUVASTATIN CALCIUM 20 MG/1
20 TABLET, COATED ORAL NIGHTLY
Status: DISCONTINUED | OUTPATIENT
Start: 2022-03-07 | End: 2022-03-09

## 2022-03-07 RX ORDER — HEPARIN SODIUM AND DEXTROSE 10000; 5 [USP'U]/100ML; G/100ML
12 INJECTION INTRAVENOUS ONCE
Status: COMPLETED | OUTPATIENT
Start: 2022-03-07 | End: 2022-03-07

## 2022-03-07 RX ORDER — SODIUM CHLORIDE 9 MG/ML
INJECTION, SOLUTION INTRAVENOUS
Status: DISCONTINUED | OUTPATIENT
Start: 2022-03-08 | End: 2022-03-08

## 2022-03-07 RX ORDER — CLOPIDOGREL BISULFATE 75 MG/1
75 TABLET ORAL DAILY
Status: DISCONTINUED | OUTPATIENT
Start: 2022-03-07 | End: 2022-03-09

## 2022-03-07 RX ORDER — MIDAZOLAM HYDROCHLORIDE 1 MG/ML
INJECTION INTRAMUSCULAR; INTRAVENOUS
Status: DISCONTINUED
Start: 2022-03-07 | End: 2022-03-07 | Stop reason: WASHOUT

## 2022-03-07 RX ORDER — ACETAMINOPHEN 500 MG
500 TABLET ORAL EVERY 6 HOURS PRN
Status: DISCONTINUED | OUTPATIENT
Start: 2022-03-07 | End: 2022-03-09

## 2022-03-07 RX ORDER — NITROGLYCERIN 0.4 MG/1
0.4 TABLET SUBLINGUAL EVERY 5 MIN PRN
Status: DISCONTINUED | OUTPATIENT
Start: 2022-03-07 | End: 2022-03-09

## 2022-03-07 RX ORDER — DIPHENHYDRAMINE HYDROCHLORIDE 50 MG/ML
INJECTION INTRAMUSCULAR; INTRAVENOUS
Status: COMPLETED
Start: 2022-03-07 | End: 2022-03-07

## 2022-03-07 RX ORDER — METOCLOPRAMIDE HYDROCHLORIDE 5 MG/ML
5 INJECTION INTRAMUSCULAR; INTRAVENOUS EVERY 8 HOURS PRN
Status: DISCONTINUED | OUTPATIENT
Start: 2022-03-07 | End: 2022-03-09

## 2022-03-07 RX ORDER — LIDOCAINE HYDROCHLORIDE 20 MG/ML
INJECTION, SOLUTION EPIDURAL; INFILTRATION; INTRACAUDAL; PERINEURAL
Status: COMPLETED
Start: 2022-03-07 | End: 2022-03-07

## 2022-03-07 RX ORDER — HEPARIN SODIUM AND DEXTROSE 10000; 5 [USP'U]/100ML; G/100ML
INJECTION INTRAVENOUS CONTINUOUS
Status: DISCONTINUED | OUTPATIENT
Start: 2022-03-07 | End: 2022-03-07

## 2022-03-07 RX ORDER — ONDANSETRON 2 MG/ML
4 INJECTION INTRAMUSCULAR; INTRAVENOUS ONCE
Status: COMPLETED | OUTPATIENT
Start: 2022-03-07 | End: 2022-03-07

## 2022-03-07 RX ORDER — MELATONIN
1
Status: DISCONTINUED | OUTPATIENT
Start: 2022-03-07 | End: 2022-03-09

## 2022-03-07 RX ORDER — MELATONIN
1000 DAILY
Status: DISCONTINUED | OUTPATIENT
Start: 2022-03-07 | End: 2022-03-09

## 2022-03-07 RX ORDER — ASCORBIC ACID 500 MG
500 TABLET ORAL DAILY
Status: DISCONTINUED | OUTPATIENT
Start: 2022-03-07 | End: 2022-03-09

## 2022-03-07 RX ORDER — DOCUSATE SODIUM 100 MG/1
100 CAPSULE, LIQUID FILLED ORAL 2 TIMES DAILY PRN
Status: DISCONTINUED | OUTPATIENT
Start: 2022-03-07 | End: 2022-03-09

## 2022-03-07 RX ORDER — GARLIC EXTRACT 500 MG
1 CAPSULE ORAL 2 TIMES DAILY
Refills: 0 | Status: DISCONTINUED | OUTPATIENT
Start: 2022-03-07 | End: 2022-03-09

## 2022-03-07 RX ORDER — PANTOPRAZOLE SODIUM 40 MG/1
40 TABLET, DELAYED RELEASE ORAL
Status: DISCONTINUED | OUTPATIENT
Start: 2022-03-07 | End: 2022-03-09

## 2022-03-07 NOTE — CONSULTS
[facility]    PATIENT'S NAME: Betty Jones   ATTENDING PHYSICIAN: Polo Decker MD   CONSULTING PHYSICIAN: Zack Hendricks. Arnav Nevarez MD   PATIENT ACCOUNT#:   [id number]    LOCATION:  [address] [place] [address] #:   [id number]       YOB: 1928  ADMISSION DATE:       03/07/2022      CONSULT DATE:  03/07/2022    REPORT OF CONSULTATION      HISTORY OF PRESENT ILLNESS:  The patient is a very pleasant 60-year-old female who was getting ready for bed last night after taking a shower. She developed a severe pain/pressure in the midsternal area without radiation. It was rated 10 on a scale of 1 to 10. Sometime later she developed nausea and had several episodes of emesis both at home and on the way to the emergency room in the ambulance and once here in the emergency room. Her chest pain and nausea is now resolved. With the pain, she had mild sweats and mild shortness of breath. She has had no prior similar episodes. Pain is currently entirely resolved. PAST MEDICAL HISTORY:    1. Coronary artery disease. She underwent quadruple bypass surgery at the [facility] in [facility]. Cardiac catheterization done for unstable angina in 2012 at [facility] revealed the following: There was severe diffuse occlusive disease in all the native circulation. The LIMA to the LAD was occluded. The vein graft to the diagonal was widely patent and fed into the LAD as well. The vein graft to the RCA had 50% proximal disease. The culprit at that time was a sequential SVG to the first and second OM which had a 90% proximal stenosis. That graft was stented at that time with a 5 mm bare metal stent. She has done well since that time. A nuclear stress test from 2020 showed only a fixed anteroapical defect and an EF 72%. 2.   Fall in June 2021, resulting in subarachnoid hemorrhage. Anticoagulation was stopped at that time. 3.   Permanent atrial fibrillation.   She underwent a Watchman device implant in November 2021. Preop VIKKI for that procedure showed normal LV size and contractility. There was no significant valve disease. She does have mild aortic stenosis with a mean gradient of about 16 to 20.  4. Hypertension; dyslipidemia; hypothyroidism. MEDICATIONS:  Home medications:  Rosuvastatin 20 at bedtime, vitamin D, metoprolol succinate 12.5 b.i.d., levothyroxine 0.1 q.a.m., Plavix 75 q.a.m. ALLERGIES:  Reported to aspirin which caused swelling, penicillin, sulfa, Macrobid, Cipro, and topical iodine. SOCIAL HISTORY:  She is a nonsmoker, nondrinker. She is , has children. FAMILY HISTORY:  Positive for premature coronary disease in her father. REVIEW OF SYSTEMS:  Otherwise negative. PHYSICAL EXAMINATION:    GENERAL:  Well-developed, well-nourished female in no acute distress. Alert and oriented x3. VITAL SIGNS:  Blood pressure 139/93; respirations 18, unlabored; pulse 80, irregular; afebrile; saturation 93% on room air. HEENT:  Unremarkable. NECK:  Supple. There is increased jugular venous pressure. Carotids are brisk without bruits. No thyromegaly. LUNGS:  Mild basilar crackles. HEART:  S1, S2 normal.  Grade 2/6 holosystolic murmur. No S3. No rub. ABDOMEN:  Soft, nontender. No organomegaly, masses, bruits, or pulsations. EXTREMITIES:  Trace edema. No cyanosis or clubbing. Good distal perfusion. Femoral pulse 2+ with no bruit. LABORATORY DATA:  The initial troponin was 23. The 2-hour troponin was 330. The morning-draw troponin was 47,576. D-dimer was mildly elevated. BUN 22, creatinine 0.85, sodium 137, potassium 4.1, bicarb 30. White count 5.6, hemoglobin 12.1, platelets 616. Chest x-ray was unremarkable. CT of the chest reports no PE; there were several ground-glass density nodular opacities throughout both lungs; small hiatal hernia. ASSESSMENT:    1.    Presentation consistent with an acute myocardial infarction with chest pain and markedly elevated troponin. EKG shows lateral ST-T wave depression consistent with ischemia but not an ST-elevation myocardial infarction. These changes were also present in November 2021, however. Consider takotsubo cardiomyopathy, though there has been no physical or emotional stress in her life. 2.   Known coronary artery disease, status post coronary artery bypass graft in 1989 and percutaneous coronary intervention of the sequential graft to the first and second obtuse marginal in 2012. Has had no ischemic symptoms up to the current presentation. Left ventricular function historically normal.   3.   Permanent atrial fibrillation. Rate is controlled. Off anticoagulation because of her subarachnoid hemorrhage last June. Has Watchman in place. PLAN:    1. Heparin drip and continue medications as at home. 2.   Stat echocardiogram.  3.   Probable cardiac catheterization later today. 4.   I have put a call into her daughter to discuss the situation. Thank you for this consultation. Dictated By Neo Allen.  Landy Maloney MD  d: 03/07/2022 09:48:50  t: 03/07/2022 12:48:39  Job [id number]  [facility]/

## 2022-03-07 NOTE — PROGRESS NOTES
ADMISSION NOTE    80year old female with h/o CAD, A fib, A S, dyslipidemia  presents with chest pressure since 1 pm 3/7. Initial troponin negative second troponin positive ddimer elevated but no PE on CT. Abilio Josue Available medical records partially reviewed. Dictation to follow.     Fadia Meraz M.D.  3/7/2022

## 2022-03-07 NOTE — ED QUICK NOTES
Orders for admission, patient is aware of plan and ready to go upstairs. Any questions, please call ED RN Laura Memos at extension [phone].      Patient Covid vaccination status: Fully vaccinated     COVID Test Ordered in ED: Rapid SARS-CoV-2 by PCR    COVID Suspicion at Admission: Low clinical suspicion for COVID    Running Infusions:  Heparin gtt @ 6cc/hr    Mental Status/LOC at time of transport: A&Ox4    Other pertinent information:   CIWA score: N/A   NIH score:  N/A

## 2022-03-07 NOTE — ED INITIAL ASSESSMENT (HPI)
Patient arrives from [address] [place] [address] via [facility] with complaints of mid-chest pain that started at 1200. x1 nitroglycerin given en route. Patient took Tums for indigestion feeling. Patient was vomiting when EMS arrived. Hx Afib, no anticoagulants. Afib on the monitor per EMS. Hx Watchman's procedure in November 2021. Left arm precaution.

## 2022-03-07 NOTE — H&P
[facility]    PATIENT'S NAME: Robin Solorio   ATTENDING PHYSICIAN: Pilar Gifford MD   PATIENT ACCOUNT#:   [id number]    LOCATION:  [address] [place] [address] #:   [id number]       YOB: 1928  ADMISSION DATE:       03/07/2022    HISTORY AND PHYSICAL EXAMINATION    DATE OF EXAMINATION:  03/07/2022. CHIEF COMPLAINT:  Chest pain. HISTORY OF PRESENT ILLNESS:  This is a very pleasant 80-year-old woman who presented to the emergency room complaining of chest discomfort which started while she was just watching television. She went to the restroom to urinate and started to feel sick. She became very nauseated and ultimately did vomit. She was able to get up off the toilet but continued to feel somewhat dizzy and continued to have mid chest discomfort. She denied palpitations or radiation of the discomfort. She resides at [facility] and was sent to the emergency room for evaluation, where her initial troponin was 23. Her D-dimer was 2.23. A CT scan of the chest to rule out pulmonary embolism was performed which revealed no evidence of acute PE. There were multifocal ground-glass density nodular opacities throughout both lungs, possibly related to multifocal atypical or viral pneumonia. COVID testing was negative. There was a small retrocardiac hiatal hernia. Her glucose was 170, sodium 137, potassium 4.1, chloride 100, CO2 of 30, BUN of 22 with a creatinine of 0.85, calcium 9.3, anion gap of 7. Her white count was 5.6 with a hemoglobin of 12.1 and a platelet count of 463,137; there were 64% neutrophils. Chest x-ray revealed no acute findings. EKG revealed atrial fibrillation. The patient's second troponin, however, was over 300. [facility] was consulted. She was placed on a heparin drip with Nitropaste and was admitted for further evaluation and treatment. Her chest discomfort was essentially resolved when I saw her in the emergency room.     PAST MEDICAL HISTORY:  Significant for arthritis, balance problems, rheumatic fever at age 12, hypothyroidism, hypertension, hyperlipidemia, left-sided breast cancer, history of basal cell skin cancer, primary osteoarthritis of both knees, visual impairment, angioplasty in , bowel resection in , coronary artery bypass grafting of 5 vessels in , cholecystectomy, femur fracture following a fall status post open reduction and internal fixation in , hysterectomy, lumpectomy in left breast, intertrochanteric fracture on the left requiring ORIF in 2020, status post radiation treatment to the left breast, peripheral artery disease with lower extremity wounds in the past, venous stasis, history of acute GI blood loss anemia in 2020, history of hyponatremia as well, history of concussion in  of last year with some persistent headaches since that time. The patient sustained a left occipital bone fracture. She had multiple brain contusions and subarachnoid hemorrhage in the right middle cranial fossa and right sylvian fissure. Repeat CT scan the following day, however, revealed no evidence of subdural or subarachnoid hemorrhage. MEDICATIONS:  Medications prior to admission:  Tylenol 500 mg every 6 hours as needed for mild to moderate pain, ascorbic acid 500 mg daily, Plavix 75 mg daily, cranberry juice 1000 mg daily, Colace 100 mg 2 times a day as needed for constipation, moisturizing cream as needed for dry skin, levothyroxine 100 mcg before breakfast, metoprolol 12.5 mg twice a day, rosuvastatin 20 mg p.o. nightly, Florastor 250 mg twice a day, triamcinolone 0.1% external cream applied to affected areas b.i.d. as needed, vitamin D3 at 1000 units daily. ALLERGIES:  Multiple and include sulfa, adhesive tape, aspirin, Macrobid, penicillin, povidone iodine, ciprofloxacin. FAMILY HISTORY:  The patient's mother  at 64 of a CVA.   Her father  at 39 of an MI.    SOCIAL HISTORY:  The patient is a former smoker, quit when she was 36years old, had a 20 pack-year tobacco history before that. She has perhaps 4 glasses of wine per week. No history of drug use. 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 REVIEW OF SYSTEMS:  Twelve systems were reviewed. The patient states that she had been feeling relatively well. Her last fall was in June of last year. She said her appetite is fair. Denies any fever or chills. There are otherwise no additional pertinent positives or negatives on a 12-point review of systems except as listed in the History of Present Illness. PHYSICAL EXAMINATION:    VITAL SIGNS:  Temperature was 97.4, pulse 75, respiratory rate 12, blood pressure 162/90, O2 saturation 87% on room air. HEENT:  Normocephalic, atraumatic. Pupils equal, reactive. Sclerae anicteric. There was no sinus tenderness. Mucous membranes were moist.  NECK:  Supple. LUNGS:  Bilateral scattered crackles. HEART:  Regular rate and rhythm. Normal S1, S2, and a systolic murmur. ABDOMEN:  Soft. Bowel sounds were present. There was no guarding or rebound. EXTREMITIES:  No clubbing or cyanosis. There was significant edema of both lower extremities with venous stasis changes, and I did not appreciate any open skin. NEUROLOGIC:  The patient was awake, alert, oriented x3. She was hard of hearing. She was not ambulated because of her positive troponins. She did not have any focal deficit. Speech was clear and fluent. No facial asymmetry. PSYCHIATRIC:  Her mood and affect were pleasant and cooperative. BACK:  There was no costovertebral angle tenderness. LABORATORY DATA:  Labs were previously mentioned. ASSESSMENT AND PLAN:    1.   Non-ST-elevation myocardial infarction. Continue heparin drip. Nitropaste. Await evaluation by Cardiology. Also continue beta blocker. 2.   Abnormal chest x-ray, raising the possibility of a viral infection.   The patient's COVID testing is negative. We will send expanded flu panel and also proBNP. I   wonder if this could be some fluid overload given her myocardial ischemia. Clinically she does not appear to be infected. 3.   Coronary artery disease, status post coronary bypass grafting in 1989 with subsequent stenting as well. Await Cardiology evaluation and recommendations. See above. 4.   Hypertension. Continue antihypertensives, adjust as needed. 5.   Hypothyroidism. Continue thyroid supplementation. 6.   Chronic kidney disease stage 3, essentially stable. 7.   Deep venous thrombosis prophylaxis. The patient is on heparin drip. 8.   History of gastrointestinal bleeding in the past.  We will place on Protonix. 9.   The patient's current clinical status and proposed treatment plan was discussed with her. All of her questions were answered, and she agreed with the plan of care as outlined above.     Dictated By Clark Wilkinson MD  d: 03/07/2022 08:58:57  t: 03/07/2022 11:16:17  Job [id number]  CRJ/

## 2022-03-07 NOTE — CONSULTS
Cardiology (consult dictated). Assessment:  1. Presentation consistent with an acute myocardial infarction. Currently pain-free. Marked elevation of troponin. Lateral ST-T wave abnormalities consistent with ischemia, but not a STEMI. Takotsubo syndrome? 2. Known CAD, status post CABG 1989 and PCI 2012. Has been stable up to the present admission. LV function historically normal.    3.  Permanent atrial fibrillation. Rate has been controlled. Not on anticoagulation because of fall in June 2021, resulting in subarachnoid hemorrhage. 4.  Watchman implant performed November, 2021. LV function normal on the preprocedural VIKKI. Plan:  1. Continue heparin drip and cardiac meds as at home. 2.  Stat echocardiogram.    3.  Probable cardiac cath later today.       Thank you

## 2022-03-08 LAB
ANION GAP SERPL CALC-SCNC: 5 MMOL/L (ref 0–18)
BUN BLD-MCNC: 19 MG/DL (ref 7–18)
CALCIUM BLD-MCNC: 9.1 MG/DL (ref 8.5–10.1)
CHLORIDE SERPL-SCNC: 104 MMOL/L (ref 98–112)
CO2 SERPL-SCNC: 30 MMOL/L (ref 21–32)
CREAT BLD-MCNC: 0.87 MG/DL
DEPRECATED RDW RBC AUTO: 55.8 FL (ref 35.1–46.3)
ERYTHROCYTE [DISTWIDTH] IN BLOOD BY AUTOMATED COUNT: 15.5 % (ref 11–15)
GLUCOSE BLD-MCNC: 99 MG/DL (ref 70–99)
HCT VFR BLD AUTO: 38.6 %
HGB BLD-MCNC: 11.9 G/DL
MCH RBC QN AUTO: 29.9 PG (ref 26–34)
MCHC RBC AUTO-ENTMCNC: 30.8 G/DL (ref 31–37)
MCV RBC AUTO: 97 FL
OSMOLALITY SERPL CALC.SUM OF ELEC: 290 MOSM/KG (ref 275–295)
PLATELET # BLD AUTO: 152 10(3)UL (ref 150–450)
POTASSIUM SERPL-SCNC: 4 MMOL/L (ref 3.5–5.1)
RBC # BLD AUTO: 3.98 X10(6)UL
SODIUM SERPL-SCNC: 139 MMOL/L (ref 136–145)
WBC # BLD AUTO: 5.7 X10(3) UL (ref 4–11)

## 2022-03-08 PROCEDURE — 99233 SBSQ HOSP IP/OBS HIGH 50: CPT | Performed by: HOSPITALIST

## 2022-03-08 RX ORDER — HEPARIN SODIUM 5000 [USP'U]/ML
5000 INJECTION, SOLUTION INTRAVENOUS; SUBCUTANEOUS EVERY 12 HOURS
Status: DISCONTINUED | OUTPATIENT
Start: 2022-03-08 | End: 2022-03-09

## 2022-03-08 RX ORDER — LISINOPRIL 2.5 MG/1
2.5 TABLET ORAL DAILY
Status: DISCONTINUED | OUTPATIENT
Start: 2022-03-08 | End: 2022-03-09

## 2022-03-08 NOTE — PLAN OF CARE
Problem: CARDIOVASCULAR - ADULT  Goal: Maintains optimal cardiac output and hemodynamic stability  Description: INTERVENTIONS:  - Monitor vital signs, rhythm, and trends  - Monitor for bleeding, hypotension and signs of decreased cardiac output  - Evaluate effectiveness of vasoactive medications to optimize hemodynamic stability  - Monitor arterial and/or venous puncture sites for bleeding and/or hematoma  - Assess quality of pulses, skin color and temperature  - Assess for signs of decreased coronary artery perfusion - ex. Angina  - Evaluate fluid balance, assess for edema, trend weights  Outcome: Progressing  Goal: Absence of cardiac arrhythmias or at baseline  Description: INTERVENTIONS:  - Continuous cardiac monitoring, monitor vital signs, obtain 12 lead EKG if indicated  - Evaluate effectiveness of antiarrhythmic and heart rate control medications as ordered  - Initiate emergency measures for life threatening arrhythmias  - Monitor electrolytes and administer replacement therapy as ordered  Outcome: Progressing   Cardiac cath done today. No interventions. Gauze and tegaderm to right groin c/d/I. No bleeding or hematoma noted. Pedal pulses +2. NC applied 2L O2. No home O2. Pt and daughter updated about plan of care at bedside. Discharge back to [place] when medically cleared.

## 2022-03-08 NOTE — PLAN OF CARE
Received patient A&Ox4, on room air. Plan to stay overnight for monitoring. Daughter at bedside. Status update provided to patient and daughter by treatment team providers. Problem: Patient Centered Care  Goal: Patient preferences are identified and integrated in the patient's plan of care  Description: Interventions:  - What would you like us to know as we care for you? I live at [facility] in [place]  - Provide timely, complete, and accurate information to patient/family  - Incorporate patient and family knowledge, values, beliefs, and cultural backgrounds into the planning and delivery of care  - Encourage patient/family to participate in care and decision-making at the level they choose  - Honor patient and family perspectives and choices  Outcome: Progressing     Problem: Patient/Family Goals  Goal: Patient/Family Long Term Goal  Description: Patient's Long Term Goal: To discharge home    Interventions:  - Following plan of care  - See additional Care Plan goals for specific interventions  Outcome: Progressing  Goal: Patient/Family Short Term Goal  Description: Patient's Short Term Goal: Complete physical therapy    Interventions:   - Physical therapy order in chart  - See additional Care Plan goals for specific interventions  Outcome: Progressing     Problem: CARDIOVASCULAR - ADULT  Goal: Maintains optimal cardiac output and hemodynamic stability  Description: INTERVENTIONS:  - Monitor vital signs, rhythm, and trends  - Monitor for bleeding, hypotension and signs of decreased cardiac output  - Evaluate effectiveness of vasoactive medications to optimize hemodynamic stability  - Monitor arterial and/or venous puncture sites for bleeding and/or hematoma  - Assess quality of pulses, skin color and temperature  - Assess for signs of decreased coronary artery perfusion - ex.  Angina  - Evaluate fluid balance, assess for edema, trend weights  Outcome: Progressing  Goal: Absence of cardiac arrhythmias or at baseline  Description: INTERVENTIONS:  - Continuous cardiac monitoring, monitor vital signs, obtain 12 lead EKG if indicated  - Evaluate effectiveness of antiarrhythmic and heart rate control medications as ordered  - Initiate emergency measures for life threatening arrhythmias  - Monitor electrolytes and administer replacement therapy as ordered  Outcome: Progressing

## 2022-03-08 NOTE — CM/SW NOTE
03/08/22 1000   CM/SW Referral Data   Referral Source Social Work (self-referral)   Reason for Referral Discharge planning   Informant Patient   Pertinent Medical Hx   Does patient have an established PCP? Yes  (Alyssia Kilpatrick)   Patient Info   Patient's Home Environment Independent Living  ([address])   Number of Levels in Home 1   Number of Stair in Home 0   Patient lives with Alone   Patient Status Prior to Admission   Independent with ADLs and Mobility No   Pt. requires assistance with Driving; Ambulating   Services in place prior to admission DME/Supplies at home   Type of DME/Supplies Standard Walker   Discharge Needs   Anticipated D/C needs No anticipated discharge needs     SW self referred pt for DC Planning. SW met w/ pt in her room. Above assessment completed. Pt uses walker for ambulation. Pt gets 2 meals/day from [facility]. Pt manages her own medications at home. No anticipated needs for pt at time of DC. PLAN: [address] - pending med clear      SW/CM to remain available for support and/or discharge planning.          Servando Thompson, MSW, [facility]

## 2022-03-08 NOTE — PLAN OF CARE
Vitals stable. Alert, weaned down to room air, afib on tele and ambulates with 1 assist with walker. Cath site has no complications and dressing is dry and intact. Plan for possible discharge today pending medical clearance     Problem: CARDIOVASCULAR - ADULT  Goal: Maintains optimal cardiac output and hemodynamic stability  Description: INTERVENTIONS:  - Monitor vital signs, rhythm, and trends  - Monitor for bleeding, hypotension and signs of decreased cardiac output  - Evaluate effectiveness of vasoactive medications to optimize hemodynamic stability  - Monitor arterial and/or venous puncture sites for bleeding and/or hematoma  - Assess quality of pulses, skin color and temperature  - Assess for signs of decreased coronary artery perfusion - ex.  Angina  - Evaluate fluid balance, assess for edema, trend weights  Outcome: Progressing  Goal: Absence of cardiac arrhythmias or at baseline  Description: INTERVENTIONS:  - Continuous cardiac monitoring, monitor vital signs, obtain 12 lead EKG if indicated  - Evaluate effectiveness of antiarrhythmic and heart rate control medications as ordered  - Initiate emergency measures for life threatening arrhythmias  - Monitor electrolytes and administer replacement therapy as ordered  Outcome: Progressing

## 2022-03-09 VITALS
BODY MASS INDEX: 19.72 KG/M2 | RESPIRATION RATE: 20 BRPM | HEART RATE: 103 BPM | WEIGHT: 111.31 LBS | HEIGHT: 63 IN | DIASTOLIC BLOOD PRESSURE: 76 MMHG | SYSTOLIC BLOOD PRESSURE: 132 MMHG | OXYGEN SATURATION: 99 % | TEMPERATURE: 97 F

## 2022-03-09 PROCEDURE — 99239 HOSP IP/OBS DSCHRG MGMT >30: CPT | Performed by: HOSPITALIST

## 2022-03-09 RX ORDER — LISINOPRIL 2.5 MG/1
2.5 TABLET ORAL DAILY
Qty: 30 TABLET | Refills: 0 | Status: SHIPPED | OUTPATIENT
Start: 2022-03-10

## 2022-03-09 NOTE — PLAN OF CARE
Anand Arita is alert and oriented. On RA. Voiding freely. No pain complaints overnight. Rt. Groin site C,D,I. Plan for PT/OT to evaluate. Discharge back to [facility] possibly today. Problem: Patient Centered Care  Goal: Patient preferences are identified and integrated in the patient's plan of care  Description: Interventions:  - What would you like us to know as we care for you? Lives at [facility]  - Provide timely, complete, and accurate information to patient/family  - Incorporate patient and family knowledge, values, beliefs, and cultural backgrounds into the planning and delivery of care  - Encourage patient/family to participate in care and decision-making at the level they choose  - Honor patient and family perspectives and choices  Outcome: Progressing     Problem: Patient/Family Goals  Goal: Patient/Family Long Term Goal  Description: Patient's Long Term Goal: Go home    Interventions:  - F/U with PCP  - Take medications as directed  - See additional Care Plan goals for specific interventions  Outcome: Progressing  Goal: Patient/Family Short Term Goal  Description: Patient's Short Term Goal: No CP    Interventions:   - Call RN if any chest pain is experienced  - Take medications as needed  - See additional Care Plan goals for specific interventions  Outcome: Progressing     Problem: CARDIOVASCULAR - ADULT  Goal: Maintains optimal cardiac output and hemodynamic stability  Description: INTERVENTIONS:  - Monitor vital signs, rhythm, and trends  - Monitor for bleeding, hypotension and signs of decreased cardiac output  - Evaluate effectiveness of vasoactive medications to optimize hemodynamic stability  - Monitor arterial and/or venous puncture sites for bleeding and/or hematoma  - Assess quality of pulses, skin color and temperature  - Assess for signs of decreased coronary artery perfusion - ex.  Angina  - Evaluate fluid balance, assess for edema, trend weights  Outcome: Progressing  Goal: Absence of cardiac arrhythmias or at baseline  Description: INTERVENTIONS:  - Continuous cardiac monitoring, monitor vital signs, obtain 12 lead EKG if indicated  - Evaluate effectiveness of antiarrhythmic and heart rate control medications as ordered  - Initiate emergency measures for life threatening arrhythmias  - Monitor electrolytes and administer replacement therapy as ordered  Outcome: Progressing

## 2022-03-09 NOTE — PLAN OF CARE
Patient evaluated by cardiology, attending, PT, and OT. PT appropriate to go home with home health. Discharge instructions provided to daughter and patient. Patient and daughter verbalized understanding. Problem: Patient Centered Care  Goal: Patient preferences are identified and integrated in the patient's plan of care  Description: Interventions:  - What would you like us to know as we care for you? I am hard of hearing.  - Provide timely, complete, and accurate information to patient/family  - Incorporate patient and family knowledge, values, beliefs, and cultural backgrounds into the planning and delivery of care  - Encourage patient/family to participate in care and decision-making at the level they choose  - Honor patient and family perspectives and choices  Outcome: Completed     Problem: Patient/Family Goals  Goal: Patient/Family Long Term Goal  Description: Patient's Long Term Goal: To discharge home safely    Interventions:  - Home Health recommended  - See additional Care Plan goals for specific interventions  Outcome: Completed  Goal: Patient/Family Short Term Goal  Description: Patient's Short Term Goal: To complete discharge process    Interventions:   - Signed off for discharge by treatment team.  - See additional Care Plan goals for specific interventions  Outcome: Completed     Problem: CARDIOVASCULAR - ADULT  Goal: Maintains optimal cardiac output and hemodynamic stability  Description: INTERVENTIONS:  - Monitor vital signs, rhythm, and trends  - Monitor for bleeding, hypotension and signs of decreased cardiac output  - Evaluate effectiveness of vasoactive medications to optimize hemodynamic stability  - Monitor arterial and/or venous puncture sites for bleeding and/or hematoma  - Assess quality of pulses, skin color and temperature  - Assess for signs of decreased coronary artery perfusion - ex.  Angina  - Evaluate fluid balance, assess for edema, trend weights  Outcome: Completed  Goal: Absence of cardiac arrhythmias or at baseline  Description: INTERVENTIONS:  - Continuous cardiac monitoring, monitor vital signs, obtain 12 lead EKG if indicated  - Evaluate effectiveness of antiarrhythmic and heart rate control medications as ordered  - Initiate emergency measures for life threatening arrhythmias  - Monitor electrolytes and administer replacement therapy as ordered  Outcome: Completed

## 2022-03-10 ENCOUNTER — TELEPHONE (OUTPATIENT)
Dept: INTERNAL MEDICINE CLINIC | Facility: CLINIC | Age: 87
End: 2022-03-10

## 2022-03-10 ENCOUNTER — PATIENT OUTREACH (OUTPATIENT)
Dept: CASE MANAGEMENT | Age: 87
End: 2022-03-10

## 2022-03-10 PROCEDURE — 1111F DSCHRG MED/CURRENT MED MERGE: CPT

## 2022-03-10 NOTE — TELEPHONE ENCOUNTER
Appointment request:   Spoke to the pt today for TCM. The patient was recently hospitalized for NSTEMI . The pt does not have a HFU appt scheduled at this time, but is scheduled for a physical on 3/22/2022. A TCM/HFU appt is recommended by 3/16/2022 as the pt is a high risk for readmission. [facility] attempted to schdeule the patient for a 1 week TCM-HFU appointment, however, there were no available appointments at the [facility] location within the TCM timeframe. The patient's transportation [facility], can only take the patient to the [facility] office. Please advise. BOOK BY DATE (last date for TCM): 3/23/2022    Orders request: [facility] PT/OT:     PT/OT did recommend home health for the patient prior to discharge, however, these orders were not entered. [facility] did confirm patient and daughter are interested in these services. [facility] did contact inpatient SW to confirm orders were not yet placed. It was recommended the PCP place Home Health orders since the patient has returned home on 3/9/2022. TRIAGE:  Please f/u with the pt and try to get her to schedule as the pt would greatly benefit from TCM/HFU appt. Please also place the requested Home Health orders per Dr. Brunner Manual recommendations. Thank you!

## 2022-03-10 NOTE — TELEPHONE ENCOUNTER
Routed to [facility] please advise on message below. Would you like physical appt on 3/22/2022 to be changed to TCM?

## 2022-03-10 NOTE — CM/SW NOTE
Received call from [phone] [facility] ([phone]) inquiring about pt being arranged w/ [place] services post DC as PT/OT notes indicate recommendation. CHRIS informed Rachel Menchaca that PT did not notify SW of recommendation and OT note was placed after Karl Puentes confirmed pt and dtr agreeable to [place]. CHRIS instructed Rachel Menchaca that [place] will now need to be arranged via pt's PCP. Rachel Menchaca confirmed to f/up.     Lea Miller, MSW, [facility]

## 2022-03-15 RX ORDER — METOPROLOL SUCCINATE 25 MG/1
12.5 TABLET, EXTENDED RELEASE ORAL 2 TIMES DAILY
Qty: 90 TABLET | Refills: 1 | Status: SHIPPED | OUTPATIENT
Start: 2022-03-15

## 2022-03-15 NOTE — TELEPHONE ENCOUNTER
Refill passed per [facility] protocol. Requested Prescriptions   Pending Prescriptions Disp Refills    metoprolol succinate ER 25 MG Oral Tablet 24 Hr [Pharmacy Med Name: METOPROLOL ER SUCCINATE 25MG TABS] 90 tablet 1     Sig: Take 0.5 tablets (12.5 mg total) by mouth 2 (two) times daily.         Hypertensive Medications Protocol Passed - 3/15/2022  7:53 AM        Passed - CMP or BMP in past 12 months        Passed - Appointment in past 6 or next 3 months        Passed - GFR Non- > 50     Lab Results   Component Value Date    GFRNAA 62 (L) 03/08/2022                     Recent Outpatient Visits              1 month ago Essential hypertension    Luis Waldrop MD    Office Visit    2 months ago [facility]op [facility]    Nurse Only    2 months ago Diarrhea, unspecified type    [facility], [address], [place], [place], [place]    Office Visit    4 months ago Pre-op testing    [facility]    Nurse Only    5 months ago Impacted cerumen of left ear    [facility] Chacho Caldwell [facility]    Office Visit          Future Appointments         Provider Department Appt Notes    In 1 week Osiel Zuniga MD [facility], [address] f/u see comm \"policy informed\"

## 2022-03-15 NOTE — TELEPHONE ENCOUNTER
Refill passed per [facility] protocol.   Requested Prescriptions   Pending Prescriptions Disp Refills    METOPROLOL SUCCINATE ER 25 MG Oral Tablet 24 Hr [Pharmacy Med Name: METOPROLOL ER SUCCINATE 25MG TABS] 90 tablet 1     Sig: TAKE 1/2 TABLET(12.5 MG) BY MOUTH TWICE DAILY        Hypertensive Medications Protocol Passed - 3/15/2022  7:53 AM        Passed - CMP or BMP in past 12 months        Passed - Appointment in past 6 or next 3 months        Passed - GFR Non- > 50     Lab Results   Component Value Date    XMZCGS 58 (L) 03/08/2022                     Recent Outpatient Visits              1 month ago Essential hypertension    Waleska Mercedes MD    Office Visit    2 months ago Pre-op testing    [facility]    Nurse Only    2 months ago Diarrhea, unspecified type    [facility], [address] MD    Office Visit    4 months ago Pre-op testing    [facility]    Nurse Only    5 months ago Impacted cerumen of left ear    [facility] Lyric Galicia [facility]    Office Visit          Future Appointments         Provider Department Appt Notes    In 1 week Jc Levin MD [facility], [address] f/u see comm \"policy informed\"

## 2022-03-18 NOTE — CM/SW NOTE
CHRIS received notification the pt's dtr. Is interested in [address] for the pt. SW contact the pt's dtr. And left a message with directions to contact Dr. Natalie Priest office to make [address] arrangements. The pt. Has been discharge too long to have orders entered in the pt's last admission.       [facility] ext [phone]

## 2022-03-22 ENCOUNTER — OFFICE VISIT (OUTPATIENT)
Dept: INTERNAL MEDICINE CLINIC | Facility: CLINIC | Age: 87
End: 2022-03-22
Payer: MEDICARE

## 2022-03-22 VITALS
DIASTOLIC BLOOD PRESSURE: 75 MMHG | SYSTOLIC BLOOD PRESSURE: 123 MMHG | HEART RATE: 74 BPM | BODY MASS INDEX: 19.84 KG/M2 | HEIGHT: 63 IN | WEIGHT: 112 LBS

## 2022-03-22 DIAGNOSIS — N18.30 STAGE 3 CHRONIC KIDNEY DISEASE, UNSPECIFIED WHETHER STAGE 3A OR 3B CKD (HCC): ICD-10-CM

## 2022-03-22 DIAGNOSIS — I10 ESSENTIAL HYPERTENSION: Primary | ICD-10-CM

## 2022-03-22 DIAGNOSIS — I25.10 ATHEROSCLEROSIS OF NATIVE CORONARY ARTERY OF NATIVE HEART WITHOUT ANGINA PECTORIS: ICD-10-CM

## 2022-03-22 DIAGNOSIS — I70.0 ATHEROSCLEROSIS OF AORTA (HCC): ICD-10-CM

## 2022-03-22 DIAGNOSIS — E03.9 HYPOTHYROIDISM, UNSPECIFIED TYPE: ICD-10-CM

## 2022-03-22 DIAGNOSIS — I73.9 PAD (PERIPHERAL ARTERY DISEASE) (HCC): ICD-10-CM

## 2022-03-22 PROBLEM — I48.91 ATRIAL FIBRILLATION AND FLUTTER (HCC): Status: RESOLVED | Noted: 2021-12-16 | Resolved: 2022-03-22

## 2022-03-22 PROBLEM — I21.4 NSTEMI (NON-ST ELEVATED MYOCARDIAL INFARCTION) (HCC): Status: RESOLVED | Noted: 2022-03-07 | Resolved: 2022-03-22

## 2022-03-22 PROBLEM — I48.92 ATRIAL FIBRILLATION AND FLUTTER (HCC): Status: RESOLVED | Noted: 2021-12-16 | Resolved: 2022-03-22

## 2022-03-22 PROBLEM — R07.9 ACUTE CHEST PAIN: Status: RESOLVED | Noted: 2022-03-07 | Resolved: 2022-03-22

## 2022-03-22 PROCEDURE — 99495 TRANSJ CARE MGMT MOD F2F 14D: CPT | Performed by: INTERNAL MEDICINE

## 2022-04-04 ENCOUNTER — APPOINTMENT (OUTPATIENT)
Dept: URBAN - METROPOLITAN AREA CLINIC 244 | Age: 87
Setting detail: DERMATOLOGY
End: 2022-04-04

## 2022-04-04 DIAGNOSIS — L81.4 OTHER MELANIN HYPERPIGMENTATION: ICD-10-CM

## 2022-04-04 DIAGNOSIS — D22 MELANOCYTIC NEVI: ICD-10-CM

## 2022-04-04 DIAGNOSIS — L57.0 ACTINIC KERATOSIS: ICD-10-CM

## 2022-04-04 PROBLEM — D22.62 MELANOCYTIC NEVI OF LEFT UPPER LIMB, INCLUDING SHOULDER: Status: ACTIVE | Noted: 2022-04-04

## 2022-04-04 PROBLEM — D22.39 MELANOCYTIC NEVI OF OTHER PARTS OF FACE: Status: ACTIVE | Noted: 2022-04-04

## 2022-04-04 PROBLEM — D22.61 MELANOCYTIC NEVI OF RIGHT UPPER LIMB, INCLUDING SHOULDER: Status: ACTIVE | Noted: 2022-04-04

## 2022-04-04 PROCEDURE — 17000 DESTRUCT PREMALG LESION: CPT

## 2022-04-04 PROCEDURE — 17003 DESTRUCT PREMALG LES 2-14: CPT

## 2022-04-04 PROCEDURE — OTHER LIQUID NITROGEN: OTHER

## 2022-04-04 PROCEDURE — 99213 OFFICE O/P EST LOW 20 MIN: CPT | Mod: 25

## 2022-04-04 PROCEDURE — OTHER COUNSELING: OTHER

## 2022-04-04 ASSESSMENT — LOCATION DETAILED DESCRIPTION DERM
LOCATION DETAILED: RIGHT RADIAL DORSAL HAND
LOCATION DETAILED: LEFT CENTRAL MALAR CHEEK
LOCATION DETAILED: RIGHT CENTRAL MALAR CHEEK
LOCATION DETAILED: RIGHT LATERAL MALAR CHEEK
LOCATION DETAILED: LEFT ULNAR DORSAL HAND
LOCATION DETAILED: RIGHT INFERIOR CENTRAL MALAR CHEEK

## 2022-04-04 ASSESSMENT — LOCATION ZONE DERM
LOCATION ZONE: FACE
LOCATION ZONE: HAND

## 2022-04-04 ASSESSMENT — LOCATION SIMPLE DESCRIPTION DERM
LOCATION SIMPLE: LEFT CHEEK
LOCATION SIMPLE: RIGHT HAND
LOCATION SIMPLE: RIGHT CHEEK
LOCATION SIMPLE: LEFT HAND

## 2022-04-04 NOTE — PROCEDURE: LIQUID NITROGEN
Consent: The patient's consent was obtained including but not limited to risks of crusting, scabbing, blistering, scarring, darker or lighter pigmentary change, recurrence, incomplete removal and infection.
Total Number Of Aks Treated: 8
Duration Of Freeze Thaw-Cycle (Seconds): 0
Detail Level: Zone
Render In Bullet Format When Appropriate: No
Post-Care Instructions: I reviewed with the patient in detail post-care instructions. Patient is to wear sunprotection, and avoid picking at any of the treated lesions. Pt may apply Vaseline to crusted or scabbing areas.

## 2022-04-26 ENCOUNTER — ORDER TRANSCRIPTION (OUTPATIENT)
Dept: CARDIAC REHAB | Facility: HOSPITAL | Age: 87
End: 2022-04-26

## 2022-05-08 NOTE — PROGRESS NOTES
[facility]    Progress Note    Anselmodario Mae Patient Status:  Inpatient    1928 MRN [id number]   Location [facility] 4W/SW/SE Attending No att. providers found   [facility] Day # 9 PCP Korey Jenkins MD       Subjective:    Wi abnormal bruising noted  Back/Spine: no abnormalities noted  Musculoskeletal: full ROM all extremities good strength  no deformities  Extremities: no edema, cyanosis  Neurological:  Grossly normal    Results:     Laboratory Data:  Lab Results   Component V 145

## 2022-05-09 NOTE — PROGRESS NOTES
[facility]    Progress Note    Nereyda George Patient Status:  Inpatient    1928 MRN [id number]   Location [facility] 3W/SW Attending Rosie Slater MD   [facility] Day # 3 PCP Sarahi Fernandes MD     HPI/Subjective:     Con Moriah Zaidi is a 32 year old female presenting to the walk-in clinic today for a lower back pain for a week now; report pulled muscle in back; also report that yesterday L leg cramping while in sitting camping chair.      Swabs/Specimens collected during rooming process:    none         PPE worn during room process    Writer: N95, Face shield/eye protection, gown, gloves    Patient: mask      Patient would like communication of their results via:         Cell Phone:   Telephone Information:   Mobile [phone]     Okay to leave a message containing results? Yes     MG 1.9 06/08/2021    PHOS 2.9 01/23/2021    CK 58 06/27/2018    B12 491 05/02/2019       CT BRAIN OR HEAD (64827)    Result Date: 6/13/2021  CONCLUSION:  1. Stable appearance of multiple small areas of hemorrhagic contusion in the right frontal and tempora

## 2022-05-17 ENCOUNTER — LAB ENCOUNTER (OUTPATIENT)
Dept: LAB | Facility: HOSPITAL | Age: 87
End: 2022-05-17
Attending: INTERNAL MEDICINE
Payer: MEDICARE

## 2022-05-17 DIAGNOSIS — I10 ESSENTIAL HYPERTENSION: ICD-10-CM

## 2022-05-17 LAB
ANION GAP SERPL CALC-SCNC: 5 MMOL/L (ref 0–18)
BUN BLD-MCNC: 21 MG/DL (ref 7–18)
BUN/CREAT SERPL: 26.3 (ref 10–20)
CALCIUM BLD-MCNC: 9 MG/DL (ref 8.5–10.1)
CHLORIDE SERPL-SCNC: 104 MMOL/L (ref 98–112)
CO2 SERPL-SCNC: 30 MMOL/L (ref 21–32)
CREAT BLD-MCNC: 0.8 MG/DL
FASTING STATUS PATIENT QL REPORTED: NO
GLUCOSE BLD-MCNC: 96 MG/DL (ref 70–99)
OSMOLALITY SERPL CALC.SUM OF ELEC: 291 MOSM/KG (ref 275–295)
POTASSIUM SERPL-SCNC: 4.3 MMOL/L (ref 3.5–5.1)
SODIUM SERPL-SCNC: 139 MMOL/L (ref 136–145)

## 2022-05-17 PROCEDURE — 80048 BASIC METABOLIC PNL TOTAL CA: CPT

## 2022-05-17 PROCEDURE — 36415 COLL VENOUS BLD VENIPUNCTURE: CPT

## 2022-05-18 ENCOUNTER — HOSPITAL ENCOUNTER (INPATIENT)
Facility: HOSPITAL | Age: 87
LOS: 2 days | Discharge: HOME HEALTH | End: 2022-05-20
Attending: EMERGENCY MEDICINE | Admitting: HOSPITALIST
Payer: MEDICARE

## 2022-05-18 ENCOUNTER — APPOINTMENT (OUTPATIENT)
Dept: GENERAL RADIOLOGY | Facility: HOSPITAL | Age: 87
End: 2022-05-18
Attending: EMERGENCY MEDICINE
Payer: MEDICARE

## 2022-05-18 ENCOUNTER — APPOINTMENT (OUTPATIENT)
Dept: ULTRASOUND IMAGING | Facility: HOSPITAL | Age: 87
End: 2022-05-18
Attending: EMERGENCY MEDICINE
Payer: MEDICARE

## 2022-05-18 DIAGNOSIS — S81.801A OPEN WOUND OF RIGHT LOWER EXTREMITY, INITIAL ENCOUNTER: ICD-10-CM

## 2022-05-18 DIAGNOSIS — L03.119 CELLULITIS OF LOWER EXTREMITY, UNSPECIFIED LATERALITY: ICD-10-CM

## 2022-05-18 DIAGNOSIS — I50.9 CONGESTIVE HEART FAILURE, UNSPECIFIED HF CHRONICITY, UNSPECIFIED HEART FAILURE TYPE (HCC): Primary | ICD-10-CM

## 2022-05-18 LAB
ANION GAP SERPL CALC-SCNC: 6 MMOL/L (ref 0–18)
BASOPHILS # BLD AUTO: 0.02 X10(3) UL (ref 0–0.2)
BASOPHILS NFR BLD AUTO: 0.3 %
BUN BLD-MCNC: 21 MG/DL (ref 7–18)
BUN/CREAT SERPL: 22.6 (ref 10–20)
CALCIUM BLD-MCNC: 8.9 MG/DL (ref 8.5–10.1)
CHLORIDE SERPL-SCNC: 102 MMOL/L (ref 98–112)
CO2 SERPL-SCNC: 28 MMOL/L (ref 21–32)
CREAT BLD-MCNC: 0.93 MG/DL
DEPRECATED RDW RBC AUTO: 52.2 FL (ref 35.1–46.3)
EOSINOPHIL # BLD AUTO: 0.09 X10(3) UL (ref 0–0.7)
EOSINOPHIL NFR BLD AUTO: 1.4 %
ERYTHROCYTE [DISTWIDTH] IN BLOOD BY AUTOMATED COUNT: 14.6 % (ref 11–15)
GLUCOSE BLD-MCNC: 97 MG/DL (ref 70–99)
HCT VFR BLD AUTO: 39.9 %
HGB BLD-MCNC: 12 G/DL
IMM GRANULOCYTES # BLD AUTO: 0.01 X10(3) UL (ref 0–1)
IMM GRANULOCYTES NFR BLD: 0.2 %
LYMPHOCYTES # BLD AUTO: 1.12 X10(3) UL (ref 1–4)
LYMPHOCYTES NFR BLD AUTO: 17.4 %
MCH RBC QN AUTO: 29.2 PG (ref 26–34)
MCHC RBC AUTO-ENTMCNC: 30.1 G/DL (ref 31–37)
MCV RBC AUTO: 97.1 FL
MONOCYTES # BLD AUTO: 0.61 X10(3) UL (ref 0.1–1)
MONOCYTES NFR BLD AUTO: 9.5 %
NEUTROPHILS # BLD AUTO: 4.6 X10 (3) UL (ref 1.5–7.7)
NEUTROPHILS # BLD AUTO: 4.6 X10(3) UL (ref 1.5–7.7)
NEUTROPHILS NFR BLD AUTO: 71.2 %
NT-PROBNP SERPL-MCNC: 4690 PG/ML (ref ?–450)
OSMOLALITY SERPL CALC.SUM OF ELEC: 285 MOSM/KG (ref 275–295)
PLATELET # BLD AUTO: 174 10(3)UL (ref 150–450)
POTASSIUM SERPL-SCNC: 4 MMOL/L (ref 3.5–5.1)
RBC # BLD AUTO: 4.11 X10(6)UL
SARS-COV-2 RNA RESP QL NAA+PROBE: NOT DETECTED
SODIUM SERPL-SCNC: 136 MMOL/L (ref 136–145)
WBC # BLD AUTO: 6.5 X10(3) UL (ref 4–11)

## 2022-05-18 PROCEDURE — 93970 EXTREMITY STUDY: CPT | Performed by: EMERGENCY MEDICINE

## 2022-05-18 PROCEDURE — 71045 X-RAY EXAM CHEST 1 VIEW: CPT | Performed by: EMERGENCY MEDICINE

## 2022-05-18 PROCEDURE — 99223 1ST HOSP IP/OBS HIGH 75: CPT | Performed by: HOSPITALIST

## 2022-05-18 RX ORDER — METOCLOPRAMIDE HYDROCHLORIDE 5 MG/ML
5 INJECTION INTRAMUSCULAR; INTRAVENOUS EVERY 8 HOURS PRN
Status: DISCONTINUED | OUTPATIENT
Start: 2022-05-18 | End: 2022-05-20

## 2022-05-18 RX ORDER — HEPARIN SODIUM 5000 [USP'U]/ML
5000 INJECTION, SOLUTION INTRAVENOUS; SUBCUTANEOUS EVERY 12 HOURS SCHEDULED
Status: DISCONTINUED | OUTPATIENT
Start: 2022-05-18 | End: 2022-05-20

## 2022-05-18 RX ORDER — FUROSEMIDE 10 MG/ML
40 INJECTION INTRAMUSCULAR; INTRAVENOUS
Status: DISCONTINUED | OUTPATIENT
Start: 2022-05-18 | End: 2022-05-19 | Stop reason: ALTCHOICE

## 2022-05-18 RX ORDER — ONDANSETRON 2 MG/ML
4 INJECTION INTRAMUSCULAR; INTRAVENOUS EVERY 6 HOURS PRN
Status: DISCONTINUED | OUTPATIENT
Start: 2022-05-18 | End: 2022-05-20

## 2022-05-18 RX ORDER — NITROGLYCERIN 0.4 MG/1
0.4 TABLET SUBLINGUAL EVERY 5 MIN PRN
Status: DISCONTINUED | OUTPATIENT
Start: 2022-05-18 | End: 2022-05-20

## 2022-05-18 RX ORDER — FUROSEMIDE 10 MG/ML
40 INJECTION INTRAMUSCULAR; INTRAVENOUS ONCE
Status: COMPLETED | OUTPATIENT
Start: 2022-05-18 | End: 2022-05-18

## 2022-05-18 RX ORDER — ACETAMINOPHEN 325 MG/1
650 TABLET ORAL EVERY 6 HOURS PRN
Status: DISCONTINUED | OUTPATIENT
Start: 2022-05-18 | End: 2022-05-20

## 2022-05-18 NOTE — HISTORICAL OFFICE NOTE
[address], [address] [place] [address]  Progress Note  Demographics:  Name: Valerie Alonzo YOB: 1928  Age: 80, Female Medical Record No: [id number]  Visited Date/Time: 03/18/2022 08:45 AM    Chief Complaints    F/U  Patient sts that she had a heart attack 2 weeks ago. History of Present Illness  Mrs. Lane Glass is here to follow-up on a recent admission to [facility].  She presented with a non-STEMI. Cardiac cath was performed and she was found to have a an occlusion in the sequential SVG to the OM 1 and OM 2. We also found severely diseased native vessels. The SVG to the diagonal was patent and fills the LAD retrograde. SVG to the RCA had moderate ostial stenosis which we managed medically. Echo during the admission showed an EF of 40% which is new. There was mid and apical inferolateral hypokinesis. Lisinopril was added to her medical regimen. She has a history of falls and a head bleed so no additional blood thinners were given. She has a watchman device as protection from stroke. Patient has a history of permanent A. fib. Her blood pressure today is good and she is having no further chest pain. She gets occasional shortness of breath but it is unchanged compared with symptoms that she has had in the past.  Weight is stable. Overall has made a good recovery. Her right groin site is soft and nontender and well-healed. Examination also reveals a systolic murmur which  represents her known mild to moderate aortic stenosis. Cardiac risk factors Never smoked    Past Medical History  1. Intracranial subarachnoid hemorrhage  2. Atrial fibrillation, persistent - AFib  3.CAD (coronary artery disease)  4. Essential hypertension  5. Mitral valve prolapse  6. Dyslipidemia  7. Rheumatic fever  8. Aortic stenosis  9. Precordial pain    Past Surgical History  1. Presence of Watchman left atrial appendage closure device  Family History  1. Father - Coronary heart disease  2. Mother - Stroke  3. Sister - Stroke    Social History  Smoking status Never smoked  Tobacco usage - No (Non-smoker (finding))  Review of systems  Cardiovascular [place]  No history of Chest pain, Palpitations, Syncope, PND, Orthopnea, Edema and Claudication  Genitourinary No history of Dysuria, Hematuria and Frequency  Respiratory No history of SOB, Wheezing and Sputum  Neurological No history of Migraines, Numbness and Limb weakness  Hem/Lymphatic Easy bruising  No history of Blood clots, Hx of blood transfusion, Anemia and Bleeding problems  Other - Review of System  Dizziness    Physical Examination  Constitutional O298%RA  Vitals Left Arm Sitting  / 64 mmHg, Pulse rate 70 bpm, Height in 5' 3\", BMI: 19.8, Weight in 112 lbs (or) 51 kgs and BSA : 1.5 cc/m²  General Appearance No Acute Distress  Head/Eyes/Ears/Nose/Mouth/Throat Mucous membranes Moist  Neck Normal carotid pulsations, No carotid bruits and No JVD  Respiratory Unlabored and Lungs clear with normal breath sounds  Cardiovascular Regular rhythm. Normal rate present. Normal and normal S1 and S2  Mid murmur is present with a grade of 2 at the upper right sternal border. Gastrointestinal Abdomen soft and Non-tender  Lower Extremities Pulses 2+ and equal bilaterally  Skin Warm and dry and Good turgor  Neurologic / Psychiatric Alert and Oriented    Allergies  1. Aspirin(Reaction:, Severity:Mild)  2. Ciprofloxacin(Reaction:, Severity:Mild)  3. Iodine (Environmental Or Med)(Reaction:, Severity:Mild)  4. Nitrofurantoin(Reaction:, Severity:Mild)  Medications  1. Cholecalciferol (VITAMIN D3) 34971 units Cap  2. Cranberry 1000 MG Cap  3.docusate sodium (COLACE) 100 MG capsule, Take 100 mg by mouth as needed. 4.levothyroxine (SYNTHROID, LEVOTHROID) 100 MCG tablet, 1 tablet daily  5.melatonin (Melatonin Maximum Strength) 5 MG, Take 5 mg by mouth as needed.   6.metoprolol succinate ER 25 mg tablet,extended release 24 hr, Take one-half tablet orally 2 times a day. 7.rosuvastatin 20 mg tablet, Take 1 tablet orally once a day. 8.Vitamin C 1,000 mg tablet, Take 1 tablet orally once a day. Impression  1. Non-ST elevated myocardial infarction (non-STEMI)  2. Diarrhea  3. Presence of Watchman left atrial appendage closure device  4. Atrial fibrillation, persistent - AFib  5.CAD (coronary artery disease)  6. Essential hypertension    Assessment & Plan  Assessment:  1.  CAD. Recent non-STEMI. Event secondary to occlusion of the SVG to the OM1 and OM 2. Other grafts patent though stenotic. At this time stable and asymptomatic. 2.  Ischemic cardiomyopathy with an EF of 40%. Lisinopril added recently. Renal function remained stable on low-dose. 3.  Mild to moderate aortic stenosis, compensated and stable. 4.  Permanent atrial fibrillation. Rate is controlled. Watchman device in place. Plan:  1. Continue same cardiac medications. 2.  Basic metabolic panel soon to follow-up on the initiation of lisinopril    3. CMP, CBC, and office visit in 2 months. 4.  Patient needs prescription for lisinopril 2.5 mg daily. Dispense 90 with 3 refills  Medications Ordered  1.lisinopriL 2.5 mg tablet, Take 1 tablet orally once a day. Labs and Diagnostics ordered  1. BMP (Basic Metabolic Panel) (Today)  2. CBC (Complete Blood Count) (2 Months)  3. CMP (comprehensive metabolic panel) (2 Months)  Future appointments  1. Follow up visit - Lázaro Zarco (2 Months)  Nurses documentation  Refills: None  Assistive Devices: None  Upcoming sx or procedures: None  Ekg: no    Patient instructions  Plan:  1. Continue same cardiac medications. 2.  Basic metabolic panel soon to follow-up on the initiation of lisinopril    3. CMP, CBC, and office visit in 2 months. 4.  Patient needs prescription for lisinopril 2.5 mg daily.   Dispense 90 with 3 refills  CPOE Orders carried out by: Domingo Najera Providers: Tk Robles MD, Paul Gonzalez and Nael Grimm  Electronically Authenticated by  Tk Robles MD  03/18/2022 08:30:29 PM    Disclaimer: Components of this note were documented using voice recognition system and are subject to errors not corrected at proofreading. Contact the author of this note for any clarifications.

## 2022-05-18 NOTE — ED QUICK NOTES
Orders for admission, patient is aware of plan and ready to go upstairs. Any questions, please call ED RN Evelia at extension [phone].      Patient Covid vaccination status: Fully vaccinated     COVID Test Ordered in ED: Rapid SARS-CoV-2 by PCR    COVID Suspicion at Admission: N/A    Running Infusions:  None    Mental Status/LOC at time of transport: [id number]    Other pertinent information:   CIWA score: N/A   NIH score:  N/A      Covid negative

## 2022-05-18 NOTE — ED INITIAL ASSESSMENT (HPI)
Patient arrives ambulatory through triage with complaints of BLE swelling R>L and a wound on RLE, was instructed to come in for a wound check by her home health nurse.

## 2022-05-19 PROCEDURE — 99233 SBSQ HOSP IP/OBS HIGH 50: CPT | Performed by: HOSPITALIST

## 2022-05-19 RX ORDER — ASCORBIC ACID 500 MG
500 TABLET ORAL DAILY
Status: DISCONTINUED | OUTPATIENT
Start: 2022-05-19 | End: 2022-05-20

## 2022-05-19 RX ORDER — FUROSEMIDE 40 MG/1
40 TABLET ORAL DAILY
Status: DISCONTINUED | OUTPATIENT
Start: 2022-05-19 | End: 2022-05-20

## 2022-05-19 RX ORDER — FUROSEMIDE 40 MG/1
40 TABLET ORAL DAILY
Qty: 30 TABLET | Refills: 3 | Status: SHIPPED | OUTPATIENT
Start: 2022-05-20

## 2022-05-19 RX ORDER — ROSUVASTATIN CALCIUM 20 MG/1
20 TABLET, COATED ORAL NIGHTLY
Status: DISCONTINUED | OUTPATIENT
Start: 2022-05-19 | End: 2022-05-20

## 2022-05-19 RX ORDER — LEVOTHYROXINE SODIUM 0.1 MG/1
100 TABLET ORAL
Status: DISCONTINUED | OUTPATIENT
Start: 2022-05-19 | End: 2022-05-20

## 2022-05-19 RX ORDER — MELATONIN
1000 DAILY
Status: DISCONTINUED | OUTPATIENT
Start: 2022-05-19 | End: 2022-05-20

## 2022-05-19 RX ORDER — DOCUSATE SODIUM 100 MG/1
100 CAPSULE, LIQUID FILLED ORAL 2 TIMES DAILY
Status: DISCONTINUED | OUTPATIENT
Start: 2022-05-19 | End: 2022-05-20

## 2022-05-19 RX ORDER — CLOPIDOGREL BISULFATE 75 MG/1
75 TABLET ORAL DAILY
Status: DISCONTINUED | OUTPATIENT
Start: 2022-05-19 | End: 2022-05-20

## 2022-05-19 NOTE — H&P
[facility]    PATIENT'S NAME: Emir Hill   ATTENDING PHYSICIAN: Isaura Hunter MD   PATIENT ACCOUNT#:   [de-identified]    LOCATION:  [address]  MEDICAL RECORD #:   [id number]       YOB: 1928  ADMISSION DATE:       05/18/2022    HISTORY AND PHYSICAL EXAMINATION    CHIEF COMPLAINT:  Acute congestive heart failure, bilateral lower extremity cellulitis and edema. HISTORY OF PRESENT ILLNESS:  Patient is a 49-year-old  female who lives in an independent living facility with known underlying coronary artery disease and valvular heart disease, who came into the emergency department for evaluation of increased leg edema and erythema with dyspnea for last 3 to 4 days. ProBNP 4690. Chemistry and CBC were unremarkable. Chest x-ray showed heart failure changes. Patient was started on IV Lasix and IV Ancef. EKG showed atrial fibrillation which was chronic. She will be admitted to the hospital for further management. PAST MEDICAL HISTORY:  Coronary artery disease status post coronary artery bypass graft surgery; angiogram done in March 2022 showed occluded graft, SVG graft to obtuse marginal 1 and 2, medical therapy was recommended. She had decreased ejection fraction to 40%, left ventricular diastolic dysfunction, and wall motion hypokinesis. She had rheumatic valvular heart disease with mild to moderate aortic stenosis and mitral insufficiency stenosis and regurgitation. She had moderate to severe pulmonary artery hypertension. She had chronic atrial fibrillation status post Watchman device, hypertension, hyperlipidemia, osteoarthritis, osteoporosis, chronic kidney disease stage 2 to 3, and hypothyroidism. PAST SURGICAL HISTORY:  Left breast lumpectomy followed by radiation therapy for breast cancer, hysterectomy, laparoscopic cholecystectomy, coronary artery bypass graft surgery, and partial bowel resection.     ALLERGIES:  Aspirin, penicillin, Macrobid, and ciprofloxacin. Patient is not able to confirm the reactions. FAMILY HISTORY:  Mother had cerebrovascular accident and hypertension. Father had coronary artery disease. SOCIAL HISTORY:  Ex-tobacco user. No current tobacco, alcohol, or drug use. Lives at an independent living nursing facility. Requires some assistance in her basic activities of daily living. REVIEW OF SYSTEMS:  Patient said she tries to comply with low-salt diet, but recently started noticing increased leg edema, both sides, with increased erythema. Last 2 days, she had a blister that unroofed on the lateral aspect of her right leg. No fever or chills. She has been also having dyspnea on exertion and orthopnea. No chest pain. Other 12-point review of systems is negative. PHYSICAL EXAMINATION:  GENERAL:  Alert and oriented to time, place, and person. Moderate distress. VITAL SIGNS:  Temperature 97.8, pulse 71, respiratory rate 19, blood pressure 144/97, pulse ox 96% on room air. HEENT:  Atraumatic. Oropharynx clear. Moist mucous membranes. Normal hard and soft palate. Eyes:  Anicteric sclerae. NECK:  Supple. No lymphadenopathy. Trachea midline. Full range of motion. Positive jugular venous distention. LUNGS:  Crackles auscultated on both lung bases. Increased respiratory effort. HEART:  Irregular rhythm. S1 and S2 auscultated. Cephalic murmur best heard at the apex. ABDOMEN:  Soft, nondistended. No tenderness. Positive bowel sounds. EXTREMITIES:  +2 edema, both legs, with erythema extending from the knee to the ankles in bilateral legs. There is unroofed blister on the right leg lateral aspect. NEUROLOGIC:  Motor and sensory intact. ASSESSMENT AND PLAN:  1. Acute on chronic diastolic heart failure. 2.   Valvular heart disease. 3.   Chronic atrial fibrillation. 4.   Coronary artery disease. 5.   Bilateral lower extremity cellulitis. Patient will be admitted to telemetry floor. IV Lasix, Ancef. Monitor her hemodynamic status. Monitor electrolytes and kidney function. Fall precautions. Wound service consult. Cardiology consult. Further recommendations to follow.     Dictated By Jeovany Eddy MD  d: 05/18/2022 18:23:20  t: 05/18/2022 18:45:33  Job [id number]  BU/

## 2022-05-19 NOTE — PLAN OF CARE
Patient was able to ambulate in room with her 4 wheel walker after breakfast and lunch. Patient seemed to tolerate the activity well. Patient has small wound on her lateral right calf. Santyl wet to dry in place. Bandage to be changed once a day. Patient's edema in her legs decreased bilaterally, little to no pitting and patient states that the pain has decreased.      Problem: Patient Centered Care  Goal: Patient preferences are identified and integrated in the patient's plan of care  Description: Interventions:  - What would you like us to know as we care for you?   - Provide timely, complete, and accurate information to patient/family  - Incorporate patient and family knowledge, values, beliefs, and cultural backgrounds into the planning and delivery of care  - Encourage patient/family to participate in care and decision-making at the level they choose  - Honor patient and family perspectives and choices  Outcome: Progressing     Problem: Patient/Family Goals  Goal: Patient/Family Long Term Goal  Description: Patient's Long Term Goal: Build up strength to be able to go home    Interventions:  - Frequent ambulation  - See additional Care Plan goals for specific interventions  Outcome: Progressing  Goal: Patient/Family Short Term Goal  Description: Patient's Short Term Goal: Decrease swelling in legs    Interventions:   - Oral diuretics  - See additional Care Plan goals for specific interventions  Outcome: Progressing     Problem: PAIN - ADULT  Goal: Verbalizes/displays adequate comfort level or patient's stated pain goal  Description: INTERVENTIONS:  - Encourage pt to monitor pain and request assistance  - Assess pain using appropriate pain scale  - Administer analgesics based on type and severity of pain and evaluate response  - Implement non-pharmacological measures as appropriate and evaluate response  - Consider cultural and social influences on pain and pain management  - Manage/alleviate anxiety  - Utilize distraction and/or relaxation techniques  - Monitor for opioid side effects  - Notify MD/LIP if interventions unsuccessful or patient reports new pain  - Anticipate increased pain with activity and pre-medicate as appropriate  Outcome: Progressing     Problem: RISK FOR INFECTION - ADULT  Goal: Absence of fever/infection during anticipated neutropenic period  Description: INTERVENTIONS  - Monitor WBC  - Administer growth factors as ordered  - Implement neutropenic guidelines  Outcome: Progressing     Problem: SAFETY ADULT - FALL  Goal: Free from fall injury  Description: INTERVENTIONS:  - Assess pt frequently for physical needs  - Identify cognitive and physical deficits and behaviors that affect risk of falls.   - [facility] fall precautions as indicated by assessment.  - Educate pt/family on patient safety including physical limitations  - Instruct pt to call for assistance with activity based on assessment  - Modify environment to reduce risk of injury  - Provide assistive devices as appropriate  - Consider OT/PT consult to assist with strengthening/mobility  - Encourage toileting schedule  Outcome: Progressing     Problem: DISCHARGE PLANNING  Goal: Discharge to home or other facility with appropriate resources  Description: INTERVENTIONS:  - Identify barriers to discharge w/pt and caregiver  - Include patient/family/discharge partner in discharge planning  - Arrange for needed discharge resources and transportation as appropriate  - Identify discharge learning needs (meds, wound care, etc)  - Arrange for interpreters to assist at discharge as needed  - Consider post-discharge preferences of patient/family/discharge partner  - Complete POLST form as appropriate  - Assess patient's ability to be responsible for managing their own health  - Refer to Case Management Department for coordinating discharge planning if the patient needs post-hospital services based on physician/LIP order or complex needs related to functional status, cognitive ability or social support system  Outcome: Progressing     Problem: Altered Communication/Language Barrier  Goal: Patient/Family is able to understand and participate in their care  Description: Interventions:  - Assess communication ability and preferred communication style  - Implement communication aides and strategies  - Use visual cues when possible  - Listen attentively, be patient, do not interrupt  - Minimize distractions  - Allow time for understanding and response  - Establish method for patient to ask for assistance (call light)  - Provide an  as needed  - Communicate barriers and strategies to overcome with those who interact with patient  Outcome: Progressing

## 2022-05-19 NOTE — PROGRESS NOTES
[facility] Note:  Renal Adjustment for cefazolin (ANCEF)    Pavithra Glass is a 80year old patient who has been prescribed cefazolin (ANCEF) 1gm every 8 hrs. The estimated creatinine clearance is 30.5 mL/min (based on SCr of 0.93 mg/dL). The dose has been adjusted to cefazolin (ANCEF) 1gm every 12 hrs per hospital renal dose adjustment protocol for treatment of cellulitis. Pharmacy will follow and adjust dose as warranted for additional renal function changes.     Thank you,    Katty Morgan, PharmD  5/18/2022  9:57 PM

## 2022-05-19 NOTE — PROGRESS NOTES
Received patient from ED via cart accompanied by LI salazar and daughter, Trace Ram. Head to toe assessment, VS, admission history done. Patient is AO x 4. Denies any pain but she verbalized that she feels uncomfortable with her right leg. All held orders by MD were released and carried out. Will continue to monitor.

## 2022-05-19 NOTE — PLAN OF CARE
Problem: DISCHARGE PLANNING  Goal: Discharge to home or other facility with appropriate resources  Description: INTERVENTIONS:  - Identify barriers to discharge w/pt and caregiver  - Include patient/family/discharge partner in discharge planning  - Arrange for needed discharge resources and transportation as appropriate  - Identify discharge learning needs (meds, wound care, etc)  - Arrange for interpreters to assist at discharge as needed  - Consider post-discharge preferences of patient/family/discharge partner  - Complete POLST form as appropriate  - Assess patient's ability to be responsible for managing their own health  - Refer to Case Management Department for coordinating discharge planning if the patient needs post-hospital services based on physician/LIP order or complex needs related to functional status, cognitive ability or social support system  Outcome: Progressing     Problem: SAFETY ADULT - FALL  Goal: Free from fall injury  Description: INTERVENTIONS:  - Assess pt frequently for physical needs  - Identify cognitive and physical deficits and behaviors that affect risk of falls.   - [facility] fall precautions as indicated by assessment.  - Educate pt/family on patient safety including physical limitations  - Instruct pt to call for assistance with activity based on assessment  - Modify environment to reduce risk of injury  - Provide assistive devices as appropriate  - Consider OT/PT consult to assist with strengthening/mobility  - Encourage toileting schedule  Outcome: Progressing     Problem: RISK FOR INFECTION - ADULT  Goal: Absence of fever/infection during anticipated neutropenic period  Description: INTERVENTIONS  - Monitor WBC  - Administer growth factors as ordered  - Implement neutropenic guidelines  Outcome: Progressing     Problem: PAIN - ADULT  Goal: Verbalizes/displays adequate comfort level or patient's stated pain goal  Description: INTERVENTIONS:  - Encourage pt to monitor pain and request assistance  - Assess pain using appropriate pain scale  - Administer analgesics based on type and severity of pain and evaluate response  - Implement non-pharmacological measures as appropriate and evaluate response  - Consider cultural and social influences on pain and pain management  - Manage/alleviate anxiety  - Utilize distraction and/or relaxation techniques  - Monitor for opioid side effects  - Notify MD/LIP if interventions unsuccessful or patient reports new pain  - Anticipate increased pain with activity and pre-medicate as appropriate  Outcome: Progressing

## 2022-05-20 VITALS
HEIGHT: 63 IN | OXYGEN SATURATION: 94 % | RESPIRATION RATE: 18 BRPM | TEMPERATURE: 98 F | HEART RATE: 77 BPM | SYSTOLIC BLOOD PRESSURE: 122 MMHG | BODY MASS INDEX: 19.96 KG/M2 | DIASTOLIC BLOOD PRESSURE: 81 MMHG | WEIGHT: 112.63 LBS

## 2022-05-20 PROCEDURE — 99239 HOSP IP/OBS DSCHRG MGMT >30: CPT | Performed by: HOSPITALIST

## 2022-05-20 RX ORDER — CLINDAMYCIN HYDROCHLORIDE 300 MG/1
300 CAPSULE ORAL 3 TIMES DAILY
Qty: 30 CAPSULE | Refills: 0 | Status: SHIPPED | OUTPATIENT
Start: 2022-05-20 | End: 2022-05-30

## 2022-05-20 NOTE — PLAN OF CARE
Problem: Patient Centered Care  Goal: Patient preferences are identified and integrated in the patient's plan of care  Description: Interventions:  - What would you like us to know as we care for you?  I live at [facility]  - Provide timely, complete, and accurate information to patient/family  - Incorporate patient and family knowledge, values, beliefs, and cultural backgrounds into the planning and delivery of care  - Encourage patient/family to participate in care and decision-making at the level they choose  - Honor patient and family perspectives and choices  Outcome: Progressing     Problem: Patient/Family Goals  Goal: Patient/Family Long Term Goal  Description: Patient's Long Term Goal: To go home    Interventions:  - Monitor vital signs  -IV abx  -Wound changes  - See additional Care Plan goals for specific interventions  Outcome: Progressing  Goal: Patient/Family Short Term Goal  Description: Patient's Short Term Goal: to feel better    Interventions:   - Monitor vital signs  -IV abx  -Wound changes  - See additional Care Plan goals for specific interventions  Outcome: Progressing     Problem: PAIN - ADULT  Goal: Verbalizes/displays adequate comfort level or patient's stated pain goal  Description: INTERVENTIONS:  - Encourage pt to monitor pain and request assistance  - Assess pain using appropriate pain scale  - Administer analgesics based on type and severity of pain and evaluate response  - Implement non-pharmacological measures as appropriate and evaluate response  - Consider cultural and social influences on pain and pain management  - Manage/alleviate anxiety  - Utilize distraction and/or relaxation techniques  - Monitor for opioid side effects  - Notify MD/LIP if interventions unsuccessful or patient reports new pain  - Anticipate increased pain with activity and pre-medicate as appropriate  Outcome: Progressing     Problem: RISK FOR INFECTION - ADULT  Goal: Absence of fever/infection during anticipated neutropenic period  Description: INTERVENTIONS  - Monitor WBC  - Administer growth factors as ordered  - Implement neutropenic guidelines  Outcome: Progressing     Problem: SAFETY ADULT - FALL  Goal: Free from fall injury  Description: INTERVENTIONS:  - Assess pt frequently for physical needs  - Identify cognitive and physical deficits and behaviors that affect risk of falls.   - Naches fall precautions as indicated by assessment.  - Educate pt/family on patient safety including physical limitations  - Instruct pt to call for assistance with activity based on assessment  - Modify environment to reduce risk of injury  - Provide assistive devices as appropriate  - Consider OT/PT consult to assist with strengthening/mobility  - Encourage toileting schedule  Outcome: Progressing     Problem: DISCHARGE PLANNING  Goal: Discharge to home or other facility with appropriate resources  Description: INTERVENTIONS:  - Identify barriers to discharge w/pt and caregiver  - Include patient/family/discharge partner in discharge planning  - Arrange for needed discharge resources and transportation as appropriate  - Identify discharge learning needs (meds, wound care, etc)  - Arrange for interpreters to assist at discharge as needed  - Consider post-discharge preferences of patient/family/discharge partner  - Complete POLST form as appropriate  - Assess patient's ability to be responsible for managing their own health  - Refer to Case Management Department for coordinating discharge planning if the patient needs post-hospital services based on physician/LIP order or complex needs related to functional status, cognitive ability or social support system  Outcome: Progressing     Problem: Altered Communication/Language Barrier  Goal: Patient/Family is able to understand and participate in their care  Description: Interventions:  - Assess communication ability and preferred communication style  - Implement communication aides and strategies  - Use visual cues when possible  - Listen attentively, be patient, do not interrupt  - Minimize distractions  - Allow time for understanding and response  - Establish method for patient to ask for assistance (call light)  - Provide an  as needed  - Communicate barriers and strategies to overcome with those who interact with patient  Outcome: Progressing     Pt alert and oriented X 3-4. Pt on room air. No complaints throughout the day. Pt on IV abx for cellulitis. Wound dressing changed. Safety precautions in place, call light within reach. Plan is discharge today.

## 2022-05-20 NOTE — HOME CARE LIAISON
Patient met with and provided with list of [facility] providers from [facility], patient choice is [address]. Agency reserved in [facility] and contact information placed on AVS.   Financial interest disclosure provided to patient. CHRIS Self updated.

## 2022-05-20 NOTE — CM/SW NOTE
05/20/22 1200   Discharge disposition   Expected discharge disposition Home-Health   Post Acute Care Provider Residential   Discharge transportation Private car     Received notice from [place], pt is medically cleared today and has DC order in place. SW informed RN that pt's dtr will pick her up today at approx 4PM.    SW confirmed w/ Petar Feeling from [facility] - pt is agreeable to [facility] services at time of DC. HH instructions added to pt's AVS.    Pt is cleared from SW/CM stand point. [facility] is aware.     PLAN: [address] w/ [facility], dtr to transport home      TONI Padilla, [address]

## 2022-05-20 NOTE — CM/SW NOTE
05/20/22 1000   CM/SW Referral Data   Referral Source Social Work (self-referral)   Reason for Referral Discharge planning   Informant Daughter  Rosalia Nguyen)   Pertinent Medical Hx   Does patient have an established PCP? Yes  (Alyssia Kilpatrick)   Patient Info   Patient's Home Environment Independent Living  ([address])   Patient lives with Alone   Patient Status Prior to Admission   Independent with ADLs and Mobility No   Pt. requires assistance with Driving; Ambulating   Services in place prior to admission DME/Supplies at home   Type of DME/Supplies Rollator Walker   Discharge Needs   Anticipated D/C needs Home health care   Choice of Post-Acute Provider   Informed patient of right to choose their preferred provider Yes   List of appropriate post-acute services provided to patient/family with quality data   (pending - needs f/up)     SW self referred pt for DC Planning. SW met w/ pt's dtr Ahmet Blanco in pt's room - pt was using restroom at this time. Above assessment completed. Per pt's dtr, pt does not have [place] services at this time. Pt was supposed to have them set up last Admission but that did not occur. Per Dtr - they would like [place] RN and PT upon DC. Ahmet Blanco also informed SW that family will transport home at time of DC pending med clearance. SW reviewed pt's chart. Pt was last DC'd from [facility] in March 2022. At that time, recommendations for DC were not known prior to DC. This SW and SW Supervisor Bhumika Gallardo instructed family to f/up w/ pt's PCP post DC to arrange services. It does not appear that services were arranged. SW sent [place] referrals via [facility] for review. F2F entered/sent. Will need f/up for [place] list and final agency choice prior to DC. PLAN: [facility] w/ HH - pending accept/choice, pending med clear      SW/CM to remain available for support and/or discharge planning.        Servando Thompson, MSW, [address] [facility]

## 2022-05-20 NOTE — PROGRESS NOTES
Went over discharge instructions with pt and family. Provided them with the printed scripts. Went over the importance of follow up appointments. Answered all questions. Returned all bedside belongings to pt and daughter. IV and tele removed. Pt discharged.

## 2022-05-23 ENCOUNTER — PATIENT OUTREACH (OUTPATIENT)
Dept: CASE MANAGEMENT | Age: 87
End: 2022-05-23

## 2022-05-23 ENCOUNTER — APPOINTMENT (OUTPATIENT)
Dept: CARDIAC REHAB | Facility: HOSPITAL | Age: 87
End: 2022-05-23
Attending: INTERNAL MEDICINE
Payer: MEDICARE

## 2022-05-23 DIAGNOSIS — Z02.9 ENCOUNTERS FOR UNSPECIFIED ADMINISTRATIVE PURPOSE: ICD-10-CM

## 2022-05-23 DIAGNOSIS — L03.119 CELLULITIS OF LOWER EXTREMITY, UNSPECIFIED LATERALITY: ICD-10-CM

## 2022-05-23 DIAGNOSIS — S81.801A OPEN WOUND OF RIGHT LOWER EXTREMITY, INITIAL ENCOUNTER: ICD-10-CM

## 2022-05-23 DIAGNOSIS — I50.9 CONGESTIVE HEART FAILURE, UNSPECIFIED HF CHRONICITY, UNSPECIFIED HEART FAILURE TYPE (HCC): ICD-10-CM

## 2022-05-23 PROCEDURE — 1111F DSCHRG MED/CURRENT MED MERGE: CPT

## 2022-05-25 NOTE — TELEPHONE ENCOUNTER
May 25, 2022      [facility]  [address]  [place] [address]  Phone: [phone]  Fax: [phone]       Patient: King Maura Reyes   YOB: 2014  Date of Visit: 05/25/2022    To Whom It May Concern:    Tavo Reyes  was at [facility] on 05/25/2022.If you have any questions or concerns, or if I can be of further assistance, please do not hesitate to contact me.    Sincerely,    Alysa Kellogg MD      Sent to Dr. Venkata Dickerson MD [facility] as well, thank you. Please see results of stat labs.

## 2022-05-26 ENCOUNTER — OFFICE VISIT (OUTPATIENT)
Dept: INTERNAL MEDICINE CLINIC | Facility: CLINIC | Age: 87
End: 2022-05-26
Payer: MEDICARE

## 2022-05-26 VITALS
DIASTOLIC BLOOD PRESSURE: 85 MMHG | HEIGHT: 63 IN | WEIGHT: 114 LBS | HEART RATE: 101 BPM | SYSTOLIC BLOOD PRESSURE: 128 MMHG | BODY MASS INDEX: 20.2 KG/M2

## 2022-05-26 DIAGNOSIS — I50.33 ACUTE ON CHRONIC DIASTOLIC CONGESTIVE HEART FAILURE (HCC): ICD-10-CM

## 2022-05-26 DIAGNOSIS — N18.30 STAGE 3 CHRONIC KIDNEY DISEASE, UNSPECIFIED WHETHER STAGE 3A OR 3B CKD (HCC): ICD-10-CM

## 2022-05-26 DIAGNOSIS — Z74.09 IMPAIRED MOBILITY AND ADLS: ICD-10-CM

## 2022-05-26 DIAGNOSIS — Z78.9 IMPAIRED MOBILITY AND ADLS: ICD-10-CM

## 2022-05-26 DIAGNOSIS — Z95.818 PRESENCE OF WATCHMAN LEFT ATRIAL APPENDAGE CLOSURE DEVICE: ICD-10-CM

## 2022-05-26 DIAGNOSIS — S81.801D OPEN WOUND OF LEG, RIGHT, SUBSEQUENT ENCOUNTER: Primary | ICD-10-CM

## 2022-05-26 DIAGNOSIS — I73.9 PAD (PERIPHERAL ARTERY DISEASE) (HCC): ICD-10-CM

## 2022-05-26 PROCEDURE — 99496 TRANSJ CARE MGMT HIGH F2F 7D: CPT | Performed by: INTERNAL MEDICINE

## 2022-05-26 PROCEDURE — 1111F DSCHRG MED/CURRENT MED MERGE: CPT | Performed by: INTERNAL MEDICINE

## 2022-06-03 NOTE — TELEPHONE ENCOUNTER
Tried to call pt -- no ans and \"memory full \" so unable ot leave a msg -- reviewed cahrt -- it seems pt went to [facility] Top[facility] Sulfur [employer] Counseling: Topical Sulfur Counseling: Patient counseled that this medication may cause skin irritation or allergic reactions.  In the event of skin irritation, the patient was advised to reduce the amount of the drug applied or use it less frequently.   The patient verbalized understanding of the proper use and possible adverse effects of topical sulfur application.  All of the patient's questions and concerns were addressed.

## 2022-06-07 ENCOUNTER — OFFICE VISIT (OUTPATIENT)
Dept: INTERNAL MEDICINE CLINIC | Facility: CLINIC | Age: 87
End: 2022-06-07
Payer: MEDICARE

## 2022-06-07 VITALS
WEIGHT: 110.38 LBS | RESPIRATION RATE: 18 BRPM | TEMPERATURE: 98 F | SYSTOLIC BLOOD PRESSURE: 142 MMHG | HEART RATE: 74 BPM | DIASTOLIC BLOOD PRESSURE: 86 MMHG | HEIGHT: 63 IN | BODY MASS INDEX: 19.56 KG/M2

## 2022-06-07 DIAGNOSIS — I10 ESSENTIAL HYPERTENSION: ICD-10-CM

## 2022-06-07 DIAGNOSIS — I70.0 ATHEROSCLEROSIS OF AORTA (HCC): ICD-10-CM

## 2022-06-07 DIAGNOSIS — Z00.00 ENCOUNTER FOR ANNUAL HEALTH EXAMINATION: ICD-10-CM

## 2022-06-07 DIAGNOSIS — Z78.9 IMPAIRED MOBILITY AND ADLS: ICD-10-CM

## 2022-06-07 DIAGNOSIS — I50.9 CONGESTIVE HEART FAILURE, UNSPECIFIED HF CHRONICITY, UNSPECIFIED HEART FAILURE TYPE (HCC): ICD-10-CM

## 2022-06-07 DIAGNOSIS — I73.9 PAD (PERIPHERAL ARTERY DISEASE) (HCC): ICD-10-CM

## 2022-06-07 DIAGNOSIS — E03.9 HYPOTHYROIDISM, UNSPECIFIED TYPE: ICD-10-CM

## 2022-06-07 DIAGNOSIS — Z00.00 MEDICARE ANNUAL WELLNESS VISIT, SUBSEQUENT: Primary | ICD-10-CM

## 2022-06-07 DIAGNOSIS — I06.0 RHEUMATIC AORTIC STENOSIS: ICD-10-CM

## 2022-06-07 DIAGNOSIS — I25.10 ATHEROSCLEROSIS OF NATIVE CORONARY ARTERY OF NATIVE HEART WITHOUT ANGINA PECTORIS: ICD-10-CM

## 2022-06-07 DIAGNOSIS — H91.90 HEARING LOSS, UNSPECIFIED HEARING LOSS TYPE, UNSPECIFIED LATERALITY: ICD-10-CM

## 2022-06-07 DIAGNOSIS — Z74.09 IMPAIRED MOBILITY AND ADLS: ICD-10-CM

## 2022-06-07 DIAGNOSIS — S81.801A OPEN WOUND OF RIGHT LOWER EXTREMITY, INITIAL ENCOUNTER: ICD-10-CM

## 2022-06-07 DIAGNOSIS — E78.5 HYPERLIPIDEMIA, UNSPECIFIED HYPERLIPIDEMIA TYPE: ICD-10-CM

## 2022-06-07 DIAGNOSIS — N18.30 STAGE 3 CHRONIC KIDNEY DISEASE, UNSPECIFIED WHETHER STAGE 3A OR 3B CKD (HCC): ICD-10-CM

## 2022-06-07 PROBLEM — H61.22 IMPACTED CERUMEN OF LEFT EAR: Status: RESOLVED | Noted: 2021-09-23 | Resolved: 2022-06-07

## 2022-06-07 PROBLEM — L03.119 CELLULITIS OF LOWER EXTREMITY, UNSPECIFIED LATERALITY: Status: RESOLVED | Noted: 2022-05-18 | Resolved: 2022-06-07

## 2022-06-07 PROBLEM — H91.93 DECREASED HEARING OF BOTH EARS: Status: RESOLVED | Noted: 2021-09-23 | Resolved: 2022-06-07

## 2022-06-07 PROCEDURE — 1125F AMNT PAIN NOTED PAIN PRSNT: CPT | Performed by: INTERNAL MEDICINE

## 2022-06-07 PROCEDURE — 1111F DSCHRG MED/CURRENT MED MERGE: CPT | Performed by: INTERNAL MEDICINE

## 2022-06-07 PROCEDURE — G0439 PPPS, SUBSEQ VISIT: HCPCS | Performed by: INTERNAL MEDICINE

## 2022-06-13 ENCOUNTER — MED REC SCAN ONLY (OUTPATIENT)
Dept: INTERNAL MEDICINE CLINIC | Facility: CLINIC | Age: 87
End: 2022-06-13

## 2022-06-13 RX ORDER — LEVOTHYROXINE SODIUM 0.1 MG/1
100 TABLET ORAL
Qty: 90 TABLET | Refills: 3 | Status: SHIPPED | OUTPATIENT
Start: 2022-06-13

## 2022-06-14 ENCOUNTER — TELEPHONE (OUTPATIENT)
Dept: INTERNAL MEDICINE CLINIC | Facility: CLINIC | Age: 87
End: 2022-06-14

## 2022-06-20 ENCOUNTER — TELEPHONE (OUTPATIENT)
Dept: INTERVENTIONAL RADIOLOGY/VASCULAR | Facility: HOSPITAL | Age: 87
End: 2022-06-20

## 2022-06-20 NOTE — TELEPHONE ENCOUNTER
6 month post Watchman call. She continues on Plavix. She is allergic to ASA. I will defer to Dr Genoveva Manning and Dr Milda Gowers when/if  to stop Plavix. She states that she has an upcoming appt w/ Dr Ian Arita.   Emailed [facility]

## 2022-06-24 ENCOUNTER — OFFICE VISIT (OUTPATIENT)
Dept: OTOLARYNGOLOGY | Facility: CLINIC | Age: 87
End: 2022-06-24
Payer: MEDICARE

## 2022-06-24 ENCOUNTER — OFFICE VISIT (OUTPATIENT)
Dept: AUDIOLOGY | Facility: CLINIC | Age: 87
End: 2022-06-24
Payer: MEDICARE

## 2022-06-24 VITALS — TEMPERATURE: 98 F

## 2022-06-24 DIAGNOSIS — H90.3 SENSORINEURAL HEARING LOSS (SNHL) OF BOTH EARS: Primary | ICD-10-CM

## 2022-06-24 DIAGNOSIS — H91.90 HEARING LOSS, UNSPECIFIED HEARING LOSS TYPE, UNSPECIFIED LATERALITY: Primary | ICD-10-CM

## 2022-06-24 DIAGNOSIS — H61.23 BILATERAL IMPACTED CERUMEN: ICD-10-CM

## 2022-06-24 DIAGNOSIS — H90.3 SENSORINEURAL HEARING LOSS, BILATERAL: ICD-10-CM

## 2022-06-24 PROCEDURE — 92567 TYMPANOMETRY: CPT | Performed by: AUDIOLOGIST

## 2022-06-24 PROCEDURE — 92557 COMPREHENSIVE HEARING TEST: CPT | Performed by: AUDIOLOGIST

## 2022-06-24 PROCEDURE — G0268 REMOVAL OF IMPACTED WAX MD: HCPCS | Performed by: OTOLARYNGOLOGY

## 2022-07-16 ENCOUNTER — LAB ENCOUNTER (OUTPATIENT)
Dept: LAB | Age: 87
End: 2022-07-16
Attending: INTERNAL MEDICINE
Payer: MEDICARE

## 2022-07-16 ENCOUNTER — TELEPHONE (OUTPATIENT)
Dept: INTERNAL MEDICINE CLINIC | Facility: CLINIC | Age: 87
End: 2022-07-16

## 2022-07-16 DIAGNOSIS — R39.9 UTI SYMPTOMS: Primary | ICD-10-CM

## 2022-07-16 DIAGNOSIS — R82.90 CLOUDY URINE: ICD-10-CM

## 2022-07-16 LAB
BILIRUB UR QL: NEGATIVE
CLARITY UR: CLEAR
COLOR UR: YELLOW
GLUCOSE UR-MCNC: NEGATIVE MG/DL
KETONES UR-MCNC: NEGATIVE MG/DL
NITRITE UR QL STRIP.AUTO: NEGATIVE
PH UR: 7 [PH] (ref 5–8)
PROT UR-MCNC: NEGATIVE MG/DL
SP GR UR STRIP: 1.01 (ref 1–1.03)
UROBILINOGEN UR STRIP-ACNC: <2
VIT C UR-MCNC: NEGATIVE MG/DL

## 2022-07-16 RX ORDER — CEPHALEXIN 500 MG/1
500 CAPSULE ORAL 2 TIMES DAILY
Qty: 14 CAPSULE | Refills: 0 | Status: SHIPPED | OUTPATIENT
Start: 2022-07-16 | End: 2022-07-23

## 2022-07-16 NOTE — TELEPHONE ENCOUNTER
Liz calling back and was provided with instruction below from provider. (Patient name and  verified)  Verbalized understanding.

## 2022-07-16 NOTE — TELEPHONE ENCOUNTER
Juliet Morales is new caregiver for the patient at [facility] ( independent caregiver )     Juliet Morales states patient is c/o having urine cloudy, frequency, urgency;  onset  Since Friday     HX of UTI with confusion     Juliet Morales states patient has no fever ,not confused at present time     Juliet Morales can obtain a urine specimen if needed      Allergies reviewed and pharmacy confirmed ( multiple allergies noted )        Routing as on_call     Please advise and thank you.         Staff-  please call avinash  at  [phone] with provider response

## 2022-07-17 ENCOUNTER — TELEPHONE (OUTPATIENT)
Dept: INTERNAL MEDICINE CLINIC | Facility: CLINIC | Age: 87
End: 2022-07-17

## 2022-07-17 NOTE — TELEPHONE ENCOUNTER
On  Call   Received call from [facility]  that med wasn't received at the [facility] the pharm  and they said that pt picked it , called avinash back and she states pt did not pick it up  She said she would go to the pharm as she was having a hard time calling them   Call back if any more problems

## 2022-07-17 NOTE — PROGRESS NOTES
Called and spoke to [facility] who is the caregiver of the patient and advised her of the positive UTI and that antibiotics will be sent to the pharmacy on file  Noted she is allergic to penicillin but she tolerates cephalosporins

## 2022-07-25 ENCOUNTER — TELEPHONE (OUTPATIENT)
Dept: INTERNAL MEDICINE CLINIC | Facility: CLINIC | Age: 87
End: 2022-07-25

## 2022-07-25 NOTE — TELEPHONE ENCOUNTER
Patient contacted and was sick last week. Wasn't feeling well. Wasn't having good sleep. Patient doesn't remember where the bottle is. misplaced it. Lives at [facility]. Doesn't remember when she picked up her last RX. I will call pharmacy.

## 2022-07-25 NOTE — TELEPHONE ENCOUNTER
Called Rebec[facility]. Explained situation to pharmacistMar. (misplaced rx). He states patient last  6/15/22 #90. He can get #30 ready for patient and she would pay $5.54 out of pocket expense. Then patient can call by 8/20/22 to get her other refills covered by insurance. Spoke to patient and explained she can  #30 of levothyroxine. Patient verbalized understanding.

## 2022-07-29 ENCOUNTER — LAB ENCOUNTER (OUTPATIENT)
Dept: LAB | Facility: HOSPITAL | Age: 87
End: 2022-07-29
Attending: INTERNAL MEDICINE
Payer: MEDICARE

## 2022-07-29 DIAGNOSIS — I25.10 CAD (CORONARY ARTERY DISEASE): Primary | ICD-10-CM

## 2022-07-29 DIAGNOSIS — E78.5 DYSLIPIDEMIA: ICD-10-CM

## 2022-07-29 DIAGNOSIS — I10 BENIGN ESSENTIAL HTN: ICD-10-CM

## 2022-07-29 DIAGNOSIS — E03.9 HYPOTHYROIDISM: ICD-10-CM

## 2022-07-29 DIAGNOSIS — H61.22 IMPACTED CERUMEN OF LEFT EAR: ICD-10-CM

## 2022-07-29 DIAGNOSIS — I35.0 AORTIC STENOSIS: ICD-10-CM

## 2022-07-29 DIAGNOSIS — I60.9: ICD-10-CM

## 2022-07-29 DIAGNOSIS — R07.9 ACUTE CHEST PAIN: ICD-10-CM

## 2022-07-29 LAB
ANION GAP SERPL CALC-SCNC: 4 MMOL/L (ref 0–18)
BUN BLD-MCNC: 23 MG/DL (ref 7–18)
BUN/CREAT SERPL: 25.3 (ref 10–20)
CALCIUM BLD-MCNC: 9.1 MG/DL (ref 8.5–10.1)
CHLORIDE SERPL-SCNC: 98 MMOL/L (ref 98–112)
CO2 SERPL-SCNC: 32 MMOL/L (ref 21–32)
CREAT BLD-MCNC: 0.91 MG/DL
FASTING STATUS PATIENT QL REPORTED: NO
GLUCOSE BLD-MCNC: 119 MG/DL (ref 70–99)
OSMOLALITY SERPL CALC.SUM OF ELEC: 283 MOSM/KG (ref 275–295)
POTASSIUM SERPL-SCNC: 4 MMOL/L (ref 3.5–5.1)
SODIUM SERPL-SCNC: 134 MMOL/L (ref 136–145)

## 2022-07-29 PROCEDURE — 36415 COLL VENOUS BLD VENIPUNCTURE: CPT

## 2022-07-29 PROCEDURE — 80048 BASIC METABOLIC PNL TOTAL CA: CPT

## 2022-09-06 NOTE — TELEPHONE ENCOUNTER
Call pt   Give her a referral to see Rosealee Apgar  Dx Open wound leg left
Patient on call by Dr. Azar Fuelling regarding leg avulsion. Patient treated in ER, will be discharged home on p.o. antibiotics with MRSA coverage and recommended follow-up with PCP in the next week.
See  Carly Mancilla soon
Spoke with patient, verified  and name. Informed of Dr. Mckenna Denver instructions below. Patient given MD name and number. Patient verbalizes understanding and agrees.
English

## 2022-09-12 RX ORDER — METOPROLOL SUCCINATE 25 MG/1
12.5 TABLET, EXTENDED RELEASE ORAL 2 TIMES DAILY
Qty: 90 TABLET | Refills: 0 | Status: SHIPPED | OUTPATIENT
Start: 2022-09-12

## 2022-09-12 NOTE — TELEPHONE ENCOUNTER
Protocol failed or has No Protocol, please review  Requested Prescriptions   Pending Prescriptions Disp Refills    METOPROLOL SUCCINATE ER 25 MG Oral Tablet 24 Hr [Pharmacy Med Name: METOPROLOL ER SUCCINATE 25MG TABS] 90 tablet 1     Sig: TAKE 1/2 TABLET(12.5 MG) BY MOUTH TWICE DAILY        Hypertensive Medications Protocol Failed - 9/11/2022  6:26 AM        Failed - Last BP reading less than 140/90     BP Readings from Last 1 Encounters:  06/07/22 : 142/86                Passed - In person appointment in the past 12 or next 3 months       Recent Outpatient Visits              2 months ago Sensorineural hearing loss (SNHL) of both ears    [facility] Jose Stone, [address], [address]    Office Visit    2 months ago Hearing loss, unspecified hearing loss type, unspecified laterality    [facility], [place], Antonio Mcfarland MD    Office Visit    3 months ago Medicare annual wellness visit, subsequent    Erica Michelle MD    Office Visit    3 months ago Open wound of leg, right, subsequent encounter    Erica Michelle MD    Office Visit    5 months ago Essential hypertension    [address], [address] Liang MD    Office Visit                 Passed - CMP or BMP in past 6 months     Recent Results (from the past 4392 hour(s))   BASIC METABOLIC PANEL (8)    Collection Time: 07/29/22  1:19 PM   Result Value Ref Range    Glucose 119 (H) 70 - 99 mg/dL    Sodium 134 (L) 136 - 145 mmol/L    Potassium 4.0 3.5 - 5.1 mmol/L    Chloride 98 98 - 112 mmol/L    CO2 32.0 21.0 - 32.0 mmol/L    Anion Gap 4 0 - 18 mmol/L    BUN 23 (H) 7 - 18 mg/dL    Creatinine 0.91 0.55 - 1.02 mg/dL    BUN/CREA Ratio 25.3 (H) 10.0 - 20.0    Calcium, Total 9.1 8.5 - 10.1 mg/dL    Calculated Osmolality 283 275 - 295 mOsm/kg    GFR, Non- 54 (L) >=60    GFR, -American 62 >=60    Patient Fasting for BMP? No      *Note: Due to a large number of results and/or encounters for the requested time period, some results have not been displayed. A complete set of results can be found in Results Review.                  Passed - In person appointment or virtual visit in the past 6 months       Recent Outpatient Visits              2 months ago Sensorineural hearing loss (SNHL) of both [address] Audiology [address], [address] [email or web address]    Office Visit    2 months ago Hearing loss, unspecified hearing loss type, unspecified laterality    [facility], [address], Kory Mcfarland MD    Office Visit    3 months ago Medicare annual wellness visit, subsequent    [address], [address] Jeronimo MD    Office Visit    3 months ago Open wound of leg, right, subsequent encounter    [address], [address] Jeronimo MD    Office Visit    5 months ago Essential hypertension    [address], [address] Jeronimo MD    Office Visit                 Passed - GFR > 50     No results found for: [facility]                    Recent Outpatient Visits              2 months ago Sensorineural hearing loss (SNHL) of both [address] Audiology Alden Sofia, [address], [address]    Office Visit    2 months ago Hearing loss, unspecified hearing loss type, unspecified laterality    [facility] Lyric Galicia MD    Office Visit    3 months ago [employer] annual wellness visit, subsequent    Waleska Mercedes MD    Office Visit    3 months ago Open wound of leg, right, subsequent encounter    Waleska Mercedes MD    Office Visit    5 months ago Essential hypertension    Waleska Mercedes MD Office Visit

## 2022-09-23 DIAGNOSIS — H90.3 SENSORINEURAL HEARING LOSS, BILATERAL: Primary | ICD-10-CM

## 2022-10-03 ENCOUNTER — APPOINTMENT (OUTPATIENT)
Dept: URBAN - METROPOLITAN AREA CLINIC 244 | Age: 87
Setting detail: DERMATOLOGY
End: 2022-10-03

## 2022-10-03 DIAGNOSIS — L82.1 OTHER SEBORRHEIC KERATOSIS: ICD-10-CM

## 2022-10-03 DIAGNOSIS — L81.4 OTHER MELANIN HYPERPIGMENTATION: ICD-10-CM

## 2022-10-03 DIAGNOSIS — L82.0 INFLAMED SEBORRHEIC KERATOSIS: ICD-10-CM

## 2022-10-03 DIAGNOSIS — D22 MELANOCYTIC NEVI: ICD-10-CM

## 2022-10-03 DIAGNOSIS — L57.0 ACTINIC KERATOSIS: ICD-10-CM

## 2022-10-03 PROBLEM — D22.61 MELANOCYTIC NEVI OF RIGHT UPPER LIMB, INCLUDING SHOULDER: Status: ACTIVE | Noted: 2022-10-03

## 2022-10-03 PROBLEM — D22.62 MELANOCYTIC NEVI OF LEFT UPPER LIMB, INCLUDING SHOULDER: Status: ACTIVE | Noted: 2022-10-03

## 2022-10-03 PROBLEM — D48.5 NEOPLASM OF UNCERTAIN BEHAVIOR OF SKIN: Status: ACTIVE | Noted: 2022-10-03

## 2022-10-03 PROBLEM — D22.5 MELANOCYTIC NEVI OF TRUNK: Status: ACTIVE | Noted: 2022-10-03

## 2022-10-03 PROCEDURE — 11300 SHAVE SKIN LESION 0.5 CM/<: CPT | Mod: 59

## 2022-10-03 PROCEDURE — OTHER LIQUID NITROGEN: OTHER

## 2022-10-03 PROCEDURE — OTHER SHAVE REMOVAL: OTHER

## 2022-10-03 PROCEDURE — 17110 DESTRUCT B9 LESION 1-14: CPT

## 2022-10-03 PROCEDURE — OTHER COUNSELING: OTHER

## 2022-10-03 PROCEDURE — 99213 OFFICE O/P EST LOW 20 MIN: CPT | Mod: 25

## 2022-10-03 PROCEDURE — 17000 DESTRUCT PREMALG LESION: CPT | Mod: 59

## 2022-10-03 ASSESSMENT — LOCATION DETAILED DESCRIPTION DERM
LOCATION DETAILED: MIDDLE STERNUM
LOCATION DETAILED: RIGHT ANTERIOR SHOULDER
LOCATION DETAILED: RIGHT ANTERIOR DISTAL UPPER ARM
LOCATION DETAILED: UPPER STERNUM
LOCATION DETAILED: LEFT VENTRAL PROXIMAL FOREARM
LOCATION DETAILED: LEFT ANTERIOR DISTAL UPPER ARM
LOCATION DETAILED: LEFT CENTRAL TEMPLE
LOCATION DETAILED: RIGHT MEDIAL SUPERIOR CHEST
LOCATION DETAILED: RIGHT ANTECUBITAL SKIN
LOCATION DETAILED: LEFT ANTECUBITAL SKIN

## 2022-10-03 ASSESSMENT — LOCATION ZONE DERM
LOCATION ZONE: TRUNK
LOCATION ZONE: ARM
LOCATION ZONE: FACE

## 2022-10-03 ASSESSMENT — LOCATION SIMPLE DESCRIPTION DERM
LOCATION SIMPLE: RIGHT SHOULDER
LOCATION SIMPLE: CHEST
LOCATION SIMPLE: LEFT UPPER ARM
LOCATION SIMPLE: RIGHT UPPER ARM
LOCATION SIMPLE: LEFT FOREARM
LOCATION SIMPLE: LEFT TEMPLE

## 2022-10-03 NOTE — PROCEDURE: LIQUID NITROGEN
Number Of Freeze-Thaw Cycles: 1 freeze-thaw cycle
Render Post-Care Instructions In Note?: no
Show Aperture Variable?: Yes
Medical Necessity Information: It is in your best interest to select a reason for this procedure from the list below. All of these items fulfill various CMS LCD requirements except the new and changing color options.
Medical Necessity Clause: This procedure was medically necessary because the lesions that were treated were:
Detail Level: Simple
Consent: The patient's consent was obtained including but not limited to risks of crusting, scabbing, blistering, scarring, darker or lighter pigmentary change, recurrence, incomplete removal and infection.
Number Of Freeze-Thaw Cycles: 2 freeze-thaw cycles
Post-Care Instructions: I reviewed with the patient in detail post-care instructions. Patient is to wear sunprotection, and avoid picking at any of the treated lesions. Pt may apply Vaseline to crusted or scabbing areas.
Application Tool (Optional): Liquid Nitrogen Sprayer
Duration Of Freeze Thaw-Cycle (Seconds): 5
Spray Paint Text: The liquid nitrogen was applied to the skin utilizing a spray paint frosting technique.
Duration Of Freeze Thaw-Cycle (Seconds): 5-10

## 2022-10-03 NOTE — PROCEDURE: SHAVE REMOVAL
Wound Care: Petrolatum
Bill For Surgical Tray: no
X Size Of Lesion In Cm (Optional): 0
Notification Instructions: Patient will be notified of pathology results. However, patient instructed to call the office if not contacted within 2 weeks.
Medical Necessity Clause: This procedure was medically necessary because the lesion that was treated was:
Hemostasis: Drysol
Size Of Lesion In Cm (Required): 0.5
Biopsy Method: double edge Personna blade
Consent was obtained from the patient. The risks and benefits to therapy were discussed in detail. Specifically, the risks of infection, scarring, bleeding, prolonged wound healing, incomplete removal, allergy to anesthesia, nerve injury and recurrence were addressed.
Billing Type: Third-Party Bill
Was A Bandage Applied: Yes
Medical Necessity Information: It is in your best interest to select a reason for this procedure from the list below. All of these items fulfill various CMS LCD requirements except the new and changing color options.
Post-Care Instructions: I reviewed with the patient in detail post-care instructions. Patient is to keep the biopsy site dry overnight, and then apply petrolatum twice daily until healed or after one or two night patient may leave area open and dry and a scab will form which will eventually fall off in a few weeks without further care other than cleaning twice a day.
Detail Level: Detailed
Anesthesia Type: 0.5% lidocaine with 1:200,000 epinephrine and a 1:10 solution of 8.4% sodium bicarbonate

## 2022-11-04 ENCOUNTER — HOSPITAL ENCOUNTER (EMERGENCY)
Facility: HOSPITAL | Age: 87
Discharge: HOME | End: 2022-11-05
Attending: EMERGENCY MEDICINE
Payer: MEDICARE

## 2022-11-04 ENCOUNTER — APPOINTMENT (OUTPATIENT)
Dept: CT IMAGING | Facility: HOSPITAL | Age: 87
End: 2022-11-04
Payer: MEDICARE

## 2022-11-04 ENCOUNTER — APPOINTMENT (OUTPATIENT)
Dept: GENERAL RADIOLOGY | Facility: HOSPITAL | Age: 87
End: 2022-11-04
Payer: MEDICARE

## 2022-11-04 DIAGNOSIS — S00.93XA CONTUSION OF HEAD, UNSPECIFIED PART OF HEAD, INITIAL ENCOUNTER: ICD-10-CM

## 2022-11-04 DIAGNOSIS — S42.202A CLOSED FRACTURE OF PROXIMAL END OF LEFT HUMERUS, UNSPECIFIED FRACTURE MORPHOLOGY, INITIAL ENCOUNTER: Primary | ICD-10-CM

## 2022-11-04 PROCEDURE — 70450 CT HEAD/BRAIN W/O DYE: CPT

## 2022-11-04 PROCEDURE — 99285 EMERGENCY DEPT VISIT HI MDM: CPT

## 2022-11-04 PROCEDURE — 99284 EMERGENCY DEPT VISIT MOD MDM: CPT

## 2022-11-04 PROCEDURE — 73030 X-RAY EXAM OF SHOULDER: CPT

## 2022-11-04 RX ORDER — ACETAMINOPHEN 500 MG
1000 TABLET ORAL ONCE
Status: COMPLETED | OUTPATIENT
Start: 2022-11-04 | End: 2022-11-04

## 2022-11-04 RX ORDER — METOPROLOL SUCCINATE 25 MG/1
TABLET, EXTENDED RELEASE ORAL
Qty: 90 TABLET | Refills: 0 | Status: SHIPPED | OUTPATIENT
Start: 2022-11-04

## 2022-11-04 NOTE — ED INITIAL ASSESSMENT (HPI)
Presents via EMS to triage for witnessed fall at [facility]. Pt presents with a sling to the left arm applied in the ambulance with c/o left shoulder pain. Denies other injury.  Pt is AAOx4 and verbal at this time

## 2022-11-05 VITALS
BODY MASS INDEX: 19.54 KG/M2 | OXYGEN SATURATION: 97 % | WEIGHT: 110.25 LBS | HEIGHT: 63 IN | SYSTOLIC BLOOD PRESSURE: 148 MMHG | DIASTOLIC BLOOD PRESSURE: 86 MMHG | RESPIRATION RATE: 16 BRPM | TEMPERATURE: 98 F | HEART RATE: 74 BPM

## 2022-11-05 RX ORDER — ACETAMINOPHEN 500 MG
TABLET ORAL
Status: COMPLETED
Start: 2022-11-05 | End: 2022-11-05

## 2022-11-05 RX ORDER — ACETAMINOPHEN 500 MG
1000 TABLET ORAL ONCE
Status: COMPLETED | OUTPATIENT
Start: 2022-11-05 | End: 2022-11-05

## 2022-11-05 NOTE — ED QUICK NOTES
Pt requesting pain meds, allergic to ASA, unsure of tolerance to NSAID, does not want any strong pain meds. Per ERMD Versa Moulds pt OK for another dose of tylenol.

## 2022-11-05 NOTE — ED QUICK NOTES
Pt made aware of medicar ETA and provided snacks per ERMD approval, comfortable, no further needs at this time. No other ambulance services w/ available medicar at this time. Pt does not have anyone to call for ride.

## 2022-11-05 NOTE — ED QUICK NOTES
Pt assisted by RN into [employer] pt a/ox4, respirations unlabored, speech clear, NAD  Pt instructed to return to ED for any new/severe s/s or as directed by provider, and to see PMD/specialist ASAP or as directed by provider.

## 2022-11-05 NOTE — ED QUICK NOTES
Pt resting comfortably in bed, a/ox4, respirations unlabored, speech full/clear, no acute distress noted. Continuing to monitor.

## 2022-11-07 ENCOUNTER — TELEPHONE (OUTPATIENT)
Dept: INTERNAL MEDICINE CLINIC | Facility: CLINIC | Age: 87
End: 2022-11-07

## 2022-11-07 NOTE — TELEPHONE ENCOUNTER
Patient's daughter Gege Rosales calling to request form for respite care for patient be signed today. Will be faxing over today. Patient needs to be moved from 4th floor to 2nd floor. Patient is not getting care needed on 4th floor.

## 2022-11-08 PROBLEM — Z95.1 PRESENCE OF AORTOCORONARY BYPASS GRAFT: Status: ACTIVE | Noted: 2021-01-01

## 2022-11-08 PROBLEM — Z85.828 HISTORY OF SKIN CANCER: Status: ACTIVE | Noted: 2021-01-01

## 2022-11-08 PROBLEM — I27.20 PULMONARY HYPERTENSION, UNSPECIFIED (HCC): Status: ACTIVE | Noted: 2021-01-01

## 2022-11-08 PROBLEM — Z87.891 HISTORY OF NICOTINE DEPENDENCE: Status: ACTIVE | Noted: 2021-01-01

## 2022-11-08 PROBLEM — S42.222A CLOSED 2-PART DISPLACED FRACTURE OF SURGICAL NECK OF LEFT HUMERUS: Status: ACTIVE | Noted: 2022-11-08

## 2022-11-08 PROBLEM — I50.21 ACUTE SYSTOLIC (CONGESTIVE) HEART FAILURE (HCC): Status: ACTIVE | Noted: 2021-01-01

## 2022-11-08 PROBLEM — E78.00 PURE HYPERCHOLESTEROLEMIA: Status: ACTIVE | Noted: 2021-01-01

## 2022-11-08 NOTE — TELEPHONE ENCOUNTER
Daughter will have [place] re-fax the form. Daughter is requesting a call to confirm the form has been received. Per daughter, the form is due today.

## 2022-11-08 NOTE — TELEPHONE ENCOUNTER
Form completed by Dr Malka Torres and faxed along with medication list and chest x-ray order to  [phone] (confirmation received ) . Called Pt's daughter Maite Rosenberg and informed her forms / orders were faxed .

## 2022-11-14 ENCOUNTER — NURSE TRIAGE (OUTPATIENT)
Dept: INTERNAL MEDICINE CLINIC | Facility: CLINIC | Age: 87
End: 2022-11-14

## 2022-11-14 ENCOUNTER — LAB ENCOUNTER (OUTPATIENT)
Dept: LAB | Age: 87
End: 2022-11-14
Attending: INTERNAL MEDICINE
Payer: MEDICARE

## 2022-11-14 DIAGNOSIS — R30.9 URINARY PAIN: ICD-10-CM

## 2022-11-14 DIAGNOSIS — N39.0 RECURRENT UTI: Primary | ICD-10-CM

## 2022-11-14 LAB
BILIRUB UR QL: NEGATIVE
COLOR UR: YELLOW
GLUCOSE UR-MCNC: NEGATIVE MG/DL
HGB UR QL STRIP.AUTO: NEGATIVE
KETONES UR-MCNC: NEGATIVE MG/DL
NITRITE UR QL STRIP.AUTO: NEGATIVE
PH UR: 8 [PH] (ref 5–8)
PROT UR-MCNC: >=500 MG/DL
SP GR UR STRIP: 1.01 (ref 1–1.03)
UROBILINOGEN UR STRIP-ACNC: <2
VIT C UR-MCNC: NEGATIVE MG/DL

## 2022-11-14 NOTE — TELEPHONE ENCOUNTER
Multiple allergies to medications  Recurrent UTI    Not sure which antibiotic to start  Need urinalysis with reflex culture   Otherwise ER or URgent care if acutely ill

## 2022-11-14 NOTE — TELEPHONE ENCOUNTER
Advised patient's daughter of Dr Kenan Garcia note. She verbalized understanding. Daughter will have caregiver  urine sample cup from lab.

## 2022-11-16 ENCOUNTER — TELEPHONE (OUTPATIENT)
Dept: INTERNAL MEDICINE CLINIC | Facility: CLINIC | Age: 87
End: 2022-11-16

## 2022-11-17 ENCOUNTER — HOSPITAL ENCOUNTER (EMERGENCY)
Facility: HOSPITAL | Age: 87
Discharge: HOME | End: 2022-11-17
Attending: EMERGENCY MEDICINE
Payer: MEDICARE

## 2022-11-17 ENCOUNTER — HOSPITAL ENCOUNTER (INPATIENT)
Facility: HOSPITAL | Age: 87
LOS: 4 days | Discharge: NURSING, REHAB OR LONG-TERM CARE | End: 2022-11-22
Attending: EMERGENCY MEDICINE | Admitting: HOSPITALIST
Payer: MEDICARE

## 2022-11-17 ENCOUNTER — HOSPITAL ENCOUNTER (INPATIENT)
Facility: HOSPITAL | Age: 87
End: 2022-11-17
Attending: EMERGENCY MEDICINE | Admitting: HOSPITALIST
Payer: MEDICARE

## 2022-11-17 VITALS
SYSTOLIC BLOOD PRESSURE: 122 MMHG | HEIGHT: 63 IN | OXYGEN SATURATION: 98 % | RESPIRATION RATE: 20 BRPM | HEART RATE: 79 BPM | WEIGHT: 115 LBS | DIASTOLIC BLOOD PRESSURE: 71 MMHG | BODY MASS INDEX: 20.37 KG/M2 | TEMPERATURE: 98 F

## 2022-11-17 DIAGNOSIS — N30.01 ACUTE CYSTITIS WITH HEMATURIA: Primary | ICD-10-CM

## 2022-11-17 DIAGNOSIS — I21.4 NSTEMI (NON-ST ELEVATED MYOCARDIAL INFARCTION) (HCC): ICD-10-CM

## 2022-11-17 DIAGNOSIS — I48.91 ATRIAL FIBRILLATION WITH RAPID VENTRICULAR RESPONSE (HCC): ICD-10-CM

## 2022-11-17 DIAGNOSIS — N30.00 ACUTE CYSTITIS WITHOUT HEMATURIA: Primary | ICD-10-CM

## 2022-11-17 DIAGNOSIS — R11.2 NAUSEA AND VOMITING IN ADULT: ICD-10-CM

## 2022-11-17 PROCEDURE — 99283 EMERGENCY DEPT VISIT LOW MDM: CPT

## 2022-11-17 RX ORDER — PHENAZOPYRIDINE HYDROCHLORIDE 100 MG/1
100 TABLET, FILM COATED ORAL 3 TIMES DAILY PRN
Qty: 6 TABLET | Refills: 0 | Status: SHIPPED | OUTPATIENT
Start: 2022-11-17 | End: 2022-11-24

## 2022-11-17 RX ORDER — CEPHALEXIN 500 MG/1
500 CAPSULE ORAL 2 TIMES DAILY
Qty: 14 CAPSULE | Refills: 0 | Status: SHIPPED | OUTPATIENT
Start: 2022-11-17 | End: 2022-11-22

## 2022-11-17 RX ORDER — CEPHALEXIN 500 MG/1
500 CAPSULE ORAL ONCE
Status: COMPLETED | OUTPATIENT
Start: 2022-11-17 | End: 2022-11-17

## 2022-11-17 RX ORDER — ONDANSETRON 2 MG/ML
4 INJECTION INTRAMUSCULAR; INTRAVENOUS ONCE
Status: COMPLETED | OUTPATIENT
Start: 2022-11-17 | End: 2022-11-17

## 2022-11-17 RX ORDER — ONDANSETRON 2 MG/ML
4 INJECTION INTRAMUSCULAR; INTRAVENOUS ONCE
Status: DISCONTINUED | OUTPATIENT
Start: 2022-11-17 | End: 2022-11-17

## 2022-11-17 NOTE — CM/SW NOTE
Patient is in respite care at [facility] d/t broken left shoulder but normally resides in [address].      SAMIR would like to see if patient can receive any PT while there. KETURAH called Rosana Malloy liaison at [facility] to inquire about PT in respite and she stated that she will have someone from [facility]. Waiting call back. [facility] at [phone] and spoke to Mary Jo on  and he stated that he wasn't sure who to transfer me to but that he would find out and have someone call BHAVINM back. [address] again, since no one from facility has called [facility] back, and it is just ringing out. Called patient at [phone] and left message that [facility] to [facility] someone at [facility] and that they should reach out to [facility] about arranging PT and if they have any more questions for [facility] to please call.

## 2022-11-17 NOTE — ED INITIAL ASSESSMENT (HPI)
Pt to ED with c/o call back to ED for resulted E.coli infection. Pt currently has broken left shoulder and is wearing immobilizer. Pt c/o urinary frequency, dysuria, and urinary urgency.

## 2022-11-18 ENCOUNTER — TELEPHONE (OUTPATIENT)
Dept: INTERNAL MEDICINE CLINIC | Facility: CLINIC | Age: 87
End: 2022-11-18

## 2022-11-18 ENCOUNTER — APPOINTMENT (OUTPATIENT)
Dept: CV DIAGNOSTICS | Facility: HOSPITAL | Age: 87
End: 2022-11-18
Attending: STUDENT IN AN ORGANIZED HEALTH CARE EDUCATION/TRAINING PROGRAM
Payer: MEDICARE

## 2022-11-18 ENCOUNTER — APPOINTMENT (OUTPATIENT)
Dept: INTERVENTIONAL RADIOLOGY/VASCULAR | Facility: HOSPITAL | Age: 87
End: 2022-11-18
Attending: NURSE PRACTITIONER
Payer: MEDICARE

## 2022-11-18 PROBLEM — R11.2 NAUSEA AND VOMITING IN ADULT: Status: ACTIVE | Noted: 2022-11-18

## 2022-11-18 PROBLEM — I48.91 ATRIAL FIBRILLATION WITH RAPID VENTRICULAR RESPONSE (HCC): Status: ACTIVE | Noted: 2022-11-18

## 2022-11-18 LAB
ALBUMIN SERPL-MCNC: 3.7 G/DL (ref 3.4–5)
ALP LIVER SERPL-CCNC: 116 U/L
ALT SERPL-CCNC: 26 U/L
ANION GAP SERPL CALC-SCNC: 8 MMOL/L (ref 0–18)
ANION GAP SERPL CALC-SCNC: 9 MMOL/L (ref 0–18)
APTT PPP: 28.1 SECONDS (ref 23.3–35.6)
APTT PPP: 90.6 SECONDS (ref 23.3–35.6)
APTT PPP: >240 SECONDS (ref 23.3–35.6)
AST SERPL-CCNC: 38 U/L (ref 15–37)
BASOPHILS # BLD AUTO: 0.02 X10(3) UL (ref 0–0.2)
BASOPHILS NFR BLD AUTO: 0.3 %
BILIRUB DIRECT SERPL-MCNC: 0.2 MG/DL (ref 0–0.2)
BILIRUB SERPL-MCNC: 0.8 MG/DL (ref 0.1–2)
BUN BLD-MCNC: 27 MG/DL (ref 7–18)
BUN BLD-MCNC: 28 MG/DL (ref 7–18)
BUN/CREAT SERPL: 27.6 (ref 10–20)
BUN/CREAT SERPL: 33.7 (ref 10–20)
CALCIUM BLD-MCNC: 9 MG/DL (ref 8.5–10.1)
CALCIUM BLD-MCNC: 9.7 MG/DL (ref 8.5–10.1)
CHLORIDE SERPL-SCNC: 101 MMOL/L (ref 98–112)
CHLORIDE SERPL-SCNC: 105 MMOL/L (ref 98–112)
CHOLEST SERPL-MCNC: 143 MG/DL (ref ?–200)
CO2 SERPL-SCNC: 26 MMOL/L (ref 21–32)
CO2 SERPL-SCNC: 26 MMOL/L (ref 21–32)
CREAT BLD-MCNC: 0.83 MG/DL
CREAT BLD-MCNC: 0.98 MG/DL
DEPRECATED RDW RBC AUTO: 49.1 FL (ref 35.1–46.3)
DEPRECATED RDW RBC AUTO: 49.9 FL (ref 35.1–46.3)
EOSINOPHIL # BLD AUTO: 0.04 X10(3) UL (ref 0–0.7)
EOSINOPHIL NFR BLD AUTO: 0.6 %
ERYTHROCYTE [DISTWIDTH] IN BLOOD BY AUTOMATED COUNT: 13.9 % (ref 11–15)
ERYTHROCYTE [DISTWIDTH] IN BLOOD BY AUTOMATED COUNT: 13.9 % (ref 11–15)
GFR SERPLBLD BASED ON 1.73 SQ M-ARVRAT: 53 ML/MIN/1.73M2 (ref 60–?)
GFR SERPLBLD BASED ON 1.73 SQ M-ARVRAT: 65 ML/MIN/1.73M2 (ref 60–?)
GLUCOSE BLD-MCNC: 138 MG/DL (ref 70–99)
GLUCOSE BLD-MCNC: 153 MG/DL (ref 70–99)
HCT VFR BLD AUTO: 37.2 %
HCT VFR BLD AUTO: 42.9 %
HDLC SERPL-MCNC: 70 MG/DL (ref 40–59)
HGB BLD-MCNC: 12 G/DL
HGB BLD-MCNC: 13.5 G/DL
IMM GRANULOCYTES # BLD AUTO: 0.01 X10(3) UL (ref 0–1)
IMM GRANULOCYTES NFR BLD: 0.2 %
ISTAT ACTIVATED CLOTTING TIME: 161 SECONDS (ref 125–137)
ISTAT ACTIVATED CLOTTING TIME: 179 SECONDS (ref 125–137)
ISTAT ACTIVATED CLOTTING TIME: 237 SECONDS (ref 125–137)
ISTAT ACTIVATED CLOTTING TIME: 242 SECONDS (ref 125–137)
ISTAT ACTIVATED CLOTTING TIME: 254 SECONDS (ref 125–137)
ISTAT ACTIVATED CLOTTING TIME: 260 SECONDS (ref 125–137)
LDLC SERPL CALC-MCNC: 56 MG/DL (ref ?–100)
LIPASE SERPL-CCNC: 96 U/L (ref 73–393)
LYMPHOCYTES # BLD AUTO: 0.34 X10(3) UL (ref 1–4)
LYMPHOCYTES NFR BLD AUTO: 5.2 %
MCH RBC QN AUTO: 31.1 PG (ref 26–34)
MCH RBC QN AUTO: 31.5 PG (ref 26–34)
MCHC RBC AUTO-ENTMCNC: 31.5 G/DL (ref 31–37)
MCHC RBC AUTO-ENTMCNC: 32.3 G/DL (ref 31–37)
MCV RBC AUTO: 97.6 FL
MCV RBC AUTO: 98.8 FL
MONOCYTES # BLD AUTO: 0.67 X10(3) UL (ref 0.1–1)
MONOCYTES NFR BLD AUTO: 10.3 %
NEUTROPHILS # BLD AUTO: 5.43 X10 (3) UL (ref 1.5–7.7)
NEUTROPHILS # BLD AUTO: 5.43 X10(3) UL (ref 1.5–7.7)
NEUTROPHILS NFR BLD AUTO: 83.4 %
NONHDLC SERPL-MCNC: 73 MG/DL (ref ?–130)
OSMOLALITY SERPL CALC.SUM OF ELEC: 288 MOSM/KG (ref 275–295)
OSMOLALITY SERPL CALC.SUM OF ELEC: 298 MOSM/KG (ref 275–295)
PLATELET # BLD AUTO: 195 10(3)UL (ref 150–450)
PLATELET # BLD AUTO: 220 10(3)UL (ref 150–450)
POTASSIUM SERPL-SCNC: 3.8 MMOL/L (ref 3.5–5.1)
POTASSIUM SERPL-SCNC: 4.3 MMOL/L (ref 3.5–5.1)
PROT SERPL-MCNC: 8 G/DL (ref 6.4–8.2)
Q-T INTERVAL: 342 MS
Q-T INTERVAL: 404 MS
Q-T INTERVAL: 410 MS
QRS DURATION: 104 MS
QRS DURATION: 108 MS
QRS DURATION: 90 MS
QTC CALCULATION (BEZET): 438 MS
QTC CALCULATION (BEZET): 448 MS
QTC CALCULATION (BEZET): 493 MS
R AXIS: 51 DEGREES
R AXIS: 60 DEGREES
R AXIS: 73 DEGREES
RBC # BLD AUTO: 3.81 X10(6)UL
RBC # BLD AUTO: 4.34 X10(6)UL
SARS-COV-2 RNA RESP QL NAA+PROBE: NOT DETECTED
SODIUM SERPL-SCNC: 135 MMOL/L (ref 136–145)
SODIUM SERPL-SCNC: 140 MMOL/L (ref 136–145)
T AXIS: 117 DEGREES
T AXIS: 140 DEGREES
T AXIS: 248 DEGREES
TRIGL SERPL-MCNC: 94 MG/DL (ref 30–149)
TROPONIN I HIGH SENSITIVITY: 111 NG/L
TROPONIN I HIGH SENSITIVITY: 1215 NG/L
TROPONIN I HIGH SENSITIVITY: ABNORMAL NG/L
VENTRICULAR RATE: 74 BPM
VENTRICULAR RATE: 87 BPM
VENTRICULAR RATE: 99 BPM
VLDLC SERPL CALC-MCNC: 14 MG/DL (ref 0–30)
WBC # BLD AUTO: 5.9 X10(3) UL (ref 4–11)
WBC # BLD AUTO: 6.5 X10(3) UL (ref 4–11)

## 2022-11-18 PROCEDURE — 99222 1ST HOSP IP/OBS MODERATE 55: CPT | Performed by: HOSPITALIST

## 2022-11-18 PROCEDURE — 4A023N7 MEASUREMENT OF CARDIAC SAMPLING AND PRESSURE, LEFT HEART, PERCUTANEOUS APPROACH: ICD-10-PCS | Performed by: INTERNAL MEDICINE

## 2022-11-18 PROCEDURE — 027237Z DILATION OF CORONARY ARTERY, THREE ARTERIES WITH FOUR OR MORE DRUG-ELUTING INTRALUMINAL DEVICES, PERCUTANEOUS APPROACH: ICD-10-PCS | Performed by: INTERNAL MEDICINE

## 2022-11-18 RX ORDER — ONDANSETRON 2 MG/ML
INJECTION INTRAMUSCULAR; INTRAVENOUS
Status: COMPLETED
Start: 2022-11-18 | End: 2022-11-18

## 2022-11-18 RX ORDER — VANCOMYCIN HYDROCHLORIDE 1 G/20ML
INJECTION, POWDER, LYOPHILIZED, FOR SOLUTION INTRAVENOUS
Status: COMPLETED
Start: 2022-11-18 | End: 2022-11-18

## 2022-11-18 RX ORDER — ACETAMINOPHEN 325 MG/1
TABLET ORAL
Status: COMPLETED
Start: 2022-11-18 | End: 2022-11-18

## 2022-11-18 RX ORDER — HEPARIN SODIUM 1000 [USP'U]/ML
60 INJECTION, SOLUTION INTRAVENOUS; SUBCUTANEOUS ONCE
Status: COMPLETED | OUTPATIENT
Start: 2022-11-18 | End: 2022-11-18

## 2022-11-18 RX ORDER — DIPHENHYDRAMINE HYDROCHLORIDE 50 MG/ML
INJECTION INTRAMUSCULAR; INTRAVENOUS
Status: COMPLETED
Start: 2022-11-18 | End: 2022-11-18

## 2022-11-18 RX ORDER — HEPARIN SODIUM AND DEXTROSE 10000; 5 [USP'U]/100ML; G/100ML
12 INJECTION INTRAVENOUS ONCE
Status: COMPLETED | OUTPATIENT
Start: 2022-11-18 | End: 2022-11-18

## 2022-11-18 RX ORDER — 0.9 % SODIUM CHLORIDE 0.9 %
INTRAVENOUS SOLUTION INTRAVENOUS
Status: COMPLETED
Start: 2022-11-18 | End: 2022-11-18

## 2022-11-18 RX ORDER — MIDAZOLAM HYDROCHLORIDE 1 MG/ML
INJECTION INTRAMUSCULAR; INTRAVENOUS
Status: COMPLETED
Start: 2022-11-18 | End: 2022-11-18

## 2022-11-18 RX ORDER — ROSUVASTATIN CALCIUM 20 MG/1
20 TABLET, COATED ORAL NIGHTLY
Status: DISCONTINUED | OUTPATIENT
Start: 2022-11-18 | End: 2022-11-22

## 2022-11-18 RX ORDER — ONDANSETRON 2 MG/ML
4 INJECTION INTRAMUSCULAR; INTRAVENOUS EVERY 4 HOURS PRN
Status: DISCONTINUED | OUTPATIENT
Start: 2022-11-18 | End: 2022-11-18

## 2022-11-18 RX ORDER — LEVOTHYROXINE SODIUM 0.1 MG/1
100 TABLET ORAL
Status: DISCONTINUED | OUTPATIENT
Start: 2022-11-18 | End: 2022-11-22

## 2022-11-18 RX ORDER — HEPARIN SODIUM AND DEXTROSE 10000; 5 [USP'U]/100ML; G/100ML
INJECTION INTRAVENOUS CONTINUOUS
Status: DISCONTINUED | OUTPATIENT
Start: 2022-11-18 | End: 2022-11-18

## 2022-11-18 RX ORDER — ADENOSINE 3 MG/ML
INJECTION, SOLUTION INTRAVENOUS
Status: COMPLETED
Start: 2022-11-18 | End: 2022-11-18

## 2022-11-18 RX ORDER — PREDNISONE 20 MG/1
40 TABLET ORAL
Status: DISCONTINUED | OUTPATIENT
Start: 2022-11-18 | End: 2022-11-22

## 2022-11-18 RX ORDER — METHYLPREDNISOLONE SODIUM SUCCINATE 125 MG/2ML
INJECTION, POWDER, LYOPHILIZED, FOR SOLUTION INTRAMUSCULAR; INTRAVENOUS
Status: COMPLETED
Start: 2022-11-18 | End: 2022-11-18

## 2022-11-18 RX ORDER — HEPARIN SODIUM 5000 [USP'U]/ML
5000 INJECTION, SOLUTION INTRAVENOUS; SUBCUTANEOUS EVERY 12 HOURS SCHEDULED
Status: DISCONTINUED | OUTPATIENT
Start: 2022-11-18 | End: 2022-11-18

## 2022-11-18 RX ORDER — SODIUM CHLORIDE 9 MG/ML
INJECTION, SOLUTION INTRAVENOUS CONTINUOUS
Status: ACTIVE | OUTPATIENT
Start: 2022-11-18 | End: 2022-11-18

## 2022-11-18 RX ORDER — SODIUM CHLORIDE 9 MG/ML
INJECTION, SOLUTION INTRAVENOUS CONTINUOUS
Status: DISCONTINUED | OUTPATIENT
Start: 2022-11-18 | End: 2022-11-19

## 2022-11-18 RX ORDER — SODIUM CHLORIDE 9 MG/ML
INJECTION, SOLUTION INTRAVENOUS
Status: DISCONTINUED | OUTPATIENT
Start: 2022-11-19 | End: 2022-11-19

## 2022-11-18 RX ORDER — ACETAMINOPHEN 325 MG/1
650 TABLET ORAL EVERY 6 HOURS PRN
Status: DISCONTINUED | OUTPATIENT
Start: 2022-11-18 | End: 2022-11-22

## 2022-11-18 RX ORDER — ONDANSETRON 2 MG/ML
4 INJECTION INTRAMUSCULAR; INTRAVENOUS EVERY 6 HOURS PRN
Status: DISCONTINUED | OUTPATIENT
Start: 2022-11-18 | End: 2022-11-22

## 2022-11-18 RX ORDER — NITROGLYCERIN 20 MG/100ML
INJECTION INTRAVENOUS
Status: COMPLETED
Start: 2022-11-18 | End: 2022-11-18

## 2022-11-18 RX ORDER — METOPROLOL SUCCINATE 25 MG/1
25 TABLET, EXTENDED RELEASE ORAL 2 TIMES DAILY
Status: DISCONTINUED | OUTPATIENT
Start: 2022-11-18 | End: 2022-11-22

## 2022-11-18 RX ORDER — FUROSEMIDE 40 MG/1
40 TABLET ORAL DAILY
Status: DISCONTINUED | OUTPATIENT
Start: 2022-11-18 | End: 2022-11-22

## 2022-11-18 RX ORDER — METOPROLOL TARTRATE 5 MG/5ML
INJECTION INTRAVENOUS
Status: COMPLETED
Start: 2022-11-18 | End: 2022-11-18

## 2022-11-18 RX ORDER — CHLORHEXIDINE GLUCONATE 4 G/100ML
30 SOLUTION TOPICAL
Status: COMPLETED | OUTPATIENT
Start: 2022-11-18 | End: 2022-11-18

## 2022-11-18 RX ORDER — LISINOPRIL 2.5 MG/1
2.5 TABLET ORAL DAILY
Status: DISCONTINUED | OUTPATIENT
Start: 2022-11-18 | End: 2022-11-21

## 2022-11-18 RX ORDER — DOCUSATE SODIUM 100 MG/1
100 CAPSULE, LIQUID FILLED ORAL 2 TIMES DAILY PRN
Status: DISCONTINUED | OUTPATIENT
Start: 2022-11-18 | End: 2022-11-22

## 2022-11-18 RX ORDER — ASPIRIN 81 MG/1
81 TABLET ORAL DAILY
Status: DISCONTINUED | OUTPATIENT
Start: 2022-11-19 | End: 2022-11-18

## 2022-11-18 RX ORDER — CLOPIDOGREL BISULFATE 75 MG/1
75 TABLET ORAL DAILY
Status: DISCONTINUED | OUTPATIENT
Start: 2022-11-18 | End: 2022-11-18

## 2022-11-18 NOTE — TELEPHONE ENCOUNTER
Yessenia from [place] [address] is calling to see if PCP will sign orders for home health. Patient is currently admitted at [facility].    Call back #[phone]  Confidential Line Okay to leave a message.

## 2022-11-18 NOTE — TELEPHONE ENCOUNTER
On call message received, called pt back and daughter took call. She is listed ok to contact. Reports she was advised to go to ER for IV due to recent UTI and s/s dehydration. Reports when they went to the ER, they were told they can choose whether to get an IV or antibiotics. The opted for antibiotics. Pt started them, experiencing N/V/D. Sx are not improving, slightly worsening. Advised to go to ER, needs IV hydration. Pt's daughter verbalized understanding and agrees to take patient.

## 2022-11-18 NOTE — ED QUICK NOTES
Orders for admission, patient is aware of plan and ready to go upstairs.  Any questions, please call ED RN gurmeet at extension [phone]    Patient Covid vaccination status: Fully vaccinated     COVID Test Ordered in ED: Rapid SARS-CoV-2 by PCR    COVID Suspicion at Admission: Low clinical suspicion for COVID    Running Infusions: none     Mental Status/LOC at time of transport: alert    Other pertinent information:       Elevated trop  CIWA score: N/A   NIH score:  N/A

## 2022-11-18 NOTE — H&P
[facility]    PATIENT'S NAME: Matthew Moise   ATTENDING PHYSICIAN: Elzbieta Nance. Alpa Zarco MD   PATIENT ACCOUNT#:   [id number]    LOCATION:  [address] [place] [address] #:   [id number]       YOB: 1928  ADMISSION DATE:       11/17/2022    HISTORY AND PHYSICAL EXAMINATION    DATE OF EXAMINATION:  11/18/2022    CHIEF COMPLAINT:  Intractable nausea and vomiting, dyspnea on exertion. HISTORY OF PRESENT ILLNESS:  This is a 77-year-old woman who was admitted to our institution in March of this year with a non-ST-elevation myocardial infarction. At that time she underwent coronary angiography which revealed an occluded SVG to OM1 and OM2. She was seen by Cardiology and medical management was recommended. She was discharged on aspirin, Plavix, metoprolol, and Crestor. According to the patient's daughter, however, ever since that admission she has noticed that her mother has decreased exercise tolerance with increased shortness of breath with exertion. The patient walks with the aid of a walker, but recently had a fall with an apparent proximal humeral fracture on the left. The patient is currently in respite, as she is unable to negotiate a walker at the present time. She denied any nausea or vomiting. No chest discomfort or diaphoresis. No calf pain or shortness of breath. No recent travel history. The daughter says that the patient missed lunch yesterday, as they were out and about for evaluation and treatment of a urinary tract infection that had been diagnosed several days earlier. The patient was found to have Proteus in her urine, but with her many antibiotic allergies, treatment was challenging. The patient did  some antibiotic on the 17th, but missed lunch, so decided to take it after dinner as she wanted to take it on a full stomach. Apparently, shortly after taking the medication she developed nausea and vomiting with diarrhea. She denied any chest discomfort.   No diaphoresis. Her initial workup in the emergency room included an EKG which revealed atrial fibrillation, minimal voltage criteria for LVH, with some ST-segment depression and a nonspecific T-wave abnormalities. The patient's initial troponin was 111. Repeat troponin was 1215. Her glucose was 153, sodium 135, potassium 4.3, chloride 101, CO2 of 26, BUN of 27 with a creatinine of 0.98, calcium 9.7, anion gap of 8. AST 38, ALT 26. Lipase was 96. Protein was 8 with an albumin of 3.7. White count was 6.5 with a hemoglobin of 13.5 and a platelet count of 808,362. There were 83% neutrophils. Urinalysis was cloudy with moderate leukocyte esterase and rare bacteria. Urine culture was positive for Proteus in 50 to 99,000 CFL/mL on November 14, but it was resistant only to nitrofurantoin. The patient received a dose of IV ceftriaxone. [address] [place] [address] Cardiology was consulted and will decide regarding beginning heparin drip. Patient will be admitted to the PCU for further evaluation and recommendations. EKG revealed atrial fibrillation with left ventricular hypertrophy and nonspecific T-wave abnormalities. The patient's initial EKG did show some ST-segment depression, which improved on the second EKG. PAST MEDICAL HISTORY:  Significant for coronary artery disease, status post coronary artery bypass graft surgery. Angiogram in 2022 showed occluded graft of the SVG to obtuse marginal 1 and 2. She had a decreased ejection fraction of 18% with LV diastolic dysfunction and wall motion hypokinesis. The patient has a history of rheumatic valvular heart disease with mild to moderate aortic stenosis and mitral insufficiency and regurgitation. She had moderate to severe pulmonary artery hypertension and chronic atrial fibrillation, status post Watchman device. Additionally, she has hypertension, hyperlipidemia, osteoarthritis, osteoporosis, chronic kidney disease stage 2 to 3, and hypothyroidism.   She recently had a fall and sustained a slightly comminuted, minimally impacted fracture of the surgical neck of the left humerus with minimal displacement of the greater tuberosity. She is following with Dr. Bobbi Dao and is in a shoulder immobilizer currently. She has moved from her independent living section to respite until she is able to use her walker again. PAST SURGICAL HISTORY:  Left breast lumpectomy followed by radiation therapy for breast cancer, hysterectomy, laparoscopic cholecystectomy, coronary artery bypass grafting, and partial bowel resection. MEDICATIONS:  Prior to admission:  Pyridium 100 mg 3 times a day as needed for pain; metoprolol 25 mg, patient is taking half a tablet twice a day; levothyroxine 100 mcg daily before breakfast; lisinopril 2.5 mg daily; clopidogrel 75 mg daily; rosuvastatin 20 mg nightly; Tylenol 500 mg every 6 hours as needed for pain; Colace 100 mg twice a day as needed for constipation; cephalexin 500 mg twice a day, which was started on the day of admission; collagenase ointment to affected areas twice a day; furosemide 40 mg daily; triamcinolone 0.1% cream applied topically 2 times a day; vitamin C 500 mg daily; vitamin D3 at 1000 units daily; moisturizing cream 1 application daily as needed; cranberry juice extract 1000 mg daily. ALLERGIES:  Aspirin, penicillin, Macrobid, ciprofloxacin. FAMILY HISTORY:  Patient's mother had a CVA and hypertension. Father had coronary artery disease. SOCIAL HISTORY:  The patient is an ex-smoker. No current tobacco, alcohol, or drug use. Again, she lives at [facility] in independent living, but is currently in the respite unit. She does require some assistance with activities of daily living and more now that she has sustained the humerus fracture. REVIEW OF SYSTEMS:  Twelve systems were reviewed. The patient has had problems with her diet at [facility] in terms of its compliance with her heart issues.   She had been having difficulty with significant lower extremity edema, but according to her daughter the diet has improved and her lower extremity edema is also much improved. She denies any fever or chills. No calf pain. No recent travel history. She was recently diagnosed with a urinary tract infection and developed severe nausea, vomiting, and diarrhea following starting the medication. PHYSICAL EXAMINATION:    VITAL SIGNS:  Temperature was 98. Pulse 102. Respiratory rate 18. Blood pressure varied between 88 systolic to 327 when the patient was sleeping to 124/78 while awake. HEENT:  Normocephalic, atraumatic. Pupils equal, reactive. Sclerae anicteric. There was no sinus tenderness. Mucous membranes were moist.  Teeth were in fair repair. There was no oropharyngeal crowding. LUNGS:  Clear with easy respirations. No wheezes or rhonchi. HEART:  Irregularly irregular rate and rhythm. ABDOMEN:  Soft, nontender, with normoactive bowel sounds. No guarding. No rebound. EXTREMITIES:  No clubbing, cyanosis, calf tenderness, or palpable cords. There was trace edema. SKIN:  Cool and dry without any significant rashes. NEUROLOGIC:  The patient was initially sleepy when I entered the room, but aroused easily and was awake, alert, and appropriate. She was hard of hearing and generally weak, but there was no focal motor or sensory deficit. Her gait was not tested. PSYCHIATRIC:  Her mood and affect were pleasant and cooperative. BACK:  There was no costovertebral angle tenderness noted. LABORATORY DATA:  Previously mentioned. ASSESSMENT AND PLAN:    1.   Non-ST-elevation myocardial infarction. [facility] was apparently planning to start heparin drip pending evaluation by cardiology attending. Continue oxygen supplementation as needed and antiemetics as needed with sublingual nitroglycerin for acute chest discomfort. Zofran for nausea as well. 2.   Hypertension. Blood pressure has been on the low side. Continue to monitor. Restart blood pressure medicines when blood pressure stabilizes. 3.   Valvular heart disease. Consider repeat echocardiogram pending cardiology evaluation. Perhaps, if there is plan for another angiogram, that could be done instead. Currently patient does not seem to have much in the way of lower extremity edema. 4.   Coronary artery disease, has previously been managed medically. At this point will need to await Cardiology's evaluation and recommendations regarding any further intervention. The patient did have occlusion of a couple of bypasses on her previous angiography in March and medical management was recommended. It sounds as though this had not been completely successful, as the patient described new-onset dyspnea on exertion, even minimal exertion, since she had the NSTEMI in March. 5.   Chronic atrial fibrillation, status post Watchman device. 6.   Heart failure with reduced ejection fraction and diastolic dysfunction. Will monitor for volume status currently. Would hold diuretic at this point pending cardiology evaluation. 7.   DVT prophylaxis. SCDs, subcutaneous heparin. 8.   Hyperlipidemia. Continue statin. 9.   Chronic kidney disease stage 2 to 3. Stable. 10.   Hypothyroidism. Continue Synthroid. 11. The patient's current clinical status and proposed treatment plan were discussed with her. All of her questions were answered and she agreed with the plan of care as outlined above. I did discuss with her daughter regarding the plan moving forward if Cardiology does not feel the patient is able to tolerate another angiogram with potential intervention. We discussed that if the patient was too frail for definitive treatment, could consider palliative care or hospice for primarily symptom management.   The patient has 2 daughters, one is here currently, another is in [place], and there are plans for the daughter in [place] to come here as well so a family discussion can be had.    Dictated By Kyler Herzog MD  d: 11/18/2022 08:44:10  t: 11/18/2022 09:44:31  Job [id number]  OYQ/

## 2022-11-18 NOTE — ED INITIAL ASSESSMENT (HPI)
Patient arrives through triage in wheelchair with c/o of nausea vomiting since today. EMS states that patient also complained of chest pain en route.  EMS states that patient was recently diagnosed with a UTI

## 2022-11-18 NOTE — PLAN OF CARE
Patient admitted for N/V/Diarrhea and chest pain. Pt with elevated trops, 111-->1,215-->27,517. Pt went for emergent cath this afternoon. Pt transferring to CCU post-procedure. Multiple stents placed, sheath left in. Report called to Frank Lou in ccu. Pts daughter directed down to ccu. All belongings sent with the daughter. Problem: Patient Centered Care  Goal: Patient preferences are identified and integrated in the patient's plan of care  Description: Interventions:  - What would you like us to know as we care for you?  She has a broken left shoulder  - Provide timely, complete, and accurate information to patient/family  - Incorporate patient and family knowledge, values, beliefs, and cultural backgrounds into the planning and delivery of care  - Encourage patient/family to participate in care and decision-making at the level they choose  - Honor patient and family perspectives and choices  Outcome: Progressing     Problem: Patient/Family Goals  Goal: Patient/Family Long Term Goal  Description: Patient's Long Term Goal: To stay out of the hospital    Interventions:  - Medical management  - See additional Care Plan goals for specific interventions  Outcome: Progressing  Goal: Patient/Family Short Term Goal  Description: Patient's Short Term Goal: To feel better    Interventions:   - Medical management, cardiology follow ups  - See additional Care Plan goals for specific interventions  Outcome: Progressing     Problem: CARDIOVASCULAR - ADULT  Goal: Maintains optimal cardiac output and hemodynamic stability  Description: INTERVENTIONS:  - Monitor vital signs, rhythm, and trends  - Monitor for bleeding, hypotension and signs of decreased cardiac output  - Evaluate effectiveness of vasoactive medications to optimize hemodynamic stability  - Monitor arterial and/or venous puncture sites for bleeding and/or hematoma  - Assess quality of pulses, skin color and temperature  - Assess for signs of decreased coronary artery perfusion - ex. Angina  - Evaluate fluid balance, assess for edema, trend weights  Outcome: Progressing  Goal: Absence of cardiac arrhythmias or at baseline  Description: INTERVENTIONS:  - Continuous cardiac monitoring, monitor vital signs, obtain 12 lead EKG if indicated  - Evaluate effectiveness of antiarrhythmic and heart rate control medications as ordered  - Initiate emergency measures for life threatening arrhythmias  - Monitor electrolytes and administer replacement therapy as ordered  Outcome: Progressing     Problem: PAIN - ADULT  Goal: Verbalizes/displays adequate comfort level or patient's stated pain goal  Description: INTERVENTIONS:  - Encourage pt to monitor pain and request assistance  - Assess pain using appropriate pain scale  - Administer analgesics based on type and severity of pain and evaluate response  - Implement non-pharmacological measures as appropriate and evaluate response  - Consider cultural and social influences on pain and pain management  - Manage/alleviate anxiety  - Utilize distraction and/or relaxation techniques  - Monitor for opioid side effects  - Notify MD/LIP if interventions unsuccessful or patient reports new pain  - Anticipate increased pain with activity and pre-medicate as appropriate  Outcome: Progressing     Problem: RISK FOR INFECTION - ADULT  Goal: Absence of fever/infection during anticipated neutropenic period  Description: INTERVENTIONS  - Monitor WBC  - Administer growth factors as ordered  - Implement neutropenic guidelines  Outcome: Progressing     Problem: SAFETY ADULT - FALL  Goal: Free from fall injury  Description: INTERVENTIONS:  - Assess pt frequently for physical needs  - Identify cognitive and physical deficits and behaviors that affect risk of falls.   - [place] fall precautions as indicated by assessment.  - Educate pt/family on patient safety including physical limitations  - Instruct pt to call for assistance with activity based on assessment  - Modify environment to reduce risk of injury  - Provide assistive devices as appropriate  - Consider OT/PT consult to assist with strengthening/mobility  - Encourage toileting schedule  Outcome: Progressing     Problem: DISCHARGE PLANNING  Goal: Discharge to home or other facility with appropriate resources  Description: INTERVENTIONS:  - Identify barriers to discharge w/pt and caregiver  - Include patient/family/discharge partner in discharge planning  - Arrange for needed discharge resources and transportation as appropriate  - Identify discharge learning needs (meds, wound care, etc)  - Arrange for interpreters to assist at discharge as needed  - Consider post-discharge preferences of patient/family/discharge partner  - Complete POLST form as appropriate  - Assess patient's ability to be responsible for managing their own health  - Refer to Case Management Department for coordinating discharge planning if the patient needs post-hospital services based on physician/LIP order or complex needs related to functional status, cognitive ability or social support system  Outcome: Progressing

## 2022-11-18 NOTE — PLAN OF CARE
Received patient from ED in no acute distress, no chest pain. Pt voiced concerns about being full code, wants to change to DNR, informed oncoming RN. Heparin gtt started @ 6ml/hr @ 0530, PTT due at 1130. No pain endorsed. Call light within reach, hourly rounds maintained. Problem: Patient Centered Care  Goal: Patient preferences are identified and integrated in the patient's plan of care  Description: Interventions:  - What would you like us to know as we care for you?  She has a broken left shoulder  - Provide timely, complete, and accurate information to patient/family  - Incorporate patient and family knowledge, values, beliefs, and cultural backgrounds into the planning and delivery of care  - Encourage patient/family to participate in care and decision-making at the level they choose  - Honor patient and family perspectives and choices  Outcome: Progressing

## 2022-11-18 NOTE — PROCEDURES
[facility]    Cardiac Cath Procedure Note    Perri Andrew Patient Status:  Inpatient    1928 MRN [id number]   Location [facility] Attending Rosalina Smith MD   Hosp Day # 0 PCP [address]       Cardiologist: Shona Pretty MD  Primary Proceduralist: Shona Pretty MD  Procedure Performed: LHC, LV, GRAFT and PCI SVG-D, SVG-OM, SVG-RCA  Date of Procedure: 2022   Indication: NSTEMI    Summary of procedure:    Successful PCI SVG-D with PAULA x 1, SVG-OM with PAULA x 2, SVG-RCA with PAULA x 1      Left Ventriculography and hemodynamics: Aortic valve not crossed    During procedure patient's hand touched access site and therefore was started on vancomycin. After the procedure she had a rash and nausea either due to iodine allergy or vancomycin allergy. She was premedicated with Benadryl and Solu-Medrol prior to the case. -Given additional 25 mg IV Benadryl  -Oral prednisone to start today    Coronary Angiography  RCA: Known to be heavily diseased at the ostium, supplies small marginal branch. Not injected. Left main:  Free of obstructive disease    Left anterior descending: Heavily diseased proximal segment supplying significant septal to the base of the heart, 100% occluded shortly after takeoff of the septal.  -LIMA to LAD known to be occluded  -LAD flow from retrograde filling via SVG to diagonal significantly diseased due to retrograde limb disease as well as ostial diagonal disease.   -SVG to diagonal without significant disease however at the distal anastomosis there is 80 to 90% both antegrade and retrograde disease into the diagonal    Circumflex: 100% occluded at the proximal segment   -Large vein graft to inferolateral OM with heavily diseased 80 to 90% occlusion at the ostium      SVG to diagonal intervention: Initially try to wire retrograde into the LAD, able to wire in the retrograde limb of the diagonal however unable to wire LAD stenosis after trying Fielder FC and Adry black. Therefore decided to fix antegrade limb to the diagonal.    Lesion Characteristics-moderately torturous, mildly calcified. Type non-C lesion. Pre-intervention stenosis 95%, Post intervention stenosis 0%. Pre LIAT 3, Post LIAT 3.      Guide Catheter: AL-1  Wire: Adry black via guide liner  Pre-dil: 2.5 and 3 mm compliant balloons  Stent: 3 x 16 mm Synergy PAULA  Post-dil: 4 x 8 compliant balloon at proximal end of the stent to flare stent into the vein graft      SVG to OM intervention  Lesion Characteristics-not torturous, severely calcified. Type non-C lesion. Pre-intervention stenosis 95%, Post intervention stenosis 0%. Pre LIAT 3, Post LIAT 3.      Guide Catheter: AL-1  Wire: Primary lesion wired with  200, eventually exchanged for run-through wire down the lateral OM  Pre-dil: 2 x 20, 3 x 20 balloon to track guide liner through to the mid vessel and under sheath stent  Stent: 5 x 20 Synergy PAULA at ostium  Post-dil: 5 x 20 NC at 20 haylie into the aorta  Post stent deployment there was significant distal embolization of organized material to the distal vessel as well as no reflow from microvascular embolization. Advance guide liner to the distal portion of the graft to give adenosine and nitroglycerin. Sequential dilations of 3 x 20 balloon down the lateral OM branch and vein graft to restore flow. Still embolized debris in the distal portion of the graft which was eventually stented with 3.5 x 16 mm Synergy PAULA at 20 haylie. SVG to RCA intervention  Lesion Characteristics-not torturous, moderately calcified. Type non-C lesion. Pre-intervention stenosis 95%, Post intervention stenosis 0%.   Pre LIAT 3, Post LIAT 3.      Guide Catheter: MPA  Wire: Run through exchanged for 4 mm filter wire, difficulty passing through the proximal lesion therefore deployed in the mid portion of the graft  Pre-dil: 2.5 and 3 mm balloon, 3 mm balloon to track guide liner to the mid graft  Stent: 3.5 x 16 mm Synergy PAULA at 18 haylie  Post-dil: None        Assessment:  NSTEMI secondary to diffuse global ischemia, unclear which of the lesions above were the culprit given that they are all noncompliant and chronic lesions. CAD status post CABG and most recent PCI as above    HFrEF: Ischemic cardiomyopathy    PVD      Recommendations:  Aspirin allergy therefore we will proceed with monotherapy Brilinta  Reassess blood pressures tomorrow for heart failure medication titration  Ambulate in the morning given manual closure on the artery      Description of Procedure:   After written informed consent was obtained from the patient, patient was brought to the cardiac catheterization laboratory. Patient was prepped and draped in the usual sterile fashion. Lidocaine 1% was used to infiltrate the right groin for local anesthesia and a 6 English introducer sheath was inserted into the right femoral artery via ultrasound guidance and micropuncture kit. Selective coronary angiography performed with JR4 catheter for RCA and JL4 catheter for LCA. Angiography performed in standard projections. JR4 for vein grafts to the left, MPA catheter SVG to the RCA    Selective right femoral angiogram done assess anatomy for closure. Specimen sent to: No specimen collected  Estimated blood loss: 10 cc  Closure:  Perclose       IV was maintained by RN and moderate conscious sedation of versed and fentanyl was given. Patient was assessed and monitoring of oxygen, heart rate and blood pressure by nurse and myself during the exam 120 minutes.       Jesusita Lundy MD  11/18/22

## 2022-11-18 NOTE — CONSULTS
Spiritual Care Visit Note    Visited With: Patient    Spiritual Care Taxonomy:    Intended Effects: Demonstrate caring and concern    Methods: Collaborate with care team member;Encourage story-telling; Offer spiritual/Adventism support    Interventions: Active listening; Ask guided questions;Assist someone with Advance Directives;[place]    RN report patient is alert and decisional. Writer report offering empathic listening and support.  visited patient in response to [place] [facility] consult/request.   Prior to visit, reviewed the patient's EMR and paper chart to identify any existing POA documentation. Two previously completed POA document was located. Reviewed the document with the patient. Patient states that the current POA document is accurate and would like to make changes. Writer report helping patient create a new POA for Healthcare document. Working with the patient, revoked the previous POA document. A copy of the revoked document was given to the patient. A second copy of the revoked document was placed on the patient's paper chart. Created a new POA for Healthcare document that, per the patient, accurately reflects their wishes. Copies of the original POA document along with copies. A copy of the new POA document has been placed on the patient's paper chart. Spiritual Care can be requested via an Epic consult.     [address]   [id number]     On call  services [phone] negative detailed exam

## 2022-11-18 NOTE — CM/SW NOTE
11/18/22 1200   CM/SW Referral Data   Referral Source Social Work (self-referral)   Reason for Referral Discharge planning   Informant Patient   Pertinent Medical Hx   Does patient have an established PCP? Yes  (Elvira Hooper)   Patient Info   Patient's Home Environment [facility]  ([address])   Patient lives with Alone   Patient Status Prior to Admission   Independent with ADLs and Mobility No   Pt. requires assistance with Ambulating;Driving;Meals   Services in place prior to admission DME/Supplies at home   Type of DME/Supplies Standard Walker   Discharge Needs   Anticipated D/C needs Home health care; To be determined   Choice of Post-Acute Provider   Informed patient of right to choose their preferred provider Yes   List of appropriate post-acute services provided to patient/family with quality data   ([facility] refs in Aidin - needs f/up)     12:00PM  SW self referred pt for DC planning. Chart review shows ED CM Alexsandra Mars spoke w/ [facility] [place] and was informed pt is currently under respite care at their facility while she heals from fractured shoulder. SW met w/ pt in her room. Above assessment completed. Pt confirmed living at [place] [id number] for approx 5 yrs. Pt makes herself lunch and then has breakfast and dinner in the dining area. Pt does not drive and has not since moving into [facility]. Per pt, she utilizes the transportation available at the [address]. Pt has hx w/ rehab at [facility] and [email or web address]. Pt also [facility] hx w/ [facility]. Pt informed SW she does not currently have any PT at \[facility]". CHRIS discussed HH - pt stated \"I don't know if my orthopedic surgeon would agree to that. \"    CHRIS attempted liaison Fatuma via phone to discuss pt's status at [facility] - no answer, left Vmail requesting a call back. CHRIS also [facility] [place] - no answer, left Vmail requesting a call back.     CHRIS requested [facility] send pro-active [facility] rash referrals via Aidin for review. F2F entered. 01:40PM  Received VMail from pt's dtr Richardson Dobbs. SW attempted to return phone call - no answer, SW left another Vmail requesting call back. 04:00PM  SW attempted pt's dtr Richardson Dobbs one more time via phone - still now answer. Third Vmail was not left at this time. [facility] will need to know if pt is at her [address] [place] [address] apartment or at [facility] for respite care prior to following up w/ pt & family for [facility] choice. If Pt is at [address] [place] [address], then [facility] will accept & offer choice for [facility]. If pt is at [facility] for respite care, then [facility] would need to f/up for arrangement of PT services. Will need to f/up and confirm pt agreeable to [facility] and then provide list for choice. PLAN: [address] w/ poss HH - pending accept/choice, pending clinical course      SW/CM to remain available for support and/or discharge planning.            Darcy Ortega, MSW, [facility]

## 2022-11-18 NOTE — CM/SW NOTE
Department  notified of request for [facility], aidin referrals started. Assigned CM/SW to follow up with pt/family on further discharge planning.      Maury Martin   November 18, 2022   12:08

## 2022-11-19 ENCOUNTER — APPOINTMENT (OUTPATIENT)
Dept: CV DIAGNOSTICS | Facility: HOSPITAL | Age: 87
End: 2022-11-19
Attending: STUDENT IN AN ORGANIZED HEALTH CARE EDUCATION/TRAINING PROGRAM
Payer: MEDICARE

## 2022-11-19 LAB
ALBUMIN SERPL-MCNC: 3 G/DL (ref 3.4–5)
ALBUMIN/GLOB SERPL: 0.9 {RATIO} (ref 1–2)
ALP LIVER SERPL-CCNC: 93 U/L
ALT SERPL-CCNC: 25 U/L
ANION GAP SERPL CALC-SCNC: 9 MMOL/L (ref 0–18)
AST SERPL-CCNC: 148 U/L (ref 15–37)
BASOPHILS # BLD AUTO: 0.01 X10(3) UL (ref 0–0.2)
BASOPHILS NFR BLD AUTO: 0.2 %
BILIRUB SERPL-MCNC: 0.4 MG/DL (ref 0.1–2)
BILIRUB UR QL: NEGATIVE
BUN BLD-MCNC: 29 MG/DL (ref 7–18)
BUN/CREAT SERPL: 36.7 (ref 10–20)
CALCIUM BLD-MCNC: 8.5 MG/DL (ref 8.5–10.1)
CHLORIDE SERPL-SCNC: 110 MMOL/L (ref 98–112)
CLARITY UR: CLEAR
CO2 SERPL-SCNC: 23 MMOL/L (ref 21–32)
COLOR UR: YELLOW
CREAT BLD-MCNC: 0.79 MG/DL
DEPRECATED RDW RBC AUTO: 51.8 FL (ref 35.1–46.3)
EOSINOPHIL # BLD AUTO: 0 X10(3) UL (ref 0–0.7)
EOSINOPHIL NFR BLD AUTO: 0 %
ERYTHROCYTE [DISTWIDTH] IN BLOOD BY AUTOMATED COUNT: 14.2 % (ref 11–15)
GFR SERPLBLD BASED ON 1.73 SQ M-ARVRAT: 69 ML/MIN/1.73M2 (ref 60–?)
GLOBULIN PLAS-MCNC: 3.5 G/DL (ref 2.8–4.4)
GLUCOSE BLD-MCNC: 163 MG/DL (ref 70–99)
GLUCOSE UR-MCNC: NEGATIVE MG/DL
HCT VFR BLD AUTO: 35.3 %
HGB BLD-MCNC: 11.1 G/DL
HGB UR QL STRIP.AUTO: NEGATIVE
IMM GRANULOCYTES # BLD AUTO: 0.03 X10(3) UL (ref 0–1)
IMM GRANULOCYTES NFR BLD: 0.6 %
LYMPHOCYTES # BLD AUTO: 0.53 X10(3) UL (ref 1–4)
LYMPHOCYTES NFR BLD AUTO: 10 %
MCH RBC QN AUTO: 31.4 PG (ref 26–34)
MCHC RBC AUTO-ENTMCNC: 31.4 G/DL (ref 31–37)
MCV RBC AUTO: 99.7 FL
MONOCYTES # BLD AUTO: 0.17 X10(3) UL (ref 0.1–1)
MONOCYTES NFR BLD AUTO: 3.2 %
NEUTROPHILS # BLD AUTO: 4.57 X10 (3) UL (ref 1.5–7.7)
NEUTROPHILS # BLD AUTO: 4.57 X10(3) UL (ref 1.5–7.7)
NEUTROPHILS NFR BLD AUTO: 86 %
NITRITE UR QL STRIP.AUTO: NEGATIVE
OSMOLALITY SERPL CALC.SUM OF ELEC: 303 MOSM/KG (ref 275–295)
PH UR: 5 [PH] (ref 5–8)
PLATELET # BLD AUTO: 193 10(3)UL (ref 150–450)
PLATELET # BLD AUTO: 193 10(3)UL (ref 150–450)
POTASSIUM SERPL-SCNC: 4 MMOL/L (ref 3.5–5.1)
PROT SERPL-MCNC: 6.5 G/DL (ref 6.4–8.2)
PROT UR-MCNC: NEGATIVE MG/DL
RBC # BLD AUTO: 3.54 X10(6)UL
SODIUM SERPL-SCNC: 142 MMOL/L (ref 136–145)
SP GR UR STRIP: >1.03 (ref 1–1.03)
UROBILINOGEN UR STRIP-ACNC: <2
VIT C UR-MCNC: 20 MG/DL
WBC # BLD AUTO: 5.3 X10(3) UL (ref 4–11)

## 2022-11-19 PROCEDURE — 99233 SBSQ HOSP IP/OBS HIGH 50: CPT | Performed by: HOSPITALIST

## 2022-11-19 PROCEDURE — 93306 TTE W/DOPPLER COMPLETE: CPT | Performed by: STUDENT IN AN ORGANIZED HEALTH CARE EDUCATION/TRAINING PROGRAM

## 2022-11-19 NOTE — PLAN OF CARE
Received patient from cath lab. Patient drowsy, able to follow commands. Sheath in place. Rash on her forehead, scalp, right inner arm; received benadryl in cath lab; rash improving. IVF infusing. Daughter at bedside. Problem: CARDIOVASCULAR - ADULT  Goal: Maintains optimal cardiac output and hemodynamic stability  Description: INTERVENTIONS:  - Monitor vital signs, rhythm, and trends  - Monitor for bleeding, hypotension and signs of decreased cardiac output  - Evaluate effectiveness of vasoactive medications to optimize hemodynamic stability  - Monitor arterial and/or venous puncture sites for bleeding and/or hematoma  - Assess quality of pulses, skin color and temperature  - Assess for signs of decreased coronary artery perfusion - ex.  Angina  - Evaluate fluid balance, assess for edema, trend weights  Outcome: Progressing  Goal: Absence of cardiac arrhythmias or at baseline  Description: INTERVENTIONS:  - Continuous cardiac monitoring, monitor vital signs, obtain 12 lead EKG if indicated  - Evaluate effectiveness of antiarrhythmic and heart rate control medications as ordered  - Initiate emergency measures for life threatening arrhythmias  - Monitor electrolytes and administer replacement therapy as ordered  Outcome: Progressing     Problem: PAIN - ADULT  Goal: Verbalizes/displays adequate comfort level or patient's stated pain goal  Description: INTERVENTIONS:  - Encourage pt to monitor pain and request assistance  - Assess pain using appropriate pain scale  - Administer analgesics based on type and severity of pain and evaluate response  - Implement non-pharmacological measures as appropriate and evaluate response  - Consider cultural and social influences on pain and pain management  - Manage/alleviate anxiety  - Utilize distraction and/or relaxation techniques  - Monitor for opioid side effects  - Notify MD/LIP if interventions unsuccessful or patient reports new pain  - Anticipate increased pain with activity and pre-medicate as appropriate  Outcome: Progressing     Problem: RISK FOR INFECTION - ADULT  Goal: Absence of fever/infection during anticipated neutropenic period  Description: INTERVENTIONS  - Monitor WBC  - Administer growth factors as ordered  - Implement neutropenic guidelines  Outcome: Progressing

## 2022-11-19 NOTE — PLAN OF CARE
Patient is alert and oriented but slightly forgetful. Sheath was still in place post cath, ACT was done around 10pm and improved to remove sheath. Attempt to place femstop and that did not work for bleeding control. Manual pressure held for 15 minutes and then pressure dressing applied. Site looks good with some bruising. Pulses are weak but palpable. Patient denies any pain now. Vitals stable. Purewick in place, collected urine. No BM. Call light and belongings within reach. Problem: Patient Centered Care  Goal: Patient preferences are identified and integrated in the patient's plan of care  Description: Interventions:  - What would you like us to know as we care for you?  She has a broken left shoulder  - Provide timely, complete, and accurate information to patient/family  - Incorporate patient and family knowledge, values, beliefs, and cultural backgrounds into the planning and delivery of care  - Encourage patient/family to participate in care and decision-making at the level they choose  - Honor patient and family perspectives and choices  Outcome: Progressing     Problem: Patient/Family Goals  Goal: Patient/Family Long Term Goal  Description: Patient's Long Term Goal: To stay out of the hospital    Interventions:  - Medical management  - See additional Care Plan goals for specific interventions  Outcome: Progressing  Goal: Patient/Family Short Term Goal  Description: Patient's Short Term Goal: To feel better    Interventions:   - Medical management, cardiology follow ups  - See additional Care Plan goals for specific interventions  Outcome: Progressing     Problem: CARDIOVASCULAR - ADULT  Goal: Maintains optimal cardiac output and hemodynamic stability  Description: INTERVENTIONS:  - Monitor vital signs, rhythm, and trends  - Monitor for bleeding, hypotension and signs of decreased cardiac output  - Evaluate effectiveness of vasoactive medications to optimize hemodynamic stability  - Monitor arterial and/or venous puncture sites for bleeding and/or hematoma  - Assess quality of pulses, skin color and temperature  - Assess for signs of decreased coronary artery perfusion - ex. Angina  - Evaluate fluid balance, assess for edema, trend weights  Outcome: Progressing  Goal: Absence of cardiac arrhythmias or at baseline  Description: INTERVENTIONS:  - Continuous cardiac monitoring, monitor vital signs, obtain 12 lead EKG if indicated  - Evaluate effectiveness of antiarrhythmic and heart rate control medications as ordered  - Initiate emergency measures for life threatening arrhythmias  - Monitor electrolytes and administer replacement therapy as ordered  Outcome: Progressing     Problem: PAIN - ADULT  Goal: Verbalizes/displays adequate comfort level or patient's stated pain goal  Description: INTERVENTIONS:  - Encourage pt to monitor pain and request assistance  - Assess pain using appropriate pain scale  - Administer analgesics based on type and severity of pain and evaluate response  - Implement non-pharmacological measures as appropriate and evaluate response  - Consider cultural and social influences on pain and pain management  - Manage/alleviate anxiety  - Utilize distraction and/or relaxation techniques  - Monitor for opioid side effects  - Notify MD/LIP if interventions unsuccessful or patient reports new pain  - Anticipate increased pain with activity and pre-medicate as appropriate  Outcome: Progressing     Problem: RISK FOR INFECTION - ADULT  Goal: Absence of fever/infection during anticipated neutropenic period  Description: INTERVENTIONS  - Monitor WBC  - Administer growth factors as ordered  - Implement neutropenic guidelines  Outcome: Progressing     Problem: SAFETY ADULT - FALL  Goal: Free from fall injury  Description: INTERVENTIONS:  - Assess pt frequently for physical needs  - Identify cognitive and physical deficits and behaviors that affect risk of falls.   - [place] fall precautions as indicated by assessment.  - Educate pt/family on patient safety including physical limitations  - Instruct pt to call for assistance with activity based on assessment  - Modify environment to reduce risk of injury  - Provide assistive devices as appropriate  - Consider OT/PT consult to assist with strengthening/mobility  - Encourage toileting schedule  Outcome: Progressing     Problem: DISCHARGE PLANNING  Goal: Discharge to home or other facility with appropriate resources  Description: INTERVENTIONS:  - Identify barriers to discharge w/pt and caregiver  - Include patient/family/discharge partner in discharge planning  - Arrange for needed discharge resources and transportation as appropriate  - Identify discharge learning needs (meds, wound care, etc)  - Arrange for interpreters to assist at discharge as needed  - Consider post-discharge preferences of patient/family/discharge partner  - Complete POLST form as appropriate  - Assess patient's ability to be responsible for managing their own health  - Refer to Case Management Department for coordinating discharge planning if the patient needs post-hospital services based on physician/LIP order or complex needs related to functional status, cognitive ability or social support system  Outcome: Progressing

## 2022-11-19 NOTE — DIETARY NOTE
NUTRITION EDUCATION NOTE     Received consult for nutrition education per cardiac rehab order set. POD#1 s/p LHC, LV, GRAFT and PCI SVG-D, SVG-OM, SVG-RCA. Defer education to POD#3 ~11/21 or until pt appropriate. RD to follow.      [facility] [address], [address] [place] [address], [address] [facility], [address] [facility]

## 2022-11-19 NOTE — TELEPHONE ENCOUNTER
Spoke to [employer] given informing Dr Charles Grady will sign [facility] orders .

## 2022-11-20 LAB
ALBUMIN SERPL-MCNC: 3.1 G/DL (ref 3.4–5)
ANION GAP SERPL CALC-SCNC: 7 MMOL/L (ref 0–18)
BASOPHILS # BLD AUTO: 0.01 X10(3) UL (ref 0–0.2)
BASOPHILS NFR BLD AUTO: 0.1 %
BUN BLD-MCNC: 36 MG/DL (ref 7–18)
BUN/CREAT SERPL: 42.4 (ref 10–20)
CALCIUM BLD-MCNC: 8.5 MG/DL (ref 8.5–10.1)
CHLORIDE SERPL-SCNC: 107 MMOL/L (ref 98–112)
CO2 SERPL-SCNC: 25 MMOL/L (ref 21–32)
CREAT BLD-MCNC: 0.85 MG/DL
DEPRECATED RDW RBC AUTO: 50.6 FL (ref 35.1–46.3)
EOSINOPHIL # BLD AUTO: 0 X10(3) UL (ref 0–0.7)
EOSINOPHIL NFR BLD AUTO: 0 %
ERYTHROCYTE [DISTWIDTH] IN BLOOD BY AUTOMATED COUNT: 14.2 % (ref 11–15)
GFR SERPLBLD BASED ON 1.73 SQ M-ARVRAT: 63 ML/MIN/1.73M2 (ref 60–?)
GLUCOSE BLD-MCNC: 150 MG/DL (ref 70–99)
HCT VFR BLD AUTO: 31.8 %
HGB BLD-MCNC: 10.1 G/DL
IMM GRANULOCYTES # BLD AUTO: 0.05 X10(3) UL (ref 0–1)
IMM GRANULOCYTES NFR BLD: 0.6 %
LYMPHOCYTES # BLD AUTO: 0.77 X10(3) UL (ref 1–4)
LYMPHOCYTES NFR BLD AUTO: 8.9 %
MAGNESIUM SERPL-MCNC: 2.1 MG/DL (ref 1.6–2.6)
MCH RBC QN AUTO: 31.6 PG (ref 26–34)
MCHC RBC AUTO-ENTMCNC: 31.8 G/DL (ref 31–37)
MCV RBC AUTO: 99.4 FL
MONOCYTES # BLD AUTO: 0.89 X10(3) UL (ref 0.1–1)
MONOCYTES NFR BLD AUTO: 10.2 %
NEUTROPHILS # BLD AUTO: 6.98 X10 (3) UL (ref 1.5–7.7)
NEUTROPHILS # BLD AUTO: 6.98 X10(3) UL (ref 1.5–7.7)
NEUTROPHILS NFR BLD AUTO: 80.2 %
OSMOLALITY SERPL CALC.SUM OF ELEC: 299 MOSM/KG (ref 275–295)
PHOSPHATE SERPL-MCNC: 2.7 MG/DL (ref 2.5–4.9)
PLATELET # BLD AUTO: 193 10(3)UL (ref 150–450)
PLATELET # BLD AUTO: 193 10(3)UL (ref 150–450)
POTASSIUM SERPL-SCNC: 4 MMOL/L (ref 3.5–5.1)
RBC # BLD AUTO: 3.2 X10(6)UL
SODIUM SERPL-SCNC: 139 MMOL/L (ref 136–145)
WBC # BLD AUTO: 8.7 X10(3) UL (ref 4–11)

## 2022-11-20 PROCEDURE — 99233 SBSQ HOSP IP/OBS HIGH 50: CPT | Performed by: HOSPITALIST

## 2022-11-20 NOTE — PLAN OF CARE
Pt transferred to [facility]; VSS upon transfer; pt in  bed; all belongings brought up with pt in bed and daughter took her other belongings  Problem: Patient Centered Care  Goal: Patient preferences are identified and integrated in the patient's plan of care  Description: Interventions:  - What would you like us to know as we care for you? She has a broken left shoulder  - Provide timely, complete, and accurate information to patient/family  - Incorporate patient and family knowledge, values, beliefs, and cultural backgrounds into the planning and delivery of care  - Encourage patient/family to participate in care and decision-making at the level they choose  - Honor patient and family perspectives and choices  Outcome: Progressing     Problem: Patient/Family Goals  Goal: Patient/Family Long Term Goal  Description: Patient's Long Term Goal: To stay out of the hospital    Interventions:  - Medical management  - See additional Care Plan goals for specific interventions  Outcome: Progressing  Goal: Patient/Family Short Term Goal  Description: Patient's Short Term Goal: To feel better    Interventions:   - Medical management, cardiology follow ups  - See additional Care Plan goals for specific interventions  Outcome: Progressing     Problem: CARDIOVASCULAR - ADULT  Goal: Maintains optimal cardiac output and hemodynamic stability  Description: INTERVENTIONS:  - Monitor vital signs, rhythm, and trends  - Monitor for bleeding, hypotension and signs of decreased cardiac output  - Evaluate effectiveness of vasoactive medications to optimize hemodynamic stability  - Monitor arterial and/or venous puncture sites for bleeding and/or hematoma  - Assess quality of pulses, skin color and temperature  - Assess for signs of decreased coronary artery perfusion - ex.  Angina  - Evaluate fluid balance, assess for edema, trend weights  Outcome: Progressing  Goal: Absence of cardiac arrhythmias or at baseline  Description: INTERVENTIONS:  - Continuous cardiac monitoring, monitor vital signs, obtain 12 lead EKG if indicated  - Evaluate effectiveness of antiarrhythmic and heart rate control medications as ordered  - Initiate emergency measures for life threatening arrhythmias  - Monitor electrolytes and administer replacement therapy as ordered  Outcome: Progressing     Problem: PAIN - ADULT  Goal: Verbalizes/displays adequate comfort level or patient's stated pain goal  Description: INTERVENTIONS:  - Encourage pt to monitor pain and request assistance  - Assess pain using appropriate pain scale  - Administer analgesics based on type and severity of pain and evaluate response  - Implement non-pharmacological measures as appropriate and evaluate response  - Consider cultural and social influences on pain and pain management  - Manage/alleviate anxiety  - Utilize distraction and/or relaxation techniques  - Monitor for opioid side effects  - Notify MD/LIP if interventions unsuccessful or patient reports new pain  - Anticipate increased pain with activity and pre-medicate as appropriate  Outcome: Progressing     Problem: RISK FOR INFECTION - ADULT  Goal: Absence of fever/infection during anticipated neutropenic period  Description: INTERVENTIONS  - Monitor WBC  - Administer growth factors as ordered  - Implement neutropenic guidelines  Outcome: Progressing     Problem: SAFETY ADULT - FALL  Goal: Free from fall injury  Description: INTERVENTIONS:  - Assess pt frequently for physical needs  - Identify cognitive and physical deficits and behaviors that affect risk of falls.   - [place] fall precautions as indicated by assessment.  - Educate pt/family on patient safety including physical limitations  - Instruct pt to call for assistance with activity based on assessment  - Modify environment to reduce risk of injury  - Provide assistive devices as appropriate  - Consider OT/PT consult to assist with strengthening/mobility  - Encourage toileting schedule  Outcome: Progressing     Problem: DISCHARGE PLANNING  Goal: Discharge to home or other facility with appropriate resources  Description: INTERVENTIONS:  - Identify barriers to discharge w/pt and caregiver  - Include patient/family/discharge partner in discharge planning  - Arrange for needed discharge resources and transportation as appropriate  - Identify discharge learning needs (meds, wound care, etc)  - Arrange for interpreters to assist at discharge as needed  - Consider post-discharge preferences of patient/family/discharge partner  - Complete POLST form as appropriate  - Assess patient's ability to be responsible for managing their own health  - Refer to Case Management Department for coordinating discharge planning if the patient needs post-hospital services based on physician/LIP order or complex needs related to functional status, cognitive ability or social support system  Outcome: Progressing

## 2022-11-20 NOTE — CM/SW NOTE
MDO DC planning. Per chart review, it is unclear if patient is from [facility] for respite or if she is at [facility].     Message sent to [facility] liaison to confirm if current. CHRIS called and LVM for daughter, James Lezama requesting a call back.      Johnathon Saunders, MSW, [facility]    [id number]

## 2022-11-20 NOTE — PLAN OF CARE
Patient is alert and oriented x4. Does not complain of CP or SOB. PT recommending [facility] once medically cleared for discharge. Problem: Patient Centered Care  Goal: Patient preferences are identified and integrated in the patient's plan of care  Description: Interventions:  - What would you like us to know as we care for you? She has a broken left shoulder  - Provide timely, complete, and accurate information to patient/family  - Incorporate patient and family knowledge, values, beliefs, and cultural backgrounds into the planning and delivery of care  - Encourage patient/family to participate in care and decision-making at the level they choose  - Honor patient and family perspectives and choices  Outcome: Progressing     Problem: Patient/Family Goals  Goal: Patient/Family Long Term Goal  Description: Patient's Long Term Goal: To stay out of the hospital    Interventions:  - Medical management  - See additional Care Plan goals for specific interventions  Outcome: Progressing  Goal: Patient/Family Short Term Goal  Description: Patient's Short Term Goal: To feel better    Interventions:   - Medical management, cardiology follow ups  - See additional Care Plan goals for specific interventions  Outcome: Progressing     Problem: CARDIOVASCULAR - ADULT  Goal: Maintains optimal cardiac output and hemodynamic stability  Description: INTERVENTIONS:  - Monitor vital signs, rhythm, and trends  - Monitor for bleeding, hypotension and signs of decreased cardiac output  - Evaluate effectiveness of vasoactive medications to optimize hemodynamic stability  - Monitor arterial and/or venous puncture sites for bleeding and/or hematoma  - Assess quality of pulses, skin color and temperature  - Assess for signs of decreased coronary artery perfusion - ex.  Angina  - Evaluate fluid balance, assess for edema, trend weights  Outcome: Progressing  Goal: Absence of cardiac arrhythmias or at baseline  Description: INTERVENTIONS:  - Continuous cardiac monitoring, monitor vital signs, obtain 12 lead EKG if indicated  - Evaluate effectiveness of antiarrhythmic and heart rate control medications as ordered  - Initiate emergency measures for life threatening arrhythmias  - Monitor electrolytes and administer replacement therapy as ordered  Outcome: Progressing     Problem: PAIN - ADULT  Goal: Verbalizes/displays adequate comfort level or patient's stated pain goal  Description: INTERVENTIONS:  - Encourage pt to monitor pain and request assistance  - Assess pain using appropriate pain scale  - Administer analgesics based on type and severity of pain and evaluate response  - Implement non-pharmacological measures as appropriate and evaluate response  - Consider cultural and social influences on pain and pain management  - Manage/alleviate anxiety  - Utilize distraction and/or relaxation techniques  - Monitor for opioid side effects  - Notify MD/LIP if interventions unsuccessful or patient reports new pain  - Anticipate increased pain with activity and pre-medicate as appropriate  Outcome: Progressing     Problem: RISK FOR INFECTION - ADULT  Goal: Absence of fever/infection during anticipated neutropenic period  Description: INTERVENTIONS  - Monitor WBC  - Administer growth factors as ordered  - Implement neutropenic guidelines  Outcome: Progressing     Problem: SAFETY ADULT - FALL  Goal: Free from fall injury  Description: INTERVENTIONS:  - Assess pt frequently for physical needs  - Identify cognitive and physical deficits and behaviors that affect risk of falls.   - [place] fall precautions as indicated by assessment.  - Educate pt/family on patient safety including physical limitations  - Instruct pt to call for assistance with activity based on assessment  - Modify environment to reduce risk of injury  - Provide assistive devices as appropriate  - Consider OT/PT consult to assist with strengthening/mobility  - Encourage toileting schedule  Outcome: Progressing     Problem: DISCHARGE PLANNING  Goal: Discharge to home or other facility with appropriate resources  Description: INTERVENTIONS:  - Identify barriers to discharge w/pt and caregiver  - Include patient/family/discharge partner in discharge planning  - Arrange for needed discharge resources and transportation as appropriate  - Identify discharge learning needs (meds, wound care, etc)  - Arrange for interpreters to assist at discharge as needed  - Consider post-discharge preferences of patient/family/discharge partner  - Complete POLST form as appropriate  - Assess patient's ability to be responsible for managing their own health  - Refer to Case Management Department for coordinating discharge planning if the patient needs post-hospital services based on physician/LIP order or complex needs related to functional status, cognitive ability or social support system  Outcome: Progressing

## 2022-11-20 NOTE — PLAN OF CARE
Problem: Patient Centered Care  Goal: Patient preferences are identified and integrated in the patient's plan of care  Description: Interventions:  - What would you like us to know as we care for you? She has a broken left shoulder  - Provide timely, complete, and accurate information to patient/family  - Incorporate patient and family knowledge, values, beliefs, and cultural backgrounds into the planning and delivery of care  - Encourage patient/family to participate in care and decision-making at the level they choose  - Honor patient and family perspectives and choices  Outcome: Progressing     Problem: Patient/Family Goals  Goal: Patient/Family Long Term Goal  Description: Patient's Long Term Goal: To stay out of the hospital    Interventions:  - Medical management  - See additional Care Plan goals for specific interventions  Outcome: Progressing  Goal: Patient/Family Short Term Goal  Description: Patient's Short Term Goal: To feel better    Interventions:   - Medical management, cardiology follow ups  - See additional Care Plan goals for specific interventions  Outcome: Progressing     Problem: CARDIOVASCULAR - ADULT  Goal: Maintains optimal cardiac output and hemodynamic stability  Description: INTERVENTIONS:  - Monitor vital signs, rhythm, and trends  - Monitor for bleeding, hypotension and signs of decreased cardiac output  - Evaluate effectiveness of vasoactive medications to optimize hemodynamic stability  - Monitor arterial and/or venous puncture sites for bleeding and/or hematoma  - Assess quality of pulses, skin color and temperature  - Assess for signs of decreased coronary artery perfusion - ex.  Angina  - Evaluate fluid balance, assess for edema, trend weights  Outcome: Progressing  Goal: Absence of cardiac arrhythmias or at baseline  Description: INTERVENTIONS:  - Continuous cardiac monitoring, monitor vital signs, obtain 12 lead EKG if indicated  - Evaluate effectiveness of antiarrhythmic and heart rate control medications as ordered  - Initiate emergency measures for life threatening arrhythmias  - Monitor electrolytes and administer replacement therapy as ordered  Outcome: Progressing     Problem: PAIN - ADULT  Goal: Verbalizes/displays adequate comfort level or patient's stated pain goal  Description: INTERVENTIONS:  - Encourage pt to monitor pain and request assistance  - Assess pain using appropriate pain scale  - Administer analgesics based on type and severity of pain and evaluate response  - Implement non-pharmacological measures as appropriate and evaluate response  - Consider cultural and social influences on pain and pain management  - Manage/alleviate anxiety  - Utilize distraction and/or relaxation techniques  - Monitor for opioid side effects  - Notify MD/LIP if interventions unsuccessful or patient reports new pain  - Anticipate increased pain with activity and pre-medicate as appropriate  Outcome: Progressing     Problem: RISK FOR INFECTION - ADULT  Goal: Absence of fever/infection during anticipated neutropenic period  Description: INTERVENTIONS  - Monitor WBC  - Administer growth factors as ordered  - Implement neutropenic guidelines  Outcome: Progressing     Problem: SAFETY ADULT - FALL  Goal: Free from fall injury  Description: INTERVENTIONS:  - Assess pt frequently for physical needs  - Identify cognitive and physical deficits and behaviors that affect risk of falls.   - [place] fall precautions as indicated by assessment.  - Educate pt/family on patient safety including physical limitations  - Instruct pt to call for assistance with activity based on assessment  - Modify environment to reduce risk of injury  - Provide assistive devices as appropriate  - Consider OT/PT consult to assist with strengthening/mobility  - Encourage toileting schedule  Outcome: Progressing     Problem: DISCHARGE PLANNING  Goal: Discharge to home or other facility with appropriate resources  Description: INTERVENTIONS:  - Identify barriers to discharge w/pt and caregiver  - Include patient/family/discharge partner in discharge planning  - Arrange for needed discharge resources and transportation as appropriate  - Identify discharge learning needs (meds, wound care, etc)  - Arrange for interpreters to assist at discharge as needed  - Consider post-discharge preferences of patient/family/discharge partner  - Complete POLST form as appropriate  - Assess patient's ability to be responsible for managing their own health  - Refer to Case Management Department for coordinating discharge planning if the patient needs post-hospital services based on physician/LIP order or complex needs related to functional status, cognitive ability or social support system  Outcome: Progressing

## 2022-11-21 ENCOUNTER — APPOINTMENT (OUTPATIENT)
Dept: GENERAL RADIOLOGY | Facility: HOSPITAL | Age: 87
End: 2022-11-21
Attending: HOSPITALIST
Payer: MEDICARE

## 2022-11-21 LAB
ANION GAP SERPL CALC-SCNC: 6 MMOL/L (ref 0–18)
BUN BLD-MCNC: 31 MG/DL (ref 7–18)
BUN/CREAT SERPL: 38.8 (ref 10–20)
CALCIUM BLD-MCNC: 8.9 MG/DL (ref 8.5–10.1)
CHLORIDE SERPL-SCNC: 103 MMOL/L (ref 98–112)
CO2 SERPL-SCNC: 31 MMOL/L (ref 21–32)
CREAT BLD-MCNC: 0.8 MG/DL
DEPRECATED RDW RBC AUTO: 50.9 FL (ref 35.1–46.3)
ERYTHROCYTE [DISTWIDTH] IN BLOOD BY AUTOMATED COUNT: 14.2 % (ref 11–15)
GFR SERPLBLD BASED ON 1.73 SQ M-ARVRAT: 68 ML/MIN/1.73M2 (ref 60–?)
GLUCOSE BLD-MCNC: 82 MG/DL (ref 70–99)
HCT VFR BLD AUTO: 34.6 %
HGB BLD-MCNC: 11.1 G/DL
MCH RBC QN AUTO: 31.7 PG (ref 26–34)
MCHC RBC AUTO-ENTMCNC: 32.1 G/DL (ref 31–37)
MCV RBC AUTO: 98.9 FL
OSMOLALITY SERPL CALC.SUM OF ELEC: 296 MOSM/KG (ref 275–295)
PLATELET # BLD AUTO: 218 10(3)UL (ref 150–450)
POTASSIUM SERPL-SCNC: 3.7 MMOL/L (ref 3.5–5.1)
RBC # BLD AUTO: 3.5 X10(6)UL
SODIUM SERPL-SCNC: 140 MMOL/L (ref 136–145)
WBC # BLD AUTO: 9.2 X10(3) UL (ref 4–11)

## 2022-11-21 PROCEDURE — 99233 SBSQ HOSP IP/OBS HIGH 50: CPT | Performed by: HOSPITALIST

## 2022-11-21 PROCEDURE — 71045 X-RAY EXAM CHEST 1 VIEW: CPT | Performed by: HOSPITALIST

## 2022-11-21 RX ORDER — LISINOPRIL 5 MG/1
5 TABLET ORAL DAILY
Status: DISCONTINUED | OUTPATIENT
Start: 2022-11-22 | End: 2022-11-22

## 2022-11-21 RX ORDER — POTASSIUM CHLORIDE 20 MEQ/1
40 TABLET, EXTENDED RELEASE ORAL ONCE
Status: COMPLETED | OUTPATIENT
Start: 2022-11-21 | End: 2022-11-21

## 2022-11-21 NOTE — PLAN OF CARE
Pt alert and oriented x4, on RA, VS stable. Plan is for [facility], PT/OT. Fall precaution maintained, call light within reach, will continue to monitor  Problem: Patient Centered Care  Goal: Patient preferences are identified and integrated in the patient's plan of care  Description: Interventions:  - What would you like us to know as we care for you? She has a broken left shoulder  - Provide timely, complete, and accurate information to patient/family  - Incorporate patient and family knowledge, values, beliefs, and cultural backgrounds into the planning and delivery of care  - Encourage patient/family to participate in care and decision-making at the level they choose  - Honor patient and family perspectives and choices  Outcome: Progressing     Problem: Patient/Family Goals  Goal: Patient/Family Long Term Goal  Description: Patient's Long Term Goal: To stay out of the hospital    Interventions:  - Medical management  - See additional Care Plan goals for specific interventions  Outcome: Progressing  Goal: Patient/Family Short Term Goal  Description: Patient's Short Term Goal: To feel better    Interventions:   - Medical management, cardiology follow ups  - See additional Care Plan goals for specific interventions  Outcome: Progressing     Problem: CARDIOVASCULAR - ADULT  Goal: Maintains optimal cardiac output and hemodynamic stability  Description: INTERVENTIONS:  - Monitor vital signs, rhythm, and trends  - Monitor for bleeding, hypotension and signs of decreased cardiac output  - Evaluate effectiveness of vasoactive medications to optimize hemodynamic stability  - Monitor arterial and/or venous puncture sites for bleeding and/or hematoma  - Assess quality of pulses, skin color and temperature  - Assess for signs of decreased coronary artery perfusion - ex.  Angina  - Evaluate fluid balance, assess for edema, trend weights  Outcome: Progressing  Goal: Absence of cardiac arrhythmias or at baseline  Description: INTERVENTIONS:  - Continuous cardiac monitoring, monitor vital signs, obtain 12 lead EKG if indicated  - Evaluate effectiveness of antiarrhythmic and heart rate control medications as ordered  - Initiate emergency measures for life threatening arrhythmias  - Monitor electrolytes and administer replacement therapy as ordered  Outcome: Progressing     Problem: PAIN - ADULT  Goal: Verbalizes/displays adequate comfort level or patient's stated pain goal  Description: INTERVENTIONS:  - Encourage pt to monitor pain and request assistance  - Assess pain using appropriate pain scale  - Administer analgesics based on type and severity of pain and evaluate response  - Implement non-pharmacological measures as appropriate and evaluate response  - Consider cultural and social influences on pain and pain management  - Manage/alleviate anxiety  - Utilize distraction and/or relaxation techniques  - Monitor for opioid side effects  - Notify MD/LIP if interventions unsuccessful or patient reports new pain  - Anticipate increased pain with activity and pre-medicate as appropriate  Outcome: Progressing     Problem: RISK FOR INFECTION - ADULT  Goal: Absence of fever/infection during anticipated neutropenic period  Description: INTERVENTIONS  - Monitor WBC  - Administer growth factors as ordered  - Implement neutropenic guidelines  Outcome: Progressing     Problem: SAFETY ADULT - FALL  Goal: Free from fall injury  Description: INTERVENTIONS:  - Assess pt frequently for physical needs  - Identify cognitive and physical deficits and behaviors that affect risk of falls.   - [place] fall precautions as indicated by assessment.  - Educate pt/family on patient safety including physical limitations  - Instruct pt to call for assistance with activity based on assessment  - Modify environment to reduce risk of injury  - Provide assistive devices as appropriate  - Consider OT/PT consult to assist with strengthening/mobility  - Encourage toileting schedule  Outcome: Progressing     Problem: DISCHARGE PLANNING  Goal: Discharge to home or other facility with appropriate resources  Description: INTERVENTIONS:  - Identify barriers to discharge w/pt and caregiver  - Include patient/family/discharge partner in discharge planning  - Arrange for needed discharge resources and transportation as appropriate  - Identify discharge learning needs (meds, wound care, etc)  - Arrange for interpreters to assist at discharge as needed  - Consider post-discharge preferences of patient/family/discharge partner  - Complete POLST form as appropriate  - Assess patient's ability to be responsible for managing their own health  - Refer to Case Management Department for coordinating discharge planning if the patient needs post-hospital services based on physician/LIP order or complex needs related to functional status, cognitive ability or social support system  Outcome: Progressing

## 2022-11-21 NOTE — DIETARY NOTE
NUTRITION NOTE     Received consult for PCI/Cardiac diet education - Pt is 80years old and will DC to [facility]. Eating well. Lower end of BMI. No diet education warranted at this time as restriction would not be appropriate. Will liberalize diet to 2G sodium. RD to f/u on LOS date or as needed.     Tameka Gilmore RD, LDN  Clinical Dietitian  P: [phone]

## 2022-11-21 NOTE — PLAN OF CARE
Patient complained of intermittent SOB overnight and briefly in the AM, was placed on 1L NC, now on RA, CXR done today. Up in chair for meals, worked with OT and tolerated well. Plan to discharge to [facility] pending choice from her daughter. Problem: Patient Centered Care  Goal: Patient preferences are identified and integrated in the patient's plan of care  Description: Interventions:  - What would you like us to know as we care for you? She has a broken left shoulder  - Provide timely, complete, and accurate information to patient/family  - Incorporate patient and family knowledge, values, beliefs, and cultural backgrounds into the planning and delivery of care  - Encourage patient/family to participate in care and decision-making at the level they choose  - Honor patient and family perspectives and choices  Outcome: Progressing     Problem: Patient/Family Goals  Goal: Patient/Family Long Term Goal  Description: Patient's Long Term Goal: To stay out of the hospital    Interventions:  - Medical management  - See additional Care Plan goals for specific interventions  Outcome: Progressing  Goal: Patient/Family Short Term Goal  Description: Patient's Short Term Goal: To feel better    Interventions:   - Medical management, cardiology follow ups  - See additional Care Plan goals for specific interventions  Outcome: Progressing     Problem: CARDIOVASCULAR - ADULT  Goal: Maintains optimal cardiac output and hemodynamic stability  Description: INTERVENTIONS:  - Monitor vital signs, rhythm, and trends  - Monitor for bleeding, hypotension and signs of decreased cardiac output  - Evaluate effectiveness of vasoactive medications to optimize hemodynamic stability  - Monitor arterial and/or venous puncture sites for bleeding and/or hematoma  - Assess quality of pulses, skin color and temperature  - Assess for signs of decreased coronary artery perfusion - ex.  Angina  - Evaluate fluid balance, assess for edema, trend weights  Outcome: Progressing  Goal: Absence of cardiac arrhythmias or at baseline  Description: INTERVENTIONS:  - Continuous cardiac monitoring, monitor vital signs, obtain 12 lead EKG if indicated  - Evaluate effectiveness of antiarrhythmic and heart rate control medications as ordered  - Initiate emergency measures for life threatening arrhythmias  - Monitor electrolytes and administer replacement therapy as ordered  Outcome: Progressing     Problem: PAIN - ADULT  Goal: Verbalizes/displays adequate comfort level or patient's stated pain goal  Description: INTERVENTIONS:  - Encourage pt to monitor pain and request assistance  - Assess pain using appropriate pain scale  - Administer analgesics based on type and severity of pain and evaluate response  - Implement non-pharmacological measures as appropriate and evaluate response  - Consider cultural and social influences on pain and pain management  - Manage/alleviate anxiety  - Utilize distraction and/or relaxation techniques  - Monitor for opioid side effects  - Notify MD/LIP if interventions unsuccessful or patient reports new pain  - Anticipate increased pain with activity and pre-medicate as appropriate  Outcome: Progressing     Problem: RISK FOR INFECTION - ADULT  Goal: Absence of fever/infection during anticipated neutropenic period  Description: INTERVENTIONS  - Monitor WBC  - Administer growth factors as ordered  - Implement neutropenic guidelines  Outcome: Progressing     Problem: SAFETY ADULT - FALL  Goal: Free from fall injury  Description: INTERVENTIONS:  - Assess pt frequently for physical needs  - Identify cognitive and physical deficits and behaviors that affect risk of falls.   - [place] fall precautions as indicated by assessment.  - Educate pt/family on patient safety including physical limitations  - Instruct pt to call for assistance with activity based on assessment  - Modify environment to reduce risk of injury  - Provide assistive devices as appropriate  - Consider OT/PT consult to assist with strengthening/mobility  - Encourage toileting schedule  Outcome: Progressing     Problem: DISCHARGE PLANNING  Goal: Discharge to home or other facility with appropriate resources  Description: INTERVENTIONS:  - Identify barriers to discharge w/pt and caregiver  - Include patient/family/discharge partner in discharge planning  - Arrange for needed discharge resources and transportation as appropriate  - Identify discharge learning needs (meds, wound care, etc)  - Arrange for interpreters to assist at discharge as needed  - Consider post-discharge preferences of patient/family/discharge partner  - Complete POLST form as appropriate  - Assess patient's ability to be responsible for managing their own health  - Refer to Case Management Department for coordinating discharge planning if the patient needs post-hospital services based on physician/LIP order or complex needs related to functional status, cognitive ability or social support system  Outcome: Progressing

## 2022-11-21 NOTE — CM/SW NOTE
Spoke with daughter Radha Scott this am via phone. Clarified that patient was on the [facility] side recently (daughter was referring to this as respite). Explained choices that patient could go back to [facility] if they can manage her care with  support and [facility]. The other choice would be [facility] as an option prior to returning back to [place]. Referrals sent via [facility] including [facility] per daughter's choice. Daughter will speak to her mom to determine decision on LOC. CM to follow-up with patient and daughter. PLAN:  Will be HH or [facility]. CM to follow-up with daughter and patient. / to remain available for support and/or discharge planning.      Dary Spicer MBA BSN RN Beka Stager  RN Case Manager  [phone]

## 2022-11-21 NOTE — CM/SW NOTE
List of accepting facilities given to daughter Tristan Steele and patient. Patient is agreeable to [facility]. Daughter will notify me once choice is made. PLAN:  [facility] once choice is made and patient is medically cleared. / to remain available for support and/or discharge planning.      Phoebe HORNEA BSN RN [address]  RN Case Manager  [phone]

## 2022-11-21 NOTE — CM/SW NOTE
Department  notified of request for [facility] referrals started. Assigned CM/SW to follow up with pt/family on further discharge planning.      Lupis Sargent   November 21, 2022   13:06

## 2022-11-22 VITALS
RESPIRATION RATE: 17 BRPM | HEIGHT: 63 IN | TEMPERATURE: 98 F | DIASTOLIC BLOOD PRESSURE: 57 MMHG | BODY MASS INDEX: 20.59 KG/M2 | SYSTOLIC BLOOD PRESSURE: 107 MMHG | HEART RATE: 73 BPM | OXYGEN SATURATION: 100 % | WEIGHT: 116.19 LBS

## 2022-11-22 LAB
ANION GAP SERPL CALC-SCNC: 6 MMOL/L (ref 0–18)
BASOPHILS # BLD AUTO: 0.01 X10(3) UL (ref 0–0.2)
BASOPHILS NFR BLD AUTO: 0.1 %
BUN BLD-MCNC: 32 MG/DL (ref 7–18)
BUN/CREAT SERPL: 39.5 (ref 10–20)
CALCIUM BLD-MCNC: 8.8 MG/DL (ref 8.5–10.1)
CHLORIDE SERPL-SCNC: 101 MMOL/L (ref 98–112)
CO2 SERPL-SCNC: 32 MMOL/L (ref 21–32)
CREAT BLD-MCNC: 0.81 MG/DL
DEPRECATED RDW RBC AUTO: 51.2 FL (ref 35.1–46.3)
EOSINOPHIL # BLD AUTO: 0.01 X10(3) UL (ref 0–0.7)
EOSINOPHIL NFR BLD AUTO: 0.1 %
ERYTHROCYTE [DISTWIDTH] IN BLOOD BY AUTOMATED COUNT: 14.2 % (ref 11–15)
GFR SERPLBLD BASED ON 1.73 SQ M-ARVRAT: 67 ML/MIN/1.73M2 (ref 60–?)
GLUCOSE BLD-MCNC: 95 MG/DL (ref 70–99)
HCT VFR BLD AUTO: 34.9 %
HGB BLD-MCNC: 10.9 G/DL
IMM GRANULOCYTES # BLD AUTO: 0.06 X10(3) UL (ref 0–1)
IMM GRANULOCYTES NFR BLD: 0.7 %
LYMPHOCYTES # BLD AUTO: 1.27 X10(3) UL (ref 1–4)
LYMPHOCYTES NFR BLD AUTO: 13.8 %
MAGNESIUM SERPL-MCNC: 2.2 MG/DL (ref 1.6–2.6)
MCH RBC QN AUTO: 30.8 PG (ref 26–34)
MCHC RBC AUTO-ENTMCNC: 31.2 G/DL (ref 31–37)
MCV RBC AUTO: 98.6 FL
MONOCYTES # BLD AUTO: 0.99 X10(3) UL (ref 0.1–1)
MONOCYTES NFR BLD AUTO: 10.8 %
NEUTROPHILS # BLD AUTO: 6.85 X10 (3) UL (ref 1.5–7.7)
NEUTROPHILS # BLD AUTO: 6.85 X10(3) UL (ref 1.5–7.7)
NEUTROPHILS NFR BLD AUTO: 74.5 %
OSMOLALITY SERPL CALC.SUM OF ELEC: 295 MOSM/KG (ref 275–295)
PHOSPHATE SERPL-MCNC: 2.4 MG/DL (ref 2.5–4.9)
PLATELET # BLD AUTO: 229 10(3)UL (ref 150–450)
POTASSIUM SERPL-SCNC: 4.2 MMOL/L (ref 3.5–5.1)
POTASSIUM SERPL-SCNC: 4.2 MMOL/L (ref 3.5–5.1)
RBC # BLD AUTO: 3.54 X10(6)UL
SARS-COV-2 RNA RESP QL NAA+PROBE: NOT DETECTED
SODIUM SERPL-SCNC: 139 MMOL/L (ref 136–145)
WBC # BLD AUTO: 9.2 X10(3) UL (ref 4–11)

## 2022-11-22 RX ORDER — LISINOPRIL 5 MG/1
5 TABLET ORAL DAILY
Qty: 30 TABLET | Refills: 0 | Status: SHIPPED | OUTPATIENT
Start: 2022-11-23

## 2022-11-22 RX ORDER — METOPROLOL SUCCINATE 25 MG/1
25 TABLET, EXTENDED RELEASE ORAL 2 TIMES DAILY
Qty: 30 TABLET | Refills: 0 | Status: SHIPPED | OUTPATIENT
Start: 2022-11-22

## 2022-11-22 NOTE — CM/SW NOTE
11/22/22 1138   Discharge disposition   Expected discharge disposition [facility] Daniel Provider   Tunde Gautam [employer])   Discharge transportation . Татьяна Zafar 90 today. Family has chosen [address] set up for 2:40pm.  Family aware of discharge time.     Oskar BOON RN [address]  RN Case Manager  [phone]

## 2022-11-22 NOTE — PLAN OF CARE
Problem: Patient Centered Care  Goal: Patient preferences are identified and integrated in the patient's plan of care  Description: Interventions:  - What would you like us to know as we care for you? She has a broken left shoulder  - Provide timely, complete, and accurate information to patient/family  - Incorporate patient and family knowledge, values, beliefs, and cultural backgrounds into the planning and delivery of care  - Encourage patient/family to participate in care and decision-making at the level they choose  - Honor patient and family perspectives and choices  Outcome: Progressing     Problem: Patient/Family Goals  Goal: Patient/Family Long Term Goal  Description: Patient's Long Term Goal: To stay out of the hospital    Interventions:  - Medical management  - See additional Care Plan goals for specific interventions  Outcome: Progressing  Goal: Patient/Family Short Term Goal  Description: Patient's Short Term Goal: To feel better    Interventions:   - Medical management, cardiology follow ups  - See additional Care Plan goals for specific interventions  Outcome: Progressing     Problem: CARDIOVASCULAR - ADULT  Goal: Maintains optimal cardiac output and hemodynamic stability  Description: INTERVENTIONS:  - Monitor vital signs, rhythm, and trends  - Monitor for bleeding, hypotension and signs of decreased cardiac output  - Evaluate effectiveness of vasoactive medications to optimize hemodynamic stability  - Monitor arterial and/or venous puncture sites for bleeding and/or hematoma  - Assess quality of pulses, skin color and temperature  - Assess for signs of decreased coronary artery perfusion - ex.  Angina  - Evaluate fluid balance, assess for edema, trend weights  Outcome: Progressing  Goal: Absence of cardiac arrhythmias or at baseline  Description: INTERVENTIONS:  - Continuous cardiac monitoring, monitor vital signs, obtain 12 lead EKG if indicated  - Evaluate effectiveness of antiarrhythmic and heart rate control medications as ordered  - Initiate emergency measures for life threatening arrhythmias  - Monitor electrolytes and administer replacement therapy as ordered  Outcome: Progressing     Problem: PAIN - ADULT  Goal: Verbalizes/displays adequate comfort level or patient's stated pain goal  Description: INTERVENTIONS:  - Encourage pt to monitor pain and request assistance  - Assess pain using appropriate pain scale  - Administer analgesics based on type and severity of pain and evaluate response  - Implement non-pharmacological measures as appropriate and evaluate response  - Consider cultural and social influences on pain and pain management  - Manage/alleviate anxiety  - Utilize distraction and/or relaxation techniques  - Monitor for opioid side effects  - Notify MD/LIP if interventions unsuccessful or patient reports new pain  - Anticipate increased pain with activity and pre-medicate as appropriate  Outcome: Progressing     Problem: SAFETY ADULT - FALL  Goal: Free from fall injury  Description: INTERVENTIONS:  - Assess pt frequently for physical needs  - Identify cognitive and physical deficits and behaviors that affect risk of falls.   - [place] fall precautions as indicated by assessment.  - Educate pt/family on patient safety including physical limitations  - Instruct pt to call for assistance with activity based on assessment  - Modify environment to reduce risk of injury  - Provide assistive devices as appropriate  - Consider OT/PT consult to assist with strengthening/mobility  - Encourage toileting schedule  Outcome: Progressing     Problem: DISCHARGE PLANNING  Goal: Discharge to home or other facility with appropriate resources  Description: INTERVENTIONS:  - Identify barriers to discharge w/pt and caregiver  - Include patient/family/discharge partner in discharge planning  - Arrange for needed discharge resources and transportation as appropriate  - Identify discharge learning needs (meds, wound care, etc)  - Arrange for interpreters to assist at discharge as needed  - Consider post-discharge preferences of patient/family/discharge partner  - Complete POLST form as appropriate  - Assess patient's ability to be responsible for managing their own health  - Refer to Case Management Department for coordinating discharge planning if the patient needs post-hospital services based on physician/LIP order or complex needs related to functional status, cognitive ability or social support system  Outcome: Progressing     Problem: RISK FOR INFECTION - ADULT  Goal: Absence of fever/infection during anticipated neutropenic period  Description: INTERVENTIONS  - Monitor WBC  - Administer growth factors as ordered  - Implement neutropenic guidelines  Outcome: Progressing     Patient alert and oriented x4. Educated patient on use of call light. Bed locked and in lowest position, bed alarm engaged. PRN tylenol given for pain. Plan to discharge to [facility], awaiting family's decision regarding placement.

## 2022-11-22 NOTE — PLAN OF CARE
Received patient alert and orientated x4, on room air, with sling on left shoulder. RN called report to Theodora Acosta [phone], no answer. RN called Theodora Acosta a second time, gave report to [employer]. Problem: Patient Centered Care  Goal: Patient preferences are identified and integrated in the patient's plan of care  Description: Interventions:  - What would you like us to know as we care for you? She has a broken left shoulder  - Provide timely, complete, and accurate information to patient/family  - Incorporate patient and family knowledge, values, beliefs, and cultural backgrounds into the planning and delivery of care  - Encourage patient/family to participate in care and decision-making at the level they choose  - Honor patient and family perspectives and choices  Outcome: Adequate for Discharge     Problem: Patient/Family Goals  Goal: Patient/Family Long Term Goal  Description: Patient's Long Term Goal: To stay out of the hospital    Interventions:  - Medical management  - See additional Care Plan goals for specific interventions  Outcome: Adequate for Discharge  Goal: Patient/Family Short Term Goal  Description: Patient's Short Term Goal: To feel better    Interventions:   - Medical management, cardiology follow ups  - See additional Care Plan goals for specific interventions  Outcome: Adequate for Discharge     Problem: CARDIOVASCULAR - ADULT  Goal: Maintains optimal cardiac output and hemodynamic stability  Description: INTERVENTIONS:  - Monitor vital signs, rhythm, and trends  - Monitor for bleeding, hypotension and signs of decreased cardiac output  - Evaluate effectiveness of vasoactive medications to optimize hemodynamic stability  - Monitor arterial and/or venous puncture sites for bleeding and/or hematoma  - Assess quality of pulses, skin color and temperature  - Assess for signs of decreased coronary artery perfusion - ex.  Angina  - Evaluate fluid balance, assess for edema, trend weights  Outcome: Adequate for Discharge  Goal: Absence of cardiac arrhythmias or at baseline  Description: INTERVENTIONS:  - Continuous cardiac monitoring, monitor vital signs, obtain 12 lead EKG if indicated  - Evaluate effectiveness of antiarrhythmic and heart rate control medications as ordered  - Initiate emergency measures for life threatening arrhythmias  - Monitor electrolytes and administer replacement therapy as ordered  Outcome: Adequate for Discharge     Problem: PAIN - ADULT  Goal: Verbalizes/displays adequate comfort level or patient's stated pain goal  Description: INTERVENTIONS:  - Encourage pt to monitor pain and request assistance  - Assess pain using appropriate pain scale  - Administer analgesics based on type and severity of pain and evaluate response  - Implement non-pharmacological measures as appropriate and evaluate response  - Consider cultural and social influences on pain and pain management  - Manage/alleviate anxiety  - Utilize distraction and/or relaxation techniques  - Monitor for opioid side effects  - Notify MD/LIP if interventions unsuccessful or patient reports new pain  - Anticipate increased pain with activity and pre-medicate as appropriate  Outcome: Adequate for Discharge     Problem: RISK FOR INFECTION - ADULT  Goal: Absence of fever/infection during anticipated neutropenic period  Description: INTERVENTIONS  - Monitor WBC  - Administer growth factors as ordered  - Implement neutropenic guidelines  Outcome: Adequate for Discharge     Problem: SAFETY ADULT - FALL  Goal: Free from fall injury  Description: INTERVENTIONS:  - Assess pt frequently for physical needs  - Identify cognitive and physical deficits and behaviors that affect risk of falls.   - [place] fall precautions as indicated by assessment.  - Educate pt/family on patient safety including physical limitations  - Instruct pt to call for assistance with activity based on assessment  - Modify environment to reduce risk of injury  - Provide assistive devices as appropriate  - Consider OT/PT consult to assist with strengthening/mobility  - Encourage toileting schedule  Outcome: Adequate for Discharge     Problem: DISCHARGE PLANNING  Goal: Discharge to home or other facility with appropriate resources  Description: INTERVENTIONS:  - Identify barriers to discharge w/pt and caregiver  - Include patient/family/discharge partner in discharge planning  - Arrange for needed discharge resources and transportation as appropriate  - Identify discharge learning needs (meds, wound care, etc)  - Arrange for interpreters to assist at discharge as needed  - Consider post-discharge preferences of patient/family/discharge partner  - Complete POLST form as appropriate  - Assess patient's ability to be responsible for managing their own health  - Refer to Case Management Department for coordinating discharge planning if the patient needs post-hospital services based on physician/LIP order or complex needs related to functional status, cognitive ability or social support system  Outcome: Adequate for Discharge

## 2022-11-22 NOTE — CARDIAC REHAB
Cardiac Rehab Phase I    Activity:  Distance   Assistance needed   Patient tolerated activity . Education:  Handouts provided and reviewed: [address] Diet: Healthy Cardiac diet reviewed. Disease Process: Disease process reviewed.     Reviewed the following: SITE CARE: Reviewed      RISK FACTORS: Reviewed      SMOKING CESSATION: Reviewed      HOME EXERCISE ACTIVITY: Reviewed      OUTPATIENT CARDIAC REHAB: Referred to Cardiac Rehabilitation

## 2022-11-22 NOTE — DISCHARGE PLANNING
Patient chart reviewed for discharge: Medication Reconciliation completed, Specialist/PCP follow up listed, and disease specific Instructions/Education included in After Visit Summary. Discharge RN notified patient's RN of AVS completion. Primary RN checking cardiac clearance. Patient's RN to notify DC RN if discharge status changes.         Alvino Juan RN, Discharge Leader [id number]

## 2022-11-23 ENCOUNTER — INITIAL APN SNF VISIT (OUTPATIENT)
Dept: INTERNAL MEDICINE CLINIC | Facility: SKILLED NURSING FACILITY | Age: 87
End: 2022-11-23

## 2022-11-23 VITALS
DIASTOLIC BLOOD PRESSURE: 87 MMHG | BODY MASS INDEX: 21 KG/M2 | RESPIRATION RATE: 20 BRPM | OXYGEN SATURATION: 97 % | WEIGHT: 116 LBS | TEMPERATURE: 98 F | HEART RATE: 88 BPM | SYSTOLIC BLOOD PRESSURE: 107 MMHG

## 2022-11-23 DIAGNOSIS — I50.21 ACUTE SYSTOLIC (CONGESTIVE) HEART FAILURE (HCC): ICD-10-CM

## 2022-11-23 DIAGNOSIS — Z87.81 HISTORY OF LEFT SHOULDER FRACTURE: ICD-10-CM

## 2022-11-23 DIAGNOSIS — N30.00 ACUTE CYSTITIS WITHOUT HEMATURIA: ICD-10-CM

## 2022-11-23 DIAGNOSIS — I21.4 NSTEMI (NON-ST ELEVATED MYOCARDIAL INFARCTION) (HCC): ICD-10-CM

## 2022-11-23 PROCEDURE — 99310 SBSQ NF CARE HIGH MDM 45: CPT | Performed by: NURSE PRACTITIONER

## 2022-11-23 PROCEDURE — 1111F DSCHRG MED/CURRENT MED MERGE: CPT | Performed by: NURSE PRACTITIONER

## 2022-11-23 PROCEDURE — 1123F ACP DISCUSS/DSCN MKR DOCD: CPT | Performed by: NURSE PRACTITIONER

## 2022-11-23 PROCEDURE — 1126F AMNT PAIN NOTED NONE PRSNT: CPT | Performed by: NURSE PRACTITIONER

## 2022-11-27 ENCOUNTER — EXTERNAL FACILITY (OUTPATIENT)
Dept: INTERNAL MEDICINE CLINIC | Facility: CLINIC | Age: 87
End: 2022-11-27

## 2022-11-27 DIAGNOSIS — I35.0 AORTIC VALVE STENOSIS, ETIOLOGY OF CARDIAC VALVE DISEASE UNSPECIFIED: ICD-10-CM

## 2022-11-27 DIAGNOSIS — S42.222D CLOSED 2-PART DISPLACED FRACTURE OF SURGICAL NECK OF LEFT HUMERUS WITH ROUTINE HEALING, SUBSEQUENT ENCOUNTER: ICD-10-CM

## 2022-11-27 DIAGNOSIS — I48.91 ATRIAL FIBRILLATION WITH RAPID VENTRICULAR RESPONSE (HCC): ICD-10-CM

## 2022-11-27 DIAGNOSIS — I50.21 ACUTE SYSTOLIC (CONGESTIVE) HEART FAILURE (HCC): ICD-10-CM

## 2022-11-28 ENCOUNTER — INITIAL APN SNF VISIT (OUTPATIENT)
Dept: INTERNAL MEDICINE CLINIC | Facility: SKILLED NURSING FACILITY | Age: 87
End: 2022-11-28

## 2022-11-28 DIAGNOSIS — E03.9 HYPOTHYROIDISM, UNSPECIFIED TYPE: ICD-10-CM

## 2022-11-28 DIAGNOSIS — I10 ESSENTIAL HYPERTENSION: ICD-10-CM

## 2022-11-28 DIAGNOSIS — I21.4 NON-ST ELEVATION MYOCARDIAL INFARCTION (NSTEMI) (HCC): ICD-10-CM

## 2022-11-28 DIAGNOSIS — S42.222D CLOSED 2-PART DISPLACED FRACTURE OF SURGICAL NECK OF LEFT HUMERUS WITH ROUTINE HEALING, SUBSEQUENT ENCOUNTER: ICD-10-CM

## 2022-11-28 DIAGNOSIS — I48.91 ATRIAL FIBRILLATION WITH RAPID VENTRICULAR RESPONSE (HCC): ICD-10-CM

## 2022-11-28 DIAGNOSIS — N30.00 ACUTE CYSTITIS WITHOUT HEMATURIA: ICD-10-CM

## 2022-11-29 ENCOUNTER — EXTERNAL FACILITY (OUTPATIENT)
Dept: INTERNAL MEDICINE CLINIC | Facility: CLINIC | Age: 87
End: 2022-11-29

## 2022-11-29 DIAGNOSIS — I10 ESSENTIAL HYPERTENSION: ICD-10-CM

## 2022-11-29 DIAGNOSIS — S42.222D CLOSED 2-PART DISPLACED FRACTURE OF SURGICAL NECK OF LEFT HUMERUS WITH ROUTINE HEALING, SUBSEQUENT ENCOUNTER: ICD-10-CM

## 2022-11-29 DIAGNOSIS — I73.9 PAD (PERIPHERAL ARTERY DISEASE) (HCC): ICD-10-CM

## 2022-11-29 DIAGNOSIS — N18.30 STAGE 3 CHRONIC KIDNEY DISEASE, UNSPECIFIED WHETHER STAGE 3A OR 3B CKD (HCC): ICD-10-CM

## 2022-11-29 DIAGNOSIS — I50.9 CONGESTIVE HEART FAILURE, UNSPECIFIED HF CHRONICITY, UNSPECIFIED HEART FAILURE TYPE (HCC): ICD-10-CM

## 2022-11-29 DIAGNOSIS — I35.0 AORTIC VALVE STENOSIS, ETIOLOGY OF CARDIAC VALVE DISEASE UNSPECIFIED: ICD-10-CM

## 2022-11-29 PROCEDURE — 1111F DSCHRG MED/CURRENT MED MERGE: CPT | Performed by: INTERNAL MEDICINE

## 2022-11-29 PROCEDURE — 99308 SBSQ NF CARE LOW MDM 20: CPT | Performed by: INTERNAL MEDICINE

## 2022-11-30 ENCOUNTER — TELEPHONE (OUTPATIENT)
Dept: CARDIOLOGY CLINIC | Facility: HOSPITAL | Age: 87
End: 2022-11-30

## 2022-12-01 ENCOUNTER — EXTERNAL FACILITY (OUTPATIENT)
Dept: INTERNAL MEDICINE CLINIC | Facility: CLINIC | Age: 87
End: 2022-12-01

## 2022-12-01 DIAGNOSIS — I50.21 ACUTE SYSTOLIC (CONGESTIVE) HEART FAILURE (HCC): ICD-10-CM

## 2022-12-01 DIAGNOSIS — I48.91 ATRIAL FIBRILLATION WITH RAPID VENTRICULAR RESPONSE (HCC): ICD-10-CM

## 2022-12-01 DIAGNOSIS — I50.9 CONGESTIVE HEART FAILURE, UNSPECIFIED HF CHRONICITY, UNSPECIFIED HEART FAILURE TYPE (HCC): ICD-10-CM

## 2022-12-01 DIAGNOSIS — I35.0 NONRHEUMATIC AORTIC VALVE STENOSIS: ICD-10-CM

## 2022-12-06 ENCOUNTER — EXTERNAL FACILITY (OUTPATIENT)
Dept: INTERNAL MEDICINE CLINIC | Facility: CLINIC | Age: 87
End: 2022-12-06

## 2022-12-06 DIAGNOSIS — S42.222D CLOSED 2-PART DISPLACED FRACTURE OF SURGICAL NECK OF LEFT HUMERUS WITH ROUTINE HEALING, SUBSEQUENT ENCOUNTER: ICD-10-CM

## 2022-12-06 DIAGNOSIS — I73.9 PAD (PERIPHERAL ARTERY DISEASE) (HCC): ICD-10-CM

## 2022-12-06 DIAGNOSIS — I10 ESSENTIAL HYPERTENSION: ICD-10-CM

## 2022-12-06 DIAGNOSIS — E03.9 HYPOTHYROIDISM, UNSPECIFIED TYPE: ICD-10-CM

## 2022-12-06 DIAGNOSIS — I50.21 ACUTE SYSTOLIC (CONGESTIVE) HEART FAILURE (HCC): ICD-10-CM

## 2022-12-06 PROCEDURE — 99308 SBSQ NF CARE LOW MDM 20: CPT | Performed by: INTERNAL MEDICINE

## 2022-12-06 PROCEDURE — 1111F DSCHRG MED/CURRENT MED MERGE: CPT | Performed by: INTERNAL MEDICINE

## 2022-12-09 ENCOUNTER — SNF VISIT (OUTPATIENT)
Dept: INTERNAL MEDICINE CLINIC | Facility: SKILLED NURSING FACILITY | Age: 87
End: 2022-12-09

## 2022-12-09 DIAGNOSIS — I25.10 ATHEROSCLEROSIS OF NATIVE CORONARY ARTERY OF NATIVE HEART WITHOUT ANGINA PECTORIS: ICD-10-CM

## 2022-12-09 DIAGNOSIS — E03.9 HYPOTHYROIDISM, UNSPECIFIED TYPE: ICD-10-CM

## 2022-12-09 DIAGNOSIS — E78.5 HYPERLIPIDEMIA, UNSPECIFIED HYPERLIPIDEMIA TYPE: ICD-10-CM

## 2022-12-09 DIAGNOSIS — I21.4 NON-ST ELEVATION MYOCARDIAL INFARCTION (NSTEMI) (HCC): ICD-10-CM

## 2022-12-09 DIAGNOSIS — I38 VALVULAR HEART DISEASE: ICD-10-CM

## 2022-12-09 DIAGNOSIS — I10 ESSENTIAL HYPERTENSION: ICD-10-CM

## 2022-12-12 ENCOUNTER — SNF VISIT (OUTPATIENT)
Dept: INTERNAL MEDICINE CLINIC | Facility: SKILLED NURSING FACILITY | Age: 87
End: 2022-12-12

## 2022-12-12 DIAGNOSIS — S42.292D OTHER CLOSED DISPLACED FRACTURE OF PROXIMAL END OF LEFT HUMERUS WITH ROUTINE HEALING, SUBSEQUENT ENCOUNTER: ICD-10-CM

## 2022-12-12 DIAGNOSIS — I10 ESSENTIAL HYPERTENSION: ICD-10-CM

## 2022-12-12 DIAGNOSIS — I48.91 ATRIAL FIBRILLATION WITH RAPID VENTRICULAR RESPONSE (HCC): ICD-10-CM

## 2022-12-12 DIAGNOSIS — I38 VALVULAR HEART DISEASE: ICD-10-CM

## 2022-12-12 DIAGNOSIS — I21.4 NON-ST ELEVATION MYOCARDIAL INFARCTION (NSTEMI) (HCC): ICD-10-CM

## 2022-12-12 DIAGNOSIS — E78.5 HYPERLIPIDEMIA, UNSPECIFIED HYPERLIPIDEMIA TYPE: ICD-10-CM

## 2022-12-13 ENCOUNTER — EXTERNAL FACILITY (OUTPATIENT)
Dept: INTERNAL MEDICINE CLINIC | Facility: CLINIC | Age: 87
End: 2022-12-13

## 2022-12-13 DIAGNOSIS — N18.30 STAGE 3 CHRONIC KIDNEY DISEASE, UNSPECIFIED WHETHER STAGE 3A OR 3B CKD (HCC): ICD-10-CM

## 2022-12-13 DIAGNOSIS — E78.00 PURE HYPERCHOLESTEROLEMIA: ICD-10-CM

## 2022-12-13 DIAGNOSIS — I10 ESSENTIAL HYPERTENSION: ICD-10-CM

## 2022-12-13 DIAGNOSIS — I50.21 ACUTE SYSTOLIC (CONGESTIVE) HEART FAILURE (HCC): ICD-10-CM

## 2022-12-13 PROCEDURE — 1111F DSCHRG MED/CURRENT MED MERGE: CPT | Performed by: INTERNAL MEDICINE

## 2022-12-13 PROCEDURE — 99308 SBSQ NF CARE LOW MDM 20: CPT | Performed by: INTERNAL MEDICINE

## 2022-12-15 ENCOUNTER — EXTERNAL FACILITY (OUTPATIENT)
Dept: INTERNAL MEDICINE CLINIC | Facility: CLINIC | Age: 87
End: 2022-12-15

## 2022-12-15 ENCOUNTER — SNF DISCHARGE (OUTPATIENT)
Dept: INTERNAL MEDICINE CLINIC | Facility: SKILLED NURSING FACILITY | Age: 87
End: 2022-12-15

## 2022-12-15 DIAGNOSIS — I50.9 CONGESTIVE HEART FAILURE, UNSPECIFIED HF CHRONICITY, UNSPECIFIED HEART FAILURE TYPE (HCC): ICD-10-CM

## 2022-12-15 DIAGNOSIS — E03.8 OTHER SPECIFIED HYPOTHYROIDISM: ICD-10-CM

## 2022-12-15 DIAGNOSIS — S42.222D CLOSED 2-PART DISPLACED FRACTURE OF SURGICAL NECK OF LEFT HUMERUS WITH ROUTINE HEALING, SUBSEQUENT ENCOUNTER: ICD-10-CM

## 2022-12-15 DIAGNOSIS — I48.91 ATRIAL FIBRILLATION WITH RAPID VENTRICULAR RESPONSE (HCC): ICD-10-CM

## 2022-12-15 DIAGNOSIS — I10 ESSENTIAL HYPERTENSION: ICD-10-CM

## 2022-12-15 DIAGNOSIS — E78.5 HYPERLIPIDEMIA, UNSPECIFIED HYPERLIPIDEMIA TYPE: ICD-10-CM

## 2022-12-15 DIAGNOSIS — I10 PRIMARY HYPERTENSION: ICD-10-CM

## 2022-12-15 DIAGNOSIS — I35.0 AORTIC VALVE STENOSIS, ETIOLOGY OF CARDIAC VALVE DISEASE UNSPECIFIED: ICD-10-CM

## 2022-12-15 PROCEDURE — 99310 SBSQ NF CARE HIGH MDM 45: CPT | Performed by: NURSE PRACTITIONER

## 2022-12-15 PROCEDURE — 1111F DSCHRG MED/CURRENT MED MERGE: CPT | Performed by: NURSE PRACTITIONER

## 2022-12-15 PROCEDURE — 99308 SBSQ NF CARE LOW MDM 20: CPT | Performed by: INTERNAL MEDICINE

## 2022-12-15 PROCEDURE — 1111F DSCHRG MED/CURRENT MED MERGE: CPT | Performed by: INTERNAL MEDICINE

## 2022-12-16 ENCOUNTER — TELEPHONE (OUTPATIENT)
Dept: INTERNAL MEDICINE CLINIC | Facility: CLINIC | Age: 87
End: 2022-12-16

## 2022-12-16 NOTE — TELEPHONE ENCOUNTER
Britni, Liaison calling from [facility]. Patient's date of birth and full name both confirmed. She says Home Health was ordered from [facility], and Javier sees patients at [facility]. Per chart review, yesterday, SNF Discharge:   KAMLA Fitzpatrick (APN) Cayden Machuca Nurse Practitioner  Jammie Dill, 2/24/1928, 80year old, female is being discharged from [address] [place] [address] to [facility] asking when last appointment was . RN infomred that patient was seen by Dr. Micah Mari yesterday. Britni requested Fax number for [facility] and ordered. RN provided fax number for Dr. Ariana Odell office, as Dr. Concepcion Lawrence is the primary.

## 2022-12-19 ENCOUNTER — MED REC SCAN ONLY (OUTPATIENT)
Dept: INTERNAL MEDICINE CLINIC | Facility: CLINIC | Age: 87
End: 2022-12-19

## 2023-01-03 PROBLEM — R26.89 OTHER ABNORMALITIES OF GAIT AND MOBILITY: Status: ACTIVE | Noted: 2022-11-22

## 2023-01-03 PROBLEM — M17.0 BILATERAL PRIMARY OSTEOARTHRITIS OF KNEE: Status: ACTIVE | Noted: 2022-11-22

## 2023-01-03 PROBLEM — R29.3 ABNORMAL POSTURE: Status: ACTIVE | Noted: 2022-11-22

## 2023-01-03 PROBLEM — I87.2 CHRONIC VENOUS INSUFFICIENCY: Status: ACTIVE | Noted: 2022-08-05

## 2023-01-03 PROBLEM — E78.5 DYSLIPIDEMIA: Status: ACTIVE | Noted: 2019-06-26

## 2023-01-03 PROBLEM — R27.8 OTHER LACK OF COORDINATION: Status: ACTIVE | Noted: 2022-11-22

## 2023-01-03 PROBLEM — R27.9 UNSPECIFIED LACK OF COORDINATION: Status: ACTIVE | Noted: 2022-11-22

## 2023-01-03 PROBLEM — R26.81 UNSTEADINESS ON FEET: Status: ACTIVE | Noted: 2022-11-22

## 2023-01-03 PROBLEM — I25.5 ISCHEMIC CARDIOMYOPATHY: Status: ACTIVE | Noted: 2022-11-22

## 2023-01-05 ENCOUNTER — TELEPHONE (OUTPATIENT)
Dept: CARDIOLOGY CLINIC | Facility: HOSPITAL | Age: 88
End: 2023-01-05

## 2023-01-05 NOTE — TELEPHONE ENCOUNTER
Luigi Gamboa called to make an appt. See was told to call for appt from Dr. Valentin Mccabe. confirmed from his 12/15 appt. Assisted to schedule and to bring med list or her pill bottles with. Discussed having her weight daily ,  and if she is symptomatic to call and can be seen sooner. To monitor for 3#/day or 5#/week weight gains.

## 2023-01-23 DIAGNOSIS — I50.9 CONGESTIVE HEART FAILURE, UNSPECIFIED HF CHRONICITY, UNSPECIFIED HEART FAILURE TYPE (HCC): Primary | ICD-10-CM

## 2023-01-24 ENCOUNTER — OFFICE VISIT (OUTPATIENT)
Dept: CARDIOLOGY CLINIC | Facility: HOSPITAL | Age: 88
End: 2023-01-24
Payer: MEDICARE

## 2023-01-24 VITALS
DIASTOLIC BLOOD PRESSURE: 65 MMHG | OXYGEN SATURATION: 100 % | HEART RATE: 71 BPM | BODY MASS INDEX: 18 KG/M2 | SYSTOLIC BLOOD PRESSURE: 110 MMHG | WEIGHT: 103.31 LBS

## 2023-01-24 DIAGNOSIS — I25.10 ATHEROSCLEROSIS OF NATIVE CORONARY ARTERY OF NATIVE HEART WITHOUT ANGINA PECTORIS: ICD-10-CM

## 2023-01-24 DIAGNOSIS — E03.8 OTHER SPECIFIED HYPOTHYROIDISM: ICD-10-CM

## 2023-01-24 DIAGNOSIS — I10 ESSENTIAL HYPERTENSION: ICD-10-CM

## 2023-01-24 DIAGNOSIS — I50.9 CONGESTIVE HEART FAILURE, UNSPECIFIED HF CHRONICITY, UNSPECIFIED HEART FAILURE TYPE (HCC): Primary | ICD-10-CM

## 2023-01-24 DIAGNOSIS — I48.20 CHRONIC ATRIAL FIBRILLATION (HCC): ICD-10-CM

## 2023-01-24 DIAGNOSIS — E78.00 PURE HYPERCHOLESTEROLEMIA: ICD-10-CM

## 2023-01-24 DIAGNOSIS — S42.222D CLOSED 2-PART DISPLACED FRACTURE OF SURGICAL NECK OF LEFT HUMERUS WITH ROUTINE HEALING, SUBSEQUENT ENCOUNTER: ICD-10-CM

## 2023-01-24 DIAGNOSIS — E78.5 HYPERLIPIDEMIA, UNSPECIFIED HYPERLIPIDEMIA TYPE: ICD-10-CM

## 2023-01-24 LAB
ANION GAP SERPL CALC-SCNC: 8 MMOL/L (ref 0–18)
BUN BLD-MCNC: 26 MG/DL (ref 7–18)
BUN/CREAT SERPL: 24.8 (ref 10–20)
CALCIUM BLD-MCNC: 9.4 MG/DL (ref 8.5–10.1)
CHLORIDE SERPL-SCNC: 104 MMOL/L (ref 98–112)
CO2 SERPL-SCNC: 30 MMOL/L (ref 21–32)
CREAT BLD-MCNC: 1.05 MG/DL
FASTING STATUS PATIENT QL REPORTED: NO
GFR SERPLBLD BASED ON 1.73 SQ M-ARVRAT: 49 ML/MIN/1.73M2 (ref 60–?)
GLUCOSE BLD-MCNC: 137 MG/DL (ref 70–99)
NT-PROBNP SERPL-MCNC: 5935 PG/ML (ref ?–450)
OSMOLALITY SERPL CALC.SUM OF ELEC: 301 MOSM/KG (ref 275–295)
POTASSIUM SERPL-SCNC: 3.8 MMOL/L (ref 3.5–5.1)
SODIUM SERPL-SCNC: 142 MMOL/L (ref 136–145)

## 2023-01-24 PROCEDURE — 99203 OFFICE O/P NEW LOW 30 MIN: CPT | Performed by: NURSE PRACTITIONER

## 2023-01-24 RX ORDER — FUROSEMIDE 40 MG/1
TABLET ORAL
Qty: 45 TABLET | Refills: 2 | Status: SHIPPED | OUTPATIENT
Start: 2023-01-24

## 2023-01-24 RX ORDER — LOPERAMIDE HYDROCHLORIDE 2 MG/1
2 CAPSULE ORAL 4 TIMES DAILY PRN
COMMUNITY

## 2023-01-24 NOTE — PROGRESS NOTES
Arianna Nguyen is here with her daughter. She ambulates with Rolater walker. Weight today 103 lb. Weight on 1/3 was 116 lb. She went to [facility] respite (where she got some more services than the apartment) after hospital stay and today returns to her apartment at [facility]. She reports increased shortness of breath with activity and now at rest (about couple of last week)/ denies feeling bloated, has leg swelling and discoloration (reports poor circulation). Requested med list from [facility].

## 2023-01-24 NOTE — PATIENT INSTRUCTIONS
Increase furosemide to 40mg in the morning and 20mg in the afternoon for 4 days, then continue furosemide 40mg daily    Update me on Monday with your home weight and if it helped your shortness of breath - [phone]    Try to include protein in your diet - you can try ensure or boost    Return to [address] [facility] in 2 weeks    Follow up with Dr. Genoveva Manning as directed    Follow up with Dr. Derrick Miller as directed    Please call the heart failure clinic if you notice a weight gain of 3 pounds overnight or 5 pounds or more in 5 days. Monitor yourself for signs and symptoms of fluid overload, increased shortness of breath, difficulty breathing when laying down etc. Call the clinic if any of these signs develop at ph: [phone]    Call if having any dizziness, lightheadedness, heart racing, palpitations, chest pain, shortness of breath, coughing, swelling, weight gain or worsening symptoms. 32-64 ounces of fluid daily    Less than 2000 mg salt/sodium daily. Common high sodium foods include frozen dinners, soups (not homemade), some cereal, vegetable juice, canned vegetables, lunch meats, processed meats like hotdogs, sausage, taylor, pepperoni, soy sauce, pre-packaged rice or potatoes. Please remember to read nutrition labels for sodium content.

## 2023-01-25 PROBLEM — I48.20 CHRONIC ATRIAL FIBRILLATION (HCC): Status: ACTIVE | Noted: 2022-11-18

## 2023-01-30 ENCOUNTER — TELEPHONE (OUTPATIENT)
Dept: CARDIOLOGY CLINIC | Facility: HOSPITAL | Age: 88
End: 2023-01-30

## 2023-01-30 NOTE — TELEPHONE ENCOUNTER
Called and spoke with daughter. Requested patient stay on furosemide 40mg in the morning and 20mg in the afternoon. Daughter verbalized understanding. Daughter reports she does not need me to call the caregiver at this time and she will relay the information. Patient has a follow up with me next week.

## 2023-01-30 NOTE — TELEPHONE ENCOUNTER
Mary's caregiver called with update from last visit. She was instructed to take extra furosemide for 4 days. Weight today 100 lb (from 103 lb) and no shortness of breath. She is back to furosemide 40 mg and knows her next office visit is 2/8.

## 2023-02-03 RX ORDER — METOPROLOL SUCCINATE 25 MG/1
TABLET, EXTENDED RELEASE ORAL
Qty: 180 TABLET | Refills: 0 | Status: SHIPPED | OUTPATIENT
Start: 2023-02-03

## 2023-02-03 NOTE — TELEPHONE ENCOUNTER
Please review. Protocol failed / No protocol.    Requested Prescriptions   Pending Prescriptions Disp Refills    METOPROLOL SUCCINATE ER 25 MG Oral Tablet 24 Hr [Pharmacy Med Name: metoprolol succinate ER 25 mg tablet,extended release 24 hr] 180 tablet 0     Sig: TAKE 1 TABLET BY MOUTH EVERY 12 HOURS       Hypertensive Medications Protocol Failed - 2/3/2023 10:21 AM        Failed - In person appointment or virtual visit in the past 6 months     Recent Outpatient Visits              1 week ago Congestive heart failure, unspecified HF chronicity, unspecified heart failure type [facility])    [address] [facility], [place]    Office Visit    1 week ago Closed 2-part displaced fracture of surgical neck of left humerus with routine healing, subsequent encounter    [address] Lu[address] Louise MD    Office Visit    1 month ago Closed 2-part displaced fracture of surgical neck of left humerus with routine healing, subsequent encounter    [address] MD    Office Visit    1 month ago Closed 2-part displaced fracture of surgical neck of left humerus with routine healing, subsequent encounter    [address] Lu[address] Louise MD    Office Visit    2 months ago Closed 2-part displaced fracture of surgical neck of left humerus with routine healing, subsequent encounter    [address] Luiz Louise MD    Office Visit          Future Appointments         Provider Department Appt Notes    In 5 days Kendell Gumaro, [address]     In 1 month Law Carvajal MD [facility] left arm f/up               Failed - EGFRCR or GFRNAA > 50     GFR Evaluation  EGFRCR: 49 , resulted on 1/24/2023          Passed - In person appointment in the past 12 or next 3 months     Recent Outpatient Visits              1 week ago Congestive heart failure, unspecified HF chronicity, unspecified heart failure type [facility])    [facility]    Office Visit    1 week ago Closed 2-part displaced fracture of surgical neck of left humerus with routine healing, subsequent encounter    [address] Luiz Miramontes MD    Office Visit    1 month ago Closed 2-part displaced fracture of surgical neck of left humerus with routine healing, subsequent encounter    [address] Luiz Miramontes MD    Office Visit    1 month ago Closed 2-part displaced fracture of surgical neck of left humerus with routine healing, subsequent encounter    [address] Luiz Miramontes MD    Office Visit    2 months ago Closed 2-part displaced fracture of surgical neck of left humerus with routine healing, subsequent encounter    Andrés Gonsalez MD    Office Visit          Future Appointments         Provider Department Appt Notes    In 5 days Alyssia River, [address]     In 1 month MD Andrés Hein left arm f/up               Passed - Last BP reading less than 140/90     BP Readings from Last 1 Encounters:  01/24/23 : 110/65              Passed - CMP or BMP in past 6 months     Recent Results (from the past 4392 hour(s))   BASIC METABOLIC PANEL (8)    Collection Time: 01/24/23  1:06 PM   Result Value Ref Range    Glucose 137 (H) 70 - 99 mg/dL    Sodium 142 136 - 145 mmol/L    Potassium 3.8 3.5 - 5.1 mmol/L    Chloride 104 98 - 112 mmol/L    CO2 30.0 21.0 - 32.0 mmol/L    Anion Gap 8 0 - 18 mmol/L    BUN 26 (H) 7 - 18 mg/dL    Creatinine 1.05 (H) 0.55 - 1.02 mg/dL    BUN/CREA Ratio 24.8 (H) 10.0 - 20.0    Calcium, Total 9.4 8.5 - 10.1 mg/dL    Calculated Osmolality 301 (H) 275 - 295 mOsm/kg    eGFR-Cr 49 (L) >=60 mL/min/1.73m2    Patient Fasting for BMP? No      *Note: Due to a large number of results and/or encounters for the requested time period, some results have not been displayed. A complete set of results can be found in Results Review. Future Appointments         Provider Department Appt Notes    In 5 days Christiano Collins, [address]     In 1 month Rosa Donis MD [facility] left arm f/up           Recent Outpatient Visits              1 week ago Congestive heart failure, unspecified HF chronicity, unspecified heart failure type [facility])    [address], APRN    Office Visit    1 week ago Closed 2-part displaced fracture of surgical neck of left humerus with routine healing, subsequent encounter    [facility] Jairon Arias MD    Office Visit    1 month ago Closed 2-part displaced fracture of surgical neck of left humerus with routine healing, subsequent encounter    [facility] Jairon Arias MD    Office Visit    1 month ago Closed 2-part displaced fracture of surgical neck of left humerus with routine healing, subsequent encounter    [facility] Jairon Arias MD    Office Visit    2 months ago Closed 2-part displaced fracture of surgical neck of left humerus with routine healing, subsequent encounter    Deonte Braxton MD    Office Visit

## 2023-02-07 DIAGNOSIS — I50.9 CONGESTIVE HEART FAILURE, UNSPECIFIED HF CHRONICITY, UNSPECIFIED HEART FAILURE TYPE (HCC): Primary | ICD-10-CM

## 2023-02-08 ENCOUNTER — OFFICE VISIT (OUTPATIENT)
Dept: CARDIOLOGY CLINIC | Facility: HOSPITAL | Age: 88
End: 2023-02-08
Attending: NURSE PRACTITIONER
Payer: MEDICARE

## 2023-02-08 ENCOUNTER — LAB ENCOUNTER (OUTPATIENT)
Dept: LAB | Facility: HOSPITAL | Age: 88
End: 2023-02-08
Attending: NURSE PRACTITIONER
Payer: MEDICARE

## 2023-02-08 VITALS
HEART RATE: 81 BPM | OXYGEN SATURATION: 97 % | BODY MASS INDEX: 19 KG/M2 | SYSTOLIC BLOOD PRESSURE: 90 MMHG | DIASTOLIC BLOOD PRESSURE: 60 MMHG | WEIGHT: 105.63 LBS

## 2023-02-08 DIAGNOSIS — I50.9 CONGESTIVE HEART FAILURE, UNSPECIFIED HF CHRONICITY, UNSPECIFIED HEART FAILURE TYPE (HCC): ICD-10-CM

## 2023-02-08 DIAGNOSIS — I50.9 CONGESTIVE HEART FAILURE, UNSPECIFIED HF CHRONICITY, UNSPECIFIED HEART FAILURE TYPE (HCC): Primary | ICD-10-CM

## 2023-02-08 DIAGNOSIS — I48.20 CHRONIC ATRIAL FIBRILLATION (HCC): ICD-10-CM

## 2023-02-08 DIAGNOSIS — I25.10 ATHEROSCLEROSIS OF NATIVE CORONARY ARTERY OF NATIVE HEART WITHOUT ANGINA PECTORIS: ICD-10-CM

## 2023-02-08 LAB
ANION GAP SERPL CALC-SCNC: 7 MMOL/L (ref 0–18)
BUN BLD-MCNC: 23 MG/DL (ref 7–18)
BUN/CREAT SERPL: 21.3 (ref 10–20)
CALCIUM BLD-MCNC: 9.2 MG/DL (ref 8.5–10.1)
CHLORIDE SERPL-SCNC: 104 MMOL/L (ref 98–112)
CO2 SERPL-SCNC: 27 MMOL/L (ref 21–32)
CREAT BLD-MCNC: 1.08 MG/DL
FASTING STATUS PATIENT QL REPORTED: NO
GFR SERPLBLD BASED ON 1.73 SQ M-ARVRAT: 48 ML/MIN/1.73M2 (ref 60–?)
GLUCOSE BLD-MCNC: 174 MG/DL (ref 70–99)
NT-PROBNP SERPL-MCNC: 7023 PG/ML (ref ?–450)
OSMOLALITY SERPL CALC.SUM OF ELEC: 294 MOSM/KG (ref 275–295)
POTASSIUM SERPL-SCNC: 4.3 MMOL/L (ref 3.5–5.1)
SODIUM SERPL-SCNC: 138 MMOL/L (ref 136–145)

## 2023-02-08 PROCEDURE — 99214 OFFICE O/P EST MOD 30 MIN: CPT | Performed by: NURSE PRACTITIONER

## 2023-02-08 PROCEDURE — 80048 BASIC METABOLIC PNL TOTAL CA: CPT

## 2023-02-08 PROCEDURE — 36415 COLL VENOUS BLD VENIPUNCTURE: CPT

## 2023-02-08 RX ORDER — LISINOPRIL 5 MG/1
2.5 TABLET ORAL DAILY
Qty: 30 TABLET | Refills: 0 | COMMUNITY
Start: 2023-02-08 | End: 2023-02-08

## 2023-02-08 RX ORDER — FUROSEMIDE 40 MG/1
TABLET ORAL
Qty: 45 TABLET | Refills: 2 | COMMUNITY
Start: 2023-02-08

## 2023-02-08 NOTE — PROGRESS NOTES
Veronica Valadez is here with her friend Berta Prescott  Weight today 105.6 lb, home weight 100 lb. She was 103 lb at 1/24 visit. She is here today with caregiver/friend and gives permission to discuss care, results and treatment in her presence. She walks in with Rolator walker with dyspnea on exertion after sitting a couple of minutes she is breathing normal again. She notes she is still recovering from heart attack and stents. She just moved back to her apartment (about 2 weeks ago) from rehab after fall/humerus fracture and now doing more for herself. They report she took the extra 20 mg lasix in the afternoon for 4 days but did not continue. She reports taking all of her medications today. Denies lightheadedness, palpitations or chest pain or bloating but does have trace bilateral lower extremity chronic swelling. This morning /83, on the 4th it was 96/91 meds held. 1st check in office BP 74/39 repeat 69/38 automatic, then 69/38 manually (all pressures on right -left arm lymph surgery history). Blood pressure came up to 90/60 after walking about 75 feet. She gets physical therapy a couple of times a week. Caregiver called upon returning to [address] [place] [address] and reports the medication list she has was wrong and she is all ready taking Lisinopril at the 2.5 mg daily dose. Please call [facility] with further instructions. Reviewed with Jono Conklin, Lisinopril stopped. Liz notified, she will change it on the three copies of after visit summary she was given. Med rec updated.

## 2023-02-08 NOTE — PATIENT INSTRUCTIONS
Stop lisinopril    increase furosemide to 40mg in am and 20mg in pm    Return to [address] [place] [address] in 2 weeks - please call me if you need to be seen sooner    Follow up with Dr. Nai Alvarez as directed    Follow up with Dr. Vanna Altamirano as directed    Please call the heart failure clinic if you notice a weight gain of 3 pounds overnight or 5 pounds or more in 5 days. Monitor yourself for signs and symptoms of fluid overload, increased shortness of breath, difficulty breathing when laying down etc. Call the clinic if any of these signs develop at : [phone]    Call if having any dizziness, lightheadedness, heart racing, palpitations, chest pain, shortness of breath, coughing, swelling, weight gain or worsening symptoms. 32-64 ounces of fluid daily    Less than 2000 mg salt/sodium daily. Common high sodium foods include frozen dinners, soups (not homemade), some cereal, vegetable juice, canned vegetables, lunch meats, processed meats like hotdogs, sausage, taylor, pepperoni, soy sauce, pre-packaged rice or potatoes. Please remember to read nutrition labels for sodium content.

## 2023-02-09 ENCOUNTER — TELEPHONE (OUTPATIENT)
Dept: CARDIOLOGY CLINIC | Facility: HOSPITAL | Age: 88
End: 2023-02-09

## 2023-02-09 DIAGNOSIS — I50.9 CONGESTIVE HEART FAILURE, UNSPECIFIED HF CHRONICITY, UNSPECIFIED HEART FAILURE TYPE (HCC): Primary | ICD-10-CM

## 2023-02-09 NOTE — TELEPHONE ENCOUNTER
Michelle Marquez called back to say Tony Mandujano has appointment with Dr. Mitzi Issa 2/23 and we scheduled her follow up for 2/22. Advised will review with Braulio Francis for new follow up date. Per Braulio Francis, she should come back next week, if unable to, she should get repeat BMP and Pro-BNP around 2/20 and see Braulio Francis 1-2 weeks after Dr. Mitzi Issa appointment. Message left for daughter Paradise Dye to call to reschedule follow up appointment as above and or give permission to schedule with friend Michelle Marquez.

## 2023-02-13 ENCOUNTER — NURSE TRIAGE (OUTPATIENT)
Dept: INTERNAL MEDICINE CLINIC | Facility: CLINIC | Age: 88
End: 2023-02-13

## 2023-02-13 DIAGNOSIS — N39.0 URINARY TRACT INFECTION WITHOUT HEMATURIA, SITE UNSPECIFIED: Primary | ICD-10-CM

## 2023-02-14 ENCOUNTER — HOSPITAL ENCOUNTER (OUTPATIENT)
Age: 88
Discharge: HOME | DRG: 871 | End: 2023-02-14
Attending: EMERGENCY MEDICINE
Payer: MEDICARE

## 2023-02-14 ENCOUNTER — HOSPITAL ENCOUNTER (OUTPATIENT)
Age: 88
Discharge: HOME | End: 2023-02-14
Attending: EMERGENCY MEDICINE
Payer: MEDICARE

## 2023-02-14 VITALS
SYSTOLIC BLOOD PRESSURE: 130 MMHG | OXYGEN SATURATION: 98 % | TEMPERATURE: 99 F | RESPIRATION RATE: 20 BRPM | DIASTOLIC BLOOD PRESSURE: 80 MMHG | HEART RATE: 96 BPM

## 2023-02-14 DIAGNOSIS — N30.00 ACUTE CYSTITIS WITHOUT HEMATURIA: Primary | ICD-10-CM

## 2023-02-14 LAB
BILIRUB UR QL STRIP: NEGATIVE
COLOR UR: YELLOW
GLUCOSE UR STRIP-MCNC: NEGATIVE MG/DL
KETONES UR STRIP-MCNC: NEGATIVE MG/DL
NITRITE UR QL STRIP: POSITIVE
PH UR STRIP: 7 [PH]
PROT UR STRIP-MCNC: NEGATIVE MG/DL
SP GR UR STRIP: 1.01
UROBILINOGEN UR STRIP-ACNC: <2 MG/DL

## 2023-02-14 PROCEDURE — 87086 URINE CULTURE/COLONY COUNT: CPT | Performed by: EMERGENCY MEDICINE

## 2023-02-14 PROCEDURE — 99214 OFFICE O/P EST MOD 30 MIN: CPT

## 2023-02-14 PROCEDURE — 87088 URINE BACTERIA CULTURE: CPT | Performed by: EMERGENCY MEDICINE

## 2023-02-14 PROCEDURE — 87186 SC STD MICRODIL/AGAR DIL: CPT | Performed by: EMERGENCY MEDICINE

## 2023-02-14 PROCEDURE — 81002 URINALYSIS NONAUTO W/O SCOPE: CPT

## 2023-02-14 RX ORDER — CEPHALEXIN 500 MG/1
500 CAPSULE ORAL 2 TIMES DAILY
Qty: 14 CAPSULE | Refills: 0 | Status: ON HOLD | OUTPATIENT
Start: 2023-02-14 | End: 2023-02-17

## 2023-02-14 NOTE — TELEPHONE ENCOUNTER
JESSICA on [employer] phone and Left detailed message  On [employer] phone. If possible to schedule f/u tomorrow , or after [facility] visit , would like BMP, BNP this week. S/w Jayna Vega and will review with Jami Riley tomorrow and then make a  Plan. Was in [facility] this afternoon for ILDA.

## 2023-02-15 ENCOUNTER — HOSPITAL ENCOUNTER (INPATIENT)
Facility: HOSPITAL | Age: 88
LOS: 2 days | Discharge: NURSING, REHAB OR LONG-TERM CARE | End: 2023-02-17
Attending: EMERGENCY MEDICINE | Admitting: HOSPITALIST
Payer: MEDICARE

## 2023-02-15 ENCOUNTER — HOSPITAL ENCOUNTER (INPATIENT)
Facility: HOSPITAL | Age: 88
LOS: 2 days | Discharge: NURSING, REHAB OR LONG-TERM CARE | DRG: 871 | End: 2023-02-17
Attending: EMERGENCY MEDICINE | Admitting: HOSPITALIST
Payer: MEDICARE

## 2023-02-15 ENCOUNTER — HOSPITAL ENCOUNTER (INPATIENT)
Facility: HOSPITAL | Age: 88
LOS: 2 days | Discharge: HOME | End: 2023-02-17
Attending: EMERGENCY MEDICINE | Admitting: HOSPITALIST
Payer: MEDICARE

## 2023-02-15 DIAGNOSIS — N30.00 ACUTE CYSTITIS WITHOUT HEMATURIA: Primary | ICD-10-CM

## 2023-02-15 PROBLEM — N39.0 UTI (URINARY TRACT INFECTION): Status: ACTIVE | Noted: 2023-02-15

## 2023-02-15 PROBLEM — N39.0 URINARY TRACT INFECTION: Status: ACTIVE | Noted: 2023-02-15

## 2023-02-15 LAB
ALBUMIN SERPL-MCNC: 4 G/DL (ref 3.4–5)
ALP LIVER SERPL-CCNC: 172 U/L
ALT SERPL-CCNC: 16 U/L
ANION GAP SERPL CALC-SCNC: 10 MMOL/L (ref 0–18)
AST SERPL-CCNC: 25 U/L (ref 15–37)
ATRIAL RATE: 202 BPM
BASOPHILS # BLD AUTO: 0.03 X10(3) UL (ref 0–0.2)
BASOPHILS NFR BLD AUTO: 0.2 %
BILIRUB DIRECT SERPL-MCNC: 0.2 MG/DL (ref 0–0.2)
BILIRUB SERPL-MCNC: 0.7 MG/DL (ref 0.1–2)
BUN BLD-MCNC: 25 MG/DL (ref 7–18)
BUN/CREAT SERPL: 20.8 (ref 10–20)
C DIFF TOX B STL QL: NEGATIVE
CALCIUM BLD-MCNC: 10.3 MG/DL (ref 8.5–10.1)
CHLORIDE SERPL-SCNC: 99 MMOL/L (ref 98–112)
CO2 SERPL-SCNC: 26 MMOL/L (ref 21–32)
CREAT BLD-MCNC: 1.2 MG/DL
DEPRECATED RDW RBC AUTO: 50.9 FL (ref 35.1–46.3)
EOSINOPHIL # BLD AUTO: 0.11 X10(3) UL (ref 0–0.7)
EOSINOPHIL NFR BLD AUTO: 0.7 %
ERYTHROCYTE [DISTWIDTH] IN BLOOD BY AUTOMATED COUNT: 14 % (ref 11–15)
GFR SERPLBLD BASED ON 1.73 SQ M-ARVRAT: 42 ML/MIN/1.73M2 (ref 60–?)
GLUCOSE BLD-MCNC: 168 MG/DL (ref 70–99)
HCT VFR BLD AUTO: 40.5 %
HGB BLD-MCNC: 12.7 G/DL
IMM GRANULOCYTES # BLD AUTO: 0.07 X10(3) UL (ref 0–1)
IMM GRANULOCYTES NFR BLD: 0.5 %
LACTATE SERPL-SCNC: 1.3 MMOL/L (ref 0.4–2)
LACTATE SERPL-SCNC: 2.7 MMOL/L (ref 0.4–2)
LYMPHOCYTES # BLD AUTO: 0.33 X10(3) UL (ref 1–4)
LYMPHOCYTES NFR BLD AUTO: 2.2 %
MCH RBC QN AUTO: 31.1 PG (ref 26–34)
MCHC RBC AUTO-ENTMCNC: 31.4 G/DL (ref 31–37)
MCV RBC AUTO: 99 FL
MONOCYTES # BLD AUTO: 0.45 X10(3) UL (ref 0.1–1)
MONOCYTES NFR BLD AUTO: 3 %
NEUTROPHILS # BLD AUTO: 14.16 X10 (3) UL (ref 1.5–7.7)
NEUTROPHILS # BLD AUTO: 14.16 X10(3) UL (ref 1.5–7.7)
NEUTROPHILS NFR BLD AUTO: 93.4 %
OSMOLALITY SERPL CALC.SUM OF ELEC: 288 MOSM/KG (ref 275–295)
PLATELET # BLD AUTO: 425 10(3)UL (ref 150–450)
POTASSIUM SERPL-SCNC: 4.1 MMOL/L (ref 3.5–5.1)
PROT SERPL-MCNC: 8.3 G/DL (ref 6.4–8.2)
Q-T INTERVAL: 342 MS
QRS DURATION: 94 MS
QTC CALCULATION (BEZET): 458 MS
R AXIS: 88 DEGREES
RBC # BLD AUTO: 4.09 X10(6)UL
SARS-COV-2 RNA RESP QL NAA+PROBE: NOT DETECTED
SODIUM SERPL-SCNC: 135 MMOL/L (ref 136–145)
T AXIS: 130 DEGREES
VENTRICULAR RATE: 108 BPM
WBC # BLD AUTO: 15.2 X10(3) UL (ref 4–11)

## 2023-02-15 PROCEDURE — 99223 1ST HOSP IP/OBS HIGH 75: CPT | Performed by: HOSPITALIST

## 2023-02-15 RX ORDER — ACETAMINOPHEN 500 MG
500 TABLET ORAL EVERY 4 HOURS PRN
Status: DISCONTINUED | OUTPATIENT
Start: 2023-02-15 | End: 2023-02-17

## 2023-02-15 RX ORDER — METOCLOPRAMIDE HYDROCHLORIDE 5 MG/ML
5 INJECTION INTRAMUSCULAR; INTRAVENOUS EVERY 8 HOURS PRN
Status: DISCONTINUED | OUTPATIENT
Start: 2023-02-15 | End: 2023-02-17

## 2023-02-15 RX ORDER — SODIUM CHLORIDE 9 MG/ML
INJECTION, SOLUTION INTRAVENOUS CONTINUOUS
Status: DISCONTINUED | OUTPATIENT
Start: 2023-02-15 | End: 2023-02-17

## 2023-02-15 RX ORDER — ONDANSETRON 4 MG/1
4 TABLET, ORALLY DISINTEGRATING ORAL ONCE
Status: COMPLETED | OUTPATIENT
Start: 2023-02-15 | End: 2023-02-15

## 2023-02-15 RX ORDER — VANCOMYCIN HYDROCHLORIDE 125 MG/1
125 CAPSULE ORAL 4 TIMES DAILY
Status: CANCELLED | OUTPATIENT
Start: 2023-02-15

## 2023-02-15 RX ORDER — ONDANSETRON 2 MG/ML
4 INJECTION INTRAMUSCULAR; INTRAVENOUS ONCE
Status: COMPLETED | OUTPATIENT
Start: 2023-02-15 | End: 2023-02-15

## 2023-02-15 RX ORDER — ONDANSETRON 2 MG/ML
INJECTION INTRAMUSCULAR; INTRAVENOUS
Status: COMPLETED
Start: 2023-02-15 | End: 2023-02-15

## 2023-02-15 RX ORDER — HEPARIN SODIUM 5000 [USP'U]/ML
5000 INJECTION, SOLUTION INTRAVENOUS; SUBCUTANEOUS EVERY 12 HOURS SCHEDULED
Status: DISCONTINUED | OUTPATIENT
Start: 2023-02-15 | End: 2023-02-17

## 2023-02-15 RX ORDER — ONDANSETRON 2 MG/ML
4 INJECTION INTRAMUSCULAR; INTRAVENOUS EVERY 6 HOURS PRN
Status: DISCONTINUED | OUTPATIENT
Start: 2023-02-15 | End: 2023-02-17

## 2023-02-15 NOTE — ED INITIAL ASSESSMENT (HPI)
Pt recently started cephalexin yesterday for UTI, c/o nausea/vomiting x1 hr ago when she arrived at PT today.

## 2023-02-15 NOTE — H&P
[facility]    PATIENT'S NAME: Betty Jones   ATTENDING PHYSICIAN: Aline Cedillo MD   PATIENT ACCOUNT#:   [id number]    LOCATION:  [address]  MEDICAL RECORD #:   [id number]       YOB: 1928  ADMISSION DATE:       02/15/2023    HISTORY AND PHYSICAL EXAMINATION    (Addendum added 02/16/2023)    CHIEF COMPLAINT:  Urinary tract infection, loose bowel movements rule out C. difficile infection. HISTORY OF PRESENT ILLNESS:  Patient is a 28-year-old  female with underlying ischemic cardiomyopathy, aortic stenosis, coronary artery disease, who started having dysuria and urinary frequency for last 4 to 5 days. She had a urine culture obtained yesterday which grew E. coli. She was started on antibiotics. She took a first dose yesterday and last night, then this morning and she felt very weak. Brought into the emergency department for evaluation. Noted to have loose bowel movements. White blood cell count of 15.2 with left shift. Chemistry showed GFR of 42, glucose 168, sodium 135, BUN and creatinine of 25 and 1.20. Started on IV Rocephin and stool was sent for C. difficile, and she will be admitted to the hospital for further management. PAST MEDICAL HISTORY:  Coronary artery disease status post coronary artery bypass graft surgery. Recently, she had an angiogram and drug-eluting stents to SVG to diagonal, obtuse marginal and RCA. She had ischemic cardiomyopathy, ejection fraction around 30% to 35%, moderate aortic stenosis, moderate mitral regurgitation and moderate pulmonary hypertension. She had chronic atrial fibrillation status post Watchman device, hypertension, hyperlipidemia, osteoarthritis, osteoporosis, chronic kidney disease stage 3.     PAST SURGICAL HISTORY:  Left breast lumpectomy followed by radiation therapy for breast cancer, hysterectomy, laparoscopic cholecystectomy, coronary artery bypass graft surgery, and partial small bowel resection. MEDICATIONS:  Please see medication reconciliation list.      ALLERGIES:  Aspirin, penicillin, Macrobid, and ciprofloxacin. Patient is not able to confirm the reactions. FAMILY HISTORY:  Mother had cerebrovascular accident, hypertension. Father had coronary artery disease. SOCIAL HISTORY:  Ex-tobacco user. No current tobacco, alcohol, or drug use. Lives in an independent living nursing facility. Requires some assistance in her basic activities of daily living. REVIEW OF SYSTEMS:  Dysuria and urinary frequency for last 4 to 5 days with progressive weakness since yesterday. Patient started having loose bowel movements this morning. She denies any abdominal pain. She cannot tell me if she had fever or chills. Other 12-point review of systems is negative. PHYSICAL EXAMINATION:    GENERAL:  Alert and oriented, fatigue, hard of hearing. VITAL SIGNS:  Temperature 97.0; pulse 90 to 110, atrial fibrillation baseline on monitor; respiratory rate 20; blood pressure 132/82; pulse ox 99% on room air. HEENT:  Atraumatic. Oropharynx clear. Dry mucous membranes. Normal hard and soft palate. Eyes:  Anicteric sclerae. NECK:  Supple. No lymphadenopathy. Trachea midline. Full range of motion. LUNGS:  Clear to auscultation bilaterally. Normal respiratory effort. HEART:  Irregularly irregular rhythm. S1 and S2 auscultated. No murmur. ABDOMEN:  Soft, nondistended. No tenderness. Positive bowel sounds. EXTREMITIES:  Edema +1. Multiple areas of ecchymoses noted on upper and lower extremities. NEUROLOGIC:  Motor and sensory intact. ASSESSMENT AND PLAN:    1. Urinary tract infection, failed outpatient treatment. 2.   Generalized fatigue. 3.   Diarrhea, rule out Clostridium difficile. 4.   Chronic kidney disease stage 3.  5.   Mild hyperglycemia. Patient will be admitted to general medical floor. Will avoid IV fluids considering her heart failure history.   Start her empirically on oral vancomycin. Follow up on stool C. difficile study. Fall precautions. Physical therapy. Monitor her hemodynamic status. Further recommendations to follow. ADDENDUM (Job [id number]):    ASSESSMENT AND PLAN:  C difficile testing was negative. Lactic acid 2.7. We will give her 1 L of normal saline bolus and continue IV fluids. Continue to monitor lactic acid and hemodynamic status. Blood cultures were obtained.     Dictated By Frank Mclean MD  d: 02/15/2023 16:44:36  t: 02/15/2023 17:03:34  Job [id number]  /

## 2023-02-15 NOTE — TELEPHONE ENCOUNTER
PCG Liz called in. Discussed plan for f/u appt week after DR. Gina Fermin office visit. appt made. She will take her out for her blood test tomorrow. orders are in. Called and s/w Santiago Varner and updated on appt and plan for blood test. Santiago Varner states would be easier to just go through [place] and make appt and then she will update Santiago Varner. Agreed to plan.

## 2023-02-16 LAB
ANION GAP SERPL CALC-SCNC: 10 MMOL/L (ref 0–18)
BASOPHILS # BLD AUTO: 0.02 X10(3) UL (ref 0–0.2)
BASOPHILS NFR BLD AUTO: 0.2 %
BUN BLD-MCNC: 27 MG/DL (ref 7–18)
BUN/CREAT SERPL: 22.3 (ref 10–20)
CALCIUM BLD-MCNC: 8.8 MG/DL (ref 8.5–10.1)
CHLORIDE SERPL-SCNC: 106 MMOL/L (ref 98–112)
CO2 SERPL-SCNC: 25 MMOL/L (ref 21–32)
CREAT BLD-MCNC: 1.21 MG/DL
DEPRECATED RDW RBC AUTO: 50.4 FL (ref 35.1–46.3)
EOSINOPHIL # BLD AUTO: 0 X10(3) UL (ref 0–0.7)
EOSINOPHIL NFR BLD AUTO: 0 %
ERYTHROCYTE [DISTWIDTH] IN BLOOD BY AUTOMATED COUNT: 14 % (ref 11–15)
GFR SERPLBLD BASED ON 1.73 SQ M-ARVRAT: 42 ML/MIN/1.73M2 (ref 60–?)
GLUCOSE BLD-MCNC: 106 MG/DL (ref 70–99)
HCT VFR BLD AUTO: 32.3 %
HGB BLD-MCNC: 10.1 G/DL
IMM GRANULOCYTES # BLD AUTO: 0.05 X10(3) UL (ref 0–1)
IMM GRANULOCYTES NFR BLD: 0.4 %
LYMPHOCYTES # BLD AUTO: 0.85 X10(3) UL (ref 1–4)
LYMPHOCYTES NFR BLD AUTO: 7 %
MCH RBC QN AUTO: 30.8 PG (ref 26–34)
MCHC RBC AUTO-ENTMCNC: 31.3 G/DL (ref 31–37)
MCV RBC AUTO: 98.5 FL
MONOCYTES # BLD AUTO: 0.66 X10(3) UL (ref 0.1–1)
MONOCYTES NFR BLD AUTO: 5.5 %
NEUTROPHILS # BLD AUTO: 10.53 X10 (3) UL (ref 1.5–7.7)
NEUTROPHILS # BLD AUTO: 10.53 X10(3) UL (ref 1.5–7.7)
NEUTROPHILS NFR BLD AUTO: 86.9 %
OSMOLALITY SERPL CALC.SUM OF ELEC: 298 MOSM/KG (ref 275–295)
PLATELET # BLD AUTO: 303 10(3)UL (ref 150–450)
POTASSIUM SERPL-SCNC: 3.4 MMOL/L (ref 3.5–5.1)
RBC # BLD AUTO: 3.28 X10(6)UL
SODIUM SERPL-SCNC: 141 MMOL/L (ref 136–145)
WBC # BLD AUTO: 12.1 X10(3) UL (ref 4–11)

## 2023-02-16 PROCEDURE — 99233 SBSQ HOSP IP/OBS HIGH 50: CPT | Performed by: HOSPITALIST

## 2023-02-16 RX ORDER — POTASSIUM CHLORIDE 20 MEQ/1
40 TABLET, EXTENDED RELEASE ORAL ONCE
Status: COMPLETED | OUTPATIENT
Start: 2023-02-16 | End: 2023-02-16

## 2023-02-16 NOTE — PLAN OF CARE
Pt. Alert, oriented, vitals stable. [place]. Denies pain/discomfort. Sitting up in chair for meals. Up with walker and assisted with toileting. Spoke with daughter Estefania via phone, nursing update given. Call light within reach, able to make needs known. Problem: Patient Centered Care  Goal: Patient preferences are identified and integrated in the patient's plan of care  Description: Interventions:  - What would you like us to know as we care for you? From independent living.  - Provide timely, complete, and accurate information to patient/family  - Incorporate patient and family knowledge, values, beliefs, and cultural backgrounds into the planning and delivery of care  - Encourage patient/family to participate in care and decision-making at the level they choose  - Honor patient and family perspectives and choices  Outcome: Progressing     Problem: Patient/Family Goals  Goal: Patient/Family Long Term Goal  Description: Patient's Long Term Goal: discharge  Interventions:  - monitor vitals, monitor labs, pt/ot eval  - See additional Care Plan goals for specific interventions  Outcome: Progressing  Goal: Patient/Family Short Term Goal  Description: Patient's Short Term Goal: feel better    Interventions:   - prn meds for nausea, ivf per orders,   -monitor vitals, monitor labs  - See additional Care Plan goals for specific interventions  Outcome: Progressing     Problem: SAFETY ADULT - FALL  Goal: Free from fall injury  Description: INTERVENTIONS:  - Assess pt frequently for physical needs  - Identify cognitive and physical deficits and behaviors that affect risk of falls.   - [place] fall precautions as indicated by assessment.  - Educate pt/family on patient safety including physical limitations  - Instruct pt to call for assistance with activity based on assessment  - Modify environment to reduce risk of injury  - Provide assistive devices as appropriate  - Consider OT/PT consult to assist with strengthening/mobility  - Encourage toileting schedule  Outcome: Progressing     Problem: GENITOURINARY  Goal: Maintain optimal urinary function  Description: Interventions:  - Assess ability to void  - Assess for bladder distention  - Monitor creatinine and BUN  - Monitor intake and output  - Insert urinary catheter as ordered  - Obtain appropriate imaging as ordered  Outcome: Progressing

## 2023-02-16 NOTE — CM/SW NOTE
02/16/23 1400   CM/SW Referral Data   Referral Source Social Work (self-referral)   Reason for Referral Discharge planning   Informant Patient   Access to Care / Medical Hx   Do you have a doctor or clinic where you usually go for medical care (including prenatal care)? Yes  ([facility]ah [facility])   Patient Info   Patient's Home Environment Independent Living  ([facility])   Patient lives with Alone   Patient Status Prior to Admission   Independent with ADLs and Mobility No   Pt. requires assistance with Ambulating   Services in place prior to admission DME/Supplies at home   Type of DME/Supplies Rollator Walker   Discharge Needs   Anticipated D/C needs Home health care   Choice of Post-Acute Provider   Informed patient of right to choose their preferred provider Yes   List of appropriate post-acute services provided to patient/family with quality data   ([facility] ref in [facility] accepted & will f/up)     SW self referred pt for DC Planning. PT/OT recommendations for [facility]. Per chart, pt has recent hx w/ [facility] in January 2023. SW met w/ pt in her room. Above assessment completed. Pt confirmed living at [facility] and using a rollator at baseline. Pt confirmed hx w/ HH services but would like a new agency because the previous company was not \"energized\" or \"enthusiastic\" enough for her. After in depth conversation, CHRIS confirmed pt is agreeable to [facility] services at [place]. [facility] referrals sent this AM in [facility]. F2F entered/sent. [facility] and will f/up for final choice. PLAN: [facility] w/ HH - pending choice & med clear      SW/CM to remain available for support and/or discharge planning.            Mariangel Ed, MSW, [address] [facility]

## 2023-02-16 NOTE — PLAN OF CARE
Problem: Patient Centered Care  Goal: Patient preferences are identified and integrated in the patient's plan of care  Description: Interventions:  - What would you like us to know as we care for you?   - Provide timely, complete, and accurate information to patient/family  - Incorporate patient and family knowledge, values, beliefs, and cultural backgrounds into the planning and delivery of care  - Encourage patient/family to participate in care and decision-making at the level they choose  - Honor patient and family perspectives and choices  Outcome: Progressing     Problem: SAFETY ADULT - FALL  Goal: Free from fall injury  Description: INTERVENTIONS:  - Assess pt frequently for physical needs  - Identify cognitive and physical deficits and behaviors that affect risk of falls.   - [place] fall precautions as indicated by assessment.  - Educate pt/family on patient safety including physical limitations  - Instruct pt to call for assistance with activity based on assessment  - Modify environment to reduce risk of injury  - Provide assistive devices as appropriate  - Consider OT/PT consult to assist with strengthening/mobility  - Encourage toileting schedule  Outcome: Progressing     Problem: GENITOURINARY  Goal: Maintain optimal urinary function  Description: Interventions:  - Assess ability to void  - Assess for bladder distention  - Monitor creatinine and BUN  - Monitor intake and output  - Insert urinary catheter as ordered  - Obtain appropriate imaging as ordered  Outcome: Progressing

## 2023-02-17 ENCOUNTER — MED REC SCAN ONLY (OUTPATIENT)
Dept: INTERNAL MEDICINE CLINIC | Facility: CLINIC | Age: 88
End: 2023-02-17

## 2023-02-17 VITALS
WEIGHT: 96.69 LBS | BODY MASS INDEX: 17.13 KG/M2 | DIASTOLIC BLOOD PRESSURE: 69 MMHG | OXYGEN SATURATION: 96 % | HEIGHT: 63 IN | SYSTOLIC BLOOD PRESSURE: 113 MMHG | TEMPERATURE: 98 F | RESPIRATION RATE: 18 BRPM | HEART RATE: 99 BPM

## 2023-02-17 LAB
ANION GAP SERPL CALC-SCNC: 5 MMOL/L (ref 0–18)
BUN BLD-MCNC: 30 MG/DL (ref 7–18)
BUN/CREAT SERPL: 33.7 (ref 10–20)
CALCIUM BLD-MCNC: 8.6 MG/DL (ref 8.5–10.1)
CHLORIDE SERPL-SCNC: 108 MMOL/L (ref 98–112)
CO2 SERPL-SCNC: 26 MMOL/L (ref 21–32)
CREAT BLD-MCNC: 0.89 MG/DL
DEPRECATED RDW RBC AUTO: 50.2 FL (ref 35.1–46.3)
ERYTHROCYTE [DISTWIDTH] IN BLOOD BY AUTOMATED COUNT: 14 % (ref 11–15)
GFR SERPLBLD BASED ON 1.73 SQ M-ARVRAT: 60 ML/MIN/1.73M2 (ref 60–?)
GLUCOSE BLD-MCNC: 87 MG/DL (ref 70–99)
HCT VFR BLD AUTO: 29.6 %
HGB BLD-MCNC: 9.3 G/DL
MCH RBC QN AUTO: 30.6 PG (ref 26–34)
MCHC RBC AUTO-ENTMCNC: 31.4 G/DL (ref 31–37)
MCV RBC AUTO: 97.4 FL
OSMOLALITY SERPL CALC.SUM OF ELEC: 294 MOSM/KG (ref 275–295)
PLATELET # BLD AUTO: 269 10(3)UL (ref 150–450)
POTASSIUM SERPL-SCNC: 4.2 MMOL/L (ref 3.5–5.1)
POTASSIUM SERPL-SCNC: 4.2 MMOL/L (ref 3.5–5.1)
RBC # BLD AUTO: 3.04 X10(6)UL
SODIUM SERPL-SCNC: 139 MMOL/L (ref 136–145)
WBC # BLD AUTO: 8.6 X10(3) UL (ref 4–11)

## 2023-02-17 PROCEDURE — 99239 HOSP IP/OBS DSCHRG MGMT >30: CPT | Performed by: HOSPITALIST

## 2023-02-17 RX ORDER — CEPHALEXIN 500 MG/1
500 CAPSULE ORAL 2 TIMES DAILY
Qty: 10 CAPSULE | Refills: 0 | Status: SHIPPED | OUTPATIENT
Start: 2023-02-17 | End: 2023-03-01 | Stop reason: ALTCHOICE

## 2023-02-17 NOTE — CM/SW NOTE
02/17/23 1334   Discharge disposition   Expected discharge disposition Home or Self   Discharge transportation [facility]     Pt discussed during nursing rounds. Pt is stable for dc today. MD dc order entered. PT recommending HH w/PT at [place]. Pt and daughter Piedad Dolan declining [facility] at this time since pt is already actively participating in outpatient PT. Pt and Piedad  requesting assist w/transport home and are agreeable to cost of medicar which is $40. [facility] scheduled for 3:15pm . Plan: [address] w/outpatient PT today. / to remain available for support and/or discharge planning.      ZEESHAN Green    [phone]

## 2023-02-17 NOTE — PLAN OF CARE
Pt medically cleared for discharge. Discharge instructions and medications reviewed with patient bedside. Printed rx for antibiotic handed to patient. All personal belongings packed and sent with patient. Racheal to discharge back to [facility] via [employer]. Problem: Patient Centered Care  Goal: Patient preferences are identified and integrated in the patient's plan of care  Description: Interventions:  - What would you like us to know as we care for you? From [facility]  - Provide timely, complete, and accurate information to patient/family  - Incorporate patient and family knowledge, values, beliefs, and cultural backgrounds into the planning and delivery of care  - Encourage patient/family to participate in care and decision-making at the level they choose  - Honor patient and family perspectives and choices  Outcome: Adequate for Discharge     Problem: SAFETY ADULT - FALL  Goal: Free from fall injury  Description: INTERVENTIONS:  - Assess pt frequently for physical needs  - Identify cognitive and physical deficits and behaviors that affect risk of falls.   - [place] fall precautions as indicated by assessment.  - Educate pt/family on patient safety including physical limitations  - Instruct pt to call for assistance with activity based on assessment  - Modify environment to reduce risk of injury  - Provide assistive devices as appropriate  - Consider OT/PT consult to assist with strengthening/mobility  - Encourage toileting schedule  Outcome: Adequate for Discharge     Problem: GENITOURINARY  Goal: Maintain optimal urinary function  Description: Interventions:  - Assess ability to void  - Assess for bladder distention  - Monitor creatinine and BUN  - Monitor intake and output  - Insert urinary catheter as ordered  - Obtain appropriate imaging as ordered  Outcome: Adequate for Discharge     Problem: RISK FOR INFECTION - ADULT  Goal: Absence of fever/infection during anticipated neutropenic period  Description: INTERVENTIONS  - Monitor WBC  - Administer growth factors as ordered  - Implement neutropenic guidelines  Outcome: Adequate for Discharge

## 2023-02-17 NOTE — PLAN OF CARE
Patient alert and oriented x4, ambulated safely with walker from chair to bathroom. IV fluids continued. No pain reported. Call light and belongings within reach. [place] awareness, chair alarm and ibed alarm on. Frequent rounding completed. Plan of care continued    Problem: SAFETY ADULT - FALL  Goal: Free from fall injury  Description: INTERVENTIONS:  - Assess pt frequently for physical needs  - Identify cognitive and physical deficits and behaviors that affect risk of falls.   - [place] fall precautions as indicated by assessment.  - Educate pt/family on patient safety including physical limitations  - Instruct pt to call for assistance with activity based on assessment  - Modify environment to reduce risk of injury  - Provide assistive devices as appropriate  - Consider OT/PT consult to assist with strengthening/mobility  - Encourage toileting schedule  2/17/2023 0352 by Pramod Gillespie RN  Outcome: Progressing  2/17/2023 0352 by Pramod Gillespie RN  Outcome: Progressing     Problem: GENITOURINARY  Goal: Maintain optimal urinary function  Description: Interventions:  - Assess ability to void  - Assess for bladder distention  - Monitor creatinine and BUN  - Monitor intake and output  - Insert urinary catheter as ordered  - Obtain appropriate imaging as ordered  Outcome: Progressing     Problem: RISK FOR INFECTION - ADULT  Goal: Absence of fever/infection during anticipated neutropenic period  Description: INTERVENTIONS  - Monitor WBC  - Administer growth factors as ordered  - Implement neutropenic guidelines  Outcome: Progressing

## 2023-02-18 NOTE — CDS QUERY
.How to Answer this Query    1.) Click \"Edit\" button on the toolbar.  2.) Type an \"X\" in the bracket for the diagnosis that applies. (You may also add additional clinical details as you feel necessary to substantiate your response). 3.) Finally click \"Sign\" to complete response. Thank you    Potential for Impaired Nutritional Status  DOCUMENTATION CLARIFICATION FORM     Dear Doctor Jagdeep Rolon:  A Registered Dietitian evaluated this patient and found to meet Severe Malnutrition Criteria using the [place] Guidelines based on the following Clinical Indicators:     * BMI 19.49  * Criteria for severe malnutrition diagnosis: acute illness/injury related to body fat     moderate depletion and muscle mass moderate depletion. * Currently being treated for Acute cystitis without hematuria   * PMH includes HTN, High Cholesterol  * RD recommended added Ensure Enlive (350 calories/ 20 g protein     each) Daily [place]. PLEASE SPECIFY THE DEGREE OF MALNUTRITION  SELECTION BY PHYSICIAN ONLY       ( x )  Severe Protein-Calorie Malnutrition    (  )  Moderate Malnutrition    (  )  Undernutrition    (  )  Other (please specify): If you have any questions, please contact Clinical :  Jenna Acosta RN at [phone]     Thank You!     THIS FORM IS A PERMANENT PART OF THE MEDICAL RECORD  '

## 2023-02-21 ENCOUNTER — APPOINTMENT (OUTPATIENT)
Dept: URBAN - METROPOLITAN AREA CLINIC 244 | Age: 88
Setting detail: DERMATOLOGY
End: 2023-02-21

## 2023-02-28 DIAGNOSIS — I50.9 CONGESTIVE HEART FAILURE, UNSPECIFIED HF CHRONICITY, UNSPECIFIED HEART FAILURE TYPE (HCC): Primary | ICD-10-CM

## 2023-03-01 ENCOUNTER — LAB ENCOUNTER (OUTPATIENT)
Dept: LAB | Facility: HOSPITAL | Age: 88
End: 2023-03-01
Attending: NURSE PRACTITIONER
Payer: MEDICARE

## 2023-03-01 ENCOUNTER — OFFICE VISIT (OUTPATIENT)
Dept: CARDIOLOGY CLINIC | Facility: HOSPITAL | Age: 88
End: 2023-03-01
Attending: NURSE PRACTITIONER
Payer: MEDICARE

## 2023-03-01 VITALS
BODY MASS INDEX: 18 KG/M2 | OXYGEN SATURATION: 98 % | SYSTOLIC BLOOD PRESSURE: 106 MMHG | HEART RATE: 78 BPM | DIASTOLIC BLOOD PRESSURE: 62 MMHG | WEIGHT: 102.13 LBS

## 2023-03-01 DIAGNOSIS — E78.5 HYPERLIPIDEMIA, UNSPECIFIED HYPERLIPIDEMIA TYPE: ICD-10-CM

## 2023-03-01 DIAGNOSIS — E03.8 OTHER SPECIFIED HYPOTHYROIDISM: ICD-10-CM

## 2023-03-01 DIAGNOSIS — Z95.1 PRESENCE OF AORTOCORONARY BYPASS GRAFT: ICD-10-CM

## 2023-03-01 DIAGNOSIS — I48.20 CHRONIC ATRIAL FIBRILLATION (HCC): ICD-10-CM

## 2023-03-01 DIAGNOSIS — I50.9 CONGESTIVE HEART FAILURE, UNSPECIFIED HF CHRONICITY, UNSPECIFIED HEART FAILURE TYPE (HCC): ICD-10-CM

## 2023-03-01 DIAGNOSIS — I50.9 CONGESTIVE HEART FAILURE, UNSPECIFIED HF CHRONICITY, UNSPECIFIED HEART FAILURE TYPE (HCC): Primary | ICD-10-CM

## 2023-03-01 LAB
ANION GAP SERPL CALC-SCNC: 6 MMOL/L (ref 0–18)
BUN BLD-MCNC: 29 MG/DL (ref 7–18)
BUN/CREAT SERPL: 26.1 (ref 10–20)
CALCIUM BLD-MCNC: 9.8 MG/DL (ref 8.5–10.1)
CHLORIDE SERPL-SCNC: 97 MMOL/L (ref 98–112)
CO2 SERPL-SCNC: 33 MMOL/L (ref 21–32)
CREAT BLD-MCNC: 1.11 MG/DL
FASTING STATUS PATIENT QL REPORTED: NO
GFR SERPLBLD BASED ON 1.73 SQ M-ARVRAT: 46 ML/MIN/1.73M2 (ref 60–?)
GLUCOSE BLD-MCNC: 117 MG/DL (ref 70–99)
NT-PROBNP SERPL-MCNC: 3997 PG/ML (ref ?–450)
OSMOLALITY SERPL CALC.SUM OF ELEC: 289 MOSM/KG (ref 275–295)
POTASSIUM SERPL-SCNC: 3.5 MMOL/L (ref 3.5–5.1)
SODIUM SERPL-SCNC: 136 MMOL/L (ref 136–145)

## 2023-03-01 PROCEDURE — 80048 BASIC METABOLIC PNL TOTAL CA: CPT

## 2023-03-01 PROCEDURE — 99214 OFFICE O/P EST MOD 30 MIN: CPT | Performed by: NURSE PRACTITIONER

## 2023-03-01 PROCEDURE — 36415 COLL VENOUS BLD VENIPUNCTURE: CPT

## 2023-03-01 NOTE — PATIENT INSTRUCTIONS
Continue furosemide to 40mg in am and 20mg in pm - please make sure you are taking the afternoon dose    Return to [facility] in 2 weeks - please call me if you need to be seen sooner    Follow up with Dr. Diane Gama in 3 months    Follow up with Dr. Ángela Alegria as directed    Please call the heart failure clinic if you notice a weight gain of 3 pounds overnight or 5 pounds or more in 5 days. Monitor yourself for signs and symptoms of fluid overload, increased shortness of breath, difficulty breathing when laying down etc. Call the clinic if any of these signs develop at ph: [phone]    Call if having any dizziness, lightheadedness, heart racing, palpitations, chest pain, shortness of breath, coughing, swelling, weight gain or worsening symptoms. 32-64 ounces of fluid daily    Less than 2000 mg salt/sodium daily. Common high sodium foods include frozen dinners, soups (not homemade), some cereal, vegetable juice, canned vegetables, lunch meats, processed meats like hotdogs, sausage, taylor, pepperoni, soy sauce, pre-packaged rice or potatoes. Please remember to read nutrition labels for sodium content.

## 2023-03-01 NOTE — PROGRESS NOTES
Mary's friend Moiz Johnson called back to let us know that when they returned to [facility] apartment, she saw that they held the Metoprolol each morning for the last 4 mornings due to low blood pressure. Symone Niño, [address] [place] [address] fills the pill boxes on Friday.

## 2023-03-01 NOTE — PROGRESS NOTES
Please call daughter and review blood results. Kidney function stable. Pro-BNP decreased meaning less fluid around the heart.

## 2023-03-01 NOTE — PROGRESS NOTES
Subjective: Here with her Melida Bound, friend & . She reports she has unstable balance, walks with a Rolater walker. Denies lightheadedness, palpitations or chest pain. She has trace lower extremity swelling and reports occasional shortness of breath with exertion. None today. Was recently hospitalized w/UTI and IV antibiotics changed to oral at discharge, now complete. Weight: Today 102.1 lb  Home 102 lb Last visit: 105 lb on 2/8  Labs completed : at lab Yes  Device: None  IV placed:  None     Patient and medication assessed. Discussed with APN. Disease management teaching completed. Reviewed AVS instructions. Patient verbalized understanding.

## 2023-03-10 RX ORDER — METOPROLOL SUCCINATE 25 MG/1
25 TABLET, EXTENDED RELEASE ORAL EVERY 12 HOURS
Qty: 180 TABLET | Refills: 3 | Status: CANCELLED | OUTPATIENT
Start: 2023-03-10

## 2023-03-10 NOTE — TELEPHONE ENCOUNTER
[facility] caregiver indicated that patient needs a refill on metoprolol ER 25mg 1 tablet 2 times daily to [facility] in [facility]. Rx pended.

## 2023-03-10 NOTE — TELEPHONE ENCOUNTER
Nathalie Loera RN with [facility] called and states she has tried to refill Metoprolol and [facility] stated they were unable to call us. I made her aware 180 tablets were dispensed 2/3/2023. METOPROLOL SUCCINATE ER 25 MG Oral Tablet 24 Hr 180 tablet 0 2/3/2023     Sig: TAKE 1 TABLET BY MOUTH EVERY 12 HOURS    Sent to pharmacy as: Metoprolol Succinate ER 25 MG Oral Tablet Extended Release 24 Hour (Toprol XL)    E-Prescribing Status: Receipt confirmed by pharmacy (2/3/2023 12:41 PM CST)    It was sent to: [facility], [address] [phone], [address] was advised to call [address] to inquire if Rx was dispensed. She will call us back if needed.

## 2023-03-13 ENCOUNTER — APPOINTMENT (OUTPATIENT)
Dept: URBAN - METROPOLITAN AREA CLINIC 244 | Age: 88
Setting detail: DERMATOLOGY
End: 2023-03-15

## 2023-03-13 PROBLEM — D04.61 CARCINOMA IN SITU OF SKIN OF RIGHT UPPER LIMB, INCLUDING SHOULDER: Status: ACTIVE | Noted: 2023-03-13

## 2023-03-13 PROCEDURE — OTHER CURETTAGE AND DESTRUCTION: OTHER

## 2023-03-13 PROCEDURE — 17261 DSTRJ MAL LES T/A/L .6-1.0CM: CPT

## 2023-03-13 NOTE — PROCEDURE: CURETTAGE AND DESTRUCTION
Biopsy Photograph Reviewed: Yes
Anesthesia Volume In Cc: 3
Add Intralesional Injection: No
Final Size Statement: The size of the lesion after treatment was
Total Volume (Ccs): 1
Post-Care Instructions: I reviewed with the patient in detail post-care instructions. Patient is to keep the area dry for 48 hours, and not to engage in any swimming until the area is healed. Should the patient develop any fevers, chills, bleeding, severe pain patient will contact the office immediately.
Cautery Type: electrodesiccation
Consent was obtained from the patient. The risks, benefits and alternatives to therapy were discussed in detail. Specifically, the risks of infection, scarring, bleeding, prolonged wound healing, nerve injury, incomplete removal, allergy to anesthesia and recurrence were addressed. Prior to the procedure, the treatment site was clearly identified and confirmed by the patient.
Anesthesia Type: 1% lidocaine with epinephrine and a 1:10 solution of 8.4% sodium bicarbonate
Concentration (Mg/Ml Or Millions Of Plaque Forming Units/Cc): 0.01
Detail Level: Detailed
What Was Performed First?: Curettage
Additional Information: (Optional): The wound was cleaned, and a pressure dressing was applied.  The patient received detailed post-op instructions.
Bill As A Line Item Or As Units: Line Item

## 2023-03-14 DIAGNOSIS — I50.9 CONGESTIVE HEART FAILURE, UNSPECIFIED HF CHRONICITY, UNSPECIFIED HEART FAILURE TYPE (HCC): Primary | ICD-10-CM

## 2023-03-15 ENCOUNTER — LAB ENCOUNTER (OUTPATIENT)
Dept: LAB | Facility: HOSPITAL | Age: 88
End: 2023-03-15
Attending: NURSE PRACTITIONER
Payer: MEDICARE

## 2023-03-15 ENCOUNTER — OFFICE VISIT (OUTPATIENT)
Dept: CARDIOLOGY CLINIC | Facility: HOSPITAL | Age: 88
End: 2023-03-15
Attending: NURSE PRACTITIONER
Payer: MEDICARE

## 2023-03-15 VITALS
OXYGEN SATURATION: 100 % | SYSTOLIC BLOOD PRESSURE: 107 MMHG | BODY MASS INDEX: 18 KG/M2 | DIASTOLIC BLOOD PRESSURE: 62 MMHG | WEIGHT: 103.81 LBS | HEART RATE: 75 BPM | RESPIRATION RATE: 16 BRPM

## 2023-03-15 DIAGNOSIS — I48.20 CHRONIC ATRIAL FIBRILLATION (HCC): ICD-10-CM

## 2023-03-15 DIAGNOSIS — I50.9 CONGESTIVE HEART FAILURE, UNSPECIFIED HF CHRONICITY, UNSPECIFIED HEART FAILURE TYPE (HCC): Primary | ICD-10-CM

## 2023-03-15 DIAGNOSIS — E03.8 OTHER SPECIFIED HYPOTHYROIDISM: ICD-10-CM

## 2023-03-15 DIAGNOSIS — I50.9 CONGESTIVE HEART FAILURE, UNSPECIFIED HF CHRONICITY, UNSPECIFIED HEART FAILURE TYPE (HCC): ICD-10-CM

## 2023-03-15 LAB
ANION GAP SERPL CALC-SCNC: 8 MMOL/L (ref 0–18)
BUN BLD-MCNC: 23 MG/DL (ref 7–18)
BUN/CREAT SERPL: 19.5 (ref 10–20)
CALCIUM BLD-MCNC: 9.8 MG/DL (ref 8.5–10.1)
CHLORIDE SERPL-SCNC: 101 MMOL/L (ref 98–112)
CO2 SERPL-SCNC: 31 MMOL/L (ref 21–32)
CREAT BLD-MCNC: 1.18 MG/DL
FASTING STATUS PATIENT QL REPORTED: NO
GFR SERPLBLD BASED ON 1.73 SQ M-ARVRAT: 43 ML/MIN/1.73M2 (ref 60–?)
GLUCOSE BLD-MCNC: 124 MG/DL (ref 70–99)
OSMOLALITY SERPL CALC.SUM OF ELEC: 295 MOSM/KG (ref 275–295)
POTASSIUM SERPL-SCNC: 3.9 MMOL/L (ref 3.5–5.1)
SODIUM SERPL-SCNC: 140 MMOL/L (ref 136–145)

## 2023-03-15 PROCEDURE — 80048 BASIC METABOLIC PNL TOTAL CA: CPT

## 2023-03-15 PROCEDURE — 36415 COLL VENOUS BLD VENIPUNCTURE: CPT

## 2023-03-15 PROCEDURE — 99213 OFFICE O/P EST LOW 20 MIN: CPT | Performed by: NURSE PRACTITIONER

## 2023-03-15 NOTE — PATIENT INSTRUCTIONS
Continue furosemide to 40mg in am and 20mg in pm - please make sure you are taking the afternoon dose    Return to [address] [place] [address] in 4-6 weeks    Follow up with Dr. Adrianna Delgado in 3 months    Follow up with Dr. Deana Allen as directed    Please call the heart failure clinic if you notice a weight gain of 3 pounds overnight or 5 pounds or more in 5 days. Monitor yourself for signs and symptoms of fluid overload, increased shortness of breath, difficulty breathing when laying down etc. Call the clinic if any of these signs develop at ph: [phone]    Call if having any dizziness, lightheadedness, heart racing, palpitations, chest pain, shortness of breath, coughing, swelling, weight gain or worsening symptoms. 32-64 ounces of fluid daily    Less than 2000 mg salt/sodium daily. Common high sodium foods include frozen dinners, soups (not homemade), some cereal, vegetable juice, canned vegetables, lunch meats, processed meats like hotdogs, sausage, taylor, pepperoni, soy sauce, pre-packaged rice or potatoes. Please remember to read nutrition labels for sodium content.

## 2023-03-15 NOTE — PROGRESS NOTES
Subjective: \"I feel good today\" states her breathing has been good whether walking or at rest. Here with use of walker and her friend Marshall Vernon. Weight:  Today 103.8 #    Last visit 102 #  Labs completed : at lab - BMP    Patient and medication assessed. Discussed with APN. Disease management teaching completed. Reviewed AVS instructions. Patient verbalized understanding.

## 2023-03-24 ENCOUNTER — MED REC SCAN ONLY (OUTPATIENT)
Dept: INTERNAL MEDICINE CLINIC | Facility: CLINIC | Age: 88
End: 2023-03-24

## 2023-04-03 ENCOUNTER — APPOINTMENT (OUTPATIENT)
Dept: URBAN - METROPOLITAN AREA CLINIC 244 | Age: 88
Setting detail: DERMATOLOGY
End: 2023-04-04

## 2023-04-03 DIAGNOSIS — L57.0 ACTINIC KERATOSIS: ICD-10-CM

## 2023-04-03 DIAGNOSIS — Z48.817 ENCOUNTER FOR SURGICAL AFTERCARE FOLLOWING SURGERY ON THE SKIN AND SUBCUTANEOUS TISSUE: ICD-10-CM

## 2023-04-03 PROCEDURE — OTHER COUNSELING: OTHER

## 2023-04-03 PROCEDURE — OTHER LIQUID NITROGEN: OTHER

## 2023-04-03 PROCEDURE — 17000 DESTRUCT PREMALG LESION: CPT

## 2023-04-03 PROCEDURE — 99024 POSTOP FOLLOW-UP VISIT: CPT | Mod: 25

## 2023-04-03 ASSESSMENT — LOCATION ZONE DERM
LOCATION ZONE: FACE
LOCATION ZONE: ARM

## 2023-04-03 ASSESSMENT — LOCATION DETAILED DESCRIPTION DERM
LOCATION DETAILED: RIGHT INFERIOR CENTRAL MALAR CHEEK
LOCATION DETAILED: RIGHT ANTERIOR SHOULDER

## 2023-04-03 ASSESSMENT — LOCATION SIMPLE DESCRIPTION DERM
LOCATION SIMPLE: RIGHT SHOULDER
LOCATION SIMPLE: RIGHT CHEEK

## 2023-04-03 NOTE — PROCEDURE: LIQUID NITROGEN
Render In Bullet Format When Appropriate: No
Detail Level: Zone
Post-Care Instructions: I reviewed with the patient in detail post-care instructions. Patient is to wear sunprotection, and avoid picking at any of the treated lesions. Pt may apply Vaseline to crusted or scabbing areas.
Duration Of Freeze Thaw-Cycle (Seconds): 0
Total Number Of Aks Treated: 1
Consent: The patient's consent was obtained including but not limited to risks of crusting, scabbing, blistering, scarring, darker or lighter pigmentary change, recurrence, incomplete removal and infection.

## 2023-04-24 DIAGNOSIS — I50.9 CONGESTIVE HEART FAILURE, UNSPECIFIED HF CHRONICITY, UNSPECIFIED HEART FAILURE TYPE (HCC): Primary | ICD-10-CM

## 2023-04-25 ENCOUNTER — LAB ENCOUNTER (OUTPATIENT)
Dept: LAB | Facility: HOSPITAL | Age: 88
End: 2023-04-25
Attending: NURSE PRACTITIONER
Payer: MEDICARE

## 2023-04-25 ENCOUNTER — OFFICE VISIT (OUTPATIENT)
Dept: CARDIOLOGY CLINIC | Facility: HOSPITAL | Age: 88
End: 2023-04-25
Attending: NURSE PRACTITIONER
Payer: MEDICARE

## 2023-04-25 VITALS
DIASTOLIC BLOOD PRESSURE: 76 MMHG | BODY MASS INDEX: 19 KG/M2 | SYSTOLIC BLOOD PRESSURE: 120 MMHG | WEIGHT: 109.38 LBS | OXYGEN SATURATION: 98 % | HEART RATE: 78 BPM

## 2023-04-25 DIAGNOSIS — E78.5 DYSLIPIDEMIA: ICD-10-CM

## 2023-04-25 DIAGNOSIS — I50.9 CONGESTIVE HEART FAILURE, UNSPECIFIED HF CHRONICITY, UNSPECIFIED HEART FAILURE TYPE (HCC): Primary | ICD-10-CM

## 2023-04-25 DIAGNOSIS — I25.10 ATHEROSCLEROSIS OF NATIVE CORONARY ARTERY OF NATIVE HEART WITHOUT ANGINA PECTORIS: ICD-10-CM

## 2023-04-25 DIAGNOSIS — I50.9 CONGESTIVE HEART FAILURE, UNSPECIFIED HF CHRONICITY, UNSPECIFIED HEART FAILURE TYPE (HCC): ICD-10-CM

## 2023-04-25 DIAGNOSIS — E03.8 OTHER SPECIFIED HYPOTHYROIDISM: ICD-10-CM

## 2023-04-25 LAB
ANION GAP SERPL CALC-SCNC: 9 MMOL/L (ref 0–18)
BUN BLD-MCNC: 25 MG/DL (ref 7–18)
BUN/CREAT SERPL: 26.9 (ref 10–20)
CALCIUM BLD-MCNC: 9.4 MG/DL (ref 8.5–10.1)
CHLORIDE SERPL-SCNC: 104 MMOL/L (ref 98–112)
CO2 SERPL-SCNC: 28 MMOL/L (ref 21–32)
CREAT BLD-MCNC: 0.93 MG/DL
FASTING STATUS PATIENT QL REPORTED: NO
GFR SERPLBLD BASED ON 1.73 SQ M-ARVRAT: 57 ML/MIN/1.73M2 (ref 60–?)
GLUCOSE BLD-MCNC: 99 MG/DL (ref 70–99)
NT-PROBNP SERPL-MCNC: 6434 PG/ML (ref ?–450)
OSMOLALITY SERPL CALC.SUM OF ELEC: 296 MOSM/KG (ref 275–295)
POTASSIUM SERPL-SCNC: 4 MMOL/L (ref 3.5–5.1)
SODIUM SERPL-SCNC: 141 MMOL/L (ref 136–145)

## 2023-04-25 PROCEDURE — 80048 BASIC METABOLIC PNL TOTAL CA: CPT

## 2023-04-25 PROCEDURE — 99214 OFFICE O/P EST MOD 30 MIN: CPT | Performed by: NURSE PRACTITIONER

## 2023-04-25 PROCEDURE — 36415 COLL VENOUS BLD VENIPUNCTURE: CPT

## 2023-04-25 RX ORDER — FUROSEMIDE 40 MG/1
TABLET ORAL
Qty: 45 TABLET | Refills: 2 | COMMUNITY
Start: 2023-04-25

## 2023-04-25 NOTE — PATIENT INSTRUCTIONS
Increase furosemide to 40mg twice a day     Return to [address] [place] [address] in 2 weeks    Follow up with Dr. Vianney Pettit in 3 months    Follow up with Dr. Husam Calix as directed    Please call the heart failure clinic if you notice a weight gain of 3 pounds overnight or 5 pounds or more in 5 days. Monitor yourself for signs and symptoms of fluid overload, increased shortness of breath, difficulty breathing when laying down etc. Call the clinic if any of these signs develop at ph: [phone]    Call if having any dizziness, lightheadedness, heart racing, palpitations, chest pain, shortness of breath, coughing, swelling, weight gain or worsening symptoms. 32-64 ounces of fluid daily    Less than 2000 mg salt/sodium daily. Common high sodium foods include frozen dinners, soups (not homemade), some cereal, vegetable juice, canned vegetables, lunch meats, processed meats like hotdogs, sausage, taylor, pepperoni, soy sauce, pre-packaged rice or potatoes. Please remember to read nutrition labels for sodium content.

## 2023-04-25 NOTE — PROGRESS NOTES
Subjective: Mary ambulates in with Rolator walker on room air. Denies lightheadedness, or dizziness, chest pain or palpitations or swelling. But does reports shortness of breath over the last few weeks when getting dressed. She reports some right sided chest near shoulder pain that resolves quickly. Reports poor circulation and has numbness in right hand, looses . She notes the doctor wants her to gain weight and has been \"eating like a . \"  She reports she has been a heart patient since the age of 12. Weight: Today 109.4 lb, Home 103 lb, Last visit 103 lb  Labs completed : at lab BMP/Pro-BNP  IV placed:  No  Measurements :  RLE 13.5\"  LLE  13\"     Mary was unable to confirm all medications she takes. Called [facility] to check medications taken, She has services thru [facility], ARLETTE Block is there 2 days a week on Mondays & Fridays. Patient and medication assessed as above. Discussed with APN. Disease management teaching completed. Reviewed AVS instructions. Patient verbalized understanding.

## 2023-05-04 ENCOUNTER — HOSPITAL ENCOUNTER (INPATIENT)
Facility: HOSPITAL | Age: 88
LOS: 10 days | Discharge: NURSING, REHAB OR LONG-TERM CARE | End: 2023-05-15
Attending: EMERGENCY MEDICINE | Admitting: HOSPITALIST
Payer: MEDICARE

## 2023-05-04 ENCOUNTER — APPOINTMENT (OUTPATIENT)
Dept: GENERAL RADIOLOGY | Facility: HOSPITAL | Age: 88
End: 2023-05-04
Attending: EMERGENCY MEDICINE
Payer: MEDICARE

## 2023-05-04 DIAGNOSIS — I50.9 CONGESTIVE HEART FAILURE, UNSPECIFIED HF CHRONICITY, UNSPECIFIED HEART FAILURE TYPE (HCC): ICD-10-CM

## 2023-05-04 DIAGNOSIS — S72.001A CLOSED FRACTURE OF RIGHT HIP, INITIAL ENCOUNTER (HCC): Primary | ICD-10-CM

## 2023-05-04 LAB
ANION GAP SERPL CALC-SCNC: 8 MMOL/L (ref 0–18)
BASOPHILS # BLD AUTO: 0.03 X10(3) UL (ref 0–0.2)
BASOPHILS NFR BLD AUTO: 0.6 %
BUN BLD-MCNC: 25 MG/DL (ref 7–18)
BUN/CREAT SERPL: 26.9 (ref 10–20)
CALCIUM BLD-MCNC: 8.9 MG/DL (ref 8.5–10.1)
CHLORIDE SERPL-SCNC: 102 MMOL/L (ref 98–112)
CO2 SERPL-SCNC: 28 MMOL/L (ref 21–32)
CREAT BLD-MCNC: 0.93 MG/DL
DEPRECATED RDW RBC AUTO: 52 FL (ref 35.1–46.3)
EOSINOPHIL # BLD AUTO: 0.07 X10(3) UL (ref 0–0.7)
EOSINOPHIL NFR BLD AUTO: 1.3 %
ERYTHROCYTE [DISTWIDTH] IN BLOOD BY AUTOMATED COUNT: 14.6 % (ref 11–15)
GFR SERPLBLD BASED ON 1.73 SQ M-ARVRAT: 57 ML/MIN/1.73M2 (ref 60–?)
GLUCOSE BLD-MCNC: 155 MG/DL (ref 70–99)
HCT VFR BLD AUTO: 34.6 %
HGB BLD-MCNC: 10.5 G/DL
IMM GRANULOCYTES # BLD AUTO: 0.02 X10(3) UL (ref 0–1)
IMM GRANULOCYTES NFR BLD: 0.4 %
LYMPHOCYTES # BLD AUTO: 1.09 X10(3) UL (ref 1–4)
LYMPHOCYTES NFR BLD AUTO: 20.7 %
MCH RBC QN AUTO: 29.4 PG (ref 26–34)
MCHC RBC AUTO-ENTMCNC: 30.3 G/DL (ref 31–37)
MCV RBC AUTO: 96.9 FL
MONOCYTES # BLD AUTO: 0.51 X10(3) UL (ref 0.1–1)
MONOCYTES NFR BLD AUTO: 9.7 %
NEUTROPHILS # BLD AUTO: 3.54 X10 (3) UL (ref 1.5–7.7)
NEUTROPHILS # BLD AUTO: 3.54 X10(3) UL (ref 1.5–7.7)
NEUTROPHILS NFR BLD AUTO: 67.3 %
OSMOLALITY SERPL CALC.SUM OF ELEC: 294 MOSM/KG (ref 275–295)
PLATELET # BLD AUTO: 204 10(3)UL (ref 150–450)
POTASSIUM SERPL-SCNC: 3.6 MMOL/L (ref 3.5–5.1)
RBC # BLD AUTO: 3.57 X10(6)UL
SODIUM SERPL-SCNC: 138 MMOL/L (ref 136–145)
WBC # BLD AUTO: 5.3 X10(3) UL (ref 4–11)

## 2023-05-04 PROCEDURE — 71045 X-RAY EXAM CHEST 1 VIEW: CPT | Performed by: EMERGENCY MEDICINE

## 2023-05-04 PROCEDURE — 73560 X-RAY EXAM OF KNEE 1 OR 2: CPT | Performed by: EMERGENCY MEDICINE

## 2023-05-04 PROCEDURE — 73502 X-RAY EXAM HIP UNI 2-3 VIEWS: CPT | Performed by: EMERGENCY MEDICINE

## 2023-05-04 RX ORDER — MORPHINE SULFATE 2 MG/ML
2 INJECTION, SOLUTION INTRAMUSCULAR; INTRAVENOUS ONCE
Status: DISCONTINUED | OUTPATIENT
Start: 2023-05-04 | End: 2023-05-06

## 2023-05-04 NOTE — TELEPHONE ENCOUNTER
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 Pt says she is feeling better. Pt says she had blood work and chest x-ray and another urinalysis, culture still pending.  Pt was advised to continue taking Macrobid and has had two addition

## 2023-05-05 LAB
BILIRUB UR QL: NEGATIVE
CLARITY UR: CLEAR
EST. AVERAGE GLUCOSE BLD GHB EST-MCNC: 117 MG/DL (ref 68–126)
FLUAV + FLUBV RNA SPEC NAA+PROBE: NOT DETECTED
FLUAV + FLUBV RNA SPEC NAA+PROBE: NOT DETECTED
GLUCOSE UR-MCNC: NORMAL MG/DL
HBA1C MFR BLD: 5.7 % (ref ?–5.7)
HGB UR QL STRIP.AUTO: NEGATIVE
KETONES UR-MCNC: NEGATIVE MG/DL
L PNEUMO AG UR QL: NEGATIVE
LEUKOCYTE ESTERASE UR QL STRIP.AUTO: NEGATIVE
MRSA NASAL: NEGATIVE
PH UR: 6.5 [PH] (ref 5–8)
PROCALCITONIN SERPL-MCNC: 0.02 NG/ML (ref ?–0.16)
PROT UR-MCNC: NEGATIVE MG/DL
Q-T INTERVAL: 436 MS
QRS DURATION: 92 MS
QTC CALCULATION (BEZET): 477 MS
R AXIS: 77 DEGREES
RSV RNA SPEC NAA+PROBE: NOT DETECTED
SARS-COV-2 RNA RESP QL NAA+PROBE: NOT DETECTED
SP GR UR STRIP: 1.01 (ref 1–1.03)
STAPH A BY PCR: NEGATIVE
STREP PNEUMO ANTIGEN, URINE: NEGATIVE
T AXIS: 144 DEGREES
UROBILINOGEN UR STRIP-ACNC: NORMAL
VENTRICULAR RATE: 72 BPM

## 2023-05-05 PROCEDURE — 99222 1ST HOSP IP/OBS MODERATE 55: CPT | Performed by: HOSPITALIST

## 2023-05-05 RX ORDER — HEPARIN SODIUM 5000 [USP'U]/ML
5000 INJECTION, SOLUTION INTRAVENOUS; SUBCUTANEOUS EVERY 12 HOURS SCHEDULED
Status: DISCONTINUED | OUTPATIENT
Start: 2023-05-05 | End: 2023-05-10

## 2023-05-05 RX ORDER — MORPHINE SULFATE 2 MG/ML
2 INJECTION, SOLUTION INTRAMUSCULAR; INTRAVENOUS EVERY 4 HOURS PRN
Status: DISCONTINUED | OUTPATIENT
Start: 2023-05-05 | End: 2023-05-06

## 2023-05-05 RX ORDER — ONDANSETRON 2 MG/ML
4 INJECTION INTRAMUSCULAR; INTRAVENOUS EVERY 6 HOURS PRN
Status: DISCONTINUED | OUTPATIENT
Start: 2023-05-05 | End: 2023-05-15

## 2023-05-05 RX ORDER — MORPHINE SULFATE 2 MG/ML
1 INJECTION, SOLUTION INTRAMUSCULAR; INTRAVENOUS
Status: DISCONTINUED | OUTPATIENT
Start: 2023-05-05 | End: 2023-05-06

## 2023-05-05 NOTE — PLAN OF CARE
[address] to try and get patients home medication list because patient is unsure of what she takes everyday. [facility] said that they do not have anyone that could help me with that and to try again later in the morning.

## 2023-05-05 NOTE — ED INITIAL ASSESSMENT (HPI)
Per EMS, patient had been standing and lost balance. Axox2-3. No head trauma. No loc. Oscar Arden on right hip. Mild R leg shortening reported. 40mcg Fentanyl given en route. Hx afib. On plavix.

## 2023-05-05 NOTE — PLAN OF CARE
Pt alert and oriented x4 on 2lt o2 via nasal canula. Bryant in place on strict bed rest. Morphine for pain prn. mepilex to sacrum. Patient with Right him FX. [place] traction in place with 6 lbs weight. Bed is low and locked. Side rails up times two. Call light is within reach. Hourly rounding continued. Safety precautions maintained. Unable to get patients medication list. Pt does not know what medications she is on. [place] unable to give med list because it is an independent living facility. Daughter has list but is currently traveling.      Problem: Patient Centered Care  Goal: Patient preferences are identified and integrated in the patient's plan of care  Description: Interventions:  - Provide timely, complete, and accurate information to patient/family  - Incorporate patient and family knowledge, values, beliefs, and cultural backgrounds into the planning and delivery of care  - Encourage patient/family to participate in care and decision-making at the level they choose  - Honor patient and family perspectives and choices  Outcome: Progressing     Problem: PAIN - ADULT  Goal: Verbalizes/displays adequate comfort level or patient's stated pain goal  Description: INTERVENTIONS:  - Encourage pt to monitor pain and request assistance  - Assess pain using appropriate pain scale  - Administer analgesics based on type and severity of pain and evaluate response  - Implement non-pharmacological measures as appropriate and evaluate response  - Consider cultural and social influences on pain and pain management  - Manage/alleviate anxiety  - Utilize distraction and/or relaxation techniques  - Monitor for opioid side effects  - Notify MD/LIP if interventions unsuccessful or patient reports new pain  - Anticipate increased pain with activity and pre-medicate as appropriate  Outcome: Progressing     Problem: RISK FOR INFECTION - ADULT  Goal: Absence of fever/infection during anticipated neutropenic period  Description: INTERVENTIONS  - Monitor WBC  - Administer growth factors as ordered  - Implement neutropenic guidelines  Outcome: Progressing     Problem: SAFETY ADULT - FALL  Goal: Free from fall injury  Description: INTERVENTIONS:  - Assess pt frequently for physical needs  - Identify cognitive and physical deficits and behaviors that affect risk of falls.   - [place] fall precautions as indicated by assessment.  - Educate pt/family on patient safety including physical limitations  - Instruct pt to call for assistance with activity based on assessment  - Modify environment to reduce risk of injury  - Provide assistive devices as appropriate  - Consider OT/PT consult to assist with strengthening/mobility  - Encourage toileting schedule  Outcome: Progressing     Problem: DISCHARGE PLANNING  Goal: Discharge to home or other facility with appropriate resources  Description: INTERVENTIONS:  - Identify barriers to discharge w/pt and caregiver  - Include patient/family/discharge partner in discharge planning  - Arrange for needed discharge resources and transportation as appropriate  - Identify discharge learning needs (meds, wound care, etc)  - Arrange for interpreters to assist at discharge as needed  - Consider post-discharge preferences of patient/family/discharge partner  - Complete POLST form as appropriate  - Assess patient's ability to be responsible for managing their own health  - Refer to Case Management Department for coordinating discharge planning if the patient needs post-hospital services based on physician/LIP order or complex needs related to functional status, cognitive ability or social support system  Outcome: Progressing     Problem: Altered Communication/Language Barrier  Goal: Patient/Family is able to understand and participate in their care  Description: Interventions:  - Assess communication ability and preferred communication style  - Implement communication aides and strategies  - Use visual cues when possible  - Listen attentively, be patient, do not interrupt  - Minimize distractions  - Allow time for understanding and response  - Establish method for patient to ask for assistance (call light)  - Provide an  as needed  - Communicate barriers and strategies to overcome with those who interact with patient  Outcome: Progressing

## 2023-05-05 NOTE — CM/SW NOTE
05/05/23 1400   CM/SW Referral Data   Referral Source Physician   Reason for Referral Discharge planning   Informant Daughter   Medical Hx   Does patient have an established PCP?   (Damaris Jacobs)   Significant Past Medical/Mental Health Hx rt hip fx   Patient Info   Patient's Current Mental Status at Time of Assessment Alert   Patient's [facility] Acute Care Provider Upon Admission [facility]   Patient lives with Alone   Patient Status Prior to Admission   Independent with ADLs and Mobility No   Pt. requires assistance with Housework;Meals;Driving   Discharge Needs   Anticipated D/C needs Subacute rehab   Choice of Post-Acute Provider   Informed patient of right to choose their preferred provider Yes   List of appropriate post-acute services provided to patient/family with quality data   (email to [place]@"[employer])   CM Called and spoke to pts dtr at [facility]. Above assessment obtained from her. Pt uses a rollator at baseline to ambulate. She is a resident at [facility]. CM discussed dc plan and potential need for rehab on dc. [facility] agrees to this plan and requesting pt to rehab at [facility]. Rehab choice list emailed to [place]@"[employer] PASRR screen done and attached to [facility] referral.     Dr. Kendra Childers to see pt today. Plan is for surgical repair of rt hip in a few days pt was on brillinta. Currently on hold. Pt will need to be off it for a few days before surgery can be scheduled. Plan: [facility] when med stable post op. Sonya Caldera.  Razia Horton RN, BSN  Nurse   [phone]

## 2023-05-05 NOTE — PLAN OF CARE
Patient was admitted for a right hip fracture. Upon arrival patient reported pain, morphine PRN given. Patient has difficulty hearing, so speaking loudly was very important. Patient is on remote tele. Gretchen Mule is in place. Patient has two daughters, Richardson Dobbs and [place]. Richardson Dobbs is her POA. Safety precautions are in place. Frequent rounding by nursing staff. Problem: Patient Centered Care  Goal: Patient preferences are identified and integrated in the patient's plan of care  Description: Interventions:  - What would you like us to know as we care for you?  I am from [facility]  - Provide timely, complete, and accurate information to patient/family  - Incorporate patient and family knowledge, values, beliefs, and cultural backgrounds into the planning and delivery of care  - Encourage patient/family to participate in care and decision-making at the level they choose  - Honor patient and family perspectives and choices  Outcome: Progressing

## 2023-05-05 NOTE — PROGRESS NOTES
ADMISSION NOTE    80year old female who lives in assisted living she was getting ready for a shower with staff from the building when she lost her balance she was walking withrout her walker at the time. She fell onto her buttocks and R hip. There was no dizziness no palpitations no chest pain she simply lost her balance. She is on binlinta she did not hid her head and there was no LOC. Eval in the Ed revealed a  R intertrochanteric hip fracture. CXR R lower lobe opacity ? CHF, atelectasis early pneumonia . Available medical records partially reviewed. Dictation to follow.     Salas Kemp M.D.  5/5/2023

## 2023-05-05 NOTE — H&P
[facility]    PATIENT'S NAME: Tomás Christy   ATTENDING PHYSICIAN: Kyler Herzog MD   PATIENT ACCOUNT#:   [de-identified]    LOCATION:  [address] [place], [place] [address]sharon Reyes #:   [id number]       YOB: 1928  ADMISSION DATE:       05/04/2023    HISTORY AND PHYSICAL EXAMINATION    DATE OF EXAMINATION:  05/05/2023    CHIEF COMPLAINT:  Fall with pain in right hip. HISTORY OF PRESENT ILLNESS:  This is a delightful 55-year-old woman who lives at [facility] in assisted living situation. Tonight when she was getting ready for bed, she had an assistant with her. She was walking a short distance from her bed to chest of drawers in order to get out some socks as she and her assistant were preparing for to have a shower. She lost her balance and fell backward, landing on her butt and right hip. She immediately had severe pain in the right hip and was not able to bear weight on it. She was brought to the emergency room by emergency services and the x-ray of her pelvis and hips revealed a right mildly displaced intertrochanteric hip fracture. The right hip joint remained normally aligned. There was a prior left femoral ORIF with no definite acute fracture and malalignment in the pelvis. Chest x-ray revealed moderate cardiomegaly and faint ground-glass opacity in the right lower lobe. No definite pleural effusion or pneumothorax. The patient denied any dizziness prior to the fall. No loss of consciousness. No head trauma. She said that her balance is not good and she walks with the aid of a walker. She said the walker was right next to her at the time of her fall. The patient has a history of atrial fibrillation but is status post Watchman, so was not on anticoagulation for that. She does however have coronary artery disease and sustained a non-ST elevation myocardial infarction in March of last year.   She was readmitted in November of last year with a non-ST elevation myocardial infarction. She has had a CABG in the past and stents and has heart failure with reduced ejection fraction, mild to moderate aortic and mitral stenosis as well. Currently, the patient is complaining of spasm-type pain in her right hip. She denies any headache, visual changes. No pain in her neck or back. No nausea, vomiting. Additional workup in the emergency room included right knee x-ray which is apparently negative for any fracture. Her glucose was 155, sodium 138, potassium 3.6, chloride 102, CO2 of 28, BUN of 25 with a creatinine of 0.93, calcium 8.9, anion gap of 8. White count was 5.3 with a hemoglobin of 10.5 and a platelet count of 286,077. There were 67% neutrophils. Her EKG revealed atrial fibrillation with septal infarct and ST and T-wave abnormalities which appeared to be present on her previous EKG in February of this year. The emergency room physician consulted the Orthopedic Service and [facility] Cardiology to evaluate the patient prior to any surgical intervention. PAST MEDICAL HISTORY:  Significant for coronary artery disease status post CABG, status post angiogram in 2022 which showed occluded graft of the SVG to obtuse marginal 1 and 2. The patient underwent stenting. She also has heart failure with reduced ejection fraction of about 53% with diastolic dysfunction and wall motion hypokinesis. She has rheumatic valvular heart disease with mild-to-moderate aortic stenosis and mild to moderate aortic stenosis and mitral insufficiency and regurgitation.   Atrial fibrillation status post Watchman procedure; hypertension; hyperlipidemia; osteoarthritis; osteoporosis; chronic kidney disease stage 3; hypothyroidism; minimally impacted fracture of the surgical neck of the left humerus, followed by Dr. Marilee Fernández; non ST-elevation myocardial infarction in the past; left breast lumpectomy and radiation therapy for breast cancer; hysterectomy; laparoscopic cholecystectomy; coronary artery bypass grafting; and partial bowel resection. MEDICATIONS:  Prior to admission are not well known at this time. There is no one at the facility who is able to tell us what medication the patient is currently taking. RN will continue to reach out to [facility] and hopefully have a med list by the morning. ALLERGIES:  Aspirin, penicillin, Macrobid, ciprofloxacin. FAMILY HISTORY:  The patient's mother  at 62 of a CVA. She also had hypertension. Her father  at 39 of an MI.    SOCIAL HISTORY:  The patient is an ex-smoker. She quit in her 46s. Currently no tobacco, alcohol, or drug use. Lives at [facility] she says independently, but her daughter has recently placed more resources in her apartment including an assistant, someone who make sure she takes her medications every day. She walks with the aid of a walker. REVIEW OF SYSTEMS:  Twelve systems were reviewed. The patient reports she has been losing weight, but said she has now gained a few pounds and is trying to eat better. Denies any chest pain or fever or chills. She does occasionally get short of breath, but this has not changed recently. She denies any dizziness, lightheadedness, or palpitations. No nausea, vomiting, diarrhea, constipation, melena, or hematochezia. No dysuria or increased frequency of urination. There are otherwise no additional pertinent positives or negatives on the 12-point review of systems except as listed in the History of Present Illness. PHYSICAL EXAMINATION:    VITAL SIGNS:  Temperature was 97.9, pulse 94, respiratory rate 16, blood pressure 139/94, O2 saturation 92% on room air. HEENT:  Normocephalic, atraumatic. Pupils equal, reactive. Sclerae anicteric. There is no sinus tenderness. Mucous membranes were slightly dry. NECK:  Supple. LUNGS:  Decreased breath sounds bilaterally, possible crackles in the right base posteriorly. No wheezes or rhonchi. HEART:  Irregularly irregular rhythm.   Normal S1, S2. Systolic murmur. ABDOMEN:  Soft, nontender, with normoactive bowel sounds. No guarding. No rebound. EXTREMITIES:  Externally rotated right lower extremity with tenderness at the right hip joint. Pedal pulses are easily palpated bilaterally. There is no calf tenderness. SKIN:  Warm and dry. The patient does have an abrasion over her right knee and some bruising. There is some scattered actinic and seborrheic keratoses as well. NEUROLOGIC:  She is awake, alert, oriented x3 with no focal motor or sensory deficits. PSYCHIATRIC:  Her mood and affect are pleasant and cooperative. BACK:  There was no costovertebral angle tenderness noted. LABORATORY DATA:  Previously mentioned. ASSESSMENT AND PLAN:    1. Right intertrochanteric femur fracture following a mechanical fall. The patient will be evaluated by Orthopedic Service and further recommendations will be made at that time. The patient appears to be rather functional and likely would need surgical intervention to hopefully maintain her current level of activity. The patient SCDs will be placed. 2.   History of coronary artery disease, status post coronary artery bypass graft. Patient denies any chest discomfort or diaphoresis. Her EKG to my eye looks about the same as previous. [address] [place] [address] Cardiology has been consulted. The patient is placed on remote telemetry. Await any further recommendations from Cardiology Service. 3.   Atrial stenosis, mitral insufficiency and stenosis. Last echocardiogram that I can see in the computer was in November 2022. Question whether Cardiology would want to repeat it. It is also possible one could have been in the [address] [place] [address] Cardiology offices, but I am unable to access that, so we will await their evaluation and recommendations. 4.   Heart failure with mildly reduced ejection fraction of 42% and diastolic dysfunction. Continue home medications.   I believe the patient has been on diuretics, but we will await dosages as soon as the staff here is able to obtain a current medication list.  5.   Atrial fibrillation, status post Watchman procedure. The patient is off Coumadin for this. 6.   Coagulopathy. Patient is on Brilinta. We will hold until evaluated by Cardiology and Orthopedic Surgery and a plan is made for her management. 7.   Nonfasting hyperglycemia. Check A1c level. This likely represents stress. We will check A1c, however. 8.   Chronic kidney disease. GFR about the same. 9.   Leukocytosis of uncertain etiology, may be related to stress. Chest x-ray, however, suggests the possibility of a right lower lobe infiltrate. I have asked RN to do a swallow evaluation. We will obtain procalcitonin level and send COVID, RSV and influenza panel as well. 10.   DVT prophylaxis. SCDs, subcutaneous heparin. I told the patient, there is no plan for surgery this morning. 11. The patient's current clinical status and proposed treatment plan was discussed with her. All of her questions were answered, and she agreed with plan of care as outlined above.     Dictated By Luis Castillo MD  d: 05/05/2023 02:57:05  t: 05/05/2023 03:10:51  Job [id number]  KRIS/

## 2023-05-05 NOTE — ED QUICK NOTES
Orders for admission, patient is aware of plan and ready to go upstairs. Any questions, please call ED RN Malachi Valerio at extension [phone].      Patient Covid vaccination status: Fully vaccinated     COVID Test Ordered in ED: None    COVID Suspicion at Admission: N/A    Running Infusions:  None    Mental Status/LOC at time of transport: A&Ox4     Other pertinent information: baseline ambulatory w/ [employer]  CIWA score: N/A   NIH score:  N/A

## 2023-05-06 LAB
ANION GAP SERPL CALC-SCNC: 7 MMOL/L (ref 0–18)
BASOPHILS # BLD AUTO: 0.04 X10(3) UL (ref 0–0.2)
BASOPHILS NFR BLD AUTO: 0.5 %
BUN BLD-MCNC: 21 MG/DL (ref 7–18)
BUN/CREAT SERPL: 22.3 (ref 10–20)
CALCIUM BLD-MCNC: 8.9 MG/DL (ref 8.5–10.1)
CHLORIDE SERPL-SCNC: 108 MMOL/L (ref 98–112)
CO2 SERPL-SCNC: 26 MMOL/L (ref 21–32)
CREAT BLD-MCNC: 0.94 MG/DL
DEPRECATED RDW RBC AUTO: 54 FL (ref 35.1–46.3)
EOSINOPHIL # BLD AUTO: 0.05 X10(3) UL (ref 0–0.7)
EOSINOPHIL NFR BLD AUTO: 0.7 %
ERYTHROCYTE [DISTWIDTH] IN BLOOD BY AUTOMATED COUNT: 15 % (ref 11–15)
GFR SERPLBLD BASED ON 1.73 SQ M-ARVRAT: 56 ML/MIN/1.73M2 (ref 60–?)
GLUCOSE BLD-MCNC: 135 MG/DL (ref 70–99)
HCT VFR BLD AUTO: 30 %
HGB BLD-MCNC: 9.1 G/DL
IMM GRANULOCYTES # BLD AUTO: 0.03 X10(3) UL (ref 0–1)
IMM GRANULOCYTES NFR BLD: 0.4 %
LYMPHOCYTES # BLD AUTO: 1.47 X10(3) UL (ref 1–4)
LYMPHOCYTES NFR BLD AUTO: 20.1 %
MAGNESIUM SERPL-MCNC: 2.3 MG/DL (ref 1.6–2.6)
MCH RBC QN AUTO: 29.8 PG (ref 26–34)
MCHC RBC AUTO-ENTMCNC: 30.3 G/DL (ref 31–37)
MCV RBC AUTO: 98.4 FL
MONOCYTES # BLD AUTO: 0.84 X10(3) UL (ref 0.1–1)
MONOCYTES NFR BLD AUTO: 11.5 %
NEUTROPHILS # BLD AUTO: 4.88 X10 (3) UL (ref 1.5–7.7)
NEUTROPHILS # BLD AUTO: 4.88 X10(3) UL (ref 1.5–7.7)
NEUTROPHILS NFR BLD AUTO: 66.8 %
OSMOLALITY SERPL CALC.SUM OF ELEC: 297 MOSM/KG (ref 275–295)
PHOSPHATE SERPL-MCNC: 2.9 MG/DL (ref 2.5–4.9)
PLATELET # BLD AUTO: 166 10(3)UL (ref 150–450)
POTASSIUM SERPL-SCNC: 4.3 MMOL/L (ref 3.5–5.1)
POTASSIUM SERPL-SCNC: 4.3 MMOL/L (ref 3.5–5.1)
RBC # BLD AUTO: 3.05 X10(6)UL
SODIUM SERPL-SCNC: 141 MMOL/L (ref 136–145)
WBC # BLD AUTO: 7.3 X10(3) UL (ref 4–11)

## 2023-05-06 PROCEDURE — 99233 SBSQ HOSP IP/OBS HIGH 50: CPT | Performed by: INTERNAL MEDICINE

## 2023-05-06 RX ORDER — HYDROCODONE BITARTRATE AND ACETAMINOPHEN 5; 325 MG/1; MG/1
1 TABLET ORAL ONCE
Status: COMPLETED | OUTPATIENT
Start: 2023-05-06 | End: 2023-05-06

## 2023-05-06 RX ORDER — ROSUVASTATIN CALCIUM 10 MG/1
10 TABLET, COATED ORAL NIGHTLY
Status: DISCONTINUED | OUTPATIENT
Start: 2023-05-06 | End: 2023-05-15

## 2023-05-06 RX ORDER — DOCUSATE SODIUM 100 MG/1
100 CAPSULE, LIQUID FILLED ORAL 2 TIMES DAILY PRN
Status: DISCONTINUED | OUTPATIENT
Start: 2023-05-06 | End: 2023-05-15

## 2023-05-06 RX ORDER — LEVOTHYROXINE SODIUM 0.1 MG/1
100 TABLET ORAL
Status: DISCONTINUED | OUTPATIENT
Start: 2023-05-06 | End: 2023-05-15

## 2023-05-06 RX ORDER — METOPROLOL SUCCINATE 25 MG/1
25 TABLET, EXTENDED RELEASE ORAL EVERY 12 HOURS
Status: DISCONTINUED | OUTPATIENT
Start: 2023-05-06 | End: 2023-05-15

## 2023-05-06 RX ORDER — AZITHROMYCIN 250 MG/1
500 TABLET, FILM COATED ORAL DAILY
Status: DISCONTINUED | OUTPATIENT
Start: 2023-05-07 | End: 2023-05-06

## 2023-05-06 RX ORDER — FUROSEMIDE 40 MG/1
40 TABLET ORAL
Status: DISCONTINUED | OUTPATIENT
Start: 2023-05-06 | End: 2023-05-15

## 2023-05-06 RX ORDER — ACETAMINOPHEN 325 MG/1
650 TABLET ORAL EVERY 6 HOURS PRN
Status: DISCONTINUED | OUTPATIENT
Start: 2023-05-06 | End: 2023-05-07

## 2023-05-06 NOTE — PLAN OF CARE
Pt is A&Ox4. 1.5L NC. [place] traction 6lb for fracture right hip. Cardiac diet, Remote tele. Heparin & scds for dvt prophylaxis. Voiding via pulido catheter. Up by 1 assist with a walker. PRN morphine for pain management. Bed rest. Pending surgery for right hip fracture. Call light within  reach, frequent rounding. Safety measures in place.        Problem: Patient Centered Care  Goal: Patient preferences are identified and integrated in the patient's plan of care  Description: Interventions:  - What would you like us to know as we care for you?   - Provide timely, complete, and accurate information to patient/family  - Incorporate patient and family knowledge, values, beliefs, and cultural backgrounds into the planning and delivery of care  - Encourage patient/family to participate in care and decision-making at the level they choose  - Honor patient and family perspectives and choices  Outcome: Progressing     Problem: Patient Centered Care  Goal: Patient preferences are identified and integrated in the patient's plan of care  Description: Interventions:  - What would you like us to know as we care for you?   - Provide timely, complete, and accurate information to patient/family  - Incorporate patient and family knowledge, values, beliefs, and cultural backgrounds into the planning and delivery of care  - Encourage patient/family to participate in care and decision-making at the level they choose  - Honor patient and family perspectives and choices  Outcome: Progressing

## 2023-05-06 NOTE — PROGRESS NOTES
[facility] Pharmacy Note:  Renal Dose Adjustment for Rosuvastatin (Crestor)    Juliann Cummings has been prescribed Rosuvastatin (Crestor) 20 mg daily. Estimated Creatinine Clearance: 29 mL/min (based on SCr of 0.94 mg/dL). The dose has been changed to 10 mg daily per P&T approved protocol. Pharmacy will follow and resume original order if renal function improves.     Thank you,  Priyanka Santos, [facility]  5/6/2023 8:36 AM

## 2023-05-06 NOTE — PROGRESS NOTES
[facility] Pharmacy Note: Route Optimization for Azithromycin [facility])    Patient is currently on Azithromycin (ZITHROMAX) 500 mg IV every 24 hours. The patient meets the criteria to convert to the oral equivalent as established by the IV to Oral conversion protocol approved by the P&T committee. Medication was changed from IV formulation to Azithromycin (ZITHROMAX)  500 mg PO every 24 hours per protocol.       Warner Shahid PharmD  5/6/2023,  4:51 PM

## 2023-05-06 NOTE — CONSULTS
[facility]    PATIENT'S NAME: Gifford Hamman   ATTENDING PHYSICIAN: Melida Bullock MD   CONSULTING PHYSICIAN: Lynette De Leon MD   PATIENT ACCOUNT#:   [de-identified]    LOCATION:  [address] [place], [place] [address]sharon Reyes #:   [id number]       YOB: 1928  ADMISSION DATE:       05/04/2023      CONSULT DATE:  05/05/2023    REPORT OF CONSULTATION    REFERRING PHYSICIAN:  Kunal Lopez MD    REASON FOR CONSULTATION:  Right hip pain. HISTORY OF PRESENT ILLNESS:  The patient presents as a 57-year-old female with a history of multiple fractures including a left intertrochanteric femur fracture (October 2020), a distal left femur fracture, proximal humeral fracture; history of coronary artery disease with recent evaluation in November 2022, on Brilinta; history of atrial stenosis, mitral insufficiency and stenosis; atrial fibrillation; UTI with history of multiple recurrent UTIs; pneumonia; and leukocytosis, who sustained a fall in her assisted living room late last night when she attempted to obtain a pair of socks. She lost her balance, fell backward, and landed on her right hip. She could not ambulate or move her hip secondary to pain and was taken to [facility] emergency room, where a comminuted intertrochanteric femur fracture was diagnosed. In the emergency room she underwent a chest x-ray, which demonstrated cardiomegaly, findings of mild pulmonary edema, and a more focal faint opacity in the right lower lobe which could be suggestive of focal edema versus developing pneumonia, according to the radiologist.    EKG, which demonstrated a septal infarct and atrial fibrillation, infarct age undetermined. There were ST and T-wave abnormalities, consider lateral ischemia. She had a UA which demonstrated 2+ nitrites, 20 ascorbic acid, rare bacteria, yeast in the urine present, white blood cell clumps.     As it relates to her cardiac history, she has a history of coronary artery disease and was noted to have non-ST-elevated MI in March 2022. She was readmitted to the hospital in November with non-ST-elevation MI. She has a history of a CABG and has been stented. She has a history of heart failure with reduced ejection fraction and both aortic and mitral valve stenosis. She denies pain elsewhere. PAST MEDICAL HISTORY:  See History Of Present illness section above:  Coronary artery disease, status post 2 MIs in the past year. Status post angiogram in 2022 which showed occluded graft. History of aortic and mitral valve stenosis with reduced ejection fraction of approximately 40%. Rheumatic valvular heart disease. Atrial fibrillation. Hypertension. Dyslipidemia. Chronic renal disease stage 3. Osteoporosis with multiple fractures including the proximal humerus, distal femur (treated with open reduction and internal fixation with delayed healing), contralateral left hip fracture requiring internal intramedullary nailing. Left breast lumpectomy and radiation therapy for breast cancer. Hysterectomy and laparoscopic cholecystectomy. Coronary artery bypass grafting. Partial bowel resection. PAST SURGICAL HISTORY:  See History Of Present illness section above. MEDICATIONS:  Ticagrelor 90 mg daily; metoprolol; furosemide; Zithromax; heparin 5000 international units q.12 hours; Zofran; MS p.r.n.; levothyroxine. ALLERGIES:  She lists aspirin, swelling; penicillin, hives; erythromycin, nausea and vomiting; sulfa antibiotics, diarrhea, nausea and vomiting; Macrobid, unspecified;  adhesive tape, rash. SOCIAL HISTORY:  She lives in assisted living. She denies cigarette smoking; she quit approximately 40 years ago. Denies alcohol use or substance abuse. She lives independently, assisted living. PHYSICAL EXAMINATION:  She is alert, oriented, and appropriate throughout the examination, resting in bed, in no apparent distress, but on nasal cannula oxygen. Her right lower extremity is shortened, abducted, and externally rotated. Any type of active or passive motion of the hip causes pain. Decreased peripheral pulses, but good color and capillary refill is noted. She is able to dorsiflex and plantarflex, however, weakly, right lower extremity. Devon sign is negative. There is no ecchymosis or bruising. Her distal neurovascular status appears to be grossly intact (see above). ASSESSMENT:    1. Right intertrochanteric femur fracture with comminution, displacement, and osteoporosis. 2.   Coagulopathy, on Brilinta. 3.   Urinary tract infection, acute, with history of chronic and recurrent urinary tract infections. 4.   Chest x-ray suggests pneumonia. 5.   History of coronary artery disease, status post coronary artery bypass graft (2 myocardial infarctions in the last year). 6.   History of congestive heart failure with reduced ejection fraction of 40%. 7.   Atrial fibrillation. 8.   Chronic renal failure. RECOMMENDATIONS:  As far as the hip fracture is concerned, we had discussed all the potential benefits and risks of interlocking intramedullary nailing versus hip pinning. The third option of proximal femoral arthroplasty was not recommended.   As far as the internal fixation is concern, the potential benefits and risks that have been discussed included, but were not limited to, the risk of infection and its potential sequelae despite the use of strict sterile techniques and prophylactic antibiotics (increased incidence with reported history of penicillin); malunion, nonunion, delayed union; increased risk of leg length discrepancy and/or rotational asymmetry (particularly given her history of a contralateral intertrochanteric femur fracture requiring internal stabilization and a distal left femur fracture requiring open reduction and internal fixation, as it was explained that leg length are dependent on a variety of contributing factors, including the existence or not of scoliosis/pelvic obliquity, hip fractures or arthritis, knee fractures or arthritis, etc.); increased risk for cardiopulmonary, GI/, and/or cerebral problems including stroke given her significant comorbidities as enumerated above; increased risk for anesthetic-related complications given the above; increased risk for heart-related problems and loss of alignment given her history of marked osteoporosis; increased risk for infection and/or delayed wound healing despite the use of strict sterile techniques and prophylactic antibiotics (and its potential sequelae) given her history of recurrent UTIs, penicillin allergy, and possible pneumonia; the need for possibly prolonged protective weightbearing and rehabilitation, etc.     Numerous questions were asked, all of which were answered to the best my abilities. I do believe that she has a good understanding of the above discussion, as it was similar to one that was conveyed to her and discussed prior to fixing surgically her left intertrochanteric femur fracture. She has a UA which suggests a UTI, and urine culture pending. She also has a chest x-ray which suggests a possible pneumonia and is on nasal cannula. Antibiotics have been ordered per the hospitalist.      With her history of Brilinta, she presents with a coagulopathy and we will need to delay scheduling her for surgery approximately 5 to 7 days to decrease the risk of significant postoperative bleeding. The patient will require cardiac clearance given her significant cardiac history including recent MIs, aortic and mitral valve stenosis, atrial fibrillation, reduced ejection fraction, etc.    I agree with the DVT prophylaxis. SCDs have been ordered as well subcutaneous heparin. Will need to monitor her hemoglobin and hematocrit on a daily basis.     Pereyra's traction was ordered with a CARMEN mattress, as she is at high risk for the development of a decubitus, and strict nursing decubitus care prophylaxis will be ordered. We will monitor her above workup accordingly. Again, numerous questions were asked, all of which were answered. Thank you for the consultation.      Dictated By Lynette De Leon MD  d: 05/05/2023 13:38:11  t: 05/05/2023 14:19:24  Job [id number]  OEB/

## 2023-05-07 LAB
BASOPHILS # BLD AUTO: 0.03 X10(3) UL (ref 0–0.2)
BASOPHILS NFR BLD AUTO: 0.4 %
DEPRECATED RDW RBC AUTO: 53.6 FL (ref 35.1–46.3)
EOSINOPHIL # BLD AUTO: 0.19 X10(3) UL (ref 0–0.7)
EOSINOPHIL NFR BLD AUTO: 2.6 %
ERYTHROCYTE [DISTWIDTH] IN BLOOD BY AUTOMATED COUNT: 15 % (ref 11–15)
HCT VFR BLD AUTO: 29.9 %
HGB BLD-MCNC: 9.1 G/DL
IMM GRANULOCYTES # BLD AUTO: 0.02 X10(3) UL (ref 0–1)
IMM GRANULOCYTES NFR BLD: 0.3 %
LYMPHOCYTES # BLD AUTO: 1.28 X10(3) UL (ref 1–4)
LYMPHOCYTES NFR BLD AUTO: 17.5 %
MCH RBC QN AUTO: 29.6 PG (ref 26–34)
MCHC RBC AUTO-ENTMCNC: 30.4 G/DL (ref 31–37)
MCV RBC AUTO: 97.4 FL
MONOCYTES # BLD AUTO: 0.78 X10(3) UL (ref 0.1–1)
MONOCYTES NFR BLD AUTO: 10.7 %
NEUTROPHILS # BLD AUTO: 5 X10 (3) UL (ref 1.5–7.7)
NEUTROPHILS # BLD AUTO: 5 X10(3) UL (ref 1.5–7.7)
NEUTROPHILS NFR BLD AUTO: 68.5 %
PLATELET # BLD AUTO: 170 10(3)UL (ref 150–450)
RBC # BLD AUTO: 3.07 X10(6)UL
WBC # BLD AUTO: 7.3 X10(3) UL (ref 4–11)

## 2023-05-07 PROCEDURE — 99233 SBSQ HOSP IP/OBS HIGH 50: CPT | Performed by: INTERNAL MEDICINE

## 2023-05-07 RX ORDER — HYDROCODONE BITARTRATE AND ACETAMINOPHEN 5; 325 MG/1; MG/1
1 TABLET ORAL EVERY 6 HOURS PRN
Status: DISCONTINUED | OUTPATIENT
Start: 2023-05-07 | End: 2023-05-15

## 2023-05-07 RX ORDER — ACETAMINOPHEN 325 MG/1
650 TABLET ORAL EVERY 8 HOURS
Status: DISCONTINUED | OUTPATIENT
Start: 2023-05-07 | End: 2023-05-10

## 2023-05-07 RX ORDER — VANCOMYCIN HYDROCHLORIDE 125 MG/1
125 CAPSULE ORAL DAILY
Status: DISCONTINUED | OUTPATIENT
Start: 2023-05-07 | End: 2023-05-15

## 2023-05-07 NOTE — PLAN OF CARE
Patient alert and orientated x3 with confusion. Saline locked. Patient has had fair appetite during shift. Remote tele. Patient on bedrest, with bucks traction. Pain medication given as needed. Bryant in place. All safety precautions in place, call light within reach, frequent rounding.            Problem: Patient Centered Care  Goal: Patient preferences are identified and integrated in the patient's plan of care  Description: Interventions:  - What would you like us to know as we care for you?   - Provide timely, complete, and accurate information to patient/family  - Incorporate patient and family knowledge, values, beliefs, and cultural backgrounds into the planning and delivery of care  - Encourage patient/family to participate in care and decision-making at the level they choose  - Honor patient and family perspectives and choices  Outcome: Progressing     Problem: PAIN - ADULT  Goal: Verbalizes/displays adequate comfort level or patient's stated pain goal  Description: INTERVENTIONS:  - Encourage pt to monitor pain and request assistance  - Assess pain using appropriate pain scale  - Administer analgesics based on type and severity of pain and evaluate response  - Implement non-pharmacological measures as appropriate and evaluate response  - Consider cultural and social influences on pain and pain management  - Manage/alleviate anxiety  - Utilize distraction and/or relaxation techniques  - Monitor for opioid side effects  - Notify MD/LIP if interventions unsuccessful or patient reports new pain  - Anticipate increased pain with activity and pre-medicate as appropriate  Outcome: Progressing     Problem: RISK FOR INFECTION - ADULT  Goal: Absence of fever/infection during anticipated neutropenic period  Description: INTERVENTIONS  - Monitor WBC  - Administer growth factors as ordered  - Implement neutropenic guidelines  Outcome: Progressing     Problem: SAFETY ADULT - FALL  Goal: Free from fall injury  Description: INTERVENTIONS:  - Assess pt frequently for physical needs  - Identify cognitive and physical deficits and behaviors that affect risk of falls.   - [place] fall precautions as indicated by assessment.  - Educate pt/family on patient safety including physical limitations  - Instruct pt to call for assistance with activity based on assessment  - Modify environment to reduce risk of injury  - Provide assistive devices as appropriate  - Consider OT/PT consult to assist with strengthening/mobility  - Encourage toileting schedule  Outcome: Progressing     Problem: DISCHARGE PLANNING  Goal: Discharge to home or other facility with appropriate resources  Description: INTERVENTIONS:  - Identify barriers to discharge w/pt and caregiver  - Include patient/family/discharge partner in discharge planning  - Arrange for needed discharge resources and transportation as appropriate  - Identify discharge learning needs (meds, wound care, etc)  - Arrange for interpreters to assist at discharge as needed  - Consider post-discharge preferences of patient/family/discharge partner  - Complete POLST form as appropriate  - Assess patient's ability to be responsible for managing their own health  - Refer to Case Management Department for coordinating discharge planning if the patient needs post-hospital services based on physician/LIP order or complex needs related to functional status, cognitive ability or social support system  Outcome: Progressing     Problem: Patient/Family Goals  Goal: Patient/Family Long Term Goal  Description: Patient's Long Term Goal:     Interventions:  - See additional Care Plan goals for specific interventions  Outcome: Progressing  Goal: Patient/Family Short Term Goal  Description: Patient's Short Term Goal:     Interventions:   - See additional Care Plan goals for specific interventions  Outcome: Progressing     Problem: Altered Communication/Language Barrier  Goal: Patient/Family is able to understand and participate in their care  Description: Interventions:  - Assess communication ability and preferred communication style  - Implement communication aides and strategies  - Use visual cues when possible  - Listen attentively, be patient, do not interrupt  - Minimize distractions  - Allow time for understanding and response  - Establish method for patient to ask for assistance (call light)  - Provide an  as needed  - Communicate barriers and strategies to overcome with those who interact with patient  Outcome: Progressing

## 2023-05-07 NOTE — PLAN OF CARE
Pt is A&Ox2 confused. On 1.5L NC. Pereyra traction on RLE with 6lb for hip fx. Saline locked. Decreased appetite on cardiac diet. Remote tele. Heparin & scds for dvt prophylaxis. Voiding via pulido. PRN tylenol for pain, pt was having increased pain per MD one time dose of Norco for pain was given. Bed rest. Pending surgery. Call light within reach, frequent rounding. Safety measures in place.        Problem: Patient Centered Care  Goal: Patient preferences are identified and integrated in the patient's plan of care  Description: Interventions:  - What would you like us to know as we care for you?  - Provide timely, complete, and accurate information to patient/family  - Incorporate patient and family knowledge, values, beliefs, and cultural backgrounds into the planning and delivery of care  - Encourage patient/family to participate in care and decision-making at the level they choose  - Honor patient and family perspectives and choices  Outcome: Progressing     Problem: Patient Centered Care  Goal: Patient preferences are identified and integrated in the patient's plan of care  Description: Interventions:  - What would you like us to know as we care for you?   - Provide timely, complete, and accurate information to patient/family  - Incorporate patient and family knowledge, values, beliefs, and cultural backgrounds into the planning and delivery of care  - Encourage patient/family to participate in care and decision-making at the level they choose  - Honor patient and family perspectives and choices  Outcome: Progressing

## 2023-05-08 LAB
BASOPHILS # BLD AUTO: 0.02 X10(3) UL (ref 0–0.2)
BASOPHILS NFR BLD AUTO: 0.2 %
DEPRECATED RDW RBC AUTO: 51.9 FL (ref 35.1–46.3)
EOSINOPHIL # BLD AUTO: 0.15 X10(3) UL (ref 0–0.7)
EOSINOPHIL NFR BLD AUTO: 1.8 %
ERYTHROCYTE [DISTWIDTH] IN BLOOD BY AUTOMATED COUNT: 14.8 % (ref 11–15)
HCT VFR BLD AUTO: 30.1 %
HGB BLD-MCNC: 9.4 G/DL
IMM GRANULOCYTES # BLD AUTO: 0.02 X10(3) UL (ref 0–1)
IMM GRANULOCYTES NFR BLD: 0.2 %
LYMPHOCYTES # BLD AUTO: 0.64 X10(3) UL (ref 1–4)
LYMPHOCYTES NFR BLD AUTO: 7.9 %
MCH RBC QN AUTO: 29.8 PG (ref 26–34)
MCHC RBC AUTO-ENTMCNC: 31.2 G/DL (ref 31–37)
MCV RBC AUTO: 95.6 FL
MONOCYTES # BLD AUTO: 0.58 X10(3) UL (ref 0.1–1)
MONOCYTES NFR BLD AUTO: 7.2 %
NEUTROPHILS # BLD AUTO: 6.7 X10 (3) UL (ref 1.5–7.7)
NEUTROPHILS # BLD AUTO: 6.7 X10(3) UL (ref 1.5–7.7)
NEUTROPHILS NFR BLD AUTO: 82.7 %
PLATELET # BLD AUTO: 188 10(3)UL (ref 150–450)
RBC # BLD AUTO: 3.15 X10(6)UL
WBC # BLD AUTO: 8.1 X10(3) UL (ref 4–11)

## 2023-05-08 PROCEDURE — 99232 SBSQ HOSP IP/OBS MODERATE 35: CPT | Performed by: INTERNAL MEDICINE

## 2023-05-08 NOTE — CDS QUERY
.Heart Failure  Razia Bernardo  Dear Doctor: Kathy Howard information (provided below) includes a diagnosis of Heart Failure. For accurate ICD-10-CM code assignment to reflect severity of illness and risk of mortality,  PLEASE (X) ALL DIAGNOSES THAT APPLY. (    ) Acute Systolic Heart Failure   (   X ) Acute on Chronic Systolic Heart Failure    (    ) Chronic Systolic Heart Failure      (    ) Acute Diastolic Heart Failure  (    ) Acute on Chronic Diastolic Heart Failure   (    ) Chronic Diastolic Heart Failure       (    ) Acute combined systolic and diastolic Heart Failure  (    ) Acute on chronic combined systolic and diastolic Heart Failure  (    ) Chronic combined systolic and diastolic Heart Failure     (    ) Other - please specify:   (    ) Unable to determine - please comment:         Documentation from the Medical Record:     * Admitted for a Right Intertrochanteric Femur fracture following a mechanical fall   * 05/07 Dr. Darcy Landis PN \"   Heart failure with mildly reduced ejection fraction of 82% and diastolic dysfunction. ?mild edema on CXR on admission but pt asymtpomatic    Continue home medications. Monitor fluid status closely  * Risk Factors: UTI with ecoli, CAD     If you have any questions, please contact Clinical :  Liya Partida RN at [phone]     Thank You!      THIS FORM IS A PERMANENT PART OF THE MEDICAL RECORD

## 2023-05-08 NOTE — PLAN OF CARE
Pt is A&Ox2 confused. On 1.5L NC. Contact precautions for ESBL in urine. Pereyra traction on RLE with 6lb for hip fx. Saline locked. ABTX continued. Decreased appetite on cardiac diet. Remote tele. Heparin & scds for dvt prophylaxis. Voiding via pulido. Scheduled tylenol for pain. Bed rest. Pending surgery. Call light within reach, frequent rounding.  Safety measures in place      Problem: Patient Centered Care  Goal: Patient preferences are identified and integrated in the patient's plan of care  Description: Interventions:  - What would you like us to know as we care for you?   - Provide timely, complete, and accurate information to patient/family  - Incorporate patient and family knowledge, values, beliefs, and cultural backgrounds into the planning and delivery of care  - Encourage patient/family to participate in care and decision-making at the level they choose  - Honor patient and family perspectives and choices  Outcome: Progressing     Problem: Patient Centered Care  Goal: Patient preferences are identified and integrated in the patient's plan of care  Description: Interventions:  - What would you like us to know as we care for you?  - Provide timely, complete, and accurate information to patient/family  - Incorporate patient and family knowledge, values, beliefs, and cultural backgrounds into the planning and delivery of care  - Encourage patient/family to participate in care and decision-making at the level they choose  - Honor patient and family perspectives and choices  Outcome: Progressing

## 2023-05-09 LAB
ANION GAP SERPL CALC-SCNC: 6 MMOL/L (ref 0–18)
ANTIBODY SCREEN: NEGATIVE
BASOPHILS # BLD AUTO: 0.02 X10(3) UL (ref 0–0.2)
BASOPHILS NFR BLD AUTO: 0.3 %
BUN BLD-MCNC: 15 MG/DL (ref 7–18)
BUN/CREAT SERPL: 16.7 (ref 10–20)
CALCIUM BLD-MCNC: 8.7 MG/DL (ref 8.5–10.1)
CHLORIDE SERPL-SCNC: 99 MMOL/L (ref 98–112)
CO2 SERPL-SCNC: 31 MMOL/L (ref 21–32)
CREAT BLD-MCNC: 0.9 MG/DL
DEPRECATED RDW RBC AUTO: 51.3 FL (ref 35.1–46.3)
EOSINOPHIL # BLD AUTO: 0.15 X10(3) UL (ref 0–0.7)
EOSINOPHIL NFR BLD AUTO: 1.9 %
ERYTHROCYTE [DISTWIDTH] IN BLOOD BY AUTOMATED COUNT: 15 % (ref 11–15)
GFR SERPLBLD BASED ON 1.73 SQ M-ARVRAT: 59 ML/MIN/1.73M2 (ref 60–?)
GLUCOSE BLD-MCNC: 115 MG/DL (ref 70–99)
HCT VFR BLD AUTO: 32.8 %
HGB BLD-MCNC: 10.4 G/DL
IMM GRANULOCYTES # BLD AUTO: 0.03 X10(3) UL (ref 0–1)
IMM GRANULOCYTES NFR BLD: 0.4 %
LYMPHOCYTES # BLD AUTO: 0.69 X10(3) UL (ref 1–4)
LYMPHOCYTES NFR BLD AUTO: 8.7 %
MCH RBC QN AUTO: 29.7 PG (ref 26–34)
MCHC RBC AUTO-ENTMCNC: 31.7 G/DL (ref 31–37)
MCV RBC AUTO: 93.7 FL
MONOCYTES # BLD AUTO: 0.69 X10(3) UL (ref 0.1–1)
MONOCYTES NFR BLD AUTO: 8.7 %
NEUTROPHILS # BLD AUTO: 6.36 X10 (3) UL (ref 1.5–7.7)
NEUTROPHILS # BLD AUTO: 6.36 X10(3) UL (ref 1.5–7.7)
NEUTROPHILS NFR BLD AUTO: 80 %
OSMOLALITY SERPL CALC.SUM OF ELEC: 284 MOSM/KG (ref 275–295)
PA ADP PRP-ACNC: 283
PLATELET # BLD AUTO: 213 10(3)UL (ref 150–450)
POTASSIUM SERPL-SCNC: 3.3 MMOL/L (ref 3.5–5.1)
POTASSIUM SERPL-SCNC: 4.3 MMOL/L (ref 3.5–5.1)
RBC # BLD AUTO: 3.5 X10(6)UL
RH BLOOD TYPE: POSITIVE
SODIUM SERPL-SCNC: 136 MMOL/L (ref 136–145)
WBC # BLD AUTO: 7.9 X10(3) UL (ref 4–11)

## 2023-05-09 PROCEDURE — 99232 SBSQ HOSP IP/OBS MODERATE 35: CPT | Performed by: INTERNAL MEDICINE

## 2023-05-09 RX ORDER — CLINDAMYCIN PHOSPHATE 900 MG/50ML
900 INJECTION INTRAVENOUS
Status: COMPLETED | OUTPATIENT
Start: 2023-05-10 | End: 2023-05-10

## 2023-05-09 RX ORDER — SODIUM CHLORIDE 9 MG/ML
INJECTION, SOLUTION INTRAVENOUS CONTINUOUS
Status: DISCONTINUED | OUTPATIENT
Start: 2023-05-10 | End: 2023-05-10

## 2023-05-09 RX ORDER — POTASSIUM CHLORIDE 1.5 G/1.77G
40 POWDER, FOR SOLUTION ORAL EVERY 4 HOURS
Status: COMPLETED | OUTPATIENT
Start: 2023-05-09 | End: 2023-05-09

## 2023-05-09 NOTE — PLAN OF CARE
VS stable. Pt confused, denies pain. Safety measures in place. Problem: Patient Centered Care  Goal: Patient preferences are identified and integrated in the patient's plan of care  Description: Interventions:  - What would you like us to know as we care for you?   - Provide timely, complete, and accurate information to patient/family  - Incorporate patient and family knowledge, values, beliefs, and cultural backgrounds into the planning and delivery of care  - Encourage patient/family to participate in care and decision-making at the level they choose  - Honor patient and family perspectives and choices  Outcome: Progressing     Problem: PAIN - ADULT  Goal: Verbalizes/displays adequate comfort level or patient's stated pain goal  Description: INTERVENTIONS:  - Encourage pt to monitor pain and request assistance  - Assess pain using appropriate pain scale  - Administer analgesics based on type and severity of pain and evaluate response  - Implement non-pharmacological measures as appropriate and evaluate response  - Consider cultural and social influences on pain and pain management  - Manage/alleviate anxiety  - Utilize distraction and/or relaxation techniques  - Monitor for opioid side effects  - Notify MD/LIP if interventions unsuccessful or patient reports new pain  - Anticipate increased pain with activity and pre-medicate as appropriate  Outcome: Progressing     Problem: RISK FOR INFECTION - ADULT  Goal: Absence of fever/infection during anticipated neutropenic period  Description: INTERVENTIONS  - Monitor WBC  - Administer growth factors as ordered  - Implement neutropenic guidelines  Outcome: Progressing     Problem: SAFETY ADULT - FALL  Goal: Free from fall injury  Description: INTERVENTIONS:  - Assess pt frequently for physical needs  - Identify cognitive and physical deficits and behaviors that affect risk of falls.   - [facility] fall precautions as indicated by assessment.  - Educate pt/family on patient safety including physical limitations  - Instruct pt to call for assistance with activity based on assessment  - Modify environment to reduce risk of injury  - Provide assistive devices as appropriate  - Consider OT/PT consult to assist with strengthening/mobility  - Encourage toileting schedule  Outcome: Progressing     Problem: DISCHARGE PLANNING  Goal: Discharge to home or other facility with appropriate resources  Description: INTERVENTIONS:  - Identify barriers to discharge w/pt and caregiver  - Include patient/family/discharge partner in discharge planning  - Arrange for needed discharge resources and transportation as appropriate  - Identify discharge learning needs (meds, wound care, etc)  - Arrange for interpreters to assist at discharge as needed  - Consider post-discharge preferences of patient/family/discharge partner  - Complete POLST form as appropriate  - Assess patient's ability to be responsible for managing their own health  - Refer to Case Management Department for coordinating discharge planning if the patient needs post-hospital services based on physician/LIP order or complex needs related to functional status, cognitive ability or social support system  Outcome: Progressing     Problem: Patient/Family Goals  Goal: Patient/Family Long Term Goal  Description: Patient's Long Term Goal:     Interventions:  -   - See additional Care Plan goals for specific interventions  Outcome: Progressing  Goal: Patient/Family Short Term Goal  Description: Patient's Short Term Goal:     Interventions:   -   - See additional Care Plan goals for specific interventions  Outcome: Progressing     Problem: Altered Communication/Language Barrier  Goal: Patient/Family is able to understand and participate in their care  Description: Interventions:  - Assess communication ability and preferred communication style  - Implement communication aides and strategies  - Use visual cues when possible  - Listen attentively, be patient, do not interrupt  - Minimize distractions  - Allow time for understanding and response  - Establish method for patient to ask for assistance (call light)  - Provide an  as needed  - Communicate barriers and strategies to overcome with those who interact with patient  Outcome: Progressing

## 2023-05-09 NOTE — PLAN OF CARE
Pt is A&Ox1. RA. Remote tele. SCDs and heparin for dvt prophylaxis. Bryant in place. Last BM was 5/9. Scheduled tylenol for pain management. Bedrest. Cardiac diet. Saline locked. IV [id number]. Mepilex to sacrum, heels and elbows for prophylaxis. [place] traction in place. Plan for possible surgery, pending ortho note. Problem: Patient Centered Care  Goal: Patient preferences are identified and integrated in the patient's plan of care  Description: Interventions:  - What would you like us to know as we care for you? My daughter is here.    - Provide timely, complete, and accurate information to patient/family  - Incorporate patient and family knowledge, values, beliefs, and cultural backgrounds into the planning and delivery of care  - Encourage patient/family to participate in care and decision-making at the level they choose  - Honor patient and family perspectives and choices  Outcome: Progressing     Problem: PAIN - ADULT  Goal: Verbalizes/displays adequate comfort level or patient's stated pain goal  Description: INTERVENTIONS:  - Encourage pt to monitor pain and request assistance  - Assess pain using appropriate pain scale  - Administer analgesics based on type and severity of pain and evaluate response  - Implement non-pharmacological measures as appropriate and evaluate response  - Consider cultural and social influences on pain and pain management  - Manage/alleviate anxiety  - Utilize distraction and/or relaxation techniques  - Monitor for opioid side effects  - Notify MD/LIP if interventions unsuccessful or patient reports new pain  - Anticipate increased pain with activity and pre-medicate as appropriate  Outcome: Progressing     Problem: RISK FOR INFECTION - ADULT  Goal: Absence of fever/infection during anticipated neutropenic period  Description: INTERVENTIONS  - Monitor WBC  - Administer growth factors as ordered  - Implement neutropenic guidelines  Outcome: Progressing     Problem: SAFETY ADULT - FALL  Goal: Free from fall injury  Description: INTERVENTIONS:  - Assess pt frequently for physical needs  - Identify cognitive and physical deficits and behaviors that affect risk of falls.   - [place] fall precautions as indicated by assessment.  - Educate pt/family on patient safety including physical limitations  - Instruct pt to call for assistance with activity based on assessment  - Modify environment to reduce risk of injury  - Provide assistive devices as appropriate  - Consider OT/PT consult to assist with strengthening/mobility  - Encourage toileting schedule  Outcome: Progressing     Problem: DISCHARGE PLANNING  Goal: Discharge to home or other facility with appropriate resources  Description: INTERVENTIONS:  - Identify barriers to discharge w/pt and caregiver  - Include patient/family/discharge partner in discharge planning  - Arrange for needed discharge resources and transportation as appropriate  - Identify discharge learning needs (meds, wound care, etc)  - Arrange for interpreters to assist at discharge as needed  - Consider post-discharge preferences of patient/family/discharge partner  - Complete POLST form as appropriate  - Assess patient's ability to be responsible for managing their own health  - Refer to Case Management Department for coordinating discharge planning if the patient needs post-hospital services based on physician/LIP order or complex needs related to functional status, cognitive ability or social support system  Outcome: Progressing     Problem: Patient/Family Goals  Goal: Patient/Family Long Term Goal  Description: Patient's Long Term Goal: to go home without pain    Interventions:  - pain meds  - See additional Care Plan goals for specific interventions  Outcome: Progressing  Goal: Patient/Family Short Term Goal  Description: Patient's Short Term Goal: to go home    Interventions:   - be cleared medically   - See additional Care Plan goals for specific interventions  Outcome: Progressing     Problem: Altered Communication/Language Barrier  Goal: Patient/Family is able to understand and participate in their care  Description: Interventions:  - Assess communication ability and preferred communication style  - Implement communication aides and strategies  - Use visual cues when possible  - Listen attentively, be patient, do not interrupt  - Minimize distractions  - Allow time for understanding and response  - Establish method for patient to ask for assistance (call light)  - Provide an  as needed  - Communicate barriers and strategies to overcome with those who interact with patient  Outcome: Progressing

## 2023-05-09 NOTE — PLAN OF CARE
Pt is A&O1, pleasantly confused. Contact precautions for  ESBL in urine. On 1.5 L NC. [place] traction in place. Remote tele. Blue boots/scds & heparin for dvt prophylaxis. Mepilex on sacrum, mepilex on heels, and elbows  for prophylaxis. Voiding via pulido. Scheduled tylenol for pain management. Bed rest. Call light within reach, frequent rounding. Safety measures in place.        Problem: Patient Centered Care  Goal: Patient preferences are identified and integrated in the patient's plan of care  Description: Interventions:  - What would you like us to know as we care for you  - Provide timely, complete, and accurate information to patient/family  - Incorporate patient and family knowledge, values, beliefs, and cultural backgrounds into the planning and delivery of care  - Encourage patient/family to participate in care and decision-making at the level they choose  - Honor patient and family perspectives and choices  Outcome: Progressing     Problem: PAIN - ADULT  Goal: Verbalizes/displays adequate comfort level or patient's stated pain goal  Description: INTERVENTIONS:  - Encourage pt to monitor pain and request assistance  - Assess pain using appropriate pain scale  - Administer analgesics based on type and severity of pain and evaluate response  - Implement non-pharmacological measures as appropriate and evaluate response  - Consider cultural and social influences on pain and pain management  - Manage/alleviate anxiety  - Utilize distraction and/or relaxation techniques  - Monitor for opioid side effects  - Notify MD/LIP if interventions unsuccessful or patient reports new pain  - Anticipate increased pain with activity and pre-medicate as appropriate  Outcome: Progressing     Problem: RISK FOR INFECTION - ADULT  Goal: Absence of fever/infection during anticipated neutropenic period  Description: INTERVENTIONS  - Monitor WBC  - Administer growth factors as ordered  - Implement neutropenic guidelines  Outcome: Progressing     Problem: SAFETY ADULT - FALL  Goal: Free from fall injury  Description: INTERVENTIONS:  - Assess pt frequently for physical needs  - Identify cognitive and physical deficits and behaviors that affect risk of falls.   - [place] fall precautions as indicated by assessment.  - Educate pt/family on patient safety including physical limitations  - Instruct pt to call for assistance with activity based on assessment  - Modify environment to reduce risk of injury  - Provide assistive devices as appropriate  - Consider OT/PT consult to assist with strengthening/mobility  - Encourage toileting schedule  Outcome: Progressing     Problem: DISCHARGE PLANNING  Goal: Discharge to home or other facility with appropriate resources  Description: INTERVENTIONS:  - Identify barriers to discharge w/pt and caregiver  - Include patient/family/discharge partner in discharge planning  - Arrange for needed discharge resources and transportation as appropriate  - Identify discharge learning needs (meds, wound care, etc)  - Arrange for interpreters to assist at discharge as needed  - Consider post-discharge preferences of patient/family/discharge partner  - Complete POLST form as appropriate  - Assess patient's ability to be responsible for managing their own health  - Refer to Case Management Department for coordinating discharge planning if the patient needs post-hospital services based on physician/LIP order or complex needs related to functional status, cognitive ability or social support system  Outcome: Progressing

## 2023-05-10 ENCOUNTER — APPOINTMENT (OUTPATIENT)
Dept: GENERAL RADIOLOGY | Facility: HOSPITAL | Age: 88
End: 2023-05-10
Attending: ORTHOPAEDIC SURGERY
Payer: MEDICARE

## 2023-05-10 ENCOUNTER — ANESTHESIA (OUTPATIENT)
Dept: SURGERY | Facility: HOSPITAL | Age: 88
End: 2023-05-10
Payer: MEDICARE

## 2023-05-10 ENCOUNTER — ANESTHESIA EVENT (OUTPATIENT)
Dept: SURGERY | Facility: HOSPITAL | Age: 88
End: 2023-05-10
Payer: MEDICARE

## 2023-05-10 LAB
ANION GAP SERPL CALC-SCNC: 11 MMOL/L (ref 0–18)
BASOPHILS # BLD AUTO: 0.03 X10(3) UL (ref 0–0.2)
BASOPHILS # BLD AUTO: 0.03 X10(3) UL (ref 0–0.2)
BASOPHILS NFR BLD AUTO: 0.4 %
BASOPHILS NFR BLD AUTO: 0.5 %
BUN BLD-MCNC: 12 MG/DL (ref 7–18)
BUN/CREAT SERPL: 16.4 (ref 10–20)
CALCIUM BLD-MCNC: 8.5 MG/DL (ref 8.5–10.1)
CHLORIDE SERPL-SCNC: 105 MMOL/L (ref 98–112)
CO2 SERPL-SCNC: 21 MMOL/L (ref 21–32)
CREAT BLD-MCNC: 0.73 MG/DL
DEPRECATED RDW RBC AUTO: 49.4 FL (ref 35.1–46.3)
DEPRECATED RDW RBC AUTO: 52 FL (ref 35.1–46.3)
EOSINOPHIL # BLD AUTO: 0.19 X10(3) UL (ref 0–0.7)
EOSINOPHIL # BLD AUTO: 0.34 X10(3) UL (ref 0–0.7)
EOSINOPHIL NFR BLD AUTO: 2.9 %
EOSINOPHIL NFR BLD AUTO: 5.1 %
ERYTHROCYTE [DISTWIDTH] IN BLOOD BY AUTOMATED COUNT: 14.8 % (ref 11–15)
ERYTHROCYTE [DISTWIDTH] IN BLOOD BY AUTOMATED COUNT: 15.1 % (ref 11–15)
GFR SERPLBLD BASED ON 1.73 SQ M-ARVRAT: 76 ML/MIN/1.73M2 (ref 60–?)
GLUCOSE BLD-MCNC: 154 MG/DL (ref 70–99)
HCT VFR BLD AUTO: 29.5 %
HCT VFR BLD AUTO: 37.1 %
HGB BLD-MCNC: 12.1 G/DL
HGB BLD-MCNC: 9.4 G/DL
IMM GRANULOCYTES # BLD AUTO: 0.01 X10(3) UL (ref 0–1)
IMM GRANULOCYTES # BLD AUTO: 0.01 X10(3) UL (ref 0–1)
IMM GRANULOCYTES NFR BLD: 0.1 %
IMM GRANULOCYTES NFR BLD: 0.2 %
LYMPHOCYTES # BLD AUTO: 0.79 X10(3) UL (ref 1–4)
LYMPHOCYTES # BLD AUTO: 1.12 X10(3) UL (ref 1–4)
LYMPHOCYTES NFR BLD AUTO: 12.2 %
LYMPHOCYTES NFR BLD AUTO: 16.6 %
MCH RBC QN AUTO: 29.7 PG (ref 26–34)
MCH RBC QN AUTO: 30.2 PG (ref 26–34)
MCHC RBC AUTO-ENTMCNC: 31.9 G/DL (ref 31–37)
MCHC RBC AUTO-ENTMCNC: 32.6 G/DL (ref 31–37)
MCV RBC AUTO: 92.5 FL
MCV RBC AUTO: 93.1 FL
MONOCYTES # BLD AUTO: 0.28 X10(3) UL (ref 0.1–1)
MONOCYTES # BLD AUTO: 0.62 X10(3) UL (ref 0.1–1)
MONOCYTES NFR BLD AUTO: 4.3 %
MONOCYTES NFR BLD AUTO: 9.2 %
NEUTROPHILS # BLD AUTO: 4.61 X10 (3) UL (ref 1.5–7.7)
NEUTROPHILS # BLD AUTO: 4.61 X10(3) UL (ref 1.5–7.7)
NEUTROPHILS # BLD AUTO: 5.19 X10 (3) UL (ref 1.5–7.7)
NEUTROPHILS # BLD AUTO: 5.19 X10(3) UL (ref 1.5–7.7)
NEUTROPHILS NFR BLD AUTO: 68.6 %
NEUTROPHILS NFR BLD AUTO: 79.9 %
OSMOLALITY SERPL CALC.SUM OF ELEC: 287 MOSM/KG (ref 275–295)
PLATELET # BLD AUTO: 198 10(3)UL (ref 150–450)
PLATELET # BLD AUTO: 218 10(3)UL (ref 150–450)
POTASSIUM SERPL-SCNC: 3.5 MMOL/L (ref 3.5–5.1)
RBC # BLD AUTO: 3.17 X10(6)UL
RBC # BLD AUTO: 4.01 X10(6)UL
SODIUM SERPL-SCNC: 137 MMOL/L (ref 136–145)
WBC # BLD AUTO: 6.5 X10(3) UL (ref 4–11)
WBC # BLD AUTO: 6.7 X10(3) UL (ref 4–11)

## 2023-05-10 PROCEDURE — 76000 FLUOROSCOPY <1 HR PHYS/QHP: CPT | Performed by: ORTHOPAEDIC SURGERY

## 2023-05-10 PROCEDURE — 99233 SBSQ HOSP IP/OBS HIGH 50: CPT | Performed by: HOSPITALIST

## 2023-05-10 PROCEDURE — 30233N1 TRANSFUSION OF NONAUTOLOGOUS RED BLOOD CELLS INTO PERIPHERAL VEIN, PERCUTANEOUS APPROACH: ICD-10-PCS | Performed by: NURSE ANESTHETIST, CERTIFIED REGISTERED

## 2023-05-10 PROCEDURE — 0QS636Z REPOSITION RIGHT UPPER FEMUR WITH INTRAMEDULLARY INTERNAL FIXATION DEVICE, PERCUTANEOUS APPROACH: ICD-10-PCS | Performed by: ORTHOPAEDIC SURGERY

## 2023-05-10 PROCEDURE — 36430 TRANSFUSION BLD/BLD COMPNT: CPT | Performed by: NURSE ANESTHETIST, CERTIFIED REGISTERED

## 2023-05-10 DEVICE — BLADE INTRA NL HELI 10.35MM 95: Type: IMPLANTABLE DEVICE | Status: FUNCTIONAL

## 2023-05-10 DEVICE — IMPLANTABLE DEVICE: Type: IMPLANTABLE DEVICE | Status: FUNCTIONAL

## 2023-05-10 DEVICE — SCREW BN 5MM 4.3MM 32MM NLEX: Type: IMPLANTABLE DEVICE | Status: FUNCTIONAL

## 2023-05-10 RX ORDER — HYDROMORPHONE HYDROCHLORIDE 1 MG/ML
0.2 INJECTION, SOLUTION INTRAMUSCULAR; INTRAVENOUS; SUBCUTANEOUS EVERY 5 MIN PRN
Status: DISCONTINUED | OUTPATIENT
Start: 2023-05-10 | End: 2023-05-10 | Stop reason: HOSPADM

## 2023-05-10 RX ORDER — ETOMIDATE 2 MG/ML
INJECTION INTRAVENOUS AS NEEDED
Status: DISCONTINUED | OUTPATIENT
Start: 2023-05-10 | End: 2023-05-10 | Stop reason: SURG

## 2023-05-10 RX ORDER — TRAMADOL HYDROCHLORIDE 50 MG/1
50 TABLET ORAL EVERY 6 HOURS SCHEDULED
Status: DISCONTINUED | OUTPATIENT
Start: 2023-05-10 | End: 2023-05-15

## 2023-05-10 RX ORDER — LIDOCAINE HYDROCHLORIDE 10 MG/ML
INJECTION, SOLUTION EPIDURAL; INFILTRATION; INTRACAUDAL; PERINEURAL AS NEEDED
Status: DISCONTINUED | OUTPATIENT
Start: 2023-05-10 | End: 2023-05-10 | Stop reason: SURG

## 2023-05-10 RX ORDER — SODIUM CHLORIDE, SODIUM LACTATE, POTASSIUM CHLORIDE, CALCIUM CHLORIDE 600; 310; 30; 20 MG/100ML; MG/100ML; MG/100ML; MG/100ML
INJECTION, SOLUTION INTRAVENOUS CONTINUOUS PRN
Status: DISCONTINUED | OUTPATIENT
Start: 2023-05-10 | End: 2023-05-10 | Stop reason: SURG

## 2023-05-10 RX ORDER — SODIUM CHLORIDE, SODIUM LACTATE, POTASSIUM CHLORIDE, CALCIUM CHLORIDE 600; 310; 30; 20 MG/100ML; MG/100ML; MG/100ML; MG/100ML
INJECTION, SOLUTION INTRAVENOUS CONTINUOUS
Status: DISCONTINUED | OUTPATIENT
Start: 2023-05-10 | End: 2023-05-10 | Stop reason: HOSPADM

## 2023-05-10 RX ORDER — ONDANSETRON 2 MG/ML
4 INJECTION INTRAMUSCULAR; INTRAVENOUS EVERY 6 HOURS PRN
Status: DISCONTINUED | OUTPATIENT
Start: 2023-05-10 | End: 2023-05-15

## 2023-05-10 RX ORDER — MORPHINE SULFATE 4 MG/ML
4 INJECTION, SOLUTION INTRAMUSCULAR; INTRAVENOUS EVERY 10 MIN PRN
Status: DISCONTINUED | OUTPATIENT
Start: 2023-05-10 | End: 2023-05-10 | Stop reason: HOSPADM

## 2023-05-10 RX ORDER — NALOXONE HYDROCHLORIDE 0.4 MG/ML
80 INJECTION, SOLUTION INTRAMUSCULAR; INTRAVENOUS; SUBCUTANEOUS AS NEEDED
Status: DISCONTINUED | OUTPATIENT
Start: 2023-05-10 | End: 2023-05-10 | Stop reason: HOSPADM

## 2023-05-10 RX ORDER — MORPHINE SULFATE 4 MG/ML
2 INJECTION, SOLUTION INTRAMUSCULAR; INTRAVENOUS EVERY 10 MIN PRN
Status: DISCONTINUED | OUTPATIENT
Start: 2023-05-10 | End: 2023-05-10 | Stop reason: HOSPADM

## 2023-05-10 RX ORDER — ENEMA 19; 7 G/133ML; G/133ML
1 ENEMA RECTAL ONCE AS NEEDED
Status: DISCONTINUED | OUTPATIENT
Start: 2023-05-10 | End: 2023-05-15

## 2023-05-10 RX ORDER — METOCLOPRAMIDE HYDROCHLORIDE 5 MG/ML
5 INJECTION INTRAMUSCULAR; INTRAVENOUS EVERY 8 HOURS PRN
Status: DISCONTINUED | OUTPATIENT
Start: 2023-05-10 | End: 2023-05-10 | Stop reason: HOSPADM

## 2023-05-10 RX ORDER — SENNOSIDES 8.6 MG
17.2 TABLET ORAL NIGHTLY
Status: DISCONTINUED | OUTPATIENT
Start: 2023-05-10 | End: 2023-05-13

## 2023-05-10 RX ORDER — POLYETHYLENE GLYCOL 3350 17 G/17G
17 POWDER, FOR SOLUTION ORAL DAILY PRN
Status: DISCONTINUED | OUTPATIENT
Start: 2023-05-10 | End: 2023-05-15

## 2023-05-10 RX ORDER — ONDANSETRON 2 MG/ML
INJECTION INTRAMUSCULAR; INTRAVENOUS AS NEEDED
Status: DISCONTINUED | OUTPATIENT
Start: 2023-05-10 | End: 2023-05-10 | Stop reason: SURG

## 2023-05-10 RX ORDER — METOCLOPRAMIDE HYDROCHLORIDE 5 MG/ML
5 INJECTION INTRAMUSCULAR; INTRAVENOUS EVERY 8 HOURS PRN
Status: DISCONTINUED | OUTPATIENT
Start: 2023-05-10 | End: 2023-05-15

## 2023-05-10 RX ORDER — HYDROMORPHONE HYDROCHLORIDE 1 MG/ML
0.4 INJECTION, SOLUTION INTRAMUSCULAR; INTRAVENOUS; SUBCUTANEOUS EVERY 5 MIN PRN
Status: DISCONTINUED | OUTPATIENT
Start: 2023-05-10 | End: 2023-05-10 | Stop reason: HOSPADM

## 2023-05-10 RX ORDER — ONDANSETRON 2 MG/ML
4 INJECTION INTRAMUSCULAR; INTRAVENOUS EVERY 6 HOURS PRN
Status: DISCONTINUED | OUTPATIENT
Start: 2023-05-10 | End: 2023-05-10 | Stop reason: HOSPADM

## 2023-05-10 RX ORDER — DOXEPIN HYDROCHLORIDE 50 MG/1
1 CAPSULE ORAL DAILY
Status: DISCONTINUED | OUTPATIENT
Start: 2023-05-11 | End: 2023-05-15

## 2023-05-10 RX ORDER — EPHEDRINE SULFATE 50 MG/ML
INJECTION, SOLUTION INTRAVENOUS AS NEEDED
Status: DISCONTINUED | OUTPATIENT
Start: 2023-05-10 | End: 2023-05-10 | Stop reason: SURG

## 2023-05-10 RX ORDER — HYDROMORPHONE HYDROCHLORIDE 1 MG/ML
0.6 INJECTION, SOLUTION INTRAMUSCULAR; INTRAVENOUS; SUBCUTANEOUS EVERY 5 MIN PRN
Status: DISCONTINUED | OUTPATIENT
Start: 2023-05-10 | End: 2023-05-10 | Stop reason: HOSPADM

## 2023-05-10 RX ORDER — BISACODYL 10 MG
10 SUPPOSITORY, RECTAL RECTAL
Status: DISCONTINUED | OUTPATIENT
Start: 2023-05-10 | End: 2023-05-15

## 2023-05-10 RX ORDER — METOPROLOL TARTRATE 5 MG/5ML
2.5 INJECTION INTRAVENOUS ONCE
Status: DISCONTINUED | OUTPATIENT
Start: 2023-05-10 | End: 2023-05-10 | Stop reason: HOSPADM

## 2023-05-10 RX ORDER — MORPHINE SULFATE 10 MG/ML
6 INJECTION, SOLUTION INTRAMUSCULAR; INTRAVENOUS EVERY 10 MIN PRN
Status: DISCONTINUED | OUTPATIENT
Start: 2023-05-10 | End: 2023-05-10 | Stop reason: HOSPADM

## 2023-05-10 RX ORDER — DOCUSATE SODIUM 100 MG/1
100 CAPSULE, LIQUID FILLED ORAL 2 TIMES DAILY
Status: DISCONTINUED | OUTPATIENT
Start: 2023-05-10 | End: 2023-05-13

## 2023-05-10 RX ADMIN — SODIUM CHLORIDE, SODIUM LACTATE, POTASSIUM CHLORIDE, CALCIUM CHLORIDE: 600; 310; 30; 20 INJECTION, SOLUTION INTRAVENOUS at 14:47:00

## 2023-05-10 RX ADMIN — EPHEDRINE SULFATE 10 MG: 50 INJECTION, SOLUTION INTRAVENOUS at 16:15:00

## 2023-05-10 RX ADMIN — EPHEDRINE SULFATE 5 MG: 50 INJECTION, SOLUTION INTRAVENOUS at 15:42:00

## 2023-05-10 RX ADMIN — CLINDAMYCIN PHOSPHATE 900 MG: 900 INJECTION INTRAVENOUS at 15:07:00

## 2023-05-10 RX ADMIN — LIDOCAINE HYDROCHLORIDE 50 MG: 10 INJECTION, SOLUTION EPIDURAL; INFILTRATION; INTRACAUDAL; PERINEURAL at 14:52:00

## 2023-05-10 RX ADMIN — ETOMIDATE 18 MG: 2 INJECTION INTRAVENOUS at 14:52:00

## 2023-05-10 RX ADMIN — ONDANSETRON 4 MG: 2 INJECTION INTRAMUSCULAR; INTRAVENOUS at 16:43:00

## 2023-05-10 NOTE — ANESTHESIA PROCEDURE NOTES
Peripheral IV  Date/Time: 5/10/2023 2:58 PM  Inserted by: Jaime Pierre MD    Placement  Needle size: 18 G  Laterality: right  Location: hand  Local anesthetic: none  Site prep: alcohol  Technique: anatomical landmarks  Attempts: 1

## 2023-05-10 NOTE — CM/SW NOTE
Met with Nicola Conti and dtr Zully Cueva at bedside. Per Zully Cueva pt does not want to go to [facility] on dc. [facility] ref re-opened. [facility] list provided to them. Zully Cueva requesting time to review, but would like to see if there is a bed available at [facility]. Message sent to [facility] via [place]. awating reply on bed availability. Plan: [facility] pending final choice. Eloise Jade.  Catherine Real RN, BSN  Nurse   [phone]

## 2023-05-10 NOTE — PLAN OF CARE
Patient is alert x1-2. RA. Tele in place. Bryant in place. IVF infusing. NPO midnight. Bedrest. [place] traction in place to RLE. Scheduled tylenol given for pain management. Call light within reach, bed alarm active.   Problem: Patient Centered Care  Goal: Patient preferences are identified and integrated in the patient's plan of care  Description: Interventions:  - Provide timely, complete, and accurate information to patient/family  - Incorporate patient and family knowledge, values, beliefs, and cultural backgrounds into the planning and delivery of care  - Encourage patient/family to participate in care and decision-making at the level they choose  - Honor patient and family perspectives and choices  Outcome: Progressing     Problem: PAIN - ADULT  Goal: Verbalizes/displays adequate comfort level or patient's stated pain goal  Description: INTERVENTIONS:  - Encourage pt to monitor pain and request assistance  - Assess pain using appropriate pain scale  - Administer analgesics based on type and severity of pain and evaluate response  - Implement non-pharmacological measures as appropriate and evaluate response  - Consider cultural and social influences on pain and pain management  - Manage/alleviate anxiety  - Utilize distraction and/or relaxation techniques  - Monitor for opioid side effects  - Notify MD/LIP if interventions unsuccessful or patient reports new pain  - Anticipate increased pain with activity and pre-medicate as appropriate  Outcome: Progressing     Problem: RISK FOR INFECTION - ADULT  Goal: Absence of fever/infection during anticipated neutropenic period  Description: INTERVENTIONS  - Monitor WBC  - Administer growth factors as ordered  - Implement neutropenic guidelines  Outcome: Progressing     Problem: SAFETY ADULT - FALL  Goal: Free from fall injury  Description: INTERVENTIONS:  - Assess pt frequently for physical needs  - Identify cognitive and physical deficits and behaviors that affect risk of falls.  - [place] fall precautions as indicated by assessment.  - Educate pt/family on patient safety including physical limitations  - Instruct pt to call for assistance with activity based on assessment  - Modify environment to reduce risk of injury  - Provide assistive devices as appropriate  - Consider OT/PT consult to assist with strengthening/mobility  - Encourage toileting schedule  Outcome: Progressing     Problem: DISCHARGE PLANNING  Goal: Discharge to home or other facility with appropriate resources  Description: INTERVENTIONS:  - Identify barriers to discharge w/pt and caregiver  - Include patient/family/discharge partner in discharge planning  - Arrange for needed discharge resources and transportation as appropriate  - Identify discharge learning needs (meds, wound care, etc)  - Arrange for interpreters to assist at discharge as needed  - Consider post-discharge preferences of patient/family/discharge partner  - Complete POLST form as appropriate  - Assess patient's ability to be responsible for managing their own health  - Refer to Case Management Department for coordinating discharge planning if the patient needs post-hospital services based on physician/LIP order or complex needs related to functional status, cognitive ability or social support system  Outcome: Progressing

## 2023-05-10 NOTE — BRIEF OP NOTE
[facility] OPERATING ROOM   Brief Op Note    Patients Name: [facility]  Attending Physician: Josephine Sanchez MD  Operating Physician: Katja Lema MD  CSN: [id number]     Location:  OR  MRN: [id number]    YOB: 1928  Admission Date: 5/4/2023  Operation Date: 5/10/2023    Brief Operative Report    Pre-Operative Diagnosis: Right intertrochanteric comminuted and displaced femur fracture    Post-Operative Diagnosis: Same as above.     Procedure Performed: Right interlocking intramedullary femoral nail (TFN)    Anesthesia: General    Assistants: Surgical Assistant.: Mundo Simmons      EBL:  Blood Output: Approximately 300 mL (5/10/2023  5:05 PM)      Specimens: * No specimens in log *    Complications: None    Surgical Findings: See above with marked osteoporosis    Katja Lema MD  5/10/2023  5:19 PM

## 2023-05-10 NOTE — ANESTHESIA PROCEDURE NOTES
Airway  Date/Time: 5/10/2023 2:54 PM  Urgency: Elective      General Information and Staff    Patient location during procedure: OR  Anesthesiologist: Radha Bosch MD  Resident/CRNA: Rob Pineda CRNA  Performed: CRNA   Performed by: Rob Pineda CRNA  Authorized by: Radha Bosch MD      Indications and Patient Condition  Indications for airway management: anesthesia  Sedation level: deep  Preoxygenated: yes  Patient position: sniffing  Mask difficulty assessment: 2 - vent by mask + OA or adjuvant +/- NMBA    Final Airway Details  Final airway type: endotracheal airway      Successful airway: ETT  Cuffed: yes   Successful intubation technique: direct laryngoscopy  Facilitating devices/methods: intubating stylet  Endotracheal tube insertion site: oral  Blade: Ramirez  Blade size: #3  ETT size (mm): 7.0    Cormack-Lehane Classification: grade IIA - partial view of glottis  Placement verified by: chest auscultation and capnometry   Measured from: lips  ETT to lips (cm): 21  Number of attempts at approach: 1  Number of other approaches attempted: 0

## 2023-05-10 NOTE — ANESTHESIA PROCEDURE NOTES
Arterial Line    Date/Time: 5/10/2023 3:00 PM    Performed by: Mak Pineda CRNA  Authorized by: Damaris Moeller MD    General Information and Staff    Procedure Start:  5/10/2023 3:00 PM  Procedure End:  5/10/2023 3:03 PM  Anesthesiologist:  Damaris Moeller MD  CRNA:  Mak Pineda CRNA  Performed By:  CRNA  Patient Location:  OR  Indication: continuous blood pressure monitoring and blood sampling needed    Site Identification: surface landmarks    Preanesthetic Checklist: 2 patient identifiers, IV checked, risks and benefits discussed, monitors and equipment checked, pre-op evaluation, timeout performed, anesthesia consent and sterile technique used    Procedure Details    Catheter Size:  20 G  Catheter Length:  1 and 3/4 inch  Catheter Type:  Arrow  Seldinger Technique?: Yes    Laterality:  Left  Site:  Radial artery  Site Prep: chlorhexidine    Line Secured:  Wrist Brace, tape and Tegaderm    Assessment    Events: patient tolerated procedure well with no complications      Medications  5/10/2023 3:00 PM      Additional Comments

## 2023-05-11 LAB
ALBUMIN SERPL-MCNC: 2.5 G/DL (ref 3.4–5)
ANION GAP SERPL CALC-SCNC: 9 MMOL/L (ref 0–18)
ANION GAP SERPL CALC-SCNC: 9 MMOL/L (ref 0–18)
BASOPHILS # BLD AUTO: 0.04 X10(3) UL (ref 0–0.2)
BASOPHILS NFR BLD AUTO: 0.6 %
BLOOD TYPE BARCODE: 5100
BUN BLD-MCNC: 14 MG/DL (ref 7–18)
BUN BLD-MCNC: 14 MG/DL (ref 7–18)
BUN/CREAT SERPL: 17.1 (ref 10–20)
BUN/CREAT SERPL: 17.1 (ref 10–20)
CALCIUM BLD-MCNC: 8.5 MG/DL (ref 8.5–10.1)
CALCIUM BLD-MCNC: 8.5 MG/DL (ref 8.5–10.1)
CHLORIDE SERPL-SCNC: 103 MMOL/L (ref 98–112)
CHLORIDE SERPL-SCNC: 103 MMOL/L (ref 98–112)
CO2 SERPL-SCNC: 24 MMOL/L (ref 21–32)
CO2 SERPL-SCNC: 24 MMOL/L (ref 21–32)
CREAT BLD-MCNC: 0.82 MG/DL
CREAT BLD-MCNC: 0.82 MG/DL
DEPRECATED RDW RBC AUTO: 50.9 FL (ref 35.1–46.3)
EOSINOPHIL # BLD AUTO: 0.14 X10(3) UL (ref 0–0.7)
EOSINOPHIL NFR BLD AUTO: 2.2 %
ERYTHROCYTE [DISTWIDTH] IN BLOOD BY AUTOMATED COUNT: 15.2 % (ref 11–15)
GFR SERPLBLD BASED ON 1.73 SQ M-ARVRAT: 66 ML/MIN/1.73M2 (ref 60–?)
GFR SERPLBLD BASED ON 1.73 SQ M-ARVRAT: 66 ML/MIN/1.73M2 (ref 60–?)
GLUCOSE BLD-MCNC: 112 MG/DL (ref 70–99)
GLUCOSE BLD-MCNC: 112 MG/DL (ref 70–99)
HCT VFR BLD AUTO: 33.3 %
HGB BLD-MCNC: 10.7 G/DL
IMM GRANULOCYTES # BLD AUTO: 0.02 X10(3) UL (ref 0–1)
IMM GRANULOCYTES NFR BLD: 0.3 %
LYMPHOCYTES # BLD AUTO: 0.61 X10(3) UL (ref 1–4)
LYMPHOCYTES NFR BLD AUTO: 9.7 %
MCH RBC QN AUTO: 29.8 PG (ref 26–34)
MCHC RBC AUTO-ENTMCNC: 32.1 G/DL (ref 31–37)
MCV RBC AUTO: 92.8 FL
MONOCYTES # BLD AUTO: 0.7 X10(3) UL (ref 0.1–1)
MONOCYTES NFR BLD AUTO: 11.1 %
NEUTROPHILS # BLD AUTO: 4.78 X10 (3) UL (ref 1.5–7.7)
NEUTROPHILS # BLD AUTO: 4.78 X10(3) UL (ref 1.5–7.7)
NEUTROPHILS NFR BLD AUTO: 76.1 %
OSMOLALITY SERPL CALC.SUM OF ELEC: 283 MOSM/KG (ref 275–295)
OSMOLALITY SERPL CALC.SUM OF ELEC: 283 MOSM/KG (ref 275–295)
PHOSPHATE SERPL-MCNC: 4.2 MG/DL (ref 2.5–4.9)
PLATELET # BLD AUTO: 193 10(3)UL (ref 150–450)
POTASSIUM SERPL-SCNC: 3.3 MMOL/L (ref 3.5–5.1)
POTASSIUM SERPL-SCNC: 3.3 MMOL/L (ref 3.5–5.1)
POTASSIUM SERPL-SCNC: 4.3 MMOL/L (ref 3.5–5.1)
RBC # BLD AUTO: 3.59 X10(6)UL
SODIUM SERPL-SCNC: 136 MMOL/L (ref 136–145)
SODIUM SERPL-SCNC: 136 MMOL/L (ref 136–145)
UNIT VOLUME: 350 ML
WBC # BLD AUTO: 6.3 X10(3) UL (ref 4–11)

## 2023-05-11 PROCEDURE — 99233 SBSQ HOSP IP/OBS HIGH 50: CPT | Performed by: HOSPITALIST

## 2023-05-11 RX ORDER — ACETAMINOPHEN 325 MG/1
650 TABLET ORAL EVERY 6 HOURS PRN
Status: DISCONTINUED | OUTPATIENT
Start: 2023-05-11 | End: 2023-05-15

## 2023-05-11 RX ORDER — SODIUM CHLORIDE 9 MG/ML
INJECTION, SOLUTION INTRAVENOUS CONTINUOUS
Status: DISCONTINUED | OUTPATIENT
Start: 2023-05-11 | End: 2023-05-12

## 2023-05-11 RX ORDER — POTASSIUM CHLORIDE 20 MEQ/1
40 TABLET, EXTENDED RELEASE ORAL EVERY 4 HOURS
Status: COMPLETED | OUTPATIENT
Start: 2023-05-11 | End: 2023-05-11

## 2023-05-11 NOTE — CM/SW NOTE
05/11/23 1300   Choice of Post-Acute Provider   Informed patient of right to choose their preferred provider Yes   List of appropriate post-acute services provided to patient/family with quality data Yes   Patient/family choice OBC   Met with pt and Dtr at bedside to discuss rehab choice. Maite Rosenberg requesting pt to rehab at [facility]. OBC res via [facility]. Plan: OBC pending med clearance. / to remain available for support and/or discharge planning. Ana Hunt RN, BSN  Nurse   [phone]

## 2023-05-11 NOTE — PLAN OF CARE
Post-op day #1. Dressing in place to right hip. Hemovac in place. Output recorded. Monitoring vital signs- blood pressure low, IV fluids added pr MD order. Receiving IV antibiotics. Tolerating diet- has low appetite. Voiding via pulido- Per MD, continue until patient able to move better. Having loose bowel movements, MD notified. Tolerating room air. SCDs and eliquis for DVT prophylaxis. Tylenol as needed for pain. Sat at side of bed with PT. Partial weight beairng to right leg. Encouraged IS. Fall precautions maintained- bed alarm on, bed locked in lowest position, call light and personal belongings within reach, non-skid socks in place to bilateral feet. Frequent rounding by nursing staff. Plan is OBC when medically cleared. Patient's daughter at bedside, updated on plan of care.      Problem: Patient Centered Care  Goal: Patient preferences are identified and integrated in the patient's plan of care  Description: Interventions:  - What would you like us to know as we care for you?   - Provide timely, complete, and accurate information to patient/family  - Incorporate patient and family knowledge, values, beliefs, and cultural backgrounds into the planning and delivery of care  - Encourage patient/family to participate in care and decision-making at the level they choose  - Honor patient and family perspectives and choices  Outcome: Progressing     Problem: PAIN - ADULT  Goal: Verbalizes/displays adequate comfort level or patient's stated pain goal  Description: INTERVENTIONS:  - Encourage pt to monitor pain and request assistance  - Assess pain using appropriate pain scale  - Administer analgesics based on type and severity of pain and evaluate response  - Implement non-pharmacological measures as appropriate and evaluate response  - Consider cultural and social influences on pain and pain management  - Manage/alleviate anxiety  - Utilize distraction and/or relaxation techniques  - Monitor for opioid side effects  - Notify MD/LIP if interventions unsuccessful or patient reports new pain  - Anticipate increased pain with activity and pre-medicate as appropriate  Outcome: Progressing     Problem: RISK FOR INFECTION - ADULT  Goal: Absence of fever/infection during anticipated neutropenic period  Description: INTERVENTIONS  - Monitor WBC  - Administer growth factors as ordered  - Implement neutropenic guidelines  Outcome: Progressing     Problem: SAFETY ADULT - FALL  Goal: Free from fall injury  Description: INTERVENTIONS:  - Assess pt frequently for physical needs  - Identify cognitive and physical deficits and behaviors that affect risk of falls.   - [place] fall precautions as indicated by assessment.  - Educate pt/family on patient safety including physical limitations  - Instruct pt to call for assistance with activity based on assessment  - Modify environment to reduce risk of injury  - Provide assistive devices as appropriate  - Consider OT/PT consult to assist with strengthening/mobility  - Encourage toileting schedule  Outcome: Progressing     Problem: DISCHARGE PLANNING  Goal: Discharge to home or other facility with appropriate resources  Description: INTERVENTIONS:  - Identify barriers to discharge w/pt and caregiver  - Include patient/family/discharge partner in discharge planning  - Arrange for needed discharge resources and transportation as appropriate  - Identify discharge learning needs (meds, wound care, etc)  - Arrange for interpreters to assist at discharge as needed  - Consider post-discharge preferences of patient/family/discharge partner  - Complete POLST form as appropriate  - Assess patient's ability to be responsible for managing their own health  - Refer to Case Management Department for coordinating discharge planning if the patient needs post-hospital services based on physician/LIP order or complex needs related to functional status, cognitive ability or social support system  Outcome: Progressing     Problem: Patient/Family Goals  Goal: Patient/Family Long Term Goal  Description: Patient's Long Term Goal: to discharge to rehab    Interventions:  - follow MD orders  - discharge planning  - PT/OT  - update patient and family on plan of care  - See additional Care Plan goals for specific interventions  Outcome: Progressing  Goal: Patient/Family Short Term Goal  Description: Patient's Short Term Goal: to have less pain    Interventions:   - pain medications as needed  - non-pharmacologic pain interventions  - PT/OT  - monitor labs and vitals   - See additional Care Plan goals for specific interventions  Outcome: Progressing     Problem: Altered Communication/Language Barrier  Goal: Patient/Family is able to understand and participate in their care  Description: Interventions:  - Assess communication ability and preferred communication style  - Implement communication aides and strategies  - Use visual cues when possible  - Listen attentively, be patient, do not interrupt  - Minimize distractions  - Allow time for understanding and response  - Establish method for patient to ask for assistance (call light)  - Provide an  as needed  - Communicate barriers and strategies to overcome with those who interact with patient  Outcome: Progressing

## 2023-05-11 NOTE — PLAN OF CARE
Patient is alert x2, confused, reoriented as needed. RA. Tele in place. Bryant in place. Not oob yet, will be PWB to RLE. Hemovac in place. Scheduled tramadol given for pain management. Dressing in place to right hip, CDI. Call light within reach, bed alarm active.   Problem: Patient Centered Care  Goal: Patient preferences are identified and integrated in the patient's plan of care  Description: Interventions:  - Provide timely, complete, and accurate information to patient/family  - Incorporate patient and family knowledge, values, beliefs, and cultural backgrounds into the planning and delivery of care  - Encourage patient/family to participate in care and decision-making at the level they choose  - Honor patient and family perspectives and choices  Outcome: Progressing     Problem: PAIN - ADULT  Goal: Verbalizes/displays adequate comfort level or patient's stated pain goal  Description: INTERVENTIONS:  - Encourage pt to monitor pain and request assistance  - Assess pain using appropriate pain scale  - Administer analgesics based on type and severity of pain and evaluate response  - Implement non-pharmacological measures as appropriate and evaluate response  - Consider cultural and social influences on pain and pain management  - Manage/alleviate anxiety  - Utilize distraction and/or relaxation techniques  - Monitor for opioid side effects  - Notify MD/LIP if interventions unsuccessful or patient reports new pain  - Anticipate increased pain with activity and pre-medicate as appropriate  Outcome: Progressing     Problem: RISK FOR INFECTION - ADULT  Goal: Absence of fever/infection during anticipated neutropenic period  Description: INTERVENTIONS  - Monitor WBC  - Administer growth factors as ordered  - Implement neutropenic guidelines  Outcome: Progressing     Problem: SAFETY ADULT - FALL  Goal: Free from fall injury  Description: INTERVENTIONS:  - Assess pt frequently for physical needs  - Identify cognitive and physical deficits and behaviors that affect risk of falls.   - [place] fall precautions as indicated by assessment.  - Educate pt/family on patient safety including physical limitations  - Instruct pt to call for assistance with activity based on assessment  - Modify environment to reduce risk of injury  - Provide assistive devices as appropriate  - Consider OT/PT consult to assist with strengthening/mobility  - Encourage toileting schedule  Outcome: Progressing     Problem: DISCHARGE PLANNING  Goal: Discharge to home or other facility with appropriate resources  Description: INTERVENTIONS:  - Identify barriers to discharge w/pt and caregiver  - Include patient/family/discharge partner in discharge planning  - Arrange for needed discharge resources and transportation as appropriate  - Identify discharge learning needs (meds, wound care, etc)  - Arrange for interpreters to assist at discharge as needed  - Consider post-discharge preferences of patient/family/discharge partner  - Complete POLST form as appropriate  - Assess patient's ability to be responsible for managing their own health  - Refer to Case Management Department for coordinating discharge planning if the patient needs post-hospital services based on physician/LIP order or complex needs related to functional status, cognitive ability or social support system  Outcome: Progressing

## 2023-05-12 LAB
ANION GAP SERPL CALC-SCNC: 8 MMOL/L (ref 0–18)
BASOPHILS # BLD AUTO: 0.04 X10(3) UL (ref 0–0.2)
BASOPHILS NFR BLD AUTO: 0.8 %
BUN BLD-MCNC: 12 MG/DL (ref 7–18)
BUN/CREAT SERPL: 18.2 (ref 10–20)
CALCIUM BLD-MCNC: 6.2 MG/DL (ref 8.5–10.1)
CHLORIDE SERPL-SCNC: 106 MMOL/L (ref 98–112)
CO2 SERPL-SCNC: 19 MMOL/L (ref 21–32)
CREAT BLD-MCNC: 0.66 MG/DL
DEPRECATED RDW RBC AUTO: 54.4 FL (ref 35.1–46.3)
EOSINOPHIL # BLD AUTO: 0.21 X10(3) UL (ref 0–0.7)
EOSINOPHIL NFR BLD AUTO: 4.1 %
ERYTHROCYTE [DISTWIDTH] IN BLOOD BY AUTOMATED COUNT: 15.3 % (ref 11–15)
GFR SERPLBLD BASED ON 1.73 SQ M-ARVRAT: 81 ML/MIN/1.73M2 (ref 60–?)
GLUCOSE BLD-MCNC: 332 MG/DL (ref 70–99)
HCT VFR BLD AUTO: 23.7 %
HGB BLD-MCNC: 7.4 G/DL
IMM GRANULOCYTES # BLD AUTO: 0.01 X10(3) UL (ref 0–1)
IMM GRANULOCYTES NFR BLD: 0.2 %
LYMPHOCYTES # BLD AUTO: 0.71 X10(3) UL (ref 1–4)
LYMPHOCYTES NFR BLD AUTO: 13.7 %
MCH RBC QN AUTO: 30.5 PG (ref 26–34)
MCHC RBC AUTO-ENTMCNC: 31.2 G/DL (ref 31–37)
MCV RBC AUTO: 97.5 FL
MONOCYTES # BLD AUTO: 0.57 X10(3) UL (ref 0.1–1)
MONOCYTES NFR BLD AUTO: 11 %
NEUTROPHILS # BLD AUTO: 3.64 X10 (3) UL (ref 1.5–7.7)
NEUTROPHILS # BLD AUTO: 3.64 X10(3) UL (ref 1.5–7.7)
NEUTROPHILS NFR BLD AUTO: 70.2 %
OSMOLALITY SERPL CALC.SUM OF ELEC: 289 MOSM/KG (ref 275–295)
PLATELET # BLD AUTO: 139 10(3)UL (ref 150–450)
POTASSIUM SERPL-SCNC: 3.2 MMOL/L (ref 3.5–5.1)
POTASSIUM SERPL-SCNC: 5.1 MMOL/L (ref 3.5–5.1)
RBC # BLD AUTO: 2.43 X10(6)UL
SODIUM SERPL-SCNC: 133 MMOL/L (ref 136–145)
WBC # BLD AUTO: 5.2 X10(3) UL (ref 4–11)

## 2023-05-12 PROCEDURE — 99233 SBSQ HOSP IP/OBS HIGH 50: CPT | Performed by: HOSPITALIST

## 2023-05-12 RX ORDER — FUROSEMIDE 40 MG/1
20 TABLET ORAL DAILY
Qty: 45 TABLET | Refills: 2 | Status: SHIPPED | OUTPATIENT
Start: 2023-05-12

## 2023-05-12 RX ORDER — POTASSIUM CHLORIDE 20 MEQ/1
40 TABLET, EXTENDED RELEASE ORAL EVERY 4 HOURS
Status: COMPLETED | OUTPATIENT
Start: 2023-05-12 | End: 2023-05-12

## 2023-05-12 RX ORDER — TRAMADOL HYDROCHLORIDE 50 MG/1
50 TABLET ORAL EVERY 6 HOURS SCHEDULED
Qty: 30 TABLET | Refills: 0 | Status: SHIPPED | OUTPATIENT
Start: 2023-05-12

## 2023-05-12 NOTE — PLAN OF CARE
A/ox4, can be forgetful at times, on RA, decreased appetite, denies nausea, denies pain, pulido in place draining to gravity, IVF and IV antibiotics continued, Hemavac in place to right hip, x2 BM overnight, partial weight bearing to right leg, vital signs stable, no acute changes overnight. Call light within reach, safety measures in place, frequent rounding done, plan of care continued.        Problem: Patient Centered Care  Goal: Patient preferences are identified and integrated in the patient's plan of care  Description: Interventions:  - What would you like us to know as we care for you?   - Provide timely, complete, and accurate information to patient/family  - Incorporate patient and family knowledge, values, beliefs, and cultural backgrounds into the planning and delivery of care  - Encourage patient/family to participate in care and decision-making at the level they choose  - Honor patient and family perspectives and choices  Outcome: Progressing     Problem: PAIN - ADULT  Goal: Verbalizes/displays adequate comfort level or patient's stated pain goal  Description: INTERVENTIONS:  - Encourage pt to monitor pain and request assistance  - Assess pain using appropriate pain scale  - Administer analgesics based on type and severity of pain and evaluate response  - Implement non-pharmacological measures as appropriate and evaluate response  - Consider cultural and social influences on pain and pain management  - Manage/alleviate anxiety  - Utilize distraction and/or relaxation techniques  - Monitor for opioid side effects  - Notify MD/LIP if interventions unsuccessful or patient reports new pain  - Anticipate increased pain with activity and pre-medicate as appropriate  Outcome: Progressing     Problem: RISK FOR INFECTION - ADULT  Goal: Absence of fever/infection during anticipated neutropenic period  Description: INTERVENTIONS  - Monitor WBC  - Administer growth factors as ordered  - Implement neutropenic guidelines  Outcome: Progressing     Problem: SAFETY ADULT - FALL  Goal: Free from fall injury  Description: INTERVENTIONS:  - Assess pt frequently for physical needs  - Identify cognitive and physical deficits and behaviors that affect risk of falls.   - [place] fall precautions as indicated by assessment.  - Educate pt/family on patient safety including physical limitations  - Instruct pt to call for assistance with activity based on assessment  - Modify environment to reduce risk of injury  - Provide assistive devices as appropriate  - Consider OT/PT consult to assist with strengthening/mobility  - Encourage toileting schedule  Outcome: Progressing     Problem: DISCHARGE PLANNING  Goal: Discharge to home or other facility with appropriate resources  Description: INTERVENTIONS:  - Identify barriers to discharge w/pt and caregiver  - Include patient/family/discharge partner in discharge planning  - Arrange for needed discharge resources and transportation as appropriate  - Identify discharge learning needs (meds, wound care, etc)  - Arrange for interpreters to assist at discharge as needed  - Consider post-discharge preferences of patient/family/discharge partner  - Complete POLST form as appropriate  - Assess patient's ability to be responsible for managing their own health  - Refer to Case Management Department for coordinating discharge planning if the patient needs post-hospital services based on physician/LIP order or complex needs related to functional status, cognitive ability or social support system  Outcome: Progressing     Problem: Altered Communication/Language Barrier  Goal: Patient/Family is able to understand and participate in their care  Description: Interventions:  - Assess communication ability and preferred communication style  - Implement communication aides and strategies  - Use visual cues when possible  - Listen attentively, be patient, do not interrupt  - Minimize distractions  - Allow time for understanding and response  - Establish method for patient to ask for assistance (call light)  - Provide an  as needed  - Communicate barriers and strategies to overcome with those who interact with patient  Outcome: Progressing

## 2023-05-12 NOTE — PLAN OF CARE
Patient alert and orientated x3 with forgetfulness. Post-op day #2. Dressing in place to right hip- changed by MD. MILTON. Saline locked. Tolerating soft diet. Bryant in place. Patient is on room air. SCDs and aspiring and Eliquis for DVT prophylaxis. PRN tylenol given for Pain medication provided as needed. Encouraged frequent ambulation and use of incentive spirometer. Fall precautions maintained- bed alarm on, bed locked in lowest position, call light and personal belongings within reach, non-skid socks in place to bilateral feet. Frequent rounding by nursing staff. Plan to discharge to [facility] when medically cleared. Per Dr. Javy Mcdonald still onhold.           Problem: Patient Centered Care  Goal: Patient preferences are identified and integrated in the patient's plan of care  Description: Interventions:  - What would you like us to know as we care for you?  - Provide timely, complete, and accurate information to patient/family  - Incorporate patient and family knowledge, values, beliefs, and cultural backgrounds into the planning and delivery of care  - Encourage patient/family to participate in care and decision-making at the level they choose  - Honor patient and family perspectives and choices  Outcome: Progressing     Problem: PAIN - ADULT  Goal: Verbalizes/displays adequate comfort level or patient's stated pain goal  Description: INTERVENTIONS:  - Encourage pt to monitor pain and request assistance  - Assess pain using appropriate pain scale  - Administer analgesics based on type and severity of pain and evaluate response  - Implement non-pharmacological measures as appropriate and evaluate response  - Consider cultural and social influences on pain and pain management  - Manage/alleviate anxiety  - Utilize distraction and/or relaxation techniques  - Monitor for opioid side effects  - Notify MD/LIP if interventions unsuccessful or patient reports new pain  - Anticipate increased pain with activity and pre-medicate as appropriate  Outcome: Progressing     Problem: RISK FOR INFECTION - ADULT  Goal: Absence of fever/infection during anticipated neutropenic period  Description: INTERVENTIONS  - Monitor WBC  - Administer growth factors as ordered  - Implement neutropenic guidelines  Outcome: Progressing     Problem: SAFETY ADULT - FALL  Goal: Free from fall injury  Description: INTERVENTIONS:  - Assess pt frequently for physical needs  - Identify cognitive and physical deficits and behaviors that affect risk of falls.   - [place] fall precautions as indicated by assessment.  - Educate pt/family on patient safety including physical limitations  - Instruct pt to call for assistance with activity based on assessment  - Modify environment to reduce risk of injury  - Provide assistive devices as appropriate  - Consider OT/PT consult to assist with strengthening/mobility  - Encourage toileting schedule  Outcome: Progressing     Problem: DISCHARGE PLANNING  Goal: Discharge to home or other facility with appropriate resources  Description: INTERVENTIONS:  - Identify barriers to discharge w/pt and caregiver  - Include patient/family/discharge partner in discharge planning  - Arrange for needed discharge resources and transportation as appropriate  - Identify discharge learning needs (meds, wound care, etc)  - Arrange for interpreters to assist at discharge as needed  - Consider post-discharge preferences of patient/family/discharge partner  - Complete POLST form as appropriate  - Assess patient's ability to be responsible for managing their own health  - Refer to Case Management Department for coordinating discharge planning if the patient needs post-hospital services based on physician/LIP order or complex needs related to functional status, cognitive ability or social support system  Outcome: Progressing     Problem: Patient/Family Goals  Goal: Patient/Family Long Term Goal  Description: Patient's Long Term Goal:     Interventions:    - See additional Care Plan goals for specific interventions  Outcome: Progressing  Goal: Patient/Family Short Term Goal  Description: Patient's Short Term Goal:     Interventions:   - See additional Care Plan goals for specific interventions  Outcome: Progressing     Problem: Altered Communication/Language Barrier  Goal: Patient/Family is able to understand and participate in their care  Description: Interventions:  - Assess communication ability and preferred communication style  - Implement communication aides and strategies  - Use visual cues when possible  - Listen attentively, be patient, do not interrupt  - Minimize distractions  - Allow time for understanding and response  - Establish method for patient to ask for assistance (call light)  - Provide an  as needed  - Communicate barriers and strategies to overcome with those who interact with patient  Outcome: Progressing

## 2023-05-12 NOTE — SPIRITUAL CARE NOTE
Spiritual Care Visit Note    Writer report consulting with RN about patient. Daughter, Berenice Patee at bedside. Writer offered empathic listening and support. Patient has no needs at this time. Writer left Spiritual Care card and informed patient to let RN know if in need of support. Follow up as requested. Visited With: Patient and family together    Spiritual Care Taxonomy:    Intended Effects: Demonstrate caring and concern    Methods: Collaborate with care team member;Offer support    Interventions: Active listening; Ask guided questions;Silent prayer     [address], [address] [facility]   [id number]     On call  services [phone]

## 2023-05-13 LAB
ALBUMIN SERPL-MCNC: 2.4 G/DL (ref 3.4–5)
ANION GAP SERPL CALC-SCNC: 7 MMOL/L (ref 0–18)
ANION GAP SERPL CALC-SCNC: 7 MMOL/L (ref 0–18)
BASOPHILS # BLD AUTO: 0.03 X10(3) UL (ref 0–0.2)
BASOPHILS NFR BLD AUTO: 0.4 %
BUN BLD-MCNC: 13 MG/DL (ref 7–18)
BUN BLD-MCNC: 13 MG/DL (ref 7–18)
BUN/CREAT SERPL: 19.4 (ref 10–20)
BUN/CREAT SERPL: 19.4 (ref 10–20)
C DIFF TOX B STL QL: NEGATIVE
CALCIUM BLD-MCNC: 8.9 MG/DL (ref 8.5–10.1)
CALCIUM BLD-MCNC: 8.9 MG/DL (ref 8.5–10.1)
CHLORIDE SERPL-SCNC: 105 MMOL/L (ref 98–112)
CHLORIDE SERPL-SCNC: 105 MMOL/L (ref 98–112)
CO2 SERPL-SCNC: 20 MMOL/L (ref 21–32)
CO2 SERPL-SCNC: 20 MMOL/L (ref 21–32)
CREAT BLD-MCNC: 0.67 MG/DL
CREAT BLD-MCNC: 0.67 MG/DL
DEPRECATED RDW RBC AUTO: 52.6 FL (ref 35.1–46.3)
EOSINOPHIL # BLD AUTO: 0.28 X10(3) UL (ref 0–0.7)
EOSINOPHIL NFR BLD AUTO: 3.9 %
ERYTHROCYTE [DISTWIDTH] IN BLOOD BY AUTOMATED COUNT: 15.2 % (ref 11–15)
GFR SERPLBLD BASED ON 1.73 SQ M-ARVRAT: 80 ML/MIN/1.73M2 (ref 60–?)
GFR SERPLBLD BASED ON 1.73 SQ M-ARVRAT: 80 ML/MIN/1.73M2 (ref 60–?)
GLUCOSE BLD-MCNC: 97 MG/DL (ref 70–99)
GLUCOSE BLD-MCNC: 97 MG/DL (ref 70–99)
HCT VFR BLD AUTO: 31.4 %
HGB BLD-MCNC: 10 G/DL
IMM GRANULOCYTES # BLD AUTO: 0.01 X10(3) UL (ref 0–1)
IMM GRANULOCYTES NFR BLD: 0.1 %
LYMPHOCYTES # BLD AUTO: 0.92 X10(3) UL (ref 1–4)
LYMPHOCYTES NFR BLD AUTO: 12.9 %
MCH RBC QN AUTO: 30.1 PG (ref 26–34)
MCHC RBC AUTO-ENTMCNC: 31.8 G/DL (ref 31–37)
MCV RBC AUTO: 94.6 FL
MONOCYTES # BLD AUTO: 0.68 X10(3) UL (ref 0.1–1)
MONOCYTES NFR BLD AUTO: 9.5 %
NEUTROPHILS # BLD AUTO: 5.21 X10 (3) UL (ref 1.5–7.7)
NEUTROPHILS # BLD AUTO: 5.21 X10(3) UL (ref 1.5–7.7)
NEUTROPHILS NFR BLD AUTO: 73.2 %
OSMOLALITY SERPL CALC.SUM OF ELEC: 274 MOSM/KG (ref 275–295)
OSMOLALITY SERPL CALC.SUM OF ELEC: 274 MOSM/KG (ref 275–295)
PHOSPHATE SERPL-MCNC: 2.4 MG/DL (ref 2.5–4.9)
PLATELET # BLD AUTO: 220 10(3)UL (ref 150–450)
POTASSIUM SERPL-SCNC: 4.4 MMOL/L (ref 3.5–5.1)
POTASSIUM SERPL-SCNC: 4.4 MMOL/L (ref 3.5–5.1)
RBC # BLD AUTO: 3.32 X10(6)UL
SODIUM SERPL-SCNC: 132 MMOL/L (ref 136–145)
SODIUM SERPL-SCNC: 132 MMOL/L (ref 136–145)
WBC # BLD AUTO: 7.1 X10(3) UL (ref 4–11)

## 2023-05-13 PROCEDURE — 99233 SBSQ HOSP IP/OBS HIGH 50: CPT | Performed by: HOSPITALIST

## 2023-05-13 NOTE — PLAN OF CARE
Patient alert and orientated x3 with forgetfulness. Post-op day #3. Dressing in place to right hip- changed by MD. VSS. Saline locked. Tolerating soft diet. Pulido removed at 1330 per MD orders- if pt doesn't void in 8hrs pulido to be placed again. Patient is on room air. SCDs and aspirin and Eliquis for DVT prophylaxis. PRN tylenol given for Pain medication provided as needed. Encouraged frequent ambulation and use of incentive spirometer. Fall precautions maintained- bed alarm on, bed locked in lowest position, call light and personal belongings within reach, non-skid socks in place to bilateral feet. Frequent rounding by nursing staff. Plan to discharge to [facility] when medically cleared.            Problem: Patient Centered Care  Goal: Patient preferences are identified and integrated in the patient's plan of care  Description: Interventions:  - What would you like us to know as we care for you?   - Provide timely, complete, and accurate information to patient/family  - Incorporate patient and family knowledge, values, beliefs, and cultural backgrounds into the planning and delivery of care  - Encourage patient/family to participate in care and decision-making at the level they choose  - Honor patient and family perspectives and choices  Outcome: Progressing     Problem: PAIN - ADULT  Goal: Verbalizes/displays adequate comfort level or patient's stated pain goal  Description: INTERVENTIONS:  - Encourage pt to monitor pain and request assistance  - Assess pain using appropriate pain scale  - Administer analgesics based on type and severity of pain and evaluate response  - Implement non-pharmacological measures as appropriate and evaluate response  - Consider cultural and social influences on pain and pain management  - Manage/alleviate anxiety  - Utilize distraction and/or relaxation techniques  - Monitor for opioid side effects  - Notify MD/LIP if interventions unsuccessful or patient reports new pain  - Anticipate increased pain with activity and pre-medicate as appropriate  Outcome: Progressing     Problem: RISK FOR INFECTION - ADULT  Goal: Absence of fever/infection during anticipated neutropenic period  Description: INTERVENTIONS  - Monitor WBC  - Administer growth factors as ordered  - Implement neutropenic guidelines  Outcome: Progressing     Problem: SAFETY ADULT - FALL  Goal: Free from fall injury  Description: INTERVENTIONS:  - Assess pt frequently for physical needs  - Identify cognitive and physical deficits and behaviors that affect risk of falls.   - [place] fall precautions as indicated by assessment.  - Educate pt/family on patient safety including physical limitations  - Instruct pt to call for assistance with activity based on assessment  - Modify environment to reduce risk of injury  - Provide assistive devices as appropriate  - Consider OT/PT consult to assist with strengthening/mobility  - Encourage toileting schedule  Outcome: Progressing     Problem: DISCHARGE PLANNING  Goal: Discharge to home or other facility with appropriate resources  Description: INTERVENTIONS:  - Identify barriers to discharge w/pt and caregiver  - Include patient/family/discharge partner in discharge planning  - Arrange for needed discharge resources and transportation as appropriate  - Identify discharge learning needs (meds, wound care, etc)  - Arrange for interpreters to assist at discharge as needed  - Consider post-discharge preferences of patient/family/discharge partner  - Complete POLST form as appropriate  - Assess patient's ability to be responsible for managing their own health  - Refer to Case Management Department for coordinating discharge planning if the patient needs post-hospital services based on physician/LIP order or complex needs related to functional status, cognitive ability or social support system  Outcome: Progressing     Problem: Patient/Family Goals  Goal: Patient/Family Long Term Goal  Description: Patient's Long Term Goal:     Interventions:  - See additional Care Plan goals for specific interventions  Outcome: Progressing  Goal: Patient/Family Short Term Goal  Description: Patient's Short Term Goal:    Interventions:   - See additional Care Plan goals for specific interventions  Outcome: Progressing     Problem: Altered Communication/Language Barrier  Goal: Patient/Family is able to understand and participate in their care  Description: Interventions:  - Assess communication ability and preferred communication style  - Implement communication aides and strategies  - Use visual cues when possible  - Listen attentively, be patient, do not interrupt  - Minimize distractions  - Allow time for understanding and response  - Establish method for patient to ask for assistance (call light)  - Provide an  as needed  - Communicate barriers and strategies to overcome with those who interact with patient  Outcome: Progressing

## 2023-05-13 NOTE — PLAN OF CARE
Patient is alert and oriented, forgetful at times. RA. Tele in place. Bryant in place, patent and draining. Dressing in place to right hip, intact. Declined pain medication. IV zosyn. Call light within reach, bed alarm active. Problem: Patient Centered Care  Goal: Patient preferences are identified and integrated in the patient's plan of care  - Provide timely, complete, and accurate information to patient/family  - Incorporate patient and family knowledge, values, beliefs, and cultural backgrounds into the planning and delivery of care  - Encourage patient/family to participate in care and decision-making at the level they choose  - Honor patient and family perspectives and choices  Outcome: Progressing     Problem: PAIN - ADULT  Goal: Verbalizes/displays adequate comfort level or patient's stated pain goal  Description: INTERVENTIONS:  - Encourage pt to monitor pain and request assistance  - Assess pain using appropriate pain scale  - Administer analgesics based on type and severity of pain and evaluate response  - Implement non-pharmacological measures as appropriate and evaluate response  - Consider cultural and social influences on pain and pain management  - Manage/alleviate anxiety  - Utilize distraction and/or relaxation techniques  - Monitor for opioid side effects  - Notify MD/LIP if interventions unsuccessful or patient reports new pain  - Anticipate increased pain with activity and pre-medicate as appropriate  Outcome: Progressing     Problem: RISK FOR INFECTION - ADULT  Goal: Absence of fever/infection during anticipated neutropenic period  Description: INTERVENTIONS  - Monitor WBC  - Administer growth factors as ordered  - Implement neutropenic guidelines  Outcome: Progressing     Problem: SAFETY ADULT - FALL  Goal: Free from fall injury  Description: INTERVENTIONS:  - Assess pt frequently for physical needs  - Identify cognitive and physical deficits and behaviors that affect risk of falls.   - [place] fall precautions as indicated by assessment.  - Educate pt/family on patient safety including physical limitations  - Instruct pt to call for assistance with activity based on assessment  - Modify environment to reduce risk of injury  - Provide assistive devices as appropriate  - Consider OT/PT consult to assist with strengthening/mobility  - Encourage toileting schedule  Outcome: Progressing     Problem: DISCHARGE PLANNING  Goal: Discharge to home or other facility with appropriate resources  Description: INTERVENTIONS:  - Identify barriers to discharge w/pt and caregiver  - Include patient/family/discharge partner in discharge planning  - Arrange for needed discharge resources and transportation as appropriate  - Identify discharge learning needs (meds, wound care, etc)  - Arrange for interpreters to assist at discharge as needed  - Consider post-discharge preferences of patient/family/discharge partner  - Complete POLST form as appropriate  - Assess patient's ability to be responsible for managing their own health  - Refer to Case Management Department for coordinating discharge planning if the patient needs post-hospital services based on physician/LIP order or complex needs related to functional status, cognitive ability or social support system  Outcome: Progressing

## 2023-05-13 NOTE — CM/SW NOTE
Message sent to medical team to clarify if patient is DC ready for [facility], awaiting review and response. 227pm  SW notified by RN that patient will not DC until tomorrow, SW notified [facility].      TONI Baeza, [facility]    [id number]

## 2023-05-14 LAB
BASOPHILS # BLD AUTO: 0.06 X10(3) UL (ref 0–0.2)
BASOPHILS NFR BLD AUTO: 0.9 %
DEPRECATED RDW RBC AUTO: 52.2 FL (ref 35.1–46.3)
EOSINOPHIL # BLD AUTO: 0.33 X10(3) UL (ref 0–0.7)
EOSINOPHIL NFR BLD AUTO: 5.1 %
ERYTHROCYTE [DISTWIDTH] IN BLOOD BY AUTOMATED COUNT: 15.2 % (ref 11–15)
HCT VFR BLD AUTO: 32.7 %
HGB BLD-MCNC: 10.3 G/DL
IMM GRANULOCYTES # BLD AUTO: 0.02 X10(3) UL (ref 0–1)
IMM GRANULOCYTES NFR BLD: 0.3 %
LYMPHOCYTES # BLD AUTO: 1 X10(3) UL (ref 1–4)
LYMPHOCYTES NFR BLD AUTO: 15.6 %
MCH RBC QN AUTO: 29.9 PG (ref 26–34)
MCHC RBC AUTO-ENTMCNC: 31.5 G/DL (ref 31–37)
MCV RBC AUTO: 94.8 FL
MONOCYTES # BLD AUTO: 0.61 X10(3) UL (ref 0.1–1)
MONOCYTES NFR BLD AUTO: 9.5 %
NEUTROPHILS # BLD AUTO: 4.39 X10 (3) UL (ref 1.5–7.7)
NEUTROPHILS # BLD AUTO: 4.39 X10(3) UL (ref 1.5–7.7)
NEUTROPHILS NFR BLD AUTO: 68.6 %
PHOSPHATE SERPL-MCNC: 2.8 MG/DL (ref 2.5–4.9)
PLATELET # BLD AUTO: 249 10(3)UL (ref 150–450)
RBC # BLD AUTO: 3.45 X10(6)UL
SARS-COV-2 RNA RESP QL NAA+PROBE: NOT DETECTED
WBC # BLD AUTO: 6.4 X10(3) UL (ref 4–11)

## 2023-05-14 PROCEDURE — 99239 HOSP IP/OBS DSCHRG MGMT >30: CPT | Performed by: HOSPITALIST

## 2023-05-14 NOTE — CM/SW NOTE
Message sent to medical team to clarify if patient is DC ready for [facility], awaiting review and response. 1025am  Notified by RN of med clear. CHRIS requested Rapid covid. Message sent to [facility] requesting bed, awaiting review and response. 1108am  Spoke with Maria Isabel President at [facility], still awaiting update from building on bed avail. Luis Angel to call SW back with more info. 141pm  No update yet from Maria Isabel President at [facility], [address] sent message requesting update on status. 157pm  Notified by Maria Isabel President at [facility], no bed available today, will be ready tomorrow. CHRIS notified RN. PLAN: [facility], no bed avail today. Will be avail tomorrow 5/15.     TONI Amezquita, [facility]    [id number]

## 2023-05-14 NOTE — PLAN OF CARE
Pt alert, oriented 2-3 can be forgetful to date but knows where she is and why. Was able to move for first time to chair then back to bed with lift, pain well managed on current regimen. Per ortho and medicine, ok to restart brillinta on Wednesday, continue eliquis. Right hip dressing changed to aquacel, staples/incision look wnl. Pulido in place, to discharge with pulido for later voiding trial. ESBL positive, continue zosyn in house, ok for no abx when discharged. Tele in place, SCD to LLE. Injury to left thigh dressed. Plan for discharge tomorrow, bed available at [facility]. Covid negative. Problem: Patient Centered Care  Goal: Patient preferences are identified and integrated in the patient's plan of care  Description: Interventions:  - What would you like us to know as we care for you?  Dinahkellee Allan my caregiver and Arlin Tejeda my daughter help take care of me  - Provide timely, complete, and accurate information to patient/family  - Incorporate patient and family knowledge, values, beliefs, and cultural backgrounds into the planning and delivery of care  - Encourage patient/family to participate in care and decision-making at the level they choose  - Honor patient and family perspectives and choices  Outcome: Progressing     Problem: PAIN - ADULT  Goal: Verbalizes/displays adequate comfort level or patient's stated pain goal  Description: INTERVENTIONS:  - Encourage pt to monitor pain and request assistance  - Assess pain using appropriate pain scale  - Administer analgesics based on type and severity of pain and evaluate response  - Implement non-pharmacological measures as appropriate and evaluate response  - Consider cultural and social influences on pain and pain management  - Manage/alleviate anxiety  - Utilize distraction and/or relaxation techniques  - Monitor for opioid side effects  - Notify MD/LIP if interventions unsuccessful or patient reports new pain  - Anticipate increased pain with activity and pre-medicate as appropriate  Outcome: Progressing     Problem: RISK FOR INFECTION - ADULT  Goal: Absence of fever/infection during anticipated neutropenic period  Description: INTERVENTIONS  - Monitor WBC  - Administer growth factors as ordered  - Implement neutropenic guidelines  Outcome: Progressing     Problem: SAFETY ADULT - FALL  Goal: Free from fall injury  Description: INTERVENTIONS:  - Assess pt frequently for physical needs  - Identify cognitive and physical deficits and behaviors that affect risk of falls.   - [place] fall precautions as indicated by assessment.  - Educate pt/family on patient safety including physical limitations  - Instruct pt to call for assistance with activity based on assessment  - Modify environment to reduce risk of injury  - Provide assistive devices as appropriate  - Consider OT/PT consult to assist with strengthening/mobility  - Encourage toileting schedule  Outcome: Progressing     Problem: DISCHARGE PLANNING  Goal: Discharge to home or other facility with appropriate resources  Description: INTERVENTIONS:  - Identify barriers to discharge w/pt and caregiver  - Include patient/family/discharge partner in discharge planning  - Arrange for needed discharge resources and transportation as appropriate  - Identify discharge learning needs (meds, wound care, etc)  - Arrange for interpreters to assist at discharge as needed  - Consider post-discharge preferences of patient/family/discharge partner  - Complete POLST form as appropriate  - Assess patient's ability to be responsible for managing their own health  - Refer to Case Management Department for coordinating discharge planning if the patient needs post-hospital services based on physician/LIP order or complex needs related to functional status, cognitive ability or social support system  Outcome: Progressing     Problem: Patient/Family Goals  Goal: Patient/Family Long Term Goal  Description: Patient's Long Term Goal: full ROM of RLE    Interventions:  - PT/OT consult, pin management  - See additional Care Plan goals for specific interventions  Outcome: Progressing  Goal: Patient/Family Short Term Goal  Description: Patient's Short Term Goal: get to [facility]    Interventions:   - discharge planning, mental status monitoring  - See additional Care Plan goals for specific interventions  Outcome: Progressing     Problem: Altered Communication/Language Barrier  Goal: Patient/Family is able to understand and participate in their care  Description: Interventions:  - Assess communication ability and preferred communication style  - Implement communication aides and strategies  - Use visual cues when possible  - Listen attentively, be patient, do not interrupt  - Minimize distractions  - Allow time for understanding and response  - Establish method for patient to ask for assistance (call light)  - Provide an  as needed  - Communicate barriers and strategies to overcome with those who interact with patient  Outcome: Progressing

## 2023-05-15 VITALS
OXYGEN SATURATION: 96 % | BODY MASS INDEX: 20.08 KG/M2 | WEIGHT: 113.31 LBS | RESPIRATION RATE: 18 BRPM | HEIGHT: 63 IN | SYSTOLIC BLOOD PRESSURE: 111 MMHG | HEART RATE: 70 BPM | DIASTOLIC BLOOD PRESSURE: 63 MMHG | TEMPERATURE: 98 F

## 2023-05-15 LAB
BASOPHILS # BLD AUTO: 0.06 X10(3) UL (ref 0–0.2)
BASOPHILS NFR BLD AUTO: 0.9 %
DEPRECATED RDW RBC AUTO: 53.2 FL (ref 35.1–46.3)
EOSINOPHIL # BLD AUTO: 0.3 X10(3) UL (ref 0–0.7)
EOSINOPHIL NFR BLD AUTO: 4.7 %
ERYTHROCYTE [DISTWIDTH] IN BLOOD BY AUTOMATED COUNT: 15.2 % (ref 11–15)
HCT VFR BLD AUTO: 33.2 %
HGB BLD-MCNC: 10.4 G/DL
IMM GRANULOCYTES # BLD AUTO: 0.02 X10(3) UL (ref 0–1)
IMM GRANULOCYTES NFR BLD: 0.3 %
LYMPHOCYTES # BLD AUTO: 1.01 X10(3) UL (ref 1–4)
LYMPHOCYTES NFR BLD AUTO: 15.9 %
MCH RBC QN AUTO: 30.1 PG (ref 26–34)
MCHC RBC AUTO-ENTMCNC: 31.3 G/DL (ref 31–37)
MCV RBC AUTO: 96.2 FL
MONOCYTES # BLD AUTO: 0.69 X10(3) UL (ref 0.1–1)
MONOCYTES NFR BLD AUTO: 10.8 %
NEUTROPHILS # BLD AUTO: 4.29 X10 (3) UL (ref 1.5–7.7)
NEUTROPHILS # BLD AUTO: 4.29 X10(3) UL (ref 1.5–7.7)
NEUTROPHILS NFR BLD AUTO: 67.4 %
PLATELET # BLD AUTO: 273 10(3)UL (ref 150–450)
RBC # BLD AUTO: 3.45 X10(6)UL
WBC # BLD AUTO: 6.4 X10(3) UL (ref 4–11)

## 2023-05-15 NOTE — PLAN OF CARE
Patient alert to self and situation and forgetful. Vitals are stable. Patient is s/p right interlocking intramedullary femoral nail by Dr. Vinicius aMcias, surgical dressing during previous shift, dressing is clean, dry ad intact. Left thigh dressing also changed and is clean dry and intact. Bryant in for retention, care provided. Tramadol given per [facility], patient reported mild to moderate pain. Zosyn given per [facility]. Patient continent to incontinent of stool; assistance provided and incontinent care rendered as needed. Patient made comfortable. Plan of care reviewed with patient. Problem: Patient Centered Care  Goal: Patient preferences are identified and integrated in the patient's plan of care  Description: Interventions:  - What would you like us to know as we care for you?  Eagle Adkins my caregiver and Margaret Ordoñez my daughter help take care of me  - Provide timely, complete, and accurate information to patient/family  - Incorporate patient and family knowledge, values, beliefs, and cultural backgrounds into the planning and delivery of care  - Encourage patient/family to participate in care and decision-making at the level they choose  - Honor patient and family perspectives and choices  Outcome: Progressing     Problem: PAIN - ADULT  Goal: Verbalizes/displays adequate comfort level or patient's stated pain goal  Description: INTERVENTIONS:  - Encourage pt to monitor pain and request assistance  - Assess pain using appropriate pain scale  - Administer analgesics based on type and severity of pain and evaluate response  - Implement non-pharmacological measures as appropriate and evaluate response  - Consider cultural and social influences on pain and pain management  - Manage/alleviate anxiety  - Utilize distraction and/or relaxation techniques  - Monitor for opioid side effects  - Notify MD/LIP if interventions unsuccessful or patient reports new pain  - Anticipate increased pain with activity and pre-medicate as appropriate  Outcome: Progressing

## 2023-05-15 NOTE — PLAN OF CARE
Pt is A&Ox3. Clear for violeta. [facility] via ambulance. Problem: Patient Centered Care  Goal: Patient preferences are identified and integrated in the patient's plan of care  Description: Interventions:  - What would you like us to know as we care for you?  Raheem Mai my caregiver and Mary Bhat my daughter help take care of me  - Provide timely, complete, and accurate information to patient/family  - Incorporate patient and family knowledge, values, beliefs, and cultural backgrounds into the planning and delivery of care  - Encourage patient/family to participate in care and decision-making at the level they choose  - Honor patient and family perspectives and choices  Outcome: Adequate for Discharge     Problem: PAIN - ADULT  Goal: Verbalizes/displays adequate comfort level or patient's stated pain goal  Description: INTERVENTIONS:  - Encourage pt to monitor pain and request assistance  - Assess pain using appropriate pain scale  - Administer analgesics based on type and severity of pain and evaluate response  - Implement non-pharmacological measures as appropriate and evaluate response  - Consider cultural and social influences on pain and pain management  - Manage/alleviate anxiety  - Utilize distraction and/or relaxation techniques  - Monitor for opioid side effects  - Notify MD/LIP if interventions unsuccessful or patient reports new pain  - Anticipate increased pain with activity and pre-medicate as appropriate  Outcome: Adequate for Discharge     Problem: RISK FOR INFECTION - ADULT  Goal: Absence of fever/infection during anticipated neutropenic period  Description: INTERVENTIONS  - Monitor WBC  - Administer growth factors as ordered  - Implement neutropenic guidelines  Outcome: Adequate for Discharge     Problem: SAFETY ADULT - FALL  Goal: Free from fall injury  Description: INTERVENTIONS:  - Assess pt frequently for physical needs  - Identify cognitive and physical deficits and behaviors that affect risk of falls.  - [place] fall precautions as indicated by assessment.  - Educate pt/family on patient safety including physical limitations  - Instruct pt to call for assistance with activity based on assessment  - Modify environment to reduce risk of injury  - Provide assistive devices as appropriate  - Consider OT/PT consult to assist with strengthening/mobility  - Encourage toileting schedule  Outcome: Adequate for Discharge     Problem: DISCHARGE PLANNING  Goal: Discharge to home or other facility with appropriate resources  Description: INTERVENTIONS:  - Identify barriers to discharge w/pt and caregiver  - Include patient/family/discharge partner in discharge planning  - Arrange for needed discharge resources and transportation as appropriate  - Identify discharge learning needs (meds, wound care, etc)  - Arrange for interpreters to assist at discharge as needed  - Consider post-discharge preferences of patient/family/discharge partner  - Complete POLST form as appropriate  - Assess patient's ability to be responsible for managing their own health  - Refer to Case Management Department for coordinating discharge planning if the patient needs post-hospital services based on physician/LIP order or complex needs related to functional status, cognitive ability or social support system  Outcome: Adequate for Discharge     Problem: Patient/Family Goals  Goal: Patient/Family Long Term Goal  Description: Patient's Long Term Goal: full ROM of RLE    Interventions:  - PT/OT consult, pin management  - See additional Care Plan goals for specific interventions  Outcome: Adequate for Discharge  Goal: Patient/Family Short Term Goal  Description: [place]    Interventions:   - discharge planning, mental status monitoring  - See additional Care Plan goals for specific interventions  Outcome: Adequate for Discharge     Problem: Altered Communication/Language Barrier  Goal: Patient/Family is able to understand and participate in their care  Description: Interventions:  - Assess communication ability and preferred communication style  - Implement communication aides and strategies  - Use visual cues when possible  - Listen attentively, be patient, do not interrupt  - Minimize distractions  - Allow time for understanding and response  - Establish method for patient to ask for assistance (call light)  - Provide an  as needed  - Communicate barriers and strategies to overcome with those who interact with patient  Outcome: Adequate for Discharge

## 2023-05-16 ENCOUNTER — EXTERNAL FACILITY (OUTPATIENT)
Dept: INTERNAL MEDICINE CLINIC | Facility: CLINIC | Age: 88
End: 2023-05-16

## 2023-05-16 DIAGNOSIS — S72.001A CLOSED FRACTURE OF RIGHT HIP, INITIAL ENCOUNTER (HCC): Primary | ICD-10-CM

## 2023-05-16 DIAGNOSIS — I48.91 ATRIAL FIBRILLATION WITH RAPID VENTRICULAR RESPONSE (HCC): ICD-10-CM

## 2023-05-16 DIAGNOSIS — I50.9 CONGESTIVE HEART FAILURE, UNSPECIFIED HF CHRONICITY, UNSPECIFIED HEART FAILURE TYPE (HCC): ICD-10-CM

## 2023-05-16 DIAGNOSIS — Z16.12 UTI DUE TO EXTENDED-SPECTRUM BETA LACTAMASE (ESBL) PRODUCING ESCHERICHIA COLI: ICD-10-CM

## 2023-05-16 DIAGNOSIS — Z86.79 HISTORY OF CAD (CORONARY ARTERY DISEASE): ICD-10-CM

## 2023-05-16 DIAGNOSIS — J18.9 PNEUMONIA DUE TO INFECTIOUS ORGANISM, UNSPECIFIED LATERALITY, UNSPECIFIED PART OF LUNG: ICD-10-CM

## 2023-05-16 DIAGNOSIS — I08.0 MITRAL INSUFFICIENCY AND AORTIC STENOSIS: ICD-10-CM

## 2023-05-16 DIAGNOSIS — R73.9 HYPERGLYCEMIA: ICD-10-CM

## 2023-05-16 DIAGNOSIS — N18.30 STAGE 3 CHRONIC KIDNEY DISEASE, UNSPECIFIED WHETHER STAGE 3A OR 3B CKD (HCC): ICD-10-CM

## 2023-05-16 DIAGNOSIS — N39.0 UTI DUE TO EXTENDED-SPECTRUM BETA LACTAMASE (ESBL) PRODUCING ESCHERICHIA COLI: ICD-10-CM

## 2023-05-16 DIAGNOSIS — B96.29 UTI DUE TO EXTENDED-SPECTRUM BETA LACTAMASE (ESBL) PRODUCING ESCHERICHIA COLI: ICD-10-CM

## 2023-05-16 PROCEDURE — 1111F DSCHRG MED/CURRENT MED MERGE: CPT | Performed by: INTERNAL MEDICINE

## 2023-05-16 PROCEDURE — 99306 1ST NF CARE HIGH MDM 50: CPT | Performed by: INTERNAL MEDICINE

## 2023-05-18 ENCOUNTER — EXTERNAL FACILITY (OUTPATIENT)
Dept: INTERNAL MEDICINE CLINIC | Facility: CLINIC | Age: 88
End: 2023-05-18

## 2023-05-18 DIAGNOSIS — B96.29 UTI DUE TO EXTENDED-SPECTRUM BETA LACTAMASE (ESBL) PRODUCING ESCHERICHIA COLI: ICD-10-CM

## 2023-05-18 DIAGNOSIS — Z16.12 UTI DUE TO EXTENDED-SPECTRUM BETA LACTAMASE (ESBL) PRODUCING ESCHERICHIA COLI: ICD-10-CM

## 2023-05-18 DIAGNOSIS — J18.9 PNEUMONIA DUE TO INFECTIOUS ORGANISM, UNSPECIFIED LATERALITY, UNSPECIFIED PART OF LUNG: ICD-10-CM

## 2023-05-18 DIAGNOSIS — N39.0 UTI DUE TO EXTENDED-SPECTRUM BETA LACTAMASE (ESBL) PRODUCING ESCHERICHIA COLI: ICD-10-CM

## 2023-05-18 DIAGNOSIS — R41.3 MEMORY IMPAIRMENT: ICD-10-CM

## 2023-05-18 DIAGNOSIS — S72.001A CLOSED FRACTURE OF RIGHT HIP, INITIAL ENCOUNTER (HCC): Primary | ICD-10-CM

## 2023-05-18 PROCEDURE — 1111F DSCHRG MED/CURRENT MED MERGE: CPT | Performed by: INTERNAL MEDICINE

## 2023-05-18 PROCEDURE — 99309 SBSQ NF CARE MODERATE MDM 30: CPT | Performed by: INTERNAL MEDICINE

## 2023-05-22 ENCOUNTER — EXTERNAL FACILITY (OUTPATIENT)
Dept: INTERNAL MEDICINE CLINIC | Facility: CLINIC | Age: 88
End: 2023-05-22

## 2023-05-22 DIAGNOSIS — N39.0 UTI DUE TO EXTENDED-SPECTRUM BETA LACTAMASE (ESBL) PRODUCING ESCHERICHIA COLI: ICD-10-CM

## 2023-05-22 DIAGNOSIS — R33.9 URINE RETENTION: ICD-10-CM

## 2023-05-22 DIAGNOSIS — Z16.12 UTI DUE TO EXTENDED-SPECTRUM BETA LACTAMASE (ESBL) PRODUCING ESCHERICHIA COLI: ICD-10-CM

## 2023-05-22 DIAGNOSIS — R41.3 MEMORY IMPAIRMENT: ICD-10-CM

## 2023-05-22 DIAGNOSIS — S72.001A CLOSED FRACTURE OF RIGHT HIP, INITIAL ENCOUNTER (HCC): Primary | ICD-10-CM

## 2023-05-22 DIAGNOSIS — B96.29 UTI DUE TO EXTENDED-SPECTRUM BETA LACTAMASE (ESBL) PRODUCING ESCHERICHIA COLI: ICD-10-CM

## 2023-05-22 DIAGNOSIS — L03.90 WOUND CELLULITIS: ICD-10-CM

## 2023-05-22 PROBLEM — S72.061D: Status: ACTIVE | Noted: 2023-05-22

## 2023-05-22 PROBLEM — Z91.81 HISTORY OF FALLING: Status: ACTIVE | Noted: 2021-01-01

## 2023-05-22 PROBLEM — M81.0 AGE-RELATED OSTEOPOROSIS WITHOUT CURRENT PATHOLOGICAL FRACTURE: Status: ACTIVE | Noted: 2021-01-01

## 2023-05-22 PROCEDURE — 1111F DSCHRG MED/CURRENT MED MERGE: CPT | Performed by: INTERNAL MEDICINE

## 2023-05-22 PROCEDURE — 99309 SBSQ NF CARE MODERATE MDM 30: CPT | Performed by: INTERNAL MEDICINE

## 2023-05-23 ENCOUNTER — EXTERNAL FACILITY (OUTPATIENT)
Dept: INTERNAL MEDICINE CLINIC | Facility: CLINIC | Age: 88
End: 2023-05-23

## 2023-05-23 DIAGNOSIS — R41.3 MEMORY IMPAIRMENT: ICD-10-CM

## 2023-05-23 DIAGNOSIS — B96.29 UTI DUE TO EXTENDED-SPECTRUM BETA LACTAMASE (ESBL) PRODUCING ESCHERICHIA COLI: ICD-10-CM

## 2023-05-23 DIAGNOSIS — R33.9 URINE RETENTION: ICD-10-CM

## 2023-05-23 DIAGNOSIS — Z16.12 UTI DUE TO EXTENDED-SPECTRUM BETA LACTAMASE (ESBL) PRODUCING ESCHERICHIA COLI: ICD-10-CM

## 2023-05-23 DIAGNOSIS — N39.0 UTI DUE TO EXTENDED-SPECTRUM BETA LACTAMASE (ESBL) PRODUCING ESCHERICHIA COLI: ICD-10-CM

## 2023-05-23 DIAGNOSIS — S72.001A CLOSED FRACTURE OF RIGHT HIP, INITIAL ENCOUNTER (HCC): Primary | ICD-10-CM

## 2023-05-23 DIAGNOSIS — L03.90 WOUND CELLULITIS: ICD-10-CM

## 2023-05-23 PROCEDURE — 99309 SBSQ NF CARE MODERATE MDM 30: CPT | Performed by: INTERNAL MEDICINE

## 2023-05-23 PROCEDURE — 1111F DSCHRG MED/CURRENT MED MERGE: CPT | Performed by: INTERNAL MEDICINE

## 2023-05-25 ENCOUNTER — EXTERNAL FACILITY (OUTPATIENT)
Dept: INTERNAL MEDICINE CLINIC | Facility: CLINIC | Age: 88
End: 2023-05-25

## 2023-05-25 DIAGNOSIS — N39.0 UTI DUE TO EXTENDED-SPECTRUM BETA LACTAMASE (ESBL) PRODUCING ESCHERICHIA COLI: ICD-10-CM

## 2023-05-25 DIAGNOSIS — S72.001A CLOSED FRACTURE OF RIGHT HIP, INITIAL ENCOUNTER (HCC): Primary | ICD-10-CM

## 2023-05-25 DIAGNOSIS — L03.90 WOUND CELLULITIS: ICD-10-CM

## 2023-05-25 DIAGNOSIS — B96.29 UTI DUE TO EXTENDED-SPECTRUM BETA LACTAMASE (ESBL) PRODUCING ESCHERICHIA COLI: ICD-10-CM

## 2023-05-25 DIAGNOSIS — R41.3 MEMORY IMPAIRMENT: ICD-10-CM

## 2023-05-25 DIAGNOSIS — R33.9 URINE RETENTION: ICD-10-CM

## 2023-05-25 DIAGNOSIS — Z16.12 UTI DUE TO EXTENDED-SPECTRUM BETA LACTAMASE (ESBL) PRODUCING ESCHERICHIA COLI: ICD-10-CM

## 2023-05-25 DIAGNOSIS — S80.811A ABRASION OF RIGHT LOWER EXTREMITY, INITIAL ENCOUNTER: ICD-10-CM

## 2023-05-25 PROCEDURE — 99309 SBSQ NF CARE MODERATE MDM 30: CPT | Performed by: INTERNAL MEDICINE

## 2023-05-25 PROCEDURE — 1111F DSCHRG MED/CURRENT MED MERGE: CPT | Performed by: INTERNAL MEDICINE

## 2023-05-30 ENCOUNTER — EXTERNAL FACILITY (OUTPATIENT)
Dept: INTERNAL MEDICINE CLINIC | Facility: CLINIC | Age: 88
End: 2023-05-30

## 2023-05-30 DIAGNOSIS — B96.29 UTI DUE TO EXTENDED-SPECTRUM BETA LACTAMASE (ESBL) PRODUCING ESCHERICHIA COLI: ICD-10-CM

## 2023-05-30 DIAGNOSIS — Z16.12 UTI DUE TO EXTENDED-SPECTRUM BETA LACTAMASE (ESBL) PRODUCING ESCHERICHIA COLI: ICD-10-CM

## 2023-05-30 DIAGNOSIS — R33.9 URINE RETENTION: ICD-10-CM

## 2023-05-30 DIAGNOSIS — S72.001A CLOSED FRACTURE OF RIGHT HIP, INITIAL ENCOUNTER (HCC): Primary | ICD-10-CM

## 2023-05-30 DIAGNOSIS — N39.0 UTI DUE TO EXTENDED-SPECTRUM BETA LACTAMASE (ESBL) PRODUCING ESCHERICHIA COLI: ICD-10-CM

## 2023-05-30 DIAGNOSIS — S80.811A ABRASION OF RIGHT LOWER EXTREMITY, INITIAL ENCOUNTER: ICD-10-CM

## 2023-05-30 DIAGNOSIS — R41.3 MEMORY IMPAIRMENT: ICD-10-CM

## 2023-05-30 PROCEDURE — 99309 SBSQ NF CARE MODERATE MDM 30: CPT | Performed by: INTERNAL MEDICINE

## 2023-05-30 PROCEDURE — 1111F DSCHRG MED/CURRENT MED MERGE: CPT | Performed by: INTERNAL MEDICINE

## 2023-06-01 ENCOUNTER — EXTERNAL FACILITY (OUTPATIENT)
Dept: INTERNAL MEDICINE CLINIC | Facility: CLINIC | Age: 88
End: 2023-06-01

## 2023-06-01 DIAGNOSIS — S80.811A ABRASION OF RIGHT LOWER EXTREMITY, INITIAL ENCOUNTER: ICD-10-CM

## 2023-06-01 DIAGNOSIS — N39.0 UTI DUE TO EXTENDED-SPECTRUM BETA LACTAMASE (ESBL) PRODUCING ESCHERICHIA COLI: ICD-10-CM

## 2023-06-01 DIAGNOSIS — S72.001A CLOSED FRACTURE OF RIGHT HIP, INITIAL ENCOUNTER (HCC): Primary | ICD-10-CM

## 2023-06-01 DIAGNOSIS — R41.3 MEMORY IMPAIRMENT: ICD-10-CM

## 2023-06-01 DIAGNOSIS — B96.29 UTI DUE TO EXTENDED-SPECTRUM BETA LACTAMASE (ESBL) PRODUCING ESCHERICHIA COLI: ICD-10-CM

## 2023-06-01 DIAGNOSIS — Z16.12 UTI DUE TO EXTENDED-SPECTRUM BETA LACTAMASE (ESBL) PRODUCING ESCHERICHIA COLI: ICD-10-CM

## 2023-06-01 DIAGNOSIS — R33.9 URINE RETENTION: ICD-10-CM

## 2023-06-01 PROCEDURE — 99309 SBSQ NF CARE MODERATE MDM 30: CPT | Performed by: INTERNAL MEDICINE

## 2023-06-01 PROCEDURE — 1111F DSCHRG MED/CURRENT MED MERGE: CPT | Performed by: INTERNAL MEDICINE

## 2023-06-05 ENCOUNTER — EXTERNAL FACILITY (OUTPATIENT)
Dept: INTERNAL MEDICINE CLINIC | Facility: CLINIC | Age: 88
End: 2023-06-05

## 2023-06-05 DIAGNOSIS — R41.3 MEMORY IMPAIRMENT: ICD-10-CM

## 2023-06-05 DIAGNOSIS — S80.811A ABRASION OF RIGHT LOWER EXTREMITY, INITIAL ENCOUNTER: ICD-10-CM

## 2023-06-05 DIAGNOSIS — N39.0 UTI DUE TO EXTENDED-SPECTRUM BETA LACTAMASE (ESBL) PRODUCING ESCHERICHIA COLI: ICD-10-CM

## 2023-06-05 DIAGNOSIS — D64.9 ANEMIA, UNSPECIFIED TYPE: ICD-10-CM

## 2023-06-05 DIAGNOSIS — S72.001A CLOSED FRACTURE OF RIGHT HIP, INITIAL ENCOUNTER (HCC): Primary | ICD-10-CM

## 2023-06-05 DIAGNOSIS — B96.29 UTI DUE TO EXTENDED-SPECTRUM BETA LACTAMASE (ESBL) PRODUCING ESCHERICHIA COLI: ICD-10-CM

## 2023-06-05 DIAGNOSIS — Z16.12 UTI DUE TO EXTENDED-SPECTRUM BETA LACTAMASE (ESBL) PRODUCING ESCHERICHIA COLI: ICD-10-CM

## 2023-06-05 PROCEDURE — 1111F DSCHRG MED/CURRENT MED MERGE: CPT | Performed by: INTERNAL MEDICINE

## 2023-06-05 PROCEDURE — 99309 SBSQ NF CARE MODERATE MDM 30: CPT | Performed by: INTERNAL MEDICINE

## 2023-06-08 NOTE — PROGRESS NOTES
[facility] care note transcribed by Dr. Bethea     Date seen: 5/18/2023    Subjective: Patient seen and examined for right femur fracture, pneumonia, ESBL UTI, coronary artery disease. Patient seems stable, doing well, no changes over the week, right leg incision site is clean dry and intact, her breathing looks stable, her memory does seem poor, this appears to be baseline, there is a family friend at bedside. Pain looks controlled, working with physical therapy. PHYSICAL EXAMINATION: Vital signs: See chart  Gen. exam: Alert and awake, mental status at baseline, in no acute distress  HEENT: Pupils equal and reactive to light and accommodation, moist mucous membranes  Neck exam: Supple with baseline range of motion.  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 appreciated, no obvious deformity  : Bryant catheter unremarkable Bryant to gravity    Labs/imaging: See chart    DVT prophylaxis: with Eliquis    Ambulatory status: Per hospital discharge recommendations, specialist involvement/recommendations and physical therapy evaluation    Assessment and plan: We have a fall, status post femur fracture now admitted for rehab  Rt intertrochanteric femur fracture s/p mechanical fall: Physical therapy, occupational therapy, physiatry to evaluate and treat, further orders pending clinical course, anticoagulation per surgery, followup 14 days postop for suture/staple removal  UTI: ESBL: Completed IV Zosyn  Pneumonia: Stable, completed IV Zosyn, involve in-house pulmonology, serial imaging recommended  Memory impairment: Likely underdiagnosed Alzheimer's dementia, will continue with rehab plan, and offer to objectively once her medical conditions come together    Stable problem list:  Chronic kidney disease: Stable continue current serial lab work monitoring and management  History of coronary artery disease, status post CABG: Stable, offer in-house cardiology, continue current medications  Atrial stenosis, mitral insufficiency and stenosis: Stable, offer in-house cardiology, continue with current medication treatments  Heart failure with mildly reduced ejection fraction of 99% and diastolic dysfunction: Abdominalis cardiology, low threshold for resuming diuretics, CHF monitoring and protocol  Atrial fibrillation, status post Watchman procedure: Eliquis, rate control, Brilinta as well  Nonfasting hyperglycemia A1c 5.7: Stable continue current blood sugar checks monitoring, currently not a diabetic    CODE STATUS: Full  admit condition: Stable    Over 60 minutes spent in direct patient contact reviewing and creating admission orders, obtaining history, evaluating patient, discussing treatment options, family/staff communication, review of available labs and radiology reports, completing documentation, and coordinating care

## 2023-06-08 NOTE — PROGRESS NOTES
History and physical    Count includes the [facility] care note transcribed by Dr. [place] Granados date: 5/15/2023  Date seen: 5/16/2023    Chief Complaint: Status post fall with right femur fracture    HPI: Patient is a 70-year-old female status post fall right femur fracture, status post repair with Dr. Cinthia Lizama. Patient had mild hospital complications of incontinence/retention/UTI, Bryant was placed, this seems to be tolerated well. Patient seems to have controlled pain, tolerating medications, memory seems poor. Medications continued as recommend by the discharging hospital physician, patient seems stable, family members at bedside, therapy has seen her, living in assisted living at this time. PAST MEDICAL HISTORY: Significant for coronary artery disease status post CABG, status post angiogram in 2022 which showed occluded graft of the SVG to obtuse marginal 1 and 2. The patient underwent stenting. She also has heart failure with reduced ejection fraction of about 00% with diastolic dysfunction and wall motion hypokinesis. She has rheumatic valvular heart disease with mild-to-moderate aortic stenosis and mild to moderate aortic stenosis and mitral insufficiency and regurgitation. Atrial fibrillation status post Watchman procedure; hypertension; hyperlipidemia; osteoarthritis; osteoporosis; chronic kidney disease stage 3; hypothyroidism; minimally impacted fracture of the surgical neck of the left humerus, followed by Dr. Radhames Mcguire; non ST-elevation myocardial infarction in the past; left breast lumpectomy and radiation therapy for breast cancer; hysterectomy; laparoscopic cholecystectomy; coronary artery bypass grafting; and partial bowel resection. MEDICATIONS: Prior to admission are not well known at this time. There is no one at the facility who is able to tell us what medication the patient is currently taking. RN will continue to reach out to [facility] and hopefully have a med list by the morning.     ALLERGIES: Aspirin, penicillin, Macrobid, ciprofloxacin. FAMILY HISTORY: The patient's mother  at 62 of a CVA. She also had hypertension. Her father  at 39 of an MI.    SOCIAL HISTORY: The patient is an ex-smoker. She quit in her 46s. Currently no tobacco, alcohol, or drug use. Lives at [facility] she says independently, but her daughter has recently placed more resources in her apartment including an assistant, someone who make sure she takes her medications every day. She walks with the aid of a walker. Current medications: See chart    REVIEW OF SYSTEMS:  Constitutional: Negative for Chills, fatigue, fever, malaise, weight gain and weight loss. ENMT: Negative for Nasal drainage and sinus pressure. Eyes: Negative for Vision changes. Respiratory: Negative for Cough, dyspnea and wheezing. Cardio: Negative Chest pain and irregular heartbeat/palpitations. GI: Negative for Abdominal pain, constipation, diarrhea, heartburn, nausea and vomiting. : Negative for Dysuria and urinary frequency. Endocrine: Negative for Cold intolerance and heat intolerance. Neuro: Negative for Gait disturbance and memory impairment. Psych: Negative for Anxiety and depression. Integumentary: Negative for Hives and rash. MS: Negative muscle weakness. pos for joint pain  Hema/Lymph: Negative Easy bleeding and easy bruising. Allergic/Immuno: Negative Environmental allergies and food allergies. PHYSICAL EXAMINATION: Vital signs: See chart  Gen. exam: Alert and awake, mental status at baseline, in no acute distress  HEENT: Pupils equal and reactive to light and accommodation, moist mucous membranes  Neck exam: Supple with baseline range of motion.  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 appreciated, no obvious deformity  : Bryant catheter unremarkable Bryant to gravity    Labs/imaging: See chart    DVT prophylaxis: with Eliquis    Ambulatory status: Per hospital discharge recommendations, specialist involvement/recommendations and physical therapy evaluation    Assessment and plan: We have a fall, status post femur fracture now admitted for rehab  Rt intertrochanteric femur fracture s/p mechanical fall: Physical therapy, occupational therapy, physiatry to evaluate and treat, further orders pending clinical course, anticoagulation per surgery, followup 14 days postop for suture/staple removal  UTI: ESBL: Completed IV Zosyn  Pneumonia: Stable, completed IV Zosyn, involve in-house pulmonology, serial imaging recommended  History of coronary artery disease, status post CABG: Stable, offer in-house cardiology, continue current medications  Atrial stenosis, mitral insufficiency and stenosis: Stable, offer in-house cardiology, continue with current medication treatments  Heart failure with mildly reduced ejection fraction of 88% and diastolic dysfunction: Abdominalis cardiology, low threshold for resuming diuretics, CHF monitoring and protocol  Atrial fibrillation, status post Watchman procedure: Eliquis, rate control, Brilinta as well  Nonfasting hyperglycemia. A1c 5.7: Stable continue current blood sugar checks monitoring, currently not a diabetic  Chronic kidney disease: Stable continue current serial lab work monitoring and management    CODE STATUS: Full  admit condition: Stable    Over 60 minutes spent in direct patient contact reviewing and creating admission orders, obtaining history, evaluating patient, discussing treatment options, family/staff communication, review of available labs and radiology reports, completing documentation, and coordinating care

## 2023-06-12 NOTE — PROGRESS NOTES
[facility] hospital care note transcribed by Dr. Nikki Bellamy    Date seen: 5/22/2023    Subjective: Patient seen and examined for right femur fracture, pneumonia, ESBL UTI, coronary artery disease, cellulitis. Patient seems stable, doing well, is due to see the surgeon today, earlier on the staples is looking mildly red, she does not have any pain, mild evidence of serous fluid discharge. Daughter at bedside. I did touch base with wound, as well as nursing staff that we will transition her to her appointment staple removal.    PHYSICAL EXAMINATION: Vital signs: See chart  Gen. exam: Alert and awake, mental status at baseline, in no acute distress  HEENT: Pupils equal and reactive to light and accommodation, moist mucous membranes  Neck exam: Supple with baseline range of motion.  Normal thyroid trachea midline, no JVD  Heart exam: Regular rate and rhythm no murmurs no S3 no S4  Lung exam: No rales no rhonchi no wheezes  Abdominal exam: Soft nontender, nondistended positive bowel sounds are normoactive  Extremities exam: no clubbing no cyanosis right lower extremity 1/4 edema  Skin exam: see wound notes for details, no rashes, right hip incision site, clean dry and intact, mild evidence of serous fluid discharge, mild erythema surrounding the upper portion/incision site  Neurological exam: Cranial nerves II through XII intact, no gross deficits  Musculoskeletal exam: 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 appreciated, no obvious deformity  : Bryant catheter unremarkable Bryant to gravity    Labs/imaging: See chart    DVT prophylaxis: with Eliquis    Ambulatory status: Per hospital discharge recommendations, specialist involvement/recommendations and physical therapy evaluation    Assessment and plan: We have a fall, status post femur fracture now admitted for rehab  Rt intertrochanteric femur fracture s/p mechanical fall: Physical therapy, occupational therapy, physiatry to evaluate and treat, further orders pending clinical course, anticoagulation per surgery, followup 14 days postop for suture/staple removal  UTI: ESBL: Completed IV Zosyn  Urine retention: We will continue with Bryant catheter in, we will overlap the oral antibiotics by few days before removing the catheter later this week with a voiding trial  Memory impairment: Likely underdiagnosed Alzheimer's dementia, will continue with rehab plan, and offer to objectively once her medical conditions come together  Wound cellulitis: Mild, will cover with Duricef 500 mg twice daily for 7 days.     Stable problem list:  Pneumonia: Stable, completed IV Zosyn, involve in-house pulmonology, serial imaging recommended  Chronic kidney disease: Stable continue current serial lab work monitoring and management  History of coronary artery disease, status post CABG: Stable, offer in-house cardiology, continue current medications  Atrial stenosis, mitral insufficiency and stenosis: Stable, offer in-house cardiology, continue with current medication treatments  Heart failure with mildly reduced ejection fraction of 90% and diastolic dysfunction: Abdominalis cardiology, low threshold for resuming diuretics, CHF monitoring and protocol  Atrial fibrillation, status post Watchman procedure: Eliquis, rate control, Brilinta as well  Nonfasting hyperglycemia A1c 5.7: Stable continue current blood sugar checks monitoring, currently not a diabetic    CODE STATUS: Full

## 2023-06-12 NOTE — PROGRESS NOTES
[facility] care note transcribed by Dr. Dario Espana    Date seen: 5/25/2023    Subjective: Patient seen and examined for right femur fracture, pneumonia, ESBL UTI, coronary artery disease, cellulitis, lower extremity injury. Patient seems stable, doing well, but did sustain a skin lesion to the right lower extremity, she believes she hit her leg on the wheelchair leg, staff did identify a skin tear, is being dressed, wrapped, seems to be leaking serous fluid but bleeding is stopped. PHYSICAL EXAMINATION: Vital signs: See chart  Gen. exam: Alert and awake, mental status at baseline, in no acute distress  HEENT: Pupils equal and reactive to light and accommodation, moist mucous membranes  Neck exam: Supple with baseline range of motion.  Normal thyroid trachea midline, no JVD  Heart exam: Regular rate and rhythm no murmurs no S3 no S4  Lung exam: No rales no rhonchi no wheezes  Abdominal exam: Soft nontender, nondistended positive bowel sounds are normoactive  Extremities exam: no clubbing no cyanosis right lower extremity 1/4 edema  Skin exam: see wound notes for details, no rashes, right hip incision site, clean dry and intact, mild evidence of serous fluid discharge, mild erythema surrounding the upper portion/incision site, staples removed, 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 appreciated, no obvious deformity  : Bryant catheter unremarkable Bryant to gravity    Labs/imaging: See chart    DVT prophylaxis: with Eliquis    Ambulatory status: Per hospital discharge recommendations, specialist involvement/recommendations and physical therapy evaluation    Assessment and plan: We have a fall, status post femur fracture now admitted for rehab  Rt intertrochanteric femur fracture s/p mechanical fall: Physical therapy, occupational therapy, physiatry to evaluate and treat, further orders pending clinical course, anticoagulation per surgery, outpatient follow-up with surgery completed  UTI: ESBL: Completed IV Zosyn  Urine retention:  We will continue with Bryant catheter in, we will overlap the oral antibiotics by few days before removing the catheter later this week with a voiding trial  Memory impairment: Likely underdiagnosed Alzheimer's dementia, will continue with rehab plan, and offer to objectively once her medical conditions come together  Wound cellulitis: Complete Duricef, wound care following  Right lower extremity abrasion: Stable, continue current compressive wrapping, diuretics if appropriate, wound care following:    Stable problem list:  Pneumonia: Stable, completed IV Zosyn, involve in-house pulmonology, serial imaging recommended  Chronic kidney disease: Stable continue current serial lab work monitoring and management  History of coronary artery disease, status post CABG: Stable, offer in-house cardiology, continue current medications  Atrial stenosis, mitral insufficiency and stenosis: Stable, offer in-house cardiology, continue with current medication treatments  Heart failure with mildly reduced ejection fraction of 31% and diastolic dysfunction: Abdominalis cardiology, low threshold for resuming diuretics, CHF monitoring and protocol  Atrial fibrillation, status post Watchman procedure: Eliquis, rate control, Brilinta as well  Nonfasting hyperglycemia A1c 5.7: Stable continue current blood sugar checks monitoring, currently not a diabetic    CODE STATUS: Full

## 2023-06-12 NOTE — PROGRESS NOTES
[facility] care note transcribed by Dr. Rosalind Gill    Date seen: 5/23/2023    Subjective: Patient seen and examined for right femur fracture, pneumonia, ESBL UTI, coronary artery disease, cellulitis. Patient seen examined, seems stable, staples removed, area continues to drain mild serous fluid, she is tolerating antibiotics, no family at bedside, patient seems stable today. PHYSICAL EXAMINATION: Vital signs: See chart  Gen. exam: Alert and awake, mental status at baseline, in no acute distress  HEENT: Pupils equal and reactive to light and accommodation, moist mucous membranes  Neck exam: Supple with baseline range of motion.  Normal thyroid trachea midline, no JVD  Heart exam: Regular rate and rhythm no murmurs no S3 no S4  Lung exam: No rales no rhonchi no wheezes  Abdominal exam: Soft nontender, nondistended positive bowel sounds are normoactive  Extremities exam: no clubbing no cyanosis right lower extremity 1/4 edema  Skin exam: see wound notes for details, no rashes, right hip incision site, clean dry and intact, mild evidence of serous fluid discharge, mild erythema surrounding the upper portion/incision site, staples removed  Neurological exam: Cranial nerves II through XII intact, no gross deficits  Musculoskeletal exam: 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 appreciated, no obvious deformity  : Bryant catheter unremarkable Bryant to gravity    Labs/imaging: See chart    DVT prophylaxis: with Eliquis    Ambulatory status: Per hospital discharge recommendations, specialist involvement/recommendations and physical therapy evaluation    Assessment and plan: We have a fall, status post femur fracture now admitted for rehab  Rt intertrochanteric femur fracture s/p mechanical fall: Physical therapy, occupational therapy, physiatry to evaluate and treat, further orders pending clinical course, anticoagulation per surgery, outpatient follow-up with surgery completed  UTI: ESBL: Completed IV Zosyn  Urine retention:  We will continue with Bryant catheter in, we will overlap the oral antibiotics by few days before removing the catheter later this week with a voiding trial  Memory impairment: Likely underdiagnosed Alzheimer's dementia, will continue with rehab plan, and offer to objectively once her medical conditions come together  Wound cellulitis: Continue Duricef 500 mg twice daily for 7 days    Stable problem list:  Pneumonia: Stable, completed IV Zosyn, involve in-house pulmonology, serial imaging recommended  Chronic kidney disease: Stable continue current serial lab work monitoring and management  History of coronary artery disease, status post CABG: Stable, offer in-house cardiology, continue current medications  Atrial stenosis, mitral insufficiency and stenosis: Stable, offer in-house cardiology, continue with current medication treatments  Heart failure with mildly reduced ejection fraction of 48% and diastolic dysfunction: Abdominalis cardiology, low threshold for resuming diuretics, CHF monitoring and protocol  Atrial fibrillation, status post Watchman procedure: Eliquis, rate control, Brilinta as well  Nonfasting hyperglycemia A1c 5.7: Stable continue current blood sugar checks monitoring, currently not a diabetic    CODE STATUS: Full

## 2023-06-13 NOTE — PROGRESS NOTES
[facility] care note transcribed by Dr. Heladio Murphy    Date seen: 6/5/2023    Subjective: Patient seen and examined for right femur fracture, pneumonia, ESBL UTI, coronary artery disease, cellulitis, lower extremity injury, anemia. Patient seen and examined, uneventful weekend. Patient was noted to have hemoglobin counts in the mid sevens, it was checked and confirmed last week by the NP with stat lab work, seem to be stable. No signs of bleeding. PHYSICAL EXAMINATION: Vital signs: See chart  Gen. exam: Alert and awake, mental status at baseline, in no acute distress  HEENT: Pupils equal and reactive to light and accommodation, moist mucous membranes  Neck exam: Supple with baseline range of motion. Normal thyroid trachea midline, no JVD  Heart exam: Regular rate and rhythm no murmurs no S3 no S4  Lung exam: No rales no rhonchi no wheezes  Abdominal exam: Soft nontender, nondistended positive bowel sounds are normoactive  Extremities exam: no clubbing no cyanosis right lower extremity 1/4 edema  Skin exam: see wound notes for details, no rashes, right hip incision site, clean dry and intact, resolved erythema.  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 appreciated, no obvious deformity  : Bryant catheter unremarkable Bryant to gravity    Labs/imaging: See chart    DVT prophylaxis: with Eliquis    Ambulatory status: Per hospital discharge recommendations, specialist involvement/recommendations and physical therapy evaluation    Assessment and plan: We have a fall, status post femur fracture now admitted for rehab  Rt intertrochanteric femur fracture s/p mechanical fall: Physical therapy, occupational therapy, physiatry to evaluate and treat, further orders pending clinical course, anticoagulation per surgery, outpatient follow-up with surgery completed  UTI: ESBL: Completed IV Zosyn  Memory impairment: Likely underdiagnosed Alzheimer's dementia, will continue with rehab plan, and offer to objectively once her medical conditions come together  Right lower extremity abrasion: Stable, continue current compressive wrapping, diuretics if appropriate, wound care following  Anemia: Multifactorial continue monitoring, serial counts, encourage iron intake.     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

## 2023-06-13 NOTE — PROGRESS NOTES
[facility] care note transcribed by Dr. Rolan Garcia    Date seen: 5/30/2023    Subjective: Patient seen and examined for right femur fracture, pneumonia, ESBL UTI, coronary artery disease, cellulitis, lower extremity injury. Patient has remained stable, uneventful holiday weekend, stable and doing well, her other daughter is here today, we did touch base, she seems to be stable, progressing with therapy, still weak at times. Appetite does seem poor. PHYSICAL EXAMINATION: Vital signs: See chart  Gen. exam: Alert and awake, mental status at baseline, in no acute distress  HEENT: Pupils equal and reactive to light and accommodation, moist mucous membranes  Neck exam: Supple with baseline range of motion. Normal thyroid trachea midline, no JVD  Heart exam: Regular rate and rhythm no murmurs no S3 no S4  Lung exam: No rales no rhonchi no wheezes  Abdominal exam: Soft nontender, nondistended positive bowel sounds are normoactive  Extremities exam: no clubbing no cyanosis right lower extremity 1/4 edema  Skin exam: see wound notes for details, no rashes, right hip incision site, clean dry and intact, resolved erythema.  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 appreciated, no obvious deformity  : Bryant catheter unremarkable Bryant to gravity    Labs/imaging: See chart    DVT prophylaxis: with Eliquis    Ambulatory status: Per hospital discharge recommendations, specialist involvement/recommendations and physical therapy evaluation    Assessment and plan: We have a fall, status post femur fracture now admitted for rehab  Rt intertrochanteric femur fracture s/p mechanical fall: Physical therapy, occupational therapy, physiatry to evaluate and treat, further orders pending clinical course, anticoagulation per surgery, outpatient follow-up with surgery completed  UTI: ESBL: Completed IV Zosyn  Urine retention: voiding trial in the near future  Memory impairment: Likely underdiagnosed Alzheimer's dementia, will continue with rehab plan, and offer to objectively once her medical conditions come together  Right lower extremity abrasion: Stable, continue current compressive wrapping, diuretics if appropriate, wound care following    Stable problem list:  Wound cellulitis: Complete Duricef, wound care following, resolved  Pneumonia: Stable, completed IV Zosyn, involve in-house pulmonology, serial imaging recommended  Chronic kidney disease: Stable continue current serial lab work monitoring and management  History of coronary artery disease, status post CABG: Stable, offer in-house cardiology, continue current medications  Atrial stenosis, mitral insufficiency and stenosis: Stable, offer in-house cardiology, continue with current medication treatments  Heart failure with mildly reduced ejection fraction of 39% and diastolic dysfunction: Abdominalis cardiology, low threshold for resuming diuretics, CHF monitoring and protocol  Atrial fibrillation, status post Watchman procedure: Eliquis, rate control, Brilinta as well  Nonfasting hyperglycemia A1c 5.7: Stable continue current blood sugar checks monitoring, currently not a diabetic    CODE STATUS: Full

## 2023-06-13 NOTE — PROGRESS NOTES
Former[facility] care note transcribed by Dr. Brad Gallardo    Date seen: 6/1/2023    Subjective: Patient seen and examined for right femur fracture, pneumonia, ESBL UTI, coronary artery disease, cellulitis, lower extremity injury. Patient seems stable, no changes over the past 2 days, stable, no family at bedside, she continues to work with physical therapy, is feeling weak at times, but seems stable, lab work pending from today. PHYSICAL EXAMINATION: Vital signs: See chart  Gen. exam: Alert and awake, mental status at baseline, in no acute distress  HEENT: Pupils equal and reactive to light and accommodation, moist mucous membranes  Neck exam: Supple with baseline range of motion. Normal thyroid trachea midline, no JVD  Heart exam: Regular rate and rhythm no murmurs no S3 no S4  Lung exam: No rales no rhonchi no wheezes  Abdominal exam: Soft nontender, nondistended positive bowel sounds are normoactive  Extremities exam: no clubbing no cyanosis right lower extremity 1/4 edema  Skin exam: see wound notes for details, no rashes, right hip incision site, clean dry and intact, resolved erythema.  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 appreciated, no obvious deformity  : Bryant catheter unremarkable Bryant to gravity    Labs/imaging: See chart    DVT prophylaxis: with Eliquis    Ambulatory status: Per hospital discharge recommendations, specialist involvement/recommendations and physical therapy evaluation    Assessment and plan: We have a fall, status post femur fracture now admitted for rehab  Rt intertrochanteric femur fracture s/p mechanical fall: Physical therapy, occupational therapy, physiatry to evaluate and treat, further orders pending clinical course, anticoagulation per surgery, outpatient follow-up with surgery completed  UTI: ESBL: Completed IV Zosyn  Urine retention: voiding trial in the near future  Memory impairment: Likely underdiagnosed Alzheimer's dementia, will continue with rehab plan, and offer to objectively once her medical conditions come together  Right lower extremity abrasion: Stable, continue current compressive wrapping, diuretics if appropriate, wound care following    Stable problem list:  Wound cellulitis: Complete Duricef, wound care following, resolved  Pneumonia: Stable, completed IV Zosyn, involve in-house pulmonology, serial imaging recommended  Chronic kidney disease: Stable continue current serial lab work monitoring and management  History of coronary artery disease, status post CABG: Stable, offer in-house cardiology, continue current medications  Atrial stenosis, mitral insufficiency and stenosis: Stable, offer in-house cardiology, continue with current medication treatments  Heart failure with mildly reduced ejection fraction of 17% and diastolic dysfunction: Abdominalis cardiology, low threshold for resuming diuretics, CHF monitoring and protocol  Atrial fibrillation, status post Watchman procedure: Eliquis, rate control, Brilinta as well  Nonfasting hyperglycemia A1c 5.7: Stable continue current blood sugar checks monitoring, currently not a diabetic    CODE STATUS: Full

## 2023-06-15 ENCOUNTER — HOSPITAL ENCOUNTER (INPATIENT)
Facility: HOSPITAL | Age: 88
LOS: 4 days | Discharge: NURSING, REHAB OR LONG-TERM CARE | End: 2023-06-19
Attending: EMERGENCY MEDICINE | Admitting: HOSPITALIST
Payer: MEDICARE

## 2023-06-15 DIAGNOSIS — N30.01 ACUTE CYSTITIS WITH HEMATURIA: Primary | ICD-10-CM

## 2023-06-15 LAB
ANION GAP SERPL CALC-SCNC: 7 MMOL/L (ref 0–18)
BASOPHILS # BLD AUTO: 0.06 X10(3) UL (ref 0–0.2)
BASOPHILS NFR BLD AUTO: 1 %
BILIRUB UR QL: NEGATIVE
BUN BLD-MCNC: 18 MG/DL (ref 7–18)
BUN/CREAT SERPL: 18.4 (ref 10–20)
CALCIUM BLD-MCNC: 9.6 MG/DL (ref 8.5–10.1)
CHLORIDE SERPL-SCNC: 110 MMOL/L (ref 98–112)
CO2 SERPL-SCNC: 27 MMOL/L (ref 21–32)
COLOR UR: YELLOW
CREAT BLD-MCNC: 0.98 MG/DL
DEPRECATED RDW RBC AUTO: 72 FL (ref 35.1–46.3)
EOSINOPHIL # BLD AUTO: 0.11 X10(3) UL (ref 0–0.7)
EOSINOPHIL NFR BLD AUTO: 1.9 %
ERYTHROCYTE [DISTWIDTH] IN BLOOD BY AUTOMATED COUNT: 19 % (ref 11–15)
GFR SERPLBLD BASED ON 1.73 SQ M-ARVRAT: 53 ML/MIN/1.73M2 (ref 60–?)
GLUCOSE BLD-MCNC: 125 MG/DL (ref 70–99)
GLUCOSE UR-MCNC: NORMAL MG/DL
HCT VFR BLD AUTO: 33.8 %
HGB BLD-MCNC: 10.1 G/DL
IMM GRANULOCYTES # BLD AUTO: 0.02 X10(3) UL (ref 0–1)
IMM GRANULOCYTES NFR BLD: 0.3 %
KETONES UR-MCNC: NEGATIVE MG/DL
LEUKOCYTE ESTERASE UR QL STRIP.AUTO: 500
LYMPHOCYTES # BLD AUTO: 1.04 X10(3) UL (ref 1–4)
LYMPHOCYTES NFR BLD AUTO: 17.5 %
MCH RBC QN AUTO: 30.9 PG (ref 26–34)
MCHC RBC AUTO-ENTMCNC: 29.9 G/DL (ref 31–37)
MCV RBC AUTO: 103.4 FL
MONOCYTES # BLD AUTO: 0.53 X10(3) UL (ref 0.1–1)
MONOCYTES NFR BLD AUTO: 8.9 %
NEUTROPHILS # BLD AUTO: 4.17 X10 (3) UL (ref 1.5–7.7)
NEUTROPHILS # BLD AUTO: 4.17 X10(3) UL (ref 1.5–7.7)
NEUTROPHILS NFR BLD AUTO: 70.4 %
OSMOLALITY SERPL CALC.SUM OF ELEC: 301 MOSM/KG (ref 275–295)
PH UR: 7 [PH] (ref 5–8)
PLATELET # BLD AUTO: 256 10(3)UL (ref 150–450)
PLATELET MORPHOLOGY: NORMAL
POTASSIUM SERPL-SCNC: 3.8 MMOL/L (ref 3.5–5.1)
PROT UR-MCNC: 50 MG/DL
RBC # BLD AUTO: 3.27 X10(6)UL
RBC #/AREA URNS AUTO: >10 /HPF
SODIUM SERPL-SCNC: 144 MMOL/L (ref 136–145)
SP GR UR STRIP: 1.02 (ref 1–1.03)
UROBILINOGEN UR STRIP-ACNC: NORMAL
WBC # BLD AUTO: 5.9 X10(3) UL (ref 4–11)
WBC #/AREA URNS AUTO: >50 /HPF

## 2023-06-15 PROCEDURE — 99223 1ST HOSP IP/OBS HIGH 75: CPT | Performed by: HOSPITALIST

## 2023-06-15 RX ORDER — TRAMADOL HYDROCHLORIDE 50 MG/1
50 TABLET ORAL EVERY 6 HOURS PRN
Status: DISCONTINUED | OUTPATIENT
Start: 2023-06-15 | End: 2023-06-19

## 2023-06-15 RX ORDER — POTASSIUM CHLORIDE 1500 MG/1
20 TABLET, EXTENDED RELEASE ORAL DAILY
COMMUNITY

## 2023-06-15 RX ORDER — ACETAMINOPHEN 500 MG
500 TABLET ORAL EVERY 4 HOURS PRN
Status: DISCONTINUED | OUTPATIENT
Start: 2023-06-15 | End: 2023-06-19

## 2023-06-15 RX ORDER — ONDANSETRON 2 MG/ML
4 INJECTION INTRAMUSCULAR; INTRAVENOUS EVERY 6 HOURS PRN
Status: DISCONTINUED | OUTPATIENT
Start: 2023-06-15 | End: 2023-06-19

## 2023-06-15 RX ORDER — ROSUVASTATIN CALCIUM 20 MG/1
20 TABLET, COATED ORAL NIGHTLY
Status: DISCONTINUED | OUTPATIENT
Start: 2023-06-15 | End: 2023-06-19

## 2023-06-15 RX ORDER — ONDANSETRON 4 MG/1
4 TABLET, ORALLY DISINTEGRATING ORAL EVERY 8 HOURS PRN
COMMUNITY

## 2023-06-15 RX ORDER — LOPERAMIDE HYDROCHLORIDE 2 MG/1
2 CAPSULE ORAL 4 TIMES DAILY PRN
Status: DISCONTINUED | OUTPATIENT
Start: 2023-06-15 | End: 2023-06-19

## 2023-06-15 RX ORDER — ACETAMINOPHEN 325 MG/1
650 TABLET ORAL EVERY 6 HOURS PRN
COMMUNITY
End: 2023-06-19

## 2023-06-15 RX ORDER — TRAMADOL HYDROCHLORIDE 50 MG/1
50 TABLET ORAL EVERY 6 HOURS SCHEDULED
Status: DISCONTINUED | OUTPATIENT
Start: 2023-06-15 | End: 2023-06-15

## 2023-06-15 RX ORDER — MULTIVITAMIN
1 TABLET ORAL DAILY
COMMUNITY
End: 2023-06-19

## 2023-06-15 RX ORDER — DOCUSATE SODIUM 100 MG/1
100 CAPSULE, LIQUID FILLED ORAL 2 TIMES DAILY PRN
Status: DISCONTINUED | OUTPATIENT
Start: 2023-06-15 | End: 2023-06-19

## 2023-06-15 RX ORDER — METOPROLOL SUCCINATE 25 MG/1
25 TABLET, EXTENDED RELEASE ORAL EVERY 12 HOURS
Status: DISCONTINUED | OUTPATIENT
Start: 2023-06-15 | End: 2023-06-19

## 2023-06-15 RX ORDER — MELATONIN
325
COMMUNITY
End: 2023-06-19

## 2023-06-15 RX ORDER — ACETAMINOPHEN 500 MG
500 TABLET ORAL EVERY 6 HOURS PRN
COMMUNITY

## 2023-06-15 RX ORDER — LEVOTHYROXINE SODIUM 0.1 MG/1
100 TABLET ORAL
Status: DISCONTINUED | OUTPATIENT
Start: 2023-06-15 | End: 2023-06-19

## 2023-06-15 RX ORDER — METOCLOPRAMIDE HYDROCHLORIDE 5 MG/ML
5 INJECTION INTRAMUSCULAR; INTRAVENOUS EVERY 8 HOURS PRN
Status: DISCONTINUED | OUTPATIENT
Start: 2023-06-15 | End: 2023-06-19

## 2023-06-15 RX ORDER — FUROSEMIDE 20 MG/1
20 TABLET ORAL DAILY
Status: DISCONTINUED | OUTPATIENT
Start: 2023-06-15 | End: 2023-06-16

## 2023-06-15 RX ORDER — HEPARIN SODIUM 5000 [USP'U]/ML
5000 INJECTION, SOLUTION INTRAVENOUS; SUBCUTANEOUS EVERY 12 HOURS SCHEDULED
Status: DISCONTINUED | OUTPATIENT
Start: 2023-06-15 | End: 2023-06-15

## 2023-06-15 NOTE — ED INITIAL ASSESSMENT (HPI)
Pt presents to the ER with c/o burning upon urination that started yesterday. Per daughter pt has frequent UTI's and pt was confused a few days ago.

## 2023-06-15 NOTE — ED QUICK NOTES
Orders for admission, patient is aware of plan and ready to go upstairs. Any questions, please call ED RN [facility] at extension [phone].      Patient Covid vaccination status: Fully vaccinated     COVID Test Ordered in ED: None    COVID Suspicion at Admission: N/A    Running Infusions:      Mental Status/LOC at time of transport: x4    Other pertinent information:   CIWA score: N/A   NIH score:  N/A

## 2023-06-15 NOTE — H&P
[facility]    PATIENT'S NAME: Tomás Christy   ATTENDING PHYSICIAN: Jeovany Eddy MD   PATIENT ACCOUNT#:   [id number]    LOCATION:  [address] [place] [address] RECORD #:   [id number]       YOB: 1928  ADMISSION DATE:       06/15/2023    HISTORY AND PHYSICAL EXAMINATION    CHIEF COMPLAINT:  Urinary tract infection, history of ESBL E coli-positive urine cultures. HISTORY OF PRESENT ILLNESS:  The patient is a 20-year-old  female who was hospitalized last month with right femur intertrochanteric fracture requiring open reduction and internal fixation. Discharged to a rehab facility and then home. Today she was sent to the hospital for increased confusion. CBC showed no leukocytosis or left shift. Urinalysis showed positive leukocyte esterase and evidence of urinary tract infection. Chemistry showed GFR of 53 which is around baseline. The patient was started on IV antibiotics. She will be admitted to the hospital for further management. PAST MEDICAL HISTORY:  Coronary artery disease, status post coronary artery bypass graft surgery. She had drug-eluting stents to SVG leading to diagonal, obtuse marginal, and RCA; ischemic cardiomyopathy, ejection fraction 30% to 35%; moderate aortic stenosis; moderate mitral regurgitation; moderate pulmonary hypertension; chronic atrial fibrillation, status post Watchman device; hypertension; hyperlipidemia; osteoarthritis; osteoporosis; chronic kidney disease stage 3. PAST SURGICAL HISTORY:  Right femur open reduction and internal fixation, left breast lumpectomy followed by radiation therapy for breast cancer, hysterectomy, laparoscopic cholecystectomy, coronary artery bypass graft surgery, and partial small bowel resection. MEDICATIONS:  Please see medication reconciliation list.    ALLERGIES:  Aspirin, penicillin, Macrobid, and ciprofloxacin. The patient is not able to confirm the reactions.     FAMILY HISTORY:  Mother had cerebrovascular accident and hypertension. Father had coronary artery disease. SOCIAL HISTORY:  Ex-tobacco user. No current tobacco, alcohol, or drug use. Lives in an independent living facility. Requires some assistance in her basic activities of daily living. Uses a walker. REVIEW OF SYSTEMS:  The patient is pleasant and answers questions, but she seems confused at times. She said she had dysuria and urine frequency recently, and she has been feeling weaker. Other 12-point review of systems negative. PHYSICAL EXAMINATION:    GENERAL:  Alert. Able to answer questions. Seems confused at times but no distress. VITAL SIGNS:  Temperature 97.4, pulse 82, respiratory rate 18, blood pressure 133/101, pulse ox 93% on room air. HEENT:  Atraumatic. Oropharynx clear, dry mucous membranes. Normal hard and soft palate. Eyes:  Anicteric sclerae. NECK:  Supple. No lymphadenopathy. Trachea midline. Full range of motion. Mild jugular venous distention. LUNGS:  Diminished breathing sounds both lung bases. HEART:  Irregular rhythm. S1, S2 auscultated. ABDOMEN:  Soft, nondistended, no tenderness. Positive bowel sounds. EXTREMITIES:  +1 to 2 edema both legs. No clubbing or cyanosis. Right lateral hip femur surgery wound is well healed. NEUROLOGIC:  Motor and sensory intact. ASSESSMENT:    1. Urinary tract infection. Recent urine culture positive for extended spectrum beta lactamase Escherichia coli. 2.   Underlying ischemic cardiomyopathy. 3.   Hypertension. 4.   Encephalopathy, metabolic secondary to urinary tract infection. 5.   Chronic atrial fibrillation. PLAN:  The patient will be admitted to general medical floor. We will start her on IV meropenem, follow up on urine culture, fall precautions, physical therapy, continue her home medications. Further recommendations to follow.     Dictated By Chuck Schwab MD  d: 06/15/2023 11:52:51  t: 06/15/2023 12:30:11  [facility] [id number]  FB/

## 2023-06-16 ENCOUNTER — APPOINTMENT (OUTPATIENT)
Dept: PICC SERVICES | Facility: HOSPITAL | Age: 88
End: 2023-06-16
Attending: HOSPITALIST
Payer: MEDICARE

## 2023-06-16 ENCOUNTER — APPOINTMENT (OUTPATIENT)
Dept: GENERAL RADIOLOGY | Facility: HOSPITAL | Age: 88
End: 2023-06-16
Attending: HOSPITALIST
Payer: MEDICARE

## 2023-06-16 LAB
ANION GAP SERPL CALC-SCNC: 7 MMOL/L (ref 0–18)
BUN BLD-MCNC: 20 MG/DL (ref 7–18)
BUN/CREAT SERPL: 24.1 (ref 10–20)
CALCIUM BLD-MCNC: 8.9 MG/DL (ref 8.5–10.1)
CHLORIDE SERPL-SCNC: 110 MMOL/L (ref 98–112)
CO2 SERPL-SCNC: 26 MMOL/L (ref 21–32)
CREAT BLD-MCNC: 0.83 MG/DL
GFR SERPLBLD BASED ON 1.73 SQ M-ARVRAT: 65 ML/MIN/1.73M2 (ref 60–?)
GLUCOSE BLD-MCNC: 98 MG/DL (ref 70–99)
NT-PROBNP SERPL-MCNC: ABNORMAL PG/ML (ref ?–450)
OSMOLALITY SERPL CALC.SUM OF ELEC: 299 MOSM/KG (ref 275–295)
POTASSIUM SERPL-SCNC: 3.4 MMOL/L (ref 3.5–5.1)
SODIUM SERPL-SCNC: 143 MMOL/L (ref 136–145)

## 2023-06-16 PROCEDURE — 71045 X-RAY EXAM CHEST 1 VIEW: CPT | Performed by: HOSPITALIST

## 2023-06-16 PROCEDURE — 99233 SBSQ HOSP IP/OBS HIGH 50: CPT | Performed by: HOSPITALIST

## 2023-06-16 RX ORDER — FUROSEMIDE 10 MG/ML
20 INJECTION INTRAMUSCULAR; INTRAVENOUS ONCE
Status: COMPLETED | OUTPATIENT
Start: 2023-06-16 | End: 2023-06-16

## 2023-06-16 RX ORDER — POTASSIUM CHLORIDE 20 MEQ/1
40 TABLET, EXTENDED RELEASE ORAL ONCE
Status: COMPLETED | OUTPATIENT
Start: 2023-06-16 | End: 2023-06-16

## 2023-06-16 NOTE — CM/SW NOTE
06/16/23 1700   CM/SW Referral Data   Referral Source Physician   Reason for Referral Discharge planning   Medical Hx   Does patient have an established PCP? Yes   Patient Info   Patient lives with Other  [facility])   Patient Status Prior to Admission   Services in place prior to admission [facility]   Discharge Needs   Anticipated D/C needs Home health care;Subacute rehab     Admitted on 6/15 for cystitis. Therapy will continue to follow. Undecided if to return to [facility] with Residential (current with Residential) [facility] OR [facility]. Will need three midnights to qualify for GAGAN. If patient needs GAGAN, preference would be [facility] if possible. Will need PASRR if GAGAN is final recommendation. PLAN:  Either return to [facility] in [facility] with Residential  or GAGAN. Referrals for GAGAN started in [place]. Will continue to follow to see if Pavithra meets GAGAN criteria. F2F completed for [facility], GAGAN referrals started in [place]. / to remain available for support and/or discharge planning.       Claire BOON RN [address]  RN Case Manager  [phone]

## 2023-06-17 ENCOUNTER — APPOINTMENT (OUTPATIENT)
Dept: ULTRASOUND IMAGING | Facility: HOSPITAL | Age: 88
End: 2023-06-17
Attending: HOSPITALIST
Payer: MEDICARE

## 2023-06-17 LAB
ANION GAP SERPL CALC-SCNC: 9 MMOL/L (ref 0–18)
BUN BLD-MCNC: 21 MG/DL (ref 7–18)
BUN/CREAT SERPL: 23.6 (ref 10–20)
CALCIUM BLD-MCNC: 9 MG/DL (ref 8.5–10.1)
CHLORIDE SERPL-SCNC: 106 MMOL/L (ref 98–112)
CO2 SERPL-SCNC: 27 MMOL/L (ref 21–32)
CREAT BLD-MCNC: 0.89 MG/DL
DEPRECATED RDW RBC AUTO: 67.7 FL (ref 35.1–46.3)
ERYTHROCYTE [DISTWIDTH] IN BLOOD BY AUTOMATED COUNT: 18.4 % (ref 11–15)
GFR SERPLBLD BASED ON 1.73 SQ M-ARVRAT: 60 ML/MIN/1.73M2 (ref 60–?)
GLUCOSE BLD-MCNC: 112 MG/DL (ref 70–99)
HCT VFR BLD AUTO: 29.9 %
HGB BLD-MCNC: 9.2 G/DL
MCH RBC QN AUTO: 30.8 PG (ref 26–34)
MCHC RBC AUTO-ENTMCNC: 30.8 G/DL (ref 31–37)
MCV RBC AUTO: 100 FL
OSMOLALITY SERPL CALC.SUM OF ELEC: 298 MOSM/KG (ref 275–295)
PLATELET # BLD AUTO: 241 10(3)UL (ref 150–450)
POTASSIUM SERPL-SCNC: 3.1 MMOL/L (ref 3.5–5.1)
POTASSIUM SERPL-SCNC: 3.1 MMOL/L (ref 3.5–5.1)
RBC # BLD AUTO: 2.99 X10(6)UL
SODIUM SERPL-SCNC: 142 MMOL/L (ref 136–145)
T4 FREE SERPL-MCNC: 1.5 NG/DL (ref 0.8–1.7)
TSI SER-ACNC: 4.29 MIU/ML (ref 0.36–3.74)
VIT B12 SERPL-MCNC: 392 PG/ML (ref 193–986)
WBC # BLD AUTO: 7.4 X10(3) UL (ref 4–11)

## 2023-06-17 PROCEDURE — 76770 US EXAM ABDO BACK WALL COMP: CPT | Performed by: HOSPITALIST

## 2023-06-17 PROCEDURE — 99233 SBSQ HOSP IP/OBS HIGH 50: CPT | Performed by: HOSPITALIST

## 2023-06-17 RX ORDER — FUROSEMIDE 20 MG/1
20 TABLET ORAL DAILY
Status: DISCONTINUED | OUTPATIENT
Start: 2023-06-17 | End: 2023-06-19

## 2023-06-17 RX ORDER — POTASSIUM CHLORIDE 20 MEQ/1
40 TABLET, EXTENDED RELEASE ORAL ONCE
Status: COMPLETED | OUTPATIENT
Start: 2023-06-17 | End: 2023-06-17

## 2023-06-17 RX ORDER — VANCOMYCIN HYDROCHLORIDE 125 MG/1
125 CAPSULE ORAL DAILY
Status: DISCONTINUED | OUTPATIENT
Start: 2023-06-17 | End: 2023-06-19

## 2023-06-17 NOTE — CM/SW NOTE
RN informed SW that pt's daughter expressed interested in [facility], due to having a hx there. SW [employer] via [place] to inform them they are pt's 1st choice. Awaiting response of acceptance and bed availability. SW/CM to remain available for support and/or discharge planning.      PLAN: Vincenzo Nguyen, pending acceptance & bed availability    [id number]  (06/17/23, [id number])

## 2023-06-18 LAB — POTASSIUM SERPL-SCNC: 3.5 MMOL/L (ref 3.5–5.1)

## 2023-06-18 PROCEDURE — 99233 SBSQ HOSP IP/OBS HIGH 50: CPT | Performed by: HOSPITALIST

## 2023-06-18 NOTE — CM/SW NOTE
Silvino Gunn in admissions at [facility] confirmed patient is accepted for [facility] at [place]. Facility reserved in [facility]. Plan: [facility] for [facility] pending medical clearance.     Robi Reece, BSN    [phone]

## 2023-06-19 VITALS
SYSTOLIC BLOOD PRESSURE: 138 MMHG | HEART RATE: 69 BPM | BODY MASS INDEX: 18.48 KG/M2 | RESPIRATION RATE: 16 BRPM | HEIGHT: 63 IN | DIASTOLIC BLOOD PRESSURE: 69 MMHG | TEMPERATURE: 98 F | OXYGEN SATURATION: 94 % | WEIGHT: 104.31 LBS

## 2023-06-19 PROCEDURE — 99239 HOSP IP/OBS DSCHRG MGMT >30: CPT | Performed by: HOSPITALIST

## 2023-06-19 NOTE — CDS QUERY
Dr. Mallory Kinsey: To answer this query: DON'T [id number] [facility], 1st click on 3 dots to the RIGHT OF CO-SIGN Button AND CLICK EDIT.    2nd type an \"X\" in the bracket for the diagnosis that applies. (You may type in any additional clinical details you feel necessary to substantiate your response). 3rd Then Click on the Sign Button to complete your response. Thank you. CONFLICTING DOCUMENTATION CLARIFICATION FORM     In Hospitalist's Progress notes 6/17/23 and 6/18/23 and  Hospitalist's 6/19/23 DCS both Acute on Chronic Combined CHF and Compensated are both documented. Please clarify the appropriated diagnosis for this patient:       (  x ) Acute on Chronic Combined CHF. (   )  Chronic Combined CHF Compensated     (   )  Other Explanation, please specify:________________________________    _____________________________________________________________________  Information from the Medical Record:     06/15/23 ED ADMISSION,  06/15/23 H&P: 58-year-old  female who  was hospitalized last month with right femur intertrochanteric fracture requiring open reduction and internal fixation. Discharged to a rehab facility and then home. Today she was sent to the hospital for increased confusion. Additional  PMH:  CAD, s/p CABG, ,She had drug-eluting stents to SVG leading to diagonal, obtuse marginal, and RCA; ischemic cardiomyopathy, ejection fraction 30% to 35%; moderate aortic stenosis; moderate mitral regurgitation; moderate pulmonary hypertension; chronic atrial fibrillation, status post Watchman device; hypertension; hyperlipidemia; osteoarthritis; osteoporosis; chronic kidney disease stage 3. ASSESSMENT:  UTI, Underlying ischemic cardiomyopathy, HTN, Encephalopathy, metabolic secondary to urinary tract infection, Chronic AFIB     06/16/23 Hospitalist Progress Note Subjective: her dysuria has decreased. she c/o SOB and appears winded with talking.  Exam: Respiratory: crackles R base Cardiovascular: S1, S2.Regular rate and rhythm. No murmurs, rubs or gallops. Extremities: No edema. # h/o combined CHF. ECHO 11/22 - EF 30-35%,  Compensated. , cont GDMT: lasix 20mg daily, metoprolol 25mg BID.    06/16/23 PCXR:  FINDINGS:   Stable moderately enlarged heart. Increased mild interstitial lung edema. No pleural effusion or pneumothorax. CONCLUSION:   Increased mild interstitial edema      06/16/23 BNP: [id number]     06/17/23, and 06/18/23 Hospitalist Progress Notes and 6/19/23 DCS: Hospital Course:  # Acute on chronic combined CHF.  - ECHO 11/22 - EF 30-35%  - compensated. - cont GDMT: metoprolol 25mg BID. Given Lasix 20mg IV x 1 yesterday. - resume home oral lasix today  - BNP elevated. CXR with mild interstitial edema. Treatment: 06/15 Lasix 20mg po, 06/16 am Lasix 20mg po, 06/16 18:30  Lasix 20mg IV X1, 06/17/23 Lasix 20mg po every day ordered,     ________________________________________________________________  If you have any questions, please contact Clinical : Driss Garcia RN at [phone]    Thank You.     THIS FORM IS A PERMANENT PART OF THE MEDICAL RECORD

## 2023-06-19 NOTE — CM/SW NOTE
06/19/23 0925   Discharge disposition   Expected discharge disposition [address] [facility] Provider [facility] [place]   Dis[address] transportation [address]     Pt discussed during nursing rounds. Pt is stable for dc today. MD dc order entered. Pt will dc to [facility] for G[facility]AN, liagiuliana Avalos confirmed bed is available today. Pt and daughter agreeable to transfer today and cost of medicar which will be $45. [address] [facility] scheduled for 2pm . Number for nurse report is [phone]. Plan: [facility] for GAGAN today. / to remain available for support and/or discharge planning.      Kalpana Martin BSN    [phone]

## 2023-06-20 ENCOUNTER — EXTERNAL FACILITY (OUTPATIENT)
Dept: INTERNAL MEDICINE CLINIC | Facility: CLINIC | Age: 88
End: 2023-06-20

## 2023-06-20 DIAGNOSIS — N18.30 STAGE 3 CHRONIC KIDNEY DISEASE, UNSPECIFIED WHETHER STAGE 3A OR 3B CKD (HCC): ICD-10-CM

## 2023-06-20 DIAGNOSIS — Z86.79 HISTORY OF CAD (CORONARY ARTERY DISEASE): ICD-10-CM

## 2023-06-20 DIAGNOSIS — I08.0 MITRAL INSUFFICIENCY AND AORTIC STENOSIS: ICD-10-CM

## 2023-06-20 DIAGNOSIS — I50.9 CONGESTIVE HEART FAILURE, UNSPECIFIED HF CHRONICITY, UNSPECIFIED HEART FAILURE TYPE (HCC): ICD-10-CM

## 2023-06-20 DIAGNOSIS — N39.0 UTI DUE TO EXTENDED-SPECTRUM BETA LACTAMASE (ESBL) PRODUCING ESCHERICHIA COLI: Primary | ICD-10-CM

## 2023-06-20 DIAGNOSIS — I48.91 ATRIAL FIBRILLATION WITH RAPID VENTRICULAR RESPONSE (HCC): ICD-10-CM

## 2023-06-20 DIAGNOSIS — Z16.12 UTI DUE TO EXTENDED-SPECTRUM BETA LACTAMASE (ESBL) PRODUCING ESCHERICHIA COLI: Primary | ICD-10-CM

## 2023-06-20 DIAGNOSIS — S72.001A CLOSED FRACTURE OF RIGHT HIP, INITIAL ENCOUNTER (HCC): ICD-10-CM

## 2023-06-20 DIAGNOSIS — B96.29 UTI DUE TO EXTENDED-SPECTRUM BETA LACTAMASE (ESBL) PRODUCING ESCHERICHIA COLI: Primary | ICD-10-CM

## 2023-06-20 LAB — METHYLMALONIC ACID: 318 NMOL/L

## 2023-06-22 ENCOUNTER — TELEPHONE (OUTPATIENT)
Dept: INTERNAL MEDICINE CLINIC | Facility: CLINIC | Age: 88
End: 2023-06-22

## 2023-06-22 ENCOUNTER — EXTERNAL FACILITY (OUTPATIENT)
Dept: INTERNAL MEDICINE CLINIC | Facility: CLINIC | Age: 88
End: 2023-06-22

## 2023-06-22 DIAGNOSIS — B96.29 UTI DUE TO EXTENDED-SPECTRUM BETA LACTAMASE (ESBL) PRODUCING ESCHERICHIA COLI: Primary | ICD-10-CM

## 2023-06-22 DIAGNOSIS — N39.0 UTI DUE TO EXTENDED-SPECTRUM BETA LACTAMASE (ESBL) PRODUCING ESCHERICHIA COLI: Primary | ICD-10-CM

## 2023-06-22 DIAGNOSIS — N18.30 STAGE 3 CHRONIC KIDNEY DISEASE, UNSPECIFIED WHETHER STAGE 3A OR 3B CKD (HCC): ICD-10-CM

## 2023-06-22 DIAGNOSIS — S72.001A CLOSED FRACTURE OF RIGHT HIP, INITIAL ENCOUNTER (HCC): ICD-10-CM

## 2023-06-22 DIAGNOSIS — Z16.12 UTI DUE TO EXTENDED-SPECTRUM BETA LACTAMASE (ESBL) PRODUCING ESCHERICHIA COLI: Primary | ICD-10-CM

## 2023-06-22 DIAGNOSIS — E87.6 HYPOKALEMIA: ICD-10-CM

## 2023-06-22 RX ORDER — METOPROLOL SUCCINATE 25 MG/1
25 TABLET, EXTENDED RELEASE ORAL EVERY 12 HOURS
Qty: 180 TABLET | Refills: 0 | Status: SHIPPED | OUTPATIENT
Start: 2023-06-22

## 2023-06-22 NOTE — TELEPHONE ENCOUNTER
Please review. Protocol failed / Has no protocol.      Will make phone attempt to schedule pt with PCP    Requested Prescriptions   Pending Prescriptions Disp Refills    METOPROLOL SUCCINATE ER 25 MG Oral Tablet 24 Hr [Pharmacy Med Name: METOPROLOL ER SUCCINATE 25MG TABS] 180 tablet 0     Sig: TAKE 1 TABLET(25 MG) BY MOUTH EVERY 12 HOURS       Hypertensive Medications Protocol Failed - 6/22/2023 12:28 PM        Failed - In person appointment in the past 12 or next 3 months     Recent Outpatient Visits              1 month ago Closed displaced articular fracture of head of right femur with routine healing    [address] Lu[address] Cheng MD    Office Visit    1 month ago Congestive heart failure, unspecified HF chronicity, unspecified heart failure type [facility])    [address], APRN    Office Visit    2 months ago Closed 2-part displaced fracture of surgical neck of left humerus with routine healing, subsequent encounter    [address] Cheng MD    Office Visit    3 months ago Congestive heart failure, unspecified HF chronicity, unspecified heart failure type [facility])    [address], APRN    Office Visit    3 months ago Closed 2-part displaced fracture of surgical neck of left humerus with routine healing, subsequent encounter    [address]Alex[address] Cheng MD    Office Visit          Future Appointments         Provider Department Appt Notes    In 2 weeks MD Cedrick Shens 94               Failed - In person appointment or virtual visit in the past 6 months     Recent Outpatient Visits              1 month ago Closed displaced articular fracture of head of right femur with routine healing    Matthew Cheng MD    Office Visit    1 month ago Congestive heart failure, unspecified HF chronicity, unspecified heart failure type [facility])    [facility] Adelia Glaser, APRN    Office Visit    2 months ago Closed 2-part displaced fracture of surgical neck of left humerus with routine healing, subsequent encounter    [address] Hamilton MD    Office Visit    3 months ago Congestive heart failure, unspecified HF chronicity, unspecified heart failure type [facility])    [address], APRN    Office Visit    3 months ago Closed 2-part displaced fracture of surgical neck of left humerus with routine healing, subsequent encounter    17[address] Luiz Hamilton MD    Office Visit          Future Appointments         Provider Department Appt Notes    In 2 weeks Melodie Marmolejo MD [address] [facility]               Passed - Last BP reading less than 140/90     BP Readings from Last 1 Encounters:  06/19/23 : 138/69              Passed - CMP or BMP in past 6 months     Recent Results (from the past 4392 hour(s))   Basic Metabolic Panel (8)    Collection Time: 06/17/23  4:14 AM   Result Value Ref Range    Glucose 112 (H) 70 - 99 mg/dL    Sodium 142 136 - 145 mmol/L    Potassium 3.1 (L) 3.5 - 5.1 mmol/L    Chloride 106 98 - 112 mmol/L    CO2 27.0 21.0 - 32.0 mmol/L    Anion Gap 9 0 - 18 mmol/L    BUN 21 (H) 7 - 18 mg/dL    Creatinine 0.89 0.55 - 1.02 mg/dL    BUN/CREA Ratio 23.6 (H) 10.0 - 20.0    Calcium, Total 9.0 8.5 - 10.1 mg/dL    Calculated Osmolality 298 (H) 275 - 295 mOsm/kg    eGFR-Cr 60 >=60 mL/min/1.73m2     *Note: Due to a large number of results and/or encounters for the requested time period, some results have not been displayed. A complete set of results can be found in Results Review.                Passed - EGFRCR or GFRNAA > 50     GFR Evaluation  EGFRCR: 60 , resulted on 6/17/2023             Future Appointments         Provider Department Appt Notes    In 2 weeks MD Cedrick Villeda 94           Recent Outpatient Visits 1 month ago Closed displaced articular fracture of head of right femur with routine healing    1717 Luiz Moreira MD    Office Visit    1 month ago Congestive heart failure, unspecified HF chronicity, unspecified heart failure type [facility])    [address], APRN    Office Visit    2 months ago Closed 2-part displaced fracture of surgical neck of left humerus with routine healing, subsequent encounter    1717 Luiz Moreira MD    Office Visit    3 months ago Congestive heart failure, unspecified HF chronicity, unspecified heart failure type [facility])    [address], APRN    Office Visit    3 months ago Closed 2-part displaced fracture of surgical neck of left humerus with routine healing, subsequent encounter    1717 Luiz Moreira MD    Office Visit None

## 2023-06-23 NOTE — TELEPHONE ENCOUNTER
FYI: Tried several time to contact patient but home# it rings then turn to busy signal then Cell# is just ringing no answer, please send letter to patient.

## 2023-06-26 ENCOUNTER — EXTERNAL FACILITY (OUTPATIENT)
Dept: INTERNAL MEDICINE CLINIC | Facility: CLINIC | Age: 88
End: 2023-06-26

## 2023-06-26 DIAGNOSIS — E87.6 HYPOKALEMIA: ICD-10-CM

## 2023-06-26 DIAGNOSIS — Z16.12 UTI DUE TO EXTENDED-SPECTRUM BETA LACTAMASE (ESBL) PRODUCING ESCHERICHIA COLI: Primary | ICD-10-CM

## 2023-06-26 DIAGNOSIS — N18.30 STAGE 3 CHRONIC KIDNEY DISEASE, UNSPECIFIED WHETHER STAGE 3A OR 3B CKD (HCC): ICD-10-CM

## 2023-06-26 DIAGNOSIS — N39.0 UTI DUE TO EXTENDED-SPECTRUM BETA LACTAMASE (ESBL) PRODUCING ESCHERICHIA COLI: Primary | ICD-10-CM

## 2023-06-26 DIAGNOSIS — B96.29 UTI DUE TO EXTENDED-SPECTRUM BETA LACTAMASE (ESBL) PRODUCING ESCHERICHIA COLI: Primary | ICD-10-CM

## 2023-06-26 DIAGNOSIS — S72.001A CLOSED FRACTURE OF RIGHT HIP, INITIAL ENCOUNTER (HCC): ICD-10-CM

## 2023-06-26 PROCEDURE — 99309 SBSQ NF CARE MODERATE MDM 30: CPT | Performed by: INTERNAL MEDICINE

## 2023-06-26 NOTE — PROGRESS NOTES
[facility] care note transcribed by Dr. Anahi Moore    Date seen: 6/22/2023    Subjective: Patient seen and examined for ESBL UTI, atrial fibrillation with Watchman device, advanced age, history of hip fracture. Patient seems stable, doing well, no changes over the past 2 days, lab work reviewed, seems stable, anemia at baseline, and potassium mildly low, nurse petitioner has responded by increasing supplemental potassium. Otherwise she is improving with physical therapy, no signs of infection. PHYSICAL EXAMINATION: Vital signs: See chart  Gen. exam: Alert and awake, mental status at baseline, in no acute distress  HEENT: Pupils equal and reactive to light and accommodation, moist mucous membranes  Neck exam: Supple with baseline range of motion.  Normal thyroid trachea midline, no JVD  Heart exam: Regular rate and rhythm no murmurs no S3 no S4  Lung exam: No rales no rhonchi no wheezes  Abdominal exam: Soft nontender, nondistended positive bowel sounds are normoactive  Extremities exam: no clubbing no cyanosis no edema  Skin exam: see wound notes for details, no rashes, right hip incision site well-healed  Neurological exam: Cranial nerves II through XII intact, no gross deficits  Musculoskeletal exam: 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 appreciated, no obvious deformity  : Bryant catheter unremarkable Bryant to gravity    Labs/imaging: See chart    DVT prophylaxis: with Eliquis    Ambulatory status: Per hospital discharge recommendations, specialist involvement/recommendations and physical therapy evaluation    Assessment and plan: We have a 27-year-old female with history of femur fracture, admitted for UTI/ESBL UTI  UTI: ESBL: Completed IV meropenem: Watch for recurrence, involving house infectious disease, patient with recurrent ESBL, isolation per protocol  Rt intertrochanteric femur fracture s/p mechanical fall: Stabilized and improved, physical therapy should help  Hypokalemia: Serial lab work, replacement  Chronic kidney disease: Stable continue current serial lab work monitoring and management    Stable problem list:  Pneumonia: Stable, completed treatment, pulmonology was following on last admission  History of coronary artery disease, status post CABG: Stable, offer in-house cardiology, continue current medications  Atrial stenosis, mitral insufficiency and stenosis: Stable, offer in-house cardiology, continue with current medication treatments  Heart failure with mildly reduced ejection fraction of 89% and diastolic dysfunction: Involve in-house cardiology, low threshold for resuming diuretics, CHF monitoring and protocol  Atrial fibrillation, status post Watchman procedure: Eliquis, rate control, Brilinta as well  Nonfasting hyperglycemia: A1c 5.7: Stable continue current blood sugar checks monitoring, currently not a diabetic    CODE STATUS: Full

## 2023-06-26 NOTE — PROGRESS NOTES
History and physical    [facility] care note transcribed by Dr. Villa De Leon date: 6/19/2023  Date seen: 6/20/2023    Chief Complaint: ESBL UTI    HPI: Patient is a 70-year-old female who was recently rehabbing here from a femur fracture, who was readmitted for ESBL UTI: She was noted to have mental status changes, brought to the emergency room by her family, noted to have ESBL UTI, she was treated with 5 days of meropenem, then transition back to the facility here in stable condition. Patient seen and examined by me, medications continued as recommended by the discharging hospital physician. Patient seems stable. PAST MEDICAL/SURGICAL HISTORY: Significant for coronary artery disease status post CABG, status post angiogram in 2022 which showed occluded graft of the SVG to obtuse marginal 1 and 2. The patient underwent stenting. She also has heart failure with reduced ejection fraction of about 85% with diastolic dysfunction and wall motion hypokinesis. She has rheumatic valvular heart disease with mild-to-moderate aortic stenosis and mild to moderate aortic stenosis and mitral insufficiency and regurgitation. Atrial fibrillation status post Watchman procedure; hypertension; hyperlipidemia; osteoarthritis; osteoporosis; chronic kidney disease stage 3; hypothyroidism; minimally impacted fracture of the surgical neck of the left humerus, followed by Dr. Kathleen Archibald; non ST-elevation myocardial infarction in the past; left breast lumpectomy and radiation therapy for breast cancer; hysterectomy; laparoscopic cholecystectomy; coronary artery bypass grafting; and partial bowel resection. Right hip fracture and repair 5/23    MEDICATIONS: Prior to admission are not well known at this time. There is no one at the facility who is able to tell us what medication the patient is currently taking. RN will continue to reach out to [facility] and hopefully have a med list by the morning.     ALLERGIES: Aspirin, penicillin, Macrobid, ciprofloxacin. FAMILY HISTORY: The patient's mother  at 62 of a CVA. She also had hypertension. Her father  at 39 of an MI.    SOCIAL HISTORY: The patient is an ex-smoker. She quit in her 46s. Currently no tobacco, alcohol, or drug use. Lives at [facility] she says independently, but her daughter has recently placed more resources in her apartment including an assistant, someone who make sure she takes her medications every day. She walks with the aid of a walker. Current medications: See chart    REVIEW OF SYSTEMS:  Constitutional: Negative for Chills, fatigue, fever, malaise, weight gain and weight loss. ENMT: Negative for Nasal drainage and sinus pressure. Eyes: Negative for Vision changes. Respiratory: Negative for Cough, dyspnea and wheezing. Cardio: Negative Chest pain and irregular heartbeat/palpitations. GI: Negative for Abdominal pain, constipation, diarrhea, heartburn, nausea and vomiting. : Negative for Dysuria and urinary frequency. Endocrine: Negative for Cold intolerance and heat intolerance. Neuro: Negative for Gait disturbance and memory impairment. Psych: Negative for Anxiety and depression. Integumentary: Negative for Hives and rash. MS: Negative muscle weakness. Negative for joint pain  Hema/Lymph: Negative Easy bleeding and easy bruising. Allergic/Immuno: Negative Environmental allergies and food allergies. PHYSICAL EXAMINATION: Vital signs: See chart  Gen. exam: Alert and awake, mental status at baseline, in no acute distress  HEENT: Pupils equal and reactive to light and accommodation, moist mucous membranes  Neck exam: Supple with baseline range of motion.  Normal thyroid trachea midline, no JVD  Heart exam: Regular rate and rhythm no murmurs no S3 no S4  Lung exam: No rales no rhonchi no wheezes  Abdominal exam: Soft nontender, nondistended positive bowel sounds are normoactive  Extremities exam: no clubbing no cyanosis no edema  Skin exam: see wound notes for details, no rashes, right hip incision site well-healed  Neurological exam: Cranial nerves II through XII intact, no gross deficits  Musculoskeletal exam: 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 appreciated, no obvious deformity  : Bryant catheter unremarkable Bryant to gravity    Labs/imaging: See chart    DVT prophylaxis: with Eliquis    Ambulatory status: Per hospital discharge recommendations, specialist involvement/recommendations and physical therapy evaluation    Assessment and plan: We have a 66-year-old female with history of femur fracture, admitted for UTI/ESBL UTI  UTI: ESBL: Completed IV meropenem: Watch for recurrence, involving house infectious disease, patient with recurrent ESBL, isolation per protocol  Rt intertrochanteric femur fracture s/p mechanical fall: Stabilized and improved, physical therapy should help  Pneumonia: Stable, completed treatment, pulmonology was following on last admission  History of coronary artery disease, status post CABG: Stable, offer in-house cardiology, continue current medications  Atrial stenosis, mitral insufficiency and stenosis: Stable, offer in-house cardiology, continue with current medication treatments  Heart failure with mildly reduced ejection fraction of 42% and diastolic dysfunction: Involve in-house cardiology, low threshold for resuming diuretics, CHF monitoring and protocol  Atrial fibrillation, status post Watchman procedure: Eliquis, rate control, Brilinta as well  Nonfasting hyperglycemia. A1c 5.7: Stable continue current blood sugar checks monitoring, currently not a diabetic  Chronic kidney disease: Stable continue current serial lab work monitoring and management    CODE STATUS: Full  admit condition: Stable    Over 60 minutes spent in direct patient contact reviewing and creating admission orders, obtaining history, evaluating patient, discussing treatment options, family/staff communication, review of available labs and radiology reports, completing documentation, and coordinating care

## 2023-06-29 ENCOUNTER — EXTERNAL FACILITY (OUTPATIENT)
Dept: INTERNAL MEDICINE CLINIC | Facility: CLINIC | Age: 88
End: 2023-06-29

## 2023-06-29 DIAGNOSIS — S72.001A CLOSED FRACTURE OF RIGHT HIP, INITIAL ENCOUNTER (HCC): ICD-10-CM

## 2023-06-29 DIAGNOSIS — N39.0 UTI DUE TO EXTENDED-SPECTRUM BETA LACTAMASE (ESBL) PRODUCING ESCHERICHIA COLI: Primary | ICD-10-CM

## 2023-06-29 DIAGNOSIS — N18.30 STAGE 3 CHRONIC KIDNEY DISEASE, UNSPECIFIED WHETHER STAGE 3A OR 3B CKD (HCC): ICD-10-CM

## 2023-06-29 DIAGNOSIS — Z16.12 UTI DUE TO EXTENDED-SPECTRUM BETA LACTAMASE (ESBL) PRODUCING ESCHERICHIA COLI: Primary | ICD-10-CM

## 2023-06-29 DIAGNOSIS — E87.6 HYPOKALEMIA: ICD-10-CM

## 2023-06-29 DIAGNOSIS — B96.29 UTI DUE TO EXTENDED-SPECTRUM BETA LACTAMASE (ESBL) PRODUCING ESCHERICHIA COLI: Primary | ICD-10-CM

## 2023-06-29 PROCEDURE — 99309 SBSQ NF CARE MODERATE MDM 30: CPT | Performed by: INTERNAL MEDICINE

## 2023-07-06 ENCOUNTER — EXTERNAL FACILITY (OUTPATIENT)
Dept: INTERNAL MEDICINE CLINIC | Facility: CLINIC | Age: 88
End: 2023-07-06

## 2023-07-06 DIAGNOSIS — E87.6 HYPOKALEMIA: ICD-10-CM

## 2023-07-06 DIAGNOSIS — Z16.12 UTI DUE TO EXTENDED-SPECTRUM BETA LACTAMASE (ESBL) PRODUCING ESCHERICHIA COLI: Primary | ICD-10-CM

## 2023-07-06 DIAGNOSIS — B96.29 UTI DUE TO EXTENDED-SPECTRUM BETA LACTAMASE (ESBL) PRODUCING ESCHERICHIA COLI: Primary | ICD-10-CM

## 2023-07-06 DIAGNOSIS — N18.30 STAGE 3 CHRONIC KIDNEY DISEASE, UNSPECIFIED WHETHER STAGE 3A OR 3B CKD (HCC): ICD-10-CM

## 2023-07-06 DIAGNOSIS — S72.001A CLOSED FRACTURE OF RIGHT HIP, INITIAL ENCOUNTER (HCC): ICD-10-CM

## 2023-07-06 DIAGNOSIS — N39.0 UTI DUE TO EXTENDED-SPECTRUM BETA LACTAMASE (ESBL) PRODUCING ESCHERICHIA COLI: Primary | ICD-10-CM

## 2023-07-06 PROCEDURE — 99309 SBSQ NF CARE MODERATE MDM 30: CPT | Performed by: INTERNAL MEDICINE

## 2023-07-07 ENCOUNTER — TELEPHONE (OUTPATIENT)
Dept: INTERNAL MEDICINE CLINIC | Facility: CLINIC | Age: 88
End: 2023-07-07

## 2023-07-07 NOTE — TELEPHONE ENCOUNTER
Per [facility] agent, Mcarthur Barthel, she schedule hospital follow up appointment for patient and daughter of patient with questions about why a hospital follow up is required. Daughter of patient (on ARNAUD) was advised that per the discharge instructions from the hospital, it was advised that patient be seen for hospital follow up within 2 weeks of discharge. She was advised that hospital follow up appointments are standard so that patient's record can be looked over and patient can be reevaluated in case and changed need to be made to medications or any further recommendations need to be made. She verbalized understanding. She states she will keep patient's upcoming appointment.     Future Appointments   Date Time Provider Jerrell Argueta   7/10/2023 10:20 AM [facility]   7/31/2023 10:45 AM MD LUIS ENRIQUE Rubio

## 2023-07-10 ENCOUNTER — EXTERNAL FACILITY (OUTPATIENT)
Dept: INTERNAL MEDICINE CLINIC | Facility: CLINIC | Age: 88
End: 2023-07-10

## 2023-07-10 ENCOUNTER — LAB ENCOUNTER (OUTPATIENT)
Dept: LAB | Age: 88
End: 2023-07-10
Attending: NURSE PRACTITIONER
Payer: MEDICARE

## 2023-07-10 ENCOUNTER — OFFICE VISIT (OUTPATIENT)
Dept: INTERNAL MEDICINE CLINIC | Facility: CLINIC | Age: 88
End: 2023-07-10

## 2023-07-10 VITALS
BODY MASS INDEX: 19 KG/M2 | WEIGHT: 107 LBS | SYSTOLIC BLOOD PRESSURE: 90 MMHG | DIASTOLIC BLOOD PRESSURE: 55 MMHG | HEART RATE: 69 BPM

## 2023-07-10 DIAGNOSIS — S81.801A WOUND OF RIGHT LOWER EXTREMITY, INITIAL ENCOUNTER: ICD-10-CM

## 2023-07-10 DIAGNOSIS — N18.30 STAGE 3 CHRONIC KIDNEY DISEASE, UNSPECIFIED WHETHER STAGE 3A OR 3B CKD (HCC): ICD-10-CM

## 2023-07-10 DIAGNOSIS — R60.0 BILATERAL LOWER EXTREMITY EDEMA: ICD-10-CM

## 2023-07-10 DIAGNOSIS — B96.29 UTI DUE TO EXTENDED-SPECTRUM BETA LACTAMASE (ESBL) PRODUCING ESCHERICHIA COLI: Primary | ICD-10-CM

## 2023-07-10 DIAGNOSIS — Z16.12 UTI DUE TO EXTENDED-SPECTRUM BETA LACTAMASE (ESBL) PRODUCING ESCHERICHIA COLI: Primary | ICD-10-CM

## 2023-07-10 DIAGNOSIS — L60.2 OVERGROWN TOENAILS: ICD-10-CM

## 2023-07-10 DIAGNOSIS — R60.0 BILATERAL LOWER EXTREMITY EDEMA: Primary | ICD-10-CM

## 2023-07-10 DIAGNOSIS — B35.1 ONYCHOMYCOSIS: ICD-10-CM

## 2023-07-10 DIAGNOSIS — N39.0 UTI DUE TO EXTENDED-SPECTRUM BETA LACTAMASE (ESBL) PRODUCING ESCHERICHIA COLI: Primary | ICD-10-CM

## 2023-07-10 DIAGNOSIS — S72.001A CLOSED FRACTURE OF RIGHT HIP, INITIAL ENCOUNTER (HCC): ICD-10-CM

## 2023-07-10 LAB
ALBUMIN SERPL-MCNC: 3.5 G/DL (ref 3.4–5)
ALBUMIN/GLOB SERPL: 1 {RATIO} (ref 1–2)
ALP LIVER SERPL-CCNC: 127 U/L
ALT SERPL-CCNC: 19 U/L
ANION GAP SERPL CALC-SCNC: 10 MMOL/L (ref 0–18)
AST SERPL-CCNC: 26 U/L (ref 15–37)
BILIRUB SERPL-MCNC: 0.3 MG/DL (ref 0.1–2)
BUN BLD-MCNC: 19 MG/DL (ref 7–18)
BUN/CREAT SERPL: 14.2 (ref 10–20)
CALCIUM BLD-MCNC: 9.1 MG/DL (ref 8.5–10.1)
CHLORIDE SERPL-SCNC: 108 MMOL/L (ref 98–112)
CO2 SERPL-SCNC: 22 MMOL/L (ref 21–32)
CREAT BLD-MCNC: 1.34 MG/DL
DEPRECATED RDW RBC AUTO: 60.2 FL (ref 35.1–46.3)
ERYTHROCYTE [DISTWIDTH] IN BLOOD BY AUTOMATED COUNT: 16.3 % (ref 11–15)
FASTING STATUS PATIENT QL REPORTED: YES
GFR SERPLBLD BASED ON 1.73 SQ M-ARVRAT: 37 ML/MIN/1.73M2 (ref 60–?)
GLOBULIN PLAS-MCNC: 3.5 G/DL (ref 2.8–4.4)
GLUCOSE BLD-MCNC: 66 MG/DL (ref 70–99)
HCT VFR BLD AUTO: 33.8 %
HGB BLD-MCNC: 10.5 G/DL
MCH RBC QN AUTO: 31.3 PG (ref 26–34)
MCHC RBC AUTO-ENTMCNC: 31.1 G/DL (ref 31–37)
MCV RBC AUTO: 100.6 FL
OSMOLALITY SERPL CALC.SUM OF ELEC: 290 MOSM/KG (ref 275–295)
PLATELET # BLD AUTO: 257 10(3)UL (ref 150–450)
POTASSIUM SERPL-SCNC: 4.6 MMOL/L (ref 3.5–5.1)
PROT SERPL-MCNC: 7 G/DL (ref 6.4–8.2)
RBC # BLD AUTO: 3.36 X10(6)UL
SODIUM SERPL-SCNC: 140 MMOL/L (ref 136–145)
WBC # BLD AUTO: 5 X10(3) UL (ref 4–11)

## 2023-07-10 PROCEDURE — 80053 COMPREHEN METABOLIC PANEL: CPT

## 2023-07-10 PROCEDURE — 99309 SBSQ NF CARE MODERATE MDM 30: CPT | Performed by: INTERNAL MEDICINE

## 2023-07-10 PROCEDURE — 85027 COMPLETE CBC AUTOMATED: CPT

## 2023-07-10 PROCEDURE — 99214 OFFICE O/P EST MOD 30 MIN: CPT | Performed by: NURSE PRACTITIONER

## 2023-07-10 PROCEDURE — 36415 COLL VENOUS BLD VENIPUNCTURE: CPT

## 2023-07-10 PROCEDURE — 1111F DSCHRG MED/CURRENT MED MERGE: CPT | Performed by: NURSE PRACTITIONER

## 2023-07-11 ENCOUNTER — EXTERNAL FACILITY (OUTPATIENT)
Dept: INTERNAL MEDICINE CLINIC | Facility: CLINIC | Age: 88
End: 2023-07-11

## 2023-07-11 DIAGNOSIS — N18.30 STAGE 3 CHRONIC KIDNEY DISEASE, UNSPECIFIED WHETHER STAGE 3A OR 3B CKD (HCC): ICD-10-CM

## 2023-07-11 DIAGNOSIS — Z16.12 UTI DUE TO EXTENDED-SPECTRUM BETA LACTAMASE (ESBL) PRODUCING ESCHERICHIA COLI: Primary | ICD-10-CM

## 2023-07-11 DIAGNOSIS — S72.001A CLOSED FRACTURE OF RIGHT HIP, INITIAL ENCOUNTER (HCC): ICD-10-CM

## 2023-07-11 DIAGNOSIS — R60.0 BILATERAL LOWER EXTREMITY EDEMA: ICD-10-CM

## 2023-07-11 DIAGNOSIS — N39.0 UTI DUE TO EXTENDED-SPECTRUM BETA LACTAMASE (ESBL) PRODUCING ESCHERICHIA COLI: Primary | ICD-10-CM

## 2023-07-11 DIAGNOSIS — B96.29 UTI DUE TO EXTENDED-SPECTRUM BETA LACTAMASE (ESBL) PRODUCING ESCHERICHIA COLI: Primary | ICD-10-CM

## 2023-07-11 PROCEDURE — 99309 SBSQ NF CARE MODERATE MDM 30: CPT | Performed by: INTERNAL MEDICINE

## 2023-07-11 RX ORDER — DOXYCYCLINE HYCLATE 100 MG/1
100 CAPSULE ORAL 2 TIMES DAILY
Qty: 14 CAPSULE | Refills: 0 | Status: SHIPPED | OUTPATIENT
Start: 2023-07-11

## 2023-07-13 ENCOUNTER — EXTERNAL FACILITY (OUTPATIENT)
Dept: INTERNAL MEDICINE CLINIC | Facility: CLINIC | Age: 88
End: 2023-07-13

## 2023-07-13 DIAGNOSIS — B96.29 UTI DUE TO EXTENDED-SPECTRUM BETA LACTAMASE (ESBL) PRODUCING ESCHERICHIA COLI: Primary | ICD-10-CM

## 2023-07-13 DIAGNOSIS — N39.0 UTI DUE TO EXTENDED-SPECTRUM BETA LACTAMASE (ESBL) PRODUCING ESCHERICHIA COLI: Primary | ICD-10-CM

## 2023-07-13 DIAGNOSIS — S72.001A CLOSED FRACTURE OF RIGHT HIP, INITIAL ENCOUNTER (HCC): ICD-10-CM

## 2023-07-13 DIAGNOSIS — Z16.12 UTI DUE TO EXTENDED-SPECTRUM BETA LACTAMASE (ESBL) PRODUCING ESCHERICHIA COLI: Primary | ICD-10-CM

## 2023-07-13 DIAGNOSIS — I82.409 DEEP VEIN THROMBOSIS (DVT) OF LOWER EXTREMITY, UNSPECIFIED CHRONICITY, UNSPECIFIED LATERALITY, UNSPECIFIED VEIN (HCC): ICD-10-CM

## 2023-07-13 DIAGNOSIS — N18.30 STAGE 3 CHRONIC KIDNEY DISEASE, UNSPECIFIED WHETHER STAGE 3A OR 3B CKD (HCC): ICD-10-CM

## 2023-07-13 PROCEDURE — 99309 SBSQ NF CARE MODERATE MDM 30: CPT | Performed by: INTERNAL MEDICINE

## 2023-07-16 ENCOUNTER — HOSPITAL ENCOUNTER (EMERGENCY)
Facility: HOSPITAL | Age: 88
Discharge: HOME | End: 2023-07-17
Attending: EMERGENCY MEDICINE
Payer: MEDICARE

## 2023-07-16 DIAGNOSIS — R04.0 EPISTAXIS: Primary | ICD-10-CM

## 2023-07-16 DIAGNOSIS — Z79.01 ANTICOAGULATED: ICD-10-CM

## 2023-07-16 DIAGNOSIS — Z79.02 ANTIPLATELET OR ANTITHROMBOTIC LONG-TERM USE: ICD-10-CM

## 2023-07-16 PROCEDURE — 30903 CONTROL OF NOSEBLEED: CPT

## 2023-07-16 PROCEDURE — 99284 EMERGENCY DEPT VISIT MOD MDM: CPT

## 2023-07-16 RX ORDER — TRANEXAMIC ACID 100 MG/ML
10 INJECTION, SOLUTION INTRAVENOUS ONCE
Status: COMPLETED | OUTPATIENT
Start: 2023-07-16 | End: 2023-07-16

## 2023-07-17 ENCOUNTER — EXTERNAL FACILITY (OUTPATIENT)
Dept: INTERNAL MEDICINE CLINIC | Facility: CLINIC | Age: 88
End: 2023-07-17

## 2023-07-17 VITALS
TEMPERATURE: 98 F | DIASTOLIC BLOOD PRESSURE: 68 MMHG | RESPIRATION RATE: 18 BRPM | SYSTOLIC BLOOD PRESSURE: 150 MMHG | OXYGEN SATURATION: 95 % | HEART RATE: 75 BPM

## 2023-07-17 DIAGNOSIS — N39.0 UTI DUE TO EXTENDED-SPECTRUM BETA LACTAMASE (ESBL) PRODUCING ESCHERICHIA COLI: Primary | ICD-10-CM

## 2023-07-17 DIAGNOSIS — R04.0 EPISTAXIS: ICD-10-CM

## 2023-07-17 DIAGNOSIS — I82.409 DEEP VEIN THROMBOSIS (DVT) OF LOWER EXTREMITY, UNSPECIFIED CHRONICITY, UNSPECIFIED LATERALITY, UNSPECIFIED VEIN (HCC): ICD-10-CM

## 2023-07-17 DIAGNOSIS — B96.29 UTI DUE TO EXTENDED-SPECTRUM BETA LACTAMASE (ESBL) PRODUCING ESCHERICHIA COLI: Primary | ICD-10-CM

## 2023-07-17 DIAGNOSIS — S72.001A CLOSED FRACTURE OF RIGHT HIP, INITIAL ENCOUNTER (HCC): ICD-10-CM

## 2023-07-17 DIAGNOSIS — Z16.12 UTI DUE TO EXTENDED-SPECTRUM BETA LACTAMASE (ESBL) PRODUCING ESCHERICHIA COLI: Primary | ICD-10-CM

## 2023-07-17 PROCEDURE — 99309 SBSQ NF CARE MODERATE MDM 30: CPT | Performed by: INTERNAL MEDICINE

## 2023-07-17 NOTE — ED INITIAL ASSESSMENT (HPI)
Patient from [facility] care with c/o nose bleed since 7am. She is on Xarelto. Denies any trauma. Per NH A/Ox 3-4.

## 2023-07-17 NOTE — ED QUICK NOTES
[facility] called for transport, states they will not have a medicar until 10am tomorrow. Transport set up for 10am.     Unable to get a hold of [facility] for report.

## 2023-07-20 ENCOUNTER — EXTERNAL FACILITY (OUTPATIENT)
Dept: INTERNAL MEDICINE CLINIC | Facility: CLINIC | Age: 88
End: 2023-07-20

## 2023-07-20 DIAGNOSIS — N39.0 UTI DUE TO EXTENDED-SPECTRUM BETA LACTAMASE (ESBL) PRODUCING ESCHERICHIA COLI: Primary | ICD-10-CM

## 2023-07-20 DIAGNOSIS — B96.29 UTI DUE TO EXTENDED-SPECTRUM BETA LACTAMASE (ESBL) PRODUCING ESCHERICHIA COLI: Primary | ICD-10-CM

## 2023-07-20 DIAGNOSIS — R04.0 EPISTAXIS: ICD-10-CM

## 2023-07-20 DIAGNOSIS — Z16.12 UTI DUE TO EXTENDED-SPECTRUM BETA LACTAMASE (ESBL) PRODUCING ESCHERICHIA COLI: Primary | ICD-10-CM

## 2023-07-20 DIAGNOSIS — S72.001A CLOSED FRACTURE OF RIGHT HIP, INITIAL ENCOUNTER (HCC): ICD-10-CM

## 2023-07-20 DIAGNOSIS — I82.409 DEEP VEIN THROMBOSIS (DVT) OF LOWER EXTREMITY, UNSPECIFIED CHRONICITY, UNSPECIFIED LATERALITY, UNSPECIFIED VEIN (HCC): ICD-10-CM

## 2023-07-20 PROCEDURE — 99309 SBSQ NF CARE MODERATE MDM 30: CPT | Performed by: INTERNAL MEDICINE

## 2023-07-24 ENCOUNTER — EXTERNAL FACILITY (OUTPATIENT)
Dept: INTERNAL MEDICINE CLINIC | Facility: CLINIC | Age: 88
End: 2023-07-24

## 2023-07-24 DIAGNOSIS — B96.29 UTI DUE TO EXTENDED-SPECTRUM BETA LACTAMASE (ESBL) PRODUCING ESCHERICHIA COLI: Primary | ICD-10-CM

## 2023-07-24 DIAGNOSIS — R04.0 EPISTAXIS: ICD-10-CM

## 2023-07-24 DIAGNOSIS — S72.001A CLOSED FRACTURE OF RIGHT HIP, INITIAL ENCOUNTER (HCC): ICD-10-CM

## 2023-07-24 DIAGNOSIS — I82.409 DEEP VEIN THROMBOSIS (DVT) OF LOWER EXTREMITY, UNSPECIFIED CHRONICITY, UNSPECIFIED LATERALITY, UNSPECIFIED VEIN (HCC): ICD-10-CM

## 2023-07-24 DIAGNOSIS — Z16.12 UTI DUE TO EXTENDED-SPECTRUM BETA LACTAMASE (ESBL) PRODUCING ESCHERICHIA COLI: Primary | ICD-10-CM

## 2023-07-24 DIAGNOSIS — N39.0 UTI DUE TO EXTENDED-SPECTRUM BETA LACTAMASE (ESBL) PRODUCING ESCHERICHIA COLI: Primary | ICD-10-CM

## 2023-07-24 PROCEDURE — 99309 SBSQ NF CARE MODERATE MDM 30: CPT | Performed by: INTERNAL MEDICINE

## 2023-07-25 ENCOUNTER — APPOINTMENT (OUTPATIENT)
Dept: WOUND CARE | Facility: HOSPITAL | Age: 88
End: 2023-07-25
Attending: NURSE PRACTITIONER
Payer: MEDICARE

## 2023-07-27 NOTE — PROGRESS NOTES
[facility] care note transcribed by Dr. Rolan Garcia    Date seen: 7/10/2023    Subjective: Patient seen and examined for ESBL UTI, atrial fibrillation with Watchman device, advanced age, history of hip fracture. Patient was recently in with her specialist, lower extremities do seem to be more swollen, they have ordered for ultrasound Doppler of the lower extremities which seems reasonable. Patient seems to be doing well, is planning for discharge. Wound care is following, and is for the patient with Tubigrip's. Both daughters at bedside    PHYSICAL EXAMINATION: Vital signs: See chart  Gen. exam: Alert and awake, mental status at baseline, in no acute distress  HEENT: Pupils equal and reactive to light and accommodation, moist mucous membranes  Neck exam: Supple with baseline range of motion.  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 appreciated, no obvious deformity  : pulido removed    Labs/imaging: See chart    DVT prophylaxis: with Eliquis discontinued    Ambulatory status: Per hospital discharge recommendations, specialist involvement/recommendations and physical therapy evaluation    Assessment and plan: We have a 77-year-old female with history of femur fracture, admitted for UTI/ESBL UTI  UTI: ESBL: Completed IV meropenem: Watch for recurrence, involving [place] infectious disease, patient with recurrent ESBL, isolation per protocol, no changes, dc planning  Rt intertrochanteric femur fracture s/p mechanical fall: Stabilized and improved, physical therapy should help  Chronic kidney disease: Stable continue current serial lab work monitoring and management  Lower extremity edema: Could be heart failure/multifactorial edema:  We will get ultrasound bilateral lower extremity Doppler    Stable problem list:  Hypokalemia: Serial lab work, replacement  Pneumonia: Stable, completed treatment, pulmonology was following on last admission  History of coronary artery disease, status post CABG: Stable, offer in-house cardiology, continue current medications  Atrial stenosis, mitral insufficiency and stenosis: Stable, offer in-house cardiology, continue with current medication treatments  Heart failure with mildly reduced ejection fraction of 98% and diastolic dysfunction: Involve in-house cardiology, low threshold for resuming diuretics, CHF monitoring and protocol  Atrial fibrillation, status post Watchman procedure: Eliquis discontinued, rate control, Brilinta as well  Nonfasting hyperglycemia: A1c 5.7: Stable continue current blood sugar checks monitoring, currently not a diabetic    CODE STATUS: Full

## 2023-07-27 NOTE — PROGRESS NOTES
[facility] care note transcribed by Dr. Harden All    Date seen: 7/13/2023    Subjective: Patient seen and examined for ESBL UTI, atrial fibrillation with Watchman device, advanced age, history of hip fracture, DVT. Patient seen and examined, seems to be stable, seems to be tolerating medications well, discharge planning has been resumed, daughter not available today. She seems be tolerating Xarelto, placed on GI prophylaxis as well. PHYSICAL EXAMINATION: Vital signs: See chart  Gen. exam: Alert and awake, mental status at baseline, in no acute distress  HEENT: Pupils equal and reactive to light and accommodation, moist mucous membranes  Neck exam: Supple with baseline range of motion.  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 appreciated, no obvious deformity  : pulido removed    Labs/imaging: See chart    DVT prophylaxis: with Eliquis discontinued    Ambulatory status: Per hospital discharge recommendations, specialist involvement/recommendations and physical therapy evaluation    Assessment and plan: We have a 80-year-old female with history of femur fracture, admitted for UTI/ESBL UTI  UTI: ESBL: Completed IV meropenem: Watch for recurrence, involving house infectious disease, patient with recurrent ESBL, isolation per protocol, no changes, dc planning  Rt intertrochanteric femur fracture s/p mechanical fall: Stabilized and improved, physical therapy should help  Chronic kidney disease: Stable continue current serial lab work monitoring and management  Lower extremity DVT: We will start loading dose Xarelto and transition to 20 mg daily after 20 days. Convenience of dosing agreed on by family for usage of Xarelto.  GI prophylaxis with omeprazole    Stable problem list:  Hypokalemia: Serial lab work, replacement  Pneumonia: Stable, completed treatment, pulmonology was following on last admission  History of coronary artery disease, status post CABG: Stable, offer in-house cardiology, continue current medications  Atrial stenosis, mitral insufficiency and stenosis: Stable, offer in-house cardiology, continue with current medication treatments  Heart failure with mildly reduced ejection fraction of 59% and diastolic dysfunction: Involve in-house cardiology, low threshold for resuming diuretics, CHF monitoring and protocol  Atrial fibrillation, status post Watchman procedure: Eliquis discontinued, rate control, Brilinta as well  Nonfasting hyperglycemia: A1c 5.7: Stable continue current blood sugar checks monitoring, currently not a diabetic    CODE STATUS: Full

## 2023-07-27 NOTE — PROGRESS NOTES
[facility] care note transcribed by Dr. Brad Gallardo    Date seen: 7/17/2023    Subjective: Patient seen and examined for ESBL UTI, atrial fibrillation with Watchman device, advanced age, history of hip fracture, DVT. Patient seen examined seems stable, did have some epistaxis over the weekend, was seen in the emergency room, bleeding was contained, she was transition back to the facility in stable condition. Follow-up lab work is remained stable, Xarelto reduced to 20 mg daily    PHYSICAL EXAMINATION: Vital signs: See chart  Gen. exam: Alert and awake, mental status at baseline, in no acute distress  HEENT: Pupils equal and reactive to light and accommodation, moist mucous membranes  Neck exam: Supple with baseline range of motion.  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 appreciated, no obvious deformity  : pulido removed    Labs/imaging: See chart    DVT prophylaxis: with Eliquis discontinued    Ambulatory status: Per hospital discharge recommendations, specialist involvement/recommendations and physical therapy evaluation    Assessment and plan: We have a 70-year-old female with history of femur fracture, admitted for UTI/ESBL UTI  UTI: ESBL: Completed IV meropenem: Watch for recurrence, involving house infectious disease, patient with recurrent ESBL, isolation per protocol, no changes, dc planning  Rt intertrochanteric femur fracture s/p mechanical fall: Stabilized and improved, physical therapy should help  Lower extremity DVT: Xarelto 20 mg daily, unfortunately unable to tolerate twice daily 15 mg loading dose  Epistaxis: Well watch closely and reduce Xarelto to maintenance dose have scheduled    Stable problem list:  Chronic kidney disease: Stable continue current serial lab work monitoring and management  Hypokalemia: Serial lab work, replacement  Pneumonia: Stable, completed treatment, pulmonology was following on last admission  History of coronary artery disease, status post CABG: Stable, offer in-house cardiology, continue current medications  Atrial stenosis, mitral insufficiency and stenosis: Stable, offer in-house cardiology, continue with current medication treatments  Heart failure with mildly reduced ejection fraction of 12% and diastolic dysfunction: Involve in-house cardiology, low threshold for resuming diuretics, CHF monitoring and protocol  Atrial fibrillation, status post Watchman procedure: Eliquis discontinued, rate control, Brilinta as well  Nonfasting hyperglycemia: A1c 5.7: Stable continue current blood sugar checks monitoring, currently not a diabetic    CODE STATUS: Full

## 2023-07-27 NOTE — PROGRESS NOTES
[facility] note transcribed by Dr. Adela Iglesias    Date seen: 7/6/2023    Subjective: Patient seen and examined for ESBL UTI, atrial fibrillation with Watchman device, advanced age, history of hip fracture. Patient seems stable, doing well, lower extremity edema seems to be worsening, she does have follow-up with her specialist in the near future. No family available, she continues to work with physical therapy, they are making plans for discharge to assisted living. PHYSICAL EXAMINATION: Vital signs: See chart  Gen. exam: Alert and awake, mental status at baseline, in no acute distress  HEENT: Pupils equal and reactive to light and accommodation, moist mucous membranes  Neck exam: Supple with baseline range of motion.  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 appreciated, no obvious deformity  : pulido removed    Labs/imaging: See chart    DVT prophylaxis: with Eliquis    Ambulatory status: Per hospital discharge recommendations, specialist involvement/recommendations and physical therapy evaluation    Assessment and plan: We have a 80-year-old female with history of femur fracture, admitted for UTI/ESBL UTI  UTI: ESBL: Completed IV meropenem: Watch for recurrence, involving [place] infectious disease, patient with recurrent ESBL, isolation per protocol, no changes, dc planning  Rt intertrochanteric femur fracture s/p mechanical fall: Stabilized and improved, physical therapy should help  Hypokalemia: Serial lab work, replacement  Chronic kidney disease: Stable continue current serial lab work monitoring and management    Stable problem list:  Pneumonia: Stable, completed treatment, pulmonology was following on last admission  History of coronary artery disease, status post CABG: Stable, offer in-house cardiology, continue current medications  Atrial stenosis, mitral insufficiency and stenosis: Stable, offer in-house cardiology, continue with current medication treatments  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

## 2023-07-27 NOTE — PROGRESS NOTES
[facility] care note transcribed by Dr. Gregorio Carreon    Date seen: 7/11/2023    Subjective: Patient seen and examined for ESBL UTI, atrial fibrillation with Watchman device, advanced age, history of hip fracture, DVT. Patient seen and examined seems stable, did have ultrasound Doppler which displayed lower extremity DVT, long discussion had with patient and family, they do agree with going on anticoagulation. She was on prophylactic Eliquis, but has watchman, and this medication was discontinued by specialist back in May. PHYSICAL EXAMINATION: Vital signs: See chart  Gen. exam: Alert and awake, mental status at baseline, in no acute distress  HEENT: Pupils equal and reactive to light and accommodation, moist mucous membranes  Neck exam: Supple with baseline range of motion.  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 appreciated, no obvious deformity  : pulido removed    Labs/imaging: See chart    DVT prophylaxis: with Eliquis discontinued    Ambulatory status: Per hospital discharge recommendations, specialist involvement/recommendations and physical therapy evaluation    Assessment and plan: We have a 27-year-old female with history of femur fracture, admitted for UTI/ESBL UTI  UTI: ESBL: Completed IV meropenem: Watch for recurrence, involving [place] infectious disease, patient with recurrent ESBL, isolation per protocol, no changes, dc planning  Rt intertrochanteric femur fracture s/p mechanical fall: Stabilized and improved, physical therapy should help  Chronic kidney disease: Stable continue current serial lab work monitoring and management  Lower extremity DVT: We will start loading dose Xarelto and transition to 20 mg daily after 20 days. Convenience of dosing agreed on by family for usage of Xarelto.     Stable problem list:  Hypokalemia: Serial lab work, replacement  Pneumonia: Stable, completed treatment, pulmonology was following on last admission  History of coronary artery disease, status post CABG: Stable, offer in-house cardiology, continue current medications  Atrial stenosis, mitral insufficiency and stenosis: Stable, offer in-house cardiology, continue with current medication treatments  Heart failure with mildly reduced ejection fraction of 73% and diastolic dysfunction: Involve in-house cardiology, low threshold for resuming diuretics, CHF monitoring and protocol  Atrial fibrillation, status post Watchman procedure: Eliquis discontinued, rate control, Brilinta as well  Nonfasting hyperglycemia: A1c 5.7: Stable continue current blood sugar checks monitoring, currently not a diabetic    CODE STATUS: Full

## 2023-08-01 ENCOUNTER — TELEPHONE (OUTPATIENT)
Dept: INTERNAL MEDICINE CLINIC | Facility: CLINIC | Age: 88
End: 2023-08-01

## 2023-08-01 NOTE — TELEPHONE ENCOUNTER
Kamilah, speech therapist from [place] [id number], calling to state had speech evaluation this morning, and is not recommending further speech therapy.

## 2023-08-11 NOTE — TELEPHONE ENCOUNTER
Cassie physical therapist from [place] [id number] is calling and requesting a verbal order to extend the patients physical therapy for 1x a week for 2 more weeks.

## 2023-08-11 NOTE — TELEPHONE ENCOUNTER
It appears that [facility] was ordered after recent Hospitalization 6/15/23, discharged 6/19/23 to SNF. Seen by Rachell Mosley 7/10/23 and Home health services started 7/26/23.

## 2023-08-11 NOTE — TELEPHONE ENCOUNTER
I have not seen pt sicne 6/2022    I dont think home health services are covered  I have not face to face enounter with jozef

## 2023-08-14 ENCOUNTER — APPOINTMENT (OUTPATIENT)
Dept: URBAN - METROPOLITAN AREA CLINIC 244 | Age: 88
Setting detail: DERMATOLOGY
End: 2023-08-15

## 2023-08-14 DIAGNOSIS — L82.1 OTHER SEBORRHEIC KERATOSIS: ICD-10-CM

## 2023-08-14 DIAGNOSIS — Z86.007 PERSONAL HISTORY OF IN-SITU NEOPLASM OF SKIN: ICD-10-CM

## 2023-08-14 DIAGNOSIS — I87.2 VENOUS INSUFFICIENCY (CHRONIC) (PERIPHERAL): ICD-10-CM

## 2023-08-14 DIAGNOSIS — L81.4 OTHER MELANIN HYPERPIGMENTATION: ICD-10-CM

## 2023-08-14 DIAGNOSIS — D22 MELANOCYTIC NEVI: ICD-10-CM

## 2023-08-14 PROBLEM — D22.39 MELANOCYTIC NEVI OF OTHER PARTS OF FACE: Status: ACTIVE | Noted: 2023-08-14

## 2023-08-14 PROBLEM — D22.61 MELANOCYTIC NEVI OF RIGHT UPPER LIMB, INCLUDING SHOULDER: Status: ACTIVE | Noted: 2023-08-14

## 2023-08-14 PROBLEM — D22.62 MELANOCYTIC NEVI OF LEFT UPPER LIMB, INCLUDING SHOULDER: Status: ACTIVE | Noted: 2023-08-14

## 2023-08-14 PROCEDURE — 99214 OFFICE O/P EST MOD 30 MIN: CPT

## 2023-08-14 PROCEDURE — OTHER PRESCRIPTION: OTHER

## 2023-08-14 PROCEDURE — OTHER COUNSELING: OTHER

## 2023-08-14 PROCEDURE — OTHER ADDITIONAL NOTES: OTHER

## 2023-08-14 RX ORDER — TRIAMCINOLONE ACETONIDE 1 MG/G
OINTMENT TOPICAL
Qty: 454 | Refills: 0 | Status: ERX | COMMUNITY
Start: 2023-08-14

## 2023-08-14 ASSESSMENT — LOCATION DETAILED DESCRIPTION DERM
LOCATION DETAILED: RIGHT INFERIOR CENTRAL MALAR CHEEK
LOCATION DETAILED: GLABELLA
LOCATION DETAILED: LEFT RADIAL DORSAL HAND
LOCATION DETAILED: RIGHT ANTERIOR SHOULDER
LOCATION DETAILED: RIGHT DORSAL WRIST
LOCATION DETAILED: LEFT INFERIOR CENTRAL MALAR CHEEK
LOCATION DETAILED: LEFT DORSAL WRIST
LOCATION DETAILED: LEFT ULNAR DORSAL HAND
LOCATION DETAILED: RIGHT RADIAL DORSAL HAND

## 2023-08-14 ASSESSMENT — LOCATION ZONE DERM
LOCATION ZONE: HAND
LOCATION ZONE: FACE
LOCATION ZONE: ARM

## 2023-08-14 ASSESSMENT — LOCATION SIMPLE DESCRIPTION DERM
LOCATION SIMPLE: GLABELLA
LOCATION SIMPLE: RIGHT SHOULDER
LOCATION SIMPLE: RIGHT HAND
LOCATION SIMPLE: RIGHT CHEEK
LOCATION SIMPLE: LEFT HAND
LOCATION SIMPLE: LEFT CHEEK
LOCATION SIMPLE: RIGHT WRIST
LOCATION SIMPLE: LEFT WRIST

## 2023-08-14 NOTE — PROCEDURE: ADDITIONAL NOTES
Detail Level: Simple
Additional Notes: Consider pedaling device to help decrease edema in legs, stressed need to use compression socks
Render Risk Assessment In Note?: no

## 2023-08-14 NOTE — PROCEDURE: COUNSELING
----- Message from Anna Radford MA sent at 12/23/2019  3:41 PM CST -----  Regarding: stat consult  Hi   Dr Mehdi Allen is requesting for a Stat consult . She is concerned about possible Aplastic Anemia.   We would appreciate if you are able to schedule this patient as soon as possible.  Thank you for your consideration.  Thank you,   MICHELLE Castillo     
Detail Level: Simple
Prescription Strength Graduated Compression Stockings Recommendations: The patient was counseled that prescription strength graduated compression stockings should be worn for all waking hours. They will follow up with a venous specialist to monitor graduated compression stocking usage and their symptoms.
Detail Level: Zone
Detail Level: Detailed

## 2023-08-16 RX ORDER — TRIAMCINOLONE ACETONIDE 1 MG/G
OINTMENT TOPICAL
Qty: 454 | Refills: 0 | Status: ERX

## 2023-08-24 NOTE — PROGRESS NOTES
[facility] care note transcribed by Dr. Brad Gallardo    Date seen: 7/24/2023    Subjective: Patient seen and examined for ESBL UTI, atrial fibrillation with Watchman device, advanced age, history of hip fracture, DVT, discharge planning. Patient seems stable, doing well, lab work has remained stable, she is planning to go to assisted living today, she continues to improve. She is stable for discharge, medication list reviewed, transmitted, she will be discharged on full anticoagulation for the minimum 6 months. Family has verbalized understanding she will follow-up with her primary care physician in 5 to 7 days    PHYSICAL EXAMINATION: Vital signs: See chart  Gen. exam: Alert and awake, mental status at baseline, in no acute distress  HEENT: Pupils equal and reactive to light and accommodation, moist mucous membranes  Neck exam: Supple with baseline range of motion.  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 appreciated, no obvious deformity  : pulido removed    Labs/imaging: See chart    DVT prophylaxis: with Eliquis discontinued    Ambulatory status: Per hospital discharge recommendations, specialist involvement/recommendations and physical therapy evaluation    Assessment and plan: We have a 77-year-old female with history of femur fracture, admitted for UTI/ESBL UTI  UTI: ESBL: Completed IV meropenem: Watch for recurrence, involving house infectious disease, patient with recurrent ESBL, isolation per protocol, no changes, dc planning, will be planning assisted living on discharge  Rt intertrochanteric femur fracture s/p mechanical fall: Stabilized and improved, physical therapy should help  Lower extremity DVT: Xarelto 20 mg daily, unfortunately unable to tolerate twice daily 15 mg loading dose due to epistaxis  Epistaxis: Well watch closely and reduce Xarelto to maintenance dose have scheduled    Stable problem list:  Chronic kidney disease: Stable continue current serial lab work monitoring and management  Hypokalemia: Serial lab work, replacement  Pneumonia: Stable, completed treatment, pulmonology was following on last admission  History of coronary artery disease, status post CABG: Stable, offer in-house cardiology, continue current medications  Atrial stenosis, mitral insufficiency and stenosis: Stable, offer in-house cardiology, continue with current medication treatments  Heart failure with mildly reduced ejection fraction of 36% and diastolic dysfunction: Involve in-house cardiology, low threshold for resuming diuretics, CHF monitoring and protocol  Atrial fibrillation, status post Watchman procedure: Eliquis discontinued, rate control, Brilinta as well  Nonfasting hyperglycemia: A1c 5.7: Stable continue current blood sugar checks monitoring, currently not a diabetic    CODE STATUS: Full

## 2023-08-24 NOTE — PROGRESS NOTES
[facility] care note transcribed by Dr. Isaura Webb    Date seen: 7/20/2023    Subjective: Patient seen and examined for ESBL UTI, atrial fibrillation with Watchman device, advanced age, history of hip fracture, DVT. Patient seen and examined, seems stable, no further epistaxis, tolerating Xarelto, making discharge plans for the near future. No family onsite today, still working with physical therapy, lower extremities look improved    PHYSICAL EXAMINATION: Vital signs: See chart  Gen. exam: Alert and awake, mental status at baseline, in no acute distress  HEENT: Pupils equal and reactive to light and accommodation, moist mucous membranes  Neck exam: Supple with baseline range of motion.  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 appreciated, no obvious deformity  : pulido removed    Labs/imaging: See chart    DVT prophylaxis: with Eliquis discontinued    Ambulatory status: Per hospital discharge recommendations, specialist involvement/recommendations and physical therapy evaluation    Assessment and plan: We have a 26-year-old female with history of femur fracture, admitted for UTI/ESBL UTI  UTI: ESBL: Completed IV meropenem: Watch for recurrence, involving [place] infectious disease, patient with recurrent ESBL, isolation per protocol, no changes, dc planning, will be planning assisted living on discharge  Rt intertrochanteric femur fracture s/p mechanical fall: Stabilized and improved, physical therapy should help  Lower extremity DVT: Xarelto 20 mg daily, unfortunately unable to tolerate twice daily 15 mg loading dose  Epistaxis: Well watch closely and reduce Xarelto to maintenance dose have scheduled    Stable problem list:  Chronic kidney disease: Stable continue current serial lab work monitoring and management  Hypokalemia: Serial lab work, replacement  Pneumonia: Stable, completed treatment, pulmonology was following on last admission  History of coronary artery disease, status post CABG: Stable, offer in-house cardiology, continue current medications  Atrial stenosis, mitral insufficiency and stenosis: Stable, offer in-house cardiology, continue with current medication treatments  Heart failure with mildly reduced ejection fraction of 00% and diastolic dysfunction: Involve in-house cardiology, low threshold for resuming diuretics, CHF monitoring and protocol  Atrial fibrillation, status post Watchman procedure: Eliquis discontinued, rate control, Brilinta as well  Nonfasting hyperglycemia: A1c 5.7: Stable continue current blood sugar checks monitoring, currently not a diabetic    CODE STATUS: Full

## 2023-08-28 PROBLEM — S72.141D: Status: ACTIVE | Noted: 2023-08-28

## 2023-08-30 RX ORDER — PYRIDOXINE HCL (VITAMIN B6) 100 MG
2 TABLET ORAL DAILY
Qty: 180 CAPSULE | Refills: 0 | Status: SHIPPED | OUTPATIENT
Start: 2023-08-30

## 2023-09-05 RX ORDER — LEVOTHYROXINE SODIUM 0.1 MG/1
100 TABLET ORAL EVERY MORNING
Qty: 90 TABLET | Refills: 0 | Status: SHIPPED | OUTPATIENT
Start: 2023-09-05

## 2023-09-05 RX ORDER — METOPROLOL SUCCINATE 25 MG/1
25 TABLET, EXTENDED RELEASE ORAL EVERY 12 HOURS
Qty: 180 TABLET | Refills: 0 | Status: SHIPPED | OUTPATIENT
Start: 2023-09-05

## 2023-09-05 NOTE — TELEPHONE ENCOUNTER
Please review; protocol failed.    Requested Prescriptions   Pending Prescriptions Disp Refills    METOPROLOL SUCCINATE ER 25 MG Oral Tablet 24 Hr [Pharmacy Med Name: metoprolol succinate ER 25 mg tablet,extended release 24 hr] 180 tablet 0     Sig: TAKE 1 TABLET BY MOUTH EVERY 12 HOURS       Hypertensive Medications Protocol Failed - 9/4/2023  9:36 PM        Failed - Last BP reading less than 140/90     BP Readings from Last 1 Encounters:  07/17/23 : 150/68              Failed - EGFRCR or GFRNAA > 50     GFR Evaluation  EGFRCR: 37 , resulted on 7/10/2023          Passed - In person appointment in the past 12 or next 3 months     Recent Outpatient Visits              1 week ago Closed intertrochanteric fracture of right hip, with routine healing, subsequent encounter    [address] Luiz Copeland MD    Office Visit    1 month ago Closed displaced articular fracture of head of right femur [facility]    1 month ago Bilateral lower extremity edema    [facility]    Office Visit    2 months ago Closed displaced articular fracture of head of right femur with routine healing    [facility]    3 months ago Closed displaced articular fracture of head of right femur with routine healing    [facility] Gordo Sidhu MD    Office Visit          Future Appointments         Provider Department Appt Notes    In 1 month MD Matthew Mae  ORTHOPAEDICS R HIP F/U/CAREGIVER CHOSE THIS DATE               Passed - CMP or BMP in past 6 months     Recent Results (from the past 4392 hour(s))   COMP METABOLIC PANEL (14)    Collection Time: 07/10/23 11:32 AM   Result Value Ref Range    Glucose 66 (L) 70 - 99 mg/dL    Sodium 140 136 - 145 mmol/L    Potassium 4.6 3.5 - 5.1 mmol/L    Chloride 108 98 - 112 mmol/L    CO2 22.0 21.0 - 32.0 mmol/L    Anion Gap 10 0 - 18 mmol/L    BUN 19 (H) 7 - 18 mg/dL    Creatinine 1.34 (H) 0.55 - 1.02 mg/dL    BUN/CREA Ratio 14.2 10.0 - 20.0    Calcium, Total 9.1 8.5 - 10.1 mg/dL    Calculated Osmolality 290 275 - 295 mOsm/kg    eGFR-Cr 37 (L) >=60 mL/min/1.73m2    ALT 19 13 - 56 U/L    AST 26 15 - 37 U/L    Alkaline Phosphatase 127 55 - 142 U/L    Bilirubin, Total 0.3 0.1 - 2.0 mg/dL    Total Protein 7.0 6.4 - 8.2 g/dL    Albumin 3.5 3.4 - 5.0 g/dL    Globulin  3.5 2.8 - 4.4 g/dL    A/G Ratio 1.0 1.0 - 2.0    Patient Fasting for CMP? Yes      *Note: Due to a large number of results and/or encounters for the requested time period, some results have not been displayed. A complete set of results can be found in Results Review.                Passed - In person appointment or virtual visit in the past 6 months     Recent Outpatient Visits              1 week ago Closed intertrochanteric fracture of right hip, with routine healing, subsequent encounter    1717 Luiz Jarrett MD    Office Visit    1 month ago Closed displaced articular fracture of head of right femur with routine healing    [facility] Luiz Jarrett MD    Office Visit    1 month ago Bilateral lower extremity edema    [facility], [place], [facility]    Office Visit    2 months ago Closed displaced articular fracture of head of right femur with routine healing    [facility] Pili Iglesias MD    Office Visit    3 months ago Closed displaced articular fracture of head of right femur with routine healing    1717 Luiz Jarrett MD    Office Visit          Future Appointments         Provider Department Appt Notes    In 1 month MD Matthew Morgan ORTHOPAEDICS R HIP F/U/CAREGIVER CHOSE THIS DATE                 LEVOTHYROXINE 100 MCG Oral Tab [Pharmacy Med Name: levothyroxine 100 mcg tablet] 90 tablet 0     Sig: TAKE 1 TABLET BY MOUTH EVERY MORNING Thyroid Medication Protocol Failed - 9/4/2023  9:36 PM        Failed - Last TSH value is normal     Lab Results   Component Value Date    TSH 4.290 (H) 06/17/2023                 Passed - TSH in past 12 months        Passed - In person appointment or virtual visit in the past 12 mos or appointment in next 3 mos     Recent Outpatient Visits              1 week ago Closed intertrochanteric fracture of right hip, with routine healing, subsequent encounter    1717 Luiz Matos MD    Office Visit    1 month ago Closed displaced articular fracture of head of right femur with routine healing    171Alexis Matos MD    Office Visit    1 month ago Bilateral lower extremity edema    [address] [facility]    Office Visit    2 months ago Closed displaced articular fracture of head of right femur with routine healing    Matthew Matos MD    Office Visit    3 months ago Closed displaced articular fracture of head of right femur with routine healing    171Alexis Matos MD    Office Visit          Future Appointments         Provider Department Appt Notes    In 1 month Jesusita Kaur MD [address]Alex[address] Kelley ORTHOPAEDICS R HIP F/U/CAREGIVER CHOSE THIS DATE                  Recent Outpatient Visits              1 week ago Closed intertrochanteric fracture of right hip, with routine healing, subsequent encounter    1717 Luiz Matos MD    Office Visit    1 month ago Closed displaced articular fracture of head of right femur with routine healing    Matthew Matos MD    Office Visit    1 month ago Bilateral lower extremity edema    [address] [facility]    Office Visit    2 months ago Closed displaced articular fracture of head of right femur with routine healing    1717 Luiz Matos MD    Office Visit    3 months ago Closed displaced articular fracture of head of right femur with routine healing    1717 Luiz Gallardo MD    Office Visit          Future Appointments         Provider Department Appt Notes    In 1 month Rosa Donis MD [address] Luiz Kelley ORTHOPAEDICS R HIP F/U/CAREGIVER CHOSE THIS DATE

## 2023-09-09 NOTE — TELEPHONE ENCOUNTER
Please review; protocol failed. Potassium listed under patient reported section with sig of :take 20meq daily.      Last K+ level: 7/10/23  Potassium  3.5 - 5.1 mmol/L 4.6         Requested Prescriptions   Pending Prescriptions Disp Refills    FUROSEMIDE 20 MG Oral Tab [Pharmacy Med Name: furosemide 20 mg tablet] 30 tablet 2     Sig: TAKE 1 TABLET BY MOUTH DAILY       Hypertensive Medications Protocol Failed - 9/8/2023  1:40 PM        Failed - Last BP reading less than 140/90     BP Readings from Last 1 Encounters:  07/17/23 : 150/68              Failed - EGFRCR or GFRNAA > 50     GFR Evaluation  EGFRCR: 37 , resulted on 7/10/2023          Passed - In person appointment in the past 12 or next 3 months     Recent Outpatient Visits              1 week ago Closed intertrochanteric fracture of right hip, with routine healing, subsequent encounter    [address]Alexis Jarrett MD    Office Visit    1 month ago Closed displaced articular fracture of head of right femur with routine healing    Matthew Jarrett MD    Office Visit    2 months ago Bilateral lower extremity edema    Na Abernathy [place], [facility]    Office Visit    2 months ago Closed displaced articular fracture of head of right femur with routine healing    Matthew Jarrett MD    Office Visit    3 months ago Closed displaced articular fracture of head of right femur with routine healing    Matthwe Jarrett MD    Office Visit          Future Appointments         Provider Department Appt Notes    In 1 month MD Matthew Morgan ORTHOPAEDICS R HIP F/U/CAREGIVER CHOSE THIS DATE               Passed - CMP or BMP in past 6 months     Recent Results (from the past 4392 hour(s))   COMP METABOLIC PANEL (14)    Collection Time: 07/10/23 11:32 AM   Result Value Ref Range    Glucose 66 (L) 70 - 99 mg/dL    Sodium 140 136 - 145 mmol/L    Potassium 4.6 3.5 - 5.1 mmol/L    Chloride 108 98 - 112 mmol/L    CO2 22.0 21.0 - 32.0 mmol/L    Anion Gap 10 0 - 18 mmol/L    BUN 19 (H) 7 - 18 mg/dL    Creatinine 1.34 (H) 0.55 - 1.02 mg/dL    BUN/CREA Ratio 14.2 10.0 - 20.0    Calcium, Total 9.1 8.5 - 10.1 mg/dL    Calculated Osmolality 290 275 - 295 mOsm/kg    eGFR-Cr 37 (L) >=60 mL/min/1.73m2    ALT 19 13 - 56 U/L    AST 26 15 - 37 U/L    Alkaline Phosphatase 127 55 - 142 U/L    Bilirubin, Total 0.3 0.1 - 2.0 mg/dL    Total Protein 7.0 6.4 - 8.2 g/dL    Albumin 3.5 3.4 - 5.0 g/dL    Globulin  3.5 2.8 - 4.4 g/dL    A/G Ratio 1.0 1.0 - 2.0    Patient Fasting for CMP? Yes      *Note: Due to a large number of results and/or encounters for the requested time period, some results have not been displayed. A complete set of results can be found in Results Review.                Passed - In person appointment or virtual visit in the past 6 months     Recent Outpatient Visits              1 week ago Closed intertrochanteric fracture of right hip, with routine healing, subsequent encounter    1717 Luiz Barrios MD    Office Visit    1 month ago Closed displaced articular fracture of head of right femur with routine healing    1717 Luiz Barrios MD    Office Visit    2 months ago Bilateral lower extremity edema    [facility]    Office Visit    2 months ago Closed displaced articular fracture of head of right femur with routine healing    [facility] Jose Alberto Bustillo MD    Office Visit    3 months ago Closed displaced articular fracture of head of right femur with routine healing    1717 Luiz Barrios MD    Office Visit          Future Appointments         Provider Department Appt Notes    In 1 month MD Matthew Cardoso ORTHOPAEDICS R HIP F/U/CAREGIVER CHOSE THIS DATE                 POTASSIUM CHLORIDE 20 MEQ Oral Tab CR [Pharmacy Med Name: potassium chloride ER 20 mEq tablet,extended release(part/cryst)] 60 tablet 2     Sig: TAKE 2 TABLETS BY MOUTH DAILY       There is no refill protocol information for this order

## 2023-09-10 RX ORDER — FUROSEMIDE 20 MG/1
20 TABLET ORAL DAILY
Qty: 90 TABLET | Refills: 3 | Status: SHIPPED | OUTPATIENT
Start: 2023-09-10

## 2023-09-10 RX ORDER — POTASSIUM CHLORIDE 20 MEQ/1
40 TABLET, EXTENDED RELEASE ORAL DAILY
Qty: 180 TABLET | Refills: 1 | Status: SHIPPED | OUTPATIENT
Start: 2023-09-10

## 2023-09-11 ENCOUNTER — TELEPHONE (OUTPATIENT)
Dept: INTERNAL MEDICINE CLINIC | Facility: CLINIC | Age: 88
End: 2023-09-11

## 2023-09-11 NOTE — TELEPHONE ENCOUNTER
[facility]  [phone]    Notifying  they are discharging her from home health; patient out to outpatient rehab.

## 2023-09-24 ENCOUNTER — APPOINTMENT (OUTPATIENT)
Dept: GENERAL RADIOLOGY | Facility: HOSPITAL | Age: 88
End: 2023-09-24
Attending: EMERGENCY MEDICINE
Payer: MEDICARE

## 2023-09-24 ENCOUNTER — HOSPITAL ENCOUNTER (EMERGENCY)
Facility: HOSPITAL | Age: 88
Discharge: HOME | End: 2023-09-24
Attending: EMERGENCY MEDICINE
Payer: MEDICARE

## 2023-09-24 VITALS
RESPIRATION RATE: 22 BRPM | HEART RATE: 82 BPM | DIASTOLIC BLOOD PRESSURE: 82 MMHG | OXYGEN SATURATION: 95 % | TEMPERATURE: 98 F | SYSTOLIC BLOOD PRESSURE: 150 MMHG

## 2023-09-24 DIAGNOSIS — R07.9 CHEST PAIN OF UNCERTAIN ETIOLOGY: Primary | ICD-10-CM

## 2023-09-24 LAB
ALBUMIN SERPL-MCNC: 3.5 G/DL (ref 3.4–5)
ALP LIVER SERPL-CCNC: 137 U/L
ALT SERPL-CCNC: 18 U/L
ANION GAP SERPL CALC-SCNC: 11 MMOL/L (ref 0–18)
BASOPHILS # BLD AUTO: 0.03 X10(3) UL (ref 0–0.2)
BASOPHILS NFR BLD AUTO: 0.5 %
BILIRUB DIRECT SERPL-MCNC: 0.1 MG/DL (ref 0–0.2)
BILIRUB SERPL-MCNC: 0.7 MG/DL (ref 0.1–2)
BUN BLD-MCNC: 31 MG/DL (ref 7–18)
BUN/CREAT SERPL: 31.3 (ref 10–20)
CALCIUM BLD-MCNC: 9.2 MG/DL (ref 8.5–10.1)
CHLORIDE SERPL-SCNC: 105 MMOL/L (ref 98–112)
CO2 SERPL-SCNC: 23 MMOL/L (ref 21–32)
CREAT BLD-MCNC: 0.99 MG/DL
DEPRECATED RDW RBC AUTO: 53.5 FL (ref 35.1–46.3)
EGFRCR SERPLBLD CKD-EPI 2021: 53 ML/MIN/1.73M2 (ref 60–?)
EOSINOPHIL # BLD AUTO: 0.14 X10(3) UL (ref 0–0.7)
EOSINOPHIL NFR BLD AUTO: 2.3 %
ERYTHROCYTE [DISTWIDTH] IN BLOOD BY AUTOMATED COUNT: 15 % (ref 11–15)
GLUCOSE BLD-MCNC: 114 MG/DL (ref 70–99)
HCT VFR BLD AUTO: 40.8 %
HGB BLD-MCNC: 12.6 G/DL
IMM GRANULOCYTES # BLD AUTO: 0.02 X10(3) UL (ref 0–1)
IMM GRANULOCYTES NFR BLD: 0.3 %
LIPASE SERPL-CCNC: 41 U/L (ref 13–75)
LYMPHOCYTES # BLD AUTO: 1.48 X10(3) UL (ref 1–4)
LYMPHOCYTES NFR BLD AUTO: 24.7 %
MCH RBC QN AUTO: 30.2 PG (ref 26–34)
MCHC RBC AUTO-ENTMCNC: 30.9 G/DL (ref 31–37)
MCV RBC AUTO: 97.8 FL
MONOCYTES # BLD AUTO: 0.44 X10(3) UL (ref 0.1–1)
MONOCYTES NFR BLD AUTO: 7.3 %
NEUTROPHILS # BLD AUTO: 3.88 X10 (3) UL (ref 1.5–7.7)
NEUTROPHILS # BLD AUTO: 3.88 X10(3) UL (ref 1.5–7.7)
NEUTROPHILS NFR BLD AUTO: 64.9 %
OSMOLALITY SERPL CALC.SUM OF ELEC: 295 MOSM/KG (ref 275–295)
PLATELET # BLD AUTO: 257 10(3)UL (ref 150–450)
PROT SERPL-MCNC: 7.4 G/DL (ref 6.4–8.2)
Q-T INTERVAL: 376 MS
QRS DURATION: 96 MS
QTC CALCULATION (BEZET): 441 MS
R AXIS: 40 DEGREES
RBC # BLD AUTO: 4.17 X10(6)UL
SODIUM SERPL-SCNC: 139 MMOL/L (ref 136–145)
T AXIS: 135 DEGREES
TROPONIN I HIGH SENSITIVITY: 21 NG/L
TROPONIN I HIGH SENSITIVITY: 25 NG/L
VENTRICULAR RATE: 83 BPM
WBC # BLD AUTO: 6 X10(3) UL (ref 4–11)

## 2023-09-24 PROCEDURE — 84484 ASSAY OF TROPONIN QUANT: CPT | Performed by: EMERGENCY MEDICINE

## 2023-09-24 PROCEDURE — 71045 X-RAY EXAM CHEST 1 VIEW: CPT | Performed by: EMERGENCY MEDICINE

## 2023-09-24 PROCEDURE — 99285 EMERGENCY DEPT VISIT HI MDM: CPT

## 2023-09-24 PROCEDURE — 93005 ELECTROCARDIOGRAM TRACING: CPT

## 2023-09-24 PROCEDURE — 80048 BASIC METABOLIC PNL TOTAL CA: CPT | Performed by: EMERGENCY MEDICINE

## 2023-09-24 PROCEDURE — S0028 INJECTION, FAMOTIDINE, 20 MG: HCPCS | Performed by: EMERGENCY MEDICINE

## 2023-09-24 PROCEDURE — 85025 COMPLETE CBC W/AUTO DIFF WBC: CPT | Performed by: EMERGENCY MEDICINE

## 2023-09-24 PROCEDURE — 83690 ASSAY OF LIPASE: CPT | Performed by: EMERGENCY MEDICINE

## 2023-09-24 PROCEDURE — 99284 EMERGENCY DEPT VISIT MOD MDM: CPT

## 2023-09-24 PROCEDURE — 96374 THER/PROPH/DIAG INJ IV PUSH: CPT

## 2023-09-24 PROCEDURE — 80076 HEPATIC FUNCTION PANEL: CPT | Performed by: EMERGENCY MEDICINE

## 2023-09-24 PROCEDURE — 93010 ELECTROCARDIOGRAM REPORT: CPT

## 2023-09-24 RX ORDER — ACETAMINOPHEN 500 MG
1000 TABLET ORAL ONCE
Status: COMPLETED | OUTPATIENT
Start: 2023-09-24 | End: 2023-09-24

## 2023-09-24 RX ORDER — FAMOTIDINE 10 MG/ML
20 INJECTION, SOLUTION INTRAVENOUS ONCE
Status: COMPLETED | OUTPATIENT
Start: 2023-09-24 | End: 2023-09-24

## 2023-09-24 NOTE — ED INITIAL ASSESSMENT (HPI)
Patient presents to the ED from assistive living for bilateral chest pain that started yesterday, but started again tonight and worse. Per patient, recently started physical therapy. History of A-fib and HTN.

## 2023-09-26 ENCOUNTER — APPOINTMENT (OUTPATIENT)
Dept: URBAN - METROPOLITAN AREA CLINIC 244 | Age: 88
Setting detail: DERMATOLOGY
End: 2023-09-26

## 2023-09-26 DIAGNOSIS — Z86.007 PERSONAL HISTORY OF IN-SITU NEOPLASM OF SKIN: ICD-10-CM

## 2023-09-26 DIAGNOSIS — I87.2 VENOUS INSUFFICIENCY (CHRONIC) (PERIPHERAL): ICD-10-CM

## 2023-09-26 PROCEDURE — OTHER COUNSELING: OTHER

## 2023-09-26 PROCEDURE — OTHER PRESCRIPTION MEDICATION MANAGEMENT: OTHER

## 2023-09-26 PROCEDURE — 99213 OFFICE O/P EST LOW 20 MIN: CPT

## 2023-09-26 ASSESSMENT — LOCATION DETAILED DESCRIPTION DERM
LOCATION DETAILED: LEFT DISTAL PRETIBIAL REGION
LOCATION DETAILED: RIGHT ANTERIOR SHOULDER
LOCATION DETAILED: RIGHT DISTAL PRETIBIAL REGION

## 2023-09-26 ASSESSMENT — LOCATION ZONE DERM
LOCATION ZONE: ARM
LOCATION ZONE: LEG

## 2023-09-26 ASSESSMENT — LOCATION SIMPLE DESCRIPTION DERM
LOCATION SIMPLE: LEFT PRETIBIAL REGION
LOCATION SIMPLE: RIGHT SHOULDER
LOCATION SIMPLE: RIGHT PRETIBIAL REGION

## 2023-09-26 NOTE — PROCEDURE: PRESCRIPTION MEDICATION MANAGEMENT
Continue Regimen: triamcinolone acetonide 0.1 % topical ointment
Detail Level: Zone
Render In Strict Bullet Format?: No

## 2023-10-02 RX ORDER — FERROUS SULFATE 325(65) MG
1 TABLET ORAL 3 TIMES DAILY
Qty: 90 TABLET | Refills: 0 | Status: SHIPPED | OUTPATIENT
Start: 2023-10-02

## 2023-10-17 ENCOUNTER — LAB REQUISITION (OUTPATIENT)
Dept: LAB | Facility: HOSPITAL | Age: 88
End: 2023-10-17
Attending: INTERNAL MEDICINE
Payer: MEDICARE

## 2023-10-17 DIAGNOSIS — Z01.89 ENCOUNTER FOR OTHER SPECIFIED SPECIAL EXAMINATIONS: ICD-10-CM

## 2023-10-17 LAB
BILIRUB UR QL: NEGATIVE
CLARITY UR: CLEAR
COLOR UR: YELLOW
GLUCOSE UR-MCNC: NORMAL MG/DL
HGB UR QL STRIP.AUTO: NEGATIVE
KETONES UR-MCNC: NEGATIVE MG/DL
LEUKOCYTE ESTERASE UR QL STRIP.AUTO: NEGATIVE
NITRITE UR QL STRIP.AUTO: NEGATIVE
PH UR: 6 [PH] (ref 5–8)
PROT UR-MCNC: NEGATIVE MG/DL
SP GR UR STRIP: 1.02 (ref 1–1.03)
UROBILINOGEN UR STRIP-ACNC: NORMAL

## 2023-10-17 PROCEDURE — 87086 URINE CULTURE/COLONY COUNT: CPT

## 2023-10-17 PROCEDURE — 81003 URINALYSIS AUTO W/O SCOPE: CPT

## 2023-10-23 PROBLEM — S32.009A CLOSED FRACTURE OF LUMBAR VERTEBRAL BODY (HCC): Status: ACTIVE | Noted: 2023-10-23

## 2023-10-23 PROBLEM — M43.16 SPONDYLOLISTHESIS AT L4-L5 LEVEL: Status: ACTIVE | Noted: 2023-10-23

## 2023-10-23 PROBLEM — M54.50 ACUTE MIDLINE LOW BACK PAIN WITHOUT SCIATICA: Status: ACTIVE | Noted: 2017-05-17

## 2023-10-24 PROBLEM — Z95.0 PRESENCE OF CARDIAC PACEMAKER: Status: ACTIVE | Noted: 2023-01-01

## 2023-10-24 PROBLEM — Z95.5 PRESENCE OF CORONARY ANGIOPLASTY IMPLANT AND GRAFT: Status: ACTIVE | Noted: 2023-01-01

## 2023-10-24 PROBLEM — Z85.3 PERSONAL HISTORY OF MALIGNANT NEOPLASM OF BREAST: Status: ACTIVE | Noted: 2023-01-01

## 2023-10-25 NOTE — ED QUICK NOTES
A-sajan here ready for transport of patient back to [address] [place] [address] Report given to A-sajan and given appropriate paperwork to give to nursing staff at facility. Pt is AAOx4, calm, respirations noted as even and unlabored, skin warm and dry, and there are no signs or symptoms of distress noted at this time. Pt transferred in stable condition via wheelchair.
Attempted to call report to patients nursing home [facility] [place], no answer. Called patients daughter Moo Sanon again and gave update on plan of discharge.
Rounding Completed    Patient resting comfortably in bed in room in no current distress. Pt respirations noted as even and unlabored and there are no signs or symptoms of distress noted at this time. Plan of Care reviewed. Waiting for blood results. Elimination needs assessed. Bed is locked and in lowest position. Call light within reach.
This writer called patients daughter Zully Cueva ([phone]) with patient permission with updates. Zully Cueva requesting to be called when all results are back.
This writer unsuccessful again with attempting to call report to patients nursing home facility, no answer each call.
Detail Level: Zone

## 2023-11-06 PROCEDURE — 87086 URINE CULTURE/COLONY COUNT: CPT | Performed by: INTERNAL MEDICINE

## 2023-11-06 PROCEDURE — 81001 URINALYSIS AUTO W/SCOPE: CPT | Performed by: INTERNAL MEDICINE

## 2023-11-07 ENCOUNTER — LAB REQUISITION (OUTPATIENT)
Dept: LAB | Facility: HOSPITAL | Age: 88
End: 2023-11-07
Payer: MEDICARE

## 2023-11-07 DIAGNOSIS — N39.0 URINARY TRACT INFECTION, SITE NOT SPECIFIED: ICD-10-CM

## 2023-11-07 LAB
BILIRUB UR QL: NEGATIVE
CLARITY UR: CLEAR
COLOR UR: YELLOW
GLUCOSE UR-MCNC: NORMAL MG/DL
HGB UR QL STRIP.AUTO: NEGATIVE
HYALINE CASTS #/AREA URNS AUTO: PRESENT /LPF
KETONES UR-MCNC: NEGATIVE MG/DL
LEUKOCYTE ESTERASE UR QL STRIP.AUTO: 75
NITRITE UR QL STRIP.AUTO: NEGATIVE
PH UR: 6 [PH] (ref 5–8)
PROT UR-MCNC: NEGATIVE MG/DL
SP GR UR STRIP: 1.02 (ref 1–1.03)
UROBILINOGEN UR STRIP-ACNC: NORMAL

## 2023-11-13 ENCOUNTER — TELEPHONE (OUTPATIENT)
Dept: INTERNAL MEDICINE CLINIC | Facility: CLINIC | Age: 88
End: 2023-11-13

## 2023-11-29 RX ORDER — PYRIDOXINE HCL (VITAMIN B6) 100 MG
2 TABLET ORAL DAILY
Qty: 180 CAPSULE | Refills: 0 | Status: SHIPPED | OUTPATIENT
Start: 2023-11-29

## 2023-11-29 NOTE — TELEPHONE ENCOUNTER
Please review; protocol failed.    Requested Prescriptions   Pending Prescriptions Disp Refills    CRANBERRY CONCENTRATE 500 MG Oral Cap [Pharmacy Med Name: Cranberry Concentrate 500 mg capsule] 180 capsule 0     Sig: TAKE 2 CAPSULES BY MOUTH DAILY       There is no refill protocol information for this order       BRILINTA 90 MG Oral Tab [Pharmacy Med Name: Deloria Merom 90 mg tablet] 180 tablet 0     Sig: TAKE 1 TABLET BY MOUTH EVERY 12 HOURS       There is no refill protocol information for this order        Recent Outpatient Visits              1 month ago Closed intertrochanteric fracture of right hip, with routine healing, subsequent encounter    1717 Luiz Hernandez MD    Office Visit    3 months ago Closed intertrochanteric fracture of right hip, with routine healing, subsequent encounter    1717 Luiz Hernandez MD    Office Visit    4 months ago Closed displaced articular fracture of head of right femur with routine healing    1717 Luiz Hernandez MD    Office Visit    4 months ago Bilateral lower extremity edema    [facility] [facility]    Office Visit    4 months ago Closed displaced articular fracture of head of right femur with routine healing    1717 Luiz Hernandez MD    Office Visit

## 2023-12-11 ENCOUNTER — TELEPHONE (OUTPATIENT)
Dept: CARDIOLOGY CLINIC | Facility: HOSPITAL | Age: 88
End: 2023-12-11

## 2023-12-12 ENCOUNTER — APPOINTMENT (OUTPATIENT)
Dept: GENERAL RADIOLOGY | Facility: HOSPITAL | Age: 88
End: 2023-12-12
Attending: EMERGENCY MEDICINE
Payer: MEDICARE

## 2023-12-12 ENCOUNTER — HOSPITAL ENCOUNTER (INPATIENT)
Facility: HOSPITAL | Age: 88
LOS: 2 days | Discharge: HOME HEALTH | End: 2023-12-14
Attending: EMERGENCY MEDICINE | Admitting: STUDENT IN AN ORGANIZED HEALTH CARE EDUCATION/TRAINING PROGRAM
Payer: MEDICARE

## 2023-12-12 ENCOUNTER — APPOINTMENT (OUTPATIENT)
Dept: GENERAL RADIOLOGY | Facility: HOSPITAL | Age: 88
DRG: 193 | End: 2023-12-12
Attending: EMERGENCY MEDICINE
Payer: MEDICARE

## 2023-12-12 ENCOUNTER — HOSPITAL ENCOUNTER (INPATIENT)
Facility: HOSPITAL | Age: 88
LOS: 2 days | Discharge: HOME HEALTH | DRG: 193 | End: 2023-12-14
Attending: EMERGENCY MEDICINE | Admitting: STUDENT IN AN ORGANIZED HEALTH CARE EDUCATION/TRAINING PROGRAM
Payer: MEDICARE

## 2023-12-12 DIAGNOSIS — R07.9 ACUTE CHEST PAIN: Primary | ICD-10-CM

## 2023-12-12 DIAGNOSIS — J18.9 ATYPICAL PNEUMONIA: ICD-10-CM

## 2023-12-12 LAB
ALBUMIN SERPL-MCNC: 4.3 G/DL (ref 3.2–4.8)
ALBUMIN/GLOB SERPL: 1.3 {RATIO} (ref 1–2)
ALP LIVER SERPL-CCNC: 142 U/L
ALT SERPL-CCNC: <7 U/L
ANION GAP SERPL CALC-SCNC: 8 MMOL/L (ref 0–18)
AST SERPL-CCNC: 24 U/L (ref ?–34)
BASOPHILS # BLD AUTO: 0.04 X10(3) UL (ref 0–0.2)
BASOPHILS NFR BLD AUTO: 0.6 %
BILIRUB SERPL-MCNC: 0.6 MG/DL (ref 0.2–0.9)
BNP SERPL-MCNC: 704 PG/ML
BUN BLD-MCNC: 21 MG/DL (ref 9–23)
BUN/CREAT SERPL: 20 (ref 10–20)
CALCIUM BLD-MCNC: 10.1 MG/DL (ref 8.7–10.4)
CHLORIDE SERPL-SCNC: 104 MMOL/L (ref 98–112)
CO2 SERPL-SCNC: 27 MMOL/L (ref 21–32)
CREAT BLD-MCNC: 1.05 MG/DL
DEPRECATED RDW RBC AUTO: 56.8 FL (ref 35.1–46.3)
EGFRCR SERPLBLD CKD-EPI 2021: 49 ML/MIN/1.73M2 (ref 60–?)
EOSINOPHIL # BLD AUTO: 0.07 X10(3) UL (ref 0–0.7)
EOSINOPHIL NFR BLD AUTO: 1.1 %
ERYTHROCYTE [DISTWIDTH] IN BLOOD BY AUTOMATED COUNT: 15.1 % (ref 11–15)
FLUAV + FLUBV RNA SPEC NAA+PROBE: NEGATIVE
FLUAV + FLUBV RNA SPEC NAA+PROBE: NEGATIVE
GLOBULIN PLAS-MCNC: 3.3 G/DL (ref 2.8–4.4)
GLUCOSE BLD-MCNC: 110 MG/DL (ref 70–99)
GLUCOSE BLDC GLUCOMTR-MCNC: 80 MG/DL (ref 70–99)
HCT VFR BLD AUTO: 42.8 %
HGB BLD-MCNC: 13.5 G/DL
IMM GRANULOCYTES # BLD AUTO: 0.02 X10(3) UL (ref 0–1)
IMM GRANULOCYTES NFR BLD: 0.3 %
LYMPHOCYTES # BLD AUTO: 1.26 X10(3) UL (ref 1–4)
LYMPHOCYTES NFR BLD AUTO: 20.3 %
MCH RBC QN AUTO: 31.8 PG (ref 26–34)
MCHC RBC AUTO-ENTMCNC: 31.5 G/DL (ref 31–37)
MCV RBC AUTO: 100.9 FL
MONOCYTES # BLD AUTO: 0.58 X10(3) UL (ref 0.1–1)
MONOCYTES NFR BLD AUTO: 9.3 %
NEUTROPHILS # BLD AUTO: 4.25 X10 (3) UL (ref 1.5–7.7)
NEUTROPHILS # BLD AUTO: 4.25 X10(3) UL (ref 1.5–7.7)
NEUTROPHILS NFR BLD AUTO: 68.4 %
OSMOLALITY SERPL CALC.SUM OF ELEC: 292 MOSM/KG (ref 275–295)
PLATELET # BLD AUTO: 312 10(3)UL (ref 150–450)
POTASSIUM SERPL-SCNC: 4.5 MMOL/L (ref 3.5–5.1)
PROT SERPL-MCNC: 7.6 G/DL (ref 5.7–8.2)
Q-T INTERVAL: 416 MS
QRS DURATION: 98 MS
QTC CALCULATION (BEZET): 458 MS
R AXIS: 63 DEGREES
RBC # BLD AUTO: 4.24 X10(6)UL
RSV RNA SPEC NAA+PROBE: NEGATIVE
SARS-COV-2 RNA RESP QL NAA+PROBE: NOT DETECTED
SODIUM SERPL-SCNC: 139 MMOL/L (ref 136–145)
T AXIS: 154 DEGREES
TROPONIN I SERPL HS-MCNC: 21 NG/L
TROPONIN I SERPL HS-MCNC: 25 NG/L
TROPONIN I SERPL HS-MCNC: 27 NG/L
VENTRICULAR RATE: 73 BPM
WBC # BLD AUTO: 6.2 X10(3) UL (ref 4–11)

## 2023-12-12 PROCEDURE — 99223 1ST HOSP IP/OBS HIGH 75: CPT | Performed by: HOSPITALIST

## 2023-12-12 PROCEDURE — 71045 X-RAY EXAM CHEST 1 VIEW: CPT | Performed by: EMERGENCY MEDICINE

## 2023-12-12 RX ORDER — POLYETHYLENE GLYCOL 3350 17 G/17G
17 POWDER, FOR SOLUTION ORAL DAILY PRN
Status: DISCONTINUED | OUTPATIENT
Start: 2023-12-12 | End: 2023-12-14

## 2023-12-12 RX ORDER — PANTOPRAZOLE SODIUM 40 MG/1
40 TABLET, DELAYED RELEASE ORAL
Status: DISCONTINUED | OUTPATIENT
Start: 2023-12-13 | End: 2023-12-14

## 2023-12-12 RX ORDER — LEVOTHYROXINE SODIUM 0.1 MG/1
100 TABLET ORAL
Status: DISCONTINUED | OUTPATIENT
Start: 2023-12-13 | End: 2023-12-14

## 2023-12-12 RX ORDER — SENNOSIDES 8.6 MG
17.2 TABLET ORAL NIGHTLY PRN
Status: DISCONTINUED | OUTPATIENT
Start: 2023-12-12 | End: 2023-12-14

## 2023-12-12 RX ORDER — ONDANSETRON 2 MG/ML
4 INJECTION INTRAMUSCULAR; INTRAVENOUS EVERY 6 HOURS PRN
Status: DISCONTINUED | OUTPATIENT
Start: 2023-12-12 | End: 2023-12-14

## 2023-12-12 RX ORDER — FERROUS SULFATE 325(65) MG
325 TABLET, DELAYED RELEASE (ENTERIC COATED) ORAL DAILY
Status: DISCONTINUED | OUTPATIENT
Start: 2023-12-13 | End: 2023-12-14

## 2023-12-12 RX ORDER — MELATONIN
6 NIGHTLY PRN
Status: DISCONTINUED | OUTPATIENT
Start: 2023-12-12 | End: 2023-12-14

## 2023-12-12 RX ORDER — METOPROLOL SUCCINATE 25 MG/1
25 TABLET, EXTENDED RELEASE ORAL EVERY 12 HOURS
Status: DISCONTINUED | OUTPATIENT
Start: 2023-12-12 | End: 2023-12-14

## 2023-12-12 RX ORDER — BISACODYL 10 MG
10 SUPPOSITORY, RECTAL RECTAL
Status: DISCONTINUED | OUTPATIENT
Start: 2023-12-12 | End: 2023-12-14

## 2023-12-12 RX ORDER — HEPARIN SODIUM 5000 [USP'U]/ML
5000 INJECTION, SOLUTION INTRAVENOUS; SUBCUTANEOUS EVERY 12 HOURS
Status: DISCONTINUED | OUTPATIENT
Start: 2023-12-12 | End: 2023-12-14

## 2023-12-12 RX ORDER — NITROGLYCERIN 0.4 MG/1
0.4 TABLET SUBLINGUAL EVERY 5 MIN PRN
Status: DISCONTINUED | OUTPATIENT
Start: 2023-12-12 | End: 2023-12-14

## 2023-12-12 NOTE — ED QUICK NOTES
Orders for admission, patient is aware of plan and ready to go upstairs. Any questions, please call ED RN Huma at extension [phone].      Patient Covid vaccination status: Fully vaccinated     COVID Test Ordered in ED: None    COVID Suspicion at Admission: N/A    Running Infusions:  None    Mental Status/LOC at time of transport: a/ox4    Other pertinent information:   CIWA score: N/A   NIH score:  N/A

## 2023-12-12 NOTE — ED QUICK NOTES
Report received from [facility] for continuity of care, pt sleeping, RR even and unlabored, pt remains on cardiac monitor

## 2023-12-12 NOTE — ED INITIAL ASSESSMENT (HPI)
Pt presents to the Ed with c/o an episode of right sided chest pain. Pt reports the pain started after going to bed tonight. Pt arrives to the ED with symptoms now resolved. History of coronary bypass on anticoagulant. Pt is alert and orientated x 2, vitals are stable, moving all extremities spontaneously.

## 2023-12-13 LAB
ANION GAP SERPL CALC-SCNC: 7 MMOL/L (ref 0–18)
BASOPHILS # BLD AUTO: 0.03 X10(3) UL (ref 0–0.2)
BASOPHILS NFR BLD AUTO: 0.6 %
BUN BLD-MCNC: 17 MG/DL (ref 9–23)
BUN/CREAT SERPL: 20.2 (ref 10–20)
CALCIUM BLD-MCNC: 9.5 MG/DL (ref 8.7–10.4)
CHLORIDE SERPL-SCNC: 106 MMOL/L (ref 98–112)
CO2 SERPL-SCNC: 25 MMOL/L (ref 21–32)
CREAT BLD-MCNC: 0.84 MG/DL
DEPRECATED RDW RBC AUTO: 54 FL (ref 35.1–46.3)
EGFRCR SERPLBLD CKD-EPI 2021: 64 ML/MIN/1.73M2 (ref 60–?)
EOSINOPHIL # BLD AUTO: 0.12 X10(3) UL (ref 0–0.7)
EOSINOPHIL NFR BLD AUTO: 2.4 %
ERYTHROCYTE [DISTWIDTH] IN BLOOD BY AUTOMATED COUNT: 14.8 % (ref 11–15)
GLUCOSE BLD-MCNC: 104 MG/DL (ref 70–99)
HCT VFR BLD AUTO: 38.9 %
HGB BLD-MCNC: 12.8 G/DL
IMM GRANULOCYTES # BLD AUTO: 0.01 X10(3) UL (ref 0–1)
IMM GRANULOCYTES NFR BLD: 0.2 %
LYMPHOCYTES # BLD AUTO: 1.09 X10(3) UL (ref 1–4)
LYMPHOCYTES NFR BLD AUTO: 21.7 %
MCH RBC QN AUTO: 32.2 PG (ref 26–34)
MCHC RBC AUTO-ENTMCNC: 32.9 G/DL (ref 31–37)
MCV RBC AUTO: 98 FL
MONOCYTES # BLD AUTO: 0.43 X10(3) UL (ref 0.1–1)
MONOCYTES NFR BLD AUTO: 8.6 %
NEUTROPHILS # BLD AUTO: 3.34 X10 (3) UL (ref 1.5–7.7)
NEUTROPHILS # BLD AUTO: 3.34 X10(3) UL (ref 1.5–7.7)
NEUTROPHILS NFR BLD AUTO: 66.5 %
OSMOLALITY SERPL CALC.SUM OF ELEC: 288 MOSM/KG (ref 275–295)
PLATELET # BLD AUTO: 258 10(3)UL (ref 150–450)
POTASSIUM SERPL-SCNC: 3.6 MMOL/L (ref 3.5–5.1)
RBC # BLD AUTO: 3.97 X10(6)UL
SODIUM SERPL-SCNC: 138 MMOL/L (ref 136–145)
WBC # BLD AUTO: 5 X10(3) UL (ref 4–11)

## 2023-12-13 PROCEDURE — 99233 SBSQ HOSP IP/OBS HIGH 50: CPT | Performed by: HOSPITALIST

## 2023-12-13 NOTE — PHYSICAL THERAPY NOTE
PHYSICAL THERAPY EVALUATION - INPATIENT     Room Number: 336/336-A  Evaluation Date: 12/13/2023  Type of Evaluation: Initial   Physician Order: PT Eval and Treat    Presenting Problem: acute chest pain  Co-Morbidities : Hx HTN, CAD s/p CABG, a fib, baseline dementia  Reason for Therapy: Mobility Dysfunction and Discharge Planning    PHYSICAL THERAPY ASSESSMENT     Patient is a 80year old female admitted 12/12/2023 for acute chest pain. Patient's current functional deficits include pain, weakness, impaired balance, bed mobility, transfers, which are below the patient's pre-admission status. Patient will benefit from continued IP PT services to address these deficits in preparation for discharge. RN approved participation with physical therapy. Pt was received resting in bed and agreeable to activity. Pt pleasantly confused and cooperative. Educated on role of PT, PT plan of care, benefits of OOB mobility and transfer to chair. Pt verbalized understanding. Pt with c/o momentary lower back and abdominal pain, unrated, upon sitting at EOB which resolved within 1 minute. Bed mobility: supervision for supine to sit at EOB with HOB elevated  Transfers: CGA for STS without assistive device. CGA for stand pivot transfer to chair. Moves slowly but demos overall safe sequencing of transfer with appropriate hand placement and body mechanics. Pt was left sitting in chair with needs within reach, chair alarm on, handoff to RN complete. The patient's Approx Degree of Impairment: 57.7% has been calculated based on documentation in the [facility] '6 clicks' Inpatient Basic Mobility Short Form. Research supports that patients with this level of impairment may benefit from GAGAN; however PT anticipates pt is close to baseline and on track to DC back to [facility] with [facility] PT.    DISCHARGE RECOMMENDATIONS  PT Discharge Recommendations: Home with home health PT (return to [facility])    PLAN  PT Treatment Plan: Bed mobility; Body mechanics; Endurance; Energy conservation;Patient education;Strengthening;Transfer training;Balance training  Rehab Potential : Good  Frequency (Obs): 3-5x/week    PHYSICAL THERAPY MEDICAL/SOCIAL HISTORY      Problem List  Principal Problem:    Acute chest pain  Active Problems:    Atypical pneumonia    HOME SITUATION  Home Situation  Type of Home: Assisted living facility [facility]. User may not have seen previous data.)  Home Layout: One level (Simultaneous filing. User may not have seen previous data.)  Lives With: Staff 24 hours (Simultaneous filing. User may not have seen previous data.)  Drives: No (Simultaneous filing. User may not have seen previous data.)  Patient Owned Equipment: Rolling walker; Wheelchair  Patient Regularly Uses: None     Prior Level of [place]: per pt report, requires assistance with ADLs; pivot transfers only WC to/from bed at baseline; non-ambulatory     SUBJECTIVE  Agreeable to activity. PHYSICAL THERAPY EXAMINATION     OBJECTIVE  Precautions: Bed/chair alarm (Simultaneous filing. User may not have seen previous data.)  Fall Risk: High fall risk (Simultaneous filing. User may not have seen previous data.)    WEIGHT BEARING RESTRICTION  none    PAIN ASSESSMENT  Rating:  (did not rate)  Location: transient lower back and abdominal pain with transition to sitting  Management Techniques: Activity promotion; Body mechanics;Breathing techniques;Repositioning    COGNITION  Overall Cognitive Status:  WFL - within functional limits pleasantly confused    RANGE OF MOTION AND STRENGTH ASSESSMENT  Upper extremity ROM and strength are within functional limits. Lower extremity ROM is within functional limits. Lower extremity strength is within functional limits.     BALANCE  Static Sitting: Good  Dynamic Sitting: Fair +  Static Standing: Fair -  Dynamic Standin [place] 2 '6-Clicks' INPATIENT SHORT FORM - BASIC MOBILITY  How much difficulty does the patient currently have. .. Patient Difficulty: Turning over in bed (including adjusting bedclothes, sheets and blankets)?: A Little   Patient Difficulty: Sitting down on and standing up from a chair with arms (e.g., wheelchair, bedside commode, etc.): A Little   Patient Difficulty: Moving from lying on back to sitting on the side of the bed?: A Little   How much help from another person does the patient currently need. .. Help from Another: Moving to and from a bed to a chair (including a wheelchair)?: A Little   Help from Another: Need to walk in hospital room?: A Lot   Help from Another: Climbing 3-5 steps with a railing?: Total     AM-PAC Score:  Raw Score: 15   Approx Degree of Impairment: 57.7%   Standardized Score (AM-PAC Scale): 39.45   CMS Modifier (G-Code): CK    FUNCTIONAL ABILITY STATUS  Functional Mobility/Gait Assessment  Gait Assistance: Not tested    Bed Mobility: supervision    Transfers: CGA    Exercise/Education Provided:  Bed mobility  Body mechanics  Energy conservation  Functional activity tolerated  Posture  Transfer training    Patient End of Session: Up in chair;Needs met;Call light within reach;RN aware of session/findings; All patient questions and concerns addressed; Alarm set    CURRENT GOALS    Goals to be met by: 12/20/23  Patient Goal Patient's self-stated goal is: go home   Goal #1 Patient is able to demonstrate supine - sit EOB @ level: modified independent     Goal #1   Current Status    Goal #2 Patient is able to demonstrate stand pivot transfers EOB to/from Chair/Wheelchair at assistance level: supervision with none     Goal #2  Current Status    Goal #3    Goal #3   Current Status    Goal #4    Goal #4   Current Status    Goal #5 Patient to demonstrate independence with home activity/exercise instructions provided to patient in preparation for discharge.    Goal #5   Current Status    Goal #6    Goal #6  Current Status        Patient Evaluation Complexity Level:  History Moderate - 1 or 2 personal factors and/or co-morbidities   Examination of body systems Moderate - addressing a total of 3 or more elements   Clinical Presentation Moderate - Evolving   Clinical Decision Making Moderate Complexity     Therapeutic Activity: 15 minutes

## 2023-12-13 NOTE — TELEPHONE ENCOUNTER
Action Requested: Summary for Provider     []  Critical Lab, Recommendations Needed  [x] Need Additional Advice  []   FYI    []   Need Orders  [] Need Medications Sent to Pharmacy  []  Other     SUMMARY: Pt requesting advice about headache she wakes up w The patient is a 89y Female complaining of fall.

## 2023-12-13 NOTE — PLAN OF CARE
Ms. Baca Situ [facility]) Allie Lewis is A&O x 1-2 with forgetfulness and confusion. Lives at [place] at [place]. Max assist with stand-pivot to w/c per baseline. Incontinent of bowel and bladder. VSS:  stable; afib. Denies pain. Plan:  IV ABX, PT/OT, eval  Will continue to monitor and treat. Problem: Patient Centered Care  Goal: Patient preferences are identified and integrated in the patient's plan of care  Description: Interventions:  - What would you like us to know as we care for you? I'm from [place] [id number]  - Provide timely, complete, and accurate information to patient/family  - Incorporate patient and family knowledge, values, beliefs, and cultural backgrounds into the planning and delivery of care  - Encourage patient/family to participate in care and decision-making at the level they choose  - Honor patient and family perspectives and choices  Outcome: Progressing     Problem: Patient/Family Goals  Goal: Patient/Family Long Term Goal  Description: Patient's Long Term Goal: get stronger    Interventions:  - See additional Care Plan goals for specific interventions  Outcome: Progressing  Goal: Patient/Family Short Term Goal  Description: Patient's Short Term Goal: Go home    Interventions:   - See additional Care Plan goals for specific interventions  Outcome: Progressing     Problem: CARDIOVASCULAR - ADULT  Goal: Maintains optimal cardiac output and hemodynamic stability  Description: INTERVENTIONS:  - Monitor vital signs, rhythm, and trends  - Monitor for bleeding, hypotension and signs of decreased cardiac output  - Evaluate effectiveness of vasoactive medications to optimize hemodynamic stability  - Monitor arterial and/or venous puncture sites for bleeding and/or hematoma  - Assess quality of pulses, skin color and temperature  - Assess for signs of decreased coronary artery perfusion - ex.  Angina  - Evaluate fluid balance, assess for edema, trend weights  Outcome: Progressing  Goal: Absence of cardiac arrhythmias or at baseline  Description: INTERVENTIONS:  - Continuous cardiac monitoring, monitor vital signs, obtain 12 lead EKG if indicated  - Evaluate effectiveness of antiarrhythmic and heart rate control medications as ordered  - Initiate emergency measures for life threatening arrhythmias  - Monitor electrolytes and administer replacement therapy as ordered  Outcome: Progressing     Problem: METABOLIC/FLUID AND ELECTROLYTES - ADULT  Goal: Electrolytes maintained within normal limits  Description: INTERVENTIONS:  - Monitor labs and rhythm and assess patient for signs and symptoms of electrolyte imbalances  - Administer electrolyte replacement as ordered  - Monitor response to electrolyte replacements, including rhythm and repeat lab results as appropriate  - Fluid restriction as ordered  - Instruct patient on fluid and nutrition restrictions as appropriate  Outcome: Progressing     Problem: SKIN/TISSUE INTEGRITY - ADULT  Goal: Skin integrity remains intact  Description: INTERVENTIONS  - Assess and document risk factors for pressure ulcer development  - Assess and document skin integrity  - Monitor for areas of redness and/or skin breakdown  - Initiate interventions, skin care algorithm/standards of care as needed  Outcome: Progressing     Problem: HEMATOLOGIC - ADULT  Goal: Maintains hematologic stability  Description: INTERVENTIONS  - Assess for signs and symptoms of bleeding or hemorrhage  - Monitor labs and vital signs for trends  - Administer supportive blood products/factors, fluids and medications as ordered and appropriate  - Administer supportive blood products/factors as ordered and appropriate  Outcome: Progressing  Goal: Free from bleeding injury  Description: (Example usage: patient with low platelets)  INTERVENTIONS:  - Avoid intramuscular injections, enemas and rectal medication administration  - Ensure safe mobilization of patient  - Hold pressure on venipuncture sites to achieve adequate hemostasis  - Assess for signs and symptoms of internal bleeding  - Monitor lab trends  - Patient is to report abnormal signs of bleeding to staff  - Avoid use of toothpicks and dental floss  - Use electric shaver for shaving  - Use soft bristle tooth brush  - Limit straining and forceful nose blowing  Outcome: Progressing     Problem: MUSCULOSKELETAL - ADULT  Goal: Return mobility to safest level of function  Description: INTERVENTIONS:  - Assess patient stability and activity tolerance for standing, transferring and ambulating w/ or w/o assistive devices  - Assist with transfers and ambulation using safe patient handling equipment as needed  - Ensure adequate protection for wounds/incisions during mobilization  - Obtain PT/OT consults as needed  - Advance activity as appropriate  - Communicate ordered activity level and limitations with patient/family  Outcome: Progressing     Problem: Impaired Cognition  Goal: Patient will exhibit improved attention, thought processing and/or memory  Description: Interventions:  - Minimize distractions in the room when full attention is required  - Encourage use of eye glasses  Outcome: Progressing

## 2023-12-13 NOTE — CM/SW NOTE
Social work received an update that this patient will need [facility] PT. This patient is confirmed current with [facility] for RN services. Social work sent Family [facility] new referral to include PT. Social work will follow up to see if [facility] received the referral via Aidin and Fax. Social work will upload signed face to face when available. SW/CM to remain available for support and/or discharge planning.      Mari MUÑOZ, [facility]  Discharge Planner [id number]

## 2023-12-13 NOTE — PLAN OF CARE
Patient came up from ER today at 1500 with just nightgown, no other belongings. This RN called [facility] to [place] medication [place]. Daughter Carie Tobias was called and made aware of patients room number.  Patient oriented to room and ordered a meal.     Mack Avelar RN

## 2023-12-13 NOTE — PLAN OF CARE
Problem: Patient Centered Care  Goal: Patient preferences are identified and integrated in the patient's plan of care  Description: Interventions:  - What would you like us to know as we care for you? I'm from [place] [id number]  - Provide timely, complete, and accurate information to patient/family  - Incorporate patient and family knowledge, values, beliefs, and cultural backgrounds into the planning and delivery of care  - Encourage patient/family to participate in care and decision-making at the level they choose  - Honor patient and family perspectives and choices  Outcome: Progressing     Problem: Patient/Family Goals  Goal: Patient/Family Long Term Goal  Description: Patient's Long Term Goal: get stronger    Interventions:  - See additional Care Plan goals for specific interventions  Outcome: Progressing  Goal: Patient/Family Short Term Goal  Description: Patient's Short Term Goal: Go home    Interventions:   - See additional Care Plan goals for specific interventions  Outcome: Progressing     Problem: CARDIOVASCULAR - ADULT  Goal: Maintains optimal cardiac output and hemodynamic stability  Description: INTERVENTIONS:  - Monitor vital signs, rhythm, and trends  - Monitor for bleeding, hypotension and signs of decreased cardiac output  - Evaluate effectiveness of vasoactive medications to optimize hemodynamic stability  - Monitor arterial and/or venous puncture sites for bleeding and/or hematoma  - Assess quality of pulses, skin color and temperature  - Assess for signs of decreased coronary artery perfusion - ex.  Angina  - Evaluate fluid balance, assess for edema, trend weights  Outcome: Progressing  Goal: Absence of cardiac arrhythmias or at baseline  Description: INTERVENTIONS:  - Continuous cardiac monitoring, monitor vital signs, obtain 12 lead EKG if indicated  - Evaluate effectiveness of antiarrhythmic and heart rate control medications as ordered  - Initiate emergency measures for life threatening arrhythmias  - Monitor electrolytes and administer replacement therapy as ordered  Outcome: Progressing

## 2023-12-13 NOTE — DISCHARGE INSTRUCTIONS
Sometimes managing your health at home requires assistance. The [facility] team has recognized your preference to use   [address]  Phone: [phone]  Fax: [phone]  Location  [address], [address]  A representative from the home health agency will contact you or your family to schedule your first visit.

## 2023-12-14 VITALS
TEMPERATURE: 98 F | RESPIRATION RATE: 18 BRPM | DIASTOLIC BLOOD PRESSURE: 84 MMHG | OXYGEN SATURATION: 98 % | HEART RATE: 89 BPM | WEIGHT: 103.19 LBS | BODY MASS INDEX: 18 KG/M2 | SYSTOLIC BLOOD PRESSURE: 110 MMHG

## 2023-12-14 LAB
ANION GAP SERPL CALC-SCNC: 8 MMOL/L (ref 0–18)
BASOPHILS # BLD AUTO: 0.03 X10(3) UL (ref 0–0.2)
BASOPHILS NFR BLD AUTO: 0.6 %
BUN BLD-MCNC: 16 MG/DL (ref 9–23)
BUN/CREAT SERPL: 20.8 (ref 10–20)
CALCIUM BLD-MCNC: 9.5 MG/DL (ref 8.7–10.4)
CHLORIDE SERPL-SCNC: 106 MMOL/L (ref 98–112)
CO2 SERPL-SCNC: 25 MMOL/L (ref 21–32)
CREAT BLD-MCNC: 0.77 MG/DL
DEPRECATED RDW RBC AUTO: 56.3 FL (ref 35.1–46.3)
EGFRCR SERPLBLD CKD-EPI 2021: 71 ML/MIN/1.73M2 (ref 60–?)
EOSINOPHIL # BLD AUTO: 0.09 X10(3) UL (ref 0–0.7)
EOSINOPHIL NFR BLD AUTO: 1.7 %
ERYTHROCYTE [DISTWIDTH] IN BLOOD BY AUTOMATED COUNT: 14.9 % (ref 11–15)
GLUCOSE BLD-MCNC: 101 MG/DL (ref 70–99)
HCT VFR BLD AUTO: 40.3 %
HGB BLD-MCNC: 12.7 G/DL
IMM GRANULOCYTES # BLD AUTO: 0.01 X10(3) UL (ref 0–1)
IMM GRANULOCYTES NFR BLD: 0.2 %
LYMPHOCYTES # BLD AUTO: 1.49 X10(3) UL (ref 1–4)
LYMPHOCYTES NFR BLD AUTO: 28.5 %
MCH RBC QN AUTO: 31.6 PG (ref 26–34)
MCHC RBC AUTO-ENTMCNC: 31.5 G/DL (ref 31–37)
MCV RBC AUTO: 100.2 FL
MONOCYTES # BLD AUTO: 0.48 X10(3) UL (ref 0.1–1)
MONOCYTES NFR BLD AUTO: 9.2 %
NEUTROPHILS # BLD AUTO: 3.13 X10 (3) UL (ref 1.5–7.7)
NEUTROPHILS # BLD AUTO: 3.13 X10(3) UL (ref 1.5–7.7)
NEUTROPHILS NFR BLD AUTO: 59.8 %
OSMOLALITY SERPL CALC.SUM OF ELEC: 289 MOSM/KG (ref 275–295)
PLATELET # BLD AUTO: 262 10(3)UL (ref 150–450)
POTASSIUM SERPL-SCNC: 3.9 MMOL/L (ref 3.5–5.1)
RBC # BLD AUTO: 4.02 X10(6)UL
SODIUM SERPL-SCNC: 139 MMOL/L (ref 136–145)
WBC # BLD AUTO: 5.2 X10(3) UL (ref 4–11)

## 2023-12-14 PROCEDURE — 99239 HOSP IP/OBS DSCHRG MGMT >30: CPT | Performed by: HOSPITALIST

## 2023-12-14 RX ORDER — LIDOCAINE HYDROCHLORIDE 20 MG/ML
1 SOLUTION ORAL; TOPICAL 3 TIMES DAILY
Qty: 90 TABLET | Refills: 0 | OUTPATIENT
Start: 2023-12-14

## 2023-12-14 RX ORDER — DOXYCYCLINE HYCLATE 100 MG/1
100 CAPSULE ORAL 2 TIMES DAILY
Qty: 4 CAPSULE | Refills: 0 | Status: SHIPPED | OUTPATIENT
Start: 2023-12-15 | End: 2023-12-14

## 2023-12-14 RX ORDER — DOXYCYCLINE HYCLATE 100 MG/1
100 CAPSULE ORAL 2 TIMES DAILY
Qty: 4 CAPSULE | Refills: 0 | Status: SHIPPED | OUTPATIENT
Start: 2023-12-15 | End: 2023-12-17

## 2023-12-14 NOTE — CM/SW NOTE
12/14/23 1200   Discharge disposition   Expected discharge disposition Home-Health   Post Acute Care Provider Other  ([address])   Discharge transportation [address] [place]     The patient received a MDO for discharge. The patient will be [facility] [place] [address]. PCS complete. The number to call report is [phone]  The facility requested the script to be sent to [facility] and a physical script to be sent with the patient. MD and RN notified. Social work notified the patient's daughter/POA of transport time. RN to inform patient. SW/CM to remain available for support and/or discharge planning.      Wil MUÑOZ, [facility]  Discharge Planner [id number]

## 2023-12-14 NOTE — CDS QUERY
Cindy Butler  Dear Doctor: Giles  Clinical information in the patient's medical record (provided below) includes documentation of Atrial Fibrillation. Based on you judgement , please clarify type of Afib:    PLEASE (X)  ALL DIAGNOSES THAT APPLY. SELECTION BY PROVIDER ONLY    ( )  Paroxysmal Atrial Fibrillation    (x )  Chronic Atrial Fibrillation     ( )  Other (please specify):            Documentation includes   80year old female with a history of HTN, CAD s/p;CABG, a-fib who presents with right sided chest pain and possible pneumonia. Progress note and Discharge summary: Afib, cont toprol, Has Watchman  Risk factors/clinical indicators/treatment:  EKG: Atrial fibrillation with premature ventricular or aberrantly conducted complexes  , Trop 27 25, 21  Treatment- Continue Toprol  EKG: Atrial fibrillation with premature ventricular or aberrantly conducted complexes      If you have any questions, please contact Clinical Documentation  Specialist:  Kavon Kiran at [phone]     Thank You!      THIS FORM IS A PERMANENT PART OF THE MEDICAL RECORD

## 2023-12-14 NOTE — PLAN OF CARE
Problem: Patient Centered Care  Goal: Patient preferences are identified and integrated in the patient's plan of care  Description: Interventions:  - What would you like us to know as we care for you? I'm from [place] [address]  - Provide timely, complete, and accurate information to patient/family  - Incorporate patient and family knowledge, values, beliefs, and cultural backgrounds into the planning and delivery of care  - Encourage patient/family to participate in care and decision-making at the level they choose  - Honor patient and family perspectives and choices  Outcome: Progressing     Problem: Patient/Family Goals  Goal: Patient/Family Long Term Goal  Description: Patient's Long Term Goal: get stronger    Interventions:  - See additional Care Plan goals for specific interventions  Outcome: Progressing  Goal: Patient/Family Short Term Goal  Description: Patient's Short Term Goal: Go home    Interventions:   - See additional Care Plan goals for specific interventions  Outcome: Progressing     Problem: CARDIOVASCULAR - ADULT  Goal: Maintains optimal cardiac output and hemodynamic stability  Description: INTERVENTIONS:  - Monitor vital signs, rhythm, and trends  - Monitor for bleeding, hypotension and signs of decreased cardiac output  - Evaluate effectiveness of vasoactive medications to optimize hemodynamic stability  - Monitor arterial and/or venous puncture sites for bleeding and/or hematoma  - Assess quality of pulses, skin color and temperature  - Assess for signs of decreased coronary artery perfusion - ex.  Angina  - Evaluate fluid balance, assess for edema, trend weights  Outcome: Progressing  Goal: Absence of cardiac arrhythmias or at baseline  Description: INTERVENTIONS:  - Continuous cardiac monitoring, monitor vital signs, obtain 12 lead EKG if indicated  - Evaluate effectiveness of antiarrhythmic and heart rate control medications as ordered  - Initiate emergency measures for life threatening arrhythmias  - Monitor electrolytes and administer replacement therapy as ordered  Outcome: Progressing     Problem: METABOLIC/FLUID AND ELECTROLYTES - ADULT  Goal: Electrolytes maintained within normal limits  Description: INTERVENTIONS:  - Monitor labs and rhythm and assess patient for signs and symptoms of electrolyte imbalances  - Administer electrolyte replacement as ordered  - Monitor response to electrolyte replacements, including rhythm and repeat lab results as appropriate  - Fluid restriction as ordered  - Instruct patient on fluid and nutrition restrictions as appropriate  Outcome: Progressing     Problem: SKIN/TISSUE INTEGRITY - ADULT  Goal: Skin integrity remains intact  Description: INTERVENTIONS  - Assess and document risk factors for pressure ulcer development  - Assess and document skin integrity  - Monitor for areas of redness and/or skin breakdown  - Initiate interventions, skin care algorithm/standards of care as needed  Outcome: Progressing     Problem: HEMATOLOGIC - ADULT  Goal: Maintains hematologic stability  Description: INTERVENTIONS  - Assess for signs and symptoms of bleeding or hemorrhage  - Monitor labs and vital signs for trends  - Administer supportive blood products/factors, fluids and medications as ordered and appropriate  - Administer supportive blood products/factors as ordered and appropriate  Outcome: Progressing  Goal: Free from bleeding injury  Description: (Example usage: patient with low platelets)  INTERVENTIONS:  - Avoid intramuscular injections, enemas and rectal medication administration  - Ensure safe mobilization of patient  - Hold pressure on venipuncture sites to achieve adequate hemostasis  - Assess for signs and symptoms of internal bleeding  - Monitor lab trends  - Patient is to report abnormal signs of bleeding to staff  - Avoid use of toothpicks and dental floss  - Use electric shaver for shaving  - Use soft bristle tooth brush  - Limit straining and forceful nose blowing  Outcome: Progressing     Problem: MUSCULOSKELETAL - ADULT  Goal: Return mobility to safest level of function  Description: INTERVENTIONS:  - Assess patient stability and activity tolerance for standing, transferring and ambulating w/ or w/o assistive devices  - Assist with transfers and ambulation using safe patient handling equipment as needed  - Ensure adequate protection for wounds/incisions during mobilization  - Obtain PT/OT consults as needed  - Advance activity as appropriate  - Communicate ordered activity level and limitations with patient/family  Outcome: Progressing

## 2023-12-14 NOTE — PLAN OF CARE
Alert oriented, vss, continue IV ABX, turned q 2hr  Problem: Patient Centered Care  Goal: Patient preferences are identified and integrated in the patient's plan of care  Description: Interventions:  - What would you like us to know as we care for you? I'm from [place] [address]  - Provide timely, complete, and accurate information to patient/family  - Incorporate patient and family knowledge, values, beliefs, and cultural backgrounds into the planning and delivery of care  - Encourage patient/family to participate in care and decision-making at the level they choose  - Honor patient and family perspectives and choices  Outcome: Progressing     Problem: Patient/Family Goals  Goal: Patient/Family Long Term Goal  Description: Patient's Long Term Goal: get stronger    Interventions:  - See additional Care Plan goals for specific interventions  Outcome: Progressing  Goal: Patient/Family Short Term Goal  Description: Patient's Short Term Goal: Go home    Interventions:   - See additional Care Plan goals for specific interventions  Outcome: Progressing     Problem: CARDIOVASCULAR - ADULT  Goal: Maintains optimal cardiac output and hemodynamic stability  Description: INTERVENTIONS:  - Monitor vital signs, rhythm, and trends  - Monitor for bleeding, hypotension and signs of decreased cardiac output  - Evaluate effectiveness of vasoactive medications to optimize hemodynamic stability  - Monitor arterial and/or venous puncture sites for bleeding and/or hematoma  - Assess quality of pulses, skin color and temperature  - Assess for signs of decreased coronary artery perfusion - ex.  Angina  - Evaluate fluid balance, assess for edema, trend weights  Outcome: Progressing  Goal: Absence of cardiac arrhythmias or at baseline  Description: INTERVENTIONS:  - Continuous cardiac monitoring, monitor vital signs, obtain 12 lead EKG if indicated  - Evaluate effectiveness of antiarrhythmic and heart rate control medications as ordered  - Initiate emergency measures for life threatening arrhythmias  - Monitor electrolytes and administer replacement therapy as ordered  Outcome: Progressing     Problem: METABOLIC/FLUID AND ELECTROLYTES - ADULT  Goal: Electrolytes maintained within normal limits  Description: INTERVENTIONS:  - Monitor labs and rhythm and assess patient for signs and symptoms of electrolyte imbalances  - Administer electrolyte replacement as ordered  - Monitor response to electrolyte replacements, including rhythm and repeat lab results as appropriate  - Fluid restriction as ordered  - Instruct patient on fluid and nutrition restrictions as appropriate  Outcome: Progressing     Problem: HEMATOLOGIC - ADULT  Goal: Maintains hematologic stability  Description: INTERVENTIONS  - Assess for signs and symptoms of bleeding or hemorrhage  - Monitor labs and vital signs for trends  - Administer supportive blood products/factors, fluids and medications as ordered and appropriate  - Administer supportive blood products/factors as ordered and appropriate  Outcome: Progressing  Goal: Free from bleeding injury  Description: (Example usage: patient with low platelets)  INTERVENTIONS:  - Avoid intramuscular injections, enemas and rectal medication administration  - Ensure safe mobilization of patient  - Hold pressure on venipuncture sites to achieve adequate hemostasis  - Assess for signs and symptoms of internal bleeding  - Monitor lab trends  - Patient is to report abnormal signs of bleeding to staff  - Avoid use of toothpicks and dental floss  - Use electric shaver for shaving  - Use soft bristle tooth brush  - Limit straining and forceful nose blowing  Outcome: Progressing     Problem: MUSCULOSKELETAL - ADULT  Goal: Return mobility to safest level of function  Description: INTERVENTIONS:  - Assess patient stability and activity tolerance for standing, transferring and ambulating w/ or w/o assistive devices  - Assist with transfers and ambulation using safe patient handling equipment as needed  - Ensure adequate protection for wounds/incisions during mobilization  - Obtain PT/OT consults as needed  - Advance activity as appropriate  - Communicate ordered activity level and limitations with patient/family  Outcome: Progressing     Problem: Impaired Cognition  Goal: Patient will exhibit improved attention, thought processing and/or memory  Description: Interventions:    Outcome: Progressing

## 2023-12-19 RX ORDER — LEVOTHYROXINE SODIUM 0.1 MG/1
100 TABLET ORAL EVERY MORNING
Qty: 90 TABLET | Refills: 0 | Status: SHIPPED | OUTPATIENT
Start: 2023-12-19

## 2023-12-19 RX ORDER — FERROUS SULFATE 325(65) MG
1 TABLET ORAL 3 TIMES DAILY
Qty: 90 TABLET | Refills: 0 | Status: SHIPPED | OUTPATIENT
Start: 2023-12-19

## 2023-12-19 RX ORDER — POTASSIUM CHLORIDE 20 MEQ/1
40 TABLET, EXTENDED RELEASE ORAL DAILY
Qty: 180 TABLET | Refills: 1 | OUTPATIENT
Start: 2023-12-19

## 2023-12-19 NOTE — TELEPHONE ENCOUNTER
Please review; protocol failed.    Requested Prescriptions   Pending Prescriptions Disp Refills    LEVOTHYROXINE 100 MCG Oral Tab [Pharmacy Med Name: levothyroxine 100 mcg tablet] 90 tablet 0     Sig: TAKE 1 TABLET BY MOUTH EVERY MORNING       Thyroid Medication Protocol Failed - 12/18/2023  9:10 PM        Failed - Last TSH value is normal     Lab Results   Component Value Date    TSH 4.290 (H) 06/17/2023                 Passed - TSH in past 12 months        Passed - In person appointment or virtual visit in the past 12 mos or appointment in next 3 mos     Recent Outpatient Visits              1 month ago Closed intertrochanteric fracture of right hip, with routine healing, subsequent encounter    [address] MD    Office Visit    3 months ago Closed intertrochanteric fracture of right hip, with routine healing, subsequent encounter    [address] Luiz Hernandez MD    Office Visit    4 months ago Closed displaced articular fracture of head of right femur with routine healing    [address] Luiz Hernandez MD    Office Visit    5 months ago Bilateral lower extremity edema    Nelma Eisenmenger, [place], [facility]    Office Visit    5 months ago Closed displaced articular fracture of head of right femur with routine healing    Andrés Bennett MD    Office Visit                        Ofelia Arellano 325 (65 Fe) MG Oral Tab [Pharmacy Med Name: FeroSul 325 mg (65 mg iron) tablet] 90 tablet 0     Sig: TAKE 1 TABLET BY MOUTH THREE TIMES DAILY       There is no refill protocol information for this order      Refused Prescriptions Disp Refills    POTASSIUM CHLORIDE 20 MEQ Oral Tab CR [Pharmacy Med Name: potassium chloride ER 20 mEq tablet,extended release(part/cryst)] 180 tablet 1     Sig: TAKE 2 TABLETS BY MOUTH DAILY       There is no refill protocol information for this order        Recent Outpatient Visits              1 month ago Closed intertrochanteric fracture of right hip, with routine healing, subsequent encounter    [address] Luiz Gregg MD    Office Visit    3 months ago Closed intertrochanteric fracture of right hip, with routine healing, subsequent encounter    [address] Luiz Gregg MD    Office Visit    4 months ago Closed displaced articular fracture of head of right femur with routine healing    [address] uLiz Gregg MD    Office Visit    5 months ago Bilateral lower extremity edema    [place], [facility]    Office Visit    5 months ago Closed displaced articular fracture of head of right femur with routine healing    [facility] Snow Marroquin MD    Office Visit

## 2023-12-22 NOTE — TELEPHONE ENCOUNTER
Please contact the patient.  Needs to make appointment in  next 1 or 2 months.  Given 2 refills at this time.

## 2024-01-02 ENCOUNTER — HOSPITAL ENCOUNTER (EMERGENCY)
Facility: HOSPITAL | Age: 89
Discharge: HOME | End: 2024-01-03
Attending: EMERGENCY MEDICINE
Payer: MEDICARE

## 2024-01-02 DIAGNOSIS — M54.59 INTRACTABLE LOW BACK PAIN: Primary | ICD-10-CM

## 2024-01-02 PROCEDURE — 99284 EMERGENCY DEPT VISIT MOD MDM: CPT

## 2024-01-03 ENCOUNTER — APPOINTMENT (OUTPATIENT)
Dept: CT IMAGING | Facility: HOSPITAL | Age: 89
End: 2024-01-03
Attending: EMERGENCY MEDICINE
Payer: MEDICARE

## 2024-01-03 VITALS
OXYGEN SATURATION: 92 % | DIASTOLIC BLOOD PRESSURE: 91 MMHG | HEART RATE: 63 BPM | SYSTOLIC BLOOD PRESSURE: 166 MMHG | RESPIRATION RATE: 20 BRPM

## 2024-01-03 LAB
ANION GAP SERPL CALC-SCNC: 8 MMOL/L (ref 0–18)
BASOPHILS # BLD AUTO: 0.03 X10(3) UL (ref 0–0.2)
BASOPHILS NFR BLD AUTO: 0.5 %
BUN BLD-MCNC: 24 MG/DL (ref 9–23)
BUN/CREAT SERPL: 24.7 (ref 10–20)
CALCIUM BLD-MCNC: 9.8 MG/DL (ref 8.7–10.4)
CHLORIDE SERPL-SCNC: 106 MMOL/L (ref 98–112)
CO2 SERPL-SCNC: 27 MMOL/L (ref 21–32)
CREAT BLD-MCNC: 0.97 MG/DL
DEPRECATED RDW RBC AUTO: 57.8 FL (ref 35.1–46.3)
EGFRCR SERPLBLD CKD-EPI 2021: 54 ML/MIN/1.73M2 (ref 60–?)
EOSINOPHIL # BLD AUTO: 0.15 X10(3) UL (ref 0–0.7)
EOSINOPHIL NFR BLD AUTO: 2.3 %
ERYTHROCYTE [DISTWIDTH] IN BLOOD BY AUTOMATED COUNT: 15.4 % (ref 11–15)
GLUCOSE BLD-MCNC: 121 MG/DL (ref 70–99)
HCT VFR BLD AUTO: 39.9 %
HGB BLD-MCNC: 12.4 G/DL
IMM GRANULOCYTES # BLD AUTO: 0.02 X10(3) UL (ref 0–1)
IMM GRANULOCYTES NFR BLD: 0.3 %
LYMPHOCYTES # BLD AUTO: 1.4 X10(3) UL (ref 1–4)
LYMPHOCYTES NFR BLD AUTO: 21.3 %
MCH RBC QN AUTO: 31.5 PG (ref 26–34)
MCHC RBC AUTO-ENTMCNC: 31.1 G/DL (ref 31–37)
MCV RBC AUTO: 101.3 FL
MONOCYTES # BLD AUTO: 0.57 X10(3) UL (ref 0.1–1)
MONOCYTES NFR BLD AUTO: 8.7 %
NEUTROPHILS # BLD AUTO: 4.41 X10 (3) UL (ref 1.5–7.7)
NEUTROPHILS # BLD AUTO: 4.41 X10(3) UL (ref 1.5–7.7)
NEUTROPHILS NFR BLD AUTO: 66.9 %
OSMOLALITY SERPL CALC.SUM OF ELEC: 297 MOSM/KG (ref 275–295)
PLATELET # BLD AUTO: 285 10(3)UL (ref 150–450)
POTASSIUM SERPL-SCNC: 4.5 MMOL/L (ref 3.5–5.1)
RBC # BLD AUTO: 3.94 X10(6)UL
SODIUM SERPL-SCNC: 141 MMOL/L (ref 136–145)
WBC # BLD AUTO: 6.6 X10(3) UL (ref 4–11)

## 2024-01-03 PROCEDURE — 96374 THER/PROPH/DIAG INJ IV PUSH: CPT

## 2024-01-03 PROCEDURE — 99284 EMERGENCY DEPT VISIT MOD MDM: CPT

## 2024-01-03 PROCEDURE — 85025 COMPLETE CBC W/AUTO DIFF WBC: CPT | Performed by: EMERGENCY MEDICINE

## 2024-01-03 PROCEDURE — 96375 TX/PRO/DX INJ NEW DRUG ADDON: CPT

## 2024-01-03 PROCEDURE — 80048 BASIC METABOLIC PNL TOTAL CA: CPT | Performed by: EMERGENCY MEDICINE

## 2024-01-03 RX ORDER — TRAMADOL HYDROCHLORIDE 50 MG/1
50 TABLET ORAL ONCE
Status: DISCONTINUED | OUTPATIENT
Start: 2024-01-03 | End: 2024-01-03

## 2024-01-03 RX ORDER — ACETAMINOPHEN 500 MG
1000 TABLET ORAL ONCE
Status: COMPLETED | OUTPATIENT
Start: 2024-01-03 | End: 2024-01-03

## 2024-01-03 RX ORDER — KETOROLAC TROMETHAMINE 15 MG/ML
15 INJECTION, SOLUTION INTRAMUSCULAR; INTRAVENOUS ONCE
Status: COMPLETED | OUTPATIENT
Start: 2024-01-03 | End: 2024-01-03

## 2024-01-03 NOTE — ED PROVIDER NOTES
Patient Seen in: [facility] Emergency Department      History     Chief Complaint   Patient presents with    Pain     Stated Complaint:     Subjective:   HPI        Objective:   Past Medical History:   Diagnosis Date    Arthritis     Balance problem     Bladder problem     Cancer (HCC)     Cataract     Disorder of thyroid     Essential hypertension     High blood pressure     High cholesterol     History of blood transfusion     History of breast cancer in female 2000    \"Left sided breast cancer\"    History of heart bypass surgery 1989    Hx of skin cancer, basal cell 2009    Internal derangement of knee, right 8/14/2017    Osteoarthritis     Osteoporosis 11/14/2007    Personal history of malignant neoplasm of breast 5/28/2009    Primary osteoarthritis of knees, bilateral 7/12/2017    Primary osteoarthritis of right knee 11/12/2018    Rheumatic fever/heart disease age 16    Visual impairment               Past Surgical History:   Procedure Laterality Date    ANGIOPLASTY (CORONARY)  06-    BOWEL RESECTION  2016    CABG  1989 5 bypass    CHOLECYSTECTOMY      COLON SURGERY      COLONOSCOPY  2010    HYSTERECTOMY      LAPAROSCOPIC CHOLECYSTECTOMY      LUMPECTOMY LEFT      RADIATION LEFT                  Social History     Socioeconomic History    Marital status:    Tobacco Use    Smoking status: Former     Packs/day: 1.00     Years: 20.00     Additional pack years: 0.00     Total pack years: 20.00     Types: Cigarettes    Smokeless tobacco: Never   Vaping Use    Vaping Use: Never used   Substance and Sexual Activity    Alcohol use: Not Currently     Comment: former    Drug use: No   Other Topics Concern    Caffeine Concern Yes     Comment: Coffee, 1 cup daily;     Exercise No     Social Determinants of Health     Food Insecurity: No Food Insecurity (12/12/2023)    Food Insecurity     Food Insecurity: Never true   Transportation Needs: No Transportation Needs (12/12/2023)    Transportation Needs      Lack of Transportation: No   Housing Stability: Low Risk  (12/12/2023)    Housing Stability     Housing Instability: No              Review of Systems    Positive for stated complaint:   Other systems are as noted in HPI.  Constitutional and vital signs reviewed.      All other systems reviewed and negative except as noted above.    Physical Exam     ED Triage Vitals [01/03/24 0000]   BP (!) 149/97   Pulse 80   Resp 18   Temp    Temp src    SpO2 94 %   O2 Device None (Room air)       Current:BP (!) 130/96   Pulse 77   Resp 20   SpO2 97%         Physical Exam          ED Course     Labs Reviewed   BASIC METABOLIC PANEL (8) - Abnormal; Notable for the following components:       Result Value    Glucose 121 (*)     BUN 24 (*)     BUN/CREA Ratio 24.7 (*)     Calculated Osmolality 297 (*)     eGFR-Cr 54 (*)     All other components within normal limits   CBC W/ DIFFERENTIAL - Abnormal; Notable for the following components:    .3 (*)     RDW-SD 57.8 (*)     RDW 15.4 (*)     All other components within normal limits   CBC WITH DIFFERENTIAL WITH PLATELET    Narrative:     The following orders were created for panel order CBC With Differential With Platelet.  Procedure                               Abnormality         Status                     ---------                               -----------         ------                     CBC W/ DIFFERENTIAL[[id number]]          Abnormal            Final result                 Please view results for these tests on the individual orders.   RAINBOW DRAW LAVENDER   RAINBOW DRAW LIGHT GREEN   RAINBOW DRAW BLUE                      MDM      89-year-old female with a history of hypertension, CAD status post CABG, breast cancer in remission, and recurrent low back pain presents today with acute and severe low back pain.  She has been having pain for about 1 month but worse this evening and unable to sleep.  Denies any preceding injuries, strains, or any new neurologic symptoms  such as weakness, numbness, or urinary retention.  Also denies fevers.    On exam, vitals normal, nontoxic-appearing, but exquisitely tender thoracic and lumbar spine to palpation.  Strength and sensation grossly intact in the lower extremities.    Differential: Acute on chronic low back pain.  Given age, concern for pathologic fracture.  Low concern for epidural abscess or other infectious process or cord compression.    Will attempt analgesia in the ED with CT scan of the spine.    2:15 AM  On reexam, pt's pain is improved but not gone.  She is refusing CT at this time stating she would prefer to come back with family present.  I explained the risks of leaving without knowing the cause of her pain and she verbalized understanding of the risks and agreed to careful return precautions. Will DC.                                Medical Decision Making      Disposition and Plan     Clinical Impression:  1. Intractable low back pain         Disposition:  Discharge  1/3/2024  2:14 am    Follow-up:  [facility] [id number]  [phone]    Follow up in 2 day(s)      We recommend that you schedule follow up care with a primary care provider within the next three months to obtain basic health screening including reassessment of your blood pressure.      Medications Prescribed:  Current Discharge Medication List

## 2024-01-18 RX ORDER — OMEPRAZOLE 20 MG/1
20 CAPSULE, DELAYED RELEASE ORAL EVERY MORNING
Qty: 90 CAPSULE | Refills: 3 | Status: SHIPPED | OUTPATIENT
Start: 2024-01-18

## 2024-01-18 NOTE — TELEPHONE ENCOUNTER
Rx listed as historical.   pls advise, thanks in advance.       Protocol Failed/ No Protocol    Requested Prescriptions   Pending Prescriptions Disp Refills    OMEPRAZOLE 20 MG Oral Capsule Delayed Release [Pharmacy Med Name: omeprazole 20 mg capsule,delayed release] 30 capsule 5     Sig: TAKE 1 CAPSULE BY MOUTH EVERY MORNING       There is no refill protocol information for this order            Recent Outpatient Visits              2 months ago Closed intertrochanteric fracture of right hip, with routine healing, subsequent encounter    [facility] Joe Le MD    Office Visit    4 months ago Closed intertrochanteric fracture of right hip, with routine healing, subsequent encounter    [facility] Joe Le MD    Office Visit    5 months ago Closed displaced articular fracture of head of right femur with routine healing    [facility] Joe Le MD    Office Visit    6 months ago Bilateral lower extremity edema    [facility] [place] Inga Arango APRN    Office Visit    6 months ago Closed displaced articular fracture of head of right femur with routine healing    [facility] Joe Le MD    Office Visit

## 2024-01-24 RX ORDER — FERROUS SULFATE 325(65) MG
1 TABLET ORAL 3 TIMES DAILY
Qty: 90 TABLET | Refills: 3 | Status: SHIPPED | OUTPATIENT
Start: 2024-01-24

## 2024-01-24 NOTE — TELEPHONE ENCOUNTER
Protocol Failed/ No Protocol    Requested Prescriptions   Pending Prescriptions Disp Refills    FEROSUL 325 (65 Fe) MG Oral Tab [Pharmacy Med Name: FeroSul 325 mg (65 mg iron) tablet] 90 tablet 0     Sig: TAKE 1 TABLET BY MOUTH THREE TIMES DAILY       There is no refill protocol information for this order            Recent Outpatient Visits              3 months ago Closed intertrochanteric fracture of right hip, with routine healing, subsequent encounter    [facility] Joe Le MD    Office Visit    4 months ago Closed intertrochanteric fracture of right hip, with routine healing, subsequent encounter    [facility] Joe Le MD    Office Visit    5 months ago Closed displaced articular fracture of head of right femur with routine healing    Joe Canseco MD    Office Visit    6 months ago Bilateral lower extremity edema    [facility] [place] Inga Arango APRN    Office Visit    6 months ago Closed displaced articular fracture of head of right femur with routine healing    [facility] Joe Le MD    Office Visit

## 2024-03-12 ENCOUNTER — LAB REQUISITION (OUTPATIENT)
Dept: LAB | Facility: HOSPITAL | Age: 89
End: 2024-03-12
Attending: INTERNAL MEDICINE
Payer: MEDICARE

## 2024-03-12 DIAGNOSIS — N39.0 URINARY TRACT INFECTION, SITE NOT SPECIFIED: ICD-10-CM

## 2024-03-12 LAB
HYALINE CASTS #/AREA URNS AUTO: PRESENT /LPF
WBC #/AREA URNS AUTO: >50 /HPF

## 2024-03-12 PROCEDURE — 87086 URINE CULTURE/COLONY COUNT: CPT

## 2024-03-12 PROCEDURE — 81015 MICROSCOPIC EXAM OF URINE: CPT

## 2024-03-22 ENCOUNTER — APPOINTMENT (OUTPATIENT)
Dept: GENERAL RADIOLOGY | Facility: HOSPITAL | Age: 89
End: 2024-03-22
Attending: EMERGENCY MEDICINE
Payer: MEDICARE

## 2024-03-22 ENCOUNTER — HOSPITAL ENCOUNTER (INPATIENT)
Facility: HOSPITAL | Age: 89
LOS: 4 days | Discharge: NURSING, REHAB OR LONG-TERM CARE | End: 2024-03-26
Attending: EMERGENCY MEDICINE | Admitting: HOSPITALIST
Payer: MEDICARE

## 2024-03-22 ENCOUNTER — APPOINTMENT (OUTPATIENT)
Dept: CV DIAGNOSTICS | Facility: HOSPITAL | Age: 89
End: 2024-03-22
Attending: HOSPITALIST
Payer: MEDICARE

## 2024-03-22 DIAGNOSIS — I21.4 NSTEMI (NON-ST ELEVATED MYOCARDIAL INFARCTION) (HCC): Primary | ICD-10-CM

## 2024-03-22 DIAGNOSIS — E87.70 HYPERVOLEMIA, UNSPECIFIED HYPERVOLEMIA TYPE: ICD-10-CM

## 2024-03-22 PROBLEM — R79.89 ELEVATED TROPONIN: Status: ACTIVE | Noted: 2024-03-22

## 2024-03-22 LAB
ANION GAP SERPL CALC-SCNC: 8 MMOL/L (ref 0–18)
BASOPHILS # BLD AUTO: 0.06 X10(3) UL (ref 0–0.2)
BASOPHILS NFR BLD AUTO: 0.7 %
BNP SERPL-MCNC: 771 PG/ML
BUN BLD-MCNC: 22 MG/DL (ref 9–23)
BUN/CREAT SERPL: 25.3 (ref 10–20)
CALCIUM BLD-MCNC: 10.3 MG/DL (ref 8.7–10.4)
CHLORIDE SERPL-SCNC: 107 MMOL/L (ref 98–112)
CHOLEST SERPL-MCNC: 206 MG/DL (ref ?–200)
CO2 SERPL-SCNC: 26 MMOL/L (ref 21–32)
CREAT BLD-MCNC: 0.87 MG/DL
DEPRECATED RDW RBC AUTO: 52.3 FL (ref 35.1–46.3)
EGFRCR SERPLBLD CKD-EPI 2021: 61 ML/MIN/1.73M2 (ref 60–?)
EOSINOPHIL # BLD AUTO: 0.2 X10(3) UL (ref 0–0.7)
EOSINOPHIL NFR BLD AUTO: 2.5 %
ERYTHROCYTE [DISTWIDTH] IN BLOOD BY AUTOMATED COUNT: 13.4 % (ref 11–15)
GLUCOSE BLD-MCNC: 106 MG/DL (ref 70–99)
HCT VFR BLD AUTO: 41.9 %
HDLC SERPL-MCNC: 53 MG/DL (ref 40–59)
HGB BLD-MCNC: 13.4 G/DL
IMM GRANULOCYTES # BLD AUTO: 0.02 X10(3) UL (ref 0–1)
IMM GRANULOCYTES NFR BLD: 0.2 %
LDLC SERPL CALC-MCNC: 137 MG/DL (ref ?–100)
LYMPHOCYTES # BLD AUTO: 1.15 X10(3) UL (ref 1–4)
LYMPHOCYTES NFR BLD AUTO: 14.2 %
MCH RBC QN AUTO: 33.4 PG (ref 26–34)
MCHC RBC AUTO-ENTMCNC: 32 G/DL (ref 31–37)
MCV RBC AUTO: 104.5 FL
MONOCYTES # BLD AUTO: 0.55 X10(3) UL (ref 0.1–1)
MONOCYTES NFR BLD AUTO: 6.8 %
NEUTROPHILS # BLD AUTO: 6.12 X10 (3) UL (ref 1.5–7.7)
NEUTROPHILS # BLD AUTO: 6.12 X10(3) UL (ref 1.5–7.7)
NEUTROPHILS NFR BLD AUTO: 75.6 %
NONHDLC SERPL-MCNC: 153 MG/DL (ref ?–130)
OSMOLALITY SERPL CALC.SUM OF ELEC: 296 MOSM/KG (ref 275–295)
PLATELET # BLD AUTO: 284 10(3)UL (ref 150–450)
POTASSIUM SERPL-SCNC: 4.1 MMOL/L (ref 3.5–5.1)
Q-T INTERVAL: 308 MS
QRS DURATION: 104 MS
QTC CALCULATION (BEZET): 401 MS
R AXIS: 48 DEGREES
RBC # BLD AUTO: 4.01 X10(6)UL
SODIUM SERPL-SCNC: 141 MMOL/L (ref 136–145)
T AXIS: 206 DEGREES
TRIGL SERPL-MCNC: 87 MG/DL (ref 30–149)
TROPONIN I SERPL HS-MCNC: 42 NG/L
TROPONIN I SERPL HS-MCNC: 55 NG/L
TROPONIN I SERPL HS-MCNC: 56 NG/L
VENTRICULAR RATE: 102 BPM
VLDLC SERPL CALC-MCNC: 16 MG/DL (ref 0–30)
WBC # BLD AUTO: 8.1 X10(3) UL (ref 4–11)

## 2024-03-22 PROCEDURE — 99223 1ST HOSP IP/OBS HIGH 75: CPT | Performed by: HOSPITALIST

## 2024-03-22 PROCEDURE — 71045 X-RAY EXAM CHEST 1 VIEW: CPT | Performed by: EMERGENCY MEDICINE

## 2024-03-22 RX ORDER — TRAMADOL HYDROCHLORIDE 50 MG/1
50 TABLET ORAL EVERY 12 HOURS PRN
Status: DISCONTINUED | OUTPATIENT
Start: 2024-03-22 | End: 2024-03-26

## 2024-03-22 RX ORDER — FUROSEMIDE 10 MG/ML
20 INJECTION INTRAMUSCULAR; INTRAVENOUS
Status: DISCONTINUED | OUTPATIENT
Start: 2024-03-22 | End: 2024-03-22

## 2024-03-22 RX ORDER — PANTOPRAZOLE SODIUM 20 MG/1
20 TABLET, DELAYED RELEASE ORAL
Status: DISCONTINUED | OUTPATIENT
Start: 2024-03-23 | End: 2024-03-26

## 2024-03-22 RX ORDER — LEVOTHYROXINE SODIUM 0.1 MG/1
100 TABLET ORAL
Status: DISCONTINUED | OUTPATIENT
Start: 2024-03-22 | End: 2024-03-26

## 2024-03-22 RX ORDER — METOPROLOL SUCCINATE 25 MG/1
25 TABLET, EXTENDED RELEASE ORAL EVERY 12 HOURS
Status: DISCONTINUED | OUTPATIENT
Start: 2024-03-22 | End: 2024-03-22 | Stop reason: ALTCHOICE

## 2024-03-22 RX ORDER — METOPROLOL SUCCINATE 50 MG/1
50 TABLET, EXTENDED RELEASE ORAL
Status: DISCONTINUED | OUTPATIENT
Start: 2024-03-23 | End: 2024-03-22

## 2024-03-22 RX ORDER — METOPROLOL SUCCINATE 50 MG/1
50 TABLET, EXTENDED RELEASE ORAL
Status: DISCONTINUED | OUTPATIENT
Start: 2024-03-22 | End: 2024-03-25

## 2024-03-22 RX ORDER — ACETAMINOPHEN 500 MG
500 TABLET ORAL EVERY 4 HOURS PRN
Status: DISCONTINUED | OUTPATIENT
Start: 2024-03-22 | End: 2024-03-26

## 2024-03-22 RX ORDER — ONDANSETRON 2 MG/ML
4 INJECTION INTRAMUSCULAR; INTRAVENOUS EVERY 6 HOURS PRN
Status: DISCONTINUED | OUTPATIENT
Start: 2024-03-22 | End: 2024-03-26

## 2024-03-22 RX ORDER — METOCLOPRAMIDE HYDROCHLORIDE 5 MG/ML
5 INJECTION INTRAMUSCULAR; INTRAVENOUS EVERY 8 HOURS PRN
Status: DISCONTINUED | OUTPATIENT
Start: 2024-03-22 | End: 2024-03-26

## 2024-03-22 RX ORDER — LOPERAMIDE HYDROCHLORIDE 2 MG/1
2 CAPSULE ORAL 4 TIMES DAILY PRN
Status: DISCONTINUED | OUTPATIENT
Start: 2024-03-22 | End: 2024-03-26

## 2024-03-22 NOTE — ED QUICK NOTES
Tele box applied to pt prior to tx to floor.  Pt transported to [facility] via cart w/ tele box by ROB Williamson.  Pt left dept A&O x 2, Dilt gtt infusing per MAR, on 2L NC.  Pt in NAD, VSS, pt belongings accompanying pt to floor.

## 2024-03-22 NOTE — HISTORICAL OFFICE NOTE
Continuity of Care Document  10/19/2023  Pavithra Cedillo - 89 y.o. Female; born [birth year]Summary of episode note, generated on Oct. 23, 2023October 23, 2023   CHIEF COMPLAINT    CHIEF COMPLAINT  Reason for Visit/Chief Complaint   follow up  Concerns of SOB   Mrs. Cedillo is here to follow-up an echocardiogram and an office visit done on October 11. She remains with class II-III dyspnea on exertion. She gets sleepy after taking her morning medications. She does think that she gets a good night sleep at night. The only medication for months that might contribute to her sleepiness is the metoprolol.After our last visit she had an echocardiogram. She has moderate to severe aortic stenosis. Dimensionless index is 0.18, although her mean gradient is 25. Her LV function is moderately impaired with an EF of 40%. PA pressure is 50     PROBLEMS    PROBLEMS  Problem Effective Dates Date resolved Problem Status   Precordial pain, [SNOMED-CT: 68706340] 7/14/2020 - Active   History of CABG (coronary artery bypass graft) - Hx, [SNOMED-CT: 020845738] 10/18/2022 - Active   Chronic venous insufficiency, [SNOMED-CT: 09316558] 8/5/2022 - Active   Non-ST elevated myocardial infarction (non-STEMI), [SNOMED-CT: 66532148] 3/18/2022 - Active   Diarrhea, [SNOMED-CT: 52038553] 12/14/2021 - Active   Presence of Watchman left atrial appendage closure device, [SNOMED-CT: 894425527] 11/19/2021 - Active   Intracranial subarachnoid hemorrhage, [SNOMED-CT: 62490822] 7/9/2021 - Active   Atrial fibrillation, persistent - AFib, [SNOMED-CT: 744447792] 7/9/2021 - Active   CAD (coronary artery disease), [SNOMED-CT: 33988723] 6/26/2019 - Active   Essential hypertension, [SNOMED-CT: 28657164] 6/26/2019 - Active   Mitral valve (nonrheumatic) prolapse (MVP), [SNOMED-CT: 124429896] 6/26/2019 - Active   Dyslipidemia, [SNOMED-CT: 558158271] 6/26/2019 - Active   Rheumatic fever, [SNOMED-CT: 28823139] 6/26/2019 - Active   Aortic stenosis,  [SNOMED-CT: 00994381] 6/28/2019 - Active   NSTEMI (non-ST elevated myocardial infarction), [SNOMED-CT: 88387741] 3/7/2022 - Active     ENCOUNTER DIAGNOSIS    No data available    VITAL SIGNS    VITAL SIGNS  Date / Time: 10/19/2023   BP Systolic 112 mmHg   BP Diastolic 62 mmHg   Height 63 inches   Weight 105 lbs   Pulse Rate 76 bpm   BSA (Body Surface Area) 1.4 cc/m2   BMI (Body Mass Index) 18.6 cc/m2   Blood Pressure 112 / 62 mmHg     PHYSICAL EXAMINATION    PHYSICAL EXAMINATION  Header Details   Constitutional 95% O2   Vitals Left Arm Sitting  / 62 mmHg, Pulse rate 76 bpm, Irregular, Height in 5' 3\", BMI: 18.6, Weight in 105.82 lbs (or) 48 kgs, BSA : 1.45 cc/m²   General Appearance No Acute Distress   Head/Eyes/Ears/Nose/Mouth/Throat Mucous membranes Moist   Neck No carotid bruits, No JVD   Respiratory Unlabored, Lungs clear with normal breath sounds   Cardiovascular Intact distal pulses, Regular rhythm. Normal rate present. Normal and normal S1 and S2  Harsh mid murmur is present with a grade of 3/6 at the upper right sternal border radiating to the neck.   Gastrointestinal Abdomen soft, Non-tender, Normoactive bowel sounds   Lower Extremities Pulses 2+ and equal bilaterally, Edema 1+   Skin Warm and dry   Neurologic / Psychiatric Alert and Oriented     ALLERGIES, ADVERSE REACTIONS, ALERTS    ALLERGIES, ADVERSE REACTIONS, ALERTS  Type Substance Reaction Severity Status   Allergies Aspirin, [RxNorm: 686210]   Mild Active   Allergies Aspirin, [RxNorm: 457769]   Mild Active   Allergies Ciprofloxacin, [RxNorm: 629422]   Mild Active   Allergies Ciprofloxacin, [RxNorm: 339739]   Mild Active   Allergies Iodine (Environmental Or Med), [RxNorm: 1335885]   Mild Active   Allergies Nitrofurantoin, [RxNorm: 112252]   Mild Active   Allergies Nitrofurantoin, [RxNorm: 690894]   Mild Active     MEDICATIONS ADMINISTERED DURING VISIT    No data available    MEDICATIONS  Reconcile with Patient's ChartMEDICATIONS  Medication  Start Date Route/Frequency Status   Brilinta 90 mg tablet, [RxNorm: 0550735] 2/1/2023 Take 1 tablet orally 2 times a day. Active   Cranberry Juice Extract 1000 MG Oral Cap, [RxNorm: 0] 5/20/2022 Take 1,000 mg by mouth daily. Active   docusate sodium 100 MG Oral Cap, [RxNorm: 7621895] 5/20/2022 Take 100 mg by mouth 2 (two) times daily as needed for constipation. Active   ferrous sulfate 325 mg (65 mg iron) tablet, [RxNorm: 633859] 10/19/2023 Take 1 tablet orally once a day. Active   furosemide 20 mg tablet, [RxNorm: 231525] 10/19/2023 Take 1 tablet orally once a day. Active   LEVOTHYROXINE SODIUM 100 MCG Oral Tab, [RxNorm: 627989] 5/20/2022 TAKE 1 TABLET(100 MCG) BY MOUTH EVERY MORNING BEFORE BREAKFAST Active   loperamide 2 mg tablet, [RxNorm: 858169] 10/19/2023 Take 1 tablet orally 4 times a day as needed. Active   metoprolol succinate ER 25 mg tablet,extended release 24 hr, [RxNorm: 995054] 10/19/2023 Take 1 tablet orally once a day. Active   omeprazole 20 mg capsule,delayed release, [RxNorm: 146988] 10/19/2023 Take 1 capsule orally once a day. Active   ondansetron 4 mg disintegrating tablet, [RxNorm: 101951] 10/19/2023 Take 1 tablet orally 4 times a day as needed. Active   potassium chloride ER 20 mEq tablet,extended release, [RxNorm: 998532] 10/19/2023 Take 1 tablet orally once a day. Active   triamcinolone 0.1 % External Cream, [RxNorm: 1960780] 5/20/2022 Apply topically 2 (two) times daily. Apply to affected area bid As needed Active   Tylenol Extra Strength 500 mg tablet, [RxNorm: 529656] 10/19/2023 Take 1 tablet orally every 6 hours. Active   Xarelto 20 mg tablet, [RxNorm: 4793854] 10/19/2023 Take 1 tablet orally once a day. Active     ASSESSMENT    Assessment:  1. CAD. Status post remote CABG and multivessel PCI, November 2022.2. Ischemic cardiomyopathy, moderate LV dysfunction.3. Permanent atrial fibrillation, rate controlled.4. Moderate to severe aortic stenosis.5. History of fall with head trauma and  subarachnoid hemorrhage.Plan:  1. Discontinue a.m. dose of metoprolol. Continue p.m. dose.2. Continue other cardiac medications.3. The patient will speak to her daughters about the consideration of TAVR procedure. She is frail, and may not be a candidate for TAVR because of her poor general condition. Mentally however she remains sharp.4. Office visit in 1 month     FAMILY HISTORY    FAMILY HISTORY  Relationship Age Diagnosis   Father 0 Coronary heart disease   Mother 0 Stroke   Sister 0 Stroke     GENERAL STATUS    No data available    PAST MEDICAL HISTORY    PAST MEDICAL HISTORY  Problem Date diagonsed Date resolved Status   Precordial pain, [SNOMED-CT: 92797691] 7/14/2020 - Active   Chronic venous insufficiency, [SNOMED-CT: 57153574] 8/5/2022 - Active   Non-ST elevated myocardial infarction (non-STEMI), [SNOMED-CT: 49410938] 3/18/2022 - Active   Diarrhea, [SNOMED-CT: 17961328] 12/14/2021 - Active   Presence of Watchman left atrial appendage closure device, [SNOMED-CT: 463912521] 11/19/2021 - Active   Intracranial subarachnoid hemorrhage, [SNOMED-CT: 58954215] 7/9/2021 - Active   Atrial fibrillation, persistent - AFib, [SNOMED-CT: 113117728] 7/9/2021 - Active   CAD (coronary artery disease), [SNOMED-CT: 38671967] 6/26/2019 - Active   Essential hypertension, [SNOMED-CT: 82701792] 6/26/2019 - Active   Mitral valve (nonrheumatic) prolapse (MVP), [SNOMED-CT: 543664218] 6/26/2019 - Active   Dyslipidemia, [SNOMED-CT: 159798424] 6/26/2019 - Active   Rheumatic fever, [SNOMED-CT: 16613164] 6/26/2019 - Active   Aortic stenosis, [SNOMED-CT: 27378881] 6/28/2019 - Active   NSTEMI (non-ST elevated myocardial infarction), [SNOMED-CT: 38531974] 3/7/2022 - Active     HISTORY OF PRESENT ILLNESS    Mrs. Cedillo is here to follow-up an echocardiogram and an office visit done on October 11. She remains with class II-III dyspnea on exertion. She gets sleepy after taking her morning medications. She does think that she gets a good night  sleep at night. The only medication for months that might contribute to her sleepiness is the metoprolol.After our last visit she had an echocardiogram. She has moderate to severe aortic stenosis. Dimensionless index is 0.18, although her mean gradient is 25. Her LV function is moderately impaired with an EF of 40%. PA pressure is 50     IMMUNIZATIONS  Reconcile with Patient's ChartNo data available    PLAN OF CARE    PLAN OF CARE  Planned Care Date   Referral Visit - Malashay Marco Antonio ([email or web address]) : 1/1/1900   Follow up visit - Donnell King 1/1/1900     PROCEDURES    No data available    RESULTS    RESULTS  Name Result Date Location - Ordered By   Nuclear PET 1.Stress EKG is normal.2.Baseline atrial fibrillation. Frequent PVCs, isolated ventricular couplet.3.No chest pain.1.This is an abnormal perfusion study. 2.Small fixed perfusion abnormality of severe intensity in the apical segment. Medium fixed perfusion abnormality of moderate intensity in the lateral region. 3.The left ventricular cavity is noted to be normal on the stress studies. The stress left ventricular ejection fraction was calculated to be 52% and left ventricular global function is normal. The rest left ventricular cavity is noted to be normal. The rest left ventricular ejection fraction was calculated to be 49% and rest left ventricular global function is mildly reduced. 4.The study quality is good. 11/1/2022 2:00:00 PM Ziggy Parrish   Trans Thoracic Echocardiogram 1.The left ventricle is mildly enlarged with normal wall thickness. Regional wall motion shows apical akinesis. Severe inferior and posterior wall hypokinesis. Global left ventricular systolic function is moderately decreased. The left ventricular ejection fraction is 40%. Grade II diastolic dysfunction.2.The right ventricle is normal in size. Right ventricular systolic function is mildly decreased.3.The left atrium is severely enlarged..4.The right atrium is  severely enlarged. 5.Moderate calcification of the aortic valve is noted. Moderate-severe stenosis is present. The mean gradient is 25 mmhg. Peak velocity = 3.22 m/s. DI is 0.18.6.Moderate mitral annular calcification is noted. Mitral stenosis is mild. Mild mitral regurgitation. 7.Severe tricuspid regurgitation.8.The inferior vena cava changes less than 50 percent during respiration. 9.The estimated pulmonary artery systolic pressure is 50 mmHg assuming a right atrial pressure of 15 mmHg. Moderate pulmonary hypertension. 10/11/2023 3:30:00 PM Donnell King     REVIEW OF SYSTEMS    REVIEW OF SYSTEMS  Header Details   Cardiovascular No history of Chest pain, TOWNSEND, Palpitations, Syncope, PND, Orthopnea, Edema, Claudication   Respiratory No history of SOB, Wheezing, Sputum   Hem/Lymphatic No history of Easy bruising, Blood clots, Hx of blood transfusion, Anemia, Bleeding problems     SOCIAL HISTORY    SOCIAL HISTORY  Social History Element Description Effective Dates   Smoking status Never smoked -     FUNCTIONAL STATUS    No data available    MEDICAL EQUIPMENT    No data available    Goals Sections    No data available    REASON FOR REFERRAL                   Health Concerns Section    No data available    COGNITIVE/MENTAL STATUS    No data available    Patient Demographics    Patient Demographics  Patient Address Communication Language Race / Ethnicity Marital Status   [address]   [place], [place] [id number] [phone] (Home) Unknown White / Not  or       Document Information    Primary Care Provider Other Service Providers Document Coverage Dates   Fernando Jacobo  NPI: [id number]  [phone] (Work)  [address], [address], [place] [address]  [place] [address]  Interpreting Physicians  [facility]  [phone] (Work)  [address] [place] [address] Delano Cardenas  NPI: [id number]  [phone] (Work)  [address], [place] [address]  [place] [address]  Nurses     Jeremi Orr  NPI: [id number]  [phone] (Work)  [address], [address], [place] [address]  [place] [address]  Nurses     Timoteo Denis  NPI: [id number]  [phone] (Work)  [address], [address], [place] [address]  [place] [address]  Nurses Oct. 19, 2023October 19, 2023      Organization   [facility]  [phone] (Work)  [address], [address], [place] [address]  [place] [address]     Encounter Providers Encounter Date    Oct. 19, 2023October 19, 2023       Legal Authenticator    Donnell King  NPI: [id number]  [phone] (Work)  [address], [address], [place] [address]  [place] [address]

## 2024-03-22 NOTE — ED PROVIDER NOTES
Patient Seen in: [facility] Emergency Department    History     Chief Complaint   Patient presents with    Chest Pain Angina    Difficulty Breathing       HPI    History is provided by patient/independent historian: Patient, EMS  89 year old female with history of atrial fibrillation, on Xarelto, hypertension, hyperlipidemia, here with complaints of right-sided chest pain for the last 3 days.  She has also been having difficulty breathing.  She was supposedly 84% on room air and improved with 3 L nasal cannula.  Lungs were clear, no cough cold symptoms.    History reviewed.   Past Medical History:   Diagnosis Date    A-fib (HCC)     Arthritis     Balance problem     Bladder problem     Cancer (HCC)     Cataract     Disorder of thyroid     Essential hypertension     High blood pressure     High cholesterol     History of blood transfusion     History of breast cancer in female 2000    \"Left sided breast cancer\"    History of heart bypass surgery 1989    Hx of skin cancer, basal cell 2009    Internal derangement of knee, right 08/14/2017    Osteoarthritis     Osteoporosis 11/14/2007    Personal history of malignant neoplasm of breast 05/28/2009    Primary osteoarthritis of knees, bilateral 07/12/2017    Primary osteoarthritis of right knee 11/12/2018    Rheumatic fever/heart disease age 16    Visual impairment          History reviewed.   Past Surgical History:   Procedure Laterality Date    ANGIOPLASTY (CORONARY)  06-    BOWEL RESECTION  2016    CABG  1989 5 bypass    CHOLECYSTECTOMY      COLON SURGERY      COLONOSCOPY  2010    HYSTERECTOMY      LAPAROSCOPIC CHOLECYSTECTOMY      LUMPECTOMY LEFT      RADIATION LEFT           Home Medications reviewed :  (Not in a hospital admission)        History reviewed.   Social History     Socioeconomic History    Marital status:    Tobacco Use    Smoking status: Former     Packs/day: 1.00     Years: 20.00     Additional pack years: 0.00     Total pack  years: 20.00     Types: Cigarettes    Smokeless tobacco: Never   Vaping Use    Vaping Use: Never used   Substance and Sexual Activity    Alcohol use: Not Currently     Comment: former    Drug use: No   Other Topics Concern    Caffeine Concern Yes     Comment: Coffee, 1 cup daily;     Exercise No         ROS  Review of Systems   Respiratory:  Positive for shortness of breath.    Cardiovascular:  Positive for chest pain.   All other systems reviewed and are negative.     All other pertinent organ systems are reviewed and are negative.      Physical Exam     ED Triage Vitals [03/22/24 1029]   BP (!) 156/101   Pulse 103   Resp 11   Temp 99.5 °F (37.5 °C)   Temp src Temporal   SpO2 95 %   O2 Device Nasal cannula     Vital signs reviewed.      Physical Exam  Vitals and nursing note reviewed.   Cardiovascular:      Pulses: Normal pulses.   Pulmonary:      Effort: No respiratory distress.   Chest:      Chest wall: Tenderness present.   Abdominal:      General: There is no distension.   Neurological:      Mental Status: She is alert.         ED Course       Labs:     Labs Reviewed   BASIC METABOLIC PANEL (8) - Abnormal; Notable for the following components:       Result Value    Glucose 106 (*)     BUN/CREA Ratio 25.3 (*)     Calculated Osmolality 296 (*)     All other components within normal limits   TROPONIN I HIGH SENSITIVITY - Abnormal; Notable for the following components:    Troponin I (High Sensitivity) 56 (*)     All other components within normal limits   LIPID PANEL - Abnormal; Notable for the following components:    Cholesterol, Total 206 (*)     LDL Cholesterol 137 (*)     Non HDL Chol 153 (*)     All other components within normal limits   CBC W/ DIFFERENTIAL - Abnormal; Notable for the following components:    .5 (*)     RDW-SD 52.3 (*)     All other components within normal limits   CBC WITH DIFFERENTIAL WITH PLATELET    Narrative:     The following orders were created for panel order CBC With  Differential With Platelet.                  Procedure                               Abnormality         Status                                     ---------                               -----------         ------                                     CBC W/ DIFFERENTIAL[[id number]]          Abnormal            Final result                                                 Please view results for these tests on the individual orders.   TROPONIN I HIGH SENSITIVITY         My EKG Interpretation: EKG    Rate, intervals and axes as noted on EKG Report.  Rate: 102  Rhythm: Atrial Fibrillation  Reading: abnormal for rate, abnormal for rhythm, no acute ST changes           As reviewed and Interpreted by me      Imaging Results Available and Reviewed while in ED:   XR CHEST AP PORTABLE  (CPT=71045)    Result Date: 3/22/2024  PROCEDURE: XR CHEST AP PORTABLE  (CPT=71045) TIME: 1056  COMPARISON: [facility], XR CHEST AP PORTABLE (CPT=71045), 9/24/2023, 0:56 AM.  [facility], XR CHEST AP PORTABLE (CPT=71045), 12/12/2023, 3:22 AM.  INDICATIONS: Chest pain and difficulty breathing.  TECHNIQUE:   Single view.   Findings and impression:  Stable enlarged heart with left atrial appendage closure device  No significant lung edema.  No pulmonary consolidation.  There is linear atelectasis in the mid to lower right lung  Circumscribed 8 millimeter pulmonary nodule in the left mid lung is new from September 2023  No pneumothorax    Dictated by (CST): Edu Lea MD on 3/22/2024 at 11:09 AM     Finalized by (CST): Edu Lea MD on 3/22/2024 at 11:12 AM         My review and independent interpretation of XR images: no infiltrate. Radiology report corroborates this in addition to other details as reported by them.      Decision rules/scores evaluated: none      Diagnostic labs/tests considered but not ordered: ddimer    ED Medications Administered:   Medications   dilTIAZem 10 mg BOLUS FROM BAG infusion (has  no administration in time range)   dilTIAZem (cardIZEM) 100 mg in sodium chloride 0.9% 100 mL IVPB-ADDV (has no administration in time range)            - pt anticoagulated with xarelto    MDM       Medical Decision Making      Differential Diagnosis: After obtaining the patient's history, performing the physical exam and reviewing the diagnostics, multiple initial diagnoses were considered based on the presenting problem including pneumonia, viral syndrome, CHF, NSTEMI, PE    External document review: I personally reviewed available external medical records for any recent pertinent discharge summaries, testing, and procedures - the findings are as follows: 1/3/24 visit with Dr. Gary for back pain    Complicating Factors: The patient already  has a past medical history of A-fib (HCC), Arthritis, Balance problem, Bladder problem, Cancer (HCC), Cataract, Disorder of thyroid, Essential hypertension, High blood pressure, High cholesterol, History of blood transfusion, History of breast cancer in female (2000), History of heart bypass surgery (1989), skin cancer, basal cell (2009), Internal derangement of knee, right (08/14/2017), Osteoarthritis, Osteoporosis (11/14/2007), Personal history of malignant neoplasm of breast (05/28/2009), Primary osteoarthritis of knees, bilateral (07/12/2017), Primary osteoarthritis of right knee (11/12/2018), Rheumatic fever/heart disease (age 16), and Visual impairment. to contribute to the complexity of this ED evaluation.    Procedures performed: none    Discussed management with physician/appropriate source: Gladis Fox NP for [facility]    Considered admission/deescalation of care for: chest pain, SOB    Social determinants of health affecting patient care: none    Prescription medications considered: discussed continuing current medication regimen    The patient requires continuous monitoring for: chest pain, SOB    Shared decision making: discussed possible admission    Critical  Care Time:  Total critical care time for this patient was 30 minutes exclusive of separately billable procedures, but in obtaining history, performing a physical exam, bedside monitoring of interventions, collecting and interpreting test, and discussion with consultants.        Disposition and Plan     Clinical Impression:  1. NSTEMI (non-ST elevated myocardial infarction) (HCC)        Disposition:  Admit    Follow-up:  No follow-up provider specified.    Medications Prescribed:  Current Discharge Medication List          Hospital Problems       Present on Admission  Date Reviewed: 10/23/2023            ICD-10-CM Noted POA    Elevated troponin R79.89 3/22/2024 Unknown

## 2024-03-22 NOTE — IMAGING NOTE
Echo attempted 3/22/24, pt refused exam, RN also asked patient if she would allow the test and refused, will attempt again 3/23/24.

## 2024-03-22 NOTE — H&P
[facility]    PATIENT'S NAME: CITLALLI JOHNSON   ATTENDING PHYSICIAN: Natanael De La Rosa MD   PATIENT ACCOUNT#:   [id number]    LOCATION:  [address] [place] [address]  MEDICAL RECORD #:   [id number]       YOB: 1928  ADMISSION DATE:       03/22/2024    HISTORY AND PHYSICAL EXAMINATION    CHIEF COMPLAINT:  Atrial fibrillation with rapid ventricular response, dyspnea on exertion, chest pain, and possible fluid overload status.     HISTORY OF PRESENT ILLNESS:  The patient is a 89-year-old  female who lives in a nursing facility and was brought in today for evaluation of progressive dyspnea on exertion and chest pain, noted to be in atrial fibrillation with rapid ventricular response, rate 130.  Troponin 56.  Chemistry and CBC were unremarkable.  Chest x-ray showed no acute findings.  The patient was started on IV Cardizem drip.     PAST MEDICAL HISTORY:  Coronary artery disease, status post multiple PCI stents.  She had coronary artery bypass graft surgery and drug-eluting stents to vein grafts leading to diagonal, obtuse marginal, and RCA.  Ischemic cardiomyopathy, ejection fraction around 40%.  Moderate aortic stenosis and mitral regurgitation with prolapse.  Moderate pulmonary artery hypertension.  Chronic atrial fibrillation, anticoagulated with Xarelto.  Hypertension.  Hyperlipidemia.  Osteoarthritis.  Osteoporosis.  Chronic kidney disease stage 3.    PAST SURGICAL HISTORY:  Right femur open reduction/internal fixation after a mechanical fall.  Left breast lumpectomy followed by radiation therapy for breast cancer.  Hysterectomy.  Laparoscopic cholecystectomy.  Coronary artery bypass graft surgery.  Partial small-bowel resection.      MEDICATIONS:  Please see medication reconciliation list.      ALLERGIES:  Aspirin, penicillin, Macrobid, and ciprofloxacin.  Not able to confirm the reactions.    FAMILY HISTORY:  Mother had cerebrovascular accident and hypertension.  Father had coronary artery  disease.    SOCIAL HISTORY:  Ex-tobacco user.  No current tobacco, alcohol, or drug use.  Lives in an independent living facility.      REVIEW OF SYSTEMS:  The patient reported that she has been having dyspnea on exertion, midsternal chest tightness on and off.  Symptoms get worse with activity, get a little bit better with rest.  Denies fever, chills, or cough.  Other 12-point review of systems negative.         PHYSICAL EXAMINATION:    GENERAL:  Alert.  Oriented to time, place, and person.  Moderate distress.  VITAL SIGNS:  Temperature 99.5; pulse 102; respiratory rate 14; blood pressure 160/100; pulse ox 90% to 91% on room air, 94% on 2L nasal cannula oxygen.   HEENT:  Atraumatic.  Oropharynx clear.  Dry mucous membranes.   NECK:  Supple.  Mild jugular venous distention.   LUNGS:  Diminished breathing sounds, both lung bases.   HEART:  Irregularly irregular rhythm, tachycardic.   ABDOMEN:  Soft, nondistended.  No tenderness.  Positive bowel sounds.    EXTREMITIES:  Stasis dermatitis.  Trace edema.  No clubbing or cyanosis.  NEUROLOGIC:  Motor and sensory intact.      ASSESSMENT AND PLAN:    1.   Atrial fibrillation with rapid ventricular response.    2.   Fluid overload status, underlying ischemic cardiomyopathy and possible heart failure.  Reportedly, she had a pulse ox of 84% on room air.  Currently saturating 94% on nasal cannula oxygen.      3.   Essential hypertension.    4.   Coronary artery disease, elevated troponin.     Patient will be admitted to telemetry floor.  Continue to trend troponin level.  Continue home medications.  IV Lasix.  IV Cardizem.  2D echocardiogram with Doppler.  Cardiology consult.  Monitor her clinical status.  Further recommendations to follow.    Dictated By Natanael De La Rosa MD  d: 03/22/2024 12:30:49  t: 03/22/2024 13:58:13  Job [id number]  /

## 2024-03-22 NOTE — ED QUICK NOTES
Orders for admission, patient is aware of plan and ready to go upstairs. Any questions, please call ED RN Juliann at extension [phone].     Patient Covid vaccination status: Fully vaccinated     COVID Test Ordered in ED: None    COVID Suspicion at Admission: N/A    Running Infusions:  None    Mental Status/LOC at time of transport: Oriented x 2    **Pt has a tendency to \"pick\" at monitoring equipment & Ivs.  PIVs secured w/ Coban and that is helping.     Other pertinent information:   CIWA score: N/A   NIH score:  N/A     20g L AC PIV saline locked  20g R upper FA w/ Dilt currently infusing @ 10 mg/hr.

## 2024-03-22 NOTE — ED INITIAL ASSESSMENT (HPI)
Pt to ED via EMS A&O x 4 w/ c/o R sided continuous chest pain & GRACE x 3 days.  Per EMS, pt w/ SPO2 \"low 80s\" on RA, placed on 3L NC en route to ED w/ improvement in SPO2 to 95%.

## 2024-03-22 NOTE — CONSULTS
[facility] - CARDIOLOGY CONSULT NOTE    Pavithra Cedillo Patient Status:  Inpatient    1928 MRN [id number]   Location [facility] 3W/SW Attending Natanael De La Rosa MD   Hosp Day # 0 PCP Marcelo Bernard     Date of Admission:  3/22/2024  Date of Consult:  3/22/2024  I was asked by Natanael De La Rosa MD to provide recommendations for evaluation and management of cardiac issues.  Impression:     89-year-old female A-fib with RVR, history of stenosis, elevated troponin.    Recommendations:  1.  Atrial fibrillation  Continue Cardizem drip until able to tolerate p.o.'s.  Continue Xarelto lowered the dose to 15 mg daily  Resume home metoprolol succinate change to 50 mg daily.  2.  Hypothyroidism  On Synthroid at home continue.  3.  Elevated troponin  56 continue to trend to ensure not going up.  If continues to rise start heparin drip.  4.  History of aortic stenosis moderate to severe.  Was considering TAVR as outpatient but was too frail.  5.  History of CAD remote CABG multivessel PCI   No evidence of CHF on exam despite elevated BNP levels.  No need for IV diuretics.        L5 consult will follow.    Reason for Consultation:     Atrial fibrillation, elevated troponin    History of Present Illness:   Patient is a 89 year old female who was admitted to the hospital for right-sided chest pain 3 days ago history taken from the chart review patient is unable to answer any questions during physical exam today.  Found to be hypoxic 84% on room air improved with nasal cannula 3 L found to be in A-fib with RVR started on Cardizem drip.  Prior history of atrial fibrillation on Xarelto, hypertension, hyperlipidemia.  Moderate to severe aortic stenosis.      Cardiac tests:    Past Medical History  Past Medical History:   Diagnosis Date    A-fib (HCC)     Arthritis     Balance problem     Bladder problem     Cancer (HCC)     Cataract     Disorder of thyroid     Essential hypertension     High blood  pressure     High cholesterol     History of blood transfusion     History of breast cancer in female 2000    \"Left sided breast cancer\"    History of heart bypass surgery 1989    Hx of skin cancer, basal cell 2009    Internal derangement of knee, right 08/14/2017    Osteoarthritis     Osteoporosis 11/14/2007    Personal history of malignant neoplasm of breast 05/28/2009    Primary osteoarthritis of knees, bilateral 07/12/2017    Primary osteoarthritis of right knee 11/12/2018    Rheumatic fever/heart disease age 16    Visual impairment        Past Surgical History  Past Surgical History:   Procedure Laterality Date    ANGIOPLASTY (CORONARY)  06-    BOWEL RESECTION  2016    CABG  1989 5 bypass    CHOLECYSTECTOMY      COLON SURGERY      COLONOSCOPY  2010    HYSTERECTOMY      LAPAROSCOPIC CHOLECYSTECTOMY      LUMPECTOMY LEFT      RADIATION LEFT         Family History  Family History   Problem Relation Age of Onset    Heart Disease Father     Stroke Mother         CVA    Stroke Sister         CVA    Diabetes Brother     Breast Cancer Self 72    Breast Cancer Maternal Aunt         post menopause    Breast Cancer Maternal Cousin Female         post menopause    Breast Cancer Maternal Cousin Female         post menopause       Social History  Pediatric History   Patient Parents    Not on file     Other Topics Concern     Service Not Asked    Blood Transfusions Not Asked    Caffeine Concern Yes     Comment: Coffee, 1 cup daily;     Occupational Exposure Not Asked    Hobby Hazards Not Asked    Sleep Concern Not Asked    Stress Concern Not Asked    Weight Concern Not Asked    Special Diet Not Asked    Back Care Not Asked    Exercise No    Bike Helmet Not Asked    Seat Belt Not Asked    Self-Exams Not Asked   Social History Narrative    Not on file       No smoking or illicit drug use.    Current Medications:  Current Facility-Administered Medications   Medication Dose Route Frequency    dilTIAZem 10 mg  BOLUS FROM BAG infusion  10 mg Intravenous Q1H PRN    acetaminophen (Tylenol Extra Strength) tab 500 mg  500 mg Oral Q4H PRN    ondansetron (Zofran) 4 MG/2ML injection 4 mg  4 mg Intravenous Q6H PRN    metoclopramide (Reglan) 5 mg/mL injection 5 mg  5 mg Intravenous Q8H PRN    dilTIAZem 10 mg BOLUS FROM BAG infusion  10 mg Intravenous Q1H PRN    dilTIAZem (cardIZEM) 100 mg in sodium chloride 0.9% 100 mL IVPB-ADDV  2.5-20 mg/hr Intravenous Continuous    dilTIAZem (cardIZEM) tab 30 mg  30 mg Oral 4 times per day    furosemide (Lasix) 10 mg/mL injection 20 mg  20 mg Intravenous BID (Diuretic)     Medications Prior to Admission   Medication Sig    Ferrous Sulfate (FEROSUL) 325 (65 Fe) MG Oral Tab Take 1 tablet (325 mg total) by mouth 3 (three) times daily.    omeprazole 20 MG Oral Capsule Delayed Release Take 1 capsule (20 mg total) by mouth every morning.    rivaroxaban (XARELTO) 20 MG Oral Tab Take 1 tablet (20 mg total) by mouth every evening.    levothyroxine 100 MCG Oral Tab Take 1 tablet (100 mcg total) by mouth every morning.    Cranberry (CRANBERRY CONCENTRATE) 500 MG Oral Cap Take 2 capsules by mouth daily.    ticagrelor (BRILINTA) 90 MG Oral Tab Take 1 tablet (90 mg total) by mouth Q12H.    furosemide 20 MG Oral Tab Take 1 tablet (20 mg total) by mouth daily.    metoprolol succinate ER 25 MG Oral Tablet 24 Hr Take 1 tablet (25 mg total) by mouth Q12H.    ondansetron 4 MG Oral Tablet Dispersible Take 1 tablet (4 mg total) by mouth every 8 (eight) hours as needed for Nausea.    acetaminophen 500 MG Oral Tab Take 1 tablet (500 mg total) by mouth every 6 (six) hours as needed for Pain.    Potassium Chloride ER 20 MEQ Oral Tab CR Take 20 mEq by mouth daily.    traMADol 50 MG Oral Tab Take 1 tablet (50 mg total) by mouth every 6 (six) hours.    loperamide 2 MG Oral Cap Take 1 capsule (2 mg total) by mouth 4 (four) times daily as needed for Diarrhea.    triamcinolone 0.1 % External Cream Apply topically 2 (two) times  daily. Apply to affected area bid  As needed    docusate sodium 100 MG Oral Cap Take 100 mg by mouth 2 (two) times daily as needed for constipation.       Allergies  Allergies   Allergen Reactions    Aspirin SWELLING     Other reaction(s): ASPIRIN    Penicillins HIVES     Tolerated cephalosporins in past    Erythromycin NAUSEA AND VOMITING    Sulfa Antibiotics DIARRHEA and NAUSEA AND VOMITING    Codeine UNKNOWN    Macrobid [Nitrofurantoin]     Adhesive Tape RASH     Surgical tape    Ciprofloxacin Coughing     Pt states that Cipro caused her lung problems that necessitated seeing Dr. Cummins    Povidone-Iodine OTHER (SEE COMMENTS)     blisters       Review of Systems:   10 pt ROS performed, separate from HPI  Review of Systems:  Unable to obtain patient is alert and oriented x 1 only.  Physical Exam:   Blood pressure 147/79, pulse 100, temperature 97.4 °F (36.3 °C), temperature source Axillary, resp. rate 17, weight 98 lb (44.5 kg), SpO2 91%, not currently breastfeeding.    Scheduled Meds:    dilTIAZem  30 mg Oral 4 times per day    furosemide  20 mg Intravenous BID (Diuretic)         Physical Exam:    General: Alert and oriented x 1 name only.  Cachectic elderly female.No apparent distress. No respiratory or constitutional distress.  HEENT: Normocephalic, anicteric sclera, neck supple  Neck: No JVD, carotids 2+, supple  Cardiac: Irregular 2 out of 6 right upper sternal border systolic murmur.  Lungs: Clear with normal effort.  Normal excursions and effort.  Abdomen: Soft, non-tender. BS-present.  Extremities: Without clubbing, cyanosis.  Peripheral pulsespresent.  Neurologic: Limited exam does not follow commands only opens eyes to questions.  Skin: Warm and dry.     Results:     Laboratory Data:  Lab Results   Component Value Date    WBC 8.1 03/22/2024    HGB 13.4 03/22/2024    HCT 41.9 03/22/2024    .0 03/22/2024    CREATSERUM 0.87 03/22/2024    BUN 22 03/22/2024     03/22/2024    K 4.1 03/22/2024    CL  107 03/22/2024    CO2 26.0 03/22/2024     (H) 03/22/2024    CA 10.3 03/22/2024    ALB 4.3 12/12/2023    ALKPHO 142 12/12/2023    TP 7.6 12/12/2023    AST 24 12/12/2023    ALT <7 (L) 12/12/2023    PTT >240.0 (HH) 11/18/2022    INR 1.19 06/11/2021    PTP 14.9 (H) 06/11/2021    T4F 1.5 06/17/2023    TSH 4.290 (H) 06/17/2023    LIP 41 09/24/2023    DDIMER 2.23 (H) 03/07/2022    MG 2.3 05/06/2023    PHOS 2.8 05/14/2023    CK 58 06/27/2018    B12 392 06/17/2023         Thank you for allowing me to participate in the care of your patient.    Dereck Hendrix MD  [facility]  Interventional Cardiology  3/22/2024

## 2024-03-23 ENCOUNTER — APPOINTMENT (OUTPATIENT)
Dept: CV DIAGNOSTICS | Facility: HOSPITAL | Age: 89
End: 2024-03-23
Attending: HOSPITALIST
Payer: MEDICARE

## 2024-03-23 LAB
ANION GAP SERPL CALC-SCNC: 13 MMOL/L (ref 0–18)
BASOPHILS # BLD AUTO: 0.03 X10(3) UL (ref 0–0.2)
BASOPHILS NFR BLD AUTO: 0.4 %
BUN BLD-MCNC: 18 MG/DL (ref 9–23)
BUN/CREAT SERPL: 21.7 (ref 10–20)
CALCIUM BLD-MCNC: 9.9 MG/DL (ref 8.7–10.4)
CHLORIDE SERPL-SCNC: 109 MMOL/L (ref 98–112)
CO2 SERPL-SCNC: 21 MMOL/L (ref 21–32)
CREAT BLD-MCNC: 0.83 MG/DL
DEPRECATED RDW RBC AUTO: 52.9 FL (ref 35.1–46.3)
EGFRCR SERPLBLD CKD-EPI 2021: 64 ML/MIN/1.73M2 (ref 60–?)
EOSINOPHIL # BLD AUTO: 0.07 X10(3) UL (ref 0–0.7)
EOSINOPHIL NFR BLD AUTO: 0.9 %
ERYTHROCYTE [DISTWIDTH] IN BLOOD BY AUTOMATED COUNT: 13.5 % (ref 11–15)
GLUCOSE BLD-MCNC: 75 MG/DL (ref 70–99)
HCT VFR BLD AUTO: 40.5 %
HCT VFR BLD AUTO: 42 %
HGB BLD-MCNC: 12.5 G/DL
HGB BLD-MCNC: 13.3 G/DL
IMM GRANULOCYTES # BLD AUTO: 0.03 X10(3) UL (ref 0–1)
IMM GRANULOCYTES NFR BLD: 0.4 %
LYMPHOCYTES # BLD AUTO: 1.26 X10(3) UL (ref 1–4)
LYMPHOCYTES NFR BLD AUTO: 15.4 %
MAGNESIUM SERPL-MCNC: 2 MG/DL (ref 1.6–2.6)
MCH RBC QN AUTO: 32.6 PG (ref 26–34)
MCHC RBC AUTO-ENTMCNC: 30.9 G/DL (ref 31–37)
MCV RBC AUTO: 105.5 FL
MONOCYTES # BLD AUTO: 0.71 X10(3) UL (ref 0.1–1)
MONOCYTES NFR BLD AUTO: 8.7 %
NEUTROPHILS # BLD AUTO: 6.08 X10 (3) UL (ref 1.5–7.7)
NEUTROPHILS # BLD AUTO: 6.08 X10(3) UL (ref 1.5–7.7)
NEUTROPHILS NFR BLD AUTO: 74.2 %
OSMOLALITY SERPL CALC.SUM OF ELEC: 297 MOSM/KG (ref 275–295)
PLATELET # BLD AUTO: 298 10(3)UL (ref 150–450)
POTASSIUM SERPL-SCNC: 4.3 MMOL/L (ref 3.5–5.1)
RBC # BLD AUTO: 3.84 X10(6)UL
SODIUM SERPL-SCNC: 143 MMOL/L (ref 136–145)
TSI SER-ACNC: 4.4 MIU/ML (ref 0.55–4.78)
WBC # BLD AUTO: 8.2 X10(3) UL (ref 4–11)

## 2024-03-23 PROCEDURE — 93306 TTE W/DOPPLER COMPLETE: CPT | Performed by: HOSPITALIST

## 2024-03-23 PROCEDURE — 99233 SBSQ HOSP IP/OBS HIGH 50: CPT | Performed by: INTERNAL MEDICINE

## 2024-03-23 RX ORDER — FUROSEMIDE 20 MG/1
20 TABLET ORAL DAILY
Status: DISCONTINUED | OUTPATIENT
Start: 2024-03-23 | End: 2024-03-26

## 2024-03-23 RX ORDER — ATORVASTATIN CALCIUM 10 MG/1
10 TABLET, FILM COATED ORAL NIGHTLY
Status: DISCONTINUED | OUTPATIENT
Start: 2024-03-23 | End: 2024-03-23

## 2024-03-23 NOTE — OCCUPATIONAL THERAPY NOTE
OCCUPATIONAL THERAPY EVALUATION - INPATIENT     Room Number: 332/332-A  Evaluation Date: 3/23/2024  Type of Evaluation: Initial       Physician Order: IP Consult to Occupational Therapy  Reason for Therapy: ADL/IADL Dysfunction and Discharge Planning    OCCUPATIONAL THERAPY ASSESSMENT     Patient is a 89 year old female admitted 3/22/2024 for NSTEMI.  Prior to admission, pt was receiving assist for ADLs and functional transfers at SNF.  Patient is currently requiring up to total A for ADLs overall along with max A for supine to sit, max A for sitting at EOB, and max A of sit to supine.  Pt has the following impairments: decreased functional strength, pain, cognitive deficits, and medical status.    RN cleared pt for participation in OT session, which was completed in collaboration with PT. Upon arrival, pt was supine in bed and agreeable to activity. RN present during session. Pt was left in bed and alarm was activated. Call light and all needs left in reach. Handoff given to RN.    Education provided  Educated pt about role of OT and hospital therapy process as well as benefits of mobility. Pt verbalized/demonstrated fair carryover.    Patient will benefit from continued skilled OT Services For duration of hospitalization, however, given the patient is functioning near baseline level do not anticipate skilled therapy needs at discharge     PLAN  OT Treatment Plan: Balance activities;Energy conservation/work simplification techniques;Continued evaluation;Compensatory technique education;Fine motor coordination activities;Neuromuscluar reeducation;Equipment eval/education;Patient/Family training;Patient/Family education;Cognitive reorientation;Endurance training;UE strengthening/ROM;Functional transfer training;Visual perceptual training;IADL training;ADL training      OCCUPATIONAL THERAPY MEDICAL/SOCIAL HISTORY     Problem List  Principal Problem:    NSTEMI (non-ST elevated myocardial infarction) (HCC)  Active  Problems:    Elevated troponin      Past Medical History  Past Medical History:   Diagnosis Date    A-fib (HCC)     Arthritis     Balance problem     Bladder problem     Cancer (HCC)     Cataract     Disorder of thyroid     Essential hypertension     High blood pressure     High cholesterol     History of blood transfusion     History of breast cancer in female 2000    \"Left sided breast cancer\"    History of heart bypass surgery 1989    Hx of skin cancer, basal cell 2009    Internal derangement of knee, right 08/14/2017    Osteoarthritis     Osteoporosis 11/14/2007    Personal history of malignant neoplasm of breast 05/28/2009    Primary osteoarthritis of knees, bilateral 07/12/2017    Primary osteoarthritis of right knee 11/12/2018    Rheumatic fever/heart disease age 16    Visual impairment        Past Surgical History  Past Surgical History:   Procedure Laterality Date    ANGIOPLASTY (CORONARY)  06-    BOWEL RESECTION  2016    CABG  1989    5 bypass    CHOLECYSTECTOMY      COLON SURGERY      COLONOSCOPY  2010    HYSTERECTOMY      LAPAROSCOPIC CHOLECYSTECTOMY      LUMPECTOMY LEFT      RADIATION LEFT         HOME SITUATION  Type of Home: Skilled nursing facility                                    SUBJECTIVE  \"No put me down.\"    OCCUPATIONAL THERAPY EXAMINATION      OBJECTIVE     Fall Risk: High fall risk    PAIN ASSESSMENT  Rating: Unable to rate  Location: generalized            COORDINATION  Gross Motor: WFL   Fine Motor: WFL     ACTIVITIES OF DAILY LIVING ASSESSMENT  AM-PAC ‘6-Clicks’ Inpatient Daily Activity Short Form  How much help from another person does the patient currently need…  -   Putting on and taking off regular lower body clothing?: Total  -   Bathing (including washing, rinsing, drying)?: A Lot  -   Toileting, which includes using toilet, bedpan or urinal? : A Lot  -   Putting on and taking off regular upper body clothing?: A Lot  -   Taking care of personal grooming such as brushing  teeth?: A Lot  -   Eating meals?: A Lot    AM-PAC Score:  Score: 11  Approx Degree of Impairment: 70.42%  Standardized Score (AM-PAC Scale): 29.04  CMS Modifier (G-Code): CL      BED MOBILITY  Supine to Sit : Maximum Assist  Sit to Supine (OT): Maximum Assist      FUNCTIONAL TRANSFER ASSESSMENT  Supine to Sit : Maximum Assist          FUNCTIONAL ADL ASSESSMENT  Overall total A    The patient's Approx Degree of Impairment: 70.42% has been calculated based on documentation in the [facility] '6 clicks' Inpatient Daily Activity Short Form.  Research supports that patients with this level of impairment may benefit from LTAC. Final disposition will be made by interdisciplinary medical team.    OT Goals  Patients self stated goal is: unable to state     Patient will complete functional transfer with mod A  Comment:          Patient will tolerate standing for 2 minutes in prep for adls with mod A   Comment:               Goals  on:   Frequency: 3-5/wk    Patient Evaluation Complexity Level:   Occupational Profile/Medical History MODERATE - Expanded review of history including review of medical or therapy record   Specific performance deficits impacting engagement in ADL/IADL MODERATE  3 - 5 performance deficits   Client Assessment/Performance Deficits MODERATE - Comorbidities and min to mod modifications of tasks    Clinical Decision Making MODERATE - Analysis of occupational profile, detailed assessments, several treatment options    Overall Complexity MODERATE     OT Session Time: 20 minutes  Self-Care Home Management: 10 minutes           Sonia Romero OT  [facility]  Inpatient Rehabilitation  Occupational Therapy  [phone]

## 2024-03-23 NOTE — SLP NOTE
ADULT SWALLOWING EVALUATION    ASSESSMENT    ASSESSMENT/OVERALL IMPRESSION:  PPE: CONTACT GOWN AND GLOVES. HAND SANITIZED UPON ENTRANCE/EXIT.     SLP BSSE orders received and acknowledged. A swallow evaluation warranted as pt with modified diet consistency. Pt afebrile with clear vocal quality, on room air, with oxygen saturation at 95%. Pt with known hx of dysphagia at [facility], with most recent diet recommendation of regular/thin (5/9/23).     Pt positioned 90 degrees in bed. Pt with no complaints of pain, RN aware. Oral mech reveals copious dried blood coating oral cavity throughout; removed with toothette however unable to clear completely from palate due to gag reflex; additionally unable to find site of bleeding, MD notified as pt on blood thinners. Oral mech otherwise grossly WFL, aside from generalized weakness and reduced velar elevation. Pt accepting of PO trials thin, regular and soft solid texture. Pt with adequate oral acceptance and reduced bilabial seal across all trials; minimal anterior spillage noted with liquid and dry solid intake. Pt with intact bite, prolonged but functional mastication of solids, and slowed A-P transit. Pt's swallow response appears slow to initiate with adequate hyolaryngeal elevation/excursion to palpation. Pt reports globus sensation following dry solid intake; subsided with soft and bite sized +added moisture (apple sauce). Pt benefits from multiple swallows and liquid wash to assist in pharyngeal bolus transit. Otherwise no overt s/s distress observed throughout intake. 3/22/24 CXR indicates \"no pulmonary consolidation\". Oxygen status remained stable t/o the entire evaluation.     At this time, pt presents with minimal oral dysphagia and probable pharyngeal dysfunction. Recommend a SOFT AND BITE SIZED diet and THIN liquids with strict adherence to safe swallowing compensatory strategies including added sauces/gravies, upright, slow rate, small bites/sips, alternate  solids/liquids, supervision with intake; crush meds. Results and recommendations reviewed with RN and pt. Pt v/u to all results/recommendations. Recommendations remain written on whiteboard. SLP adjusted MD diet orders per protocol.   FCM SCORE: 5/7     RECOMMENDATIONS   Diet Recommendations - Solids: Mechanical soft chopped/ Soft & Bite Sized (added sauces/gravies)  Diet Recommendations - Liquids: Thin Liquids    Compensatory Strategies Recommended: Slow rate;Small bites and sips;Alternate consistencies;Extra sauce/gravy  Aspiration Precautions: Upright position;Slow rate;Small bites and sips  Medication Administration Recommendations: Crushed in puree  Treatment Plan/Recommendations: Aspiration precautions    HISTORY   MEDICAL HISTORY  Reason for Referral: Altered diet consistency    Problem List  Principal Problem:    NSTEMI (non-ST elevated myocardial infarction) ([facility])  Active Problems:    Elevated troponin    Past Medical History  Past Medical History:   Diagnosis Date    A-fib (HCC)     Arthritis     Balance problem     Bladder problem     Cancer (HCC)     Cataract     Disorder of thyroid     Essential hypertension     High blood pressure     High cholesterol     History of blood transfusion     History of breast cancer in female 2000    \"Left sided breast cancer\"    History of heart bypass surgery 1989    Hx of skin cancer, basal cell 2009    Internal derangement of knee, right 08/14/2017    Osteoarthritis     Osteoporosis 11/14/2007    Personal history of malignant neoplasm of breast 05/28/2009    Primary osteoarthritis of knees, bilateral 07/12/2017    Primary osteoarthritis of right knee 11/12/2018    Rheumatic fever/heart disease age 16    Visual impairment      Prior Living Situation: Skilled nursing facility  Diet Prior to Admission: Unknown  Precautions: Aspiration;Other (Comment) (fall precautions)    Patient/Family Goals: n/a    SWALLOWING HISTORY  Current Diet Consistency: Soft/ Easy to Chew;Thin  liquids  Dysphagia History: 5/9/23 - regular/thin  Imaging Results:     CXR 3/22/24 CONCLUSION:  Stable enlarged heart with left atrial appendage closure device   No significant lung edema.  No pulmonary consolidation.  There is linear atelectasis in the mid to lower right lung   Circumscribed 8 millimeter pulmonary nodule in the left mid lung is new from September 2023   No pneumothorax      Dictated by (CST): Edu Lea MD on 3/22/2024 at 11:09 AM       Finalized by (CST): Edu Lea MD on 3/22/2024 at 11:12 AM       OBJECTIVE   ORAL MOTOR EXAMINATION  Dentition: Natural;Functional  Symmetry: Within Functional Limits  Strength:  (reduced)     Range of Motion: Within Functional Limits  Rate of Motion: Reduced    Voice Quality: Weak  Respiratory Status: Unlabored  Consistencies Trialed: Thin liquids;Soft solid;Hard solid  Method of Presentation: Self presentation;Staff/Clinician assistance;Cup;Single sips;Consecutive swallows  Patient Positioning: Upright;Midline    Oral Phase of Swallow: Impaired  Bolus Retrieval: Intact  Bilabial Seal: Impaired  Bolus Formation: Intact  Bolus Propulsion: Intact  Mastication: Intact  Retention: Intact    Pharyngeal Phase of Swallow: Impaired  Laryngeal Elevation Timing: Appears impaired (c/o globus sensation)  Laryngeal Elevation Strength: Appears intact  Laryngeal Elevation Coordination: Appears intact  (Please note: Silent aspiration cannot be evaluated clinically. Videofluoroscopic Swallow Study is required to rule-out silent aspiration.)    Esophageal Phase of Swallow: No complaints consistent with possible esophageal involvement    GOALS  Goal #1 The patient will tolerate SOFT AND BITE SIZED consistency and THIN liquids without overt signs or symptoms of aspiration with 100 % accuracy over 1-2 session(s).    In Progress   Goal #2 The patient will tolerate trial upgrade of SOFT AND EASY TO CHEW consistency and THIN liquids without overt signs or symptoms of aspiration  with 100 % accuracy over 1-2 session(s).    In Progress   Goal #3 The patient will utilize compensatory strategies as outlined by  BSSE (clinical evaluation) including Slow rate, Small bites, Small sips, Alternate liquids/solids, Upright 90 degrees, Eliminate distractions, Supervision with meals, added sauces/gravies with PRN assistance 100 % of the time across 2 sessions.    In Progress     FOLLOW UP  Treatment Plan/Recommendations: Aspiration precautions  Number of Visits to Meet Established Goals: 2  Follow Up Needed (Documentation Required): Yes  SLP Follow-up Date: 03/24/24    Thank you for your referral.   If you have any questions, please contact JOSH Pedro.    Ana Rock MS CCC-SLP/L  Speech Language Pathologist  [phone]

## 2024-03-23 NOTE — PHYSICAL THERAPY NOTE
PHYSICAL THERAPY EVALUATION - INPATIENT     Room Number: 332/332-A  Evaluation Date: 3/23/2024  Type of Evaluation: Initial   Physician Order: PT Eval and Treat    Presenting Problem: NSTEMI, R chest pain     Reason for Therapy: Mobility Dysfunction and Discharge Planning    PHYSICAL THERAPY ASSESSMENT   Patient is a 89 year old female admitted 3/22/2024 for NSTEMI, R chest pain .  Prior to admission, patient's baseline is min assist.  Patient is currently functioning below baseline with bed mobility, transfers, gait, stair negotiation, and maintaining seated position.  Patient is requiring dependent as a result of the following impairments: decreased functional strength, decreased endurance/aerobic capacity, impaired coordination, impaired motor planning, and decreased muscular endurance.  Physical Therapy will continue to follow for duration of hospitalization.    Patient will benefit from continued skilled PT Services to promote return to prior level of function and safety with continuous assistance and gradual rehabilitative therapy .    PLAN  PT Treatment Plan: Bed mobility;Endurance;Energy conservation;Patient education  Rehab Potential : Fair  Frequency (Obs): 3-5x/week    PHYSICAL THERAPY MEDICAL/SOCIAL HISTORY   History related to current admission: ER with afib, ischemic cardiomyopathy     Problem List  Principal Problem:    NSTEMI (non-ST elevated myocardial infarction) (HCC)  Active Problems:    Elevated troponin      HOME SITUATION  Home Situation  Type of Home: Assisted living facility  Lives With: Staff 24 hours  Drives: No  Patient Owned Equipment: Rolling walker     Prior Level of [place]: Lives at [facility].  Poor historian    SUBJECTIVE  What is today    PHYSICAL THERAPY EXAMINATION   OBJECTIVE     Fall Risk: High fall risk    WEIGHT BEARING RESTRICTION                   PAIN ASSESSMENT  Ratin          COGNITION  Overall Cognitive Status:  Impaired A&O 1/3    RANGE OF MOTION AND STRENGTH  ASSESSMENT  Upper extremity ROM and strength are within functional limits except for the following:   see OT  Lower extremity ROM is within functional limits except for the following: BLE  Lower extremity strength is within functional limits except for the following:  BLE    BALANCE  Static Sitting: Poor +  Dynamic Sitting: Poor -  Static Standing: Not tested       ADDITIONAL TESTS                                    NEUROLOGICAL FINDINGS                      ACTIVITY TOLERANCE  Pulse: 89                      O2 WALK       AM-PAC '6-Clicks' INPATIENT SHORT FORM - BASIC MOBILITY  How much difficulty does the patient currently have...  Patient Difficulty: Turning over in bed (including adjusting bedclothes, sheets and blankets)?: A Lot   Patient Difficulty: Sitting down on and standing up from a chair with arms (e.g., wheelchair, bedside commode, etc.): Unable   Patient Difficulty: Moving from lying on back to sitting on the side of the bed?: A Lot   How much help from another person does the patient currently need...   Help from Another: Moving to and from a bed to a chair (including a wheelchair)?: Total   Help from Another: Need to walk in hospital room?: Total   Help from Another: Climbing 3-5 steps with a railing?: Total     AM-PAC Score:  Raw Score: 8   Approx Degree of Impairment: 86.62%   Standardized Score (AM-PAC Scale): 28.58   CMS Modifier (G-Code): CM    FUNCTIONAL ABILITY STATUS  Functional Mobility/Gait Assessment  Gait Assistance: Not tested  Rolling: maximum assist  Supine to Sit: dependent  Sit to Supine: maximum assist  Sit to Stand: dependent    Exercise/Education Provided:  Bed mobility  Body mechanics  Energy conservation  Functional activity tolerated    The patient's Approx Degree of Impairment: 86.62% has been calculated based on documentation in the [facility] '6 clicks' Inpatient Basic Mobility Short Form.  Research supports that patients with this level of impairment may benefit from GAGAN due to  severe weakness, high fall risk, unable to stand or ambulate.  Final disposition will be made by interdisciplinary medical team.    Patient End of Session: In bed;Call light within reach;Needs met;RN aware of session/findings;All patient questions and concerns addressed;Alarm set    CURRENT GOALS  Goals to be met by: 2024  Patient Goal Patient's self-stated goal is: to go home   Goal #1 Patient is able to demonstrate supine - sit EOB @ level: minimum assistance     Goal #1   Current Status    Goal #2 Patient is able to demonstrate transfers EOB to/from [facility] at assistance level: minimum assistance with walker - rolling     Goal #2  Current Status    Goal #3 Patient is able to ambulate 150 feet with assist device: walker - rolling at assistance level: minimum assistance   Goal #3   Current Status    Goal #4 Patient will negotiate 0 stairs/one curb w/ assistive device and supervision   Goal #4   Current Status    Goal #5 Patient to demonstrate independence with home activity/exercise instructions provided to patient in preparation for discharge.   Goal #5   Current Status    Goal #6    Goal #6  Current Status      Patient Evaluation Complexity Level:  History Moderate - 1 or 2 personal factors and/or co-morbidities   Examination of body systems Moderate - addressing a total of 3 or more elements   Clinical Presentation  Moderate - Evolving   Clinical Decision Making  Moderate Complexity     Gait Trainin minutes  Therapeutic Activity:  25 minutes  Neuromuscular Re-education: 0 minutes  Therapeutic Exercise: 5 minutes  Canalith Repositionin minutes  Manual Therapy: 0 minutes  Can add/delete any of these

## 2024-03-23 NOTE — PLAN OF CARE
Cardizem drip off since last night. Oral cardizem continues. Frequent getting out of bed. Re- directed and re-oriented frequently.  2  d echo today.    Problem: Patient Centered Care  Goal: Patient preferences are identified and integrated in the patient's plan of care  Description: Interventions:  - What would you like us to know as we care for you? Lives at [facility]  - Provide timely, complete, and accurate information to patient/family  - Incorporate patient and family knowledge, values, beliefs, and cultural backgrounds into the planning and delivery of care  - Encourage patient/family to participate in care and decision-making at the level they choose  - Honor patient and family perspectives and choices  Outcome: Progressing     Problem: Delirium  Goal: Minimize duration of delirium  Description: Interventions:  - Encourage use of hearing aids, eye glasses  - Promote highest level of mobility daily  - Provide frequent reorientation  - Promote wakefulness i.e. lights on, blinds open  - Promote sleep, encourage patient's normal rest cycle i.e. lights off, TV off, minimize noise and interruptions  - Encourage family to assist in orientation and promotion of home routines  Outcome: Progressing     Problem: SAFETY ADULT - FALL  Goal: Free from fall injury  Description: INTERVENTIONS:  - Assess pt frequently for physical needs  - Identify cognitive and physical deficits and behaviors that affect risk of falls.  - [place] fall precautions as indicated by assessment.  - Educate pt/family on patient safety including physical limitations  - Instruct pt to call for assistance with activity based on assessment  - Modify environment to reduce risk of injury  - Provide assistive devices as appropriate  - Consider OT/PT consult to assist with strengthening/mobility  - Encourage toileting schedule  Outcome: Progressing     Problem: DISCHARGE PLANNING  Goal: Discharge to home or other facility with appropriate  resources  Description: INTERVENTIONS:  - Identify barriers to discharge w/pt and caregiver  - Include patient/family/discharge partner in discharge planning  - Arrange for needed discharge resources and transportation as appropriate  - Identify discharge learning needs (meds, wound care, etc)  - Arrange for interpreters to assist at discharge as needed  - Consider post-discharge preferences of patient/family/discharge partner  - Complete POLST form as appropriate  - Assess patient's ability to be responsible for managing their own health  - Refer to Case Management Department for coordinating discharge planning if the patient needs post-hospital services based on physician/LIP order or complex needs related to functional status, cognitive ability or social support system  Outcome: Progressing     Problem: CONFUSION  Goal: Confusion, delirium, dementia or psychosis is improved or at baseline  Description: INTERVENTIONS:  - Assess for possible contributors to thought disturbance, including medications, impaired vision or hearing, underlying metabolic abnormalities, dehydration, psychiatric diagnoses, and notify attending MD/LIP  - [place] high risk fall precautions, as indicated  - Provide frequent short contacts to provide reality reorientation, refocusing and direction  - Decrease environmental stimuli, including noise as appropriate  - Monitor and intervene to maintain adequate nutrition, hydration, elimination, sleep and activity  - Consider the need for a sitter if unable to leave patient unattended  - Initiate Spiritual Care consult as indicated  - Consult Pharmacy as needed  Outcome: Not Progressing

## 2024-03-23 NOTE — PROGRESS NOTES
Progress Note  Pavithra Cedillo Patient Status:  Inpatient    1928 MRN [id number]   Location [facility] 3W/SW Attending Vadim Shin MD   Hosp Day # 1 PCP Marcelo Bernard     Primary Cardiologist: Fernando     SUBJECTIVE:    Denies chest pain. Has no dyspnea but overall feels very weak.     VITALS:  BP (!) 127/105 (BP Location: Left arm)   Pulse 78   Temp 97.4 °F (36.3 °C) (Axillary)   Resp 14   Wt 100 lb (45.4 kg)   SpO2 95%   BMI 17.71 kg/m²     INTAKE/OUTPUT:    Intake/Output Summary (Last 24 hours) at 3/23/2024 0908  Last data filed at 3/23/2024 0600  Gross per 24 hour   Intake 240.67 ml   Output --   Net 240.67 ml     Last 3 Weights   24 0428 100 lb (45.4 kg)   24 1338 98 lb (44.5 kg)   24 1029 100 lb (45.4 kg)   23 0456 103 lb 3.2 oz (46.8 kg)   23 0403 97 lb 9.6 oz (44.3 kg)   23 1752 98 lb 12.8 oz (44.8 kg)   07/10/23 1022 107 lb (48.5 kg)     LABS:  Recent Labs   Lab 24  1046 24  0445   * 75   BUN 22 18   CREATSERUM 0.87 0.83   EGFRCR 61 64   CA 10.3 9.9    143   K 4.1 4.3    109   CO2 26.0 21.0     Recent Labs   Lab 24  1046 24  0445   RBC 4.01 3.84   HGB 13.4 12.5   HCT 41.9 40.5   .5* 105.5*   MCH 33.4 32.6   MCHC 32.0 30.9*   RDW 13.4 13.5   NEPRELIM 6.12 6.08   WBC 8.1 8.2   .0 298.0     No results for input(s): \"TROP\", \"CK\" in the last 168 hours.    DIAGNOSTICS:    TELEMETRY: Afib, HR 50-70s    ECHO 10/11/2023:  1.The left ventricle is mildly enlarged with normal wall thickness. Regional wall motion shows apical akinesis. Severe inferior and posterior wall hypokinesis. Global left ventricular systolic function is moderately decreased. The left ventricular ejection fraction is 40%. Grade II diastolic dysfunction.2.The right ventricle is normal in size. Right ventricular systolic function is mildly decreased.3.The left atrium is severely enlarged..4.The right atrium is severely  enlarged. 5.Moderate calcification of the aortic valve is noted. Moderate-severe stenosis is present. The mean gradient is 25 mmhg. Peak velocity = 3.22 m/s. DI is 0.18.6.Moderate mitral annular calcification is noted. Mitral stenosis is mild. Mild mitral regurgitation. 7.Severe tricuspid regurgitation.8.The inferior vena cava changes less than 50 percent during respiration. 9.The estimated pulmonary artery systolic pressure is 50 mmHg assuming a right atrial pressure of 15 mmHg. Moderate pulmonary hypertension. 10/11/2023 3:       ROS: Negative unless noted above     PHYSICAL EXAM:  General: Alert and oriented x 3. No apparent distress.  HEENT: Normocephalic, sclera are nonicteric. Hearing decreased bilaterally. Frail   Neck: No JVD or Carotid bruits. Trachea midline.   Cardiac: IRRegular rate and rhythm. S1, S2 auscultated. No murmurs, rubs, or gallops appreciated.   Lungs: a/p dull.  Chest expansion symmetrical. Regular effort.  Abdomen: Soft, non-tender, +BS. No hepatosplenomegaly or appreciable masses.   Extremities: Without clubbing, cyanosis or edema.  Peripheral pulses are 1+.  Neurologic: Motor and sensory nerves grossly intact.   Psych: Appropriate affect   Skin: Warm and dry. No obvious lesions, wounds, or ulcerations.     MEDICATIONS:   dilTIAZem  30 mg Oral 4 times per day    levothyroxine  100 mcg Oral Before breakfast    pantoprazole  20 mg Oral QAM AC    ticagrelor  90 mg Oral 2 times per day    rivaroxaban  15 mg Oral QPM    metoprolol succinate  50 mg Oral Daily Beta Blocker      dilTIAZem Stopped (03/22/24 2010)     ASSESSMENT:    Permanent Afib  - Presented in RVR, s/p Cardizem gtt. Now rate controlled on Toprol and short acting Cardizem   - A/c with renal dose Xarelto     Elevated Troponin  - No chest pain   - Flat trend, 56-42-55  - Likely secondary to demand ischemia and known occlusions     Oral Bleeding  - Hgb is stable, no other signs/reports of bleeding  - On Brilinita and Xarelto     CAD,  Hx Remote CABG 1989, PCI 2012  - [facility] 3/7/2022: Patent SVG-Diag & SVG-RCA with moderate ostial stenosis. Severely disease 100% occluded native Cx, occluded SVG-OM. 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%, ICM  - Appears compensated   -  on admission, though was 704 in December   - CXR without pulmonary edema     Moderate-Severe Aortic Stenosis   - Was considering TAVR o/p but was deemed to frail for intervention   - Mean gradient 25 mmHg     Hypothyroidism- Synthroid   HDL- , above goal of 70, not historically on Statin     Abnormal CXR- 3/22/2024, 8mm pulmonary nodule in the left mid lung is new from Sept 2023, discussed with patient, should follow up with primary, further workup per primary recommendations     Hx of fall with head trauma and subarachnoid hemorrhage     PLAN:  - ECHO pending to reassess LVEF and valvular functioning, further recommendations pending results   - With frailitiy and advanced, rate controlling titration will be delicate. Stop short acting Cardizem, if needed can increase Toprol to BID  - With oral bleeding, fall history, and bleeding history. Will need to consider if discontinue antiplatelet or anticoagulant, will need to discuss with cardiologist which would be more favorable. If her rates can maintain control would likely be better to stop anticoagulant.   - Resume oral Lasix, monitor H&H   - Patient will consider low dose statin therapy   - Check TSH with admission for AF RVR     Plan of care discussed with patient and RN.     Lillian Vergara, SHELL  3/23/2024  9:08 AM  [phone] ([place])  [phone] ([place])      Cardiologist Addendum:  Pavithra Goinson was seen and examined independently and I agree with the above documentation provided by GIANA Zamora.  Echo performed today - will review, additional recommendations to follow based on results. Discussed with Daughter -  conservative management is preferred.  Tele shows HR is controlled. Cont gentle diuresis, BB, DOAC. Recommend decreasing brilinta to 60mg BID (PCI > 12 months).    L3    Thank you for allowing me to take part in the care of Pavithra Cedillo. Please call with any questions of concerns.      Andrew Randolph DO  [facility]   Interventional Cardiac and Vascular Services      March 23, 2024    3:30 PM

## 2024-03-23 NOTE — CM/SW NOTE
Pt is current with [facility] for RN and ST services, TIP and clinicals uploaded.    SW/CM to remain available for support and/or discharge planning.      María Lisa MSW, LSW  Social Work   Ext:#[phone]

## 2024-03-23 NOTE — PLAN OF CARE
Patient alert to self. Patient's vitals stable on 5ml/hr cardizem drip. Patient declines pain. Plan to continue to monitor and titrate Cardizem per protocol. Call light within reach.    Problem: Patient Centered Care  Goal: Patient preferences are identified and integrated in the patient's plan of care  Description: Interventions:  - What would you like us to know as we care for you? From [facility]  - Provide timely, complete, and accurate information to patient/family  - Incorporate patient and family knowledge, values, beliefs, and cultural backgrounds into the planning and delivery of care  - Encourage patient/family to participate in care and decision-making at the level they choose  - Honor patient and family perspectives and choices  Outcome: Progressing     Problem: Patient/Family Goals  Goal: Patient/Family Long Term Goal  Description: Patient's Long Term Goal: Be able to discharge    Interventions:  - Titrate cardizem drip  - See additional Care Plan goals for specific interventions  Outcome: Progressing  Goal: Patient/Family Short Term Goal  Description: Patient's Short Term Goal: Reduce confusion    Interventions:   - Monitor labs and vitals  - See additional Care Plan goals for specific interventions  Outcome: Progressing

## 2024-03-23 NOTE — PROGRESS NOTES
Progress Note     Pavithra Cedillo Patient Status:  Inpatient    1928 MRN [id number]   Location [facility] 3W/SW Attending Vadim Shin MD   Hosp Day # 1 PCP Marcelo Bernard     Chief Complaint:   Chief Complaint   Patient presents with    Chest Pain Angina    Difficulty Breathing         Subjective:   S: Patient seen and examined, chart reviewed.   Discussed care plan with family at bedside      Review of Systems:   10 point ROS completed and was negative, except for pertinent positive and negatives stated in subjective.                Objective:   Vital signs:  Temp:  [97.3 °F (36.3 °C)-98.3 °F (36.8 °C)] 97.3 °F (36.3 °C)  Pulse:  [62-89] 89  Resp:  [14-15] 15  BP: (114-143)/() 114/57  SpO2:  [92 %-95 %] 95 %    Wt Readings from Last 6 Encounters:   24 100 lb (45.4 kg)   23 103 lb 3.2 oz (46.8 kg)   07/10/23 107 lb (48.5 kg)   23 104 lb 4.8 oz (47.3 kg)   23 113 lb 4.8 oz (51.4 kg)   23 109 lb 6.4 oz (49.6 kg)       Physical Exam:    General: No acute distress.   Respiratory: Clear to auscultation bilaterally. No wheezes. No rhonchi.  Cardiovascular: S1, S2. Regular rate and rhythm. No murmurs, rubs or gallops.   Abdomen: Soft, nontender, nondistended.  Positive bowel sounds. No rebound or guarding.  Neurologic: No focal neurological deficits.   Musculoskeletal: Moves all extremities.  Extremities: No edema.    Results:   Diagnostic Data:      Labs:    Labs Last 24 Hours:   BMP     CBC    Other     Na 143 Cl 109 BUN 18 Glu 75   Hb 13.3   PTT - Procal -   K 4.3 CO2 21.0 Cr 0.83   WBC 8.2 >< .0  INR - CRP -   Renal Lytes Endo    Hct 42.0   Trop - D dim -   eGFR - Ca 9.9 POC Gluc  -    LFT   pBNP - Lactic -   eGFR AA - PO4 - A1c -   AST - APk - Prot -  LDL -      Pro-Calcitonin  No results for input(s): \"PCT\" in the last 168 hours.    Cardiac  No results for input(s): \"TROP\", \"PBNP\" in the last 168 hours.    Imaging: Imaging data reviewed in  Epic.    XR CHEST AP PORTABLE  (CPT=71045)    Result Date: 3/22/2024  PROCEDURE: XR CHEST AP PORTABLE  (CPT=71045) TIME: 1056  COMPARISON: [facility], XR CHEST AP PORTABLE (CPT=71045), 9/24/2023, 0:56 AM.  [facility], XR CHEST AP PORTABLE (CPT=71045), 12/12/2023, 3:22 AM.  INDICATIONS: Chest pain and difficulty breathing.  TECHNIQUE:   Single view.   Findings and impression:  Stable enlarged heart with left atrial appendage closure device  No significant lung edema.  No pulmonary consolidation.  There is linear atelectasis in the mid to lower right lung  Circumscribed 8 millimeter pulmonary nodule in the left mid lung is new from September 2023  No pneumothorax    Dictated by (CST): Edu Lea MD on 3/22/2024 at 11:09 AM     Finalized by (CST): Edu Lea MD on 3/22/2024 at 11:12 AM              Medications:    furosemide  20 mg Oral Daily    ticagrelor  60 mg Oral 2 times per day    levothyroxine  100 mcg Oral Before breakfast    pantoprazole  20 mg Oral QAM AC    rivaroxaban  15 mg Oral QPM    metoprolol succinate  50 mg Oral Daily Beta Blocker       Assessment & Plan:   ASSESSMENT / PLAN:   86-year-old female admitted with atrial fibrillation with rapid ventricular rate    Atrial fibrillation With RVR.  Hemodynamically stable.  On anticoagulation xarelto lo dose.  -Cardiology consult appreciated  -Monitor on telemetry  -Monitor electrolytes  -Anticoagulation as above  -Rate/rhythm control with metoprolol  -Echo Pending    2.       Fluid overload status, underlying ischemic cardiomyopathy and possible heart failure.  Reportedly, she had a pulse ox of 84% on room air.  Currently saturating 94% on ROOM AIR  - check ECHO        3. NSTEMI  - 2/2 to demand ischemia  - on brilinta and xarelto    4. CAD, hx CABG 1989, PCI 2012  - [facility] 2022 reviewed.  - no on ASA    5. 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 Mod Aortic stenosis  - check ECHO    7. Essential hypertension.    -  cpm    Estimated date of discharge: TBD  Discharge is dependent on: cards clearance  At this point Ms. Cedillo is expected to be discharge to: home vs SNF    Plan of care discussed with patient and dtr/nurse    Vadim Shin MD, FAAP, FACP  [facility] Hospitalist  I respond to Epic Chat and AMS Connect    45 minutes spent on this admission - examining patient, obtaining history, reviewing previous medical records, going over test results/imaging and discussing plan of care. All questions answered.

## 2024-03-24 LAB
ANION GAP SERPL CALC-SCNC: 11 MMOL/L (ref 0–18)
BASOPHILS # BLD AUTO: 0.04 X10(3) UL (ref 0–0.2)
BASOPHILS NFR BLD AUTO: 0.6 %
BUN BLD-MCNC: 20 MG/DL (ref 9–23)
BUN/CREAT SERPL: 26.7 (ref 10–20)
CALCIUM BLD-MCNC: 9.5 MG/DL (ref 8.7–10.4)
CHLORIDE SERPL-SCNC: 109 MMOL/L (ref 98–112)
CO2 SERPL-SCNC: 25 MMOL/L (ref 21–32)
CREAT BLD-MCNC: 0.75 MG/DL
DEPRECATED RDW RBC AUTO: 52.2 FL (ref 35.1–46.3)
EGFRCR SERPLBLD CKD-EPI 2021: 73 ML/MIN/1.73M2 (ref 60–?)
EOSINOPHIL # BLD AUTO: 0.14 X10(3) UL (ref 0–0.7)
EOSINOPHIL NFR BLD AUTO: 1.9 %
ERYTHROCYTE [DISTWIDTH] IN BLOOD BY AUTOMATED COUNT: 13.3 % (ref 11–15)
GLUCOSE BLD-MCNC: 71 MG/DL (ref 70–99)
HCT VFR BLD AUTO: 37.7 %
HGB BLD-MCNC: 12.3 G/DL
IMM GRANULOCYTES # BLD AUTO: 0.02 X10(3) UL (ref 0–1)
IMM GRANULOCYTES NFR BLD: 0.3 %
LYMPHOCYTES # BLD AUTO: 1.01 X10(3) UL (ref 1–4)
LYMPHOCYTES NFR BLD AUTO: 14 %
MAGNESIUM SERPL-MCNC: 1.8 MG/DL (ref 1.6–2.6)
MCH RBC QN AUTO: 34.1 PG (ref 26–34)
MCHC RBC AUTO-ENTMCNC: 32.6 G/DL (ref 31–37)
MCV RBC AUTO: 104.4 FL
MONOCYTES # BLD AUTO: 0.69 X10(3) UL (ref 0.1–1)
MONOCYTES NFR BLD AUTO: 9.5 %
NEUTROPHILS # BLD AUTO: 5.33 X10 (3) UL (ref 1.5–7.7)
NEUTROPHILS # BLD AUTO: 5.33 X10(3) UL (ref 1.5–7.7)
NEUTROPHILS NFR BLD AUTO: 73.7 %
OSMOLALITY SERPL CALC.SUM OF ELEC: 301 MOSM/KG (ref 275–295)
PLATELET # BLD AUTO: 267 10(3)UL (ref 150–450)
POTASSIUM SERPL-SCNC: 3.6 MMOL/L (ref 3.5–5.1)
RBC # BLD AUTO: 3.61 X10(6)UL
SODIUM SERPL-SCNC: 145 MMOL/L (ref 136–145)
WBC # BLD AUTO: 7.2 X10(3) UL (ref 4–11)

## 2024-03-24 PROCEDURE — 99233 SBSQ HOSP IP/OBS HIGH 50: CPT | Performed by: INTERNAL MEDICINE

## 2024-03-24 RX ORDER — MAGNESIUM OXIDE 400 MG/1
400 TABLET ORAL ONCE
Status: COMPLETED | OUTPATIENT
Start: 2024-03-24 | End: 2024-03-24

## 2024-03-24 NOTE — PLAN OF CARE
Patient is A&Ox1, 1Lo2, 2 mod assist. Pt retaining urine through shift, straight cath done. Call light within reach and fall precautions in place.     Problem: Patient Centered Care  Goal: Patient preferences are identified and integrated in the patient's plan of care  Description: Interventions:  - What would you like us to know as we care for you? From [place]   - Provide timely, complete, and accurate information to patient/family  - Incorporate patient and family knowledge, values, beliefs, and cultural backgrounds into the planning and delivery of care  - Encourage patient/family to participate in care and decision-making at the level they choose  - Honor patient and family perspectives and choices  Outcome: Progressing     Problem: Patient/Family Goals  Goal: Patient/Family Long Term Goal  Description: Patient's Long Term Goal: to go back to facility    Interventions:  - follow medical regimen  - See additional Care Plan goals for specific interventions  Outcome: Progressing    Problem: DISCHARGE PLANNING  Goal: Discharge to home or other facility with appropriate resources  Description: INTERVENTIONS:  - Identify barriers to discharge w/pt and caregiver  - Include patient/family/discharge partner in discharge planning  - Arrange for needed discharge resources and transportation as appropriate  - Identify discharge learning needs (meds, wound care, etc)  - Arrange for interpreters to assist at discharge as needed  - Consider post-discharge preferences of patient/family/discharge partner  - Complete POLST form as appropriate  - Assess patient's ability to be responsible for managing their own health  - Refer to Case Management Department for coordinating discharge planning if the patient needs post-hospital services based on physician/LIP order or complex needs related to functional status, cognitive ability or social support system  Outcome: Progressing     Problem: CONFUSION  Goal: Confusion, delirium,  dementia or psychosis is improved or at baseline  Description: INTERVENTIONS:  - Assess for possible contributors to thought disturbance, including medications, impaired vision or hearing, underlying metabolic abnormalities, dehydration, psychiatric diagnoses, and notify attending MD/LIP  - [place] high risk fall precautions, as indicated  - Provide frequent short contacts to provide reality reorientation, refocusing and direction  - Decrease environmental stimuli, including noise as appropriate  - Monitor and intervene to maintain adequate nutrition, hydration, elimination, sleep and activity  - Consider the need for a sitter if unable to leave patient unattended  - Initiate Spiritual Care consult as indicated  - Consult Pharmacy as needed  Outcome: Progressing     Problem: Delirium  Goal: Minimize duration of delirium  Description: Interventions:  - Encourage use of hearing aids, eye glasses  - Promote highest level of mobility daily  - Provide frequent reorientation  - Promote wakefulness i.e. lights on, blinds open  - Promote sleep, encourage patient's normal rest cycle i.e. lights off, TV off, minimize noise and interruptions  - Encourage family to assist in orientation and promotion of home routines  Outcome: Progressing

## 2024-03-24 NOTE — PLAN OF CARE
O2 1l n/c. Stable vital signs. Bladder scan per protocol- straight cath done. Oral lasix continue.  Plan: home with C VS SNF. Will continue to monitor.    Problem: Patient Centered Care  Goal: Patient preferences are identified and integrated in the patient's plan of care  Description: Interventions:  - What would you like us to know as we care for you? From [facility]  - Provide timely, complete, and accurate information to patient/family  - Incorporate patient and family knowledge, values, beliefs, and cultural backgrounds into the planning and delivery of care  - Encourage patient/family to participate in care and decision-making at the level they choose  - Honor patient and family perspectives and choices  Outcome: Progressing     Problem: Delirium  Goal: Minimize duration of delirium  Description: Interventions:  - Encourage use of hearing aids, eye glasses  - Promote highest level of mobility daily  - Provide frequent reorientation  - Promote wakefulness i.e. lights on, blinds open  - Promote sleep, encourage patient's normal rest cycle i.e. lights off, TV off, minimize noise and interruptions  - Encourage family to assist in orientation and promotion of home routines  Outcome: Progressing     Problem: SAFETY ADULT - FALL  Goal: Free from fall injury  Description: INTERVENTIONS:  - Assess pt frequently for physical needs  - Identify cognitive and physical deficits and behaviors that affect risk of falls.  - [place] fall precautions as indicated by assessment.  - Educate pt/family on patient safety including physical limitations  - Instruct pt to call for assistance with activity based on assessment  - Modify environment to reduce risk of injury  - Provide assistive devices as appropriate  - Consider OT/PT consult to assist with strengthening/mobility  - Encourage toileting schedule  Outcome: Progressing     Problem: DISCHARGE PLANNING  Goal: Discharge to home or other facility with appropriate  resources  Description: INTERVENTIONS:  - Identify barriers to discharge w/pt and caregiver  - Include patient/family/discharge partner in discharge planning  - Arrange for needed discharge resources and transportation as appropriate  - Identify discharge learning needs (meds, wound care, etc)  - Arrange for interpreters to assist at discharge as needed  - Consider post-discharge preferences of patient/family/discharge partner  - Complete POLST form as appropriate  - Assess patient's ability to be responsible for managing their own health  - Refer to Case Management Department for coordinating discharge planning if the patient needs post-hospital services based on physician/LIP order or complex needs related to functional status, cognitive ability or social support system  Outcome: Progressing     Problem: CONFUSION  Goal: Confusion, delirium, dementia or psychosis is improved or at baseline  Description: INTERVENTIONS:  - Assess for possible contributors to thought disturbance, including medications, impaired vision or hearing, underlying metabolic abnormalities, dehydration, psychiatric diagnoses, and notify attending MD/LIP  - [place] high risk fall precautions, as indicated  - Provide frequent short contacts to provide reality reorientation, refocusing and direction  - Decrease environmental stimuli, including noise as appropriate  - Monitor and intervene to maintain adequate nutrition, hydration, elimination, sleep and activity  - Consider the need for a sitter if unable to leave patient unattended  - Initiate Spiritual Care consult as indicated  - Consult Pharmacy as needed  Outcome: Progressing

## 2024-03-24 NOTE — DIETARY NOTE
ADULT NUTRITION INITIAL ASSESSMENT    Pt is at high nutrition risk.  Pt meets severe malnutrition criteria.      CRITERIA FOR MALNUTRITION DIAGNOSIS:  Criteria for severe malnutrition diagnosis: social/environmental related to wt loss greater than 10% in 6 months, energy intake less than 50% for greater than 1 month, body fat severe depletion, and muscle mass severe depletion.    RECOMMENDATIONS TO MD: See Nutrition Intervention and liberalized diet given small po intake.      ADMITTING DIAGNOSIS:  NSTEMI (non-ST elevated myocardial infarction) ([facility]) [I21.4]    PERTINENT PAST MEDICAL HISTORY:  has a past medical history of A-fib ([facility]), Arthritis, Balance problem, Bladder problem, Cancer (HCC), Cataract, Disorder of thyroid, Essential hypertension, High blood pressure, High cholesterol, History of blood transfusion, History of breast cancer in female (2000), History of heart bypass surgery (1989), skin cancer, basal cell (2009), Internal derangement of knee, right (08/14/2017), Osteoarthritis, Osteoporosis (11/14/2007), Personal history of malignant neoplasm of breast (05/28/2009), Primary osteoarthritis of knees, bilateral (07/12/2017), Primary osteoarthritis of right knee (11/12/2018), Rheumatic fever/heart disease (age 16), and Visual impairment.     PATIENT STATUS: Initial 03/24/24: seeing pt due to low BMI and due to RN reports at admission poor po intake (and severe PCM diagnosis recent admission).  Pt is pleasantly confused at visit. Eating B/L, but complaining of cream of wheat being stuck in her throat--points to left neck area.  SLP planning to see pt again today.  Pt drinking milk, juice and water to clear this sensation, but still c/o's after doing this.  Pt from a SNF. Pt admitted with cardiac issues with IV meds and lasix, but given very little po intake liberalized from cardiac diet to just 3-4 g sodium (and SLP specifics).  Noted DNAR/comfort code status.      FOOD/NUTRITION RELATED  HISTORY:  Appetite: Poor  Intake:  mostly only 20% of meals, but is drinking good during my visit--most of apple juice, half of milk and drinks of water.    Intake Meeting Needs: No, but oral nutrition supplements (ONS) to maximize  Percent Meals Eaten (last 6 days)       Date/Time Percent Meals Eaten (%)    03/22/24 1700 20 %    03/23/24 0900 50 %    03/23/24 1101 20 %    03/23/24 1900 20 %           Food Allergies: No Known Food Allergies (NKFA)  Cultural/Ethnic/Orthodox Preferences: Not Obtained    GASTROINTESTINAL:  pt denies any GI c/o's--uncertain when last BM was.      MEDICATIONS: reviewed   furosemide  20 mg Oral Daily    ticagrelor  60 mg Oral 2 times per day    levothyroxine  100 mcg Oral Before breakfast    pantoprazole  20 mg Oral QAM AC    rivaroxaban  15 mg Oral QPM    metoprolol succinate  50 mg Oral Daily Beta Blocker       LABS: reviewed  Recent Labs     03/22/24  1046 03/23/24  0445 03/24/24  0712   * 75 71   BUN 22 18 20   CREATSERUM 0.87 0.83 0.75   CA 10.3 9.9 9.5   MG  --  2.0 1.8    143 145   K 4.1 4.3 3.6    109 109   CO2 26.0 21.0 25.0   OSMOCALC 296* 297* 301*       NUTRITION RELATED PHYSICAL FINDINGS:  - Nutrition Focused Physical Exam (NFPE): severe depletion body fat orbital region, triceps region, and fat overlying rib cage region and severe depletion muscle mass temple region, clavicle region, scapular region, shoulder region, dorsal palencia region, thigh region, and calf region   - Fluid Accumulation: none  see RN documentation for details  - Skin Integrity: at risk see RN documentation for details    ANTHROPOMETRICS:  HT:  5'3\"  WT: 41.6 kg (91 lb 11.2 oz) drop from 100# at admission with lasix.    BMI: Body mass index is 16.24 kg/m².  BMI CLASSIFICATION: <22 considered underweight for advanced age  IBW: 115 lbs        80% IBW  Usual Body Wt: 102-107 lbs mostly for the last yr. Years ago 130-140# likely.        87% UBW  WEIGHT HISTORY:  Patient Weight(s) for the  past 336 hrs:   Weight   03/24/24 0424 41.6 kg (91 lb 11.2 oz)   03/23/24 0428 45.4 kg (100 lb)   03/22/24 1338 44.5 kg (98 lb)   03/22/24 1029 45.4 kg (100 lb)     Wt Readings from Last 10 Encounters:   03/24/24 41.6 kg (91 lb 11.2 oz)   12/14/23 46.8 kg (103 lb 3.2 oz)   07/10/23 48.5 kg (107 lb)   06/18/23 47.3 kg (104 lb 4.8 oz)   05/05/23 51.4 kg (113 lb 4.8 oz)   04/25/23 49.6 kg (109 lb 6.4 oz)   03/15/23 47.1 kg (103 lb 12.8 oz)   03/01/23 46.3 kg (102 lb 1.6 oz)   02/16/23 43.9 kg (96 lb 11.2 oz)   02/08/23 47.9 kg (105 lb 9.6 oz)       NUTRITION DIAGNOSIS/PROBLEM:    Severe Malnutrition related to Starvation related - Behavioral/psychological causes in the setting of dementia/confusion and living in a SNF as evidenced by >10% true wt loss in the past 6 months, less than 50% intake for > 1 month and severe muscle and fat deficits.      NUTRITION INTERVENTION:     NUTRITION PRESCRIPTION:   Estimated Nutrition needs: --dosing wt of 41.6 kg - wt taken on 3/24  Calories: 2182-6248 calories/day (30-35 calories per kg Dosing wt)  Protein: 54-62 g protein/day (1.3-1.5 g protein/kg Dosing wt)    - Diet:       Procedures    Regular/General diet Sodium Restriction: 3-4 GM NA; Fluid Consistency: Thin Liquids ; Texture Consistency: Chopped / Soft & Bite Sized; Is Patient on Accuchecks? No      - RD Malnutrition Care Plan: Encouraged increased PO intake and Initiated ONS (oral nutritional supplements)    - Meals and snacks: Encouraged increased PO intake  - Medical Food Supplements-RD added Ensure Enlive (350 calories/ 20 g protein each) Vanilla Daily and Magic Cup (290 calories/ 9 g protein each) Chocolate Daily Rational/use of oral supplements discussed. Pt tells me that she prefers chocolate flavor, but if in the morning she would prefer vanilla  : )  - Vitamin and mineral supplements: none  - Feeding assistance: meal set up  - Nutrition education: Discussed importance of adequate energy and protein intake   -  Coordination of nutrition care: collaboration with other providers  - Discharge and transfer of nutrition care to new setting or provider: to be determined    MONITOR AND EVALUATE/NUTRITION GOALS:  - Food and Nutrient Intake:      Monitor: adequacy of PO intake and adequacy of supplement intake  - Food and Nutrient Administration:      Monitor: N/A  - Anthropometric Measurement:    Monitor weight  - Nutrition Goals:      halt wt loss, gradual wt gain as able, PO and supplement greater than 75% of needs, labs within acceptable limits, and support body systems    DIETITIAN FOLLOW UP: RD to follow and monitor nutrition status      Carly Kim RD, LDN   Clinical Dietitian [id number]

## 2024-03-24 NOTE — PROGRESS NOTES
[facility] Cardiology Progress Note    Pavithra Cedillo Patient Status:  Inpatient    1928 MRN [id number]   Location [facility] 3W/SW Attending Vadim Shin MD   Hosp Day # 2 PCP Marcelo Bernard     Subjective:  Sitting up in her chair. Denies cp, sob.     Objective:  /80 (BP Location: Left arm)   Pulse 90   Temp 97.7 °F (36.5 °C) (Axillary)   Resp 12   Wt 91 lb 11.2 oz (41.6 kg)   SpO2 97%   BMI 16.24 kg/m²     Telemetry: Afib       Intake/Output:    Intake/Output Summary (Last 24 hours) at 3/24/2024 1304  Last data filed at 3/24/2024 0855  Gross per 24 hour   Intake 285 ml   Output 1250 ml   Net -965 ml       Last 3 Weights   24 0424 91 lb 11.2 oz (41.6 kg)   24 0428 100 lb (45.4 kg)   24 1338 98 lb (44.5 kg)   24 1029 100 lb (45.4 kg)   23 0456 103 lb 3.2 oz (46.8 kg)   23 0403 97 lb 9.6 oz (44.3 kg)   23 1752 98 lb 12.8 oz (44.8 kg)   07/10/23 1022 107 lb (48.5 kg)       Labs:  Recent Labs   Lab 24  1046 24  0445 24  0712   * 75 71   BUN 22 18 20   CREATSERUM 0.87 0.83 0.75   EGFRCR 61 64 73   CA 10.3 9.9 9.5    143 145   K 4.1 4.3 3.6    109 109   CO2 26.0 21.0 25.0     Recent Labs   Lab 24  1046 24  0445 24  1626 24  0711   RBC 4.01 3.84  --  3.61*   HGB 13.4 12.5 13.3 12.3   HCT 41.9 40.5 42.0 37.7   .5* 105.5*  --  104.4*   MCH 33.4 32.6  --  34.1*   MCHC 32.0 30.9*  --  32.6   RDW 13.4 13.5  --  13.3   NEPRELIM 6.12 6.08  --  5.33   WBC 8.1 8.2  --  7.2   .0 298.0  --  267.0         Recent Labs   Lab 24  1046 24  1442 24  1937   TROPHS 56* 42* 55*       Diagnostics:     3/23/24 Echo  1. Left ventricle: The cavity size was mildly increased. Wall thickness was      normal. Systolic function was markedly reduced. The estimated ejection      fraction was 25-30%, by biplane method of disks. There was severe diffuse      hypokinesis.  Akinesis of the apicallateral, inferolateral, and inferior      walls. Hypokinesis of the anterior and anterolateral walls. Doppler      parameters are consistent with restrictive physiology, indicative of      decreased left ventricular diastolic compliance and/or increased left      atrial pressure.   2. Right ventricle: Systolic function was reduced.   3. Left atrium: The atrium was markedly dilated.   4. Right atrium: The atrium was dilated.   5. Aortic valve: Cusp separation was reduced. The findings were consistent      with moderate to severe stenosis. Stenosis was probably underestimated      due to poor left ventricular function. The peak systolic velocity was      2.6m/sec. The mean systolic gradient was 15mm Hg. The valve area (VTI)      was 0.68cm^2. The valve area (VTI) index was 0.47cm^2/m^2.   6. Mitral valve: The findings were consistent with mild stenosis. There was      moderate regurgitation. The mean diastolic gradient was 3mm Hg.   7. Tricuspid valve: There was severe regurgitation.   8. Pulmonary arteries: Systolic pressure was moderately increased.   *       Review of Systems   Respiratory: Negative.     Cardiovascular: Negative.      Physical Exam:    General: Alert and oriented x 3. No apparent distress. [place]  HEENT: Normocephalic, anicteric sclera, neck supple, no thyromegaly or adenopathy.  Neck: No JVD, carotids 2+, no bruits.  Cardiac: Irregularly irregular rate & rhythm. S1, S2 normal. No murmur, pericardial rub, S3, or extra cardiac sounds.  Lungs: Clear without wheezes, rales, rhonchi or dullness.  Normal excursions and effort.  Abdomen: Soft, non-tender. No organosplenomegally, mass or rebound, BS-present.  Extremities: Without clubbing or cyanosis.  No left lower extremity edema, no right lower extremity edema. BLE venous stasis changes   Neurologic: Alert and oriented, normal affect. No focal defects  Skin: Warm and dry.       Medications:   furosemide  20 mg Oral Daily     ticagrelor  60 mg Oral 2 times per day    levothyroxine  100 mcg Oral Before breakfast    pantoprazole  20 mg Oral QAM AC    rivaroxaban  15 mg Oral QPM    metoprolol succinate  50 mg Oral Daily Beta Blocker      dilTIAZem Stopped (03/22/24 2010)       Assessment:    Permanent Afib  - off of cardizem gtt . rates controlled on toprol xl   - A/c with renal dose xarelto      Elevated Troponin  - Denies angina   - Mildly elevated troponin w/ flat trend   - Likely secondary to demand ischemia and known occlusions      Oral Bleeding  - Hgb is stable, no other signs of bleeding. Minimal today   - On Xarelto . Brilinta reduced to 60mg bid yesterday      CAD, Hx Remote CABG 1989, PCI 2012  - [facility] 3/7/2022: Patent SVG-Diag & SVG-RCA with moderate ostial stenosis. Severely disease 100% occluded native Cx, occluded SVG-OM. Pt declined intervention, on Brilinta   - Not historically on ASA due to allergy & avoid triple therapy      Chronic HFmrEF, LVEF 40%, ICM  - Appears compensated   - CXR w/ no significant lung edema   - On lasix & toprol xl     Moderate-Severe Aortic Stenosis   - Was considering TAVR o/p but was deemed to frail for intervention   - Mean gradient 15 mmHg      Hypothyroidism - synthroid   HDL- , not on statin therapy      Abnormal CXR- 3/22/2024, 8mm pulmonary nodule in the left mid lung is new from Sept 2023, discussed with patient, should follow up with primary, further workup per primary recommendations      Hx of fall with head trauma and subarachnoid hemorrhage     Plan:    In controlled afib with toprol XL  Continue xarelto & lower dose brilinta   Compensated on exam   Repeat echo w/ EF 25-30%, severe diffuse hypokinesis, mod-severe AS, mild MS, mod MR . Will discuss results & plan of care with rounding KAMLA Weston  3/24/2024  1:04 PM  Ph [phone] ([place])  Ph [phone] ([place])     104.5

## 2024-03-24 NOTE — PROGRESS NOTES
Progress Note     Pavithra Cedillo Patient Status:  Inpatient    1928 MRN [id number]   Location [facility] 3W/SW Attending Vadim Shin MD   Hosp Day # 2 PCP Marcelo Bernard     Chief Complaint:   Chief Complaint   Patient presents with    Chest Pain Angina    Difficulty Breathing         Subjective:   S: Patient seen and examined, chart reviewed.   Sleeping soundly.  Hb stable. Nurse reports no concerns at this time.       Review of Systems:   10 point ROS completed and was negative, except for pertinent positive and negatives stated in subjective.                Objective:   Vital signs:  Temp:  [97.3 °F (36.3 °C)-97.7 °F (36.5 °C)] 97.7 °F (36.5 °C)  Pulse:  [] 90  Resp:  [12-16] 12  BP: (114-151)/() 144/98  SpO2:  [95 %-97 %] 95 %    Wt Readings from Last 6 Encounters:   24 91 lb 11.2 oz (41.6 kg)   23 103 lb 3.2 oz (46.8 kg)   07/10/23 107 lb (48.5 kg)   23 104 lb 4.8 oz (47.3 kg)   23 113 lb 4.8 oz (51.4 kg)   23 109 lb 6.4 oz (49.6 kg)       Physical Exam:    General: No acute distress.   Respiratory: Clear to auscultation bilaterally. No wheezes. No rhonchi.  Cardiovascular: S1, S2. Regular rate and rhythm. No murmurs, rubs or gallops.   Abdomen: Soft, nontender, nondistended.  Positive bowel sounds. No rebound or guarding.  Neurologic: No focal neurological deficits.   Musculoskeletal: Moves all extremities.  Extremities: No edema.    Results:   Diagnostic Data:      Labs:    Labs Last 24 Hours:   BMP     CBC    Other     Na 145 Cl 109 BUN 20 Glu 71   Hb 12.3   PTT - Procal -   K 3.6 CO2 25.0 Cr 0.75   WBC 7.2 >< .0  INR - CRP -   Renal Lytes Endo    Hct 37.7   Trop - D dim -   eGFR - Ca 9.5 POC Gluc  -    LFT   pBNP - Lactic -   eGFR AA - PO4 - A1c -   AST - APk - Prot -  LDL -      Pro-Calcitonin  No results for input(s): \"PCT\" in the last 168 hours.    Cardiac  No results for input(s): \"TROP\", \"PBNP\" in the last 168  hours.    Imaging: Imaging data reviewed in Epic.    XR CHEST AP PORTABLE  (CPT=71045)    Result Date: 3/22/2024  PROCEDURE: XR CHEST AP PORTABLE  (CPT=71045) TIME: 1056  COMPARISON: [facility], XR CHEST AP PORTABLE (CPT=71045), 9/24/2023, 0:56 AM.  [facility], XR CHEST AP PORTABLE (CPT=71045), 12/12/2023, 3:22 AM.  INDICATIONS: Chest pain and difficulty breathing.  TECHNIQUE:   Single view.   Findings and impression:  Stable enlarged heart with left atrial appendage closure device  No significant lung edema.  No pulmonary consolidation.  There is linear atelectasis in the mid to lower right lung  Circumscribed 8 millimeter pulmonary nodule in the left mid lung is new from September 2023  No pneumothorax    Dictated by (CST): Edu Lea MD on 3/22/2024 at 11:09 AM     Finalized by (CST): Edu Lea MD on 3/22/2024 at 11:12 AM              Medications:    magnesium oxide  400 mg Oral Once    furosemide  20 mg Oral Daily    ticagrelor  60 mg Oral 2 times per day    levothyroxine  100 mcg Oral Before breakfast    pantoprazole  20 mg Oral QAM AC    rivaroxaban  15 mg Oral QPM    metoprolol succinate  50 mg Oral Daily Beta Blocker       Assessment & Plan:   ASSESSMENT / PLAN:     86-year-old female admitted with atrial fibrillation with rapid ventricular rate    Atrial fibrillation with RVR  hemodynamically stable.  On anticoagulation xarelto lo dose.  Cardiology consult appreciated  Monitor on telemetry  Monitor electrolytes  Rate/rhythm control with metoprolol and short acting diltiazem  Echo showed EF 25%, M-S aortic stenosis, pHTN, restrictive physiol.  Renal dose xarelto  NSTEMI  2/2 to demand ischemia  On brilinta and xarelto  CP free now  Acute on Chronic CHF (40%)  Hypoxic on arrival with pulm edema on CXR  ECHO EF 25%  Now resolved on RA  Gentle diuresis with lasix 20 mg PO daily  CAD, CABG 1989, PCI 2012  [facility] 2022, patient declined intervention   On brilinta, reduced dose  to 60 mg BID  No on ASA secondary to allergy  Moderate to Severe Aortic Stenosis  Confirmed on ECHO  Essential HTN  CPM  Hypothyroidism  On synthroid  TSH WNL  Pulmonary Nodule on CXR  8mm pulmonary nodule in the left mid lung is new from Sept 2023  further workup would be surveillance CXR in 3 months.     Estimated date of discharge: TBD  Discharge is dependent on: cards clearance  At this point Ms. Cedillo is expected to be discharge to: SNF referral     Plan of care discussed with patient and dtr/nurse    Vadim Shin MD, FAAP, FACP  [facility] Hospitalist  I respond to Epic Chat and AMS Connect    45 minutes spent on this admission - examining patient, obtaining history, reviewing previous medical records, going over test results/imaging and discussing plan of care. All questions answered.

## 2024-03-24 NOTE — PLAN OF CARE
Pavithra is resting comfortably in the chair, alert and oriented x 2, forgetful, on room air, Afib on tele, on Xarelto for anticoagulation, incontinent, urine retention-bladder scan per protocol, stand and pivot to the chair x 2 assist, plan to discharge to [facility] pending medical clearance, sister update on plan of care, safety precautions in place, call light within easy reach, all needs met at this time.    Problem: Patient Centered Care  Goal: Patient preferences are identified and integrated in the patient's plan of care  Description: Interventions:  - What would you like us to know as we care for you? From [facility]   - Provide timely, complete, and accurate information to patient/family  - Incorporate patient and family knowledge, values, beliefs, and cultural backgrounds into the planning and delivery of care  - Encourage patient/family to participate in care and decision-making at the level they choose  - Honor patient and family perspectives and choices  Outcome: Progressing     Problem: Patient/Family Goals  Goal: Patient/Family Long Term Goal  Description: Patient's Long Term Goal: to go back to facility    Interventions:  - follow medical regimen  - See additional Care Plan goals for specific interventions  Outcome: Progressing  Goal: Patient/Family Short Term Goal  Description: Patient's Short Term Goal: Improve food intake    Interventions:   - Offer meals patient likes  - assist patient with meals  - magic cup  - See additional Care Plan goals for specific interventions  Outcome: Progressing     Problem: Delirium  Goal: Minimize duration of delirium  Description: Interventions:  - Encourage use of hearing aids, eye glasses  - Promote highest level of mobility daily  - Provide frequent reorientation  - Promote wakefulness i.e. lights on, blinds open  - Promote sleep, encourage patient's normal rest cycle i.e. lights off, TV off, minimize noise and interruptions  - Encourage family to assist in  orientation and promotion of home routines  Outcome: Progressing     Problem: SAFETY ADULT - FALL  Goal: Free from fall injury  Description: INTERVENTIONS:  - Assess pt frequently for physical needs  - Identify cognitive and physical deficits and behaviors that affect risk of falls.  - [place] fall precautions as indicated by assessment.  - Educate pt/family on patient safety including physical limitations  - Instruct pt to call for assistance with activity based on assessment  - Modify environment to reduce risk of injury  - Provide assistive devices as appropriate  - Consider OT/PT consult to assist with strengthening/mobility  - Encourage toileting schedule  Outcome: Progressing     Problem: DISCHARGE PLANNING  Goal: Discharge to home or other facility with appropriate resources  Description: INTERVENTIONS:  - Identify barriers to discharge w/pt and caregiver  - Include patient/family/discharge partner in discharge planning  - Arrange for needed discharge resources and transportation as appropriate  - Identify discharge learning needs (meds, wound care, etc)  - Arrange for interpreters to assist at discharge as needed  - Consider post-discharge preferences of patient/family/discharge partner  - Complete POLST form as appropriate  - Assess patient's ability to be responsible for managing their own health  - Refer to Case Management Department for coordinating discharge planning if the patient needs post-hospital services based on physician/LIP order or complex needs related to functional status, cognitive ability or social support system  Outcome: Progressing     Problem: CONFUSION  Goal: Confusion, delirium, dementia or psychosis is improved or at baseline  Description: INTERVENTIONS:  - Assess for possible contributors to thought disturbance, including medications, impaired vision or hearing, underlying metabolic abnormalities, dehydration, psychiatric diagnoses, and notify attending MD/LIP  - [place] high  risk fall precautions, as indicated  - Provide frequent short contacts to provide reality reorientation, refocusing and direction  - Decrease environmental stimuli, including noise as appropriate  - Monitor and intervene to maintain adequate nutrition, hydration, elimination, sleep and activity  - Consider the need for a sitter if unable to leave patient unattended  - Initiate Spiritual Care consult as indicated  - Consult Pharmacy as needed  Outcome: Progressing

## 2024-03-24 NOTE — CM/SW NOTE
PT recommendation: Gradual  rehabilitative therapy.  [facility]  PASRR uploaded.    Will need facility acceptance/pt choice if agreeable.    SW/CM to remain available for support and/or discharge planning.      TONI Handy, LSW  Social Work   Ext:#[phone]

## 2024-03-25 LAB
ANION GAP SERPL CALC-SCNC: 8 MMOL/L (ref 0–18)
BASOPHILS # BLD AUTO: 0.04 X10(3) UL (ref 0–0.2)
BASOPHILS NFR BLD AUTO: 0.6 %
BUN BLD-MCNC: 25 MG/DL (ref 9–23)
BUN/CREAT SERPL: 34.7 (ref 10–20)
CALCIUM BLD-MCNC: 9.4 MG/DL (ref 8.7–10.4)
CHLORIDE SERPL-SCNC: 108 MMOL/L (ref 98–112)
CO2 SERPL-SCNC: 27 MMOL/L (ref 21–32)
CREAT BLD-MCNC: 0.72 MG/DL
DEPRECATED RDW RBC AUTO: 52.1 FL (ref 35.1–46.3)
EGFRCR SERPLBLD CKD-EPI 2021: 76 ML/MIN/1.73M2 (ref 60–?)
EOSINOPHIL # BLD AUTO: 0.18 X10(3) UL (ref 0–0.7)
EOSINOPHIL NFR BLD AUTO: 2.5 %
ERYTHROCYTE [DISTWIDTH] IN BLOOD BY AUTOMATED COUNT: 13.3 % (ref 11–15)
GLUCOSE BLD-MCNC: 90 MG/DL (ref 70–99)
HCT VFR BLD AUTO: 38.6 %
HGB BLD-MCNC: 12 G/DL
IMM GRANULOCYTES # BLD AUTO: 0.01 X10(3) UL (ref 0–1)
IMM GRANULOCYTES NFR BLD: 0.1 %
LYMPHOCYTES # BLD AUTO: 1.22 X10(3) UL (ref 1–4)
LYMPHOCYTES NFR BLD AUTO: 17 %
MAGNESIUM SERPL-MCNC: 1.9 MG/DL (ref 1.6–2.6)
MCH RBC QN AUTO: 32.5 PG (ref 26–34)
MCHC RBC AUTO-ENTMCNC: 31.1 G/DL (ref 31–37)
MCV RBC AUTO: 104.6 FL
MONOCYTES # BLD AUTO: 0.63 X10(3) UL (ref 0.1–1)
MONOCYTES NFR BLD AUTO: 8.8 %
NEUTROPHILS # BLD AUTO: 5.08 X10 (3) UL (ref 1.5–7.7)
NEUTROPHILS # BLD AUTO: 5.08 X10(3) UL (ref 1.5–7.7)
NEUTROPHILS NFR BLD AUTO: 71 %
OSMOLALITY SERPL CALC.SUM OF ELEC: 300 MOSM/KG (ref 275–295)
PLATELET # BLD AUTO: 323 10(3)UL (ref 150–450)
POTASSIUM SERPL-SCNC: 3.3 MMOL/L (ref 3.5–5.1)
RBC # BLD AUTO: 3.69 X10(6)UL
SODIUM SERPL-SCNC: 143 MMOL/L (ref 136–145)
WBC # BLD AUTO: 7.2 X10(3) UL (ref 4–11)

## 2024-03-25 PROCEDURE — 99233 SBSQ HOSP IP/OBS HIGH 50: CPT | Performed by: INTERNAL MEDICINE

## 2024-03-25 RX ORDER — POTASSIUM CHLORIDE 20 MEQ/1
40 TABLET, EXTENDED RELEASE ORAL ONCE
Status: COMPLETED | OUTPATIENT
Start: 2024-03-25 | End: 2024-03-25

## 2024-03-25 RX ORDER — METOPROLOL SUCCINATE 50 MG/1
50 TABLET, EXTENDED RELEASE ORAL
Status: DISCONTINUED | OUTPATIENT
Start: 2024-03-25 | End: 2024-03-26

## 2024-03-25 RX ORDER — FUROSEMIDE 10 MG/ML
20 INJECTION INTRAMUSCULAR; INTRAVENOUS ONCE
Status: COMPLETED | OUTPATIENT
Start: 2024-03-25 | End: 2024-03-25

## 2024-03-25 RX ORDER — MAGNESIUM OXIDE 400 MG/1
400 TABLET ORAL ONCE
Status: COMPLETED | OUTPATIENT
Start: 2024-03-25 | End: 2024-03-25

## 2024-03-25 NOTE — PHYSICAL THERAPY NOTE
PHYSICAL THERAPY TREATMENT NOTE - INPATIENT     Room Number: 332/332-A       Presenting Problem: NSTEMI, R chest pain       Problem List  Principal Problem:    NSTEMI (non-ST elevated myocardial infarction) (HCC)  Active Problems:    Elevated troponin      PHYSICAL THERAPY ASSESSMENT   Patient demonstrates fair progress this session, goals  remain in progress.    Patient continues to function below baseline with bed mobility, transfers, and gait.  Contributing factors to remaining limitations include decreased functional strength, decreased endurance/aerobic capacity, and pain.  Next session anticipate patient to progress bed mobility, transfers, and gait.  Physical Therapy will continue to follow patient for duration of hospitalization.    Patient continues to benefit from continued skilled PT services: to promote return to prior level of function and safety with continuous assistance and gradual rehabilitative therapy .    PLAN  PT Treatment Plan: Bed mobility;Body mechanics;Endurance;Strengthening;Patient education  Frequency (Obs): 3-5x/week    SUBJECTIVE  Pt reports being ready for PT RX    OBJECTIVE  Precautions: Bed/chair alarm    WEIGHT BEARING RESTRICTION                PAIN ASSESSMENT   Ratin          BALANCE  Static Sitting: Poor +  Dynamic Sitting: Poor -  Static Standing: Not tested       ACTIVITY TOLERANCE                          O2 WALK       AM-PAC '6-Clicks' INPATIENT SHORT FORM - BASIC MOBILITY  How much difficulty does the patient currently have...  Patient Difficulty: Turning over in bed (including adjusting bedclothes, sheets and blankets)?: A Lot   Patient Difficulty: Sitting down on and standing up from a chair with arms (e.g., wheelchair, bedside commode, etc.): Unable   Patient Difficulty: Moving from lying on back to sitting on the side of the bed?: A Lot   How much help from another person does the patient currently need...   Help from Another: Moving to and from a bed to a chair  (including a wheelchair)?: Total   Help from Another: Need to walk in hospital room?: Total   Help from Another: Climbing 3-5 steps with a railing?: Total     AM-PAC Score:  Raw Score: 8   Approx Degree of Impairment: 86.62%   Standardized Score (AM-PAC Scale): 28.58   CMS Modifier (G-Code): CM    FUNCTIONAL ABILITY STATUS  Functional Mobility/Gait Assessment  Gait Assistance: Maximum assistance  Distance (ft): SPT  Rolling: maximum assist  Supine to Sit: maximum assist  Sit to Supine: maximum assist  Sit to Stand: maximum assist    Additional information: Pt seen daily.Max a for bed mobility and transfer.EOB sitting balance activity with emphasis on core stabilization.Max a for SPT.There ex.    The patient's Approx Degree of Impairment: 86.62% has been calculated based on documentation in the [facility] '6 clicks' Inpatient Daily Activity Short Form.  Research supports that patients with this level of impairment may benefit from Sub-acute Rehabilitation.  Final disposition will be made by interdisciplinary medical team.    THERAPEUTIC EXERCISES  Lower Extremity Ankle pumps  Glut sets  Heel slides  Quad sets     Position Supine       Patient End of Session: In bed;Call light within reach;RN aware of session/findings;All patient questions and concerns addressed    CURRENT GOALS     Patient Goal Patient's self-stated goal is: to go home   Goal #1 Patient is able to demonstrate supine - sit EOB @ level: minimum assistance     Goal #1   Current Status Max a   Goal #2 Patient is able to demonstrate transfers EOB to/from [facility] at assistance level: minimum assistance with walker - rolling     Goal #2  Current Status Max a   Goal #3 Patient is able to ambulate 150 feet with assist device: walker - rolling at assistance level: minimum assistance   Goal #3   Current Status Max a for SPT   Goal #4 Patient will negotiate 0 stairs/one curb w/ assistive device and supervision   Goal #4   Current Status NT   Goal #5 Patient to  demonstrate independence with home activity/exercise instructions provided to patient in preparation for discharge.   Goal #5   Current Status In progress   Goal #6    Goal #6  Current Status      Gait Training:  minutes  Therapeutic Activity: 15 minutes  Neuromuscular Re-education:  minutes  Therapeutic Exercise: 15 minutes  [place] Repositioning:  minutes  Manual Therapy:  minutes  Can add/delete any of these

## 2024-03-25 NOTE — PLAN OF CARE
Problem: Patient Centered Care  Goal: Patient preferences are identified and integrated in the patient's plan of care  Description: Interventions:  - What would you like us to know as we care for you? [place]   - Provide timely, complete, and accurate information to patient/family  - Incorporate patient and family knowledge, values, beliefs, and cultural backgrounds into the planning and delivery of care  - Encourage patient/family to participate in care and decision-making at the level they choose  - Honor patient and family perspectives and choices  Outcome: Progressing     Problem: Patient/Family Goals  Goal: Patient/Family Long Term Goal  Description: Patient's Long Term Goal: to go back to facility    Interventions:  - follow medical regimen  - See additional Care Plan goals for specific interventions  Outcome: Progressing  Goal: Patient/Family Short Term Goal  Description: Patient's Short Term Goal:     Interventions:  - See additional Care Plan goals for specific interventions  Outcome: Progressing     Problem: Delirium  Goal: Minimize duration of delirium  Description: Interventions:  - Encourage use of hearing aids, eye glasses  - Promote highest level of mobility daily  - Provide frequent reorientation  - Promote wakefulness i.e. lights on, blinds open  - Promote sleep, encourage patient's normal rest cycle i.e. lights off, TV off, minimize noise and interruptions  - Encourage family to assist in orientation and promotion of home routines  Outcome: Progressing     Problem: SAFETY ADULT - FALL  Goal: Free from fall injury  Description: INTERVENTIONS:  - Assess pt frequently for physical needs  - Identify cognitive and physical deficits and behaviors that affect risk of falls.  - [place] fall precautions as indicated by assessment.  - Educate pt/family on patient safety including physical limitations  - Instruct pt to call for assistance with activity based on assessment  - Modify environment to  reduce risk of injury  - Provide assistive devices as appropriate  - Consider OT/PT consult to assist with strengthening/mobility  - Encourage toileting schedule  Outcome: Progressing     Problem: DISCHARGE PLANNING  Goal: Discharge to home or other facility with appropriate resources  Description: INTERVENTIONS:  - Identify barriers to discharge w/pt and caregiver  - Include patient/family/discharge partner in discharge planning  - Arrange for needed discharge resources and transportation as appropriate  - Identify discharge learning needs (meds, wound care, etc)  - Arrange for interpreters to assist at discharge as needed  - Consider post-discharge preferences of patient/family/discharge partner  - Complete POLST form as appropriate  - Assess patient's ability to be responsible for managing their own health  - Refer to Case Management Department for coordinating discharge planning if the patient needs post-hospital services based on physician/LIP order or complex needs related to functional status, cognitive ability or social support system  Outcome: Progressing     Problem: CONFUSION  Goal: Confusion, delirium, dementia or psychosis is improved or at baseline  Description: INTERVENTIONS:  - Assess for possible contributors to thought disturbance, including medications, impaired vision or hearing, underlying metabolic abnormalities, dehydration, psychiatric diagnoses, and notify attending MD/LIP  - [place] high risk fall precautions, as indicated  - Provide frequent short contacts to provide reality reorientation, refocusing and direction  - Decrease environmental stimuli, including noise as appropriate  - Monitor and intervene to maintain adequate nutrition, hydration, elimination, sleep and activity  - Consider the need for a sitter if unable to leave patient unattended  - Initiate Spiritual Care consult as indicated  - Consult Pharmacy as needed  Outcome: Progressing

## 2024-03-25 NOTE — CONGREGATE LIVING REVIEW
[facility] Living Authorization    The [facility] Review Committee has reviewed this case and the patient IS APPROVED for discharge to a facility for Short Term Skilled once the following procedure is followed:     - The physician discharge instructions (contained within the MARILYNN note for SNF) must inlcude the below appropriate and approved COVID instructions to the facility    For questions regarding CLRC approval process, please contact the CM assigned to the case.  For questions regarding RN discharge workflow, please contact the unit Clinical Leader.

## 2024-03-25 NOTE — PROGRESS NOTES
Progress Note     Pavithra Cedillo Patient Status:  Inpatient    1928 MRN [id number]   Location [facility] 3W/SW Attending Vadim Shin MD   Hosp Day # 3 PCP Marcelo Bernard     Chief Complaint:   Chief Complaint   Patient presents with    Chest Pain Angina    Difficulty Breathing         Subjective:   S: Patient seen and examined, chart reviewed.   His daughter is unavailable to take her home today as she is getting a colonoscopy.  Patient has been stable today.  She has been cleared by cardiology for discharge.      Review of Systems:   10 point ROS completed and was negative, except for pertinent positive and negatives stated in subjective.                Objective:   Vital signs:  Temp:  [97.3 °F (36.3 °C)-98.1 °F (36.7 °C)] 97.3 °F (36.3 °C)  Pulse:  [88-98] 88  Resp:  [14-16] 16  BP: (115-129)/(84-99) 115/84  SpO2:  [92 %-96 %] 92 %    Wt Readings from Last 6 Encounters:   24 91 lb 11.2 oz (41.6 kg)   23 103 lb 3.2 oz (46.8 kg)   07/10/23 107 lb (48.5 kg)   23 104 lb 4.8 oz (47.3 kg)   23 113 lb 4.8 oz (51.4 kg)   23 109 lb 6.4 oz (49.6 kg)       Physical Exam:    General: No acute distress.   Respiratory: Clear to auscultation bilaterally. No wheezes. No rhonchi.  Cardiovascular: S1, S2. Regular rate and rhythm. No murmurs, rubs or gallops.   Abdomen: Soft, nontender, nondistended.  Positive bowel sounds. No rebound or guarding.  Neurologic: No focal neurological deficits.  She is a bit confused asking me if I did speak spoken to her  visiting her today.  Unclear if he is still alive.  Musculoskeletal: Moves all extremities.  Extremities: No edema.    Results:   Diagnostic Data:      Labs:    Labs Last 24 Hours:   BMP     CBC    Other     Na 143 Cl 108 BUN 25 Glu 90   Hb 12.0   PTT - Procal -   K 3.3 CO2 27.0 Cr 0.72   WBC 7.2 >< .0  INR - CRP -   Renal Lytes Endo    Hct 38.6   Trop - D dim -   eGFR - Ca 9.4 POC Gluc  -    LFT   pBNP -  Lactic -   eGFR AA - PO4 - A1c -   AST - APk - Prot -  LDL -      Pro-Calcitonin  No results for input(s): \"PCT\" in the last 168 hours.    Cardiac  No results for input(s): \"TROP\", \"PBNP\" in the last 168 hours.    Imaging: Imaging data reviewed in Epic.    No results found.       Medications:    metoprolol succinate  50 mg Oral 2x Daily(Beta Blocker)    furosemide  20 mg Oral Daily    ticagrelor  60 mg Oral 2 times per day    levothyroxine  100 mcg Oral Before breakfast    pantoprazole  20 mg Oral QAM AC    rivaroxaban  15 mg Oral QPM       Assessment & Plan:   ASSESSMENT / PLAN:     86-year-old female admitted with atrial fibrillation with rapid ventricular rate     Atrial fibrillation with RVR  hemodynamically stable.  On anticoagulation xarelto lo dose.  Cardiology consult appreciated  Monitor on telemetry, rate is now controlled  Monitor electrolytes  Rate/rhythm control with metoprolol and short acting diltiazem  Echo showed EF 25%, M-S aortic stenosis, pHTN, restrictive physiol.  Renal dose xarelto  NSTEMI, Type II  2/2 to demand ischemia  On brilinta and xarelto  CP free now  Chronic CHF (40%)  Hypoxic on arrival with pulm edema on CXR  ECHO EF 25%  Now resolved on RA  Gentle diuresis with lasix 20 mg PO daily  CAD, CABG 1989, PCI 2012  [facility] 2022, patient declined intervention   On brilinta, reduced dose to 60 mg BID  No on ASA secondary to allergy  Moderate to Severe Aortic Stenosis  Confirmed on ECHO  Essential HTN  CPM  Hypothyroidism  On synthroid  TSH WNL  8.   NSVT overnight   A. asymptomatic  9.   Pulmonary Nodule on CXR  8mm pulmonary nodule in the left mid lung is new from Sept 2023  further workup would be surveillance CXR in 3 months.      Estimated date of discharge: Tomorrow  Discharge is dependent on: Transportation  At this point Ms. Cedillo is expected to be discharge to: Assisted living facility    Plan of care discussed with nursing and patient.  Called daughter and left a  voicemail    Vadim Shin MD, FAAP, FACP  [facility] Hospitalist  I respond to Epic Chat and AMS Connect    45 minutes spent on this admission - examining patient, obtaining history, reviewing previous medical records, going over test results/imaging and discussing plan of care. All questions answered.

## 2024-03-25 NOTE — PROGRESS NOTES
Progress Note  Pavithra Cedillo Patient Status:  Inpatient    1928 MRN [id number]   Location [facility] 3W/SW Attending Vadim Shin MD   Hosp Day # 3 PCP Marcelo Bernard     Primary Cardiologist: Fernando     SUBJECTIVE:    Pleasantly confused. Does not know the year or where she is. Knows her name and date of birth. Denies chest pain or palpations, reports that her breathing feels fine. Reports feeling sleepy today.    VITALS:  /84 (BP Location: Left arm)   Pulse 88   Temp 97.3 °F (36.3 °C) (Oral)   Resp 16   Wt 91 lb 11.2 oz (41.6 kg)   SpO2 92%   BMI 16.24 kg/m²     INTAKE/OUTPUT:    Intake/Output Summary (Last 24 hours) at 3/25/2024 0847  Last data filed at 3/25/2024 0805  Gross per 24 hour   Intake 500 ml   Output 0 ml   Net 500 ml     Last 3 Weights   24 0424 91 lb 11.2 oz (41.6 kg)   24 0428 100 lb (45.4 kg)   24 1338 98 lb (44.5 kg)   24 1029 100 lb (45.4 kg)   23 0456 103 lb 3.2 oz (46.8 kg)   23 0403 97 lb 9.6 oz (44.3 kg)   23 1752 98 lb 12.8 oz (44.8 kg)   07/10/23 1022 107 lb (48.5 kg)     LABS:  Recent Labs   Lab 24  0445 24  0712 24  0607   GLU 75 71 90   BUN 18 20 25*   CREATSERUM 0.83 0.75 0.72   EGFRCR 64 73 76   CA 9.9 9.5 9.4    145 143   K 4.3 3.6 3.3*    109 108   CO2 21.0 25.0 27.0     Recent Labs   Lab 24  0445 24  1626 24  0711 24  0607   RBC 3.84  --  3.61* 3.69*   HGB 12.5 13.3 12.3 12.0   HCT 40.5 42.0 37.7 38.6   .5*  --  104.4* 104.6*   MCH 32.6  --  34.1* 32.5   MCHC 30.9*  --  32.6 31.1   RDW 13.5  --  13.3 13.3   NEPRELIM 6.08  --  5.33 5.08   WBC 8.2  --  7.2 7.2   .0  --  267.0 323.0     No results for input(s): \"TROP\", \"CK\" in the last 168 hours.    DIAGNOSTICS:    TELEMETRY: Afib, -110s, ~ NSVT    ECHO 3/23/2024:   10/11/2023 3:   Conclusions:     1. Left ventricle: The cavity size was mildly increased. Wall thickness was       normal. Systolic function was markedly reduced. The estimated ejection      fraction was 25-30%, by biplane method of disks. There was severe diffuse      hypokinesis. Akinesis of the apicallateral, inferolateral, and inferior      walls. Hypokinesis of the anterior and anterolateral walls. Doppler      parameters are consistent with restrictive physiology, indicative of      decreased left ventricular diastolic compliance and/or increased left      atrial pressure.   2. Right ventricle: Systolic function was reduced.   3. Left atrium: The atrium was markedly dilated.   4. Right atrium: The atrium was dilated.   5. Aortic valve: Cusp separation was reduced. The findings were consistent      with moderate to severe stenosis. Stenosis was probably underestimated      due to poor left ventricular function. The peak systolic velocity was      2.6m/sec. The mean systolic gradient was 15mm Hg. The valve area (VTI)      was 0.68cm^2. The valve area (VTI) index was 0.47cm^2/m^2.   6. Mitral valve: The findings were consistent with mild stenosis. There was      moderate regurgitation. The mean diastolic gradient was 3mm Hg.   7. Tricuspid valve: There was severe regurgitation.   8. Pulmonary arteries: Systolic pressure was moderately increased.     ROS: Negative unless noted above     PHYSICAL EXAM:  General: Alert and oriented x 3. No apparent distress.  HEENT: Normocephalic, sclera are nonicteric. Hearing decreased bilaterally. Frail   Neck: No JVD or Carotid bruits. Trachea midline.   Cardiac: IRRegular rate and rhythm. S1, S2 auscultated. No murmurs, rubs, or gallops appreciated.   Lungs: a/p dull.  Chest expansion symmetrical. Regular effort.  Abdomen: Soft, non-tender, +BS. No hepatosplenomegaly or appreciable masses.   Extremities: Without clubbing, cyanosis or edema.  Peripheral pulses are 1+.  Neurologic: Motor and sensory nerves grossly intact.   Psych: Appropriate affect   Skin: Warm and dry. No obvious lesions,  wounds, or ulcerations.     MEDICATIONS:   furosemide  20 mg Oral Daily    ticagrelor  60 mg Oral 2 times per day    levothyroxine  100 mcg Oral Before breakfast    pantoprazole  20 mg Oral QAM AC    rivaroxaban  15 mg Oral QPM    metoprolol succinate  50 mg Oral Daily Beta Blocker      dilTIAZem Stopped (03/22/24 2010)     ASSESSMENT:    Permanent Afib  - Presented in RVR, s/p Cardizem gtt. Rates mildly elevated controlled on Toprol 50 mg daily   - TSH unremarkable   - A/c with renal dose Xarelto     Elevated Troponin  - No chest pain   - Flat trend, 56-42-55  - Likely secondary to demand ischemia and known occlusions   - ECHO this stay with reduced LVEF 25-30% (was 40%), Apical and Inferior hypokinesis present, thought appears to be similar ECHO last year, at that time plans for conservative management     NSVT  - Runs overnight, patient is asymptomatic though is also confused so ROS is limited. K & Mag being replaced   - On BB    Oral Bleeding  - Now resolved, Hgb is stable, no other signs/reports of bleeding  - On Xarelto and Brilinita, which was reduced to 60 mg BID    CAD, Hx Remote CABG 1989, PCI 2012  - [facility] 3/7/2022: Patent SVG-Diag & SVG-RCA with moderate ostial stenosis. Severely disease 100% occluded native Cx, occluded SVG-OM. 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 2023, ECHO this stay with mean gradient of 15mmHg     Hypothyroidism- Synthroid   HDL- , above goal of 70, not historically on Statin    Abnormal CXR- 3/22/2024, 8mm pulmonary nodule in the left mid lung is new from Sept 2023, further workup per primary recommendation is surveillance in  3 months     Hx of fall with head trauma and subarachnoid hemorrhage     PLAN:  - Increase Toprol XL to BID with increased rates and NSVT, monitor if rates will tolerate this. She was previously on BID o/p but dose had been reduced to daily   - LDL above goal, can discuss low dose statin therapy outpatient with pt and family once her confusion resolves     Plan of care discussed with patient and RN.     Lillian Matamoros Ursula, APRN  3/25/2024  8:58 AM  [phone] ([place])  [phone] ([place])      CARDIOLOGY ATTENDING    Notes reviewed and patient examined independently.    Rates 100-110.  Agree with increased beta-blocker.  On Eliquis.    CAD, stable and asymptomatic.  History of remote CABG and PCI 17 months ago.  With bleeding issues, will switch Brilinta to Plavix.    Moderate aortic stenosis.  I believe patient is too frail to attempt TAVR.    Persistent mild CHF.  Stable renal function and blood pressure.  Will give another dose of Lasix this evening.

## 2024-03-25 NOTE — CM/SW NOTE
Social work attempted to call the patient's daughter 2 times regarding GAGAN options.    Social work left the patient's daughter/POA a voicemail and will re-attempt to call again tomorrow 3/26.    CHRIS/OMAR to remain available for support and/or discharge planning.     Ashia Mackay MSW, LSW  Discharge Planner [id number]

## 2024-03-25 NOTE — CDS QUERY
How to answer this Query:    1.) DON'T CLICK COSIGN BUTTON FIRST  2.) click EDIT on the toolbar. If unable to see edit=Click \"3 dots...\" to the right of cosign button   2.) Type an \"X\" in the bracket for the diagnosis that applies. (You may also add additional clinical details as you feel necessary to substantiate your response).   3.) Finally click \"Sign\" to complete response.  Thank You    CLINICAL VALIDATION CLARIFICATION    DEAR DR WILSON    Please provide additional documentation in the patient's medical record supportive of your documented diagnosis of NSTEMI      ( )  Condition was ruled out and amended documentation provided in the medical record      ( )  Condition was evident because: ___________________________                (please document in your next progress note)           ( x)  Other (please specify)____Type II NSTEMI_______________________  ________________________________________________________________________    RISK FACTORS: RAPID AFIB RVR, ADVANCED AGE  CLINICAL INDICATORS: TROPONINS DID NOT RISE OVER 99TH PERCENTILE  323 DR CHAIDEZ-Elevated Troponin  - No chest pain   - Flat trend, 56-42-55  - Likely secondary to demand ischemia and known occlusions   TREATMENT:CARDIOLOGY CONSULT, CARDIZEM GTT =TREATMENT FOR AFIB    If you have any questions, please contact Clinical :  Josephine APONTE RN at [phone]     Thank You!    THIS FORM IS A PERMANENT PART OF THE MEDICAL RECORD

## 2024-03-25 NOTE — CDS QUERY
How to answer this Query:    1.) DON'T CLICK COSIGN BUTTON FIRST  2.)click EDIT on the toolbar.If you do not see edit-Click \"3 dots...\" to the right of cosign button   2.) Type an \"X\" in the bracket for the diagnosis that applies. (You may also add additional clinical details as you feel necessary to substantiate your response).   3.) Finally click \"Sign\" to complete response.  Thank You    CLINICAL DOCUMENTATION CLARIFICATION    DEAR DR WILSON    Please choose the clarification below as appropriate:    1.  The attending physician is required to clarify conflicting documentation in the medical record.  The following documentation is noted in the medical record:    -DR SOLIS-No evidence of CHF on exam despite elevated BNP levels. No need for IV diuretics.  -DR WILSON- Acute on Chronic CHF (40%)     Please clarify the appropriate diagnosis for this patient.    (x ) CHRONIC HFmrEF  ( ) OTHER    _______________________________________________________________________    RISK FACTORS: HO CHF mrEF    CLINICAL INDICATOR:-CXR WITHOUT PULMONARY EDEMA-PER DR SOLIS-No evidence of CHF on exam despite elevated BNP levels    TREATMENT: CONTINUE W/ORAL LASIX    If you have any questions, please contact Clinical :  Josephine APONTE RN at [phone]     Thank You!    THIS FORM IS A PERMANENT PART OF THE MEDICAL RECORD

## 2024-03-25 NOTE — CDS QUERY
How to answer this Query:    1.) DON'T CLICK COSIGN BUTTON FIRST  2.)  click EDIT on the toolbar.If you do not see edit on toolbar-Click \"3 dots...\" to the right of cosign button and  2.) Type an \"X\" in the bracket for the diagnosis that applies. (You may also add additional clinical details as you feel necessary to substantiate your response).   3.) Finally click \"Sign\" to complete response.  Thank You  DOCUMENTATION CLARIFICATION FORM  Dear Doctor:KATIE  Clinical information suggests potential for impaired nutritional status.Please answer below if you agree with nutritional consult       (x )  Severe Protein-Calorie Malnutrition     ( )  Other (please specify):       Risk factors:ADVANCED AGE    Clinical indicators:BMI 16.24 RD EVAL=Pt meets severe malnutrition criteria.  wt loss greater than 10% in 6 months, energy intake less than 50% for greater than 1 month, body fat severe depletion, and muscle mass severe depletion.     Treatment:RD RECOMMENDATION-ENCOURAGE PO INTAKE, ENSURE AND MAGIC CUP ADDED DAILY  If you have any questions, please contact Clinical :  Josephine APONTE RN at [phone]     Thank You!    THIS FORM IS A PERMANENT PART OF THE MEDICAL RECORD

## 2024-03-25 NOTE — PLAN OF CARE
Patient is alert and oriented x1 and forgetful at times. On RA. No complaints of pain. Electrolyte replacement per protocol. IV lasix x1 given. Frequent rounding done throughout the shift. Safety precautions in place.    Problem: Patient Centered Care  Goal: Patient preferences are identified and integrated in the patient's plan of care  Description: Interventions:  - What would you like us to know as we care for you? From [place]   - Provide timely, complete, and accurate information to patient/family  - Incorporate patient and family knowledge, values, beliefs, and cultural backgrounds into the planning and delivery of care  - Encourage patient/family to participate in care and decision-making at the level they choose  - Honor patient and family perspectives and choices  Outcome: Progressing     Problem: Patient/Family Goals  Goal: Patient/Family Long Term Goal  Description: Patient's Long Term Goal: to go back to facility    Interventions:  - follow medical regimen  - See additional Care Plan goals for specific interventions  Outcome: Progressing     Problem: Delirium  Goal: Minimize duration of delirium  Description: Interventions:  - Encourage use of hearing aids, eye glasses  - Promote highest level of mobility daily  - Provide frequent reorientation  - Promote wakefulness i.e. lights on, blinds open  - Promote sleep, encourage patient's normal rest cycle i.e. lights off, TV off, minimize noise and interruptions  - Encourage family to assist in orientation and promotion of home routines  Outcome: Progressing     Problem: SAFETY ADULT - FALL  Goal: Free from fall injury  Description: INTERVENTIONS:  - Assess pt frequently for physical needs  - Identify cognitive and physical deficits and behaviors that affect risk of falls.  - [place] fall precautions as indicated by assessment.  - Educate pt/family on patient safety including physical limitations  - Instruct pt to call for assistance with activity based  on assessment  - Modify environment to reduce risk of injury  - Provide assistive devices as appropriate  - Consider OT/PT consult to assist with strengthening/mobility  - Encourage toileting schedule  Outcome: Progressing     Problem: DISCHARGE PLANNING  Goal: Discharge to home or other facility with appropriate resources  Description: INTERVENTIONS:  - Identify barriers to discharge w/pt and caregiver  - Include patient/family/discharge partner in discharge planning  - Arrange for needed discharge resources and transportation as appropriate  - Identify discharge learning needs (meds, wound care, etc)  - Arrange for interpreters to assist at discharge as needed  - Consider post-discharge preferences of patient/family/discharge partner  - Complete POLST form as appropriate  - Assess patient's ability to be responsible for managing their own health  - Refer to Case Management Department for coordinating discharge planning if the patient needs post-hospital services based on physician/LIP order or complex needs related to functional status, cognitive ability or social support system  Outcome: Progressing     Problem: CONFUSION  Goal: Confusion, delirium, dementia or psychosis is improved or at baseline  Description: INTERVENTIONS:  - Assess for possible contributors to thought disturbance, including medications, impaired vision or hearing, underlying metabolic abnormalities, dehydration, psychiatric diagnoses, and notify attending MD/LIP  - [place] high risk fall precautions, as indicated  - Provide frequent short contacts to provide reality reorientation, refocusing and direction  - Decrease environmental stimuli, including noise as appropriate  - Monitor and intervene to maintain adequate nutrition, hydration, elimination, sleep and activity  - Consider the need for a sitter if unable to leave patient unattended  - Initiate Spiritual Care consult as indicated  - Consult Pharmacy as needed  Outcome: Progressing

## 2024-03-26 VITALS
BODY MASS INDEX: 16 KG/M2 | DIASTOLIC BLOOD PRESSURE: 98 MMHG | TEMPERATURE: 97 F | WEIGHT: 88.13 LBS | OXYGEN SATURATION: 98 % | SYSTOLIC BLOOD PRESSURE: 126 MMHG | HEART RATE: 89 BPM | RESPIRATION RATE: 18 BRPM

## 2024-03-26 PROBLEM — R79.89 ELEVATED TROPONIN: Status: RESOLVED | Noted: 2024-03-22 | Resolved: 2024-03-26

## 2024-03-26 PROBLEM — I21.4 NSTEMI (NON-ST ELEVATED MYOCARDIAL INFARCTION) (HCC): Status: RESOLVED | Noted: 2024-03-22 | Resolved: 2024-03-26

## 2024-03-26 LAB
ANION GAP SERPL CALC-SCNC: 9 MMOL/L (ref 0–18)
BASOPHILS # BLD AUTO: 0.03 X10(3) UL (ref 0–0.2)
BASOPHILS NFR BLD AUTO: 0.4 %
BUN BLD-MCNC: 24 MG/DL (ref 9–23)
BUN/CREAT SERPL: 29.3 (ref 10–20)
CALCIUM BLD-MCNC: 9.8 MG/DL (ref 8.7–10.4)
CHLORIDE SERPL-SCNC: 108 MMOL/L (ref 98–112)
CO2 SERPL-SCNC: 26 MMOL/L (ref 21–32)
CREAT BLD-MCNC: 0.82 MG/DL
DEPRECATED RDW RBC AUTO: 51.6 FL (ref 35.1–46.3)
EGFRCR SERPLBLD CKD-EPI 2021: 65 ML/MIN/1.73M2 (ref 60–?)
EOSINOPHIL # BLD AUTO: 0.16 X10(3) UL (ref 0–0.7)
EOSINOPHIL NFR BLD AUTO: 2.3 %
ERYTHROCYTE [DISTWIDTH] IN BLOOD BY AUTOMATED COUNT: 13.4 % (ref 11–15)
GLUCOSE BLD-MCNC: 102 MG/DL (ref 70–99)
HCT VFR BLD AUTO: 38.3 %
HGB BLD-MCNC: 11.9 G/DL
IMM GRANULOCYTES # BLD AUTO: 0.02 X10(3) UL (ref 0–1)
IMM GRANULOCYTES NFR BLD: 0.3 %
LYMPHOCYTES # BLD AUTO: 1.33 X10(3) UL (ref 1–4)
LYMPHOCYTES NFR BLD AUTO: 18.8 %
MAGNESIUM SERPL-MCNC: 1.9 MG/DL (ref 1.6–2.6)
MCH RBC QN AUTO: 32.4 PG (ref 26–34)
MCHC RBC AUTO-ENTMCNC: 31.1 G/DL (ref 31–37)
MCV RBC AUTO: 104.4 FL
MONOCYTES # BLD AUTO: 0.52 X10(3) UL (ref 0.1–1)
MONOCYTES NFR BLD AUTO: 7.3 %
NEUTROPHILS # BLD AUTO: 5.02 X10 (3) UL (ref 1.5–7.7)
NEUTROPHILS # BLD AUTO: 5.02 X10(3) UL (ref 1.5–7.7)
NEUTROPHILS NFR BLD AUTO: 70.9 %
OSMOLALITY SERPL CALC.SUM OF ELEC: 300 MOSM/KG (ref 275–295)
PLATELET # BLD AUTO: 322 10(3)UL (ref 150–450)
POTASSIUM SERPL-SCNC: 3.9 MMOL/L (ref 3.5–5.1)
POTASSIUM SERPL-SCNC: 3.9 MMOL/L (ref 3.5–5.1)
RBC # BLD AUTO: 3.67 X10(6)UL
SODIUM SERPL-SCNC: 143 MMOL/L (ref 136–145)
WBC # BLD AUTO: 7.1 X10(3) UL (ref 4–11)

## 2024-03-26 PROCEDURE — 99239 HOSP IP/OBS DSCHRG MGMT >30: CPT | Performed by: INTERNAL MEDICINE

## 2024-03-26 RX ORDER — CLOPIDOGREL BISULFATE 75 MG/1
75 TABLET ORAL DAILY
Qty: 30 TABLET | Refills: 3 | Status: SHIPPED | OUTPATIENT
Start: 2024-03-27

## 2024-03-26 RX ORDER — CLOPIDOGREL BISULFATE 75 MG/1
75 TABLET ORAL DAILY
Status: DISCONTINUED | OUTPATIENT
Start: 2024-03-27 | End: 2024-03-26

## 2024-03-26 RX ORDER — FUROSEMIDE 10 MG/ML
20 INJECTION INTRAMUSCULAR; INTRAVENOUS ONCE
Status: COMPLETED | OUTPATIENT
Start: 2024-03-26 | End: 2024-03-26

## 2024-03-26 RX ORDER — METOPROLOL SUCCINATE 50 MG/1
50 TABLET, EXTENDED RELEASE ORAL
Qty: 60 TABLET | Refills: 3 | Status: SHIPPED | OUTPATIENT
Start: 2024-03-26

## 2024-03-26 NOTE — PLAN OF CARE
Patient is alert and oriented x1 and forgetful at times. On RA. No complaints of pain. IV lasix x1 given. Frequent rounding done throughout the shift. Safety precautions in place. Report given to Shoshana CHONG from [facility]. Report given to [facility] with information packet.     Problem: Patient Centered Care  Goal: Patient preferences are identified and integrated in the patient's plan of care  Description: Interventions:  - What would you like us to know as we care for you? From [place]   - Provide timely, complete, and accurate information to patient/family  - Incorporate patient and family knowledge, values, beliefs, and cultural backgrounds into the planning and delivery of care  - Encourage patient/family to participate in care and decision-making at the level they choose  - Honor patient and family perspectives and choices  Outcome: Completed     Problem: Patient/Family Goals  Goal: Patient/Family Long Term Goal  Description: Patient's Long Term Goal: to go back to facility    Interventions:  - follow medical regimen  - See additional Care Plan goals for specific interventions  Outcome: Completed  Goal: Patient/Family Short Term Goal  Description: Patient's Short Term Goal:     Interventions:   -   - See additional Care Plan goals for specific interventions  Outcome: Completed     Problem: Delirium  Goal: Minimize duration of delirium  Description: Interventions:  - Encourage use of hearing aids, eye glasses  - Promote highest level of mobility daily  - Provide frequent reorientation  - Promote wakefulness i.e. lights on, blinds open  - Promote sleep, encourage patient's normal rest cycle i.e. lights off, TV off, minimize noise and interruptions  - Encourage family to assist in orientation and promotion of home routines  Outcome: Completed     Problem: SAFETY ADULT - FALL  Goal: Free from fall injury  Description: INTERVENTIONS:  - Assess pt frequently for physical needs  - Identify cognitive and  physical deficits and behaviors that affect risk of falls.  - [place] fall precautions as indicated by assessment.  - Educate pt/family on patient safety including physical limitations  - Instruct pt to call for assistance with activity based on assessment  - Modify environment to reduce risk of injury  - Provide assistive devices as appropriate  - Consider OT/PT consult to assist with strengthening/mobility  - Encourage toileting schedule  Outcome: Completed     Problem: DISCHARGE PLANNING  Goal: Discharge to home or other facility with appropriate resources  Description: INTERVENTIONS:  - Identify barriers to discharge w/pt and caregiver  - Include patient/family/discharge partner in discharge planning  - Arrange for needed discharge resources and transportation as appropriate  - Identify discharge learning needs (meds, wound care, etc)  - Arrange for interpreters to assist at discharge as needed  - Consider post-discharge preferences of patient/family/discharge partner  - Complete POLST form as appropriate  - Assess patient's ability to be responsible for managing their own health  - Refer to Case Management Department for coordinating discharge planning if the patient needs post-hospital services based on physician/LIP order or complex needs related to functional status, cognitive ability or social support system  Outcome: Completed     Problem: CONFUSION  Goal: Confusion, delirium, dementia or psychosis is improved or at baseline  Description: INTERVENTIONS:  - Assess for possible contributors to thought disturbance, including medications, impaired vision or hearing, underlying metabolic abnormalities, dehydration, psychiatric diagnoses, and notify attending MD/LIP  - [place] high risk fall precautions, as indicated  - Provide frequent short contacts to provide reality reorientation, refocusing and direction  - Decrease environmental stimuli, including noise as appropriate  - Monitor and intervene to  maintain adequate nutrition, hydration, elimination, sleep and activity  - Consider the need for a sitter if unable to leave patient unattended  - Initiate Spiritual Care consult as indicated  - Consult Pharmacy as needed  Outcome: Completed

## 2024-03-26 NOTE — PROGRESS NOTES
Progress Note  Pavithra Cedillo Patient Status:  Inpatient    1928 MRN [id number]   Location [facility] 3W/SW Attending Vadim Shin MD   Hosp Day # 4 PCP Marcelo Bernard     Primary Cardiologist: Fernando     SUBJECTIVE:    Remains pleasantly confused. Does not know the year or where she is. Knows her name and date of birth. Denies chest pain or palpations, reports that her breathing feels fine.     VITALS:  BP (!) 133/95 (BP Location: Right arm)   Pulse 73   Temp 97.3 °F (36.3 °C) (Oral)   Resp 20   Wt 88 lb 1.6 oz (40 kg)   SpO2 92%   BMI 15.61 kg/m²     INTAKE/OUTPUT:    Intake/Output Summary (Last 24 hours) at 3/26/2024 1005  Last data filed at 3/25/2024 2300  Gross per 24 hour   Intake 300 ml   Output --   Net 300 ml     Last 3 Weights   24 0400 88 lb 1.6 oz (40 kg)   24 0424 91 lb 11.2 oz (41.6 kg)   24 0428 100 lb (45.4 kg)   24 1338 98 lb (44.5 kg)   24 1029 100 lb (45.4 kg)   23 0456 103 lb 3.2 oz (46.8 kg)   23 0403 97 lb 9.6 oz (44.3 kg)   23 1752 98 lb 12.8 oz (44.8 kg)   07/10/23 1022 107 lb (48.5 kg)     LABS:  Recent Labs   Lab 24  0712 24  0607 24  0715   GLU 71 90 102*   BUN 20 25* 24*   CREATSERUM 0.75 0.72 0.82   EGFRCR 73 76 65   CA 9.5 9.4 9.8    143 143   K 3.6 3.3* 3.9  3.9    108 108   CO2 25.0 27.0 26.0     Recent Labs   Lab 24  0711 24  0607 24  0715   RBC 3.61* 3.69* 3.67*   HGB 12.3 12.0 11.9*   HCT 37.7 38.6 38.3   .4* 104.6* 104.4*   MCH 34.1* 32.5 32.4   MCHC 32.6 31.1 31.1   RDW 13.3 13.3 13.4   NEPRELIM 5.33 5.08 5.02   WBC 7.2 7.2 7.1   .0 323.0 322.0     No results for input(s): \"TROP\", \"CK\" in the last 168 hours.    DIAGNOSTICS:    TELEMETRY: Afib, -110s    ECHO 3/23/2024:   10/11/2023 3:   Conclusions:     1. Left ventricle: The cavity size was mildly increased. Wall thickness was      normal. Systolic function was markedly reduced.  The estimated ejection      fraction was 25-30%, by biplane method of disks. There was severe diffuse      hypokinesis. Akinesis of the apicallateral, inferolateral, and inferior      walls. Hypokinesis of the anterior and anterolateral walls. Doppler      parameters are consistent with restrictive physiology, indicative of      decreased left ventricular diastolic compliance and/or increased left      atrial pressure.   2. Right ventricle: Systolic function was reduced.   3. Left atrium: The atrium was markedly dilated.   4. Right atrium: The atrium was dilated.   5. Aortic valve: Cusp separation was reduced. The findings were consistent      with moderate to severe stenosis. Stenosis was probably underestimated      due to poor left ventricular function. The peak systolic velocity was      2.6m/sec. The mean systolic gradient was 15mm Hg. The valve area (VTI)      was 0.68cm^2. The valve area (VTI) index was 0.47cm^2/m^2.   6. Mitral valve: The findings were consistent with mild stenosis. There was      moderate regurgitation. The mean diastolic gradient was 3mm Hg.   7. Tricuspid valve: There was severe regurgitation.   8. Pulmonary arteries: Systolic pressure was moderately increased.     ROS: Negative unless noted above     PHYSICAL EXAM:  General: Alert and oriented x 3. No apparent distress.  HEENT: Normocephalic, sclera are nonicteric. Hearing decreased bilaterally. Frail   Neck: No JVD or Carotid bruits. Trachea midline.   Cardiac: IRRegular rate and rhythm. S1, S2 auscultated. No murmurs, rubs, or gallops appreciated.   Lungs: Anteriorly dull, bi-basilar crackles. Chest expansion symmetrical. Regular effort.  Abdomen: Soft, non-tender, +BS. No hepatosplenomegaly or appreciable masses.   Extremities: Without clubbing, cyanosis or edema.  Peripheral pulses are 1+.  Neurologic: Motor and sensory nerves grossly intact.   Psych: Appropriate affect   Skin: Warm and dry. No obvious lesions, wounds, or  ulcerations.     MEDICATIONS:   metoprolol succinate  50 mg Oral 2x Daily(Beta Blocker)    furosemide  20 mg Oral Daily    ticagrelor  60 mg Oral 2 times per day    levothyroxine  100 mcg Oral Before breakfast    pantoprazole  20 mg Oral QAM AC    rivaroxaban  15 mg Oral QPM      dilTIAZem Stopped (03/22/24 2010)     ASSESSMENT:    Permanent Afib  - Presented in RVR, s/p Cardizem gtt. Rates mildly elevated controlled on Toprol 50 mg daily therefore dosing was increased to BID, rates are now more controlled   - TSH unremarkable   - A/c with renal dose Xarelto     Elevated Troponin  - No chest pain   - Flat trend, 56-42-55  - Likely secondary to demand ischemia and known occlusions   - ECHO this stay with reduced LVEF 25-30% (was 40%), Apical and Inferior hypokinesis present, thought appears to be similar ECHO last year, at that time plans for conservative management     NSVT  - BB was increased this stay, No NSVT overnight     Oral Bleeding  - Now resolved, Hgb is stable, no other signs/reports of bleeding  - On Xarelto. Brilinita was switched to Plavix yesterday     CAD, Hx Remote CABG 1989, PCI 2012  - [facility] 3/7/2022: Patent SVG-Diag & SVG-RCA with moderate ostial stenosis. Severely disease 100% occluded native Cx, occluded SVG-OM. Pt declined intervention, on Brilinta   - Not historically on ASA due to allergy     Acute on Chronic HFmrEF, LVEF 40%, (Now 25-30%) ICM  Severe TR   -  on admission, though was 704 in December   - CXR without pulmonary edema   - Given IVP Lasix 3/25  - Appears mildly congested     Moderate-Severe Aortic Stenosis   - Was considering TAVR o/p but was deemed to frail for intervention   - Mean gradient 25 mmHg on ECHO in Oct. 2023, ECHO this stay with mean gradient of 15mmHg     Hypothyroidism- Synthroid   HDL- , above goal of 70, not historically on Statin    Abnormal CXR- 3/22/2024, 8mm pulmonary nodule in the left mid lung is new from Sept 2023, further workup per primary  recommendation is surveillance in 3 months     Hx of fall with head trauma and subarachnoid hemorrhage     PLAN:  - Will give one more dose of IVP Lasix this morning, mild congestion, otherwise is stable to discharge from a cardiology standpoint. At discharge will continue Lasix 20 mg daily, BMP in one week. Our office will call her POA to schedule a follow up visit.   - LDL above goal, can discuss low dose statin therapy outpatient with pt and family once her confusion resolves     Plan of care discussed with patient and RN.     Lillian Vergara, APRN  3/26/2024  10:05 AM  [phone] ([place])  [phone] ([place])

## 2024-03-26 NOTE — PHYSICAL THERAPY NOTE
Physical Therapy Contact Note    Orders received and chart reviewed. Attempted to see patient for Physical Therapy services. Patient not available secondary to patient refusing to attempt out of bed mobility and repositioning despite maximal education and encouragement.      03/26/24 0853   VISIT TYPE   PT Inpatient Visit Type (Documentation Required) Attempted Treatment  (refusal)     Will re-attempt pending patient availability/appropriateness as schedule allows, otherwise will re-schedule visit.    Priyanka Carter, PT, DPT  Count includes the [facility]  Inpatient Rehabilitation  Physical Therapy  [phone]

## 2024-03-26 NOTE — CM/SW NOTE
03/26/24 1016   Discharge disposition   Expected discharge disposition subacute   Post Acute Care Provider [facility]   Discharge transportation [facility]     The patient will be transported to [facility] via [facility] at 2pm.  PCS complete.    The number to call report is [phone]  [place]ane in admissions has been notified of transport time.    Social work notified patient's daughter Yue via voicemail.  RN to notify patient.  RN and MD notified of transport time.     SW/CM to remain available for support and/or discharge planning.     Ashia Mackay MSW, LSW  Discharge Planner [id number]

## 2024-03-26 NOTE — DISCHARGE SUMMARY
[facility]  part of [facility]    Discharge Summary    Pavithra Cedillo Patient Status:  Inpatient    1928 MRN [id number]   Location [facility] 3W/SW Attending Vadim Shin MD   Hosp Day # 4 PCP Marcelo Bernard     Date of Admission: 3/22/2024 Disposition: Home or Self Care     Date of Discharge: 3/26/2024    Admitting Diagnosis: NSTEMI (non-ST elevated myocardial infarction) ([facility]) [I21.4]    Hospital Discharge Diagnoses:   Atrial fibrillation with RVR  NSTEMI, Type II  Acute on Chronic CHF (25% down from 40%)  CAD, CABG  Moderate to Severe Aortic Stenosis  Essential HTN  Hypothyroidism  NSVT   Pulmonary Nodule on CXR         Lace+ Score: 81  59-90 High Risk  29-58 Medium Risk  0-28   Low Risk.    TCM Follow-Up Recommendation:  LACE > 58: High Risk of readmission after discharge from the hospital.      Problem List:   Patient Active Problem List   Diagnosis    Coronary atherosclerosis of native coronary artery    Essential hypertension    Hypothyroidism    Hyperlipidemia    Acute midline low back pain without sciatica    Atherosclerosis of aorta ([facility])    PAD (peripheral artery disease) ([facility])    Stage 3 chronic kidney disease (HCC)    Aortic stenosis    Impaired mobility and ADLs    Congestive heart failure, unspecified HF chronicity, unspecified heart failure type ([facility])    Open wound of right lower extremity, initial encounter    Acute systolic (congestive) heart failure (HCC)    Pure hypercholesterolemia    Pulmonary hypertension, unspecified (HCC)    Presence of aortocoronary bypass graft    History of nicotine dependence    History of skin cancer    Closed 2-part displaced fracture of surgical neck of left humerus    Acute cystitis without hematuria    Nausea and vomiting in adult    Chronic atrial fibrillation (HCC)    Unsteadiness on feet    Unspecified lack of coordination    Other lack of coordination    Bilateral primary osteoarthritis of knee    Other  abnormalities of gait and mobility    Ischemic cardiomyopathy    Dyslipidemia    Chronic venous insufficiency    Abnormal posture    Urinary tract infection    UTI (urinary tract infection)    Closed fracture of right hip, initial encounter (HCC)    Age-related osteoporosis without current pathological fracture    History of falling    Closed displaced articular fracture of head of right femur with routine healing    Acute cystitis with hematuria    Closed intertrochanteric fracture of right hip, with routine healing, subsequent encounter    Spondylolisthesis at L4-L5 level    Closed fracture of lumbar vertebral body (HCC)    Presence of coronary angioplasty implant and graft    Presence of cardiac pacemaker    Personal history of malignant neoplasm of breast    Acute chest pain    Atypical pneumonia       Reason for Admission: atrial fib    Physical Exam:   General appearance: alert, appears stated age and cooperative  Pulmonary:  clear to auscultation bilaterally  Cardiovascular: S1, S2 normal, no murmur, click, rub or gallop, regular rate and rhythm  Abdominal: soft, non-tender; bowel sounds normal; no masses,  no organomegaly  Extremities: extremities normal, atraumatic, no cyanosis or edema  Psychiatric: calm      History of Present Illness: The patient is a 89-year-old  female who lives in a nursing facility and was brought in today for evaluation of progressive dyspnea on exertion and chest pain, noted to be in atrial fibrillation with rapid ventricular response, rate 130.  Troponin 56.  Chemistry and CBC were unremarkable.  Chest x-ray showed no acute findings.  The patient was started on IV Cardizem drip.      PAST MEDICAL HISTORY:  Coronary artery disease, status post multiple PCI stents.  She had coronary artery bypass graft surgery and drug-eluting stents to vein grafts leading to diagonal, obtuse marginal, and RCA.  Ischemic cardiomyopathy, ejection fraction around 40%.  Moderate aortic  stenosis and mitral regurgitation with prolapse.  Moderate pulmonary artery hypertension.  Chronic atrial fibrillation, anticoagulated with Xarelto.  Hypertension.  Hyperlipidemia.  Osteoarthritis.  Osteoporosis.  Chronic kidney disease stage 3.       Hospital Course: 86-year-old female admitted with atrial fibrillation with rapid ventricular rate     Atrial fibrillation with RVR  hemodynamically stable.  On anticoagulation xarelto lo dose.  Cardiology consult appreciated  Monitor on telemetry, rate is now controlled  Monitor electrolytes  Rate/rhythm control with metoprolol and short acting diltiazem  Echo showed EF 25%, M-S aortic stenosis, pHTN, restrictive physiol.  Renal dose xarelto  NSTEMI, Type II  2/2 to demand ischemia  On brilinta and xarelto  CP free now  Chronic CHF (40%)  Hypoxic on arrival with pulm edema on CXR  ECHO EF 25%  Now resolved on RA  Gentle diuresis with lasix 20 mg PO daily  CAD, CABG 1989, PCI 2012  [facility] 2022, patient declined intervention   On brilinta, reduced dose to 60 mg BID  No on ASA secondary to allergy  Moderate to Severe Aortic Stenosis  Confirmed on ECHO  Essential HTN  CPM  Hypothyroidism  On synthroid  TSH WNL  8.   NSVT overnight                 A. asymptomatic  9.   Pulmonary Nodule on CXR  8mm pulmonary nodule in the left mid lung is new from Sept 2023  further workup would be surveillance CXR in 3 months.       Consultations: Cardiology    Procedures: None    Complications: None    Discharge Condition: Good    Discharge Medications:      Discharge Medications        START taking these medications        Instructions Prescription details   clopidogrel 75 MG Tabs  Commonly known as: Plavix  Start taking on: March 27, 2024      Take 1 tablet (75 mg total) by mouth daily.   Quantity: 30 tablet  Refills: 3            CHANGE how you take these medications        Instructions Prescription details   metoprolol succinate ER 50 MG Tb24  Commonly known as: Toprol XL  What changed:    medication strength  how much to take  when to take this      Take 1 tablet (50 mg total) by mouth 2x Daily(Beta Blocker).   Quantity: 60 tablet  Refills: 3     rivaroxaban 15 MG Tabs  Commonly known as: Xarelto  What changed:   medication strength  how much to take      Take 1 tablet (15 mg total) by mouth every evening.   Quantity: 30 tablet  Refills: 3            CONTINUE taking these medications        Instructions Prescription details   acetaminophen 500 MG Tabs  Commonly known as: Tylenol Extra Strength      Take 1 tablet (500 mg total) by mouth every 6 (six) hours as needed for Pain.   Refills: 0     Cranberry 500 MG Caps  Commonly known as: Cranberry Concentrate      Take 2 capsules by mouth daily.   Quantity: 180 capsule  Refills: 0     docusate sodium 100 MG Caps  Commonly known as: COLACE      Take 100 mg by mouth 2 (two) times daily as needed for constipation.   Quantity: 60 capsule  Refills: 0     Ferrous Sulfate 325 (65 Fe) MG Tabs  Commonly known as: FeroSul      Take 1 tablet (325 mg total) by mouth 3 (three) times daily.   Quantity: 90 tablet  Refills: 3     furosemide 20 MG Tabs  Commonly known as: Lasix      Take 1 tablet (20 mg total) by mouth daily.   Quantity: 90 tablet  Refills: 3     levothyroxine 100 MCG Tabs  Commonly known as: Synthroid      Take 1 tablet (100 mcg total) by mouth every morning.   Quantity: 90 tablet  Refills: 0     loperamide 2 MG Caps  Commonly known as: Imodium      Take 1 capsule (2 mg total) by mouth 4 (four) times daily as needed for Diarrhea.   Refills: 0     omeprazole 20 MG Cpdr  Commonly known as: PriLOSEC      Take 1 capsule (20 mg total) by mouth every morning.   Quantity: 90 capsule  Refills: 3     ondansetron 4 MG Tbdp  Commonly known as: Zofran-ODT      Take 1 tablet (4 mg total) by mouth every 8 (eight) hours as needed for Nausea.   Refills: 0     traMADol 50 MG Tabs  Commonly known as: Ultram      Take 1 tablet (50 mg total) by mouth every 6 (six)  hours.   Quantity: 30 tablet  Refills: 0     triamcinolone 0.1 % Crea  Commonly known as: Kenalog      Apply topically 2 (two) times daily. Apply to affected area bid  As needed   Quantity: 1 g  Refills: 0            STOP taking these medications      Potassium Chloride ER 20 MEQ Tbcr        ticagrelor 90 MG Tabs  Commonly known as: Brilinta                  Where to Get Your Medications        These medications were sent to [id number] - [place], [place] - [address] AT [facility], [phone], [phone]  [address], [facility] [address]      Hours: 24-hours Phone: [phone]   clopidogrel 75 MG Tabs  metoprolol succinate ER 50 MG Tb24  rivaroxaban 15 MG Tabs         Follow up Visits: Follow-up with in 1 week    Follow up Labs: None    Other Discharge Instructions: None    Vadim Shin MD  3/26/2024  11:54 AM    > 35 min

## 2024-03-26 NOTE — PLAN OF CARE
Problem: Patient Centered Care  Goal: Patient preferences are identified and integrated in the patient's plan of care  Description: Interventions:  - What would you like us to know as we care for you? From [place]   - Provide timely, complete, and accurate information to patient/family  - Incorporate patient and family knowledge, values, beliefs, and cultural backgrounds into the planning and delivery of care  - Encourage patient/family to participate in care and decision-making at the level they choose  - Honor patient and family perspectives and choices  Outcome: Progressing     Problem: Patient/Family Goals  Goal: Patient/Family Long Term Goal  Description: Patient's Long Term Goal: to go back to facility    Interventions:  - follow medical regimen  - See additional Care Plan goals for specific interventions  Outcome: Progressing  Goal: Patient/Family Short Term Goal  Description: Patient's Short Term Goal:     Interventions:   - See additional Care Plan goals for specific interventions  Outcome: Progressing     Problem: Delirium  Goal: Minimize duration of delirium  Description: Interventions:  - Encourage use of hearing aids, eye glasses  - Promote highest level of mobility daily  - Provide frequent reorientation  - Promote wakefulness i.e. lights on, blinds open  - Promote sleep, encourage patient's normal rest cycle i.e. lights off, TV off, minimize noise and interruptions  - Encourage family to assist in orientation and promotion of home routines  Outcome: Progressing     Problem: SAFETY ADULT - FALL  Goal: Free from fall injury  Description: INTERVENTIONS:  - Assess pt frequently for physical needs  - Identify cognitive and physical deficits and behaviors that affect risk of falls.  - [place] fall precautions as indicated by assessment.  - Educate pt/family on patient safety including physical limitations  - Instruct pt to call for assistance with activity based on assessment  - Modify environment to  reduce risk of injury  - Provide assistive devices as appropriate  - Consider OT/PT consult to assist with strengthening/mobility  - Encourage toileting schedule  Outcome: Progressing     Problem: DISCHARGE PLANNING  Goal: Discharge to home or other facility with appropriate resources  Description: INTERVENTIONS:  - Identify barriers to discharge w/pt and caregiver  - Include patient/family/discharge partner in discharge planning  - Arrange for needed discharge resources and transportation as appropriate  - Identify discharge learning needs (meds, wound care, etc)  - Arrange for interpreters to assist at discharge as needed  - Consider post-discharge preferences of patient/family/discharge partner  - Complete POLST form as appropriate  - Assess patient's ability to be responsible for managing their own health  - Refer to Case Management Department for coordinating discharge planning if the patient needs post-hospital services based on physician/LIP order or complex needs related to functional status, cognitive ability or social support system  Outcome: Progressing     Problem: CONFUSION  Goal: Confusion, delirium, dementia or psychosis is improved or at baseline  Description: INTERVENTIONS:  - Assess for possible contributors to thought disturbance, including medications, impaired vision or hearing, underlying metabolic abnormalities, dehydration, psychiatric diagnoses, and notify attending MD/LIP  - [place] high risk fall precautions, as indicated  - Provide frequent short contacts to provide reality reorientation, refocusing and direction  - Decrease environmental stimuli, including noise as appropriate  - Monitor and intervene to maintain adequate nutrition, hydration, elimination, sleep and activity  - Consider the need for a sitter if unable to leave patient unattended  - Initiate Spiritual Care consult as indicated  - Consult Pharmacy as needed  Outcome: Progressing

## 2024-03-27 ENCOUNTER — MED REC SCAN ONLY (OUTPATIENT)
Dept: INTERNAL MEDICINE CLINIC | Facility: CLINIC | Age: 89
End: 2024-03-27

## 2024-03-28 ENCOUNTER — EXTERNAL FACILITY (OUTPATIENT)
Dept: INTERNAL MEDICINE CLINIC | Facility: CLINIC | Age: 89
End: 2024-03-28

## 2024-03-28 DIAGNOSIS — I48.91 ATRIAL FIBRILLATION WITH RVR (HCC): Primary | ICD-10-CM

## 2024-03-28 DIAGNOSIS — E78.00 PURE HYPERCHOLESTEROLEMIA: ICD-10-CM

## 2024-03-28 DIAGNOSIS — I27.20 PULMONARY HYPERTENSION, UNSPECIFIED (HCC): ICD-10-CM

## 2024-03-28 DIAGNOSIS — Z87.440 HISTORY OF UTI: ICD-10-CM

## 2024-03-28 DIAGNOSIS — Z91.81 HISTORY OF FALLING: ICD-10-CM

## 2024-03-28 DIAGNOSIS — E03.8 OTHER SPECIFIED HYPOTHYROIDISM: ICD-10-CM

## 2024-03-28 DIAGNOSIS — N18.30 STAGE 3 CHRONIC KIDNEY DISEASE, UNSPECIFIED WHETHER STAGE 3A OR 3B CKD (HCC): ICD-10-CM

## 2024-03-28 DIAGNOSIS — I50.9 CONGESTIVE HEART FAILURE, UNSPECIFIED HF CHRONICITY, UNSPECIFIED HEART FAILURE TYPE (HCC): ICD-10-CM

## 2024-03-28 PROBLEM — R27.8 OTHER LACK OF COORDINATION: Status: RESOLVED | Noted: 2022-11-22 | Resolved: 2024-03-28

## 2024-03-28 PROBLEM — R27.9 UNSPECIFIED LACK OF COORDINATION: Status: RESOLVED | Noted: 2022-11-22 | Resolved: 2024-03-28

## 2024-03-28 PROBLEM — R07.9 ACUTE CHEST PAIN: Status: RESOLVED | Noted: 2023-12-12 | Resolved: 2024-03-28

## 2024-03-28 PROBLEM — S32.009A CLOSED FRACTURE OF LUMBAR VERTEBRAL BODY (HCC): Status: RESOLVED | Noted: 2023-10-23 | Resolved: 2024-03-28

## 2024-03-28 PROBLEM — N30.00 ACUTE CYSTITIS WITHOUT HEMATURIA: Status: RESOLVED | Noted: 2022-11-17 | Resolved: 2024-03-28

## 2024-03-28 PROBLEM — I25.5 ISCHEMIC CARDIOMYOPATHY: Status: RESOLVED | Noted: 2022-11-22 | Resolved: 2024-03-28

## 2024-03-28 PROBLEM — S42.222A CLOSED 2-PART DISPLACED FRACTURE OF SURGICAL NECK OF LEFT HUMERUS: Status: RESOLVED | Noted: 2022-11-08 | Resolved: 2024-03-28

## 2024-03-28 PROBLEM — S81.801A OPEN WOUND OF RIGHT LOWER EXTREMITY, INITIAL ENCOUNTER: Status: RESOLVED | Noted: 2022-05-18 | Resolved: 2024-03-28

## 2024-03-28 PROBLEM — N39.0 UTI (URINARY TRACT INFECTION): Status: RESOLVED | Noted: 2023-02-15 | Resolved: 2024-03-28

## 2024-03-28 PROBLEM — Z74.09 IMPAIRED MOBILITY AND ADLS: Status: RESOLVED | Noted: 2020-10-21 | Resolved: 2024-03-28

## 2024-03-28 PROBLEM — R26.81 UNSTEADINESS ON FEET: Status: RESOLVED | Noted: 2022-11-22 | Resolved: 2024-03-28

## 2024-03-28 PROBLEM — N30.01 ACUTE CYSTITIS WITH HEMATURIA: Status: RESOLVED | Noted: 2023-06-15 | Resolved: 2024-03-28

## 2024-03-28 PROBLEM — R26.89 OTHER ABNORMALITIES OF GAIT AND MOBILITY: Status: RESOLVED | Noted: 2022-11-22 | Resolved: 2024-03-28

## 2024-03-28 PROBLEM — S72.141D: Status: RESOLVED | Noted: 2023-08-28 | Resolved: 2024-03-28

## 2024-03-28 PROBLEM — Z78.9 IMPAIRED MOBILITY AND ADLS: Status: RESOLVED | Noted: 2020-10-21 | Resolved: 2024-03-28

## 2024-03-28 PROBLEM — I50.21 ACUTE SYSTOLIC (CONGESTIVE) HEART FAILURE (HCC): Status: RESOLVED | Noted: 2021-01-01 | Resolved: 2024-03-28

## 2024-03-28 PROBLEM — S72.061D: Status: RESOLVED | Noted: 2023-05-22 | Resolved: 2024-03-28

## 2024-03-28 PROBLEM — M81.0 AGE-RELATED OSTEOPOROSIS WITHOUT CURRENT PATHOLOGICAL FRACTURE: Status: RESOLVED | Noted: 2021-01-01 | Resolved: 2024-03-28

## 2024-03-28 PROBLEM — S72.001A CLOSED FRACTURE OF RIGHT HIP, INITIAL ENCOUNTER (HCC): Status: RESOLVED | Noted: 2023-05-04 | Resolved: 2024-03-28

## 2024-03-28 PROBLEM — N39.0 URINARY TRACT INFECTION: Status: RESOLVED | Noted: 2023-02-15 | Resolved: 2024-03-28

## 2024-03-28 PROBLEM — M54.50 ACUTE MIDLINE LOW BACK PAIN WITHOUT SCIATICA: Status: RESOLVED | Noted: 2017-05-17 | Resolved: 2024-03-28

## 2024-03-28 PROBLEM — E78.5 DYSLIPIDEMIA: Status: RESOLVED | Noted: 2019-06-26 | Resolved: 2024-03-28

## 2024-03-28 PROBLEM — R11.2 NAUSEA AND VOMITING IN ADULT: Status: RESOLVED | Noted: 2022-11-18 | Resolved: 2024-03-28

## 2024-03-28 PROBLEM — J18.9 ATYPICAL PNEUMONIA: Status: RESOLVED | Noted: 2023-12-12 | Resolved: 2024-03-28

## 2024-03-28 PROCEDURE — 99306 1ST NF CARE HIGH MDM 50: CPT | Performed by: INTERNAL MEDICINE

## 2024-03-28 NOTE — PROGRESS NOTES
History and physical    [facility] [place] note transcribed by Dr. Jeff Damico    Admit date: 3/26/2024    Date seen: 3/28/2024    Chief complaint: Atrial fibrillation with RVR, stress ischemia    HPI: Pavithra Cedillo is a 89 year old female who has been admitted to [facility] stable condition after being discharged from the hospital.  Patient seen and examined by me, medications continued as recommended by the discharging hospital physician.  Patient recently admitted with shortness of breath, she was brought in from the [facility] and where she lives as a long-term care resident.  She is a DO NOT RESUSCITATE, and a comfort care patient, has been seen by palliative care services, but still elected for hospital transition.  She was treated there after she was discovered to have stress ischemia, medical management seen by her cardiologist, and her internal medicine team, stabilized improved and now is transitioned here to [facility] in stable condition for rehab.  Patient seen and examined by me, daughter Estefania at bedside, records reviewed, patient's memory is extremely poor.  She is tolerating medication seems stable, breathing seems stable, but she has declined since the last time seen her.  We did review her case, looks like she is not on a statin at this point in time, and if her and upcoming lab work looks stable from a CMP perspective, we may restart the 20 mg simvastatin, family is okay with this.  She is currently on Xarelto, and Plavix from an anticoagulant perspective.    Wt Readings from Last 3 Encounters:   03/26/24 88 lb 1.6 oz (40 kg)   12/14/23 103 lb 3.2 oz (46.8 kg)   07/10/23 107 lb (48.5 kg)      Past Medical History:   Diagnosis Date    A-fib (HCC)     Arthritis     Balance problem     Bladder problem     Cancer (HCC)     Cataract     Disorder of thyroid     Essential hypertension     High blood pressure     High cholesterol     History of blood transfusion     History of  breast cancer in female 2000    \"Left sided breast cancer\"    History of heart bypass surgery 1989    Hx of skin cancer, basal cell 2009    Internal derangement of knee, right 08/14/2017    Osteoarthritis     Osteoporosis 11/14/2007    Personal history of malignant neoplasm of breast 05/28/2009    Primary osteoarthritis of knees, bilateral 07/12/2017    Primary osteoarthritis of right knee 11/12/2018    Rheumatic fever/heart disease age 16    Visual impairment       Past Surgical History:   Procedure Laterality Date    ANGIOPLASTY (CORONARY)  06-    BOWEL RESECTION  2016    CABG  1989    5 bypass    CHOLECYSTECTOMY      COLON SURGERY      COLONOSCOPY  2010    HYSTERECTOMY      LAPAROSCOPIC CHOLECYSTECTOMY      LUMPECTOMY LEFT      RADIATION LEFT        Family History   Problem Relation Age of Onset    Heart Disease Father     Stroke Mother         CVA    Stroke Sister         CVA    Diabetes Brother     Breast Cancer Self 72    Breast Cancer Maternal Aunt         post menopause    Breast Cancer Maternal Cousin Female         post menopause    Breast Cancer Maternal Cousin Female         post menopause      Social History:  Social History     Socioeconomic History    Marital status:    Tobacco Use    Smoking status: Former     Packs/day: 1.00     Years: 20.00     Additional pack years: 0.00     Total pack years: 20.00     Types: Cigarettes    Smokeless tobacco: Never   Vaping Use    Vaping Use: Never used   Substance and Sexual Activity    Alcohol use: Not Currently     Comment: former    Drug use: No   Other Topics Concern    Caffeine Concern Yes     Comment: Coffee, 1 cup daily;     Exercise No     Social Determinants of Health     Food Insecurity: No Food Insecurity (3/22/2024)    Food Insecurity     Food Insecurity: Never true   Transportation Needs: No Transportation Needs (3/22/2024)    Transportation Needs     Lack of Transportation: No   Housing Stability: Low Risk  (3/22/2024)    Housing  Stability     Housing Instability: No         Current medications: See chart  Current Outpatient Medications   Medication Sig Dispense Refill    metoprolol succinate ER 50 MG Oral Tablet 24 Hr Take 1 tablet (50 mg total) by mouth 2x Daily(Beta Blocker). 60 tablet 3    rivaroxaban 15 MG Oral Tab Take 1 tablet (15 mg total) by mouth every evening. 30 tablet 3    clopidogrel 75 MG Oral Tab Take 1 tablet (75 mg total) by mouth daily. 30 tablet 3    Ferrous Sulfate (FEROSUL) 325 (65 Fe) MG Oral Tab Take 1 tablet (325 mg total) by mouth 3 (three) times daily. 90 tablet 3    omeprazole 20 MG Oral Capsule Delayed Release Take 1 capsule (20 mg total) by mouth every morning. 90 capsule 3    levothyroxine 100 MCG Oral Tab Take 1 tablet (100 mcg total) by mouth every morning. 90 tablet 0    Cranberry (CRANBERRY CONCENTRATE) 500 MG Oral Cap Take 2 capsules by mouth daily. 180 capsule 0    furosemide 20 MG Oral Tab Take 1 tablet (20 mg total) by mouth daily. 90 tablet 3    ondansetron 4 MG Oral Tablet Dispersible Take 1 tablet (4 mg total) by mouth every 8 (eight) hours as needed for Nausea.      acetaminophen 500 MG Oral Tab Take 1 tablet (500 mg total) by mouth every 6 (six) hours as needed for Pain.      traMADol 50 MG Oral Tab Take 1 tablet (50 mg total) by mouth every 6 (six) hours. 30 tablet 0    loperamide 2 MG Oral Cap Take 1 capsule (2 mg total) by mouth 4 (four) times daily as needed for Diarrhea.      triamcinolone 0.1 % External Cream Apply topically 2 (two) times daily. Apply to affected area bid  As needed 1 g 0    docusate sodium 100 MG Oral Cap Take 100 mg by mouth 2 (two) times daily as needed for constipation. 60 capsule 0       REVIEW OF SYSTEMS:  Constitutional: Negative for Chills, fatigue, fever, malaise, weight gain and weight loss.  ENMT: Negative for Nasal drainage and sinus pressure.  Eyes: Negative for Vision changes.  Respiratory: Negative for Cough, dyspnea and wheezing.  Cardio: Negative Chest pain  and irregular heartbeat/palpitations.  GI: Negative for Abdominal pain, constipation, diarrhea, heartburn, nausea and vomiting.  : Negative for Dysuria and urinary frequency.  Endocrine: Negative for Cold intolerance and heat intolerance.  Neuro: Negative for Gait disturbance and memory impairment.  Psych: Negative for Anxiety and depression.  Integumentary: Negative for Hives and rash.  MS: Negative muscle weakness. neg for joint pain  Hema/Lymph: Negative Easy bleeding and easy bruising.  Allergic/Immuno: Negative Environmental allergies and food allergies.    EXAM:   There were no vitals taken for this visit.  There is no height or weight on file to calculate BMI.   Vital signs: See point click care charting for updated details  Gen. exam: Alert and awake, baseline mental status noted, in no acute distress   HEENT: Pupils equal and reactive to light and accommodation, moist mucous membranes, bilateral conjunctival irritation left worse than right  Neck exam:  Supple.  Normal thyroid trachea midline, no JVD  Heart exam: Regular rate and irregular rhythm 2/6 AI murmurs no S3 no S4   Lung exam: No rales no rhonchi no wheezes, poor air exchange  Abdominal exam: Soft, nontender, nondistended, positive bowel sounds are normoactive  Extremities exam: no clubbing, no cyanosis, trace bilateral lower extremity edema  Skin exam: No obvious wounds, no rashes, bilateral lower extremity venous stasis changes, scattered upper and lower extremity ecchymosis at baseline, no hematoma  Neurological exam: Cranial nerves II through XII intact, no gross deficits  Musculoskeletal exam: Advanced bilateral generalized hand arthritis appreciated, no obvious deformity    Labs/imaging: See chart    DVT prophylaxis: with Xarelto and Plavix    Ambulatory status: Per hospital discharge recommendations, specialist involvement/recommendations and physical therapy evaluation    ASSESSMENT AND PLAN:   Pavithra Cedillo is a 89 year old female  seen at [facility] [place] with the followin. Atrial fibrillation with RVR (HCC)  Conservative management, medications adjusted, rate control with anticoagulation, anticoagulation still seems safe.  Xarelto, Plavix    2. History of UTI  Stable continuing complete antibiotics    3. Congestive heart failure, unspecified HF chronicity, unspecified heart failure type (HCC)   stable continue current monitoring management, CHF protocol, continue current weight monitoring, she was given doses of IV/IM diuretics while in the hospital.    4. Stage 3 chronic kidney disease, unspecified whether stage 3a or 3b CKD (HCC)  Stable continue current monitoring management, serial lab work monitoring    5. Pure hypercholesterolemia  Stable, she may benefit from a restart of the simvastatin 20 mg, looks like a history of Vytorin in the past, will await normal LFTs before restarting    6. Pulmonary hypertension, unspecified (HCC)  Stable continue current monitoring management    7. Other specified hypothyroidism  Stable continue current home medications monitoring and management    8. History of falling  Stable continue current monitor management, fall precautions, physical therapy, occupational therapy, physiatry to evaluate and treat      CODE STATUS:   Code Status: DNAR/Comfort Care    admit condition: Stable    Over 60 minutes spent in direct patient contact reviewing and creating admission orders, obtaining history, evaluating patient, discussing treatment options, family/staff communication, review of available labs and radiology reports, completing documentation, and coordinating care      Jeff Anthony D'Amico, DO  3/28/2024  2:25 PM

## 2024-04-01 ENCOUNTER — EXTERNAL FACILITY (OUTPATIENT)
Dept: INTERNAL MEDICINE CLINIC | Facility: CLINIC | Age: 89
End: 2024-04-01

## 2024-04-01 DIAGNOSIS — I50.9 CONGESTIVE HEART FAILURE, UNSPECIFIED HF CHRONICITY, UNSPECIFIED HEART FAILURE TYPE (HCC): ICD-10-CM

## 2024-04-01 DIAGNOSIS — Z87.440 HISTORY OF UTI: ICD-10-CM

## 2024-04-01 DIAGNOSIS — I48.91 ATRIAL FIBRILLATION WITH RVR (HCC): Primary | ICD-10-CM

## 2024-04-01 DIAGNOSIS — N18.30 STAGE 3 CHRONIC KIDNEY DISEASE, UNSPECIFIED WHETHER STAGE 3A OR 3B CKD (HCC): ICD-10-CM

## 2024-04-01 PROCEDURE — 99309 SBSQ NF CARE MODERATE MDM 30: CPT | Performed by: INTERNAL MEDICINE

## 2024-04-02 ENCOUNTER — EXTERNAL FACILITY (OUTPATIENT)
Dept: INTERNAL MEDICINE CLINIC | Facility: CLINIC | Age: 89
End: 2024-04-02

## 2024-04-02 DIAGNOSIS — I48.91 ATRIAL FIBRILLATION WITH RVR (HCC): Primary | ICD-10-CM

## 2024-04-02 DIAGNOSIS — N18.30 STAGE 3 CHRONIC KIDNEY DISEASE, UNSPECIFIED WHETHER STAGE 3A OR 3B CKD (HCC): ICD-10-CM

## 2024-04-02 DIAGNOSIS — Z87.440 HISTORY OF UTI: ICD-10-CM

## 2024-04-02 DIAGNOSIS — I50.9 CONGESTIVE HEART FAILURE, UNSPECIFIED HF CHRONICITY, UNSPECIFIED HEART FAILURE TYPE (HCC): ICD-10-CM

## 2024-04-02 PROCEDURE — 99309 SBSQ NF CARE MODERATE MDM 30: CPT | Performed by: INTERNAL MEDICINE

## 2024-04-04 ENCOUNTER — EXTERNAL FACILITY (OUTPATIENT)
Dept: INTERNAL MEDICINE CLINIC | Facility: CLINIC | Age: 89
End: 2024-04-04

## 2024-04-04 DIAGNOSIS — I50.9 CONGESTIVE HEART FAILURE, UNSPECIFIED HF CHRONICITY, UNSPECIFIED HEART FAILURE TYPE (HCC): ICD-10-CM

## 2024-04-04 DIAGNOSIS — I87.8 VENOUS STASIS: ICD-10-CM

## 2024-04-04 DIAGNOSIS — I48.91 ATRIAL FIBRILLATION WITH RVR (HCC): Primary | ICD-10-CM

## 2024-04-04 DIAGNOSIS — Z87.440 HISTORY OF UTI: ICD-10-CM

## 2024-04-04 PROCEDURE — 99309 SBSQ NF CARE MODERATE MDM 30: CPT | Performed by: INTERNAL MEDICINE

## 2024-04-04 NOTE — PROGRESS NOTES
[facility] [place] note transcribed by Dr. Jeff Damico  .  Date seen: 2024  .  Subjective: Patient seen and examined for atrial fibrillation with RVR, UTI, chronic kidney disease, CHF. Patient seems stable, doing well, looks much improved over the weekend, continues to participate with physical therapy, there is a friend/caregiver at bedside, they are interacting well, her lower extremities are mildly swollen, her activity level has been minimal but stable. Lab work reviewed.  .  EXAM:  Vital signs: See point click care charting for updated details  Gen. exam: Alert and awake, baseline mental status noted, in no acute distress  HEENT: Pupils equal and reactive to light and accommodation, moist mucous membranes, bilateral conjunctival irritationleft worse than right  Neck exam: Supple. Normal thyroid trachea midline, no JVD  Heart exam: Regular rate and irregular rhythm 2/6 AI murmurs no S3 no S4  Lung exam: No rales no rhonchi no wheezes, poor air exchange  Abdominal exam: Soft, nontender, nondistended, positive bowel sounds are normoactive  Extremities exam: no clubbing, no cyanosis, trace bilateral lower extremity edema  Skin exam: No obvious wounds, no rashes, bilateral lower extremity venous stasis changes, scattered upper and lower extremity ecchymosis at baseline, no hematoma  Neurological exam: Cranial nerves II through XII intact, no gross deficits  Musculoskeletal exam: Advanced bilateral generalized hand arthritis appreciated, no obvious deformity  .  Labs/imaging: See chart  DVT prophylaxis: with Xarelto and Plavix  Ambulatory status: Per hospital discharge recommendations, specialist involvement/recommendations and physical therapy evaluation  .  ASSESSMENT AND PLAN:  Pavithra Cedillo is a 89 year old female seen at [facility] with the followin. Atrial fibrillation with RVR (HCC)  Conservative management, medications adjusted, rate control with anticoagulation,  anticoagulation still seems safe. Xarelto, Plavix  2. History of UTI  Stable continuing complete antibiotics  3. Congestive heart failure, unspecified HF chronicity, unspecified heart failure type (HCC)  stable continue current monitoring management, CHF protocol, continue current weight monitoring, she was given doses of IV/IM diuretics while in the hospital.  4. Stage 3 chronic kidney disease, unspecified whether stage 3a or 3b CKD (HCC): Serial lab work monitoring  .  Stable problem list:  Pure hypercholesterolemia  Stable, she may benefit from a restart of the simvastatin 20 mg, looks like a historyof Vytorin in the past, will await normal LFTs before restarting  Pulmonary hypertension, unspecified (HCC)  Other specified hypothyroidism  History of falling

## 2024-04-05 NOTE — PROGRESS NOTES
[facility] [place] note transcribed by Dr. Jeff Damico  .  Date seen: 2024  .  Subjective: Patient seen and examined for atrial fibrillation with RVR, UTI, chronic kidney disease, CHF. Patient seems stable, doing well, does appear that she does have some new lesions on the right upper shin, she is not bothered by them her memory seems poor, but stable, she is participating with physical therapy, lab and vital sign numbers look stable, tolerating medications.  .  EXAM:  Vital signs: See point click care charting for updated details  Gen. exam: Alert and awake, baseline mental status noted, in no acute distress  HEENT: Pupils equal and reactive to light and accommodation, moist mucous membranes, bilateral conjunctival irritationleft worse than right  Neck exam: Supple. Normal thyroid trachea midline, no JVD  Heart exam: Regular rate and irregular rhythm 2/6 AI murmurs no S3 no S4  Lung exam: No rales no rhonchi no wheezes, poor air exchange  Abdominal exam: Soft, nontender, nondistended, positive bowel sounds are normoactive  Extremities exam: no clubbing,no cyanosis, trace bilateral lower extremity edema  Skin exam: No obvious wounds, no rashes, bilateral lower extremity venous stasis changes, scattered upper and lower extremity ecchymosis at baseline, no hematoma  Neurological exam: Cranial nerves II through XII intact, no gross deficits  Musculoskeletal exam: Advanced bilateral generalized hand arthritis appreciated, no obvious deformity  .  Labs/imaging: See chart  DVT prophylaxis: with Xarelto and Plavix  Ambulatory status: Per hospital discharge recommendations, specialist involvement/recommendations and physical therapy evaluation  .  ASSESSMENT AND PLAN:  Pavithra Cedillo is a 89 year old female seen at [facility] [place] with the followin. Atrial fibrillation with RVR (HCC)  Conservative management, medications adjusted, rate control with anticoagulation, anticoagulation still seems safe.  Xarelto, Plavix  2. History of UTI  Stable continuing complete antibiotics  3. Congestiveheart failure, unspecified HF chronicity, unspecified heartfailure type (HCC)  stable continue current monitoring management, CHF protocol, continue current weight monitoring, she was given doses of IV/IM diuretics while in the hospital.  4. Venous stasis: With new lesions, right shin: Wound therapy to see, dressed the area, pad for possible irritation on external devices  .  Stable problem list:  stage 3 chronic kidney disease, unspecified whether stage 3a or 3b CKD (HCC)  Pure hypercholesterolemia  Stable, she may benefit from a restart of the simvastatin 20 mg, looks like a historyof Vytorin in the past, will await normal LFTs before restarting  Pulmonary hypertension, unspecified (HCC)  Other specified hypothyroidism  History of falling  Alzheimer's dementia: Reorientation, monitoring and management.

## 2024-04-05 NOTE — PROGRESS NOTES
[facility] at [place] note transcribed by Dr. Jeff Damico  .  Date seen: 4/2/2024  .  Subjective: Patient seen and examined for atrial fibrillation with RVR, UTI, chronic kidney disease, CHF. Patient seems stable, doing well, seen well rolling herself down the hallway, taken back to her room and seen and examined in a private fashion, she seems to have improved lower extremity edema, she is active in the facility, very confused still, breathing looks stable, she is not wearing her oxygen currently. Good reports with physical therapy, compliant with medications, appears to be more active as of today. No family available to inquire about restart of statin.  .  EXAM:  Vital signs: See point click care charting for updated details  Gen. exam: Alert and awake, baseline mental status noted, in no acute distress  HEENT: Pupils equal and reactive to light and accommodation, moist mucous membranes, bilateral conjunctival irritationleft worse than right  Neck exam: Supple. Normal thyroid trachea midline, no JVD  Heart exam: Regular rate and irregular rhythm 2/6 AI murmurs no S3 no S4  Lung exam: No rales no rhonchi no wheezes, poor air exchange  Abdominal exam: Soft, nontender, nondistended, positive bowel sounds are normoactive  Extremities exam: no clubbing,no cyanosis, trace bilateral lower extremity edema  Skin exam: No obvious wounds, no rashes, bilateral lower extremity venous stasis changes, scattered upper and lower extremity ecchymosis at baseline, no hematoma  Neurological exam: Cranial nerves II through XII intact, no gross deficits  Musculoskeletal exam: Advanced bilateral generalized hand arthritis appreciated, no obvious deformity  .  Labs/imaging: See chart  DVT prophylaxis: with Xarelto and Plavix  Ambulatory status: Per hospital discharge recommendations, specialist involvement/recommendations and physical therapy evaluation  .  ASSESSMENT AND PLAN:  Pavithra Cedillo is a 89 year old female seen at [place]  [facility] with the followin. Atrial fibrillation with RVR (HCC)  Conservative management, medications adjusted, rate control with anticoagulation, anticoagulation still seems safe. Xarelto, Plavix  2. History of UTI  Stable continuing complete antibiotics  3. Congestive heart failure, unspecified HF chronicity, unspecified heartfailure type ([facility])  stable continue current monitoring management, CHF protocol, continue current weight monitoring, she was given doses of IV/IM diuretics while in the hospital.  4. Stage 3 chronic kidney disease, unspecified whether stage 3a or 3b CKD ([facility]): Serial lab work monitoring, no changes  .  Stable problem list:  Pure hypercholesterolemia  Stable, she may benefit from a restart of the simvastatin 20 mg, looks like a historyof Vytorin in the past, will await normal LFTs before restarting  Pulmonary hypertension, unspecified (HCC)  Other specified hypothyroidism  History of falling  Alzheimer's dementia: Reorientation, monitoring and management.

## 2024-04-08 ENCOUNTER — EXTERNAL FACILITY (OUTPATIENT)
Dept: INTERNAL MEDICINE CLINIC | Facility: CLINIC | Age: 89
End: 2024-04-08

## 2024-04-08 DIAGNOSIS — Z87.440 HISTORY OF UTI: ICD-10-CM

## 2024-04-08 DIAGNOSIS — I50.9 CONGESTIVE HEART FAILURE, UNSPECIFIED HF CHRONICITY, UNSPECIFIED HEART FAILURE TYPE (HCC): ICD-10-CM

## 2024-04-08 DIAGNOSIS — I87.8 VENOUS STASIS: ICD-10-CM

## 2024-04-08 DIAGNOSIS — I48.91 ATRIAL FIBRILLATION WITH RVR (HCC): Primary | ICD-10-CM

## 2024-04-08 PROCEDURE — 99309 SBSQ NF CARE MODERATE MDM 30: CPT | Performed by: INTERNAL MEDICINE

## 2024-04-09 NOTE — PROGRESS NOTES
[facility] [place] note transcribed by Dr. Jeff Damico  .  Date seen: 2024  .  Subjective: Patient seen and examined for atrial fibrillation with RVR, UTI, chronic kidney disease, CHF. Patient seems stable, doing well, no current complaints, shewas without her oxygen when I first entered the room, but was able to put it back on and orient well. She still has very poor memory, lower extremities seem to be stable, medications seem to be stable, therapy seems to be stable, she is coming along with her physicality albeit slowly.  .  EXAM:  Vital signs: See point click care charting for updated details  Gen. exam: Alert and awake, baseline mental status noted, in no acute distress  HEENT: Pupils equal and reactive to light and accommodation, moist mucous membranes, bilateral conjunctival irritationleft worse than right  Neck exam: Supple. Normal thyroid trachea midline, no JVD  Heart exam: Regular rate and irregular rhythm 2/6 AI murmurs no S3 no S4  Lung exam: No rales no rhonchi no wheezes, poor air exchange  Abdominal exam: Soft, nontender, nondistended, positive bowel sounds are normoactive  Extremities exam: no clubbing,no cyanosis, trace bilateral lower extremity edema  Skin exam: No obvious wounds, no rashes, bilateral lower extremity venousstasis changes, scattered upper and lower extremity ecchymosis at baseline, no hematoma  Neurological exam: Cranial nerves II through XII intact, no gross deficits  Musculoskeletal exam: Advanced bilateral generalized hand arthritis appreciated, no obvious deformity  .  Labs/imaging: See chart  DVT prophylaxis: with Xarelto and Plavix  Ambulatory status: Per hospital discharge recommendations, specialist involvement/recommendations and physical therapy evaluation  .  ASSESSMENT AND PLAN:  Pavithra Cedillo is a 89 year old female seen at [facility] [place] with the followin. Atrial fibrillation with RVR (HCC)  Conservative management, medications adjusted,  rate control with anticoagulation, anticoagulation still seems safe. Xarelto, Plavix  2.History of UTI  Stable continuing complete antibiotics  3. Congestiveheart failure, unspecified HF chronicity, unspecified heartfailure type (HCC)  stable continue current monitoring management, CHF protocol, continue current weight monitoring, she was given doses of IV/IM diuretics while in the hospital. Continue p.o. furosemide 20 mg daily  4. Venous stasis: With right lower extremity lesions, wound therapy has addressed the area, stable, continue with current recommended treatments.  .  Stable problem list:  stage 3 chronic kidney disease, unspecified whether stage 3a or 3b CKD (HCC)  Pure hypercholesterolemia  Stable, she may benefit from a restart of the simvastatin 20 mg, looks like a historyof Vytorin in the past, will await normal LFTs before restarting  Pulmonary hypertension, unspecified (HCC)  Other specified hypothyroidism  History of falling  Alzheimer's dementia: Reorientation, monitoring and management.

## 2024-04-11 ENCOUNTER — EXTERNAL FACILITY (OUTPATIENT)
Dept: INTERNAL MEDICINE CLINIC | Facility: CLINIC | Age: 89
End: 2024-04-11

## 2024-04-11 DIAGNOSIS — Z87.440 HISTORY OF UTI: ICD-10-CM

## 2024-04-11 DIAGNOSIS — I87.8 VENOUS STASIS: ICD-10-CM

## 2024-04-11 DIAGNOSIS — I50.9 CONGESTIVE HEART FAILURE, UNSPECIFIED HF CHRONICITY, UNSPECIFIED HEART FAILURE TYPE (HCC): ICD-10-CM

## 2024-04-11 DIAGNOSIS — I48.91 ATRIAL FIBRILLATION WITH RVR (HCC): Primary | ICD-10-CM

## 2024-04-11 PROCEDURE — 99309 SBSQ NF CARE MODERATE MDM 30: CPT | Performed by: INTERNAL MEDICINE

## 2024-04-15 ENCOUNTER — EXTERNAL FACILITY (OUTPATIENT)
Dept: INTERNAL MEDICINE CLINIC | Facility: CLINIC | Age: 89
End: 2024-04-15

## 2024-04-15 DIAGNOSIS — I87.8 VENOUS STASIS: ICD-10-CM

## 2024-04-15 DIAGNOSIS — I50.9 CONGESTIVE HEART FAILURE, UNSPECIFIED HF CHRONICITY, UNSPECIFIED HEART FAILURE TYPE (HCC): ICD-10-CM

## 2024-04-15 DIAGNOSIS — Z87.440 HISTORY OF UTI: ICD-10-CM

## 2024-04-15 DIAGNOSIS — I48.91 ATRIAL FIBRILLATION WITH RVR (HCC): Primary | ICD-10-CM

## 2024-04-15 PROCEDURE — 99309 SBSQ NF CARE MODERATE MDM 30: CPT | Performed by: INTERNAL MEDICINE

## 2024-04-16 NOTE — PROGRESS NOTES
[facility] [place] note transcribed by Dr. Jeff Damico  .  Date seen: 4/11/2024  .  Subjective: Patient seen and examined for atrial fibrillation with RVR, UTI, chronic kidney disease, CHF. Seen and examined, seems to be stable, doing well, lungs and breathing looks stable, lab work looks stable, she continues to do well in therapy, but is coming along slowly. She has a poor memory, is asking about her shack it that is orange and may have been left down in therapy. I will inquire with therapy. I did notify wound nurse to, she has blood blistering on the right lower leg, present for a few days now, does not look pathological, but could use some Kerlix gauze and a second look by the wound team. They were notified  .  EXAM:  Vital signs: See point click care charting for updated details  Gen. exam: Alert and awake, baseline mental status noted, in no acute distress  HEENT: Pupils equal and reactive to light and accommodation, moist mucous membranes, bilateral conjunctival irritationleft worse than right  Neck exam: Supple. Normal thyroid trachea midline, no JVD  Heart exam: Regular rate and irregular rhythm 2/6 AI murmurs no S3 no S4  Lung exam: No rales no rhonchi no wheezes, poor air exchange  Abdominal exam: Soft, nontender, nondistended, positive bowel sounds are normoactive  Extremities exam: no clubbing,no cyanosis, trace bilateral lower extremity edema  Skin exam: No obvious wounds, no rashes, bilateral lower extremity venousstasis changes, scattered upper and lower extremity ecchymosis at baseline, 2 x 3 cm blood-filled blister, right lower outer leg.  Neurological exam: Cranial nerves II through XII intact, no gross deficits  Musculoskeletal exam: Advanced bilateral generalized hand arthritis appreciated, no obvious deformity  .  Labs/imaging: See chart  DVT prophylaxis: with Xarelto and Plavix  Ambulatory status: Per hospital discharge recommendations, specialist involvement/recommendations and physical  therapy evaluation  .  ASSESSMENT AND PLAN:  Pavithra Cedillo is a 89 year old female seen at [facility] [place] with the followin. Atrial fibrillation with RVR (HCC)  Conservative management, medications adjusted, rate control with anticoagulation, anticoagulation still seems safe. Xarelto, Plavix  2.History of UTI  Stable continuing complete antibiotics  3. Congestiveheart failure, unspecified HF chronicity, unspecified heartfailure type (HCC)  stable continue current monitoring management, CHF protocol, continue current weight monitoring, she was given doses of IV/IM diuretics while in the hospital. Continue p.o. furosemide 20 mg daily  4. Venous stasis: With right lower extremity lesions, wound therapy has addressed the area, stable, continue with current recommended treatments.  .  -Check some serial lab work to ensure stability of blood counts and renal function.  .  Stable problem list:  stage 3 chronic kidney disease, unspecified whether stage 3a or 3b CKD (HCC)  Pure hypercholesterolemia  Stable, she may benefit from a restart of the simvastatin 20 mg, looks like a historyof Vytorin in the past, will await normal LFTs before restarting  Pulmonary hypertension, unspecified (HCC)  Other specified hypothyroidism  History of falling  Alzheimer's dementia: Reorientation, monitoring and management

## 2024-04-16 NOTE — PROGRESS NOTES
[facility] at [place] note transcribed by Dr. Jeff Damico  .  Date seen: 4/15/2024  .  Subjective: Patient seen and examined for atrial fibrillation with RVR, UTI, chronic kidney disease, CHF. Patient seen and examined, seems stable, doing well, she continues to do activities with physical therapy, breathing remained stable, memory advanced. Tolerating medications, tolerating activities, looks well cared for. Discharge planning underway. Lab work from last week reviewed, looks very stable, hemoglobin looks excellent for her.  .  EXAM:  Vital signs: See point click care charting for updated details  Gen. exam: Alert and awake, baseline mental status noted, in no acute distress  HEENT: Pupils equal and reactive to light and accommodation, moist mucousmembranes, bilateral conjunctival irritationleft worse than right  Neck exam: Supple. Normal thyroid trachea midline, no JVD  Heart exam: Regular rate and irregular rhythm 2/6 AI murmurs no S3 no S4  Lung exam: No rales no rhonchi no wheezes, poor air exchange  Abdominal exam: Soft, nontender, nondistended, positive bowel sounds are normoactive  Extremities exam: no clubbing,no cyanosis, trace bilateral lower extremity edema  Skin exam: No obvious wounds, no rashes, bilateral lower extremity venousstasischanges, scattered upper and lower extremity ecchymosis at baseline, 2 x 3 cm blood-filled blister, right lower outer leg.  Neurological exam: Cranial nerves II through XII intact, no gross deficits  Musculoskeletal exam: Advanced bilateral generalized hand arthritis appreciated, no obvious deformity  .  Labs/imaging: See chart  DVT prophylaxis: with Xarelto and Plavix  Ambulatory status: Per hospital discharge recommendations, specialist involvement/recommendations and physical therapy evaluation  .  ASSESSMENT AND PLAN:  Pavithra Cedillo is a 89 year old female seen at [facility] [place] with the followin. Atrial fibrillation with RVR ([facility])  Conservative  management, medications adjusted, rate control with anticoagulation, anticoagulation still seems safe. Xarelto, Plavix  2.History of UTI  Stable continuing complete antibiotics  3. Congestiveheart failure, unspecified HF chronicity, unspecified heartfailure type (HCC)  stable continue current monitoring management, CHF protocol, continue current weight monitoring, she was given doses of IV/IM diuretics while in the hospital. Continue p.o. furosemide 20 mg daily  4. Venous stasis: With right lower extremity lesions, wound therapy has addressed the area, stable, continue with current recommended treatments.  -No changes  .  -Check some serial lab work to ensure stability of blood counts and renal function.  .  Stable problem list:  stage 3 chronic kidney disease, unspecified whether stage 3a or 3b CKD (HCC)  Pure hypercholesterolemia  Stable,she may benefit from a restart of the simvastatin 20 mg, looks like a historyof Vytorin in the past, will await normal LFTs before restarting  Pulmonary hypertension, unspecified (HCC)  Other specified hypothyroidism  History of falling  Alzheimer's dementia: Reorientation, monitoring and management.

## 2024-04-18 ENCOUNTER — EXTERNAL FACILITY (OUTPATIENT)
Dept: INTERNAL MEDICINE CLINIC | Facility: CLINIC | Age: 89
End: 2024-04-18

## 2024-04-18 DIAGNOSIS — Z87.440 HISTORY OF UTI: ICD-10-CM

## 2024-04-18 DIAGNOSIS — I50.9 CONGESTIVE HEART FAILURE, UNSPECIFIED HF CHRONICITY, UNSPECIFIED HEART FAILURE TYPE (HCC): ICD-10-CM

## 2024-04-18 DIAGNOSIS — I87.8 VENOUS STASIS: ICD-10-CM

## 2024-04-18 DIAGNOSIS — I48.91 ATRIAL FIBRILLATION WITH RVR (HCC): Primary | ICD-10-CM

## 2024-04-19 NOTE — PROGRESS NOTES
[facility] [place] note transcribed by Dr. Jeff Damico  .  Date seen: 2024  .  Subjective: Patient seen and examined for atrial fibrillation with RVR, UTI, chronic kidney disease, CHF. Patient seen and examined seems stable, doing well, tolerating physical therapy activities, memory seems poor, there is discharge planning underway, she continues to tolerate minimal activities, but this is expected for her age and state of physicality at this point, her lungs continue to be stable she is compliant with medications and the medical plan, looks well cared for. Serial lab work reviewed.  .  EXAM:  Vital signs: See point click care charting for updated details  Gen. exam: Alert and awake, baseline mental status noted, in no acute distress  HEENT: Pupils equal and reactive to light and accommodation, moist mucousmembranes, bilateral conjunctival irritationleft worse than right  Neck exam: Supple. Normal thyroid trachea midline, no JVD  Heart exam: Regular rate and irregular rhythm 2/6 AI murmurs no S3 no S4  Lung exam: No rales no rhonchi no wheezes, poor air exchange  Abdominal exam: Soft, nontender, nondistended, positive bowel sounds are normoactive  Extremities exam: no clubbing,no cyanosis, 1/4 bilateral lower extremity edema  Skin exam: No obvious wounds, no rashes, bilateral lower extremity venous stasis changes that are severe, scattered ecchymosis.  Neurological exam: Cranial nerves II through XII intact, no gross deficits  Musculoskeletal exam: Advanced bilateral generalized hand arthritis appreciated, no obvious deformity  .  Labs/imaging: See chart  DVT prophylaxis: with Xarelto and Plavix  Ambulatory status: Per hospital discharge recommendations, specialist involvement/recommendations and physical therapy evaluation  .  ASSESSMENT AND PLAN:  Pavithra Cedillo is a 89 year old female seen at [facility] [place] with the followin. Atrial fibrillation with RVR (HCC)  Conservative management,  medications adjusted, rate control with anticoagulation, anticoagulation still seems safe. Xarelto, Plavix  2.History of UTI  Stable continuing complete antibiotics  3. Congestiveheart failure, unspecified HF chronicity, unspecified heartfailure type (HCC)  stable continue current monitoring management, CHF protocol, continue current weight monitoring, she was given doses of IV/IM diuretics while in the hospital. Continue p.o. furosemide 20 mg daily  4. Venous stasis: With right lower extremity lesions, wound therapy has addressed the area, stable, continue with current recommended treatments  -Discharge planning underway.  .  Stable problem list:  stage 3 chronic kidney disease, unspecified whether stage 3a or 3b CKD (HCC)  Pure hypercholesterolemia  Stable,she may benefit from a restart of the simvastatin 20 mg, looks like a historyof Vytorin in the past, will await normal LFTs before restarting  Pulmonary hypertension, unspecified (HCC)  Other specified hypothyroidism  History of falling  Alzheimer's dementia: Reorientation, monitoring and management.

## 2024-04-22 ENCOUNTER — EXTERNAL FACILITY (OUTPATIENT)
Dept: INTERNAL MEDICINE CLINIC | Facility: CLINIC | Age: 89
End: 2024-04-22

## 2024-04-22 DIAGNOSIS — I48.91 ATRIAL FIBRILLATION WITH RVR (HCC): Primary | ICD-10-CM

## 2024-04-22 DIAGNOSIS — R13.10 DYSPHAGIA, UNSPECIFIED TYPE: ICD-10-CM

## 2024-04-22 DIAGNOSIS — I50.9 CONGESTIVE HEART FAILURE, UNSPECIFIED HF CHRONICITY, UNSPECIFIED HEART FAILURE TYPE (HCC): ICD-10-CM

## 2024-04-22 DIAGNOSIS — I87.8 VENOUS STASIS: ICD-10-CM

## 2024-04-22 PROCEDURE — 99309 SBSQ NF CARE MODERATE MDM 30: CPT | Performed by: INTERNAL MEDICINE

## 2024-04-23 NOTE — PROGRESS NOTES
[facility] [place] note transcribed by Dr. Jeff Damico  .  Date seen: 4/22/2024  .  Subjective: Patient seen and examined for atrial fibrillation with RVR, UTI, chronic kidney disease, CHF, swallowing difficulty. Patient seems stable, doing well, memory is poor, she was confused over the weekend, triggering a workup with the nurse practitioner. She seems to have stabilized today, I am hearing reports from speech therapy as well that she is pocketing food for over 2 minutes as she is evaluated today, they are recommending she is seen by GI for an evaluation, hopefully before discharge. Family has been communicated with I did speak with the daughter, and nursing staff here setting up the arrangements for hopeful evaluation through the GI clinic before she is discharged home. Some nursing reports to report a significant weight gain, I question the accuracy of her actual recorded weights here. She was restarted on diuretics last week, and seems that her lower extremities are improved to me. Volume status looks improved.  .  EXAM:  Vital signs: See point click care charting for updated details  Gen. exam: Alert and awake, baseline mental status noted, in no acute distress  HEENT: Pupils equal and reactive to light and accommodation, moist mucousmembranes, bilateral conjunctival irritationleft worse than right  Neck exam: Supple. Normal thyroid trachea midline, no JVD  Heart exam: Regular rate and irregular rhythm 2/6 AI murmurs no S3 no S4  Lung exam: No rales no rhonchi no wheezes, poor air exchange  Abdominal exam: Soft, nontender, nondistended, positive bowel sounds are normoactive  Extremities exam: no clubbing,no cyanosis, 1/4 bilateral lower extremity edema, looks improved  Skin exam: no rashes, bilateral lower extremity venous stasis changesthat are severe, scattered ecchymosis, lower extremity lesions, dressed by wound, see nursing notes for full details  Neurological exam: Cranial nerves II through XII  intact, no gross deficits  Musculoskeletal exam: Advanced bilateral generalized hand arthritis appreciated, no obvious deformity  .  Labs/imaging: See chart  DVT prophylaxis: with Xarelto and Plavix  Ambulatory status: Per hospital discharge recommendations, specialist involvement/recommendations and physical therapy evaluation  .  ASSESSMENT AND PLAN:  Pavithra Cedillo is a 89 year old female seen at [facility] [place] with the followin. Atrial fibrillation with RVR (HCC)  Conservative management, medications adjusted, rate control with anticoagulation, anticoagulation still seems safe. Xarelto, Plavix  2.dysphagia: Noted by speech, they recommended evaluation with GI continue to work with speech, close watch, adjusted diet and mechanical adjusted diet per speech recommendations, nursing will set up appointment with the GI clinic for further recommendations hopefully prior to discharge.  3. Congestiveheart failure, unspecified HF chronicity, unspecified heartfailure type (HCC)  stable continue current monitoring management, CHF protocol, continue current weight monitoring, she was given doses of IV/IM diuretics while in the hospital. Continue p.o. furosemide 20 mg daily  4. Venous stasis: With right lower extremity lesions, wound therapy has addressed the area, stable, continue with current recommended treatments  .  Stable problem list:  History of UTI  stage 3 chronic kidney disease, unspecified whether stage 3a or 3b CKD (HCC)  Pure hypercholesterolemia  Stable,she may benefit from a restart of the simvastatin 20 mg, looks like a historyof Vytorin in the past, will await normal LFTs before restarting  Pulmonary hypertension, unspecified (HCC)  Other specified hypothyroidism  History of falling  Alzheimer's dementia: Reorientation, monitoring and management.

## 2024-04-24 ENCOUNTER — OFFICE VISIT (OUTPATIENT)
Facility: CLINIC | Age: 89
End: 2024-04-24

## 2024-04-24 VITALS — SYSTOLIC BLOOD PRESSURE: 116 MMHG | DIASTOLIC BLOOD PRESSURE: 83 MMHG

## 2024-04-24 DIAGNOSIS — R63.4 WEIGHT LOSS: Primary | ICD-10-CM

## 2024-04-24 PROCEDURE — 99204 OFFICE O/P NEW MOD 45 MIN: CPT | Performed by: STUDENT IN AN ORGANIZED HEALTH CARE EDUCATION/TRAINING PROGRAM

## 2024-04-24 NOTE — H&P
[facility] - Gastroenterology                                                                                                               Reason for consult: weight loss, GERD    Requesting physician or provider: Marcelo Bernard    Chief Complaint   Patient presents with    Consult       HPI:   Pavithra Cedillo is a 89 year old year-old woman with history of A-fib, hypertension, hyperlipidemia, rheumatic heart disease, chronic hypoxemia on 2 to 3 L of oxygen, CKD, aortic stenosis, CHF who presents from rehab facility for consultation regarding weight loss, GERD and intermittent issues chewing food.    The patient is brought in on a wheelchair and a caregiver that works at the facility does not with the patient often.  The patient is unable to clearly state why she is here.  The patient reports \"I do not know why I am here.\"    Based on chart review she has longstanding issues with difficulty chewing food and occasionally swallowing it.  She has no prior food impactions.  She has no nausea or vomiting.  She had a speech-language pathology evaluation with a videofluoroscopy swallow study done at her rehab facility and this was notable for moderate to severe oropharyngeal dysphagia and possible delayed clearance of food from the esophagus.  Her diet was recently downgraded to mechanical soft and she was told to follow-up with either her primary care physician or gastroenterology for additional evaluation.    Patient has a history of falling and intermittent memory loss.  She was using wheelchair today and had oxygen being administered via nasal cannula at 2 to 3 L.  She has a significant cardiac history including A-fib and congestive heart failure/CAD on Xarelto and Plavix.  The patient reports issues chewing food but denies that it gets stuck in the center of her chest.    The patient also stated today that she would not  want any invasive procedures including anesthesia/EGD.      Prior endoscopies:  No prior EGD, unclear colonoscopy    Soc:  -no smoking  -no Etoh    Wt Readings from Last 6 Encounters:   03/26/24 88 lb 1.6 oz (40 kg)   12/14/23 103 lb 3.2 oz (46.8 kg)   07/10/23 107 lb (48.5 kg)   06/18/23 104 lb 4.8 oz (47.3 kg)   05/05/23 113 lb 4.8 oz (51.4 kg)   04/25/23 109 lb 6.4 oz (49.6 kg)        History, Medications, Allergies, ROS:      Past Medical History:    A-fib (HCC)    Arthritis    Balance problem    Bladder problem    Cancer (HCC)    Cataract    Disorder of thyroid    Essential hypertension    High blood pressure    High cholesterol    History of blood transfusion    History of breast cancer in female    \"Left sided breast cancer\"    History of heart bypass surgery    Hx of skin cancer, basal cell    Internal derangement of knee, right    Osteoarthritis    Osteoporosis    Personal history of malignant neoplasm of breast    Primary osteoarthritis of knees, bilateral    Primary osteoarthritis of right knee    Rheumatic fever/heart disease    Visual impairment      Past Surgical History:   Procedure Laterality Date    Angioplasty (coronary)  06-    Bowel resection  2016    Cabg  1989 5 bypass    Cholecystectomy      Colon surgery      Colonoscopy  2010    Hysterectomy      Laparoscopic cholecystectomy      Lumpectomy left      Radiation left        Family Hx:   Family History   Problem Relation Age of Onset    Heart Disease Father     Stroke Mother         CVA    Stroke Sister         CVA    Diabetes Brother     Breast Cancer Self 72    Breast Cancer Maternal Aunt         post menopause    Breast Cancer Maternal Cousin Female         post menopause    Breast Cancer Maternal Cousin Female         post menopause      Social History:   Social History     Socioeconomic History    Marital status:    Tobacco Use    Smoking status: Former     Current packs/day: 1.00     Average packs/day: 1 pack/day for 20.0  years (20.0 ttl pk-yrs)     Types: Cigarettes    Smokeless tobacco: Never   Vaping Use    Vaping status: Never Used   Substance and Sexual Activity    Alcohol use: Not Currently     Comment: former    Drug use: No   Other Topics Concern    Caffeine Concern Yes     Comment: Coffee, 1 cup daily;     Exercise No     Social Determinants of Health     Financial Resource Strain: Low Risk  (3/10/2022)    [facility]    Overall Financial Resource Strain (CARDIA)     Difficulty of Paying Living Expenses: Not hard at all   Food Insecurity: No Food Insecurity (3/22/2024)    Food Insecurity     Food Insecurity: Never true   Transportation Needs: No Transportation Needs (3/22/2024)    Transportation Needs     Lack of Transportation: No   Housing Stability: Low Risk  (3/22/2024)    Housing Stability     Housing Instability: No        Medications (Active prior to today's visit):  Current Outpatient Medications   Medication Sig Dispense Refill    metoprolol succinate ER 50 MG Oral Tablet 24 Hr Take 1 tablet (50 mg total) by mouth 2x Daily(Beta Blocker). 60 tablet 3    rivaroxaban 15 MG Oral Tab Take 1 tablet (15 mg total) by mouth every evening. 30 tablet 3    clopidogrel 75 MG Oral Tab Take 1 tablet (75 mg total) by mouth daily. 30 tablet 3    Ferrous Sulfate (FEROSUL) 325 (65 Fe) MG Oral Tab Take 1 tablet (325 mg total) by mouth 3 (three) times daily. 90 tablet 3    omeprazole 20 MG Oral Capsule Delayed Release Take 1 capsule (20 mg total) by mouth every morning. 90 capsule 3    levothyroxine 100 MCG Oral Tab Take 1 tablet (100 mcg total) by mouth every morning. 90 tablet 0    Cranberry (CRANBERRY CONCENTRATE) 500 MG Oral Cap Take 2 capsules by mouth daily. 180 capsule 0    furosemide 20 MG Oral Tab Take 1 tablet (20 mg total) by mouth daily. 90 tablet 3    ondansetron 4 MG Oral Tablet Dispersible Take 1 tablet (4 mg total) by mouth every 8 (eight) hours as needed for Nausea.       acetaminophen 500 MG Oral Tab Take 1 tablet (500 mg total) by mouth every 6 (six) hours as needed for Pain.      traMADol 50 MG Oral Tab Take 1 tablet (50 mg total) by mouth every 6 (six) hours. 30 tablet 0    loperamide 2 MG Oral Cap Take 1 capsule (2 mg total) by mouth 4 (four) times daily as needed for Diarrhea.      triamcinolone 0.1 % External Cream Apply topically 2 (two) times daily. Apply to affected area bid  As needed 1 g 0    docusate sodium 100 MG Oral Cap Take 100 mg by mouth 2 (two) times daily as needed for constipation. 60 capsule 0       Allergies:  Allergies   Allergen Reactions    Aspirin SWELLING     Other reaction(s): ASPIRIN    Penicillins HIVES     Tolerated cephalosporins in past    Erythromycin NAUSEA AND VOMITING    Sulfa Antibiotics DIARRHEA and NAUSEA AND VOMITING    Codeine UNKNOWN    Macrobid [Nitrofurantoin]     Adhesive Tape RASH     Surgical tape    Ciprofloxacin Coughing     Pt states that Cipro caused her lung problems that necessitated seeing Dr. Cummins    Povidone-Iodine OTHER (SEE COMMENTS)     blisters       ROS:   CONSTITUTIONAL:  negative for fevers, rigors  EYES:  negative for diplopia   RESPIRATORY:  negative for severe shortness of breath  CARDIOVASCULAR:  negative for crushing sub-sternal chest pain  GASTROINTESTINAL:  see HPI  GENITOURINARY:  negative for dysuria or gross hematuria  INTEGUMENT/BREAST:  SKIN:  negative for jaundice   ALLERGIC/IMMUNOLOGIC:  negative for hay fever  ENDOCRINE:  negative for cold intolerance and heat intolerance  MUSCULOSKELETAL:  negative for joint effusion/severe erythema  BEHAVIOR/PSYCH:  negative for psychotic behavior      PHYSICAL EXAM:   Blood pressure 116/83, not currently breastfeeding.    Gen- Patient appears comfortable and in no acute discomfort  HEENT: the sclera appears anicteric, oropharynx clear, mucus membranes appear moist  CV- regular rate and rhythm, the extremities are warm and well perfused   Lung- Moves air well; No  labored breathing  Abdomen- soft, non-tender exam in all quadrants without rigidity or guarding, non-distended  Skin- No jaundice  Ext: no edema is evident.   Neuro- Alert and interactive, and gross movements of extremities normal  Psych - appropriate, non-agitated    Labs/Imaging:     Patient's pertinent labs and imaging were reviewed and discussed with patient today.      ASSESSMENT/PLAN:   Pavithra Cedillo is a 89 year old year-old woman with history of A-fib, hypertension, hyperlipidemia, rheumatic heart disease, chronic hypoxemia on 2 to 3 L of oxygen, CKD, aortic stenosis, CHF who presents from rehab facility for consultation regarding weight loss, GERD and intermittent issues chewing food.    #Weight loss  #Dysphagia (oropharyngeal and possibly esophageal phase)  #Failure to thrive    Patient is presenting from nursing home and is unclear why she is here.  It seems that she has had progressive decline over the last several years and more recently has weight loss and difficulty chewing food.  She had a's videofluoroscopy swallow study done by speech-language pathology and this was normal for moderate to severe oropharyngeal dysphagia and possible delayed clearance of food from the esophagus.  The patient is a high risk anesthesia candidate with several comorbid conditions including A-fib, CAD, CHF, aortic stenosis, hypoxemia on 2 to 3 days of oxygen and furthermore she takes Plavix, aspirin as well as Xarelto.    The patient made clear today that she would not want any invasive procedures given her age and risk associated with it.    I also called the patient's daughter who is listed as the POA to further discuss her mom situation.    In my opinion, the patient has pretty significant oropharyngeal dysphagia and was recently downgraded to mechanical soft diet.  I would like to give this time plus working with speech-language pathology to see if her swelling can improve.  An EGD may show a stricture versus  cancer but at the same time, given the patient's age, comorbid conditions, medications and the fact that she is DNR/comfort makes me feel the EGD is unnecessary and invasive at this point.  The patient is likely not a candidate for any therapy if she were to have cancer or another process in the esophagus.  The risk of complications associate with an EGD and anesthesia outweighs the benefit at this time.  I did discuss with the patient and family I could order a CT scan of the chest/abdomen/pelvis for further evaluation given weight loss but at this time they would like to hold off as they likely would not want to do anything should there be something found on the images.    I told the patient's daughter that she can further discuss with other family members and see if they want to change their mind but at this point given the patient's wishes and her CODE STATUS of DNR/comfort and her chronic comorbid conditions she is a high risk candidate for any further investigation and it seems that she has been declining over the last several years.    An EGD likely would not change the outcome or improve her oral intake given the moderate to severe oropharyngeal dysphagia seen on the speech language pathology swallow study.    Recommendations:  -Continue conservative therapy.  Work with speech-language pathology given oropharyngeal dysphagia and see if she tolerates mechanical soft diet better.  -Consider CT scan of the chest/abdomen/pelvis though not sure it would  at this time.  -High risk anesthesia candidate and an EGD likely would not alter her clinical course.  I did tell the patient's POA to further discuss with family to see if they would like any additional evaluation or discuss this further.    Will increase pantoprazole to 40 mg nightly to see if this will improve her symptoms.    She may have a peptic stricture versus hiatal hernia versus GERD versus a malignant process.  The family will discuss  whether they want to pursue additional imaging.    Orders This Visit:  No orders of the defined types were placed in this encounter.      Meds This Visit:  Requested Prescriptions      No prescriptions requested or ordered in this encounter     Imaging & Referrals:  None     Bakari Carrera MD  [facility]  4/24/2024      This note was partially prepared using Dragon Medical voice recognition dictation software. As a result, errors may occur. When identified, these errors have been corrected. While every attempt is made to correct errors during dictation, discrepancies may still exist.

## 2024-04-25 ENCOUNTER — EXTERNAL FACILITY (OUTPATIENT)
Dept: INTERNAL MEDICINE CLINIC | Facility: CLINIC | Age: 89
End: 2024-04-25

## 2024-04-25 DIAGNOSIS — I87.8 VENOUS STASIS: ICD-10-CM

## 2024-04-25 DIAGNOSIS — I50.9 CONGESTIVE HEART FAILURE, UNSPECIFIED HF CHRONICITY, UNSPECIFIED HEART FAILURE TYPE (HCC): ICD-10-CM

## 2024-04-25 DIAGNOSIS — R13.10 DYSPHAGIA, UNSPECIFIED TYPE: ICD-10-CM

## 2024-04-25 DIAGNOSIS — I48.91 ATRIAL FIBRILLATION WITH RVR (HCC): Primary | ICD-10-CM

## 2024-04-25 PROCEDURE — 99309 SBSQ NF CARE MODERATE MDM 30: CPT | Performed by: INTERNAL MEDICINE

## 2024-04-26 ENCOUNTER — EXTERNAL FACILITY (OUTPATIENT)
Dept: INTERNAL MEDICINE CLINIC | Facility: CLINIC | Age: 89
End: 2024-04-26

## 2024-04-26 DIAGNOSIS — I50.9 CONGESTIVE HEART FAILURE, UNSPECIFIED HF CHRONICITY, UNSPECIFIED HEART FAILURE TYPE (HCC): ICD-10-CM

## 2024-04-26 DIAGNOSIS — I48.91 ATRIAL FIBRILLATION WITH RVR (HCC): Primary | ICD-10-CM

## 2024-04-26 DIAGNOSIS — R13.10 DYSPHAGIA, UNSPECIFIED TYPE: ICD-10-CM

## 2024-04-26 DIAGNOSIS — N39.0 URINARY TRACT INFECTION WITHOUT HEMATURIA, SITE UNSPECIFIED: ICD-10-CM

## 2024-04-26 NOTE — PROGRESS NOTES
[facility] [place] note transcribed by Dr. Jeff Damico  .  Date seen: 4/25/2024  .  Subjective: Patient seen and examined for atrial fibrillation with RVR, UTI, chronic kidney disease, CHF, swallowing difficulty. Seems stable, doing well, but she has had some news from the GI evaluation yesterday, they have given some options, instructed to increase PPI, and are apprehensive to scope her with her advanced medical problems. She continues to feel like there is an obstruction with her swallowing, speech is following closely they have downgraded her diet, her lower extremities are worsening.  .  EXAM:  Vital signs: See point click care charting for updated details  Gen. exam: Alert and awake, baseline mental status noted, in no acute distress  HEENT: Pupils equal and reactive to light and accommodation, moist mucousmembranes, bilateral conjunctival irritationleft worse than right  Neck exam: Supple. Normal thyroid trachea midline, no JVD  Heart exam: Regular rate and irregular rhythm 2/6 AI murmurs no S3 no S4  Lung exam: No rales no rhonchi no wheezes, poor air exchange  Abdominal exam: Soft, nontender, nondistended, positive bowel sounds are normoactive  Extremities exam: no clubbing,no cyanosis, 1/4 bilateral lower extremity edema, looks improved  Skin exam: no rashes, bilateral lower extremity venous stasis changesthat are severe, scattered ecchymosis, lower extremity lesions, dressed by wound, see nursing notes for full details  Neurological exam: Cranial nerves II through XII intact, no gross deficits  Musculoskeletal exam: Advanced bilateral generalized hand arthritis appreciated, no obvious deformity  .  Labs/imaging: See chart  DVT prophylaxis: with Xarelto and Plavix  Ambulatory status: Per hospital discharge recommendations, specialist involvement/recommendations and physical therapy evaluation  .  ASSESSMENT AND PLAN:  Pavithra Cedillo is a 89 year old female seen at [facility] [place] with the  followin. Atrial fibrillation with RVR (HCC)  Conservative management, medications adjusted, rate control with anticoagulation, anticoagulation still seems safe. Xarelto, Plavix  2.dysphagia: Recent visit to GI, has given some options, family has decided against G-tube in the past, lets continue with downgraded speech options, they are following closely, if she continues to not be able to feed, we may have to pull back on her aggressive nurses options, and present more of a palliative options for her. The family is aware of this  3. Congestive heart failure, unspecified HF chronicity, unspecified heartfailure type (HCC): with worsening, IM lasix 10mg today, increase 30mg daily and add aldactone 25mg po qd lab work to track kidney function  4. Venous stasis: With right lower extremity lesions, wound therapy has addressed the area, stable,continue with current recommended treatments  .  Stable problem list:  History of UTI  stage 3 chronic kidney disease, unspecified whether stage 3a or 3b CKD (HCC)  Pure hypercholesterolemia  Stable,she may benefit from a restart of the simvastatin 20 mg, looks like a historyof Vytorin in the past, will await normal LFTs before restarting  Pulmonary hypertension, unspecified (HCC)  Other specified hypothyroidism  History of falling  Alzheimer's dementia: Reorientation, monitoring and management.

## 2024-04-29 ENCOUNTER — EXTERNAL FACILITY (OUTPATIENT)
Dept: INTERNAL MEDICINE CLINIC | Facility: CLINIC | Age: 89
End: 2024-04-29

## 2024-04-29 DIAGNOSIS — I50.9 CONGESTIVE HEART FAILURE, UNSPECIFIED HF CHRONICITY, UNSPECIFIED HEART FAILURE TYPE (HCC): ICD-10-CM

## 2024-04-29 DIAGNOSIS — R13.10 DYSPHAGIA, UNSPECIFIED TYPE: ICD-10-CM

## 2024-04-29 DIAGNOSIS — N39.0 URINARY TRACT INFECTION WITHOUT HEMATURIA, SITE UNSPECIFIED: ICD-10-CM

## 2024-04-29 DIAGNOSIS — I48.91 ATRIAL FIBRILLATION WITH RVR (HCC): Primary | ICD-10-CM

## 2024-04-29 PROCEDURE — 99316 NF DSCHRG MGMT 30 MIN+: CPT | Performed by: INTERNAL MEDICINE

## 2024-04-29 NOTE — PROGRESS NOTES
[facility] [place] note transcribed by Dr. Jeff Damico  .  Date seen: 4/26/2024  .  Subjective: Patient seen and examined for atrial fibrillation with RVR, UTI, chronic kidney disease, CHF, swallowing difficulty. Seems stable, doing well, urine culture today was resulted with some sensitivities, patient does have a penicillin allergy, she was seen by the nurse practitioner, who decided to use second-generation cephalosporin, she did communicate with the daughters who claimed that she is not allergicto a cephalosporin has tolerated those in the past. It was started today, patient's lower extremities look to be improved as well, tolerating increased diuretic dosage.  .  EXAM:  Vital signs: See point click care charting for updated details  Gen. exam: Alert and awake, baseline mental status noted, in no acute distress  HEENT: Pupils equal and reactive to light and accommodation, moist mucousmembranes, bilateral conjunctival irritationleft worse than right  Neck exam: Supple. Normal thyroid trachea midline, no JVD  Heart exam: Regular rate and irregular rhythm 2/6 AI murmurs no S3 no S4  Lung exam: No rales no rhonchi no wheezes, poor air exchange  Abdominal exam: Soft, nontender, nondistended, positive bowel sounds are normoactive  Extremities exam: no clubbing,no cyanosis, 1/4 bilateral lower extremity edema, looks improved  Skin exam: no rashes, bilateral lower extremity venous stasis changesthat are severe, scattered ecchymosis, lower extremity lesions, dressed by wound, see nursing notes for fulldetails  Neurological exam: Cranial nerves II through XII intact, no gross deficits  Musculoskeletal exam: Advanced bilateral generalized hand arthritis appreciated, no obvious deformity  .  Labs/imaging: See chart  DVT prophylaxis: with Xarelto and Plavix  Ambulatory status: Per hospital discharge recommendations, specialist involvement/recommendations and physical therapy evaluation  .  ASSESSMENT AND PLAN:  Pavithra  TARIQ Cedillo is a 89 year old female seen at [facility] [place] with the followin. Atrial fibrillation with RVR (HCC)  Conservative management, medications adjusted, rate control with anticoagulation, anticoagulation still seems safe. Xarelto, Plavix  2.dysphagia: Recent visit to GI, has given some options, family has decided against G-tube in the past, lets continue with downgraded speech options, they are following closely, if she continues to not be able to feed, we may have to pull back on her aggressive nurses options, and present more of a palliative options for her. The family is aware of this  3. Congestive heart failure, unspecified HF chronicity, unspecified heartfailure type (HCC): with worsening, continue 30 mg oral Lasix and add aldactone 25mg po qd lab work to track kidney function  4. UTI: Ceftin 500 mg twice daily for 7 days, track response, oral prophylactic vancomycin  .  Stable problem list:  venous stasis: Wound therapy care.  History of UTI  stage 3 chronic kidney disease, unspecified whether stage 3a or 3b CKD (HCC)  Pure hypercholesterolemia  Stable,she may benefit from a restart of the simvastatin 20 mg, looks like a historyof Vytorin in the past, will await normal LFTs before restarting  Pulmonary hypertension, unspecified (HCC)  Other specified hypothyroidism  History of falling  Alzheimer's dementia: Reorientation, monitoring and management.

## 2024-04-30 NOTE — PROGRESS NOTES
Discharge summary:  [facility] note transcribed by Dr. Jeff Damico  .  Date seen: 4/29/2024  .  Subjective: Patient seen and examined for atrial fibrillation with RVR, UTI, chronic kidney disease, CHF, swallowing difficulty. Patient seems stable, uneventful weekend, I did note the nurse practitioner note, patient is not currently complaining of any issues, no diarrhea, her memory is poor, no family available today. Nursing staff claims she is in her usual state of health, receiving treatment with oral antibiotic therapy for UTI. Nearly completed. There are plans listed from the staff for an discharge in the near future.  .  EXAM:  Vital signs: See point click care charting for updated details  Gen. exam: Alert and awake, baseline mental status noted, in no acute distress  HEENT: Pupils equal and reactive to light and accommodation, moist mucousmembranes, bilateral conjunctival irritationleft worse than right  Neck exam: Supple. Normal thyroid trachea midline, no JVD  Heart exam: Regular rate and irregular rhythm 2/6 AI murmurs no S3 no S4  Lung exam: No rales no rhonchi no wheezes, poor air exchange  Abdominal exam: Soft, nontender, nondistended, positive bowel sounds are normoactive  Extremities exam: no clubbing,no cyanosis, 1/4 bilateral lower extremity edema, looks improved  Skin exam: no rashes, bilateral lower extremity venous stasis changesthat are severe, scattered ecchymosis, lower extremity lesions, dressed by wound, see nursing notes for fulldetails  Neurological exam: Cranial nerves II through XII intact, no gross deficits  Musculoskeletal exam: Advanced bilateral generalized hand arthritis appreciated, no obvious deformity  .  Labs/imaging: See chart  DVT prophylaxis: with Xarelto and Plavix  Ambulatory status: Per hospital discharge recommendations, specialist involvement/recommendations and physical therapy evaluation  .  ASSESSMENT AND PLAN:  Pavithra Cedillo is a 89 year old female  seen at [facility] [place] with the followin. Atrial fibrillation with RVR (HCC)  Conservative management, medications adjusted, rate control with anticoagulation, anticoagulation still seems safe. Xarelto, Plavix  2.dysphagia: Recent visit to GI, has given some options, family has decided against G-tube in the past, lets continue with downgraded speech options, they are following closely, if she continues to not be able to feed, we may have to pull back on her aggressive nurses options, and present more of a palliative options for her. The family is aware of this  3. Congestive heart failure, unspecified HF chronicity, unspecified heartfailure type ([facility]): with worsening, continue 30 mg oral Lasix and add aldactone 25mg po qd lab work to track kidney function  4. UTI: Complete Ceftin 250 twice daily for 7 days, oral prophylactic vancomycin, and okay to DC home, see below  .  Stable problem list:  venous stasis: Wound therapy care.  History of UTI  stage 3 chronic kidney disease, unspecified whether stage 3a or 3b CKD ([facility])  Pure hypercholesterolemia  Stable,she may benefit from a restart of the simvastatin 20 mg, looks like a historyof Vytorin in the past, will await normal LFTs before restarting  Pulmonary hypertension, unspecified ([facility])  Other specified hypothyroidism  History of falling  Alzheimer's dementia: Reorientation, monitoring and management.  .  Discharge summary: Patient had an mildly eventful stay at [place] which improved with gait, strength, balance, and endurance, she was treated for UTI in 2 different occasions, had a CHF exacerbation on a few different occasionsas well, tolerated IM and increased p.o. diuretics, recently has completed cephalosporin for UTI, stabilized and will transition home, she does live in an assisted living care center, with extended help, good family support, home health services will be arranged the  are following closely.  Discharge medication  list:  Xarelto Oral Tablet 15 MG (Rivaroxaban) Active 4/28/2024 4/28/2024  Give 1 tablet by mouth one time a day for Afib  Clopidogrel Bisulfate Oral Tablet 75 MG (Clopidogrel Bisulfate) Active 3/27/2024 3/26/2024  Give 1 tablet by mouth one time a day for Coronary artery Disease  Acetaminophen Oral Tablet 500 MG (Acetaminophen) Active 4/5/2024 3/26/2024  Give 1 tablet by mouth every 6 hours as needed for for pain Acetaminophen not to exceed 3000 mg/day  Levothyroxine Sodium Tablet 100 MCG Active 4/23/2024 4/23/2024  Give 1 tablet by mouth one time a day for low thyroid hormone  Docusate Sodium Capsule 100 MG Active 3/26/2024  Give 1 tablet by mouth every 12 hours as needed for constipation  Ferrous Sulfate Tablet 325 (65 Fe) MG Active 3/27/2024  Give 325 mg by mouth three times a day for supplementation  Triamcinolone Acetonide External Cream 0.1 % (Triamcinolone Acetonide (Topical)) Active 3/26/2024 3/26/2024  Apply to Affected Area topically every 12 hours as needed for rash  Cranberry Oral Capsule 500 MG (Cranberry (Vaccinium macrocarpon)) Active 3/27/2024 3/26/2024  Give 2 capsule by mouth one time a day for supplementation  Loperamide HCl Capsule 2 MG Active 3/26/2024  Give 1 capsule by mouth every 6 hours as needed for diarrhea after each loose stool  Ondansetron HCl Tablet 4 MG Active 4/11/2024 3/26/2024  Give1 tablet by mouth every 8 hours as needed for Nausea and Vomiting  TraMADol HCl Tablet 50 MG Active 3/26/2024 3/26/2024  Give 1 tablet by mouth every 6 hours as needed for Moderate Pain  Metoprolol Succinate ER Oral Tablet Extended Release 24 Hour 100 MG (Metoprolol Succinate) Active 3/29/2024 3/28/2024  Give 1 tablet by mouth one time a day related to ESSENTIAL (PRIMARY) HYPERTENSION (I10) HOLD IF BP IS LESS THAN 90/60 AND OR PULSE IS LESS THAN 60. NOTIFY MD/NP  Simethicone Oral Tablet Chewable 80 MG (Simethicone) Active 4/10/2024  Give 2 tablet by mouth every 4 hours as needed for  Heartburn  Omeprazole Oral Capsule Delayed Release (Omeprazole) Active 4/25/2024 4/24/2024  Give 40 mg by mouth one time a day for GERD  Lasix Tablet (Furosemide) Active 4/26/2024 4/25/2024  Give 30 mg by mouth one time a day related to FLUID OVERLOAD, UNSPECIFIED (E87.70)  Aldactone Oral Tablet 25 MG (Spironolactone) Active 4/26/2024 4/25/2024  Give 1 tablet by mouth one time a day related to FLUID OVERLOAD, UNSPECIFIED (E87.70)

## 2024-05-02 ENCOUNTER — EXTERNAL FACILITY (OUTPATIENT)
Dept: INTERNAL MEDICINE CLINIC | Facility: CLINIC | Age: 89
End: 2024-05-02

## 2024-05-02 DIAGNOSIS — R13.10 DYSPHAGIA, UNSPECIFIED TYPE: ICD-10-CM

## 2024-05-02 DIAGNOSIS — N39.0 URINARY TRACT INFECTION WITHOUT HEMATURIA, SITE UNSPECIFIED: ICD-10-CM

## 2024-05-02 DIAGNOSIS — I48.91 ATRIAL FIBRILLATION WITH RVR (HCC): Primary | ICD-10-CM

## 2024-05-02 DIAGNOSIS — I50.9 CONGESTIVE HEART FAILURE, UNSPECIFIED HF CHRONICITY, UNSPECIFIED HEART FAILURE TYPE (HCC): ICD-10-CM

## 2024-05-02 PROCEDURE — 99316 NF DSCHRG MGMT 30 MIN+: CPT | Performed by: INTERNAL MEDICINE

## 2024-05-05 ENCOUNTER — HOSPITAL ENCOUNTER (EMERGENCY)
Facility: HOSPITAL | Age: 89
Discharge: HOME | End: 2024-05-05
Attending: STUDENT IN AN ORGANIZED HEALTH CARE EDUCATION/TRAINING PROGRAM
Payer: MEDICARE

## 2024-05-05 VITALS
SYSTOLIC BLOOD PRESSURE: 135 MMHG | DIASTOLIC BLOOD PRESSURE: 92 MMHG | RESPIRATION RATE: 20 BRPM | OXYGEN SATURATION: 96 % | TEMPERATURE: 98 F | HEART RATE: 89 BPM

## 2024-05-05 DIAGNOSIS — S80.12XS LEG HEMATOMA, LEFT, SEQUELA: Primary | ICD-10-CM

## 2024-05-05 PROCEDURE — 99282 EMERGENCY DEPT VISIT SF MDM: CPT

## 2024-05-05 PROCEDURE — 99283 EMERGENCY DEPT VISIT LOW MDM: CPT

## 2024-05-05 PROCEDURE — 90471 IMMUNIZATION ADMIN: CPT

## 2024-05-05 NOTE — ED INITIAL ASSESSMENT (HPI)
Patient arrives from [facility] with a bleeding excoriation from today. +anticoagulants.

## 2024-05-06 ENCOUNTER — EXTERNAL FACILITY (OUTPATIENT)
Dept: INTERNAL MEDICINE CLINIC | Facility: CLINIC | Age: 89
End: 2024-05-06

## 2024-05-06 DIAGNOSIS — R13.10 DYSPHAGIA, UNSPECIFIED TYPE: ICD-10-CM

## 2024-05-06 DIAGNOSIS — I48.91 ATRIAL FIBRILLATION WITH RVR (HCC): Primary | ICD-10-CM

## 2024-05-06 DIAGNOSIS — I50.9 CONGESTIVE HEART FAILURE, UNSPECIFIED HF CHRONICITY, UNSPECIFIED HEART FAILURE TYPE (HCC): ICD-10-CM

## 2024-05-06 DIAGNOSIS — L98.9 SKIN LESION OF LEFT LOWER EXTREMITY: ICD-10-CM

## 2024-05-06 PROCEDURE — 99309 SBSQ NF CARE MODERATE MDM 30: CPT | Performed by: INTERNAL MEDICINE

## 2024-05-06 NOTE — ED PROVIDER NOTES
Patient Seen in: [facility] Emergency Department      History     Chief Complaint   Patient presents with    Bleeding     Stated Complaint: a tech    Subjective:   HPI    89-year-old female with history of A-fib hypertension hyperlipidemia presents for evaluation of bleeding hematoma.  Patient with a hematoma on the leg which has been there for several days per EMS handoff, patient was scratching it today and it started to bleed.  Bleeding was not able to be controlled nursing home she was brought here.  She is on Xarelto and Plavix.  She denies chest pain shortness of breath or dizziness.    Objective:   Past Medical History:    A-fib (HCC)    Arthritis    Balance problem    Bladder problem    Cancer (HCC)    Cataract    Disorder of thyroid    Essential hypertension    High blood pressure    High cholesterol    History of blood transfusion    History of breast cancer in female    \"Left sided breast cancer\"    History of heart bypass surgery    Hx of skin cancer, basal cell    Internal derangement of knee, right    Osteoarthritis    Osteoporosis    Personal history of malignant neoplasm of breast    Primary osteoarthritis of knees, bilateral    Primary osteoarthritis of right knee    Rheumatic fever/heart disease    Visual impairment              Past Surgical History:   Procedure Laterality Date    Angioplasty (coronary)  06-    Bowel resection  2016    Cabg  1989 5 bypass    Cholecystectomy      Colon surgery      Colonoscopy  2010    Hysterectomy      Laparoscopic cholecystectomy      Lumpectomy left      Radiation left                  Social History     Socioeconomic History    Marital status:    Tobacco Use    Smoking status: Former     Current packs/day: 1.00     Average packs/day: 1 pack/day for 20.0 years (20.0 ttl pk-yrs)     Types: Cigarettes    Smokeless tobacco: Never   Vaping Use    Vaping status: Never Used   Substance and Sexual Activity    Alcohol use: Not Currently      Comment: former    Drug use: No   Other Topics Concern    Caffeine Concern Yes     Comment: Coffee, 1 cup daily;     Exercise No     Social Determinants of Health     Financial Resource Strain: Low Risk  (3/10/2022)    [facility]    Overall Financial Resource Strain (CARDIA)     Difficulty of Paying Living Expenses: Not hard at all   Food Insecurity: No Food Insecurity (3/22/2024)    Food Insecurity     Food Insecurity: Never true   Transportation Needs: No Transportation Needs (3/22/2024)    Transportation Needs     Lack of Transportation: No   Housing Stability: Low Risk  (3/22/2024)    Housing Stability     Housing Instability: No              Review of Systems    Positive for stated complaint: a tech  Other systems are as noted in HPI.  Constitutional and vital signs reviewed.      All other systems reviewed and negative except as noted above.    Physical Exam     ED Triage Vitals   BP 05/05/24 1559 (!) 133/95   Pulse 05/05/24 1555 76   Resp 05/05/24 1555 20   Temp 05/05/24 1555 97.6 °F (36.4 °C)   Temp src --    SpO2 05/05/24 1555 93 %   O2 Device 05/05/24 1555 Nasal cannula       Current:BP (!) 135/92   Pulse 89   Temp 97.6 °F (36.4 °C)   Resp 20   SpO2 96%         Physical Exam    Constitutional: awake, alert, no sig distress  HENT: mmm, no lesions,  Neck: normal range of motion, no tenderness, supple.  Eyes: PERRL, EOMI, conjunctiva normal, no discharge. Sclera anicteric.  Cardiovascular: rr no murmur  Respiratory: Normal breath sounds, no respiratory distress, no wheezing, no chest tenderness.  GI: Bowel sounds normal, Soft, no tenderness, no masses, no pulsatile masses.  : No CVA tenderness.  Skin: Warm, dry, no erythema, large hematoma left lateral lower leg - small ~2cm skin defect with small amt of oozing, no active bleeding.   Musculoskeletal: Intact distal pulses, 2+ lower extremity edema, no tenderness, no cyanosis, no clubbing. Good range of motion  in all major joints. No tenderness to palpation or major deformities noted. Back- No tenderness.  Neurologic: Alert & oriented x 3, normal motor function, normal sensory function, no focal deficits noted.  Psych: Calm, cooperative, nl affect     ED Course     Labs Reviewed   RAINBOW DRAW LAVENDER   RAINBOW DRAW LIGHT GREEN   RAINBOW DRAW BLUE                      MDM      89-year-old female presenting with a bleeding hematoma and left lateral lower leg.  On arrival vitals are stable reassuring.  No significant active bleeding.  Wound was packed with surgical snow with good hemostatic result.  Observed for period time without recurrence of significant bleeding    Return precautions and follow-up instructions were discussed with patient who voiced understanding and agreement the plan.  All questions were answered to patient satisfaction.                                   Medical Decision Making      Disposition and Plan     Clinical Impression:  1. Leg hematoma, left, sequela         Disposition:  Discharge  5/5/2024  4:57 pm    Follow-up:  Marcelo Bernard  [address] [facility] [address]  [phone]    Follow up in 2 day(s)      [facility] Emergency Department  [address] [facility] [address]  [phone]  Follow up  If symptoms worsen, As needed    We recommend that you schedule follow up care with a primary care provider within the next three months to obtain basic health screening including reassessment of your blood pressure.      Medications Prescribed:  Discharge Medication List as of 5/5/2024  5:19 PM

## 2024-05-07 NOTE — PROGRESS NOTES
Discharge summary:  [facility] [place] note transcribed by Dr. Jeff Damico  .  Date seen: 2024  .  Subjective: Patient seen and examined for atrial fibrillation with RVR, UTI, chronic kidney disease, CHF, swallowing difficulty. Patient seems stable, doing well, improving, UTI seems much improved, she is planning/replanted discharge for Saturday of this week, she continues to do well, continues to make effort with physical therapy, lower extremities look unchanged. Patient looks well cared for, nursing and wound care following closely.  .  EXAM:  Vital signs: See point click care charting for updated details  Gen. exam: Alert and awake, baseline mental status noted, in no acute distress  HEENT: Pupils equal and reactive to light and accommodation, moist mucousmembranes, bilateral conjunctival irritationleft worse than right  Neck exam: Supple. Normal thyroid trachea midline, no JVD  Heart exam: Regular rate and irregular rhythm 2/6 AI murmurs no S3 no S4  Lung exam: No rales no rhonchi no wheezes, poor air exchange  Abdominal exam: Soft, nontender, nondistended, positive bowel sounds are normoactive  Extremities exam: no clubbing,no cyanosis, 1/4 bilateral lower extremity edema, looks unchanged  Skin exam: no rashes, bilateral lower extremity venous stasis changesthat are severe, scattered ecchymosis, lower extremity lesions, dressed by wound, see nursing notes for fulldetails  Neurological exam: Cranial nerves II through XII intact, no gross deficits  Musculoskeletal exam: Advanced bilateral generalized hand arthritis appreciated, no obvious deformity  .  Labs/imaging: See chart  DVT prophylaxis: with Xarelto and Plavix  Ambulatory status: Per hospital discharge recommendations, specialist involvement/recommendations and physical therapy evaluation  .  ASSESSMENT AND PLAN:  Pavithra Cedillo is a 89 year old female seen at [facility] [place] with the followin. Atrial fibrillation with RVR  (HCC)  Conservative management, medications adjusted, rate control with anticoagulation, anticoagulation still seems safe. Xarelto, Plavix  2.dysphagia: Recent visit to GI, has given some options, family has decided against G-tube in the past, continue with speech adjusted diet, we may have to pull back on her aggressive nurses options, and present more of a palliative options for her. Continue to discuss on discharge  3. Congestive heart failure, unspecified HF chronicity, unspecified heartfailure type (HCC): with worsening, continue 30 mg oral Lasix and add aldactone 25mg po qd lab work to track kidney function  4. UTI: Complete Ceftin 250 twice daily for 7 days, oral prophylactic vancomycin, and okay to DC home, see below  .  Stable problem list:  venous stasis: Wound therapy care.  History of UTI  stage 3 chronic kidney disease, unspecified whether stage 3a or 3b CKD (HCC)  Pure hypercholesterolemia  Stable,she may benefit from a restart of the simvastatin 20 mg, looks like a historyof Vytorin in the past, will await normal LFTs before restarting  Pulmonary hypertension, unspecified (HCC)  Other specified hypothyroidism  History of falling  Alzheimer's dementia: Reorientation, monitoring and management.  .  Discharge summary: Patient had an mildly eventful stay at [place] which improved with gait, strength, balance, and endurance, she was treated for UTI in 2 different occasions, had a CHF exacerbation on a few different occasionsas well, tolerated IM and increased p.o. diuretics, recently has completed cephalosporin for UTI, stabilized and will transition home, she does live in an assisted living care center, with extended help, good family support, home health services will be arranged the  are following closely.  .  Discharge medication list:  Xarelto Oral Tablet 15 MG (Rivaroxaban) Active 4/28/2024 4/28/2024  Give 1 tablet by mouth one time a day for Afib  Clopidogrel Bisulfate Oral Tablet  75 MG (Clopidogrel Bisulfate) Active 3/27/2024 3/26/2024  Give 1 tablet by mouth one time a day for Coronary artery Disease  Acetaminophen Oral Tablet 500 MG (Acetaminophen) Active 4/5/2024 3/26/2024  Give 1 tablet by mouth every 6 hours as needed for for pain Acetaminophen not to exceed 3000 mg/day  Levothyroxine Sodium Tablet 100 MCG Active 4/23/2024 4/23/2024  Give 1 tablet by mouth one time a day for low thyroid hormone  Docusate Sodium Capsule 100 MG Active 3/26/2024  Give 1 tablet by mouth every 12 hours as needed for constipation  Ferrous Sulfate Tablet 325 (65 Fe) MG Active 3/27/2024  Give 325 mg by mouth three times a day for supplementation  Triamcinolone Acetonide External Cream 0.1 % (Triamcinolone Acetonide (Topical)) Active 3/26/2024 3/26/2024  Apply to Affected Area topically every 12 hours as needed for rash  Cranberry Oral Capsule 500 MG (Cranberry (Vaccinium macrocarpon)) Active 3/27/2024 3/26/2024  Give 2 capsule by mouth one time a day for supplementation  Loperamide HCl Capsule 2 MG Active 3/26/2024  Give 1 capsule by mouth every 6 hours as needed for diarrhea after each loose stool  Ondansetron HCl Tablet 4 MG Active 4/11/2024 3/26/2024  Give1 tablet by mouth every 8hours as needed for Nausea and Vomiting  TraMADol HCl Tablet 50 MG Active 3/26/2024 3/26/2024  Give 1 tablet by mouth every 6 hours as needed for Moderate Pain  Metoprolol Succinate ER Oral Tablet Extended Release 24 Hour 100 MG (Metoprolol Succinate) Active 3/29/2024 3/28/2024  Give 1 tablet by mouth one time a day related to ESSENTIAL (PRIMARY) HYPERTENSION (I10) HOLD IF BP IS LESS THAN 90/60 AND OR PULSE IS LESS THAN 60. NOTIFY MD/NP  Simethicone Oral Tablet Chewable 80 MG (Simethicone) Active 4/10/2024  Give 2 tablet by mouth every 4 hours as needed for Heartburn  Omeprazole Oral Capsule Delayed Release (Omeprazole) Active 4/25/2024 4/24/2024  Give 40 mg by mouth one time a day for GERD  Lasix Tablet (Furosemide) Active  4/26/2024 4/25/2024  Give 30 mg by mouth one time a day related to FLUID OVERLOAD, UNSPECIFIED (E87.70)  Aldactone Oral Tablet 25 MG (Spironolactone) Active 4/26/2024 4/25/2024  Give 1 tablet by mouth one time a day related to FLUID OVERLOAD, UNSPECIFIED (E87.70)

## 2024-05-08 NOTE — PROGRESS NOTES
[facility] [place] note transcribed by Dr. Jeff Damico  .  Date seen: 5/6/2024  .  Subjective: Patient seen and examined for atrial fibrillation with RVR, UTI, chronic kidney disease, CHF, swallowing difficulty. Patient seen and examined, seems stable, had a eventful week in which she was sent to the emergency room after bleeding was noted of the left lower extremity, was unable to be controlled by the facility. There she had the area dressed, seen by the on-call physicians, then ended up being sent back to facility, her anticoagulants were held, she seemed to have a stable rest of the weekend, lab work looked unremarkable, today she is feeling some pain in the left lower extremity, it is wrapped, dressed, which she has at baseline. Memory poor, but she does remember the events for the weekend.  .  EXAM:  Vital signs: See point click care charting for updated details  Gen. exam: Alert and awake, baseline mental status noted, in no acute distress  HEENT: Pupils equal and reactive to light and accommodation, moist mucousmembranes, bilateral conjunctival irritationleft worse than right  Neck exam: Supple. Normal thyroid trachea midline, no JVD  Heart exam: Regular rate and irregular rhythm 2/6 AI murmurs no S3 no S4  Lung exam: Bilateral fine rales, mild, diffuse no rhonchi no wheezes, poor air exchange  Abdominal exam: Soft, nontender, nondistended, positive bowel sounds are normoactive  Extremities exam: no clubbing,no cyanosis, 1/4 edema, right lower extremity, 2/4 left lower extremity edema  Skinexam: no rashes, bilateral lower extremity venous stasis changesthat are severe, dressed, left lower extremity lesions, see wound nursing notes for full details, dressing not removed on direction from the treating physician.  Neurological exam: Cranial nerves II through XII intact, no gross deficits  Musculoskeletal exam: Advanced bilateral generalized hand arthritis appreciated, no obvious deformity  .  Labs/imaging:  See chart  DVT prophylaxis: with Xarelto and Plavix, held temporarily  Ambulatory status: Per hospital discharge recommendations, specialist involvement/recommendations and physical therapy evaluation  .  ASSESSMENT AND PLAN:  Pavithra Cedillo is a 89 year old female seen at [facility] [place] with the followin. Atrial fibrillation with RVR (HCC)  Conservative management, medications adjusted, rate control with anticoagulation, anticoagulation on hold  2. dysphagia: Recent visit to GI, has given some options, family has decided against G-tube in the past, continue with speech adjusted diet, we may have to pull back on her aggressive nurses options, and present more of a palliative options for her. Continue to discuss on discharge  3. Congestive heart failure, unspecified HF chronicity, unspecified heartfailure type (HCC): with worsening, continue 30 mg oral Lasix and add aldactone 25mg po qd lab work to track kidney function  4. New wounds, left lower extremity: With history of severe venous stasis: Continue with current wound care, dressings, compression wrap, will hold anticoagulants for 48 hours, continue to monitor response, track hemoglobin counts.  .  Stable problem list:  venous stasis: Wound therapy care.  History of UTI: Complete Ceftin 250 twice daily 2024  stage 3 chronic kidney disease, unspecified whether [id number] or 3b CKD (HCC)  Pure hypercholesterolemia  Stable,she may benefit from a restart of the simvastatin 20 mg, looks like a historyof Vytorin in the past, will await normal LFTs before restarting  Pulmonary hypertension, unspecified (HCC)  Other specified hypothyroidism  History of falling  Alzheimer's dementia: Reorientation, monitoring and management.

## 2024-05-14 ENCOUNTER — LAB REQUISITION (OUTPATIENT)
Dept: LAB | Facility: HOSPITAL | Age: 89
End: 2024-05-14

## 2024-05-14 DIAGNOSIS — I10 ESSENTIAL (PRIMARY) HYPERTENSION: ICD-10-CM

## 2024-05-14 DIAGNOSIS — D64.9 ANEMIA, UNSPECIFIED: ICD-10-CM

## 2024-05-14 LAB
ALBUMIN SERPL-MCNC: 3.4 G/DL (ref 3.2–4.8)
ALBUMIN/GLOB SERPL: 1.2 {RATIO} (ref 1–2)
ALP LIVER SERPL-CCNC: 85 U/L
ALT SERPL-CCNC: <7 U/L
ANION GAP SERPL CALC-SCNC: 6 MMOL/L (ref 0–18)
AST SERPL-CCNC: 20 U/L (ref ?–34)
BASOPHILS # BLD AUTO: 0.02 X10(3) UL (ref 0–0.2)
BASOPHILS NFR BLD AUTO: 0.3 %
BILIRUB SERPL-MCNC: 0.9 MG/DL (ref 0.2–0.9)
BUN BLD-MCNC: 17 MG/DL (ref 9–23)
BUN/CREAT SERPL: 16.5 (ref 10–20)
CALCIUM BLD-MCNC: 8.8 MG/DL (ref 8.7–10.4)
CHLORIDE SERPL-SCNC: 97 MMOL/L (ref 98–112)
CO2 SERPL-SCNC: 30 MMOL/L (ref 21–32)
CREAT BLD-MCNC: 1.03 MG/DL
DEPRECATED RDW RBC AUTO: 54.7 FL (ref 35.1–46.3)
EGFRCR SERPLBLD CKD-EPI 2021: 50 ML/MIN/1.73M2 (ref 60–?)
EOSINOPHIL # BLD AUTO: 0.05 X10(3) UL (ref 0–0.7)
EOSINOPHIL NFR BLD AUTO: 0.7 %
ERYTHROCYTE [DISTWIDTH] IN BLOOD BY AUTOMATED COUNT: 15.1 % (ref 11–15)
GLOBULIN PLAS-MCNC: 2.8 G/DL (ref 2–3.5)
GLUCOSE BLD-MCNC: 100 MG/DL (ref 70–99)
HCT VFR BLD AUTO: 31.7 %
HGB BLD-MCNC: 10.3 G/DL
IMM GRANULOCYTES # BLD AUTO: 0.02 X10(3) UL (ref 0–1)
IMM GRANULOCYTES NFR BLD: 0.3 %
LYMPHOCYTES # BLD AUTO: 0.89 X10(3) UL (ref 1–4)
LYMPHOCYTES NFR BLD AUTO: 11.9 %
MCH RBC QN AUTO: 32.4 PG (ref 26–34)
MCHC RBC AUTO-ENTMCNC: 32.5 G/DL (ref 31–37)
MCV RBC AUTO: 99.7 FL
MONOCYTES # BLD AUTO: 0.56 X10(3) UL (ref 0.1–1)
MONOCYTES NFR BLD AUTO: 7.5 %
NEUTROPHILS # BLD AUTO: 5.96 X10 (3) UL (ref 1.5–7.7)
NEUTROPHILS # BLD AUTO: 5.96 X10(3) UL (ref 1.5–7.7)
NEUTROPHILS NFR BLD AUTO: 79.3 %
OSMOLALITY SERPL CALC.SUM OF ELEC: 278 MOSM/KG (ref 275–295)
PLATELET # BLD AUTO: 250 10(3)UL (ref 150–450)
POTASSIUM SERPL-SCNC: 4.4 MMOL/L (ref 3.5–5.1)
PROT SERPL-MCNC: 6.2 G/DL (ref 5.7–8.2)
RBC # BLD AUTO: 3.18 X10(6)UL
SODIUM SERPL-SCNC: 133 MMOL/L (ref 136–145)
WBC # BLD AUTO: 7.5 X10(3) UL (ref 4–11)

## 2024-05-14 PROCEDURE — 80053 COMPREHEN METABOLIC PANEL: CPT

## 2024-05-14 PROCEDURE — 85025 COMPLETE CBC W/AUTO DIFF WBC: CPT

## 2024-05-15 ENCOUNTER — TELEPHONE (OUTPATIENT)
Dept: INTERNAL MEDICINE CLINIC | Facility: CLINIC | Age: 89
End: 2024-05-15

## 2024-05-15 ENCOUNTER — HOSPITAL ENCOUNTER (EMERGENCY)
Facility: HOSPITAL | Age: 89
Discharge: HOME | End: 2024-05-15
Attending: EMERGENCY MEDICINE

## 2024-05-15 VITALS
BODY MASS INDEX: 23.18 KG/M2 | HEIGHT: 59 IN | HEART RATE: 65 BPM | WEIGHT: 115 LBS | DIASTOLIC BLOOD PRESSURE: 82 MMHG | RESPIRATION RATE: 18 BRPM | OXYGEN SATURATION: 99 % | TEMPERATURE: 97 F | SYSTOLIC BLOOD PRESSURE: 133 MMHG

## 2024-05-15 DIAGNOSIS — S80.12XA LEG HEMATOMA, LEFT, INITIAL ENCOUNTER: Primary | ICD-10-CM

## 2024-05-15 DIAGNOSIS — N30.00 ACUTE CYSTITIS WITHOUT HEMATURIA: ICD-10-CM

## 2024-05-15 LAB
BILIRUB UR QL: NEGATIVE
CLARITY UR: CLEAR
COLOR UR: YELLOW
GLUCOSE UR-MCNC: NORMAL MG/DL
HGB UR QL STRIP.AUTO: NEGATIVE
HYALINE CASTS #/AREA URNS AUTO: PRESENT /LPF
KETONES UR-MCNC: NEGATIVE MG/DL
LEUKOCYTE ESTERASE UR QL STRIP.AUTO: 75
PH UR: 6 [PH] (ref 5–8)
PROT UR-MCNC: NEGATIVE MG/DL
SP GR UR STRIP: 1.01 (ref 1–1.03)
UROBILINOGEN UR STRIP-ACNC: NORMAL

## 2024-05-15 PROCEDURE — 87077 CULTURE AEROBIC IDENTIFY: CPT | Performed by: EMERGENCY MEDICINE

## 2024-05-15 PROCEDURE — 81001 URINALYSIS AUTO W/SCOPE: CPT | Performed by: EMERGENCY MEDICINE

## 2024-05-15 PROCEDURE — 99283 EMERGENCY DEPT VISIT LOW MDM: CPT

## 2024-05-15 PROCEDURE — 87186 SC STD MICRODIL/AGAR DIL: CPT | Performed by: EMERGENCY MEDICINE

## 2024-05-15 PROCEDURE — 87086 URINE CULTURE/COLONY COUNT: CPT | Performed by: EMERGENCY MEDICINE

## 2024-05-15 NOTE — ED QUICK NOTES
Attempted to call RN at [facility] [place] for discharge instructions. No answer. Will try again later

## 2024-05-15 NOTE — DISCHARGE INSTRUCTIONS
Please hold your blood thinner for 1 week if you are okay with understanding that you have an increased risk of stroke by holding the medication as we have very little else to offer at in terms of reducing the hematoma that you have been experiencing chronically.

## 2024-05-15 NOTE — ED INITIAL ASSESSMENT (HPI)
To ED via [facility] for wound evaluation to [facility]. Patient was recently discharged from this hospital last week.

## 2024-05-15 NOTE — ED PROVIDER NOTES
Patient Seen in: [facility] Emergency Department    History     Chief Complaint   Patient presents with    Wound Care       HPI    89-year-old female with a history of atrial fibrillation on anticoagulation and reports several weeks of a left leg hematoma for which the patient was seen recently in the emergency department.  Patient states that she remembers urinating earlier today and waking up with urine on her leg and states this is the reason why she is in the emergency department which differs from the EMS report provided.  Patient denies any fall.  History is somewhat limited by the patient's dementia.    History reviewed.   Past Medical History:    A-fib (HCC)    Arthritis    Balance problem    Bladder problem    Cancer (HCC)    Cataract    Disorder of thyroid    Essential hypertension    High blood pressure    High cholesterol    History of blood transfusion    History of breast cancer in female    \"Left sided breast cancer\"    History of heart bypass surgery    Hx of skin cancer, basal cell    Internal derangement of knee, right    Osteoarthritis    Osteoporosis    Personal history of malignant neoplasm of breast    Primary osteoarthritis of knees, bilateral    Primary osteoarthritis of right knee    Rheumatic fever/heart disease    Visual impairment       History reviewed.   Past Surgical History:   Procedure Laterality Date    Angioplasty (coronary)  06-    Bowel resection  2016    Cabg  1989 5 bypass    Cholecystectomy      Colon surgery      Colonoscopy  2010    Hysterectomy      Laparoscopic cholecystectomy      Lumpectomy left      Radiation left           Medications :  (Not in a hospital admission)       Family History   Problem Relation Age of Onset    Heart Disease Father     Stroke Mother         CVA    Stroke Sister         CVA    Diabetes Brother     Breast Cancer Self 72    Breast Cancer Maternal Aunt         post menopause    Breast Cancer Maternal Cousin Female         post  menopause    Breast Cancer Maternal Cousin Female         post menopause       Smoking Status:   Social History     Socioeconomic History    Marital status:    Tobacco Use    Smoking status: Former     Current packs/day: 1.00     Average packs/day: 1 pack/day for 20.0 years (20.0 ttl pk-yrs)     Types: Cigarettes    Smokeless tobacco: Never   Vaping Use    Vaping status: Never Used   Substance and Sexual Activity    Alcohol use: Not Currently     Comment: former    Drug use: No   Other Topics Concern    Caffeine Concern Yes     Comment: Coffee, 1 cup daily;     Exercise No       Constitutional and vital signs reviewed.      Social History and Family History elements reviewed from today, pertinent positives to the presenting problem noted.    Physical Exam     ED Triage Vitals [05/15/24 1322]   /68   Pulse 69   Resp 18   Temp 97.3 °F (36.3 °C)   Temp src    SpO2 95 %   O2 Device Nasal cannula       All measures to prevent infection transmission during my interaction with the patient were taken. The patient was already wearing a droplet mask on my arrival to the room. Personal protective equipment was worn throughout the duration of the exam.  Handwashing was performed prior to and after the exam.  Stethoscope and any equipment used during my examination was cleaned with super sani-cloth germicidal wipes following the exam.     Physical Exam    General: NAD  Head: Normocephalic and atraumatic.  Mouth/Throat/Ears/Nose: No hoarseness of voice  Eyes: Conjunctivae and EOM are normal.  Neck: Normal range of motion. Supple.   Cardiovascular: Normal rate  Respiratory/Chest: No tachypnea.  Clear breath sounds  Gastrointestinal: Nondistended, nontender  Musculoskeletal: No deformity however there is a left lateral leg/fibular region hematoma approximately 5 cm in vertical length by 2 cm.  It is not actively bleeding  Neurological: Alert and appropriate to self and place.  Moving all extremities equally.  Skin:  Skin is warm and dry. No pallor.        ED Course        Labs Reviewed   URINALYSIS WITH CULTURE REFLEX - Abnormal; Notable for the following components:       Result Value    Nitrite Urine 1+ (*)     Leukocyte Esterase Urine 75 (*)     WBC Urine 11-20 (*)     Bacteria Urine 1+ (*)     Hyaline Casts Present (*)     All other components within normal limits   RAINBOW DRAW LAVENDER   RAINBOW DRAW LIGHT GREEN   URINE CULTURE, ROUTINE       As Interpreted by me    Imaging Results Available and Reviewed while in ED: No results found.  ED Medications Administered: Medications - No data to display      MDM     Vitals:    05/15/24 1322 05/15/24 1345 05/15/24 1430 05/15/24 1600   BP: 108/68 107/66 101/73 113/75   Pulse: 69 60 62 64   Resp: 18      Temp: 97.3 °F (36.3 °C)      SpO2: 95% 99%     Weight: 52.2 kg      Height: 149.9 cm (4' 11\")        *I personally reviewed and interpreted all ED vitals.    Pulse Ox: 99%, Room air, Normal     Medical Decision Making      Differential Diagnosis/ Diagnostic Considerations: Hematoma, chronic.  UTI    Complicating Factors: The patient already has atrial fibrillation on anticoagulation to contribute to the complexity of this ED evaluation.    I reviewed prior chart records including ED visit note from May 5, 2024.  Patient is here with chronic left leg hematoma.  This is complicated by the fact that she is on anticoagulation.  After shared decision-making discussion, she understands to hold the anticoagulation for several days after discussing the risk of possible stroke by holding her anticoagulation in context of her atrial fibrillation.  This was also communicated to the nursing home.  Surgicel applied, Ace wrap applied and elevated the leg.  Discharged back to nursing home in stable condition.  Labs notable for urinary tract infection, Keflex prescribed.    Disposition and Plan     Clinical Impression:  1. Leg hematoma, left, initial encounter    2. Acute cystitis without  hematuria        Disposition:  Discharge    Follow-up:  Marcelo Bernard [facility] [id number]  [phone]    Schedule an appointment as soon as possible for a visit in 1 day(s)        Medications Prescribed:  Current Discharge Medication List

## 2024-05-15 NOTE — TELEPHONE ENCOUNTER
Juliann with home health called regarding hematoma on leg, states patient will need to be seen asap for further evaluation. She will ask daughter to call in to schedule. Advised Dr. Bernard does not have visit until next week but other providers have availability today/tomorrow.

## 2024-05-17 NOTE — PROGRESS NOTES
ED Culture Callback Results Review    Pharmacist reviewed culture results from ED visit .    Final urine culture positive for Citrobacter amalonaticus that is not susceptible to previously prescribed Cephalexin (Keflex) therapy. Patient currently resides at [facility]. Results called to Dr. Beasley. He stated he will take care of the patient's antibiotics. No further intervention required at this time.         Chandler eD La Vega, PharmD  Emergency Medicine Pharmacist Specialist  05/17/24; 3:25 PM

## 2024-06-23 NOTE — ED QUICK NOTES
Called RN that is taking care of Pavithra Cedillo and provided discharge education   Patient is alert and oriented x4. Wdl on RA. SB/NS on tele. VSS. IVF infusing. PT/OT. Patient refusing colonoscopy but still agreeable to EGD. GI notified. NPO at midnight for EGD in am. POC discussed with pt, pt verbalizes understanding.   Problem: Patient/Family Goals  Goal: Patient/Family Long Term Goal  Description: Patient's Long Term Goal: discharge home with appropriate means    Interventions:  - consults  -blood transfusion  PT/OT eval  - See additional Care Plan goals for specific interventions  Outcome: Progressing  Goal: Patient/Family Short Term Goal  Description: Patient's Short Term Goal:   6/22am: rest comfortably   6:22 noc: EGD in am    Interventions:   - cluster care  - meds per MAR   - See additional Care Plan goals for specific interventions  Outcome: Progressing     Problem: SAFETY ADULT - FALL  Goal: Free from fall injury  Description: INTERVENTIONS:  - Assess pt frequently for physical needs  - Identify cognitive and physical deficits and behaviors that affect risk of falls.  - [place] fall precautions as indicated by assessment.  - Educate pt/family on patient safety including physical limitations  - Instruct pt to call for assistance with activity based on assessment  - Modify environment to reduce risk of injury  - Provide assistive devices as appropriate  - Consider OT/PT consult to assist with strengthening/mobility  - Encourage toileting schedule  Outcome: Progressing     Problem: CARDIOVASCULAR - ADULT  Goal: Maintains optimal cardiac output and hemodynamic stability  Description: INTERVENTIONS:  - Monitor vital signs, rhythm, and trends  - Monitor for bleeding, hypotension and signs of decreased cardiac output  - Evaluate effectiveness of vasoactive medications to optimize hemodynamic stability  - Monitor arterial and/or venous puncture sites for bleeding and/or hematoma  - Assess quality of pulses, skin color and temperature  - Assess for signs of decreased coronary artery perfusion  - ex. Angina  - Evaluate fluid balance, assess for edema, trend weights  Outcome: Progressing     Problem: HEMATOLOGIC - ADULT  Goal: Maintains hematologic stability  Description: INTERVENTIONS  - Assess for signs and symptoms of bleeding or hemorrhage  - Monitor labs and vital signs for trends  - Administer supportive blood products/factors, fluids and medications as ordered and appropriate  - Administer supportive blood products/factors as ordered and appropriate  Outcome: Progressing

## 2024-09-23 NOTE — ED PROVIDER NOTES
Patient Seen in: [facility] AND [facility] Emergency Department    History   Patient presents with:  Laceration  Infection    Stated Complaint: Left leg infected per pt     HPI    81 yo F with PMH HTN, CAD with recent [address] ICU hospitalization for fall with SDH pres WEI left for writer this AM from patient wife stating patient pain in more pain than when we saw him last week and the area around his incision is very sore. Ultrasound was ordered last week and completed revealing no pseudoaneurysm. Will reach out to [facility] team for further guidance. Of note, patient did ask for pain medication other than tylenol multiple times when visiting writer last week and had reported taking frequent tylenol due to the pain. DOLORES   Cream,  Apply 1 Application topically daily as needed. docusate sodium 100 MG Oral Cap,  Take 100 mg by mouth 2 (two) times daily as needed for constipation. bisacodyl 10 MG Rectal Suppos,  Place 1 suppository (10 mg total) rectally daily as needed. Temp 97.6 °F (36.4 °C) (Temporal)   Resp 16   Ht 160 cm (5' 3\")   Wt 51.7 kg   SpO2 95%   BMI 20.19 kg/m²         Physical Exam   Constitutional: No distress. HEENT: MM.  Head: Normocephalic.  Occipital scalp laceration without evidence for infection/ble plate and screws at the distal femur. SOFT TISSUES: Multiple clips at the proximal left leg medially. EFFUSION: None visible. OTHER: Negative. CONCLUSION:  1. No acute appearing fracture or dislocation. Moderate bony de ossification.   No bone dest Normal, Disp-14 capsule, R-0    mupirocin 2 % External Ointment  Apply 1 Application topically 3 (three) times daily for 10 days. , Normal, Disp-30 g, R-0

## (undated) DIAGNOSIS — Z02.9 ENCOUNTERS FOR UNSPECIFIED ADMINISTRATIVE PURPOSE: ICD-10-CM

## (undated) DIAGNOSIS — I25.10 ATHEROSCLEROSIS OF NATIVE CORONARY ARTERY OF NATIVE HEART WITHOUT ANGINA PECTORIS: ICD-10-CM

## (undated) DIAGNOSIS — I21.4 NSTEMI (NON-ST ELEVATED MYOCARDIAL INFARCTION) (HCC): ICD-10-CM

## (undated) DIAGNOSIS — I48.91 ATRIAL FIBRILLATION AND FLUTTER (HCC): Primary | ICD-10-CM

## (undated) DIAGNOSIS — N18.30 STAGE 3 CHRONIC KIDNEY DISEASE, UNSPECIFIED WHETHER STAGE 3A OR 3B CKD (HCC): ICD-10-CM

## (undated) DIAGNOSIS — I48.92 ATRIAL FIBRILLATION AND FLUTTER (HCC): Primary | ICD-10-CM

## (undated) DIAGNOSIS — I21.9 MI (MYOCARDIAL INFARCTION) (HCC): Primary | ICD-10-CM

## (undated) DEVICE — SUT VICRYL 2-0 FS-1 J443H

## (undated) DEVICE — HIP PINNING: Brand: MEDLINE INDUSTRIES, INC.

## (undated) DEVICE — 3M™ IOBAN™ 2 ANTIMICROBIAL INCISE DRAPE 6650EZ: Brand: IOBAN™ 2

## (undated) DEVICE — SUTURE VICRYL 1 CT-1

## (undated) DEVICE — SUTURE SILK 0

## (undated) DEVICE — 3M™ TEGADERM™ TRANSPARENT FILM DRESSING, 1626W, 4 IN X 4-3/4 IN (10 CM X 12 CM), 50 EACH/CARTON, 4 CARTON/CASE: Brand: 3M™ TEGADERM™

## (undated) DEVICE — NON-ADHERENT STRIPS,OIL EMULSION: Brand: CURITY

## (undated) DEVICE — ROD RM 950MM 2.5MM BALL TIP GW

## (undated) DEVICE — 6619 2 PTNT ISO SYS INCISE AREA&LT;(&GT;&&LT;)&GT;P: Brand: STERI-DRAPE™ IOBAN™ 2

## (undated) DEVICE — ENCORE® LATEX MICRO SIZE 7.5, STERILE LATEX POWDER-FREE SURGICAL GLOVE: Brand: ENCORE

## (undated) DEVICE — INTENDED FOR TISSUE SEPARATION, AND OTHER PROCEDURES THAT REQUIRE A SHARP SURGICAL BLADE TO PUNCTURE OR CUT.: Brand: BARD-PARKER ® STAINLESS STEEL BLADES

## (undated) DEVICE — SUT MONOCRYL 4-0 PS-1 Y935H

## (undated) DEVICE — GAMMEX® NON-LATEX PI ORTHO SIZE 9, STERILE POLYISOPRENE POWDER-FREE SURGICAL GLOVE: Brand: GAMMEX

## (undated) DEVICE — KIT DRN 3/16IN PVC DRN 3 SPRG

## (undated) DEVICE — SPONGE LAP 18X18 XRAY STRL

## (undated) DEVICE — VIOLET BRAIDED (POLYGLACTIN 910), SYNTHETIC ABSORBABLE SUTURE: Brand: COATED VICRYL

## (undated) DEVICE — SUCTION CANISTER, 3000CC,SAFELINER: Brand: DEROYAL

## (undated) DEVICE — SUT MONOCRYL 3-0 PS-1 Y936H

## (undated) DEVICE — DRESSING BIOPATCH 1X4 BLUE

## (undated) DEVICE — SUTURE PDS II 1 CT-1

## (undated) DEVICE — PROXIMATE SKIN STAPLERS (35 WIDE) CONTAINS 35 STAINLESS STEEL STAPLES (FIXED HEAD): Brand: PROXIMATE

## (undated) DEVICE — OCCLUSIVE GAUZE STRIP,3% BISMUTH TRIBROMOPHENATE IN PETROLATUM BLEND: Brand: XEROFORM

## (undated) DEVICE — Device

## (undated) DEVICE — DRESSING BIOPATCH 1X4 CNTR

## (undated) DEVICE — SUTURE VICRYL 2-0 FS-1

## (undated) DEVICE — GUIDEWIRE SYNT 3.2X400 357.399

## (undated) DEVICE — BANDAGE ROLL,100% COTTON, 6 PLY, LARGE: Brand: KERLIX

## (undated) DEVICE — SUT VICRYL 0 CP-1 J267H

## (undated) DEVICE — SUTURE MONOCRYL 3-0 Y936H

## (undated) DEVICE — TOWEL OR BLU 16X26 STRL

## (undated) DEVICE — 3M™ STERI-DRAPE™ PATIENT ISOLATION DRAPE 1014: Brand: STERI-DRAPE™

## (undated) DEVICE — 3M™ STERI-STRIP™ REINFORCED ADHESIVE SKIN CLOSURES, R1547, 1/2 IN X 4 IN (12 MM X 100 MM), 6 STRIPS/ENVELOPE: Brand: 3M™ STERI-STRIP™

## (undated) DEVICE — BATTERY

## (undated) DEVICE — BANDAGE COBAN 5X2 TAN LTX

## (undated) DEVICE — BIT DRL GRN 145MM 4.2MM 3 FLT

## (undated) DEVICE — SUTURE SILK 0 SH

## (undated) DEVICE — PAD,ABDOMINAL,8"X7.5",STERILE,LF,1/PK: Brand: MEDLINE

## (undated) DEVICE — DRILL BIT SYNT 3.2X145 310.31

## (undated) DEVICE — PEN: MARKING STD PT 100/CS: Brand: MEDICAL ACTION INDUSTRIES

## (undated) DEVICE — COVER SGL STRL LGHT HNDL BLU

## (undated) DEVICE — ENCORE® LATEX ACCLAIM SIZE 8, STERILE LATEX POWDER-FREE SURGICAL GLOVE: Brand: ENCORE

## (undated) DEVICE — SOL  .9 1000ML BTL

## (undated) DEVICE — TOWEL SURG OR 17X30IN BLUE

## (undated) DEVICE — 6617 IOBAN II PATIENT ISOLATION DRAPE 5/BX,4BX/CS: Brand: STERI-DRAPE™ IOBAN™ 2

## (undated) DEVICE — DRILL BIT 4.2/3-FLTD CALIB

## (undated) DEVICE — SUTURE VICRYL 0 J340H

## (undated) DEVICE — SUTURE VICRYL 0 CP-1

## (undated) DEVICE — TRAY SRGPRP PVP IOD WT SCRB SM

## (undated) DEVICE — REM POLYHESIVE ADULT PATIENT RETURN ELECTRODE: Brand: VALLEYLAB

## (undated) DEVICE — PROXIMATE RH ROTATING HEAD SKIN STAPLERS (35 WIDE) CONTAINS 35 STAINLESS STEEL STAPLES: Brand: PROXIMATE

## (undated) DEVICE — BANDAGE,GAUZE,CONFORMING,4"X75",STRL,LF: Brand: MEDLINE

## (undated) DEVICE — STERILE TETRA-FLEX CF, ELASTIC BANDAGE LATEX FREE 6IN X5.5 YD: Brand: TETRA-FLEX™CF

## (undated) DEVICE — IMPERVIOUS STOCKINETTE: Brand: DEROYAL

## (undated) DEVICE — LOWER EXTREMITY: Brand: MEDLINE INDUSTRIES, INC.

## (undated) DEVICE — GAUZE SPONGES,12 PLY: Brand: CURITY

## (undated) DEVICE — ABDOMINAL PAD: Brand: CURITY

## (undated) DEVICE — PETROLATUM GAUZE CISION DRESSING: Brand: VASELINE

## (undated) DEVICE — DRESSING PETRO 18X3IN ABS NADH

## (undated) DEVICE — SOL NACL IRRIG 0.9% 1000ML BTL

## (undated) DEVICE — UNDYED BRAIDED (POLYGLACTIN 910), SYNTHETIC ABSORBABLE SUTURE: Brand: COATED VICRYL

## (undated) DEVICE — SKIN PREP TRAY 4 COMPARTM TRAY: Brand: MEDLINE INDUSTRIES, INC.

## (undated) DEVICE — SUT PDS II 1 CT-1 Z347H

## (undated) DEVICE — 3M™ MEDITPORE™ SOFT CLOTH TAPE 6 IN X 10 YD 12 ROLLS/CASE 2966: Brand: 3M™ MEDIPORE™

## (undated) DEVICE — DRAPE CASSETTE X-RAY

## (undated) DEVICE — BANDAGE,GAUZE,BULKEE II,4.5"X4.1YD,STRL: Brand: MEDLINE

## (undated) DEVICE — COTTON UNDERCAST PADDING,REGULAR FINISH: Brand: WEBRIL

## (undated) DEVICE — 4.2MM RADIOLUCENT DRILL BIT

## (undated) DEVICE — TRAY SKIN PREP PVP-1

## (undated) DEVICE — STERILE TETRA-FLEX CF, ELASTIC BANDAGE, 4" X 5.5YD: Brand: TETRA-FLEX™CF

## (undated) DEVICE — SPONGE PREMIERPRO 7X18X18

## (undated) DEVICE — GAUZE TRAY STERILE 4X4 12PLY

## (undated) DEVICE — GAMMEX® PI HYBRID SIZE 9, STERILE POWDER-FREE SURGICAL GLOVE, POLYISOPRENE AND NEOPRENE BLEND: Brand: GAMMEX

## (undated) DEVICE — ESMARK: Brand: DEROYAL

## (undated) DEVICE — CURAD NONADHERENT PAD 3X4

## (undated) NOTE — LETTER
21      Patient: Jamie Trimble  : 1928 Visit date: 2021    Dear Kartik Ryder,      I examined your patient in consultation today. She has a healing avulsion flap wound of the left leg.   No surgery is indicated at this p

## (undated) NOTE — IP AVS SNAPSHOT
Patient Demographics     Address  [address] [address] Phone  [phone] [facility])  [phone] (Mobile) *Preferred*      Emergency Contact(s)     Name Relation Home Work [place] Daughter   [phone] HYDROcodone-acetaminophen 7.5-325 MG Tabs  Commonly known as: NORCO  Take 1 tablet by mouth every 6 (six) hours as needed for Pain (severe). Watch drowsiness no alcohol     PEG 3350 17 g Pack  Commonly known as: MIRALAX  Take 17 g by mouth daily as needed. Shazia Cedillo MD In 2 weeks.     Specialty: SURGERY, ORTHOPEDIC  Why: as he wishes for all wound and activity orders  Contact information:  Alexander Gar medicatio Metoprolol Succinate ER 25 MG Tb24  Commonly known as:  Toprol XL  Next dose due: TONIGHT      TAKE 1/2 TABLET(12.5 MG) BY MOUTH TWICE DAILY   Sergio Fisher MD         Pantoprazole Sodium 40 MG Tbec  Commonly known as: PROTONIX  Next dose due: ADELE apixaban 2.5 MG Tabs  bisacodyl 10 MG Supp  cyclobenzaprine 5 MG Tabs  docusate sodium 100 MG Caps  HYDROcodone-acetaminophen 7.5-325 MG Tabs  Pantoprazole Sodium 40 MG Tbec  PEG 3350 17 g Pack  Phenazopyridine HCl 100 MG Tabs  sodium chloride 0.9% SOLN 10 [id number] traMADol HCl (ULTRAM) tab 50 mg 10/19/20 2213 Given      [id number] traMADol HCl (ULTRAM) tab 50 mg 10/20/20 1317 Given      [id number] triamcinolone acetonide (KENALOG) 0.1 % cream 10/20/20 0902 Given      [id number] vancomycin HCl (VANCOCIN) cap Ordering provider: Karan Conde MD  10/19/20 0829 Resulting lab: [facility])    Specimen Information    Type Source Collected On   Blood — 10/20/20 0519          Components    Component Value Reference Range F Lab - Abbreviation Name Director Address Valid Date Range    [place] [address] Lab [facility]) [facility] LAB [facility]) Juanita Finnegan. Zach Alvarez M.D. [facility]. [address] [place] [id number] 03/19/20 1442 - Present            Microbiology Results (All Order Status: Completed Lab Status: Final result Updated: 10/08/20 2054    Specimen: Other from [place]      Rapid SARS-CoV-2 by PCR Not Detected         H&P - H&P Note      H&P signed by Florentino Brothers MD at 10/18/2020  9:48 AM   Version 1 of 1    A HISTORY OF PRESENT ILLNESS:  This is a very pleasant 49-year-old woman with a past medical history of coronary artery disease.   She had coronary artery bypass surgery and bare metal stent to the saphenous vein graft in the past.  She also has chronic kidne patient's admission. Interestingly enough, the patient has recently seen Dr. Demetria Pierson in followup and a nuclear medicine stress test at the [address] [place] [address] office was performed at the end of September. It revealed fixed defects with no reversible defects.   Lynne Altamirano SOCIAL HISTORY:  The patient smoked for a few years when she was young but quit many, many years ago. Does not drink alcohol or use drugs. She lives by herself. She is usually independent in her activities of daily living with the aid of a walker.   She EXTREMITIES:  No clubbing or cyanosis. There was swelling about the left hip with some external rotation. The patient did have coolness of both feet with decreased pulses bilaterally. Sensation was intact. Capillary refill is present.   NEUROLOGIC:  She 2.   Coronary artery disease status post CABG and stenting in the past.  The patient just had a stress test, which does reveal fixed defects but no reversible defects.   She certainly would be at increased risk for any surgery given her age and comorbiditie 11.   Diarrhea while on Bactrim. Check Clostridium difficile. 12. DVT prophylaxis. Subcutaneous heparin, SCD on right lower extremity. 13.   Hypocalcemia.   I am uncertain whether or not this is accurate given no evidence of this in the past.  We wi Jannet Lancaster is a 80year old woman with history of previous breast cancer; coronary artery disease, previous cardiac revascularization surgery, chronic kidney disease dyslipidemia hypertension; recovering from recent admission and diagnosis of left 01/23/20 : 117 lb (53.1 kg)  01/07/20 : 115 lb (52.2 kg)  11/05/19 : 119 lb (54 kg)  08/13/19 : 115 lb 6.4 oz (52.3 kg)  06/14/19 : 120 lb (54.4 kg)  05/02/19 : 108 lb (49 kg)  01/09/19 : 129 lb 3.2 oz (58.6 kg)  01/07/19 : 129 lb 3.2 oz (58.6 kg)  01/02/1 · I had an extensive discussion midday today with both daughters at bedside today regarding acute upper GI bleed in this 20-year-old woman recovering from hip surgery as above.   We discussed possible causes for this GI bleed ranging from Mirtha-Zimmerman tear • History of heart bypass surgery 1989   • Hx of skin cancer, basal cell 2009   • Internal derangement of knee, right 8/14/2017   • Osteoarthritis    • Osteoporosis 11/14/2007   • Personal history of malignant neoplasm of breast 5/28/2009   • Primary osteo •  0.9% NaCl infusion, , Intravenous, Continuous    •  docusate sodium (COLACE) cap 100 mg, 100 mg, Oral, BID    •  PEG 3350 (MIRALAX) powder packet 17 g, 17 g, Oral, Daily PRN    •  magnesium hydroxide (MILK OF MAGNESIA) 400 MG/5ML suspension 30 mL, 30 mL •  triamcinolone acetonide 0.1 % External Cream, Apply topically 2 (two) times daily. Apply to affected area bid  As needed (Patient taking differently: Apply topically 2 (two) times daily.  Apply to affected area bid  As needed )    •  METOPROLOL SUCCINATE HEENT: Normocephalic; pupils equally round and reactive to light; no temporal wasting; no thyromegaly or cervical lymphadenopathy  PULM: Lungs clear to auscultation anteriorly  CV:[CB.1] RRR not tachycardic[CB. 5]  ABD: Normoactive bowel sounds; soft/nondis CONCLUSION: Post left femur ORIF for intertrochanteric fracture.    Dictated by (CST): Gallo Delgado MD on 10/16/2020 at 4:08 PM     Finalized by (CST): Gallo Delgado MD on 10/16/2020 at 4:10 PM[CB.1]                 Electronically signed by Sejal Villalta Filed: 10/20/2020  2:06 PM Date of Service: 10/20/2020  2:03 PM Status: Signed    : Jennifer Hays PTA (Physical Therapist)       PHYSICAL THERAPY TREATMENT NOTE - INPATIENT     Room Number: 223/082-J       Presenting Problem: L hip ORIF, hard BP Location: Right arm  BP Method: Automatic  Patient Position: Sitting    O2 WALK                  AM-PAC '6-Clicks' INPATIENT SHORT FORM - BASIC MOBILITY  How much difficulty does the patient currently have. ..  -   Turning over in bed (including adjustin Goal #3   Current Status NT   Goal #4 Patient verbalizes and/or demonstrates all precautions and safety concerns independently   Goal #4   Current Status In progress   Goal #5 Patient independently performs home exercise program for ROM/strengthening per t Pt reports being ready for PT RX    OBJECTIVE  Precautions: Limb alert - left    WEIGHT BEARING RESTRICTION  Weight Bearing Restriction: L lower extremity           L Lower Extremity: Toe Touch Weight Bearing    PAIN ASSESSMENT   Ratin  Location: left Position supine       Patient End of Session: Up in chair;Call light within reach;RN aware of session/findings;Bracing education provided; All patient questions and concerns addressed    CURRENT GOALS   Patient Goal Patient's self-stated goal is: get back t PHYSICAL THERAPY ASSESSMENT   Chart reviewed , RN approve participation. Pt received supine in bed agreeable to mobility . Pt is alert , on 2 1/2L O2. Pt reports pain post sx at left hip sx site. Pt currently is NPO .    Pt is feeling better overall co Pt with high fall risk , poor activity tolerance , need for 2 person assisted mobility and generalized weakness throughout with sign loss of left hip and knee ROM and strength post sx. Pt currently on speciality mattress in bed with high bed surface.    Pt The patient's[KS. 1] Approx Degree of Impairment: 76.75%[KS. 2] has been calculated based on documentation in the [facility] '6 clicks' Inpatient Basic Mobility Short Form.   Research supports that patients with this level of impairment may benefit from rehab fac -   Turning over in bed (including adjusting bedclothes, sheets and blankets)?: A Lot   -   Sitting down on and standing up from a chair with arms (e.g., wheelchair, bedside commode, etc.): A Lot   -   Moving from lying on back to sitting on the side of th Goal #5 Patient independently performs home exercise program for ROM/strengthening per the instructions provided in preparation for discharge. Goal #5   Current Status[KS. 1]  B AP and Quad sets      Sitting at EOB for postural control[KS. 3]    Goal #6   The patient's[KS. 1] Approx Degree of Impairment: 76.75%[KS. 2] has been calculated based on documentation in the [facility] '6 clicks' Inpatient Basic Mobility Short Form.   Research supports that patients with this level of impairment may benefit from rehab fac OBJECTIVE  Precautions: Limb alert - left    WEIGHT BEARING RESTRICTION  Weight Bearing Restriction: L lower extremity           L Lower Extremity: Toe Touch Weight Bearing    PAIN ASSESSMENT   Ratin  Location: L hip  Management Techniques: Relaxation; Patient Goal Patient's self-stated goal is: get back to PLOF   Goal #1 Patient is able to demonstrate supine - sit EOB @ level: SBA   Goal #1   Current Status Max a   Goal #2 Patient is able to demonstrate transfers Sit to/from Stand at assistance level: Pt seen for OT treatment. Pt received sitting in the chair after PT session. Pt reporting pain in L hip, very guarded. Pt required minimal assist for [facility] and UE dressing and max assist to use reacher and sock aid for LE dressing while seated in the chair. -   Toileting, which includes using toilet, bedpan or urinal? : A Lot  -   Putting on and taking off regular upper body clothing?: A Little  -   Taking care of personal grooming such as brushing teeth?: A Little  -   Eating meals?: None[SS. 2]    AM-PAC Sco SLP Note signed by JOSH Olivas at 10/12/2020 11:06 AM  Version 1 of 1    Author: Suanne Douglas, SLP Service: Pedro Victor Type: Speech and Language Pathologist    Filed: 10/12/2020 11:06 AM Date of Service: 10/12/2020 11:03 AM Status: Signed Treatment Plan  Treatment Plan/Recommendations: Aspiration precautions    Interdisciplinary Communication: Discussed with RN  and PCT.             GOALS  Goal #1 The patient will tolerate solid consistency and thin  liquids without overt signs or symptoms Description: Patient's Long Term Goal: I would like to be discharged back to my home at [place]. Interventions:  - Work with PT/OT.   -Follow plan of care  -Pain management interventions  -Take medication as prescribed  - See additional Care Plan goal

## (undated) NOTE — LETTER
9/20/2019              [address] [place] [id number]         Dear Antoine Rouse,    This letter is to inform you that our office has made several attempts to reach you by phone without success.   We were a

## (undated) NOTE — LETTER
[id number] [facility]     I agree to have a Peripherally Inserted Central Catheter (PICC) placed in my arm.    1. The PICC insertion procedure, care, maintenance, risks, benefits, and complications have been explained to me b 7. The person performing this procedure has discussed the potential benefits, risks, and side effects of the PICC; the likelihood of achieving goals; and potential problems that might occur during recuperation.  They also discussed reasonable alternatives t

## (undated) NOTE — IP AVS SNAPSHOT
Patient Demographics     Address  [address] [place] [id number] Phone  [phone] [facility])  [phone] (Mobile) *Preferred*      Emergency Contact(s)     Name Relation Home Work Mobile    Lizett Helms Daughter   [phone]    Oleg Mcmullen, Place 1 patch onto the skin daily. bisacodyl 10 MG Supp  Commonly known as: DULCOLAX  Next dose due: Anytime as Needed      Place 1 suppository (10 mg total) rectally daily as needed.    Jaquelin Dobbs MD         Clopidogrel Bisulfate 75 Take 1 tablet (40 mg total) by mouth every morning before breakfast.   JUSTIN SALINAS MD         PEG 3350 17 g Pack  Commonly known as: MIRALAX  Next dose due: Anytime as Needed      Take 17 g by mouth daily as needed.    ARGENTINA Cohn Order ID Medication Name Action Time Action Reason Comments    [id number] Clopidogrel Bisulfate (PLAVIX) tab 75 mg 01/25/21 0818 Given      [id number] HYDROcodone-acetaminophen (Clary Epp) 7.5-325 MG per tab 1 tablet 01/25/21 0817 Given      [id number] Isosorbid Ordering provider: Lucio Matias MD  01/24/21 7734 Resulting lab: [facility])    Specimen Information    Type Source Collected On   Blood — 01/25/21 2731          Components    Component Value Reference Range F CBC W/ DIFFERENTIAL[[id number]]          Abnormal            Final result                 Please view results for these tests on the individual orders.     Specimen Information    Type Source Collected On   Blood — 01/25/21 0459            Testing Performed ATTENDING PHYSICIAN: Brittany Beck MD   PATIENT ACCOUNT#:   [id number]    LOCATION:  [address] [place] [address] RECORD #:   [id number]       YOB: 1928  ADMISSION DATE:       01/22/2021    HISTORY AND PHYSICAL EXAMINATION    DATE OF EXA PAST MEDICAL HISTORY:  Arthritis. Balance problems. Rheumatic fever at age 12. Hypothyroidism. Hypertension. Hypercholesterolemia. History of left-sided breast cancer. History of basal cell skin cancer. Internal derangement of the right knee.   Prim MEDICATIONS:  Prior to admission:  Tylenol 500 mg daily as needed. Cranberry juice extract daily. Emollient cream for her lower extremities topically daily as needed. MiraLAX 17 g daily as needed for constipation.   Phenazopyridine 100 mg 3 times a day a REVIEW OF SYSTEMS:  Twelve systems are reviewed. The patient denies any fever, chills, sweats. States that her appetite is fair. Denies any nausea, vomiting, dysphagia. No chest pain or shortness of breath.   There are otherwise no additional pertinent LABORATORY DATA:  Her glucose was 111, sodium 138, potassium 3.9, chloride 102, CO2 of 30, BUN of 28 with a creatinine of 0.99, calcium 9.5, anion gap of 6. White count of 7.2 with a hemoglobin of 12.5 and a platelet count of 283,306.   There were 59% neut 9.   The patient's current clinical status and proposed treatment plan were discussed with her. All of her questions were answered, and she agreed with the plan of care as outlined above.     Dictated By Alpa Suh MD  d: 01/23/2021 10:46:34 Pneumovax 23 11/08/13       Future Appointments        Provider Jerrell Argueta    2/2/2021 11:15 AM Anne Yeh MD [facility] ORTHOPAEDICS [facility] MAGUE Castellon      Multidisciplinary Problems     Active Goals        Problem: Patient/Family Goals    Goal Priori

## (undated) NOTE — IP AVS SNAPSHOT
Patient Demographics     Address  [address] [facility] [address] Phone  [phone] (Home)  [phone] (Mobile) *Preferred*      Patient Contacts     Name Relation Home Work Mobile    Yue Novak (POA) Daughter   [phone]    Estefania George Daughter [phone]    Alvina Yanes Relative   [phone]      Allergies as of 3/26/2024  Review status set to In Progress on 3/22/2024       Noted Reaction Type Reactions    Aspirin 10/29/2014    SWELLING    Other reaction(s): ASPIRIN    Penicillins 09/23/2017    HIVES    Tolerated cephalosporins in past    Erythromycin 02/15/2023    NAUSEA AND VOMITING    Sulfa Antibiotics 01/14/2021    DIARRHEA, NAUSEA AND VOMITING    Codeine 12/12/2023    UNKNOWN    Macrobid [nitrofurantoin] 10/12/2017        Adhesive Tape 08/18/2015    RASH    Surgical tape    Ciprofloxacin 08/10/2018   Not Verified Coughing    Pt states that Cipro caused her lung problems that necessitated seeing Dr. Cummins    Povidone-iodine 10/29/2014    OTHER (SEE COMMENTS)    blisters      Code Status Information     Code Status    DNAR/Comfort Care      Patient Instructions    None      Follow-up Information     [facility] Follow up in 1 week(s).    Why: Office will call to schedule your follow up visit  Contact information:  [address] [facility] [address]  [facility] [address] [phone]           Marcelo Bernard Follow up in 1 week(s).    Specialty: Internal Medicine  Contact information:  [address] [facility] [address]  [phone]                        Your Home Meds List      TAKE these medications       Instructions Authorizing Provider Morning Afternoon Evening As Needed   acetaminophen 500 MG Tabs  Commonly known as: Tylenol Extra Strength      Take 1 tablet (500 mg total) by mouth every 6 (six) hours as needed for Pain.          clopidogrel 75 MG Tabs  Commonly known as: Plavix  Start taking on: March 27, 2024  Next dose due: Tomorrow morning      Take 1 tablet  (75 mg total) by mouth daily.   Lillian Vergara         Cranberry 500 MG Caps  Commonly known as: Cranberry Concentrate  Next dose due: Tomorrow morning      Take 2 capsules by mouth daily.   Maelen Pantano         docusate sodium 100 MG Caps  Commonly known as: COLACE      Take 100 mg by mouth 2 (two) times daily as needed for constipation.   JUSTIN FISH SMULKSTYS         Ferrous Sulfate 325 (65 Fe) MG Tabs  Commonly known as: FeroSul  Next dose due: This evening      Take 1 tablet (325 mg total) by mouth 3 (three) times daily.   Maelen Pantano         furosemide 20 MG Tabs  Commonly known as: Lasix  Next dose due: Tomorrow morning      Take 1 tablet (20 mg total) by mouth daily.   Maelen Pantano         levothyroxine 100 MCG Tabs  Commonly known as: Synthroid  Next dose due: Tomorrow morning      Take 1 tablet (100 mcg total) by mouth every morning.   Maelen Pantano         loperamide 2 MG Caps  Commonly known as: Imodium      Take 1 capsule (2 mg total) by mouth 4 (four) times daily as needed for Diarrhea.          metoprolol succinate ER 50 MG Tb24  Commonly known as: Toprol XL  Next dose due: tonight      Take 1 tablet (50 mg total) by mouth 2x Daily(Beta Blocker).   Lillian Vergara         omeprazole 20 MG Cpdr  Commonly known as: PriLOSEC  Next dose due: Tomorrow morning      Take 1 capsule (20 mg total) by mouth every morning.   Marcelo Pantano         ondansetron 4 MG Tbdp  Commonly known as: Zofran-ODT      Take 1 tablet (4 mg total) by mouth every 8 (eight) hours as needed for Nausea.          rivaroxaban 15 MG Tabs  Commonly known as: Xarelto  Next dose due: tonight      Take 1 tablet (15 mg total) by mouth every evening.   Lillian Vergara         traMADol 50 MG Tabs  Commonly known as: Ultram      Take 1 tablet (50 mg total) by mouth every 6 (six) hours.   Agustin Mcwilliams         triamcinolone 0.1 % Crea  Commonly known as: Kenalog      Apply topically 2 (two) times daily. Apply to  affected area bid  As needed   Marceol Bernard               Where to Get Your Medications      These medications were sent to [id number] - [place], [place] - [address] AT [facility], [phone], [phone]  [address], [place] [address]    Hours: 24-hours Phone: [phone]   clopidogrel 75 MG Tabs  metoprolol succinate ER 50 MG Tb24  rivaroxaban 15 MG Tabs           [id number] - MAR ACTION REPORT  (last 48 hrs)    ** SITE UNKNOWN **     Order ID Medication Name Action Time Action Reason Comments    [id number] furosemide (Lasix) 10 mg/mL injection 20 mg 03/25/24 1721 Given      [id number] furosemide (Lasix) 10 mg/mL injection 20 mg 03/26/24 1142 Given      [id number] furosemide (Lasix) tab 20 mg 03/25/24 0828 Given      [id number] furosemide (Lasix) tab 20 mg 03/26/24 0930 Given by Other      [id number] levothyroxine (Synthroid) tab 100 mcg 03/25/24 0531 Given      [id number] levothyroxine (Synthroid) tab 100 mcg 03/26/24 0526 Given      [id number] magnesium oxide (Mag-Ox) tab 400 mg 03/25/24 1023 Given      [id number] metoprolol succinate ER (Toprol XL) 24 hr tab 50 mg 03/25/24 1721 Given      [id number] metoprolol succinate ER (Toprol XL) 24 hr tab 50 mg 03/26/24 0526 Given      [id number] pantoprazole (Protonix) DR tab 20 mg 03/25/24 0530 Given      [id number] pantoprazole (Protonix) DR tab 20 mg 03/26/24 0526 Given      [id number] potassium chloride (K-Dur) tab 40 mEq 03/25/24 0828 Given      [id number] rivaroxaban (Xarelto) tab 15 mg 03/24/24 1802 Given      [id number] rivaroxaban (Xarelto) tab 15 mg 03/25/24 1721 Given      [id number] traMADol (Ultram) tab 50 mg 03/24/24 1955 Given              Recent Vital Signs    Flowsheet Row Most Recent Value   /98 Filed at 03/26/2024 1141   Pulse 89 Filed at 03/26/2024 1141   Resp 18 Filed at 03/26/2024 1141   Temp 97.3 °F (36.3 °C) Filed at 03/26/2024 0921   SpO2 98 % Filed at 03/26/2024 1141      Patient's Most  Recent Weight    Flowsheet Row Most Recent Value   Patient Weight 40 kg (88 lb 1.6 oz)         Lab Results Last 24 Hours      Potassium [[id number]] (Normal)  Resulted: 03/26/24 0756, Result status: Final result   Ordering provider: Vadim Shin MD  03/25/24 2300 Resulting lab: [facility] ([facility])    Specimen Information    Type Source Collected On   Blood — 03/26/24 0715          Components    Component Value Reference Range Flag Lab   Potassium 3.9 3.5 - 5.1 mmol/L — [facility])            Magnesium [[id number]] (Normal)  Resulted: 03/26/24 0756, Result status: Final result   Ordering provider: Vadim Shin MD  03/25/24 2321 Resulting lab: [facility] ([facility])    Specimen Information    Type Source Collected On   Blood — 03/26/24 0715          Components    Component Value Reference Range Flag Lab   Magnesium 1.9 1.6 - 2.6 mg/dL — [facility])            Basic Metabolic Panel (8) [[id number]] (Abnormal)  Resulted: 03/26/24 0756, Result status: Final result   Ordering provider: Lillian Vergara APRN  03/25/24 2300 Resulting lab: [facility] ([facility])    Specimen Information    Type Source Collected On   Blood — 03/26/24 0715          Components    Component Value Reference Range Flag Lab   Glucose 102 70 - 99 mg/dL H [facility])   Sodium 143 136 - 145 mmol/L — [facility])   Potassium 3.9 3.5 - 5.1 mmol/L — [facility])   Chloride 108 98 - 112 mmol/L — [facility])   CO2 26.0 21.0 - 32.0 mmol/L — [facility])   Anion Gap 9 0 - 18 mmol/L — [facility])   BUN 24 9 - 23 mg/dL [facility] ([facility])   Creatinine 0.82 0.55 - 1.02 mg/dL — [facility])   BUN/CREA Ratio 29.3 10.0 - 20.0 [facility] ([facility])   Calcium, Total 9.8 8.7 - 10.4 mg/dL — [facility] ([facility])   Calculated Osmolality 300 275 - 295 mOsm/kg [facility] ([facility])   eGFR-Cr 65 >=60 mL/min/1.73m2 —  [facility] ([facility])            CBC With Differential With Platelet [[id number]] (Abnormal)  Resulted: 03/26/24 0736, Result status: Final result   Ordering provider: Lillian Vergara APRN  03/25/24 2300 Resulting lab: [facility] ([facility])   Narrative:  The following orders were created for panel order CBC With Differential With Platelet.  Procedure                               Abnormality         Status                     ---------                               -----------         ------                     CBC W/ DIFFERENTIAL[[id number]]          Abnormal            Final result                 Please view results for these tests on the individual orders.    Specimen Information    Type Source Collected On   Blood — 03/26/24 0715            Testing Performed By     Lab - Abbreviation Name Director Address Valid Date Range    162 - [facility] ([facility]) [facility] ([facility]) Rogerio Kern M.D. [facility] [place] [facility] [address] 03/19/20 1442 - Present            Microbiology Results (All)     None         H&P - H&P Note      H&P filed by Natanael De La Rosa MD at 3/22/2024  2:48 PM / Draft: Not Electronically Signed  Version 1 of 1    Author: Natanael De La Rosa MD Service: Hospitalist Author Type: Physician    Filed: 3/22/2024  2:48 PM Status: Unsigned Transcription    : Natanael De La Rosa MD (Physician)       [facility]    PATIENT'S NAME: JOHNSONCITLALLI   ATTENDING PHYSICIAN: Natanael De La Rosa MD   PATIENT ACCOUNT#:   [id number]    LOCATION:  [address] [place] [address]  MEDICAL RECORD #:   [id number]       YOB: 1928  ADMISSION DATE:       03/22/2024    HISTORY AND PHYSICAL EXAMINATION    CHIEF COMPLAINT:  Atrial fibrillation with rapid ventricular response, dyspnea on exertion, chest pain, and possible fluid overload status.     HISTORY OF PRESENT ILLNESS:  The patient is a 89-year-old  female who lives in a nursing facility and  was brought in today for evaluation of progressive dyspnea on exertion and chest pain, noted to be in atrial fibrillation with rapid ventricular response, rate 130.  Troponin 56.  Chemistry and CBC were unremarkable.  Chest x-ray showed no acute findings.  The patient was started on IV Cardizem drip.     PAST MEDICAL HISTORY:  Coronary artery disease, status post multiple PCI stents.  She had coronary artery bypass graft surgery and drug-eluting stents to vein grafts leading to diagonal, obtuse marginal, and RCA.  Ischemic cardiomyopathy, ejection fraction around 40%.  Moderate aortic stenosis and mitral regurgitation with prolapse.  Moderate pulmonary artery hypertension.  Chronic atrial fibrillation, anticoagulated with Xarelto.  Hypertension.  Hyperlipidemia.  Osteoarthritis.  Osteoporosis.  Chronic kidney disease stage 3.    PAST SURGICAL HISTORY:  Right femur open reduction/internal fixation after a mechanical fall.  Left breast lumpectomy followed by radiation therapy for breast cancer.  Hysterectomy.  Laparoscopic cholecystectomy.  Coronary artery bypass graft surgery.  Partial small-bowel resection.      MEDICATIONS:  Please see medication reconciliation list.      ALLERGIES:  Aspirin, penicillin, Macrobid, and ciprofloxacin.  Not able to confirm the reactions.    FAMILY HISTORY:  Mother had cerebrovascular accident and hypertension.  Father had coronary artery disease.    SOCIAL HISTORY:  Ex-tobacco user.  No current tobacco, alcohol, or drug use.  Lives in an independent living facility.      REVIEW OF SYSTEMS:  The patient reported that she has been having dyspnea on exertion, midsternal chest tightness on and off.  Symptoms get worse with activity, get a little bit better with rest.  Denies fever, chills, or cough.  Other 12-point review of systems negative.         PHYSICAL EXAMINATION:    GENERAL:  Alert.  Oriented to time, place, and person.  Moderate distress.  VITAL SIGNS:  Temperature 99.5; pulse  102; respiratory rate 14; blood pressure 160/100; pulse ox 90% to 91% on room air, 94% on 2L nasal cannula oxygen.   HEENT:  Atraumatic.  Oropharynx clear.  Dry mucous membranes.   NECK:  Supple.  Mild jugular venous distention.   LUNGS:  Diminished breathing sounds, both lung bases.   HEART:  Irregularly irregular rhythm, tachycardic.   ABDOMEN:  Soft, nondistended.  No tenderness.  Positive bowel sounds.    EXTREMITIES:  Stasis dermatitis.  Trace edema.  No clubbing or cyanosis.  NEUROLOGIC:  Motor and sensory intact.      ASSESSMENT AND PLAN:    1.   Atrial fibrillation with rapid ventricular response.    2.   Fluid overload status, underlying ischemic cardiomyopathy and possible heart failure.  Reportedly, she had a pulse ox of 84% on room air.  Currently saturating 94% on nasal cannula oxygen.      3.   Essential hypertension.    4.   Coronary artery disease, elevated troponin.     Patient will be admitted to telemetry floor.  Continue to trend troponin level.  Continue home medications.  IV Lasix.  IV Cardizem.  2D echocardiogram with Doppler.  Cardiology consult.  Monitor her clinical status.  Further recommendations to follow.    Dictated By Natanael De La Rosa MD  d: 2024 12:30:49  t: 2024 13:58:13  Job [id number]  FB/                Consults - MD Consult Notes      Consults signed by Dereck Hendrix MD at 3/22/2024  2:12 PM     Author: Dereck Hendrix MD Service: Cardiology Author Type: Physician    Filed: 3/22/2024  2:12 PM Date of Service: 3/22/2024  2:03 PM Status: Addendum    : Dereck Hendrix MD (Physician)    Related Notes: Original Note by Dereck Hendrix MD (Physician) filed at 3/22/2024  2:12 PM     Consult Orders    1. ED Consult to Cardiology [[id number]] ordered by Kristine Beth MD at [address] [place] [address] - CARDIOLOGY CONSULT NOTE    Pavithra TARIQ Cedillo Patient Status:  Inpatient    1928 MRN [id number]   Location [facility] 3W/SW  Attending Natanael De La Rosa MD   Hosp Day # 0 PCP Marcelo Mittalelijah     Date of Admission:  3/22/2024  Date of Consult:  3/22/2024  I was asked by Natanael De La Rosa MD to provide recommendations for evaluation and management of cardiac issues.  Impression:     89-year-old female A-fib with RVR, history of stenosis, elevated troponin.    Recommendations:  1.  Atrial fibrillation  Continue Cardizem drip until able to tolerate p.o.'s.  Continue Xarelto lowered the dose to 15 mg daily  Resume home metoprolol succinate change to 50 mg daily.  2.  Hypothyroidism  On Synthroid at home continue.  3.  Elevated troponin  56 continue to trend to ensure not going up.  If continues to rise start heparin drip.  4.  History of aortic stenosis moderate to severe.  Was considering TAVR as outpatient but was too frail.  5.  History of CAD remote CABG multivessel PCI 11/22  No evidence of CHF on exam despite elevated BNP levels.  No need for IV diuretics.        L5 consult will follow.    Reason for Consultation:     Atrial fibrillation, elevated troponin    History of Present Illness:   Patient is a 89 year old female who was admitted to the hospital for right-sided chest pain 3 days ago history taken from the chart review patient is unable to answer any questions during physical exam today.  Found to be hypoxic 84% on room air improved with nasal cannula 3 L found to be in A-fib with RVR started on Cardizem drip.  Prior history of atrial fibrillation on Xarelto, hypertension, hyperlipidemia.  Moderate to severe aortic stenosis.      Cardiac tests:    Past Medical History  Past Medical History:   Diagnosis Date    A-fib (HCC)     Arthritis     Balance problem     Bladder problem     Cancer (HCC)     Cataract     Disorder of thyroid     Essential hypertension     High blood pressure     High cholesterol     History of blood transfusion     History of breast cancer in female 2000    \"Left sided breast cancer\"    History of heart bypass  surgery 1989    Hx of skin cancer, basal cell 2009    Internal derangement of knee, right 08/14/2017    Osteoarthritis     Osteoporosis 11/14/2007    Personal history of malignant neoplasm of breast 05/28/2009    Primary osteoarthritis of knees, bilateral 07/12/2017    Primary osteoarthritis of right knee 11/12/2018    Rheumatic fever/heart disease age 16    Visual impairment        Past Surgical History  Past Surgical History:   Procedure Laterality Date    ANGIOPLASTY (CORONARY)  06-    BOWEL RESECTION  2016    CABG  1989    5 bypass    CHOLECYSTECTOMY      COLON SURGERY      COLONOSCOPY  2010    HYSTERECTOMY      LAPAROSCOPIC CHOLECYSTECTOMY      LUMPECTOMY LEFT      RADIATION LEFT         Family History  Family History   Problem Relation Age of Onset    Heart Disease Father     Stroke Mother         CVA    Stroke Sister         CVA    Diabetes Brother     Breast Cancer Self 72    Breast Cancer Maternal Aunt         post menopause    Breast Cancer Maternal Cousin Female         post menopause    Breast Cancer Maternal Cousin Female         post menopause       Social History  Pediatric History   Patient Parents    Not on file     Other Topics Concern     Service Not Asked    Blood Transfusions Not Asked    Caffeine Concern Yes     Comment: Coffee, 1 cup daily;     Occupational Exposure Not Asked    Hobby Hazards Not Asked    Sleep Concern Not Asked    Stress Concern Not Asked    Weight Concern Not Asked    Special Diet Not Asked    Back Care Not Asked    Exercise No    Bike Helmet Not Asked    Seat Belt Not Asked    Self-Exams Not Asked   Social History Narrative    Not on file       No smoking or illicit drug use.    Current Medications:  Current Facility-Administered Medications   Medication Dose Route Frequency    dilTIAZem 10 mg BOLUS FROM BAG infusion  10 mg Intravenous Q1H PRN    acetaminophen (Tylenol Extra Strength) tab 500 mg  500 mg Oral Q4H PRN    ondansetron (Zofran) 4 MG/2ML injection  4 mg  4 mg Intravenous Q6H PRN    metoclopramide (Reglan) 5 mg/mL injection 5 mg  5 mg Intravenous Q8H PRN    dilTIAZem 10 mg BOLUS FROM BAG infusion  10 mg Intravenous Q1H PRN    dilTIAZem (cardIZEM) 100 mg in sodium chloride 0.9% 100 mL IVPB-ADDV  2.5-20 mg/hr Intravenous Continuous    dilTIAZem (cardIZEM) tab 30 mg  30 mg Oral 4 times per day    furosemide (Lasix) 10 mg/mL injection 20 mg  20 mg Intravenous BID (Diuretic)     Medications Prior to Admission   Medication Sig    Ferrous Sulfate (FEROSUL) 325 (65 Fe) MG Oral Tab Take 1 tablet (325 mg total) by mouth 3 (three) times daily.    omeprazole 20 MG Oral Capsule Delayed Release Take 1 capsule (20 mg total) by mouth every morning.    rivaroxaban (XARELTO) 20 MG Oral Tab Take 1 tablet (20 mg total) by mouth every evening.    levothyroxine 100 MCG Oral Tab Take 1 tablet (100 mcg total) by mouth every morning.    Cranberry (CRANBERRY CONCENTRATE) 500 MG Oral Cap Take 2 capsules by mouth daily.    ticagrelor (BRILINTA) 90 MG Oral Tab Take 1 tablet (90 mg total) by mouth Q12H.    furosemide 20 MG Oral Tab Take 1 tablet (20 mg total) by mouth daily.    metoprolol succinate ER 25 MG Oral Tablet 24 Hr Take 1 tablet (25 mg total) by mouth Q12H.    ondansetron 4 MG Oral Tablet Dispersible Take 1 tablet (4 mg total) by mouth every 8 (eight) hours as needed for Nausea.    acetaminophen 500 MG Oral Tab Take 1 tablet (500 mg total) by mouth every 6 (six) hours as needed for Pain.    Potassium Chloride ER 20 MEQ Oral Tab CR Take 20 mEq by mouth daily.    traMADol 50 MG Oral Tab Take 1 tablet (50 mg total) by mouth every 6 (six) hours.    loperamide 2 MG Oral Cap Take 1 capsule (2 mg total) by mouth 4 (four) times daily as needed for Diarrhea.    triamcinolone 0.1 % External Cream Apply topically 2 (two) times daily. Apply to affected area bid  As needed    docusate sodium 100 MG Oral Cap Take 100 mg by mouth 2 (two) times daily as needed for constipation.        Allergies  Allergies   Allergen Reactions    Aspirin SWELLING     Other reaction(s): ASPIRIN    Penicillins HIVES     Tolerated cephalosporins in past    Erythromycin NAUSEA AND VOMITING    Sulfa Antibiotics DIARRHEA and NAUSEA AND VOMITING    Codeine UNKNOWN    Macrobid [Nitrofurantoin]     Adhesive Tape RASH     Surgical tape    Ciprofloxacin Coughing     Pt states that Cipro caused her lung problems that necessitated seeing Dr. Cummins    Povidone-Iodine OTHER (SEE COMMENTS)     blisters       Review of Systems:   10 pt ROS performed, separate from HPI  Review of Systems:  Unable to obtain patient is alert and oriented x 1 only.  Physical Exam:   Blood pressure 147/79, pulse 100, temperature 97.4 °F (36.3 °C), temperature source Axillary, resp. rate 17, weight 98 lb (44.5 kg), SpO2 91%, not currently breastfeeding.    Scheduled Meds:    dilTIAZem  30 mg Oral 4 times per day    furosemide  20 mg Intravenous BID (Diuretic)         Physical Exam:    General: Alert and oriented x 1 name only.  Cachectic elderly female.No apparent distress. No respiratory or constitutional distress.  HEENT: Normocephalic, anicteric sclera, neck supple  Neck: No JVD, carotids 2+, supple  Cardiac: Irregular 2 out of 6 right upper sternal border systolic murmur.  Lungs: Clear with normal effort.  Normal excursions and effort.  Abdomen: Soft, non-tender. BS-present.  Extremities: Without clubbing, cyanosis.  Peripheral pulsespresent.  Neurologic: Limited exam does not follow commands only opens eyes to questions.  Skin: Warm and dry.     Results:     Laboratory Data:  Lab Results   Component Value Date    WBC 8.1 03/22/2024    HGB 13.4 03/22/2024    HCT 41.9 03/22/2024    .0 03/22/2024    CREATSERUM 0.87 03/22/2024    BUN 22 03/22/2024     03/22/2024    K 4.1 03/22/2024     03/22/2024    CO2 26.0 03/22/2024     (H) 03/22/2024    CA 10.3 03/22/2024    ALB 4.3 12/12/2023    ALKPHO 142 12/12/2023    TP 7.6  2023    AST 24 2023    ALT <7 (L) 2023    PTT >240.0 (HH) 2022    INR 1.19 2021    PTP 14.9 (H) 2021    T4F 1.5 2023    TSH 4.290 (H) 2023    LIP 41 2023    DDIMER 2.23 (H) 2022    MG 2.3 2023    PHOS 2.8 2023    CK 58 2018    B12 392 2023         Thank you for allowing me to participate in the care of your patient.    Dereck Hendrix MD  [facility]  Interventional Cardiology  3/22/2024      Electronically signed by Dereck Hendrix MD on 3/22/2024  2:12 PM           D/C Summary    No notes of this type exist for this encounter.     Imaging Results (HF patients)    Chest X-Ray Results (HF patients only)    No exam resulted this encounter.      2D Echocardiogram Results (HF patients only)    CARD ECHO 2D DOPPLER (CPT=93306)    Result Date: 3/23/2024  Transthoracic Echocardiogram Name:Pavithra Cedillo Date: 2024 :  1928 Ht:  (63in)  BP: 114 / 57 MRN:  [id number]    Age:  89years    Wt:  (100lb) HR: Loc:  [facility]       Gndr: F          BSA: 1.44m^2 Sonographer: Nicole GARCIA Ordering:    Natanael D eLa Rosa Consulting:  Ziggy Parrish ---------------------------------------------------------------------------- History/Indications:   Chest pain.  Dyspnea. ---------------------------------------------------------------------------- Procedure information:  A transthoracic complete 2D study was performed. Additional evaluation included M-mode, complete spectral Doppler, and color Doppler.  Patient status:  Inpatient.  Location:  Bedside.    Comparison was made to the study of 2022.    This was a routine study. Transthoracic echocardiography for diagnosis and ventricular function evaluation. Image quality was adequate. The study was technically limited due to poor acoustic window availability and body habitus. Satisfactory imaging could not be obtained from the suprasternal notch acoustic window(s). Patient  very frail, pedof not performed. ECG rhythm:   Atrial fibrillation ---------------------------------------------------------------------------- Conclusions: 1. Left ventricle: The cavity size was mildly increased. Wall thickness was    normal. Systolic function was markedly reduced. The estimated ejection    fraction was 25-30%, by biplane method of disks. There was severe diffuse    hypokinesis. Akinesis of the apicallateral, inferolateral, and inferior    walls. Hypokinesis of the anterior and anterolateral walls. Doppler    parameters are consistent with restrictive physiology, indicative of    decreased left ventricular diastolic compliance and/or increased left    atrial pressure. 2. Right ventricle: Systolic function was reduced. 3. Left atrium: The atrium was markedly dilated. 4. Right atrium: The atrium was dilated. 5. Aortic valve: Cusp separation was reduced. The findings were consistent    with moderate to severe stenosis. Stenosis was probably underestimated    due to poor left ventricular function. The peak systolic velocity was    2.6m/sec. The mean systolic gradient was 15mm Hg. The valve area (VTI)    was 0.68cm^2. The valve area (VTI) index was 0.47cm^2/m^2. 6. Mitral valve: The findings were consistent with mild stenosis. There was    moderate regurgitation. The mean diastolic gradient was 3mm Hg. 7. Tricuspid valve: There was severe regurgitation. 8. Pulmonary arteries: Systolic pressure was moderately increased. * ---------------------------------------------------------------------------- * Findings: Left ventricle:  The cavity size was mildly increased. Wall thickness was normal. Systolic function was markedly reduced. The estimated ejection fraction was 25-30%, by biplane method of disks. There was severe diffuse hypokinesis.  Regional wall motion abnormalities:   Akinesis of the apicallateral, inferolateral, and inferior walls.  Hypokinesis of the anterior and anterolateral walls. Doppler  parameters are consistent with restrictive physiology, indicative of decreased left ventricular diastolic compliance and/or increased left atrial pressure. Left atrium:  The atrium was markedly dilated. Right ventricle:  The cavity size was normal. Systolic function was reduced. Right atrium:  The atrium was dilated. Mitral valve:  The leaflets were mildly thickened. Leaflet separation was normal.  Doppler:   The findings were consistent with mild stenosis.   There was moderate regurgitation.    The valve area (LVOT continuity) was 1.28cm^2. The valve area index (LVOT continuity) was 0.89cm^2/m^2.    The mean diastolic gradient was 3mm Hg. Aortic valve:   The valve was trileaflet. The leaflets were moderately thickened and moderately calcified. Cusp separation was reduced.  Doppler: The findings were consistent with moderate to severe stenosis. Stenosis was probably underestimated due to poor left ventricular function. There was no significant regurgitation.    The valve area (VTI) was 0.68cm^2. The valve area (VTI) index was 0.47cm^2/m^2.    The mean systolic gradient was 15mm Hg. The peak systolic gradient was 27mm Hg. Tricuspid valve:  The valve is structurally normal. Leaflet separation was normal.  Doppler:  Transvalvular velocity was within the normal range. There was no evidence for stenosis. There was severe regurgitation. Pulmonic valve:   The valve is structurally normal. Cusp separation was normal.  Doppler:  Transvalvular velocity was within the normal range. There was no evidence for stenosis. There was mild regurgitation. The peak systolic gradient was 14mm Hg. Pericardium:   There was no pericardial effusion. Aorta: Aortic root: The aortic root was normal-sized. Ascending aorta: The ascending aorta was normal. Pulmonary arteries: The main pulmonary artery was normal-sized. Systolic pressure was moderately increased. Systemic veins:  Central venous respirophasic diameter changes are blunted (< 50%).  Inferior vena cava: The IVC was dilated. ---------------------------------------------------------------------------- Measurements  Left ventricle      Value           Ref       11/20/2021  IVS thickness,  (L) 0.5    cm       0.6 - 0.9 0.8  ED, PLAX  LV ID, ED, PLAX (H) 5.5    cm       3.8 - 5.2 5.0  LV ID, ES, PLAX (H) 5.1    cm       2.2 - 3.5 3.2  LV PW               0.6    cm       0.6 - 0.9 0.8  thickness, ED,  PLAX  IVS/LV PW           0.83            --------- 0.99  ratio, ED, PLAX  LV PW/LV ID         0.11            --------- ----------  ratio, ED, PLAX  LV ejection     (L) 16     %        54 - 74   66  fraction  Stroke              20     ml/m^2   --------- 30  volume/bsa, 2D  LV                  76     ml       48 - 140  ----------  end-diastolic  volume, 1-p A4C  LV ejection     (L) 18     %        46 - 78   ----------  fraction, 1-p  A4C  Stroke volume,      14     ml       --------- ----------  1-p A4C  LV                  53     ml/m^2   30 - 82   ----------  end-diastolic  volume/bsa, 1-p  A4C  Stroke              9      ml/m^2   --------- ----------  volume/bsa, 1-p  A4C  LV                  85     ml       46 - 106  ----------  end-diastolic  volume, 2-p  LV end-systolic (H) 63     ml       14 - 42   ----------  volume, 2-p  LV ejection     (L) 27     %        54 - 74   ----------  fraction, 2-p  Stroke volume,      23     ml       --------- ----------  2-p  LV                  59     ml/m^2   29 - 61   ----------  end-diastolic  volume/bsa, 2-p  LV end-systolic (H) 43     ml/m^2   8 - 24    ----------  volume/bsa, 2-p  Stroke              15.8   ml/m^2   --------- ----------  volume/bsa, 2-p  LV e', lateral  (L) 9.4    cm/sec   >=10.0    ----------  LV E/e',        (H) 16              <=13      ----------  lateral  LV e', medial   (L) 6.1    cm/sec   >=7.0     ----------  LV E/e', medial     25              --------- ----------  LV e', average      7.7    cm/sec   --------- ----------  LV E/e',         (H) 19              <=14      ----------  average  LVOT                Value           Ref       11/20/2021  LVOT ID             1.7    cm       --------- ----------  LVOT peak           0.7    m/sec    --------- 2.72  velocity, S  LVOT VTI, S         13.3   cm       --------- 16.0  LVOT peak           2      mm Hg    --------- 30  gradient, S  LVOT mean           1      mm Hg    --------- ----------  gradient, S  Stroke volume       30     ml       --------- 45  (SV), LVOT DP  Stroke index        21     ml/m^2   --------- 30  (SV/bsa), LVOT  DP  Aortic valve        Value           Ref       11/20/2021  Aortic valve        2.6    m/sec    --------- 2.77  peak velocity,  S  Aortic valve        44.6   cm       --------- 61.5  VTI, S  Aortic mean         15     mm Hg    --------- 17  gradient, S  Aortic peak         27     mm Hg    --------- 31  gradient, S  Aortic valve        0.68   cm^2     --------- 0.74  area, VTI  Aortic valve        0.47   cm^2/m^2 --------- 0.49  area/bsa, VTI  Velocity ratio,     0.27            --------- 0.98  peak, LVOT/AV  Aortic root         Value           Ref       11/20/2021  Aortic root ID      2.7    cm       2.3 - 3.7 ----------  Aortic root ID,     2.7    cm       2.0 - 3.2 ----------  STJ, ED  Ascending aorta     Value           Ref       11/20/2021  Ascending aorta (H) 3.6    cm       1.9 - 3.5 ----------  ID  Left atrium         Value           Ref       11/20/2021  LA volume, S    (H) 79     ml       22 - 52   75  LA volume/bsa,  (H) 55     ml/m^2   16 - 34   50  S  LA volume, ES,  (H) 78     ml       22 - 52   67  1-p A4C  LA volume, ES,  (H) 77     ml       22 - 52   81  1-p A2C  LA volume, ES,      85     ml       --------- ----------  A/L  LA volume/bsa,  (H) 59     ml/m^2   16 - 34   ----------  ES, A/L  Mitral valve        Value           Ref       11/20/2021  Mitral E-wave       1.49   m/sec    --------- 1.54  peak velocity  Mitral              153    ms        --------- ----------  deceleration  time  Mitral mean         3      mm Hg    --------- 2  gradient, D  Mitral peak         8      mm Hg    --------- 10  gradient, D  Mitral valve        1.28   cm^2     --------- ----------  area, LVOT  continuity  Mitral valve        0.89   cm^2/m^2 --------- ----------  area/bsa, LVOT  continuity  Mitral regurg       553    cm/sec   --------- ----------  peak velocity  Mitral peak         122.32 mm Hg    --------- ----------  LV-LA gradient,  S  Mitral maximal      403    cm/sec   --------- ----------  regurg  velocity, PISA  Pulmonary           Value           Ref       11/20/2021  artery  PA pressure, S,     39     mm Hg    --------- ----------  DP  PA pressure,        21     mm Hg    --------- ----------  ED, DP  Tricuspid valve     Value           Ref       11/20/2021  Tricuspid       (H) 3.01   m/sec    <=2.8     3.02  regurg peak  velocity  Tricuspid peak      38     mm Hg    --------- 43  RV-RA gradient  Right atrium        Value           Ref       11/20/2021  RA ID, S-I, ES, (H) 6.0    cm       3.4 - 5.3 ----------  A4C  RA ID/bsa, S-I, (H) 4.2    cm/m^2   1.9 - 3.1 ----------  ES, A4C  RA area, ES,    (H) 25     cm^2     10 - 18   ----------  A4C  RA volume, ES,      83     ml       --------- ----------  1-p A4C  RA volume/bsa,  (H) 58     ml/m^2   9 - 33    ----------  ES, 1-p A4C  Systemic veins      Value           Ref       11/20/2021  Estimated CVP       15     mm Hg    --------- 10  Inferior vena       Value           Ref       11/20/2021  cava  ID              (H) 2.4    cm       <=2.1     ----------  Right ventricle     Value           Ref       11/20/2021  TAPSE, MM       (L) 0.53   cm       >=1.70    ----------  RV pressure, S,     53     mm Hg    --------- 53  DP  RV s', lateral  (L) 9.1    cm/sec   >=9.5     ----------  Pulmonic valve      Value           Ref       11/20/2021  Pulmonic valve      1.9    m/sec    --------- ----------  peak velocity,  S   Pulmonic peak       14     mm Hg    --------- ----------  gradient, S  Pulmonic regurg     1.98   m/sec    --------- ----------  peak velocity  Pulmonic regurg     16     mm Hg    --------- ----------  peak gradient  Pulmonic regurg     1.18   m/sec    --------- ----------  velocity, ED  Pulmonic regurg     6      mm Hg    --------- ----------  gradient, ED Legend: (L)  and  (H)  vanessa values outside specified reference range. ---------------------------------------------------------------------------- Prepared and electronically signed by Andrew Randolph 03/23/2024 16:42         Cath Angiogram Results (HF Patients only)    No exam resulted this encounter.          Physical Therapy Notes (last 72 hours)      Physical Therapy Note signed by Priyanka Carter PT at 3/26/2024  8:55 AM  Version 1 of 1    Author: Priyanka Carter PT Service: Physical Medicine and Rehabilitation Author Type: Physical Therapist    Filed: 3/26/2024  8:55 AM Date of Service: 3/26/2024  8:54 AM Status: Signed    : Priyanka Carter PT (Physical Therapist)       Physical Therapy Contact Note    Orders received and chart reviewed. Attempted to see patient for Physical Therapy services. Patient not available secondary to patient refusing to attempt out of bed mobility and repositioning despite maximal education and encouragement.      03/26/24 0997   VISIT TYPE   PT Inpatient Visit Type (Documentation Required) Attempted Treatment  (refusal)     Will re-attempt pending patient availability/appropriateness as schedule allows, otherwise will re-schedule visit.    Priyanka Carter PT, DPT  [facility]  Inpatient Rehabilitation  Physical Therapy  [phone]           Physical Therapy Note signed by Leona Aguirre PTA at 3/25/2024  3:11 PM  Version 1 of 1    Author: Leona Aguirre PTA Service: Rehab Author Type: Physical Therapist    Filed: 3/25/2024  3:11 PM Date of Service: 3/25/2024  2:46 PM Status:  Signed    : Leona Aguirre PTA (Physical Therapist)       PHYSICAL THERAPY TREATMENT NOTE - INPATIENT     Room Number: 332/332-A       Presenting Problem: NSTEMI, R chest pain       Problem List  Principal Problem:    NSTEMI (non-ST elevated myocardial infarction) (HCC)  Active Problems:    Elevated troponin      PHYSICAL THERAPY ASSESSMENT   Patient demonstrates fair progress this session, goals  remain in progress.    Patient continues to function below baseline with bed mobility, transfers, and gait.  Contributing factors to remaining limitations include decreased functional strength, decreased endurance/aerobic capacity, and pain.  Next session anticipate patient to progress bed mobility, transfers, and gait.  Physical Therapy will continue to follow patient for duration of hospitalization.    Patient continues to benefit from continued skilled PT services: to promote return to prior level of function and safety with continuous assistance and gradual rehabilitative therapy .    PLAN  PT Treatment Plan: Bed mobility;Body mechanics;Endurance;Strengthening;Patient education  Frequency (Obs): 3-5x/week    SUBJECTIVE  Pt reports being ready for PT RX    OBJECTIVE  Precautions: Bed/chair alarm    WEIGHT BEARING RESTRICTION                PAIN ASSESSMENT   Ratin          BALANCE  Static Sitting: Poor +  Dynamic Sitting: Poor -  Static Standing: Not tested       ACTIVITY TOLERANCE                          O2 WALK       AM-PAC '6-Clicks' INPATIENT SHORT FORM - BASIC MOBILITY  How much difficulty does the patient currently have...  Patient Difficulty: Turning over in bed (including adjusting bedclothes, sheets and blankets)?: A Lot   Patient Difficulty: Sitting down on and standing up from a chair with arms (e.g., wheelchair, bedside commode, etc.): Unable   Patient Difficulty: Moving from lying on back to sitting on the side of the bed?: A Lot   How much help from another person does the patient currently  need...   Help from Another: Moving to and from a bed to a chair (including a wheelchair)?: Total   Help from Another: Need to walk in hospital room?: Total   Help from Another: Climbing 3-5 steps with a railing?: Total     AM-PAC Score:  Raw Score: 8   Approx Degree of Impairment: 86.62%   Standardized Score (AM-PAC Scale): 28.58   CMS Modifier (G-Code): CM    FUNCTIONAL ABILITY STATUS  Functional Mobility/Gait Assessment  Gait Assistance: Maximum assistance  Distance (ft): SPT  Rolling: maximum assist  Supine to Sit: maximum assist  Sit to Supine: maximum assist  Sit to Stand: maximum assist    Additional information: Pt seen daily.Max a for bed mobility and transfer.EOB sitting balance activity with emphasis on core stabilization.Max a for SPT.There ex.    The patient's Approx Degree of Impairment: 86.62% has been calculated based on documentation in the [facility] '6 clicks' Inpatient Daily Activity Short Form.  Research supports that patients with this level of impairment may benefit from Sub-acute Rehabilitation.  Final disposition will be made by interdisciplinary medical team.    THERAPEUTIC EXERCISES  Lower Extremity Ankle pumps  Glut sets  Heel slides  Quad sets     Position Supine       Patient End of Session: In bed;Call light within reach;RN aware of session/findings;All patient questions and concerns addressed    CURRENT GOALS     Patient Goal Patient's self-stated goal is: to go home   Goal #1 Patient is able to demonstrate supine - sit EOB @ level: minimum assistance     Goal #1   Current Status Max a   Goal #2 Patient is able to demonstrate transfers EOB to/from [facility] at assistance level: minimum assistance with walker - rolling     Goal #2  Current Status Max a   Goal #3 Patient is able to ambulate 150 feet with assist device: walker - rolling at assistance level: minimum assistance   Goal #3   Current Status Max a for SPT   Goal #4 Patient will negotiate 0 stairs/one curb w/ assistive device and  supervision   Goal #4   Current Status NT   Goal #5 Patient to demonstrate independence with home activity/exercise instructions provided to patient in preparation for discharge.   Goal #5   Current Status In progress   Goal #6    Goal #6  Current Status      Gait Training:  minutes  Therapeutic Activity: 15 minutes  Neuromuscular Re-education:  minutes  Therapeutic Exercise: 15 minutes  Canalith Repositioning:  minutes  Manual Therapy:  minutes  Can add/delete any of these         Physical Therapy Note signed by Anil Ratliff PT at 3/23/2024  2:46 PM  Version 1 of 1    Author: Anil Ratliff PT Service: Rehab Author Type: Physical Therapist    Filed: 3/23/2024  2:46 PM Date of Service: 3/23/2024  2:43 PM Status: Signed    : Anil Ratliff PT (Physical Therapist)       PHYSICAL THERAPY EVALUATION - INPATIENT     Room Number: 332/332-A  Evaluation Date: 3/23/2024  Type of Evaluation: Initial   Physician Order: PT Eval and Treat    Presenting Problem: NSTEMI, R chest pain     Reason for Therapy: Mobility Dysfunction and Discharge Planning    PHYSICAL THERAPY ASSESSMENT   Patient is a 89 year old female admitted 3/22/2024 for NSTEMI, R chest pain .  Prior to admission, patient's baseline is min assist.  Patient is currently functioning below baseline with bed mobility, transfers, gait, stair negotiation, and maintaining seated position.  Patient is requiring dependent as a result of the following impairments: decreased functional strength, decreased endurance/aerobic capacity, impaired coordination, impaired motor planning, and decreased muscular endurance.  Physical Therapy will continue to follow for duration of hospitalization.    Patient will benefit from continued skilled PT Services to promote return to prior level of function and safety with continuous assistance and gradual rehabilitative therapy .    PLAN  PT Treatment Plan: Bed mobility;Endurance;Energy conservation;Patient education  Rehab Potential :  Fair  Frequency (Obs): 3-5x/week    PHYSICAL THERAPY MEDICAL/SOCIAL HISTORY   History related to current admission: ER with afib, ischemic cardiomyopathy     Problem List  Principal Problem:    NSTEMI (non-ST elevated myocardial infarction) (HCC)  Active Problems:    Elevated troponin      HOME SITUATION  Home Situation  Type of Home: Assisted living facility  Lives With: Staff 24 hours  Drives: No  Patient Owned Equipment: Rolling walker     Prior Level of Bol[place]: Lives at [facility].  Poor historian    SUBJECTIVE  What is today    PHYSICAL THERAPY EXAMINATION   OBJECTIVE     Fall Risk: High fall risk    WEIGHT BEARING RESTRICTION                   PAIN ASSESSMENT  Ratin          COGNITION  Overall Cognitive Status:  Impaired A&O 1/3    RANGE OF MOTION AND STRENGTH ASSESSMENT  Upper extremity ROM and strength are within functional limits except for the following:   see OT  Lower extremity ROM is within functional limits except for the following: BLE  Lower extremity strength is within functional limits except for the following:  BLE    BALANCE  Static Sitting: Poor +  Dynamic Sitting: Poor -  Static Standing: Not tested       ADDITIONAL TESTS                                    NEUROLOGICAL FINDINGS                      ACTIVITY TOLERANCE  Pulse: 89                      O2 WALK       AM-PAC '6-Clicks' INPATIENT SHORT FORM - BASIC MOBILITY  How much difficulty does the patient currently have...  Patient Difficulty: Turning over in bed (including adjusting bedclothes, sheets and blankets)?: A Lot   Patient Difficulty: Sitting down on and standing up from a chair with arms (e.g., wheelchair, bedside commode, etc.): Unable   Patient Difficulty: Moving from lying on back to sitting on the side of the bed?: A Lot   How much help from another person does the patient currently need...   Help from Another: Moving to and from a bed to a chair (including a wheelchair)?: Total   Help from Another: Need to walk in hospital  room?: Total   Help from Another: Climbing 3-5 steps with a railing?: Total     AM-PAC Score:  Raw Score: 8   Approx Degree of Impairment: 86.62%   Standardized Score (AM-PAC Scale): 28.58   CMS Modifier (G-Code): CM    FUNCTIONAL ABILITY STATUS  Functional Mobility/Gait Assessment  Gait Assistance: Not tested  Rolling: maximum assist  Supine to Sit: dependent  Sit to Supine: maximum assist  Sit to Stand: dependent    Exercise/Education Provided:  Bed mobility  Body mechanics  Energy conservation  Functional activity tolerated    The patient's Approx Degree of Impairment: 86.62% has been calculated based on documentation in the [facility] '6 clicks' Inpatient Basic Mobility Short Form.  Research supports that patients with this level of impairment may benefit from GAGAN due to severe weakness, high fall risk, unable to stand or ambulate.  Final disposition will be made by interdisciplinary medical team.    Patient End of Session: In bed;Call light within reach;Needs met;RN aware of session/findings;All patient questions and concerns addressed;Alarm set    CURRENT GOALS  Goals to be met by: 4/5/2024  Patient Goal Patient's self-stated goal is: to go home   Goal #1 Patient is able to demonstrate supine - sit EOB @ level: minimum assistance     Goal #1   Current Status    Goal #2 Patient is able to demonstrate transfers EOB to/from C at assistance level: minimum assistance with walker - rolling     Goal #2  Current Status    Goal #3 Patient is able to ambulate 150 feet with assist device: walker - rolling at assistance level: minimum assistance   Goal #3   Current Status    Goal #4 Patient will negotiate 0 stairs/one curb w/ assistive device and supervision   Goal #4   Current Status    Goal #5 Patient to demonstrate independence with home activity/exercise instructions provided to patient in preparation for discharge.   Goal #5   Current Status    Goal #6    Goal #6  Current Status      Patient Evaluation Complexity  Level:  History Moderate - 1 or 2 personal factors and/or co-morbidities   Examination of body systems Moderate - addressing a total of 3 or more elements   Clinical Presentation  Moderate - Evolving   Clinical Decision Making  Moderate Complexity     Gait Trainin minutes  Therapeutic Activity:  25 minutes  Neuromuscular Re-education: 0 minutes  Therapeutic Exercise: 5 minutes  Canalith Repositionin minutes  Manual Therapy: 0 minutes  Can add/delete any of these                Occupational Therapy Notes (last 72 hours)      Occupational Therapy Note signed by Sonia Romero OT at 3/23/2024  2:28 PM  Version 1 of 1    Author: Sonia Romero OT Service: Rehab Author Type: Occupational Therapist    Filed: 3/23/2024  2:28 PM Date of Service: 3/23/2024 11:20 AM Status: Signed    : Sonia Romero OT (Occupational Therapist)       OCCUPATIONAL THERAPY EVALUATION - INPATIENT     Room Number: 332/332-A  Evaluation Date: 3/23/2024  Type of Evaluation: Initial       Physician Order: IP Consult to Occupational Therapy  Reason for Therapy: ADL/IADL Dysfunction and Discharge Planning    OCCUPATIONAL THERAPY ASSESSMENT     Patient is a 89 year old female admitted 3/22/2024 for NSTEMI.  Prior to admission, pt was receiving assist for ADLs and functional transfers at SNF.  Patient is currently requiring up to total A for ADLs overall along with max A for supine to sit, max A for sitting at EOB, and max A of sit to supine.  Pt has the following impairments: decreased functional strength, pain, cognitive deficits, and medical status.    RN cleared pt for participation in OT session, which was completed in collaboration with PT. Upon arrival, pt was supine in bed and agreeable to activity. RN present during session. Pt was left in bed and alarm was activated. Call light and all needs left in reach. Handoff given to RN.    Education provided  Educated pt about role of OT and hospital therapy process as well  as benefits of mobility. Pt verbalized/demonstrated fair carryover.    Patient will benefit from continued skilled OT Services For duration of hospitalization, however, given the patient is functioning near baseline level do not anticipate skilled therapy needs at discharge     PLAN  OT Treatment Plan: Balance activities;Energy conservation/work simplification techniques;Continued evaluation;Compensatory technique education;Fine motor coordination activities;Neuromuscluar reeducation;Equipment eval/education;Patient/Family training;Patient/Family education;Cognitive reorientation;Endurance training;UE strengthening/ROM;Functional transfer training;Visual perceptual training;IADL training;ADL training      OCCUPATIONAL THERAPY MEDICAL/SOCIAL HISTORY     Problem List  Principal Problem:    NSTEMI (non-ST elevated myocardial infarction) ([facility])  Active Problems:    Elevated troponin      Past Medical History  Past Medical History:   Diagnosis Date    A-fib ([facility] [facility])     Arthritis     Balance problem     Bladder problem     Cancer (HCC)     Cataract     Disorder of thyroid     Essential hypertension     High blood pressure     High cholesterol     History of blood transfusion     History of breast cancer in female 2000    \"Left sided breast cancer\"    History of heart bypass surgery 1989    Hx of skin cancer, basal cell 2009    Internal derangement of knee, right 08/14/2017    Osteoarthritis     Osteoporosis 11/14/2007    Personal history of malignant neoplasm of breast 05/28/2009    Primary osteoarthritis of knees, bilateral 07/12/2017    Primary osteoarthritis of right knee 11/12/2018    Rheumatic fever/heart disease age 16    Visual impairment        Past Surgical History  Past Surgical History:   Procedure Laterality Date    ANGIOPLASTY (CORONARY)  06-    BOWEL RESECTION  2016    CABG  1989 5 bypass    CHOLECYSTECTOMY      COLON SURGERY      COLONOSCOPY  2010    HYSTERECTOMY      LAPAROSCOPIC CHOLECYSTECTOMY       LUMPECTOMY LEFT      RADIATION LEFT         HOME SITUATION  Type of Home: Skilled nursing facility                                    SUBJECTIVE  \"No put me down.\"    OCCUPATIONAL THERAPY EXAMINATION      OBJECTIVE     Fall Risk: High fall risk    PAIN ASSESSMENT  Rating: Unable to rate  Location: generalized            COORDINATION  Gross Motor: WFL   Fine Motor: WFL     ACTIVITIES OF DAILY LIVING ASSESSMENT  AM-PAC ‘6-Clicks’ Inpatient Daily Activity Short Form  How much help from another person does the patient currently need…  -   Putting on and taking off regular lower body clothing?: Total  -   Bathing (including washing, rinsing, drying)?: A Lot  -   Toileting, which includes using toilet, bedpan or urinal? : A Lot  -   Putting on and taking off regular upper body clothing?: A Lot  -   Taking care of personal grooming such as brushing teeth?: A Lot  -   Eating meals?: A Lot    AM-PAC Score:  Score: 11  Approx Degree of Impairment: 70.42%  Standardized Score (AM-PAC Scale): 29.04  CMS Modifier (G-Code): CL      BED MOBILITY  Supine to Sit : Maximum Assist  Sit to Supine (OT): Maximum Assist      FUNCTIONAL TRANSFER ASSESSMENT  Supine to Sit : Maximum Assist          FUNCTIONAL ADL ASSESSMENT  Overall total A    The patient's Approx Degree of Impairment: 70.42% has been calculated based on documentation in the [facility] '6 clicks' Inpatient Daily Activity Short Form.  Research supports that patients with this level of impairment may benefit from LTAC. Final disposition will be made by interdisciplinary medical team.    OT Goals  Patients self stated goal is: unable to state     Patient will complete functional transfer with mod A  Comment:          Patient will tolerate standing for 2 minutes in prep for adls with mod A   Comment:               Goals  on:   Frequency: 3-5/wk    Patient Evaluation Complexity Level:   Occupational Profile/Medical History MODERATE - Expanded review of history including  review of medical or therapy record   Specific performance deficits impacting engagement in ADL/IADL MODERATE  3 - 5 performance deficits   Client Assessment/Performance Deficits MODERATE - Comorbidities and min to mod modifications of tasks    Clinical Decision Making MODERATE - Analysis of occupational profile, detailed assessments, several treatment options    Overall Complexity MODERATE     OT Session Time: 20 minutes  Self-Care Home Management: 10 minutes           Sonia Romero OT  [facility]  Inpatient Rehabilitation  Occupational Therapy  [phone]               Video Swallow Study Notes    No notes of this type exist for this encounter.        SLP Note - SLP Notes      SLP Note signed by Ana Pham SLP at 3/23/2024  9:19 AM  Version 1 of 1    Author: Lagiglia, Ana, SLP Service: Rehab Author Type: Speech and Language Pathologist    Filed: 3/23/2024  9:19 AM Date of Service: 3/23/2024  9:02 AM Status: Signed    : Lagiglia, Ana, SLP (Speech and Language Pathologist)       ADULT SWALLOWING EVALUATION    ASSESSMENT    ASSESSMENT/OVERALL IMPRESSION:  PPE: CONTACT GOWN AND GLOVES. HAND SANITIZED UPON ENTRANCE/EXIT.     SLP BSSE orders received and acknowledged. A swallow evaluation warranted as pt with modified diet consistency. Pt afebrile with clear vocal quality, on room air, with oxygen saturation at 95%. Pt with known hx of dysphagia at [facility], with most recent diet recommendation of regular/thin (5/9/23).     Pt positioned 90 degrees in bed. Pt with no complaints of pain, RN aware. Oral mech reveals copious dried blood coating oral cavity throughout; removed with toothette however unable to clear completely from palate due to gag reflex; additionally unable to find site of bleeding, MD notified as pt on blood thinners. Oral mech otherwise grossly WFL, aside from generalized weakness and reduced velar elevation. Pt accepting of PO trials thin, regular and soft solid texture.  Pt with adequate oral acceptance and reduced bilabial seal across all trials; minimal anterior spillage noted with liquid and dry solid intake. Pt with intact bite, prolonged but functional mastication of solids, and slowed A-P transit. Pt's swallow response appears slow to initiate with adequate hyolaryngeal elevation/excursion to palpation. Pt reports globus sensation following dry solid intake; subsided with soft and bite sized +added moisture (apple sauce). Pt benefits from multiple swallows and liquid wash to assist in pharyngeal bolus transit. Otherwise no overt s/s distress observed throughout intake. 3/22/24 CXR indicates \"no pulmonary consolidation\". Oxygen status remained stable t/o the entire evaluation.     At this time, pt presents with minimal oral dysphagia and probable pharyngeal dysfunction. Recommend a SOFT AND BITE SIZED diet and THIN liquids with strict adherence to safe swallowing compensatory strategies including added sauces/gravies, upright, slow rate, small bites/sips, alternate solids/liquids, supervision with intake; crush meds. Results and recommendations reviewed with RN and pt. Pt v/u to all results/recommendations. Recommendations remain written on whiteboard. SLP adjusted MD diet orders per protocol.   FCM SCORE: 5/7     RECOMMENDATIONS   Diet Recommendations - Solids: Mechanical soft chopped/ Soft & Bite Sized (added sauces/gravies)  Diet Recommendations - Liquids: Thin Liquids    Compensatory Strategies Recommended: Slow rate;Small bites and sips;Alternate consistencies;Extra sauce/gravy  Aspiration Precautions: Upright position;Slow rate;Small bites and sips  Medication Administration Recommendations: Crushed in puree  Treatment Plan/Recommendations: Aspiration precautions    HISTORY   MEDICAL HISTORY  Reason for Referral: Altered diet consistency    Problem List  Principal Problem:    NSTEMI (non-ST elevated myocardial infarction) (HCC)  Active Problems:    Elevated  troponin    Past Medical History  Past Medical History:   Diagnosis Date    A-fib (HCC)     Arthritis     Balance problem     Bladder problem     Cancer (HCC)     Cataract     Disorder of thyroid     Essential hypertension     High blood pressure     High cholesterol     History of blood transfusion     History of breast cancer in female 2000    \"Left sided breast cancer\"    History of heart bypass surgery 1989    Hx of skin cancer, basal cell 2009    Internal derangement of knee, right 08/14/2017    Osteoarthritis     Osteoporosis 11/14/2007    Personal history of malignant neoplasm of breast 05/28/2009    Primary osteoarthritis of knees, bilateral 07/12/2017    Primary osteoarthritis of right knee 11/12/2018    Rheumatic fever/heart disease age 16    Visual impairment      Prior Living Situation: Skilled nursing facility  Diet Prior to Admission: Unknown  Precautions: Aspiration;Other (Comment) (fall precautions)    Patient/Family Goals: n/a    SWALLOWING HISTORY  Current Diet Consistency: Soft/ Easy to Chew;Thin liquids  Dysphagia History: 5/9/23 - regular/thin  Imaging Results:     CXR 3/22/24 CONCLUSION:  Stable enlarged heart with left atrial appendage closure device   No significant lung edema.  No pulmonary consolidation.  There is linear atelectasis in the mid to lower right lung   Circumscribed 8 millimeter pulmonary nodule in the left mid lung is new from September 2023   No pneumothorax      Dictated by (CST): Edu Lea MD on 3/22/2024 at 11:09 AM       Finalized by (CST): Edu Lea MD on 3/22/2024 at 11:12 AM       OBJECTIVE   ORAL MOTOR EXAMINATION  Dentition: Natural;Functional  Symmetry: Within Functional Limits  Strength:  (reduced)     Range of Motion: Within Functional Limits  Rate of Motion: Reduced    Voice Quality: Weak  Respiratory Status: Unlabored  Consistencies Trialed: Thin liquids;Soft solid;Hard solid  Method of Presentation: Self presentation;Staff/Clinician  assistance;Cup;Single sips;Consecutive swallows  Patient Positioning: Upright;Midline    Oral Phase of Swallow: Impaired  Bolus Retrieval: Intact  Bilabial Seal: Impaired  Bolus Formation: Intact  Bolus Propulsion: Intact  Mastication: Intact  Retention: Intact    Pharyngeal Phase of Swallow: Impaired  Laryngeal Elevation Timing: Appears impaired (c/o globus sensation)  Laryngeal Elevation Strength: Appears intact  Laryngeal Elevation Coordination: Appears intact  (Please note: Silent aspiration cannot be evaluated clinically. Videofluoroscopic Swallow Study is required to rule-out silent aspiration.)    Esophageal Phase of Swallow: No complaints consistent with possible esophageal involvement    GOALS  Goal #1 The patient will tolerate SOFT AND BITE SIZED consistency and THIN liquids without overt signs or symptoms of aspiration with 100 % accuracy over 1-2 session(s).    In Progress   Goal #2 The patient will tolerate trial upgrade of SOFT AND EASY TO CHEW consistency and THIN liquids without overt signs or symptoms of aspiration with 100 % accuracy over 1-2 session(s).    In Progress   Goal #3 The patient will utilize compensatory strategies as outlined by  BSSE (clinical evaluation) including Slow rate, Small bites, Small sips, Alternate liquids/solids, Upright 90 degrees, Eliminate distractions, Supervision with meals, added sauces/gravies with PRN assistance 100 % of the time across 2 sessions.    In Progress     FOLLOW UP  Treatment Plan/Recommendations: Aspiration precautions  Number of Visits to Meet Established Goals: 2  Follow Up Needed (Documentation Required): Yes  SLP Follow-up Date: 03/24/24    Thank you for your referral.   If you have any questions, please contact JOSH Pedro.    Ana Rock MS CCC-SLP/L  Speech Language Pathologist  EXT [id number]    Electronically signed by Ana Rock SLP on 3/23/2024  9:19 AM           Immunizations     Name Date      Covid-19 Pfizer 05/04/22      Covid-19 Pfizer 11/02/21     Covid-19 Pfizer 02/16/21     Covid-19 Pfizer 01/26/21     Fluad 0.5ml 10/13/17     INFLUENZA 10/13/22     INFLUENZA 10/10/21     INFLUENZA 10/17/20     INFLUENZA 10/08/20     INFLUENZA 10/22/19     INFLUENZA 10/22/19     INFLUENZA 10/15/19     INFLUENZA 11/01/18     INFLUENZA 11/15/16     INFLUENZA 11/08/13     INFLUENZA 10/15/10     INFLUENZA 11/15/08     Pneumococcal (Prevnar 13) 05/24/16     Pneumovax 23 11/08/13     TDAP 05/13/21       Multidisciplinary Problems     Active Goals     Not on file          Resolved Goals        Problem: Patient/Family Goals    Goal Priority Disciplines Outcome Interventions   Patient/Family Long Term Goal   (Resolved)     Interdisciplinary Completed    Description: Patient's Long Term Goal: to go back to facility    Interventions:  - follow medical regimen  - See additional Care Plan goals for specific interventions   Patient/Family Short Term Goal   (Resolved)     Interdisciplinary Completed    Description: Patient's Short Term Goal: ***    Interventions:   - ***  - See additional Care Plan goals for specific interventions

## (undated) NOTE — ED AVS SNAPSHOT
Layne Roland   MRN: [id number]    Department:  [facility] Emergency Department   Date of Visit:  9/23/2017           Disclosure     Insurance plans vary and the physician(s) referred by the ER may not be covered by your plan.  Please conta CARE PHYSICIAN AT ONCE OR RETURN IMMEDIATELY TO THE EMERGENCY DEPARTMENT. If you have been prescribed any medication(s), please fill your prescription right away and begin taking the medication(s) as directed.   If you believe that any of the medications

## (undated) NOTE — Clinical Note
TCM outreach complete, Please complete TCM  appt on 10/20/17. Pt reports to be taking metoprolol 12.5mg BID not 25mg QD as stated on MAR. (pt states she has been taking like this for the past 10 years and that you were aware.  Change noted on Med rec

## (undated) NOTE — IP AVS SNAPSHOT
[facility]            (For Outpatient Use Only) Initial Admit Date: 1/22/2021   Inpt/Obs Admit Date: Inpt: N/A / Obs: 01/23/21   Discharge Date:    Checo Sharma:  [de-identified]   MRN: [de-identified]   CSN: [id number]   CEID: [id number]        EN Subscriber ID: [id number] Pt Rel to Subscriber: SELF   TERTIARY INSURANCE   Payor:  Plan:    Group Number:  Insurance Type:    Subscriber Name:  Subscriber :    Subscriber ID:  Pt Rel to Subscriber:    Hospital Account Financial Class: Medicare    Andrae Gomez

## (undated) NOTE — LETTER
10/31/2017              [address], [place] [facility] [address]         Dear Sarina Hassan records indicate that the tests ordered for you by Derek Tian MD  have not been done.   If you have, in fact, al

## (undated) NOTE — LETTER
[facility]  Administration of Anesthesia  1. I, [address] [place], or _________________________________ acting on her behalf, (Patient) (Dependent/Representative) request to receive anesthesia for my pending procedure/operation/treatment. 6. OBSTETRIC PATIENTS: Specific risks/consequences of spinal/epidural anesthesia may include itching, low blood pressure, difficulty urinating, slowing of the baby's heart rate and headache.  Rare risks include infections, high spinal block, spinal bleeding ___________________________________________________           _____________________________________________________  Date/Time                                                                                               Responsible person in case of minor

## (undated) NOTE — MR AVS SNAPSHOT
[address] [facility] [address]  [phone]               Thank you for choosing us for your health care visit with Huey Carson MD.  We are glad to serve you and happy to provide you with this summary of your visi [address] [facility] [address] [facility] [place], [address], [address] [facility]  [address] [place] [address]    Julio Johnson   [address] [address], [facility] 23 Facility, call Central Scheduling at [phone], Monday through Friday between 7:30am to 6pm and on Saturday between 8am and 1pm. Evening and weekend appointments for your exam are available. Walk-in patients are welcome for most exams.      [place] or ligaments. While illness can cause back pain, it is usually not caused by a serious illness.  Mechanical problems include:   · Physical activity such as sports, exercise, work, or normal activity  · Overexertion, lifting, pushing, pulling incorrectly or and then remove it for 20 minutes. Do this over a period of 60 to 90 minutes or several times a day. This will reduce swelling and pain. Wrap the ice pack in a thin towel or plastic to protect your skin. · You can start with ice, then switch to heat.  Heat · Loss of bowel or bladder control  When to seek medical advice  Call your healthcare provider right away if any of these occur:   · Pain becomes worse or spreads to your legs  · Weakness or numbness in one or both legs  · Numbness in the groin or genital Date Last Reviewed: 9/1/2015  © 0210-2721 [address], [address] [place], [place] [address]. All rights reserved. This information is not intended as a substitute for professional medical care.  Always follow your healthcare professional' If you have questions, you can call [phone] to talk to our [facility] Staff. Remember, [employer] is NOT to be used for urgent needs. For medical emergencies, dial 911. Visit [email or web address] to learn more.            Visit [facility]

## (undated) NOTE — ED AVS SNAPSHOT
Uri Tanner   MRN: [id number]    Department:  [facility] Emergency Department   Date of Visit:  10/8/2018           Disclosure     Insurance plans vary and the physician(s) referred by the ER may not be covered by your plan.  Please conta within the next three months to obtain basic health screening including reassessment of your blood pressure.     IF THERE IS ANY CHANGE OR WORSENING OF YOUR CONDITION, CALL YOUR PRIMARY CARE PHYSICIAN AT ONCE OR RETURN IMMEDIATELY TO THE EMERGENCY DEPARTMEN

## (undated) NOTE — ED AVS SNAPSHOT
Anju Johnson   MRN: [id number]    Department:  [facility] Emergency Department   Date of Visit:  6/14/2019           Disclosure     Insurance plans vary and the physician(s) referred by the ER may not be covered by your plan.  Please conta within the next three months to obtain basic health screening including reassessment of your blood pressure.     IF THERE IS ANY CHANGE OR WORSENING OF YOUR CONDITION, CALL YOUR PRIMARY CARE PHYSICIAN AT ONCE OR RETURN IMMEDIATELY TO THE EMERGENCY DEPARTMEN

## (undated) NOTE — IP AVS SNAPSHOT
[facility]            (For Outpatient Use Only) Initial Admit Date: 12/3/2018   Inpt/Obs Admit Date: Inpt: 12/3/18 / Obs: N/A   Discharge Date:    Gemma Bal:  [de-identified]   MRN: [de-identified]   CSN: [id number]        ENCOUNTER  Patient Class Group Number:  Insurance Type:    Subscriber Name:  Subscriber :    Subscriber ID:  Pt Rel to Subscriber:    Hospital Account Financial Class: Medicare    2018

## (undated) NOTE — LETTER
Jaelyn Vazquez [address]  [facility] [address], [place], [place]  [address]  INFORMED CONSENT FOR TRANSFUSION OF BLOOD OR BLOOD PRODUCTS  My physician has informed me of the nature, purpose, benefits and risks of transfusion for blood and blood components that __________________________________________          ______________________________________________  (Signature of Patient)                                                            (Responsible party in case of Minor,

## (undated) NOTE — Clinical Note
AIMEE, TCM call made, see notes.  NCM confirmed with patient that she has a TCM HFU on 2/4/2021 at 10:20 am.

## (undated) NOTE — LETTER
[address] [place] [address]      Authorization for Surgical Operation and Procedure     Date:___________                                                                                                         Time:_______ 4.   Should the need arise during my operation or immediate post-operative period, I also consent to the administration of blood and/or blood products.   Further, I understand that despite careful testing and screening of blood or blood products by [facility] 8.   I recognize that in the event my procedure results in extended X-Ray/fluoroscopy time, I may develop a skin reaction. 9.  If I have a Do Not Attempt Resuscitation (DNAR) order in place, that status will be suspended while in the operating room, proc STATEMENT OF PHYSICIAN My signature below affirms that prior to the time of the procedure; I have explained to the patient and/or his/her legal representative, the risks and benefits involved in the proposed treatment and any reasonable alternative to the

## (undated) NOTE — Clinical Note
FYI, TCM call made, see notes. NCM attempted to schedule a TCM HFU, patient states she will call herself to schedule. Message sent to MD's office.

## (undated) NOTE — IP AVS SNAPSHOT
[facility]            (For Outpatient Use Only) Initial Admit Date: 10/8/2020   Inpt/Obs Admit Date: Inpt: 10/8/20 / Obs: N/A   Discharge Date:    Cynthia Max:  [de-identified]   MRN: [de-identified]   CSN: [id number]   CEID: [id number]        ZFR Subscriber ID: [id number] Pt Rel to Subscriber: SELF   TERTIARY INSURANCE   Payor:  Plan:    Group Number:  Insurance Type:    Subscriber Name:  Subscriber :    Subscriber ID:  Pt Rel to Subscriber:    Hospital Account Financial Class: Medicare    [place]

## (undated) NOTE — IP AVS SNAPSHOT
Patient Demographics     Address  [address] [place] [address] Phone  [phone] [facility]) *Preferred*  [phone] [facility])      Emergency Contact(s)     Name Relation Home Work [place] Daughter   [phone] [address] [place] [address]  Talya Cox MD. Schedule an appointment as soon as possible for a visit in 2 weeks.     Specialty:  OTOLARYNGOLOGY  Why:  For wound re-check; nasal fracture/laceration follow up  Contact information:  [address] Take nightly and check INR daily. Goal is 2-3.   Please check INR on 12/13   Ryan Trejo MD               Where to Get Your Medications      Please  your prescriptions at the location directed by your doctor or nurse    Bring a paper prescrip [id number] Enoxaparin Sodium (LOVENOX) 40 MG/0.4ML injection 40 mg 12/12/18 1012 Given              Recent Vital Signs       Most Recent Value   Vitals  132/45 Filed at 12/12/2018 0939   Pulse  81 Filed at 12/12/2018 0939   Resp  18 Filed at 12/12/2018 093 Chloride 99 95 - 110 mmol/L — [facility]   CO2 26 22 - 32 mmol/L — [facility]   BUN 12 8 - 20 mg/dL [facility]   Creatinine 0.76 0.50 - 1.50 mg/dL [facility]   Calcium, Total 8.1 8.5 - 10.5 mg/dL  [facility]   BUN/CREA Ratio 15.8 10.0 - 20.0 Author:  Jennifer Martínez MD Service:  Hospitalist Author Type:  Physician    Filed:  12/5/2018  7:51 PM Status:  Signed    :  Jennifer Martínez MD (Physician)       [facility]    PATIENT'S NAME: Xavi Lai   ATTENDING PHYSICIAN: Viola Romeo radiation therapy, total abdominal hysterectomy. She also had a colon surgery done in [place] 2 years ago, but she is not sure about the reason. She said she did not have colon cancer; possible colonic volvulus.          MEDICATIONS:  Please see medicatio 2.   History of coronary artery disease. 3.   Hypertension. 4.   Diabetes mellitus type 2.  5.   Hyperglycemia with no history of diabetes. Patient will be admitted to general medical floor. Pain control. IV gentle hydration.   Will obtain Lexiscan s • Bladder problem    • Cancer [facility])    • Cataract    • Disorder of thyroid    • Essential hypertension    • High blood pressure    • High cholesterol    • History of blood transfusion    • History of breast cancer in female 2000    \"Left sided breast canc HYDROcodone-acetaminophen (NORCO)  MG per tab 1 tablet 1 tablet Oral Q4H PRN   Senna-Docusate Sodium (SENOKOT S) 8.6-50 MG tab 2 tablet 2 tablet Oral BID   Rosuvastatin Calcium (CRESTOR) tab 20 mg 20 mg Oral Nightly   Levothyroxine Sodium (SYNTHROID) Respiratory: negative for cough, hemoptysis and wheezing  Cardiovascular: negative for chest pain, exertional dyspnea  Gastrointestinal: negative for abdominal pain, diarrhea and nausea  Genitourinary:negative for dysuria, frequency and hematuria  Hematolo HGB  8.7*  8.7*  8.4*   HCT  25.7*  26.0*  24.6*   MCV  91.7  92.0  91.0   WBC  6.3  6.9  6.0   PLT  152  149  174     Recent Labs   Lab  12/03/18   1902  12/05/18   0450  12/07/18   0431  12/07/18   1252   GLU  178*  137*  117*   --    BUN  24*  20  12 Location [facility] 4W/SW/SE Attending Marla Lubin MD   Hosp Day # 9 PCP Edy Abdullahi MD     Date of Admission: 12/3/2018     Date of Discharge: 12/12/18  No discharge date for patient encounter.     Hospital Discharge Diagnoses: femur frac PAD  -will need IR follow up post op   -s/p normal angiogram - no intervetion     Nasal fracture  -seen by Dr Ronan Lazcano  -no surgical intervention needed at this time  -completed clindamycin  -neosporin dressing to nasal bridge     HTN  -well controlled     Garcia soft tissue swelling and scattered soft tissue gas. No acute orbital fracture.   Dictated by (CST): Jaron Yancey MD on 12/03/2018 at 16:16     Approved by (CST): Jaron Yancey MD on 12/03/2018 at 16:27          Ct Knee Left (cpt=73700)    Result D posterior tibial artery. Asymmetric enlargement and hyperdensity within the left side within the anterior compartment musculature suggestive of edema given the recent trauma.  There is a 4.6 cm intramuscular hematoma within the mid muscle belly of the left GFRNAA  >60   < >  >60  >60  >60   CA  8.0*   < >  7.8*  8.0*  8.1*   ALB  2.7*   --    --    --    --    NA  124*   < >  127*  130*  131*   K  3.7   < >  4.1  4.1  4.4   CL  93*   < >  97  98  99   CO2  25   < >  25  26  26   ALKPHO  55   --    --    -- Levothyroxine Sodium 100 MCG Tabs  Commonly known as:  SYNTHROID      TAKE 1 TABLET BY MOUTH DAILY BEFORE BREAKFAST   Quantity:  90 tablet  Refills:  0     Metoprolol Succinate ER 25 MG Tb24  Commonly known as:   Toprol XL      12.5 mg 2 (two) times breann Refills:  0     Sulfamethoxazole-TMP -160 MG Tabs per tablet  Commonly known as:  BACTRIM DS      Take BID for 3 days   Refills:  0     triple antibiotic 3.5-400-5000 Oint      Apply to nose daily   Refills:  0     Warfarin Sodium 5 MG Tabs  Commonl Physical Therapy Notes (last 72 hours) (Notes from 12/9/2018  1:02 PM through 12/12/2018  1:02 PM)      Physical Therapy Note signed by Zaira Sim PT at 12/11/2018 11:56 AM  Version 1 of 1    Author:  Zaria Sim PT Service:  — Author Type PT Discharge Recommendations: Sub-acute rehabilitation[JG.2]     PLAN[JG.1]  PT Treatment Plan: Bed mobility; Body mechanics; Patient education;Gait training;Transfer training;Balance training;Strengthening[JG.2]    SUBJECTIVE  \"I don't recall having knee p present;SCDs in place[JG.2]    CURRENT GOALS   Goals to be met by: 12/26/18  Patient Goal Patient's self-stated goal is: to go to rehab   Goal #1 Patient is able to demonstrate supine - sit EOB @ level: minimum assistance      Goal #1   Current Status Not yesterday. Patient understands her NWB status and was lifted back to bed last night with overhead lift. Patient also instructed in UE exercises from OT during the session. Patient's daughter present in room.  Patient with increased pain in L knee during exe How much help from another person does the patient currently need. ..   -   Moving to and from a bed to a chair (including a wheelchair)?: Total   -   Need to walk in hospital room?: Total   -   Climbing 3-5 steps with a railing?: Total     AM-PAC Score:  R PHYSICAL THERAPY EVALUATION - INPATIENT     Room Number: 420/420-A  Evaluation Date: 12/9/2018  Type of Evaluation: Initial   Physician Order: See Comment for Specific Order(AROM/PROM to B ankles, hoes, hindfeet)    Presenting Problem: s/p fall with ORIF PT Treatment Plan: Bed mobility; Patient education;Gait training;Family education;Strengthening;Range of motion;Transfer training;Balance training(as cleared per MD)  Rehab Potential : Good  Frequency (Obs): Daily       PHYSICAL THERAPY MEDICAL/SOCIAL HISTO OBJECTIVE  Precautions: Bed/chair alarm(knee immbolizer; out of bed with lift)  Fall Risk: High fall risk    WEIGHT BEARING RESTRICTION  Weight Bearing Restriction: L lower extremity           L Lower Extremity: Non-Weight Bearing    PAIN ASSESSMENT  Ratin FUNCTIONAL ABILITY STATUS  Gait Assessment   Gait Assistance: Not tested           Stoop/Curb Assistance: Not tested       Bed Mobility: not assessed, assist needed per pt    Transfers: mechanical lift to chair per MD    Exercise/Education Provided:  [facility])  Active Problems:    Laceration of nose, initial encounter    Hyponatremia      OCCUPATIONAL THERAPY ASSESSMENT    The patient's Approx Degree of Impairment: 53.32% has been calculated based on documentation in the [facility] '6 clicks' Inpatient Daily Ac Standardized Score (AM-PAC Scale): 35.96  CMS Modifier (G-Code): CK    FUNCTIONAL TRANSFER ASSESSMENT  Supine to Sit : Not tested  Sit to Stand: Not tested    FUNCTIONAL ADL ASSESSMENT  Grooming: set up from bed level    Patient End of Session: In bed;Need Activity Short Form. Research supports that patients with this level of impairment may benefit from[JONATHON.1] sub acute rehab[JONATHON.3]. [JONATHON.1]    RN contacted prior to start of care. Pt received in bed w/ daughter at bedside.  MD orders at this time are for [facility] • LAPAROSCOPIC CHOLECYSTECTOMY     • LUMPECTOMY LEFT     • RADIATION LEFT[JONATHON.2]       HOME SITUATION[JONATOHN.1]  Type of Home: Apartment([facility])  Home Layout: One level  Lives With: Alone    Drives: No  Patient Regularly Uses: Glasses[JONATHON.2]    Stairs Chair Transfer:[JONATHON.1] not allowed per md[JONATHON.4]  Car Transfer:[JONATHON.1] na[JONATHON.4]    Bedroom Mobility:[JONATHON.1] not allowed per md[JONATHON.4]    BALANCE ASSESSMENT  Static Sitting:[JONATHON.1] deferred[JONATHON.4]  Dynamic Sitting:[JONATHON.1] na[JONATHON.4]  Static Standing:[JONATHON.1] na[JONATHON.4]

## (undated) NOTE — LETTER
Paige Andrade, [address] [facility]  [address], [place]       09/28/17        Patient: Emily Yeung   YOB: 1928   Date of Visit: 9/28/2017       Dear  Michelle Garcia  :           As you know, Roxanna Pearce is an 15-year-old female who I a PHYSICAL EXAMINATION: Vital signs normal. HEENT examination is unremarkable with pupils equal round and reactive to light and accommodation. Neck without adenopathy, thyromegaly, JVD nor bruit.   Lungs notable for bibasilar crackles to auscultation and perc [facility] MEDICAL OFFICE [facility], [address] [address]    Document electronically generated by:  Ernst Siddiqi

## (undated) NOTE — ED AVS SNAPSHOT
Afshan Tran   MRN: [id number]    Department:  [facility] Emergency Department   Date of Visit:  3/8/2018           Disclosure     Insurance plans vary and the physician(s) referred by the ER may not be covered by your plan.  Please contac within the next three months to obtain basic health screening including reassessment of your blood pressure.     IF THERE IS ANY CHANGE OR WORSENING OF YOUR CONDITION, CALL YOUR PRIMARY CARE PHYSICIAN AT ONCE OR RETURN IMMEDIATELY TO THE EMERGENCY DEPARTMEN

## (undated) NOTE — Clinical Note
FYI, TCM call made, see notes. NCM placed a call to MD's office and spoke with Vianey Man to assist in scheduling appt., SHELBIE waiting for a call back with a date and time for TCM HFU.

## (undated) NOTE — LETTER
[address] [facility], [address] [place] [address], [address]  [address] [place] [address] [phone]        Dear Korey Jenkins MD,      I had the pleasure of seeing your patient, Anselmo Mae on 7/20/2017.      Below please find a Current Outpatient Prescriptions:  Rosuvastatin Calcium 20 MG Oral Tab TK 1 T PO HS Disp:  Rfl: 5   Meclizine HCl 25 MG Oral Tab TK 1 T PO TID Disp:  Rfl: 3   Valsartan-Hydrochlorothiazide 160-12.5 MG Oral Tab TK 1 T PO  QD Disp:  Rfl: 0   CLOPIDOGREL Smoking status: Former Smoker                                                                Packs/day: 1.00      Years: 20.00          Types: Cigarettes    Alcohol use: Yes           0.0 oz/week       Comment: Weekly, Wine, 5 glasses;     Drug use:  No acuity symmetric. Ear canals were clear IX,X- Palate elevates in midline. XI- Shoulder shrug symmetric. XII- Tongue in midline, without atrophy. Motor: 5/5 strength in the upper and lower extremities. Tone normal. No pronator drift .   Sensory: Baron Davila

## (undated) NOTE — IP AVS SNAPSHOT
[facility]            (For Outpatient Use Only) Initial Admit Date: 2022   Inpt/Obs Admit Date: Inpt: 22 / Obs: N/A   Discharge Date:    Hospital Acct:  [de-identified]   MRN: [de-identified]   CSN: [id number]   CEID: [id number]        ENCOUNTER  Patient Class: Inpatient Admitting Provider: Katie Jackson MD Unit: [address] [place] [address]   Hospital Service: Cardiac Telemetry Attending Provider: Nahomy Swan MD   Bed: 328-A   Visit Type:   Referring Physician: No ref. provider found Billing Flag:    Admit Diagnosis: Atrial fibrillation with rapid ventricular response [facility]) [I48.91]      PATIENT  Legal Name:   Lauren Sharpe   Legal Sex: Female  Gender ID:              [address] [facility] Name:    PCP:  Juliana Bar: [phone]   Address:  [address] [place] [address] APT* : 1928 (89 yrs) Mobile: [phone]         City[place]/Zip: [place] [facility] [address] Marital:  Language: Rosmery Reynaldo: [place] SSN4: [id number] Quaker: Latter day - Parish Not *     Race: White Ethnicity: Non  Or [address] [place] [address]   Name Relationship Legal Guardian? Home Phone Work Phone Mobile Phone   1. [employer]  Estefania Gonzales Daughter  Daughter   No   [phone]    [phone]     GUARANTOR  Guarantor: Mona Sosa : 1928 Home Phone: [phone]   Address: [address]  Sex: Female Work Phone:    City/State/Zip: [place], [place]   Rel. to Patient: Self Guarantor ID: [id number]   GUARANTOR EMPLOYER   Employer:  Status: RETIRED     COVERAGE  PRIMARY INSURANCE   Payor: MEDICARE Plan: MEDICARE PART A&B   Group Number: [id number] Insurance Type: [id number] Name: Dago Webb : 1928   Subscriber ID: [id number] Pt Rel to Subscriber: Self   SECONDARY INSURANCE   Payor: COMMERCIAL Plan: [facility]   Group Number: [id number] Insurance Type: INDEMNITY   Subscriber Name: Dago Webb : 1928 Subscriber ID: [id number] Pt Rel to Subscriber: SELF   TERTIARY INSURANCE   Payor:  Plan:    Group Number:  Insurance Type:    Subscriber Name:  Subscriber :    Subscriber ID:  Pt Rel to Subscriber:    Hospital Account Financial Class: Medicare    2022

## (undated) NOTE — ED AVS SNAPSHOT
Armida Fernandez   MRN: [id number]    Department:  [facility] Emergency Department   Date of Visit:  8/24/2017           Disclosure     Insurance plans vary and the physician(s) referred by the ER may not be covered by your plan.  Please conta CARE PHYSICIAN AT ONCE OR RETURN IMMEDIATELY TO THE EMERGENCY DEPARTMENT. If you have been prescribed any medication(s), please fill your prescription right away and begin taking the medication(s) as directed.   If you believe that any of the medications

## (undated) NOTE — LETTER
[address] [facility] [place]  Authorization for Invasive Procedures  Date: 11/18/2022          Time: 1400    1. I hereby authorize Dr Sin Rai, my physician and his/her assistants (if applicable), which may include medical students, residents, and/or fellows, to perform the following surgical operation/ procedure and administer such anesthesia as may be determined necessary by my physician:  Operation/Procedure name (s)  Cardiac Catheterization, Left Ventricular Cineangiography, Bilateral Selective Coronary Angiography and/or Right Heart Catheterization; possible Percutaneous Transluminal Coronary Angioplasty, Coronary Atherectomy, Coronary Stent, Intracoronary Thrombolytic therapy, Antiplatelet therapy and/or Intravascular Ultrasound on Pavithra Cedillo   2. I recognize that during the surgical operation/procedure, unforeseen conditions may necessitate additional or different procedures than those listed above. I, therefore, further authorize and request that the above-named surgeon, assistants, or designees perform such procedures as are, in their judgment, necessary and desirable. 3.   My surgeon/physician has discussed prior to my surgery the potential benefits, risks and side effects of this procedure; the likelihood of achieving goals; and potential problems that might occur during recuperation. They also discussed reasonable alternatives to the procedure, including risks, benefits, and side effects related to the alternatives and risks related to not receiving this procedure. I have had all my questions answered and I acknowledge that no guarantee has been made as to the result that may be obtained. 4.   Should the need arise during my operation/procedure, which includes change of level of care prior to discharge, I also consent to the administration of blood and/or blood products.   Further, I understand that despite careful testing and screening of blood or blood products by collecting agencies, I may still be subject to ill effects as a result of receiving a blood transfusion and/or blood products. The following are some, but not all, of the potential risks that can occur: fever and allergic reactions, hemolytic reactions, transmission of diseases such as Hepatitis, AIDS and Cytomegalovirus (CMV) and fluid overload. In the event that I wish to have an autologous transfusion of my own blood, or a directed donor transfusion, I will discuss this with my physician. Check only if Refusing Blood or Blood Products  I understand refusal of blood or blood products as deemed necessary by my physician may have serious consequences to my condition to include possible death. I hereby assume responsibility for my refusal and release the hospital, its personnel, and my physicians from any responsibility for the consequences of my refusal.          o  Refuse      5. I authorize the use of any specimen, organs, tissues, body parts or foreign objects that may be removed from my body during the operation/procedure for diagnosis, research or teaching purposes and their subsequent disposal by hospital authorities. I also authorize the release of specimen test results and/or written reports to my treating physician on the hospital medical staff or other referring or consulting physicians involved in my care, at the discretion of the Pathologist or my treating physician. 6.   I consent to the photographing or videotaping of the operations or procedures to be performed, including appropriate portions of my body for medical, scientific, or educational purposes, provided my identity is not revealed by the pictures or by descriptive texts accompanying them. If the procedure has been photographed/videotaped, the surgeon will obtain the original picture, image, videotape or CD.   The hospital will not be responsible for storage, release or maintenance of the picture, image, tape or CD.    7.   I consent to the presence of a  or observers in the operating room as deemed necessary by my physician or their designees. 8.   I recognize that in the event my procedure results in extended X-Ray/fluoroscopy time, I may develop a skin reaction. 9. If I have a Do Not Attempt Resuscitation (DNAR) order in place, that status will be suspended while in the operating room, procedural suite, and during the recovery period unless otherwise explicitly stated by me (or a person authorized to consent on my behalf). The surgeon or my attending physician will determine when the applicable recovery period ends for purposes of reinstating the DNAR order. 10. Patients having a sterilization procedure: I understand that if the procedure is successful the results will be permanent and it will therefore be impossible for me to inseminate, conceive, or bear children. I also understand that the procedure is intended to result in sterility, although the result has not been guaranteed. 11. I acknowledge that my physician has explained sedation/analgesia administration to me including the risk and benefits I consent to the administration of sedation/analgesia as may be necessary or desirable in the judgment of my physician.     I CERTIFY THAT I HAVE READ AND FULLY UNDERSTAND THE ABOVE CONSENT TO OPERATION and/or OTHER PROCEDURE.        ____________________________________       _________________________________      ______________________________  Signature of Patient         Signature of Responsible Person        Printed Name of Responsible Person    ____________________________________      _________________________________      ______________________________       Signature of Witness          Relationship to Patient                       Date                                       Time  Patient Name: Jose Alexander     : 1928                 Printed: 2022      Medical Record #: [id number] Page 1 of 1

## (undated) NOTE — MR AVS SNAPSHOT
[facility] [place] [address] [address] [address] [phone]               Thank you for choosing us for your health care visit with Malu Kwon MD.  We are glad to serve you and happy to provide you with this summary of insurance company's guidelines for prior authorization for this test.  You may be held responsible for payment in full if proper authorization is not acquired.   Please contact the Patient Business Office at [phone] if you have any questions related to Order:  Orthopedic - Internal    Trey Prince MD   [address] [address] [facility] [address]   Phone:  [phone]   Fax:  [phone]         Referral Orders      Normal Orders This Visit    NEURO - INTERNAL [[id number] [facility] Dx:  Primary osteoarthritis of both knees [M17.0]          Medical Issues Discussed Today     Hyperlipidemia    Hypothyroidism    Personal history of malignant neoplasm of breast    Essential hypertension with goal blood pressure less than 130/80 * Valsartan-Hydrochlorothiazide 160-12.5 MG Tabs   TK 1 T PO  QD           * Notice: This list has 2 medication(s) that are the same as other medications prescribed for you.  Read the directions carefully, and ask your doctor or other care provider to rev ? Pay attention to curbs and other changes in surfaces when out in the community. ? Take care when walking on gravel or grassy surfaces. ? Avoid walking on snowy or icy surfaces. ? Use a cane or walker (indoors and out) if you are unsteady on your feet.